# Patient Record
Sex: FEMALE | Race: WHITE | HISPANIC OR LATINO | Employment: OTHER | URBAN - METROPOLITAN AREA
[De-identification: names, ages, dates, MRNs, and addresses within clinical notes are randomized per-mention and may not be internally consistent; named-entity substitution may affect disease eponyms.]

---

## 2017-04-03 ENCOUNTER — ALLSCRIPTS OFFICE VISIT (OUTPATIENT)
Dept: OTHER | Facility: OTHER | Age: 67
End: 2017-04-03

## 2017-04-03 DIAGNOSIS — E78.5 HYPERLIPIDEMIA: ICD-10-CM

## 2017-04-03 DIAGNOSIS — I10 ESSENTIAL (PRIMARY) HYPERTENSION: ICD-10-CM

## 2017-04-03 DIAGNOSIS — M19.042 PRIMARY OSTEOARTHRITIS OF LEFT HAND: ICD-10-CM

## 2017-04-03 DIAGNOSIS — M19.041 PRIMARY OSTEOARTHRITIS OF RIGHT HAND: ICD-10-CM

## 2017-04-03 DIAGNOSIS — M25.512 PAIN IN LEFT SHOULDER: ICD-10-CM

## 2017-04-03 DIAGNOSIS — T39.391A: ICD-10-CM

## 2017-04-08 ENCOUNTER — APPOINTMENT (OUTPATIENT)
Dept: LAB | Facility: HOSPITAL | Age: 67
End: 2017-04-08
Payer: MEDICARE

## 2017-04-08 ENCOUNTER — HOSPITAL ENCOUNTER (OUTPATIENT)
Dept: RADIOLOGY | Facility: HOSPITAL | Age: 67
Discharge: HOME | End: 2017-04-08
Payer: MEDICARE

## 2017-04-08 DIAGNOSIS — E78.5 HYPERLIPIDEMIA: ICD-10-CM

## 2017-04-08 DIAGNOSIS — M19.041 PRIMARY OSTEOARTHRITIS OF RIGHT HAND: ICD-10-CM

## 2017-04-08 DIAGNOSIS — T39.391A: ICD-10-CM

## 2017-04-08 DIAGNOSIS — M25.512 PAIN IN LEFT SHOULDER: ICD-10-CM

## 2017-04-08 DIAGNOSIS — M19.042 PRIMARY OSTEOARTHRITIS OF LEFT HAND: ICD-10-CM

## 2017-04-08 DIAGNOSIS — I10 ESSENTIAL (PRIMARY) HYPERTENSION: ICD-10-CM

## 2017-04-08 LAB
ALBUMIN SERPL BCP-MCNC: 3.9 G/DL (ref 3.5–5)
ALP SERPL-CCNC: 65 U/L (ref 46–116)
ALT SERPL W P-5'-P-CCNC: 34 U/L (ref 12–78)
ANION GAP SERPL CALCULATED.3IONS-SCNC: 7 MMOL/L (ref 4–13)
AST SERPL W P-5'-P-CCNC: 24 U/L (ref 5–45)
BASOPHILS # BLD AUTO: 0.02 THOUSANDS/ΜL (ref 0–0.1)
BASOPHILS NFR BLD AUTO: 0 % (ref 0–1)
BILIRUB SERPL-MCNC: 0.42 MG/DL (ref 0.2–1)
BUN SERPL-MCNC: 20 MG/DL (ref 5–25)
CALCIUM SERPL-MCNC: 9.1 MG/DL (ref 8.3–10.1)
CHLORIDE SERPL-SCNC: 107 MMOL/L (ref 100–108)
CHOLEST SERPL-MCNC: 148 MG/DL (ref 50–200)
CO2 SERPL-SCNC: 31 MMOL/L (ref 21–32)
CREAT SERPL-MCNC: 0.64 MG/DL (ref 0.6–1.3)
CREAT UR-MCNC: 113 MG/DL
EOSINOPHIL # BLD AUTO: 0.36 THOUSAND/ΜL (ref 0–0.61)
EOSINOPHIL NFR BLD AUTO: 7 % (ref 0–6)
ERYTHROCYTE [DISTWIDTH] IN BLOOD BY AUTOMATED COUNT: 13.8 % (ref 11.6–15.1)
GFR SERPL CREATININE-BSD FRML MDRD: >60 ML/MIN/1.73SQ M
GLUCOSE P FAST SERPL-MCNC: 103 MG/DL (ref 65–99)
HCT VFR BLD AUTO: 38.9 % (ref 34.8–46.1)
HDLC SERPL-MCNC: 43 MG/DL (ref 40–60)
HGB BLD-MCNC: 13 G/DL (ref 11.5–15.4)
LDLC SERPL CALC-MCNC: 74 MG/DL (ref 0–100)
LYMPHOCYTES # BLD AUTO: 1.89 THOUSANDS/ΜL (ref 0.6–4.47)
LYMPHOCYTES NFR BLD AUTO: 38 % (ref 14–44)
MCH RBC QN AUTO: 30.5 PG (ref 26.8–34.3)
MCHC RBC AUTO-ENTMCNC: 33.4 G/DL (ref 31.4–37.4)
MCV RBC AUTO: 91 FL (ref 82–98)
MICROALBUMIN UR-MCNC: 10.4 MG/L (ref 0–20)
MICROALBUMIN/CREAT 24H UR: 9 MG/G CREATININE (ref 0–30)
MONOCYTES # BLD AUTO: 0.41 THOUSAND/ΜL (ref 0.17–1.22)
MONOCYTES NFR BLD AUTO: 8 % (ref 4–12)
NEUTROPHILS # BLD AUTO: 2.33 THOUSANDS/ΜL (ref 1.85–7.62)
NEUTS SEG NFR BLD AUTO: 47 % (ref 43–75)
NRBC BLD AUTO-RTO: 0 /100 WBCS
PLATELET # BLD AUTO: 231 THOUSANDS/UL (ref 149–390)
PMV BLD AUTO: 11 FL (ref 8.9–12.7)
POTASSIUM SERPL-SCNC: 4.2 MMOL/L (ref 3.5–5.3)
PROT SERPL-MCNC: 7.3 G/DL (ref 6.4–8.2)
RBC # BLD AUTO: 4.26 MILLION/UL (ref 3.81–5.12)
SODIUM SERPL-SCNC: 145 MMOL/L (ref 136–145)
T4 FREE SERPL-MCNC: 0.98 NG/DL (ref 0.76–1.46)
TRIGL SERPL-MCNC: 153 MG/DL
TSH SERPL DL<=0.05 MIU/L-ACNC: 5.73 UIU/ML (ref 0.36–3.74)
WBC # BLD AUTO: 5.01 THOUSAND/UL (ref 4.31–10.16)

## 2017-04-08 PROCEDURE — 84439 ASSAY OF FREE THYROXINE: CPT

## 2017-04-08 PROCEDURE — 82570 ASSAY OF URINE CREATININE: CPT

## 2017-04-08 PROCEDURE — 73030 X-RAY EXAM OF SHOULDER: CPT

## 2017-04-08 PROCEDURE — 80061 LIPID PANEL: CPT

## 2017-04-08 PROCEDURE — 80053 COMPREHEN METABOLIC PANEL: CPT

## 2017-04-08 PROCEDURE — 82043 UR ALBUMIN QUANTITATIVE: CPT

## 2017-04-08 PROCEDURE — 36415 COLL VENOUS BLD VENIPUNCTURE: CPT

## 2017-04-08 PROCEDURE — 85025 COMPLETE CBC W/AUTO DIFF WBC: CPT

## 2017-04-08 PROCEDURE — 84443 ASSAY THYROID STIM HORMONE: CPT

## 2017-04-08 PROCEDURE — 73130 X-RAY EXAM OF HAND: CPT

## 2017-04-11 ENCOUNTER — GENERIC CONVERSION - ENCOUNTER (OUTPATIENT)
Dept: OTHER | Facility: OTHER | Age: 67
End: 2017-04-11

## 2017-04-18 ENCOUNTER — ALLSCRIPTS OFFICE VISIT (OUTPATIENT)
Dept: OTHER | Facility: OTHER | Age: 67
End: 2017-04-18

## 2017-04-28 ENCOUNTER — ALLSCRIPTS OFFICE VISIT (OUTPATIENT)
Dept: OTHER | Facility: OTHER | Age: 67
End: 2017-04-28

## 2017-05-09 ENCOUNTER — ALLSCRIPTS OFFICE VISIT (OUTPATIENT)
Dept: OTHER | Facility: OTHER | Age: 67
End: 2017-05-09

## 2017-05-10 ENCOUNTER — ALLSCRIPTS OFFICE VISIT (OUTPATIENT)
Dept: OTHER | Facility: OTHER | Age: 67
End: 2017-05-10

## 2017-05-12 ENCOUNTER — APPOINTMENT (OUTPATIENT)
Dept: PHYSICAL THERAPY | Facility: MEDICAL CENTER | Age: 67
End: 2017-05-12
Payer: MEDICARE

## 2017-05-12 DIAGNOSIS — M25.512 PAIN IN LEFT SHOULDER: ICD-10-CM

## 2017-05-12 PROCEDURE — 97162 PT EVAL MOD COMPLEX 30 MIN: CPT

## 2017-05-12 PROCEDURE — G8979 MOBILITY GOAL STATUS: HCPCS

## 2017-05-12 PROCEDURE — G8978 MOBILITY CURRENT STATUS: HCPCS

## 2017-05-15 ENCOUNTER — APPOINTMENT (OUTPATIENT)
Dept: PHYSICAL THERAPY | Facility: MEDICAL CENTER | Age: 67
End: 2017-05-15
Payer: MEDICARE

## 2017-05-15 PROCEDURE — 97140 MANUAL THERAPY 1/> REGIONS: CPT

## 2017-05-15 PROCEDURE — 97110 THERAPEUTIC EXERCISES: CPT

## 2017-05-17 ENCOUNTER — APPOINTMENT (OUTPATIENT)
Dept: PHYSICAL THERAPY | Facility: MEDICAL CENTER | Age: 67
End: 2017-05-17
Payer: MEDICARE

## 2017-05-17 PROCEDURE — 97110 THERAPEUTIC EXERCISES: CPT

## 2017-05-17 PROCEDURE — 97140 MANUAL THERAPY 1/> REGIONS: CPT

## 2017-05-22 ENCOUNTER — APPOINTMENT (OUTPATIENT)
Dept: PHYSICAL THERAPY | Facility: MEDICAL CENTER | Age: 67
End: 2017-05-22
Payer: MEDICARE

## 2017-05-22 PROCEDURE — 97110 THERAPEUTIC EXERCISES: CPT

## 2017-05-22 PROCEDURE — 97140 MANUAL THERAPY 1/> REGIONS: CPT

## 2017-05-24 ENCOUNTER — APPOINTMENT (OUTPATIENT)
Dept: PHYSICAL THERAPY | Facility: MEDICAL CENTER | Age: 67
End: 2017-05-24
Payer: MEDICARE

## 2017-05-24 PROCEDURE — 97110 THERAPEUTIC EXERCISES: CPT

## 2017-05-30 ENCOUNTER — APPOINTMENT (OUTPATIENT)
Dept: PHYSICAL THERAPY | Facility: MEDICAL CENTER | Age: 67
End: 2017-05-30
Payer: MEDICARE

## 2017-06-02 ENCOUNTER — APPOINTMENT (OUTPATIENT)
Dept: PHYSICAL THERAPY | Facility: MEDICAL CENTER | Age: 67
End: 2017-06-02
Payer: MEDICARE

## 2017-06-02 RX ORDER — METHYLPREDNISOLONE 4 MG/1
4 TABLET ORAL
Status: ON HOLD | COMMUNITY
End: 2018-01-18 | Stop reason: ALTCHOICE

## 2017-06-04 ENCOUNTER — ANESTHESIA EVENT (OUTPATIENT)
Dept: GASTROENTEROLOGY | Facility: MEDICAL CENTER | Age: 67
End: 2017-06-04
Payer: MEDICARE

## 2017-06-04 RX ORDER — ONDANSETRON 2 MG/ML
4 INJECTION INTRAMUSCULAR; INTRAVENOUS EVERY 6 HOURS PRN
Status: CANCELLED | OUTPATIENT
Start: 2017-06-04

## 2017-06-05 ENCOUNTER — GENERIC CONVERSION - ENCOUNTER (OUTPATIENT)
Dept: GASTROENTEROLOGY | Facility: MEDICAL CENTER | Age: 67
End: 2017-06-05

## 2017-06-05 ENCOUNTER — HOSPITAL ENCOUNTER (OUTPATIENT)
Facility: MEDICAL CENTER | Age: 67
Setting detail: OUTPATIENT SURGERY
Discharge: HOME | End: 2017-06-05
Attending: INTERNAL MEDICINE | Admitting: INTERNAL MEDICINE
Payer: MEDICARE

## 2017-06-05 ENCOUNTER — ANESTHESIA (OUTPATIENT)
Dept: GASTROENTEROLOGY | Facility: MEDICAL CENTER | Age: 67
End: 2017-06-05
Payer: MEDICARE

## 2017-06-05 VITALS
WEIGHT: 138 LBS | OXYGEN SATURATION: 96 % | TEMPERATURE: 97.6 F | HEART RATE: 65 BPM | RESPIRATION RATE: 16 BRPM | HEIGHT: 60 IN | DIASTOLIC BLOOD PRESSURE: 77 MMHG | BODY MASS INDEX: 27.09 KG/M2 | SYSTOLIC BLOOD PRESSURE: 156 MMHG

## 2017-06-05 DIAGNOSIS — R10.9 ABDOMINAL PAIN: ICD-10-CM

## 2017-06-05 PROCEDURE — 88305 TISSUE EXAM BY PATHOLOGIST: CPT | Performed by: INTERNAL MEDICINE

## 2017-06-05 PROCEDURE — 88342 IMHCHEM/IMCYTCHM 1ST ANTB: CPT | Performed by: INTERNAL MEDICINE

## 2017-06-05 RX ORDER — SODIUM CHLORIDE 9 MG/ML
125 INJECTION, SOLUTION INTRAVENOUS CONTINUOUS
Status: DISCONTINUED | OUTPATIENT
Start: 2017-06-05 | End: 2017-06-05 | Stop reason: HOSPADM

## 2017-06-05 RX ORDER — PROPOFOL 10 MG/ML
INJECTION, EMULSION INTRAVENOUS AS NEEDED
Status: DISCONTINUED | OUTPATIENT
Start: 2017-06-05 | End: 2017-06-05 | Stop reason: SURG

## 2017-06-05 RX ORDER — SODIUM CHLORIDE 9 MG/ML
125 INJECTION, SOLUTION INTRAVENOUS CONTINUOUS
Status: CANCELLED | OUTPATIENT
Start: 2017-06-05

## 2017-06-05 RX ADMIN — PROPOFOL 100 MG: 10 INJECTION, EMULSION INTRAVENOUS at 14:03

## 2017-06-05 RX ADMIN — SODIUM CHLORIDE: 0.9 INJECTION, SOLUTION INTRAVENOUS at 14:09

## 2017-06-05 RX ADMIN — SODIUM CHLORIDE: 0.9 INJECTION, SOLUTION INTRAVENOUS at 13:57

## 2017-06-05 RX ADMIN — PROPOFOL 20 MG: 10 INJECTION, EMULSION INTRAVENOUS at 14:08

## 2017-06-05 RX ADMIN — PROPOFOL 20 MG: 10 INJECTION, EMULSION INTRAVENOUS at 14:05

## 2017-06-05 RX ADMIN — LIDOCAINE HYDROCHLORIDE 100 MG: 20 INJECTION, SOLUTION INTRAVENOUS at 14:03

## 2017-06-07 ENCOUNTER — ALLSCRIPTS OFFICE VISIT (OUTPATIENT)
Dept: OTHER | Facility: OTHER | Age: 67
End: 2017-06-07

## 2017-06-07 DIAGNOSIS — M77.12 LATERAL EPICONDYLITIS OF LEFT ELBOW: ICD-10-CM

## 2017-06-07 DIAGNOSIS — M77.11 LATERAL EPICONDYLITIS OF RIGHT ELBOW: ICD-10-CM

## 2017-06-09 ENCOUNTER — ALLSCRIPTS OFFICE VISIT (OUTPATIENT)
Dept: OTHER | Facility: OTHER | Age: 67
End: 2017-06-09

## 2017-06-16 ENCOUNTER — APPOINTMENT (OUTPATIENT)
Dept: OCCUPATIONAL THERAPY | Facility: MEDICAL CENTER | Age: 67
End: 2017-06-16
Payer: MEDICARE

## 2017-06-16 DIAGNOSIS — M77.11 LATERAL EPICONDYLITIS OF RIGHT ELBOW: ICD-10-CM

## 2017-06-16 DIAGNOSIS — M77.12 LATERAL EPICONDYLITIS OF LEFT ELBOW: ICD-10-CM

## 2017-06-16 PROCEDURE — 97010 HOT OR COLD PACKS THERAPY: CPT

## 2017-06-16 PROCEDURE — G8990 OTHER PT/OT CURRENT STATUS: HCPCS

## 2017-06-16 PROCEDURE — G8991 OTHER PT/OT GOAL STATUS: HCPCS

## 2017-06-16 PROCEDURE — 97140 MANUAL THERAPY 1/> REGIONS: CPT

## 2017-06-16 PROCEDURE — 97165 OT EVAL LOW COMPLEX 30 MIN: CPT

## 2017-06-21 ENCOUNTER — APPOINTMENT (OUTPATIENT)
Dept: OCCUPATIONAL THERAPY | Facility: MEDICAL CENTER | Age: 67
End: 2017-06-21
Payer: MEDICARE

## 2017-06-21 PROCEDURE — 97110 THERAPEUTIC EXERCISES: CPT

## 2017-06-21 PROCEDURE — 97140 MANUAL THERAPY 1/> REGIONS: CPT

## 2017-06-21 PROCEDURE — 97010 HOT OR COLD PACKS THERAPY: CPT

## 2017-06-22 ENCOUNTER — ALLSCRIPTS OFFICE VISIT (OUTPATIENT)
Dept: OTHER | Facility: OTHER | Age: 67
End: 2017-06-22

## 2017-06-26 ENCOUNTER — APPOINTMENT (OUTPATIENT)
Dept: OCCUPATIONAL THERAPY | Facility: MEDICAL CENTER | Age: 67
End: 2017-06-26
Payer: MEDICARE

## 2017-06-26 PROCEDURE — 97010 HOT OR COLD PACKS THERAPY: CPT

## 2017-06-26 PROCEDURE — 97140 MANUAL THERAPY 1/> REGIONS: CPT

## 2017-06-26 PROCEDURE — 97110 THERAPEUTIC EXERCISES: CPT

## 2017-06-28 ENCOUNTER — APPOINTMENT (OUTPATIENT)
Dept: OCCUPATIONAL THERAPY | Facility: MEDICAL CENTER | Age: 67
End: 2017-06-28
Payer: MEDICARE

## 2017-06-28 PROCEDURE — 97140 MANUAL THERAPY 1/> REGIONS: CPT

## 2017-06-28 PROCEDURE — 97110 THERAPEUTIC EXERCISES: CPT

## 2017-06-28 PROCEDURE — 97010 HOT OR COLD PACKS THERAPY: CPT

## 2017-06-29 ENCOUNTER — GENERIC CONVERSION - ENCOUNTER (OUTPATIENT)
Dept: OTHER | Facility: OTHER | Age: 67
End: 2017-06-29

## 2017-07-03 ENCOUNTER — APPOINTMENT (OUTPATIENT)
Dept: OCCUPATIONAL THERAPY | Facility: MEDICAL CENTER | Age: 67
End: 2017-07-03
Payer: MEDICARE

## 2017-07-03 DIAGNOSIS — R94.6 ABNORMAL RESULTS OF THYROID FUNCTION STUDIES: ICD-10-CM

## 2017-07-03 DIAGNOSIS — Z12.31 ENCOUNTER FOR SCREENING MAMMOGRAM FOR MALIGNANT NEOPLASM OF BREAST: ICD-10-CM

## 2017-07-03 DIAGNOSIS — R10.2 PELVIC AND PERINEAL PAIN: ICD-10-CM

## 2017-07-03 DIAGNOSIS — Z01.419 ENCOUNTER FOR GYNECOLOGICAL EXAMINATION WITHOUT ABNORMAL FINDING: ICD-10-CM

## 2017-07-03 DIAGNOSIS — D25.9 LEIOMYOMA OF UTERUS: ICD-10-CM

## 2017-07-05 ENCOUNTER — APPOINTMENT (OUTPATIENT)
Dept: OCCUPATIONAL THERAPY | Facility: MEDICAL CENTER | Age: 67
End: 2017-07-05
Payer: MEDICARE

## 2017-07-05 ENCOUNTER — HOSPITAL ENCOUNTER (OUTPATIENT)
Dept: MAMMOGRAPHY | Facility: HOSPITAL | Age: 67
Discharge: HOME | End: 2017-07-05
Payer: MEDICARE

## 2017-07-05 DIAGNOSIS — Z12.39 ENCOUNTER FOR OTHER SCREENING FOR MALIGNANT NEOPLASM OF BREAST: ICD-10-CM

## 2017-07-05 PROCEDURE — G0202 SCR MAMMO BI INCL CAD: HCPCS

## 2017-07-07 ENCOUNTER — ALLSCRIPTS OFFICE VISIT (OUTPATIENT)
Dept: OTHER | Facility: OTHER | Age: 67
End: 2017-07-07

## 2017-07-07 ENCOUNTER — TRANSCRIBE ORDERS (OUTPATIENT)
Dept: ADMINISTRATIVE | Facility: HOSPITAL | Age: 67
End: 2017-07-07

## 2017-07-07 DIAGNOSIS — M75.102 ROTATOR CUFF SYNDROME OF LEFT SHOULDER: Primary | ICD-10-CM

## 2017-07-08 ENCOUNTER — HOSPITAL ENCOUNTER (OUTPATIENT)
Dept: MRI IMAGING | Facility: HOSPITAL | Age: 67
Discharge: HOME | End: 2017-07-08
Payer: MEDICARE

## 2017-07-08 DIAGNOSIS — M75.102 ROTATOR CUFF SYNDROME OF LEFT SHOULDER: ICD-10-CM

## 2017-07-08 PROCEDURE — 73221 MRI JOINT UPR EXTREM W/O DYE: CPT

## 2017-07-10 ENCOUNTER — APPOINTMENT (OUTPATIENT)
Dept: OCCUPATIONAL THERAPY | Facility: MEDICAL CENTER | Age: 67
End: 2017-07-10
Payer: MEDICARE

## 2017-07-10 ENCOUNTER — APPOINTMENT (OUTPATIENT)
Dept: LAB | Facility: HOSPITAL | Age: 67
End: 2017-07-10
Payer: MEDICARE

## 2017-07-10 DIAGNOSIS — R94.6 ABNORMAL RESULTS OF THYROID FUNCTION STUDIES: ICD-10-CM

## 2017-07-10 LAB — TSH SERPL DL<=0.05 MIU/L-ACNC: 4.48 UIU/ML (ref 0.36–3.74)

## 2017-07-10 PROCEDURE — 97010 HOT OR COLD PACKS THERAPY: CPT

## 2017-07-10 PROCEDURE — 84443 ASSAY THYROID STIM HORMONE: CPT

## 2017-07-10 PROCEDURE — 97140 MANUAL THERAPY 1/> REGIONS: CPT

## 2017-07-10 PROCEDURE — 97110 THERAPEUTIC EXERCISES: CPT

## 2017-07-10 PROCEDURE — 36415 COLL VENOUS BLD VENIPUNCTURE: CPT

## 2017-07-11 ENCOUNTER — GENERIC CONVERSION - ENCOUNTER (OUTPATIENT)
Dept: OTHER | Facility: OTHER | Age: 67
End: 2017-07-11

## 2017-07-12 ENCOUNTER — ALLSCRIPTS OFFICE VISIT (OUTPATIENT)
Dept: OTHER | Facility: OTHER | Age: 67
End: 2017-07-12

## 2017-07-12 ENCOUNTER — GENERIC CONVERSION - ENCOUNTER (OUTPATIENT)
Dept: OTHER | Facility: OTHER | Age: 67
End: 2017-07-12

## 2017-07-17 ENCOUNTER — GENERIC CONVERSION - ENCOUNTER (OUTPATIENT)
Dept: OTHER | Facility: OTHER | Age: 67
End: 2017-07-17

## 2017-07-17 ENCOUNTER — ALLSCRIPTS OFFICE VISIT (OUTPATIENT)
Dept: OTHER | Facility: OTHER | Age: 67
End: 2017-07-17

## 2017-07-17 PROCEDURE — 87624 HPV HI-RISK TYP POOLED RSLT: CPT | Performed by: NURSE PRACTITIONER

## 2017-07-17 PROCEDURE — G0145 SCR C/V CYTO,THINLAYER,RESCR: HCPCS | Performed by: NURSE PRACTITIONER

## 2017-07-18 ENCOUNTER — LAB REQUISITION (OUTPATIENT)
Dept: LAB | Facility: HOSPITAL | Age: 67
End: 2017-07-18
Payer: MEDICARE

## 2017-07-18 DIAGNOSIS — Z01.419 ENCOUNTER FOR GYNECOLOGICAL EXAMINATION WITHOUT ABNORMAL FINDING: ICD-10-CM

## 2017-07-20 LAB — HPV RRNA GENITAL QL NAA+PROBE: NORMAL

## 2017-07-21 ENCOUNTER — HOSPITAL ENCOUNTER (OUTPATIENT)
Dept: ULTRASOUND IMAGING | Facility: HOSPITAL | Age: 67
Discharge: HOME | End: 2017-07-21
Payer: MEDICARE

## 2017-07-21 DIAGNOSIS — R10.2 PELVIC AND PERINEAL PAIN: ICD-10-CM

## 2017-07-21 DIAGNOSIS — D25.9 LEIOMYOMA OF UTERUS: ICD-10-CM

## 2017-07-21 LAB
LAB AP GYN PRIMARY INTERPRETATION: NORMAL
Lab: NORMAL

## 2017-07-21 PROCEDURE — 76856 US EXAM PELVIC COMPLETE: CPT

## 2017-07-25 ENCOUNTER — ALLSCRIPTS OFFICE VISIT (OUTPATIENT)
Dept: OTHER | Facility: OTHER | Age: 67
End: 2017-07-25

## 2017-07-31 ENCOUNTER — GENERIC CONVERSION - ENCOUNTER (OUTPATIENT)
Dept: OTHER | Facility: OTHER | Age: 67
End: 2017-07-31

## 2017-08-03 ENCOUNTER — ALLSCRIPTS OFFICE VISIT (OUTPATIENT)
Dept: OTHER | Facility: OTHER | Age: 67
End: 2017-08-03

## 2017-08-03 DIAGNOSIS — R14.0 ABDOMINAL DISTENSION (GASEOUS): ICD-10-CM

## 2017-08-03 DIAGNOSIS — N39.3 STRESS INCONTINENCE: ICD-10-CM

## 2017-08-08 ENCOUNTER — ALLSCRIPTS OFFICE VISIT (OUTPATIENT)
Dept: OTHER | Facility: OTHER | Age: 67
End: 2017-08-08

## 2017-08-08 ENCOUNTER — GENERIC CONVERSION - ENCOUNTER (OUTPATIENT)
Dept: OTHER | Facility: OTHER | Age: 67
End: 2017-08-08

## 2017-08-08 ENCOUNTER — APPOINTMENT (OUTPATIENT)
Dept: LAB | Facility: HOSPITAL | Age: 67
End: 2017-08-08
Payer: MEDICARE

## 2017-08-08 DIAGNOSIS — N39.3 STRESS INCONTINENCE: ICD-10-CM

## 2017-08-08 LAB
BILIRUB UR QL STRIP: NORMAL
CLARITY UR: NORMAL
COLOR UR: YELLOW
GLUCOSE (HISTORICAL): NORMAL
HGB UR QL STRIP.AUTO: NORMAL
KETONES UR STRIP-MCNC: NORMAL MG/DL
LEUKOCYTE ESTERASE UR QL STRIP: NORMAL
NITRITE UR QL STRIP: NORMAL
PROT UR STRIP-MCNC: NORMAL MG/DL
UROBILINOGEN UR QL STRIP.AUTO: 0.2

## 2017-08-08 PROCEDURE — 87086 URINE CULTURE/COLONY COUNT: CPT

## 2017-08-09 ENCOUNTER — ALLSCRIPTS OFFICE VISIT (OUTPATIENT)
Dept: OTHER | Facility: OTHER | Age: 67
End: 2017-08-09

## 2017-08-09 LAB — BACTERIA UR CULT: NORMAL

## 2017-09-11 ENCOUNTER — APPOINTMENT (OUTPATIENT)
Dept: LAB | Facility: HOSPITAL | Age: 67
End: 2017-09-11
Attending: ORTHOPAEDIC SURGERY
Payer: COMMERCIAL

## 2017-09-11 ENCOUNTER — TRANSCRIBE ORDERS (OUTPATIENT)
Dept: LAB | Facility: HOSPITAL | Age: 67
End: 2017-09-11

## 2017-09-11 ENCOUNTER — ALLSCRIPTS OFFICE VISIT (OUTPATIENT)
Dept: OTHER | Facility: OTHER | Age: 67
End: 2017-09-11

## 2017-09-11 DIAGNOSIS — I65.23 OCCLUSION AND STENOSIS OF BILATERAL CAROTID ARTERIES: ICD-10-CM

## 2017-09-11 DIAGNOSIS — M75.102 ROTATOR CUFF SYNDROME OF LEFT SHOULDER: Primary | ICD-10-CM

## 2017-09-11 DIAGNOSIS — M81.0 AGE-RELATED OSTEOPOROSIS WITHOUT CURRENT PATHOLOGICAL FRACTURE: ICD-10-CM

## 2017-09-11 DIAGNOSIS — M75.102 TEAR OF LEFT ROTATOR CUFF: ICD-10-CM

## 2017-09-11 DIAGNOSIS — R07.9 CHEST PAIN: ICD-10-CM

## 2017-09-11 DIAGNOSIS — M75.02 ADHESIVE CAPSULITIS OF LEFT SHOULDER: ICD-10-CM

## 2017-09-11 DIAGNOSIS — M75.102 ROTATOR CUFF SYNDROME OF LEFT SHOULDER: ICD-10-CM

## 2017-09-11 LAB
ALBUMIN SERPL BCP-MCNC: 4.1 G/DL (ref 3.5–5)
ALP SERPL-CCNC: 81 U/L (ref 46–116)
ALT SERPL W P-5'-P-CCNC: 36 U/L (ref 12–78)
ANION GAP SERPL CALCULATED.3IONS-SCNC: 9 MMOL/L (ref 4–13)
AST SERPL W P-5'-P-CCNC: 25 U/L (ref 5–45)
ATRIAL RATE: 63 BPM
BASOPHILS # BLD AUTO: 0.01 THOUSANDS/ΜL (ref 0–0.1)
BASOPHILS NFR BLD AUTO: 0 % (ref 0–1)
BILIRUB SERPL-MCNC: 0.28 MG/DL (ref 0.2–1)
BUN SERPL-MCNC: 19 MG/DL (ref 5–25)
CALCIUM SERPL-MCNC: 9.2 MG/DL (ref 8.3–10.1)
CHLORIDE SERPL-SCNC: 105 MMOL/L (ref 100–108)
CO2 SERPL-SCNC: 27 MMOL/L (ref 21–32)
CREAT SERPL-MCNC: 0.9 MG/DL (ref 0.6–1.3)
EOSINOPHIL # BLD AUTO: 0.25 THOUSAND/ΜL (ref 0–0.61)
EOSINOPHIL NFR BLD AUTO: 5 % (ref 0–6)
ERYTHROCYTE [DISTWIDTH] IN BLOOD BY AUTOMATED COUNT: 13.4 % (ref 11.6–15.1)
GFR SERPL CREATININE-BSD FRML MDRD: 67 ML/MIN/1.73SQ M
GLUCOSE SERPL-MCNC: 117 MG/DL (ref 65–140)
HCT VFR BLD AUTO: 37.5 % (ref 34.8–46.1)
HGB BLD-MCNC: 12.5 G/DL (ref 11.5–15.4)
LYMPHOCYTES # BLD AUTO: 2.22 THOUSANDS/ΜL (ref 0.6–4.47)
LYMPHOCYTES NFR BLD AUTO: 43 % (ref 14–44)
MCH RBC QN AUTO: 30.8 PG (ref 26.8–34.3)
MCHC RBC AUTO-ENTMCNC: 33.3 G/DL (ref 31.4–37.4)
MCV RBC AUTO: 92 FL (ref 82–98)
MONOCYTES # BLD AUTO: 0.39 THOUSAND/ΜL (ref 0.17–1.22)
MONOCYTES NFR BLD AUTO: 8 % (ref 4–12)
NEUTROPHILS # BLD AUTO: 2.26 THOUSANDS/ΜL (ref 1.85–7.62)
NEUTS SEG NFR BLD AUTO: 44 % (ref 43–75)
NRBC BLD AUTO-RTO: 0 /100 WBCS
P AXIS: 54 DEGREES
PLATELET # BLD AUTO: 234 THOUSANDS/UL (ref 149–390)
PMV BLD AUTO: 11 FL (ref 8.9–12.7)
POTASSIUM SERPL-SCNC: 3.9 MMOL/L (ref 3.5–5.3)
PR INTERVAL: 148 MS
PROT SERPL-MCNC: 7.6 G/DL (ref 6.4–8.2)
QRS AXIS: 21 DEGREES
QRSD INTERVAL: 88 MS
QT INTERVAL: 420 MS
QTC INTERVAL: 429 MS
RBC # BLD AUTO: 4.06 MILLION/UL (ref 3.81–5.12)
SODIUM SERPL-SCNC: 141 MMOL/L (ref 136–145)
T WAVE AXIS: 44 DEGREES
VENTRICULAR RATE: 63 BPM
WBC # BLD AUTO: 5.14 THOUSAND/UL (ref 4.31–10.16)

## 2017-09-11 PROCEDURE — 93005 ELECTROCARDIOGRAM TRACING: CPT

## 2017-09-11 PROCEDURE — 85025 COMPLETE CBC W/AUTO DIFF WBC: CPT

## 2017-09-11 PROCEDURE — 80053 COMPREHEN METABOLIC PANEL: CPT

## 2017-09-11 PROCEDURE — 36415 COLL VENOUS BLD VENIPUNCTURE: CPT

## 2017-09-18 ENCOUNTER — ALLSCRIPTS OFFICE VISIT (OUTPATIENT)
Dept: OTHER | Facility: OTHER | Age: 67
End: 2017-09-18

## 2017-09-20 ENCOUNTER — GENERIC CONVERSION - ENCOUNTER (OUTPATIENT)
Dept: OTHER | Facility: OTHER | Age: 67
End: 2017-09-20

## 2017-10-13 ENCOUNTER — HOSPITAL ENCOUNTER (OUTPATIENT)
Dept: NON INVASIVE DIAGNOSTICS | Facility: CLINIC | Age: 67
Discharge: HOME | End: 2017-10-13
Payer: COMMERCIAL

## 2017-10-13 DIAGNOSIS — I65.23 OCCLUSION AND STENOSIS OF BILATERAL CAROTID ARTERIES: ICD-10-CM

## 2017-10-13 PROCEDURE — 93880 EXTRACRANIAL BILAT STUDY: CPT

## 2017-10-26 ENCOUNTER — GENERIC CONVERSION - ENCOUNTER (OUTPATIENT)
Dept: OTHER | Facility: OTHER | Age: 67
End: 2017-10-26

## 2018-01-09 NOTE — MISCELLANEOUS
Reason For Visit  Reason For Visit Free Text Note Form: SW CONTACTED PATIENT VIA PHONE TO FOLLOW UP ROBBY Watson 112  PATIENT REPORTED APPOINTMENT WENT WELL  HAS THERAPY APPOINTMENT THIS WEEK  NO OTHER CONCERN AT THIS TIME  Active Problems    1  Abdominal distension (787 3) (R14 0)   2  Abdominal pain (789 00) (R10 9)   3  Anxiety (300 00) (F41 9)   4  Bowel incontinence (787 60) (R15 9)   5  Breast cancer screening (V76 10) (Z12 39)   6  Cervical cancer screening (V76 2) (Z12 4)   7  Cervical radiculitis (723 4) (M54 12)   8  Chest pain (786 50) (R07 9)   9  Closed head injury, subsequent encounter (V58 89,959 01) (S09 90XD)   10  Colon cancer screening (V76 51) (Z12 11)   11  Confusion (Symptom) (298 9)   12  Constipation (564 00) (K59 00)   13  Depression (311) (F32 9)   14  Elevated TSH (794 5) (R94 6)   15  Encounter for routine gynecological examination with Papanicolaou smear of cervix    (V72 31,V76 2) (Z01 419)   16  Encounter for screening mammogram for malignant neoplasm of breast (V76 12)    (Z12 31)   17  Epigastric pain (789 06) (R10 13)   18  Gastroesophageal reflux disease (530 81) (K21 9)   19  Headache (784 0) (R51)   20  History of arthritis (V13 4) (Z87 39)   21  History of CVA (cerebrovascular accident) (V12 54) (Z86 73)   22  History of hypertension (V12 59) (Z86 79)   23  Hyperlipidemia (272 4) (E78 5)   24  Lateral epicondylitis of both elbows (726 32) (M77 11,M77 12)   25  Left rotator cuff tear (840 4) (M75 102)   26  Left shoulder pain (719 41) (M25 512)   27  Loss of appetite (783 0) (R63 0)   28  Memory difficulties (780 93) (R41 3)   29  Memory Lapses Or Loss (780 93)   30  Muscle ache (729 1) (M79 1)   31  Muscle Cramps (729 82)   32  Muscle weakness (728 87) (M62 81)   33  Nausea (787 02) (R11 0)   34  Neck pain (723 1) (M54 2)   35  NSAID induced gastritis (535 40,E935 8) (K29 60,T39 395A)   36  Numbness (782 0) (R20 0)   37   Osteoarthritis of both hands (539 94) (M19 041,M19 042)   38  Pain of foot and toes (729 5) (M79 673)   39  Palpitations (785 1) (R00 2)   40  Pelvic pain (R10 2)   41  Post concussion syndrome (310 2) (F07 81)   42  Right arm weakness (729 89) (R29 898)   43  Skin rash (782 1) (R21)   44  Sleep disorder (780 50) (G47 9)   45  Stress incontinence of urine (N39 3)   46  Stroke Syndrome (436)   47  History of Subarachnoid hemorrhage (430) (I60 9)   48  Thyroid disorder (246 9) (E07 9)   49  Traumatic subdural hematoma, without loss of consciousness, subsequent encounter   50  Trigger ring finger of left hand (727 03) (M65 342)   51  Trigger ring finger of right hand (727 03) (M65 341)   52  Urinary incontinence (788 30) (R32)   53  Uterine leiomyoma, unspecified location (218 9) (D25 9)   54  Vasogenic cerebral edema (348 5) (G93 6)   55  Vision loss (369 9) (H54 7)   56  Visual field defect (368 40) (H53 40)    Current Meds   1  Citalopram Hydrobromide 20 MG Oral Tablet; take 1 tablet by mouth every day    Requested for: 22Jun2017; Last Rx:22Jun2017 Ordered   2  MethylPREDNISolone 4 MG Oral Tablet (Medrol); DAY #1-2: 6 TABS/DAY, DAY #3-4: 5   TABS/DAY, DAY #5-6: 4 TABS/DAY, DAY #7-8: 3 TABS/DAY, DAY #9-10: 2 TABS/DAY,   DAY#11-12: 1 TAB/DAY; Therapy: 97PVH0735 to (Evaluate:14Oni0114)  Requested for: 07Atj2927; Last   Rx:50Nnm9030; Status: ACTIVE - Transmit to Pharmacy - Awaiting Verification Ordered   3  Omeprazole 20 MG Oral Capsule Delayed Release; take 1 capsule by mouth at bedtime; Therapy: 41VAD9796 to (Evaluate:35Dry9210)  Requested for: 12GUA7885; Last   Rx:22Jun2017 Ordered   4  Tylenol Extra Strength 500 MG Oral Tablet; TAKE 2 TABLET Every 6 hours PRN pain; Therapy: 94SSD4405 to (Evaluate:85Wvl8604); Last Rx:03Apr2017 Ordered    Allergies    1   No Known Drug Allergies    Signatures   Electronically signed by : JASMINA Gonzales; Jul 31 2017  4:19PM EST                       (Author)

## 2018-01-10 NOTE — MISCELLANEOUS
Message  GI Reminder Recall [facility]:   Date: 08/08/2017   Dear Nirmala Sellers:     Review of our records shows you are due for the following: Follow Up Visit  Please call the following office to schedule your appointment:   [address], [address], [place], [address] [facility] [phone]  We look forward to hearing from you! Sincerely,     [facility]      Signatures   Electronically signed by :  Jeanne Fuller, ; Aug  8 2017  3:10PM EST                       (Author)

## 2018-01-10 NOTE — RESULT NOTES
Verified Results  * MRI LUMBAR SPINE [address] [employer] [id number] 11:04AM Elis Myrtle Order Number: [id number]    - Patient Instructions: To schedule this appointment, please contact Central Scheduling at [phone]  Test Name Result Flag Reference   MRI LUMBAR SPINE [address] [employer] (Report)     MRI LUMBAR SPINE WITHOUT CONTRAST     INDICATION: R15 9: Full incontinence of feces   R32: Unspecified urinary incontinence  History taken directly from the electronic ordering system  COMPARISON: MRI of the cervical spine performed on 4/9/2015  TECHNIQUE: Sagittal T1, sagittal T2, sagittal inversion recovery, axial T1 and axial T2, coronal T2       IMAGE QUALITY: Diagnostic     FINDINGS:   Counting reference: Lumbosacral Junction For the purposes of this report, L4-5 is considered the level of the iliac crest  Please note the apparent preservation of an S1-S2 disc  ALIGNMENT: Alignment is maintained  MARROW SIGNAL: Marrow signal is within normal limits without signs of an infiltrative process  No signs of acute fracture or significant marrow edema pattern  There is minimal height loss to the superior T11 vertebral body with signs of endplate marrow edema pattern  This is likely reflective of remote compression deformity with less than 25% height loss  No significant bony retropulsion Remainder the    vertebral body heights are maintained  DISTAL CORD AND CONUS: Normal size and signal within the distal cord and conus  The conus ends at the L1-L2 level  PARASPINAL SOFT TISSUES: Paraspinal soft tissues are unremarkable  SACRUM: Normal signal within the sacrum  No evidence of insufficiency or stress fracture  LOWER THORACIC DISC SPACES: Normal disc height and signal  No disc herniation, canal stenosis or foraminal narrowing  LUMBAR DISC SPACES:        L1-L2: No significant spinal canal stenosis  No right and no left neural foraminal stenosis        L2-L3: No significant spinal canal stenosis  No right and no left neural foraminal stenosis  L3-L4: No significant spinal canal stenosis  No right and no left neural foraminal stenosis  L4-L5: Suggestion of minimal facet arthropathy  Left foraminal extrusion with superior extension seen on series 3 image 5 as well as series 5 image 17  No significant spinal canal stenosis  No right and moderate left neural foraminal stenosis  L5-S1: Interspace narrowing with circumferential bulging  Bilateral facet arthropathy  No significant spinal canal stenosis  No right and no left neural foraminal stenosis  IMPRESSION:     Left foraminal disc extrusion at L4-L5 resulting in moderate left neural foraminal stenosis  Otherwise, minimal spondylotic change at L4-L5 and L5-S1 without additional significant stenoses  Remote T11 superior endplate compression deformity with less than 25% height loss and no significant bony retropulsion         ##sigslh##sigslh            Workstation performed: [id number]     Signed by:   Cristal Waters MD   10/31/16

## 2018-01-11 ENCOUNTER — GENERIC CONVERSION - ENCOUNTER (OUTPATIENT)
Dept: OTHER | Facility: OTHER | Age: 68
End: 2018-01-11

## 2018-01-11 NOTE — PROGRESS NOTES
Preliminary Nursing Report                Patient Information    Initial Encounter Entry Date:   2017 2:53 PM EST (Automated Transmission Automated Transmission)       Last Modified:   {Suzy Mariano}              Legal Name: Gianni Gibson Number:        YOB: 1950        Age (years): 73        Gender: F        Body Mass Index (BMI): 28 kg/m2        Height: 59 in  Weight: 137 lbs (62 kgs)           Address:    [facility]               Phone: [phone]   (consent to leave messages)        Email:        Ethnicity: Decline to State        Hinduism:        Marital Status:        Preferred Language: English        Race: Other Race                    Patient Insurance Information        Primary Insurance Information Carrier Name: {Primary  CarrierName}           Carrier Address:   {Primary  CarrierAddress}              Carrier Phone: {Primary  CarrierPhone}          Group Number: {Primary  GroupNumber}          Policy Number: {Primary  PolicyNumber}          Insured Name: {Primary  InsuredName}          Insured : {Primary  InsuredDOB}          Relationship to Insured: {Primary  RelationshiptoInsured}           Secondary Insurance Information Carrier Name: {Secondary  CarrierName}           Carrier Address:   {Secondary  CarrierAddress}              Carrier Phone: {Secondary  CarrierPhone}          Group Number: {Secondary  GroupNumber}          Policy Number: {Secondary  PolicyNumber}          Insured Name: {Secondary  InsuredName}          Insured : {Secondary  InsuredDOB}          Relationship to Insured: {Secondary  RelationshiptoInsured}                       Health Profile   Booking #:   Trisha Erp #: [id number]               DOS: 10/04/2017    Surgery : SHOULDER ARTHROSCOPY/SURGERY        Add'l Procedures/Notes:     Surgery Risk: Intermediate          Precautions     Chest pain       History of CVA (cerebrovascular accident) Palpitations                   Allergies    No Known Drug Allergies             Medications    Citalopram Hydrobromide 20 MG Oral Tablet       Omeprazole 20 MG Oral Capsule Delayed Release       Tylenol Extra Strength 500 MG Oral Tablet               Conditions    Abdominal distension       Abdominal pain       Adhesive capsulitis of left shoulder       Anxiety       Bowel incontinence       Breast cancer screening       Cervical cancer screening       Cervical radiculitis       Chest pain       Closed head injury, subsequent encounter       Colon cancer screening       Confusion (Symptom)       Constipation       Depression       Elevated TSH       Encounter for routine gynecological examination with Papanicolaou smear of cervix       Encounter for screening mammogram for malignant neoplasm of breast       Epigastric pain       Follow-up for resolved condition       Gastroesophageal reflux disease       Headache       History of CVA (cerebrovascular accident)       Hyperlipidemia       Lateral epicondylitis of both elbows       Left rotator cuff tear       Left shoulder pain       Loss of appetite       Memory difficulties       Memory Lapses Or Loss       Muscle ache       Muscle Cramps       Muscle weakness       Nausea       Neck pain       NSAID induced gastritis       Numbness       Osteoarthritis of both hands       Pain of foot and toes       Palpitations       Pelvic pain       Post concussion syndrome       Right arm weakness       Skin rash       Sleep disorder       Stress incontinence of urine       Stroke Syndrome       Thyroid disorder       Traumatic subdural hematoma, without loss of consciousness, subsequent encounter       Trigger ring finger of left hand       Trigger ring finger of right hand       Urinary incontinence       Vasogenic cerebral edema       Vision loss       Visual field defect               Family History    None             Surgical History    None             Social History Caffeine Use       Educational Level - Grade 12 Completed       Lives with spouse       Marital History - Currently        Never a smoker       No alcohol use       No illicit drug use       [place]                               Patient Instructions       Medical Procedure Risk  NPO Instructions   The day before surgery it is recommended to have a light dinner at your usual time and you are allowed a light snack early in the evening  Do not eat anything heavy or eat a big meal after 7pm  Do not eat or drink anything after midnight prior to your surgery  If you are supposed to take any of your medications, do so with a sip of water  Failure to follow these instructions can lead to an increased risk of lung complications and may result in a delay or cancellation of your procedure  If you have any questions, contact your institution for further instructions  No candy, no gum, no mints, no chewing tobacco          Gastroesophageal reflux disease  Medication Instruction (Reflux Disease)   Please continue to take this medication on your normal schedule  If this is an oral medication and you take in the morning, you may do so with a sip of water  Citalopram Hydrobromide 20 MG Oral Tablet  Medication Instruction (SSRI - Antidepressants) 80  Please continue to take this medication on your normal schedule  If this is an oral medication and you take in the morning, you may do so with a sip of water  Testing Considerations       ? Chest X-ray (CXR) t  Consider a Chest X-Ray (CXR) if patient is having respiratory symptoms  Triggered by: Chest pain         ? Complete Blood Count (CBC) t  If test was completed and normal within last six months, repeat test is not necessary  Triggered by: Memory Lapses Or Loss, Chest pain, History of CVA (cerebrovascular accident), Palpitations         ?  Comprehensive Metabolic Panel (CMP) t  If test was completed and normal within last six months, repeat test is not necessary  Triggered by: Chest pain, History of CVA (cerebrovascular accident), Palpitations         ? Electrocardiogram (ECG) t  Patient does not need new test if normal ECG is present within the last six months and no change in clinical condition  Triggered by: Chest pain, History of CVA (cerebrovascular accident), Palpitations, Age or Facility Rec               Consultations       ? Cardiac Consult (Major MI) c  If the patient has had a Myocardial Infarction within 30 days then a cardiac consult is indicated  Also, if the patient is having increasing frequency or intensity of chest pain then a cardiac consult is indicated  Otherwise, optimization of medical therapy is recommended under the direction of the PCP or cardiologist   Triggered by: Chest pain         ? Cardiac Consult (Major Rate) c  If the patient has a heart rate of greater than 100 bpm, or if the heart rhythm disturbance is new, then a cardiac consult is indicated  Otherwise, optimization of medical therapy is recommended under the direction of the PCP or cardiologist   Triggered by: Palpitations         ? PCP eval/Echo t  Triggered by: History of CVA (cerebrovascular accident)         ? Primary Care Physician Evaluation   Primary care physician may need to evaluate patient prior to surgery  This is likely NOT necessary if the listed conditions are chronic and stable  Triggered by: Memory Lapses Or Loss, History of CVA (cerebrovascular accident), Loss of appetite               Miscellaneous Questions         Question: Are you able to walk up a flight of stairs, walk up a hill or do heavy housework WITHOUT having chest pain or shortness of breath? Answer: YES                   Allergies/Conditions/Medications Not Found        The following were not recognized by our system when generating the recommendations  Please consider if this would impact any preoperative protocols  ? Bowel incontinence       ? Caffeine Use       ?  Closed head injury, subsequent encounter       ? Confusion (Symptom)       ? Educational Level - Grade 12 Completed       ? Encounter for screening mammogram for malignant neoplasm of breast       ? Lives with spouse       ? Marital History - Currently        ? Muscle Cramps       ? Never a smoker       ? No alcohol use       ? No illicit drug use       ? Pelvic pain       ? [place]       ? Stress incontinence of urine       ? Stroke Syndrome       ? Traumatic subdural hematoma, without loss of consciousness, subsequent encounter       ? Trigger ring finger of left hand       ? Trigger ring finger of right hand       ? Vision loss       ? Visual field defect       ? Omeprazole 20 MG Oral Capsule Delayed Release                  Appointment Information         Date:    10/04/2017        Location:    [place]        Address:           Directions:                      Footnotes revision 14      ?? Denotes a free-text entry  Legal Disclaimer: Any and all recommendations and services provided herein are designed to assist in the preoperative care of the patient  Nothing contained herein is designed to replace, eliminate or alleviate the responsibility of the attending physician to supervise and determine the patient?s preoperative care and course of treatment  Failure to provide complete, accurate information may negatively impact the system?s ability to recommend the proper preoperative protocol  THE ATTENDING PHYSICIAN IS RESPONSIBLE TO REVIEW THE SUGGESTED PREOPERATIVE PROTOCOLS/COURSE OF TREATMENT AND PRESCRIBE THE FINAL COURSE OF PREOPERATIVE TREATMENT IN CONSULTATION WITH THE PATIENT  THE [employer] SYSTEM AND ITS MATERIALS ARE PROVIDED ? AS IS? WITHOUT WARRANTY OF ANY KIND, EXPRESS OR IMPLIED, INCLUDING, BUT NOT LIMITED TO, WARRANTIES OF PERFORMANCE OR MERCHANTABILITY OR FITNESS FOR A PARTICULAR PURPOSE   PATIENT AND PHYSICIANS HEREBY AGREE THAT THEIR USE OF THE MATERIALS AND RESOURCES ACT AS A CONSENT TO RELEASE AND WAIVE ePREOP FROM ANY AND ALL CLAIMS OF WARRANTY, TORT OR CONTRACT LAW OF ANY KIND  Electronically signed by:William JMIENEZ    Sep 20 2017  2:06PM EST

## 2018-01-11 NOTE — H&P
H&P Exam - Gynecology   Estefanía Coats 78 y o  female MRN: [id number]  Unit/Bed#:  Encounter: [id number]    Assessment/Plan     Assessment:  78 y o  with MELISSA-dominant mixed incontinence    Plan:   Plan to proceed with pubovaginal sling  Discussed that without urodynamics it is possible that we have not fully ruled out ISD, however other than age and decreased resistance she has no other risk factors  Low suspicion for ISD  I discussed higher risk of bladder/bowel injury with retropubic sling and higher rate of persistent MELISSA with single-incision sling if she does have underling ISD  Pt understands and wishes to proceed with single-incision sling at this time  I counseled the patient for surgery myself in Greek  We discussed the risks of surgery including bleeding, infection, and VTE  We discussed the risks specific to pubovaginal sling including injury to bladder, urethra, and vagina  We discussed the risks of mesh including mesh exposure and erosion into the bladder or urethra but that these risks are low when the sling is placed with optimal surgical technique  We discussed the alternative of expectant management with vaginal estrogen and Kegel exercises, however the patient reports the MELISSA is severely affecting her QOL and wishes to proceed with surgery  She was medically cleared for a minor procedure in Sept 2017 and does not need additional clearance  Her BP is well-controlled in the office today  For her VVA, vaginal estrogen is the best treatment for her atrophy, and low enough dose that it is low-risk of causing vascular disease even despite her history  However we will revisit this with her PCP and will discuss the possibility of restarting ASA in the face of vaginal estrogen cream      For her vasomotor sx, she is a poor candidate for systemic estrogen therapy given her cardiovascular history  Will start paroxetine  Preoperative and postoperative procedures and care were reviewed  All questions and concerns addressed  The patient signed the consent  Yola Hughes MD        History of Present Illness     HPI:  Vallie Shone is a 78 y o  female pleasant, Luxem[place]ger-speaking who presents for her preoperative visit  Long-standing history of MELISSA and UUI, reports she leaks daily  No POP sx  C/o dyspareunia, was started on vaginal estrogen but pt self-discontinued because "it did not help me"  Also c/o vasomotor sx  Recommended urodynamics but patient never got them done  She is a nonsmoker  PMH prior CVA in 2013 followed by a subdural hemorrhage due to a trauma  She has a known hx of R ICA occlusion but has been asymptomatic  She was evaluated by vascular surgery Sept 2017 and her carotid study remains stable and unchanged from 2015  She is not on ASA but has been on atorvastatin since September    Review of Systems   Constitutional: Negative for activity change and appetite change  HENT: Negative for congestion  Respiratory: Negative for apnea and chest tightness  Cardiovascular: Negative for chest pain, palpitations and leg swelling  Gastrointestinal: Negative for abdominal distention, abdominal pain, anal bleeding, blood in stool and constipation  Genitourinary: Negative for hematuria, pelvic pain, vaginal bleeding and vaginal discharge  Neurological: Negative for dizziness, syncope, facial asymmetry, speech difficulty, light-headedness, numbness and headaches  Historical Information   Past Medical History:   Diagnosis Date    Abdominal pain     Anxiety     Arthritis     osteo both hands    Bowel incontinence     Chest pain     Constipation     CVA (cerebral vascular accident) ([address] )  CVA in 2013   Hx total R ICA occlusion  [facility] - Dr Malcom Santoro 9/2017,  Carotid US 10/2017:  R ICA occluded, Left ICA is patent, stable from 2015  Not currently on ASA  Taking atorvastatin     Disease of thyroid gland     Epigastric pain     with distention    Falls     Hyperlipidemia     Hypertension     Migraine     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    Palpitations     Stroke [facility])      Past Surgical History:   Procedure Laterality Date    AXILLARY SURGERY      cyst surgery     SECTION      KNEE SURGERY      OTHER SURGICAL HISTORY      cyst removed from axilla    [place] ESOPHAGOGASTRODUODENOSCOPY TRANSORAL DIAGNOSTIC N/A 2017    Procedure: ESOPHAGOGASTRODUODENOSCOPY (EGD); Surgeon: Rudy Velez MD;  Location: [facility] GI [facility]; Service: Gastroenterology     OB/GYN History: Prior CS x 2    Family History   Problem Relation Age of Onset    Family history unknown: Yes     Social History   History   Alcohol Use No     History   Drug Use No     History   Smoking Status    Never Smoker   Smokeless Tobacco    Not on file       Meds/Allergies   all current active meds have been reviewed and current meds:       No Known Allergies    Objective   Vitals: There were no vitals taken for this visit  No intake or output data in the 24 hours ending 18 1409    Invasive Devices:  Physical Exam   Constitutional: She is oriented to person, place, and time  She appears well-developed and well-nourished  Cardiovascular: Normal rate, regular rhythm and normal heart sounds  Pulmonary/Chest: Effort normal and breath sounds normal  No respiratory distress  She has no wheezes  She has no rales  She exhibits no tenderness  Abdominal: She exhibits no distension and no mass  There is no tenderness  There is no rebound and no guarding  Genitourinary: Vagina normal and uterus normal    Neurological: She is alert and oriented to person, place, and time  No cranial nerve deficit  Coordination normal    Skin: Skin is warm and dry  Psychiatric: She has a normal mood and affect  Her behavior is normal      POP Q: not done   no pelvic support defects  Urethra: decreased resistance  Q-tip: 0 to 60,  + CST  PVR: 26ml  Severely atrophic vagina    Lab Results: I have personally reviewed pertinent reports  Imaging: I have personally reviewed pertinent reports  Code Status: full code  Advance Directive and Living Will:      Power of :    POLST:      Counseling / Coordination of Care  Total floor / unit time spent today 30 minutes  Greater than 50% of total time was spent with the patient and / or family counseling and / or coordination of care  A description of the counseling / coordination of care: see above

## 2018-01-12 VITALS
DIASTOLIC BLOOD PRESSURE: 66 MMHG | WEIGHT: 133 LBS | HEIGHT: 59 IN | BODY MASS INDEX: 26.81 KG/M2 | SYSTOLIC BLOOD PRESSURE: 115 MMHG

## 2018-01-12 VITALS
SYSTOLIC BLOOD PRESSURE: 146 MMHG | BODY MASS INDEX: 27.82 KG/M2 | WEIGHT: 138 LBS | DIASTOLIC BLOOD PRESSURE: 70 MMHG | HEIGHT: 59 IN | RESPIRATION RATE: 12 BRPM | HEART RATE: 60 BPM

## 2018-01-12 VITALS
WEIGHT: 135 LBS | DIASTOLIC BLOOD PRESSURE: 78 MMHG | SYSTOLIC BLOOD PRESSURE: 118 MMHG | BODY MASS INDEX: 26.37 KG/M2 | HEART RATE: 66 BPM

## 2018-01-12 VITALS
TEMPERATURE: 97.5 F | SYSTOLIC BLOOD PRESSURE: 130 MMHG | HEART RATE: 64 BPM | OXYGEN SATURATION: 96 % | RESPIRATION RATE: 16 BRPM | DIASTOLIC BLOOD PRESSURE: 70 MMHG | WEIGHT: 133.31 LBS | BODY MASS INDEX: 26.88 KG/M2 | HEIGHT: 59 IN

## 2018-01-12 VITALS — DIASTOLIC BLOOD PRESSURE: 70 MMHG | SYSTOLIC BLOOD PRESSURE: 130 MMHG

## 2018-01-12 NOTE — MISCELLANEOUS
Provider Comments  Provider Comments:   Patient did not show up for her follow up  Tiago RMA attempted to call patients phone numbers, she could not get a hold of any one        Signatures   Electronically signed by : Joan Perkins [facility]; Jun 1 2016  1:42PM EST                       (Author)    Electronically signed by : Arpit Solano MD; Jun 6 2016  3:02PM EST                       (Acknowledgement)

## 2018-01-13 VITALS
SYSTOLIC BLOOD PRESSURE: 117 MMHG | BODY MASS INDEX: 27.21 KG/M2 | DIASTOLIC BLOOD PRESSURE: 71 MMHG | HEART RATE: 67 BPM | WEIGHT: 135 LBS | HEIGHT: 59 IN

## 2018-01-13 VITALS
DIASTOLIC BLOOD PRESSURE: 84 MMHG | HEIGHT: 59 IN | BODY MASS INDEX: 27.28 KG/M2 | SYSTOLIC BLOOD PRESSURE: 137 MMHG | HEART RATE: 78 BPM | WEIGHT: 135.31 LBS

## 2018-01-13 VITALS
DIASTOLIC BLOOD PRESSURE: 77 MMHG | BODY MASS INDEX: 27.21 KG/M2 | WEIGHT: 135 LBS | SYSTOLIC BLOOD PRESSURE: 144 MMHG | HEIGHT: 59 IN

## 2018-01-13 VITALS
TEMPERATURE: 97 F | DIASTOLIC BLOOD PRESSURE: 78 MMHG | HEART RATE: 63 BPM | OXYGEN SATURATION: 96 % | HEIGHT: 59 IN | SYSTOLIC BLOOD PRESSURE: 140 MMHG | RESPIRATION RATE: 18 BRPM | WEIGHT: 137.31 LBS | BODY MASS INDEX: 27.68 KG/M2

## 2018-01-13 VITALS
BODY MASS INDEX: 26.56 KG/M2 | WEIGHT: 136 LBS | HEART RATE: 78 BPM | SYSTOLIC BLOOD PRESSURE: 135 MMHG | DIASTOLIC BLOOD PRESSURE: 75 MMHG

## 2018-01-13 VITALS
HEART RATE: 94 BPM | WEIGHT: 133 LBS | BODY MASS INDEX: 26.81 KG/M2 | SYSTOLIC BLOOD PRESSURE: 137 MMHG | DIASTOLIC BLOOD PRESSURE: 73 MMHG | HEIGHT: 59 IN

## 2018-01-13 VITALS
SYSTOLIC BLOOD PRESSURE: 124 MMHG | WEIGHT: 137.25 LBS | DIASTOLIC BLOOD PRESSURE: 74 MMHG | HEART RATE: 71 BPM | HEIGHT: 59 IN | BODY MASS INDEX: 27.67 KG/M2

## 2018-01-13 VITALS
WEIGHT: 140 LBS | TEMPERATURE: 98.9 F | OXYGEN SATURATION: 98 % | SYSTOLIC BLOOD PRESSURE: 132 MMHG | HEIGHT: 59 IN | BODY MASS INDEX: 28.22 KG/M2 | HEART RATE: 68 BPM | DIASTOLIC BLOOD PRESSURE: 80 MMHG

## 2018-01-13 NOTE — PROGRESS NOTES
Preliminary Nursing Report                Patient Information    Initial Encounter Entry Date:   2017 2:39 PM EST (Automated Transmission Automated Transmission)       Last Modified:   {Suzy Mariano}              Legal Name: Gianni Gibson Number:        YOB: 1950        Age (years): 73        Gender: F        Body Mass Index (BMI): 28 kg/m2        Height: 59 in  Weight: 137 lbs (62 kgs)           Address:    [facility]               Phone: [phone]   (consent to leave messages)        Email:        Ethnicity: Decline to State        Yazidi:        Marital Status:        Preferred Language: English        Race: Other Race                    Patient Insurance Information        Primary Insurance Information Carrier Name: {Primary  CarrierName}           Carrier Address:   {Primary  CarrierAddress}              Carrier Phone: {Primary  CarrierPhone}          Group Number: {Primary  GroupNumber}          Policy Number: {Primary  PolicyNumber}          Insured Name: {Primary  InsuredName}          Insured : {Primary  InsuredDOB}          Relationship to Insured: {Primary  RelationshiptoInsured}           Secondary Insurance Information Carrier Name: {Secondary  CarrierName}           Carrier Address:   {Secondary  CarrierAddress}              Carrier Phone: {Secondary  CarrierPhone}          Group Number: {Secondary  GroupNumber}          Policy Number: {Secondary  PolicyNumber}          Insured Name: {Secondary  InsuredName}          Insured : {Secondary  InsuredDOB}          Relationship to Insured: {Secondary  RelationshiptoInsured}                       Health Profile   Booking #:   Luis Dorantes #: [id number]               DOS: 10/04/2017    Surgery : ARTHROSCOP ROTATOR CUFF REPR    Add'l Procedures/Notes:     Surgery Risk: Intermediate          Precautions     Chest pain       History of CVA (cerebrovascular accident) Palpitations                   Allergies    No Known Drug Allergies             Medications    Citalopram Hydrobromide 20 MG Oral Tablet       Omeprazole 20 MG Oral Capsule Delayed Release       Tylenol Extra Strength 500 MG Oral Tablet               Conditions    Abdominal distension       Abdominal pain       Adhesive capsulitis of left shoulder       Anxiety       Bowel incontinence       Breast cancer screening       Cervical cancer screening       Cervical radiculitis       Chest pain       Closed head injury, subsequent encounter       Colon cancer screening       Confusion (Symptom)       Constipation       Depression       Elevated TSH       Encounter for routine gynecological examination with Papanicolaou smear of cervix       Encounter for screening mammogram for malignant neoplasm of breast       Epigastric pain       Follow-up for resolved condition       Gastroesophageal reflux disease       Headache       History of CVA (cerebrovascular accident)       Hyperlipidemia       Lateral epicondylitis of both elbows       Left rotator cuff tear       Left shoulder pain       Loss of appetite       Memory difficulties       Memory Lapses Or Loss       Muscle ache       Muscle Cramps       Muscle weakness       Nausea       Neck pain       NSAID induced gastritis       Numbness       Osteoarthritis of both hands       Pain of foot and toes       Palpitations       Pelvic pain       Post concussion syndrome       Right arm weakness       Skin rash       Sleep disorder       Stress incontinence of urine       Stroke Syndrome       Thyroid disorder       Traumatic subdural hematoma, without loss of consciousness, subsequent encounter       Trigger ring finger of left hand       Trigger ring finger of right hand       Urinary incontinence       Vasogenic cerebral edema       Vision loss       Visual field defect               Family History    None             Surgical History    None             Social History Caffeine Use       Educational Level - Grade 12 Completed       Lives with spouse       Marital History - Currently        Never a smoker       No alcohol use       No illicit drug use       Caodaism                               Patient Instructions       Medical Procedure Risk  NPO Instructions   The day before surgery it is recommended to have a light dinner at your usual time and you are allowed a light snack early in the evening  Do not eat anything heavy or eat a big meal after 7pm  Do not eat or drink anything after midnight prior to your surgery  If you are supposed to take any of your medications, do so with a sip of water  Failure to follow these instructions can lead to an increased risk of lung complications and may result in a delay or cancellation of your procedure  If you have any questions, contact your institution for further instructions  No candy, no gum, no mints, no chewing tobacco          Gastroesophageal reflux disease  Medication Instruction (Reflux Disease)   Please continue to take this medication on your normal schedule  If this is an oral medication and you take in the morning, you may do so with a sip of water  Citalopram Hydrobromide 20 MG Oral Tablet  Medication Instruction (SSRI - Antidepressants) 80  Please continue to take this medication on your normal schedule  If this is an oral medication and you take in the morning, you may do so with a sip of water  Testing Considerations       ? Chest X-ray (CXR) t  Consider a Chest X-Ray (CXR) if patient is having respiratory symptoms  Triggered by: Chest pain         ? Complete Blood Count (CBC) t  If test was completed and normal within last six months, repeat test is not necessary  Triggered by: Memory Lapses Or Loss, Chest pain, History of CVA (cerebrovascular accident), Palpitations         ?  Comprehensive Metabolic Panel (CMP) t  If test was completed and normal within last six months, repeat test is not necessary  Triggered by: Chest pain, History of CVA (cerebrovascular accident), Palpitations         ? Electrocardiogram (ECG) t  Patient does not need new test if normal ECG is present within the last six months and no change in clinical condition  Triggered by: Chest pain, History of CVA (cerebrovascular accident), Palpitations, Age or Facility Rec               Consultations       ? Cardiac Consult (Major MI) c  If the patient has had a Myocardial Infarction within 30 days then a cardiac consult is indicated  Also, if the patient is having increasing frequency or intensity of chest pain then a cardiac consult is indicated  Otherwise, optimization of medical therapy is recommended under the direction of the PCP or cardiologist   Triggered by: Chest pain         ? Cardiac Consult (Major Rate) c  If the patient has a heart rate of greater than 100 bpm, or if the heart rhythm disturbance is new, then a cardiac consult is indicated  Otherwise, optimization of medical therapy is recommended under the direction of the PCP or cardiologist   Triggered by: Palpitations         ? PCP eval/Echo t  Triggered by: History of CVA (cerebrovascular accident)         ? Primary Care Physician Evaluation   Primary care physician may need to evaluate patient prior to surgery  This is likely NOT necessary if the listed conditions are chronic and stable  Triggered by: Memory Lapses Or Loss, History of CVA (cerebrovascular accident), Loss of appetite               Miscellaneous Questions         Question: Are you able to walk up a flight of stairs, walk up a hill or do heavy housework WITHOUT having chest pain or shortness of breath? Answer: YES                   Allergies/Conditions/Medications Not Found        The following were not recognized by our system when generating the recommendations  Please consider if this would impact any preoperative protocols  ? Bowel incontinence       ? Caffeine Use       ?  Closed head injury, subsequent encounter       ? Confusion (Symptom)       ? Educational Level - Grade 12 Completed       ? Encounter for screening mammogram for malignant neoplasm of breast       ? Lives with spouse       ? Marital History - Currently        ? Muscle Cramps       ? Never a smoker       ? No alcohol use       ? No illicit drug use       ? Pelvic pain       ? [employer]       ? Stress incontinence of urine       ? Stroke Syndrome       ? Traumatic subdural hematoma, without loss of consciousness, subsequent encounter       ? Trigger ring finger of left hand       ? Trigger ring finger of right hand       ? Vision loss       ? Visual field defect       ? Omeprazole 20 MG Oral Capsule Delayed Release                  Appointment Information         Date:    10/04/2017        Location:    [place]        Address:           Directions:                      Footnotes revision 14      ?? Denotes a free-text entry  Legal Disclaimer: Any and all recommendations and services provided herein are designed to assist in the preoperative care of the patient  Nothing contained herein is designed to replace, eliminate or alleviate the responsibility of the attending physician to supervise and determine the patient?s preoperative care and course of treatment  Failure to provide complete, accurate information may negatively impact the system?s ability to recommend the proper preoperative protocol  THE ATTENDING PHYSICIAN IS RESPONSIBLE TO REVIEW THE SUGGESTED PREOPERATIVE PROTOCOLS/COURSE OF TREATMENT AND PRESCRIBE THE FINAL COURSE OF PREOPERATIVE TREATMENT IN CONSULTATION WITH THE PATIENT  THE [employer] SYSTEM AND ITS MATERIALS ARE PROVIDED ? AS IS? WITHOUT WARRANTY OF ANY KIND, EXPRESS OR IMPLIED, INCLUDING, BUT NOT LIMITED TO, WARRANTIES OF PERFORMANCE OR MERCHANTABILITY OR FITNESS FOR A PARTICULAR PURPOSE   PATIENT AND PHYSICIANS HEREBY AGREE THAT THEIR USE OF THE MATERIALS AND RESOURCES ACT AS A CONSENT TO RELEASE AND WAIVE ePREOP FROM ANY AND ALL CLAIMS OF WARRANTY, TORT OR CONTRACT LAW OF ANY KIND  Electronically signed by:William JIMENEZ    Sep 20 2017  2:06PM EST

## 2018-01-13 NOTE — CONSULTS
Chief Complaint  Pre Op Clearance left shoulder schedule for 10/04 with Ortho      History of Present Illness  Pre-Op Visit (Brief): The patient is being seen for a preoperative visit  The procedure is a(n) Left shoulder arthroscopy scheduled for 10/4/17 with ian  The indication for surgery is Left shoulder pain  Surgical Risk Assessment:   Prior Anesthesia: She had prior anesthesia and no prior adverse reaction to general anesthesia  Pertinent Past Medical History: neck osteoarthrosis, wears dentures, CVA, obesity and last CVA 3 yrs ago, but no angina, no arrhythmia, no CAD, CAD without prior MI, CAD without recent PCI, no CHF, no chronic liver disease, no acute hepatitis, no coagulation delay, no primary hypercoagulable state, no secondary hypercoagulable state, no pulmonary embolism, no DVT, does not use anticoagulants, no diabetes, does not use insulin, no thyroid disease, no TMJ osteoarthrosis, no seizure disorder, no asthma, no COPD, not DAVID, no renal disease and no low serum albumin  Exercise Capacity: unable to walk two flights of stairs without symptoms and feels SOB with stair climbing, but able to walk four blocks without symptoms  Lifestyle Factors: denies alcohol use, denies tobacco use and denies illegal drug use  Symptoms: no easy bleeding, no easy bruising, no frequent nosebleeds, no chest pain, cough, no dyspnea, no edema, no palpitations and no wheezing  Pertinent Family History: no family history of an adverse reaction to anesthesia, no aneurysm, no bleeding problems, no sudden early deaths, no ischemic heart disease and no stroke  HPI: Patient complaint of a scratchy throat and very mild nasal congestion over the last several days  Minimal cough  Denies fevers or chills  Review of Systems    Constitutional: as noted in HPI, no fever and no chills  ENT: sore throat, but as noted in HPI  Cardiovascular: as noted in HPI  Respiratory: as noted in HPI     Gastrointestinal: no abdominal pain, no vomiting and no diarrhea  Genitourinary: no dysuria  Integumentary: no rashes, no skin lesions and no skin wound  Preventive Quality 65 and Older: Falls Risk: The patient fell 3 times in the past 12 months  Active Problems    1  Abdominal distension (787 3) (R14 0)   2  Abdominal pain (789 00) (R10 9)   3  Adhesive capsulitis of left shoulder (726 0) (M75 02)   4  Anxiety (300 00) (F41 9)   5  Bowel incontinence (787 60) (R15 9)   6  Breast cancer screening (V76 10) (Z12 39)   7  Cervical cancer screening (V76 2) (Z12 4)   8  Cervical radiculitis (723 4) (M54 12)   9  Chest pain (786 50) (R07 9)   10  Closed head injury, subsequent encounter (V58 89,959 01) (S09 90XD)   11  Colon cancer screening (V76 51) (Z12 11)   12  Confusion (Symptom) (298 9)   13  Constipation (564 00) (K59 00)   14  Depression (311) (F32 9)   15  Elevated TSH (794 5) (R94 6)   16  Encounter for routine gynecological examination with Papanicolaou smear of cervix    (V72 31,V76 2) (Z01 419)   17  Encounter for screening mammogram for malignant neoplasm of breast (V76 12)    (Z12 31)   18  Epigastric pain (789 06) (R10 13)   19  Follow-up for resolved condition (V67 59) (Z09)   20  Gastroesophageal reflux disease (530 81) (K21 9)   21  Headache (784 0) (R51)   22  History of arthritis (V13 4) (Z87 39)   23  History of CVA (cerebrovascular accident) (V12 54) (Z86 73)   24  History of hypertension (V12 59) (Z86 79)   25  Hyperlipidemia (272 4) (E78 5)   26  Lateral epicondylitis of both elbows (726 32) (M77 11,M77 12)   27  Left rotator cuff tear (840 4) (M75 102)   28  Left shoulder pain (719 41) (M25 512)   29  Loss of appetite (783 0) (R63 0)   30  Memory difficulties (780 93) (R41 3)   31  Memory Lapses Or Loss (780 93)   32  Muscle ache (729 1) (M79 1)   33  Muscle Cramps (729 82)   34  Muscle weakness (728 87) (M62 81)   35  Nausea (787 02) (R11 0)   36  Neck pain (723 1) (M54 2)   37   NSAID induced gastritis (535 40,E935 8) (K29 60,T39 395A)   38  Numbness (782 0) (R20 0)   39  Osteoarthritis of both hands (715 94) (M19 041,M19 042)   40  Osteoporosis (733 00) (M81 0)   41  Pain of foot and toes (729 5) (M79 673)   42  Palpitations (785 1) (R00 2)   43  Pelvic pain (R10 2)   44  Post concussion syndrome (310 2) (F07 81)   45  Preoperative clearance (V72 84) (Z01 818)   46  Right arm weakness (729 89) (R29 898)   47  Skin rash (782 1) (R21)   48  Sleep disorder (780 50) (G47 9)   49  Stress incontinence of urine (N39 3)   50  Stroke Syndrome (436)   51  History of Subarachnoid hemorrhage (430) (I60 9)   52  Thyroid disorder (246 9) (E07 9)   53  Traumatic subdural hematoma, without loss of consciousness, subsequent encounter   54  Trigger ring finger of left hand (727 03) (M65 342)   55  Trigger ring finger of right hand (727 03) (M65 341)   56  Urinary incontinence (788 30) (R32)   57  Vasogenic cerebral edema (348 5) (G93 6)   58  Vision loss (369 9) (H54 7)   59   Visual field defect (368 40) (H53 40)    Past Medical History    · Anxiety (300 00) (F41 9)   · History of Diabetes mellitus type II, controlled (250 00) (E11 9)   · History of High cholesterol (272 0) (E78 00)   · History of arthritis (V13 4) (Z87 39)   · History of hypertension (V12 59) (Z86 79)   · History of recurrent urinary tract infection (V13 02) (Z87 440)   · History of seizures (V12 49) (J36 011)   · History of stroke (V12 54) (Z86 73)   · History of thyroid disease (V12 29) (Z86 39)   · History of Night sweats (780 8) (R61)   · History of Pain in both lower extremities (729 5) (M79 604,M79 605)   · History of Seizures (780 39) (R56 9)   · Sleep disorder (780 50) (G47 9)   · History of Subarachnoid hemorrhage (430) (I60 9)    Surgical History    · History of  Section   · 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    Family History    · Family history of Father  At Age ___    · Family history of diabetes mellitus (V18 0) (Z83 3)   · Family history of hypertension (V17 49) (Z82 49)   · Family history of stroke (V17 1) (Z82 3)   · Family history of thyroid disease (V18 19) (Z80 46)    Social History    · Caffeine Use   · Educational Level - Grade 12 Completed   · Lives with spouse   · Marital History - Currently    · Never a smoker   · No alcohol use   · No illicit drug use   · Zoroastrian    Current Meds   1  Citalopram Hydrobromide 20 MG Oral Tablet; take 1 tablet by mouth every day    Requested for: 22Jun2017; Last Rx:22Jun2017 Ordered   2  Omeprazole 20 MG Oral Capsule Delayed Release; take 1 capsule by mouth at bedtime; Therapy: 50PWB0780 to (Evaluate:53Pcu5151)  Requested for: 38UQL5228; Last   Rx:22Jun2017 Ordered    Allergies    1  No Known Drug Allergies    Vitals  Signs    Temperature: 98 6 F, Tympanic  Heart Rate: 63  Respiration: 18  Systolic: 111  Diastolic: 70  Height: 4 ft 11 in  Weight: 139 lb   BMI Calculated: 28 07  BSA Calculated: 1 58  O2 Saturation: 96    Physical Exam    Constitutional   General appearance: Abnormal   well developed, appears healthy, well nourished, overweight and well hydrated  Ears, Nose, Mouth, and Throat   External inspection of ears and nose: Normal     Otoscopic examination: Tympanic membranes translucent with normal light reflex  Canals patent without erythema  Lips, teeth, and gums: Normal, good dentition  Oropharynx: Normal with no erythema, edema, exudate or lesions  Neck   Neck: Supple, symmetric, trachea midline, no masses  Thyroid: Normal, no thyromegaly  Pulmonary   Respiratory effort: No increased work of breathing or signs of respiratory distress  Auscultation of lungs: Clear to auscultation  Cardiovascular   Auscultation of heart: Normal rate and rhythm, normal S1 and S2, no murmurs  Examination of extremities for edema and/or varicosities: Normal     Abdomen   Abdomen: Non-tender, no masses      Lymphatic   Palpation of lymph nodes in neck: No lymphadenopathy  Results/Data  *VB - Fall Risk Assessment  (Dx Z13 89 Screen for Neurologic Disorder) 51UJI6594 10:11AM Lisette Hernandze     Test Name Result Flag Reference   Falls Risk      2 or more falls past year     *VB - PHQ-9 Tool 60Ejd0832 10:10AM Lisette Hernandez     Test Name Result Flag Reference   PHQ-9 Adult Depression Score 15     PHQ-9 Adult Depression Screening Positive         Assessment    1  Preoperative clearance (V72 84) (Z01 818)   2  Adhesive capsulitis of left shoulder (726 0) (M75 02)   3  Gastroesophageal reflux disease (530 81) (K21 9)   4  History of CVA (cerebrovascular accident) (V12 54) (Z86 73)   5  Hyperlipidemia (272 4) (E78 5)   6  Acute upper respiratory infection (465 9) (J06 9)   7  Colon cancer screening (V76 51) (Z12 11)   8  Osteoporosis (733 00) (M81 0)    Plan  Acute upper respiratory infection    · Follow Up if Not Better Evaluation and Treatment  Follow-up  Status: Complete  Done:  12FGR6466 10:38AM   · Drink plenty of fluids ; Status:Complete;   Done: 03VKU8006 10:38AM  Colon cancer screening    · 1 - Charlie LO, Tuan Barrientos  (General Surgery) Co-Management  *  Status: Active   Requested for: 94HBI2522  Care Summary provided  : Yes   · COLONOSCOPY; Status:Active; Requested for:22Yrz7444;   Depression    · *VB - PHQ-9 Tool; Status:Complete;   Done: 90MAX5749 10:10AM  Health Maintenance    · *VB - Fall Risk Assessment  (Dx Z13 89 Screen for Neurologic Disorder);  Status:Complete;   Done: 08DBP0949 10:11AM  Hyperlipidemia    · Atorvastatin Calcium 40 MG Oral Tablet; Take 1 tablet daily  Osteoporosis    · * DXA BONE DENSITY SPINE HIP AND PELVIS; Status:Active; Requested for:19Lqt1879;      Discussion/Summary  Surgical Clearance: She is at a LOW TO MODERATE risk from a cardiovascular standpoint at this time without any additional cardiac testing  Reevaluation needed, if she should present with symptoms prior to surgery/procedure  Comments:   Preadmission CBC and CMP and EKG results have been reviewed  The patient was counseled regarding instructions for management, impressions  The treatment plan was reviewed with the patient/guardian  The patient/guardian understands and agrees with the treatment plan     Self Referrals: No      End of Encounter Meds    1  Citalopram Hydrobromide 20 MG Oral Tablet; take 1 tablet by mouth every day    Requested for: 22Jun2017; Last Rx:22Jun2017 Ordered    2  Atorvastatin Calcium 40 MG Oral Tablet; Take 1 tablet daily  Requested for: 48NKN1335;   Last Rx:21Xwo3736 Ordered    3  Omeprazole 20 MG Oral Capsule Delayed Release; take 1 capsule by mouth at bedtime;    Therapy: 91AGA3184 to (Evaluate:76Zgb9895)  Requested for: 33FJO3419; Last   GUERRA:82KMF9431 Ordered    Signatures   Electronically signed by : ARNOLD Paniagua; Sep 18 2017 10:40AM EST                       (Author)    Electronically signed by : MORALES King ; Sep 18 2017  1:21PM EST

## 2018-01-14 VITALS
WEIGHT: 138 LBS | BODY MASS INDEX: 27.82 KG/M2 | DIASTOLIC BLOOD PRESSURE: 74 MMHG | TEMPERATURE: 97.5 F | RESPIRATION RATE: 16 BRPM | HEIGHT: 59 IN | SYSTOLIC BLOOD PRESSURE: 134 MMHG | OXYGEN SATURATION: 98 % | HEART RATE: 62 BPM

## 2018-01-14 VITALS
DIASTOLIC BLOOD PRESSURE: 73 MMHG | WEIGHT: 136 LBS | SYSTOLIC BLOOD PRESSURE: 116 MMHG | BODY MASS INDEX: 27.42 KG/M2 | HEIGHT: 59 IN

## 2018-01-14 VITALS
DIASTOLIC BLOOD PRESSURE: 60 MMHG | HEIGHT: 59 IN | SYSTOLIC BLOOD PRESSURE: 124 MMHG | BODY MASS INDEX: 26.61 KG/M2 | HEART RATE: 58 BPM | RESPIRATION RATE: 12 BRPM | WEIGHT: 132 LBS

## 2018-01-15 VITALS
SYSTOLIC BLOOD PRESSURE: 128 MMHG | WEIGHT: 133 LBS | DIASTOLIC BLOOD PRESSURE: 70 MMHG | BODY MASS INDEX: 26.81 KG/M2 | HEIGHT: 59 IN | HEART RATE: 71 BPM

## 2018-01-15 NOTE — MISCELLANEOUS
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  PATIENT REPORTED SHE RESIDES WITH   PATIENT RELATES THAT FOR THE PAST 3 YEARS SHE HAS BEEN DEALING WITH SAD FEELINGS, LOSS OF INTEREST, DECREASE LIBIDO, INCREASE IRRITABILITY, SUICIDAL IDEATION AND TIREDNESS  NOT SUICIDAL AT PRESENT TIME  SUPPORTIVE COUNSELING GIVEN  PATIENT RECEPTIVE TO MENTAL HEALTH EVALUATION  ON HER REQUEST CRISTI ASSISTED WITH SCHEDULING THE APPOINTMENT SCHEDULING AT [facility]  APPOINTMENT SCHEDULED FOR 7/26/17 AT 12 NOON  Active Problems    1  Abdominal distension (787 3) (R14 0)   2  Abdominal pain (789 00) (R10 9)   3  Anxiety (300 00) (F41 9)   4  Bowel incontinence (787 60) (R15 9)   5  Breast cancer screening (V76 10) (Z12 39)   6  Cervical cancer screening (V76 2) (Z12 4)   7  Cervical radiculitis (723 4) (M54 12)   8  Chest pain (786 50) (R07 9)   9  Closed head injury, subsequent encounter (V58 89,959 01) (S09 90XD)   10  Colon cancer screening (V76 51) (Z12 11)   11  Confusion (Symptom) (298 9)   12  Constipation (564 00) (K59 00)   13  Depression (311) (F32 9)   14  Elevated TSH (794 5) (R94 6)   15  Encounter for routine gynecological examination with Papanicolaou smear of cervix    (V72 31,V76 2) (Z01 419)   16  Encounter for screening mammogram for malignant neoplasm of breast (V76 12)    (Z12 31)   17  Epigastric pain (789 06) (R10 13)   18  Gastroesophageal reflux disease (530 81) (K21 9)   19  Headache (784 0) (R51)   20  History of arthritis (V13 4) (Z87 39)   21  History of CVA (cerebrovascular accident) (V12 54) (Z86 73)   22  History of hypertension (V12 59) (Z86 79)   23  Hyperlipidemia (272 4) (E78 5)   24  Lateral epicondylitis of both elbows (726 32) (M77 11,M77 12)   25  Left rotator cuff tear (840 4) (M75 102)   26  Left shoulder pain (719 41) (M25 512)   27   Loss of appetite (783 0) (R63 0)   28  Memory difficulties (780 93) (R41 3)   29  Memory Lapses Or Loss (780 93)   30  Muscle ache (729 1) (M79 1)   31  Muscle Cramps (729 82)   32  Muscle weakness (728 87) (M62 81)   33  Nausea (787 02) (R11 0)   34  Neck pain (723 1) (M54 2)   35  NSAID induced gastritis (535 40,E935 8) (T39 391A,K29 60)   36  Numbness (782 0) (R20 0)   37  Osteoarthritis of both hands (715 94) (M19 041,M19 042)   38  Pain of foot and toes (729 5) (M79 673)   39  Palpitations (785 1) (R00 2)   40  Pelvic pain (R10 2)   41  Post concussion syndrome (310 2) (F07 81)   42  Right arm weakness (729 89) (R29 898)   43  Skin rash (782 1) (R21)   44  Sleep disorder (780 50) (G47 9)   45  Stress incontinence of urine (N39 3)   46  Stroke Syndrome (436)   47  History of Subarachnoid hemorrhage (430) (I60 9)   48  Thyroid disorder (246 9) (E07 9)   49  Traumatic subdural hematoma, without loss of consciousness, subsequent encounter   50  Trigger ring finger of left hand (727 03) (M65 342)   51  Trigger ring finger of right hand (727 03) (M65 341)   52  Urinary incontinence (788 30) (R32)   53  Uterine leiomyoma, unspecified location (218 9) (D25 9)   54  Vasogenic cerebral edema (348 5) (G93 6)   55  Vision loss (369 9) (H54 7)   56  Visual field defect (368 40) (H53 40)    Current Meds   1  Citalopram Hydrobromide 20 MG Oral Tablet; take 1 tablet by mouth every day    Requested for: 22Jun2017; Last Rx:22Jun2017 Ordered   2  MethylPREDNISolone 4 MG Oral Tablet (Medrol); DAY #1-2: 6 TABS/DAY, DAY #3-4: 5   TABS/DAY, DAY #5-6: 4 TABS/DAY, DAY #7-8: 3 TABS/DAY, DAY #9-10: 2 TABS/DAY,   DAY#11-12: 1 TAB/DAY; Therapy: 71QWJ9577 to (Evaluate:94Zmi6731)  Requested for: 19Tvs5402; Last   Rx:71Kwm1142; Status: ACTIVE - Transmit to Pharmacy - Awaiting Verification Ordered   3  Omeprazole 20 MG Oral Capsule Delayed Release; take 1 capsule by mouth at bedtime;    Therapy: 75ZXK7223 to (Evaluate:34Hku6922)  Requested for: 97GCQ0771; Last Rx: 88QCV7096 Ordered   4  Tylenol Extra Strength 500 MG Oral Tablet; TAKE 2 TABLET Every 6 hours PRN pain; Therapy: 17JZX2945 to (Evaluate:97Bkf1687); Last Rx:03Apr2017 Ordered    Allergies    1   No Known Drug Allergies    Signatures   Electronically signed by : JASMINA Ye; Jul 17 2017  4:09PM EST                       (Author)

## 2018-01-16 NOTE — RESULT NOTES
Verified Results  (1) CBC/PLT/DIFF 08Apr2017 07:39AM Osiel Melton Order Number: [id number]     Test Name Result Flag Reference   WBC COUNT 5 01 Thousand/uL  4 31-10 16   RBC COUNT 4 26 Million/uL  3 81-5 12   HEMOGLOBIN 13 0 g/dL  11 5-15 4   HEMATOCRIT 38 9 %  34 8-46  1   MCV 91 fL  82-98   MCH 30 5 pg  26 8-34 3   MCHC 33 4 g/dL  31 4-37 4   RDW 13 8 %  11 6-15 1   MPV 11 0 fL  8 9-12 7   PLATELET COUNT 792 Thousands/uL  149-390   nRBC AUTOMATED 0 /100 WBCs     NEUTROPHILS RELATIVE PERCENT 47 %  43-75   LYMPHOCYTES RELATIVE PERCENT 38 %  14-44   MONOCYTES RELATIVE PERCENT 8 %  4-12   EOSINOPHILS RELATIVE PERCENT 7 % H 0-6   BASOPHILS RELATIVE PERCENT 0 %  0-1   NEUTROPHILS ABSOLUTE COUNT 2 33 Thousands/? ??L  1 85-7 62   LYMPHOCYTES ABSOLUTE COUNT 1 89 Thousands/? ??L  0 60-4 47   MONOCYTES ABSOLUTE COUNT 0 41 Thousand/? ??L  0 17-1 22   EOSINOPHILS ABSOLUTE COUNT 0 36 Thousand/? ??L  0 00-0 61   BASOPHILS ABSOLUTE COUNT 0 02 Thousands/? ??L  0 00-0 10   - Patient Instructions: This bloodwork is non-fasting  Please drink two glasses of water morning of bloodwork  - Patient Instructions: This bloodwork is non-fasting  Please drink two glasses of water morning of bloodwork  (1) COMPREHENSIVE METABOLIC PANEL 87OFC1347 89:64IX Osiel Melton Order Number: [id number]     Test Name Result Flag Reference   SODIUM 145 mmol/L  136-145   POTASSIUM 4 2 mmol/L  3 5-5 3   CHLORIDE 107 mmol/L  100-108   CARBON DIOXIDE 31 mmol/L  21-32   ANION GAP (CALC) 7 mmol/L  4-13   BLOOD UREA NITROGEN 20 mg/dL  5-25   CREATININE 0 64 mg/dL  0 60-1 30   Standardized to IDMS reference method   CALCIUM 9 1 mg/dL  8 3-10 1   BILI, TOTAL 0 42 mg/dL  0 20-1 00   ALK PHOSPHATAS 65 U/L     ALT (SGPT) 34 U/L  12-78   AST(SGOT) 24 U/L  5-45   ALBUMIN 3 9 g/dL  3 5-5 0   TOTAL PROTEIN 7 3 g/dL  6 4-8 2   eGFR Non-African American      >60 0 ml/min/1 73sq m   - Patient Instructions:  This is a fasting blood test  Water,black tea or black  coffee only after 9:00pm the night before test Drink 2 glasses of water the morning of test   National Kidney Disease Education Program recommendations are as follows:  GFR calculation is accurate only with a steady state creatinine  Chronic Kidney disease less than 60 ml/min/1 73 sq  meters  Kidney failure less than 15 ml/min/1 73 sq  meters  GLUCOSE FASTING 103 mg/dL H 65-99     (1) LIPID PANEL, FASTING 08Apr2017 07:39AM [facility] Order Number: [id number]     Test Name Result Flag Reference   CHOLESTEROL 148 mg/dL     HDL,DIRECT 43 mg/dL  40-60   Specimen collection should occur prior to Metamizole administration due to the potential for falsely depressed results  LDL CHOLESTEROL CALCULATED 74 mg/dL  0-100   - Patient Instructions: This is a fasting blood test  Water,black tea or black  coffee only after 9:00pm the night before test   Drink 2 glasses of water the morning of test     - Patient Instructions: This is a fasting blood test  Water,black tea or black  coffee only after 9:00pm the night before test Drink 2 glasses of water the morning of test   Triglyceride:         Normal              <150 mg/dl       Borderline High    150-199 mg/dl       High               200-499 mg/dl       Very High          >499 mg/dl  Cholesterol:         Desirable        <200 mg/dl      Borderline High  200-239 mg/dl      High             >239 mg/dl  HDL Cholesterol:        High    >59 mg/dL      Low     <41 mg/dL  LDL CALCULATED:    This screening LDL is a calculated result  It does not have the accuracy of the Direct Measured LDL in the monitoring of patients with hyperlipidemia and/or statin therapy  Direct Measure LDL (HGW292) must be ordered separately in these patients  TRIGLYCERIDES 153 mg/dL H <=150   Specimen collection should occur prior to N-Acetylcysteine or Metamizole administration due to the potential for falsely depressed results       (1) MICROALBUMIN CREATININE RATIO, RANDOM URINE 08Apr2017 07:39AM Prasanna Espinoza Order Number: [id number]     Test Name Result Flag Reference   MICROALBUMIN/ CREAT R 9 mg/g creatinine  0-30   MICROALBUMIN,URINE 10 4 mg/L  0 0-20 0   CREATININE URINE 113 0 mg/dL       (1) TSH WITH FT4 REFLEX 08Apr2017 07:39AM Prasanna Espinoza Order Number: [id number]     Test Name Result Flag Reference   TSH 5 733 uIU/mL H 0 358-3 740   A FT4 has been ordered    - Patient Instructions: This is a fasting blood test  Water,black tea or black  coffee only after 9:00pm the night before test Drink 2 glasses of water the morning of test   Patients undergoing fluorescein dye angiography may retain small amounts of fluorescein in the body for 48-72 hours post procedure  Samples containing fluorescein can produce falsely depressed TSH values  If the patient had this procedure,a specimen should be resubmitted post fluorescein clearance  The recommended reference ranges for TSH during pregnancy are as follows:  First trimester 0 1 to 2 5 uIU/mL  Second trimester  0 2 to 3 0 uIU/mL  Third trimester 0 3 to 3 0 uIU/m     (1) TSH WITH FT4 REFLEX 08Apr2017 07:39AM Prasanna Espinoza Order Number: [id number]     Test Name Result Flag Reference   TSH 5 733 uIU/mL H 0 358-3 740   A FT4 has been ordered    - Patient Instructions: This is a fasting blood test  Water,black tea or black  coffee only after 9:00pm the night before test Drink 2 glasses of water the morning of test   Patients undergoing fluorescein dye angiography may retain small amounts of fluorescein in the body for 48-72 hours post procedure  Samples containing fluorescein can produce falsely depressed TSH values  If the patient had this procedure,a specimen should be resubmitted post fluorescein clearance            The recommended reference ranges for TSH during pregnancy are as follows:  First trimester 0 1 to 2 5 uIU/mL  Second trimester  0 2 to 3 0 uIU/mL  Third trimester 0 3 to 3 0 uIU/m   T4,FREE 0 98 ng/dL  0 76-1 46     * XR SHOULDER 2+ VIEW LEFT 08Apr2017 07:34AM Lamontenccarolina [employer] Order Number: [id number]     Test Name Result Flag Reference   XR SHOULDER 2+ VW LEFT (Report)     LEFT SHOULDER     INDICATION: Left shoulder pain  COMPARISON: None     VIEWS: 3     IMAGES: 3     FINDINGS:     There is no acute fracture or dislocation  Old healed fracture lateral aspect left 2nd rib  Mild narrowing at the glenohumeral articulation  Hypertrophic changes at the acromioclavicular joint  No lytic or blastic lesions are seen  Soft tissues are unremarkable  IMPRESSION:   Degenerative changes of the shoulder and acromioclavicular joint  No acute osseous abnormality  Workstation performed: [id number]     Signed by:   Hamilton Shepard DO   4/11/17     * XR HAND 3+ VIEW RIGHT 08Apr2017 07:34AM [employer] Order Number: [id number]     Test Name Result Flag Reference   XR HAND 3+ VW RIGHT (Report)     RIGHT HAND     INDICATION: Right hand pain  COMPARISON: None     VIEWS: 3     IMAGES: 3     For the purposes of institution wide universal language the following terms will apply: (thumb=1st digit/finger, index finger=2nd digit/finger, long finger=3rd digit/finger, ring=4th digit/finger and small finger=5th digit/finger)     FINDINGS:     There is no acute fracture or dislocation  Joint space narrowing within the DIP joints noted diffusely  Mild joint space narrowing seen in the PIP joints  There is relative preservation of the MCP joints  Diffuse periarticular osteopenia is present  Soft tissue swelling noted about the DIP joints  IMPRESSION:     Joint space not predominate within the DIP joints may represent underlying inflammatory arthritis such as erosive osteoarthritis         Workstation performed: [id number]     Signed by:   Jarvis Restrepo MD   4/11/17     * XR HAND 3+ VIEW LEFT 08Apr2017 07:34AM Mary Libia Becker Order Number: [id number]     Test Name Result Flag Reference   XR HAND 3+ VW LEFT (Report)     LEFT HAND     INDICATION: Left hand pain  COMPARISON: None     VIEWS: 3     IMAGES: 3     For the purposes of institution wide universal language the following terms will apply: (thumb=1st digit/finger, index finger=2nd digit/finger, long finger=3rd digit/finger, ring=4th digit/finger and small finger=5th digit/finger)     FINDINGS:     There is no acute fracture or dislocation  Diffuse joint space narrowing is seen within the DIP joints with suggestion of central erosions which may represent underlying inflammatory erosive arthritis  Mild narrowing is seen within the PIP joint similar prior exam  MCP joints demonstrate only    minimal narrowing within the fifth MCP joint  There is periarticular osteopenia similar prior exam     Soft tissues are unremarkable  IMPRESSION:     Overall stable appearance to the joint space narrowing within the hand predominantly affecting the DIP joints which may represent underlying erosive osteoarthritis  Workstation performed: [id number]     Signed by:   Petros Canales MD   4/11/17       Plan  Elevated TSH    · (1) TSH; Status:Active;  Requested EQY:58KXV4305;

## 2018-01-16 NOTE — RESULT NOTES
Verified Results  (1) TSH 94VGX5255 08:01AM Leona Proper Order Number: [id number]     Test Name Result Flag Reference   TSH 4 479 uIU/mL H 0 358-3 740   - Patient Instructions: This bloodwork is non-fasting  Please drink two glasses of water morning of bloodwork  Patients undergoing fluorescein dye angiography may retain small amounts of fluorescein in the body for 48-72 hours post procedure  Samples containing fluorescein can produce falsely depressed TSH values  If the patient had this procedure,a specimen should be resubmitted post fluorescein clearance            The recommended reference ranges for TSH during pregnancy are as follows:  First trimester 0 1 to 2 5 uIU/mL  Second trimester  0 2 to 3 0 uIU/mL  Third trimester 0 3 to 3 0 uIU/m

## 2018-01-16 NOTE — RESULT NOTES
Verified Results  * MAMMO SCREENING BILATERAL W CAD [id number] 01:55PM Nissa Barfield Order Number: [id number]    - Patient Instructions: To schedule this appointment, please contact Central Scheduling at [phone]  Do not wear any perfume, powder, lotion or deodorant on breast or underarm area  Please bring your doctors order, referral (if needed) and insurance information with you on the day of the test  Failure to bring this information may result in this test being rescheduled  Arrive 15 minutes prior to your appointment time to register  On the day of your test, please bring any prior mammogram or breast studies with you that were not performed at a [facility]  Failure to bring prior exams may result in your test needing to be rescheduled   Order Number: [id number]    - Patient Instructions: To schedule this appointment, please contact Central Scheduling at [phone]  Do not wear any perfume, powder, lotion or deodorant on breast or underarm area  Please bring your doctors order, referral (if needed) and insurance information with you on the day of the test  Failure to bring this information may result in this test being rescheduled  Arrive 15 minutes prior to your appointment time to register  On the day of your test, please bring any prior mammogram or breast studies with you that were not performed at a [facility]  Failure to bring prior exams may result in your test needing to be rescheduled  Test Name Result Flag Reference   MAMMO SCREENING BILATERAL W CAD (Report)     Patient History:   Patient is postmenopausal and had first child at age 29  No known family history of cancer  Patient has never smoked  Patient's BMI is 28 4  Reason for exam: screening, asymptomatic  Mammo Screening Bilateral W CAD: July 5, 2017 - Check In #:    [de-identified]   Bilateral CC and MLO view(s) were taken  Technologist: ROBBY Anne)(M) Prior study comparison: January 24, 2008, bilateral digital bilat   mammo cad, performed at [address] [place] [address]  There are scattered fibroglandular densities  No dominant soft tissue mass, architectural distortion or    suspicious calcifications are noted in either breast  The skin    and nipple contours are within normal limits  No evidence of malignancy  No significant changes when compared with prior studies  ACR BI-RADSï¾® Assessments: BiRad:1 - Negative     Recommendation:   Routine screening mammogram of both breasts in 1 year  Analyzed by CAD     The patient is scheduled in a reminder system  8-10% of cancers will be missed on mammography  Management of a    palpable abnormality must be based on clinical grounds  Patients   will be notified of their results via letter from our facility  Accredited by [employer] of Radiology and FDA  [facility]: [id number]     Risk Value(s):   Tyrer-Cuzick 10 Year: 3 600%, Tyrer-Cuzick Lifetime: 7 400%,    Myriad Table: 1 5%, EDWAR 5 Year: 2 3%, NCI Lifetime: 8 6%   Signed by:   Marga Soulier, MD   7/12/17                               Plan  Cervical cancer screening    · *1 - [facility] Physician Referral  Consult  [address] [facility], [address] [facility]  [phone]  Status: Active  Requested for: [id number]  Care Summary provided  : Yes  Colon cancer screening    · COLONOSCOPY; Status:Active;  Requested OWM:[id number];

## 2018-01-17 ENCOUNTER — ANESTHESIA EVENT (OUTPATIENT)
Dept: PERIOP | Facility: HOSPITAL | Age: 68
End: 2018-01-17
Payer: COMMERCIAL

## 2018-01-18 ENCOUNTER — HOSPITAL ENCOUNTER (OUTPATIENT)
Facility: HOSPITAL | Age: 68
Setting detail: OUTPATIENT SURGERY
Discharge: HOME | End: 2018-01-18
Attending: OBSTETRICS & GYNECOLOGY | Admitting: OBSTETRICS & GYNECOLOGY
Payer: COMMERCIAL

## 2018-01-18 ENCOUNTER — ANESTHESIA (OUTPATIENT)
Dept: PERIOP | Facility: HOSPITAL | Age: 68
End: 2018-01-18
Payer: COMMERCIAL

## 2018-01-18 VITALS
BODY MASS INDEX: 29.56 KG/M2 | WEIGHT: 137 LBS | OXYGEN SATURATION: 97 % | RESPIRATION RATE: 18 BRPM | HEIGHT: 57 IN | HEART RATE: 82 BPM | TEMPERATURE: 98.3 F | SYSTOLIC BLOOD PRESSURE: 140 MMHG | DIASTOLIC BLOOD PRESSURE: 63 MMHG

## 2018-01-18 LAB
ABO GROUP BLD: NORMAL
BLD GP AB SCN SERPL QL: NEGATIVE
RH BLD: POSITIVE
SPECIMEN EXPIRATION DATE: NORMAL

## 2018-01-18 PROCEDURE — 86850 RBC ANTIBODY SCREEN: CPT | Performed by: OBSTETRICS & GYNECOLOGY

## 2018-01-18 PROCEDURE — C1771 REP DEV, URINARY, W/SLING: HCPCS | Performed by: OBSTETRICS & GYNECOLOGY

## 2018-01-18 PROCEDURE — 86901 BLOOD TYPING SEROLOGIC RH(D): CPT | Performed by: OBSTETRICS & GYNECOLOGY

## 2018-01-18 PROCEDURE — 86900 BLOOD TYPING SEROLOGIC ABO: CPT | Performed by: OBSTETRICS & GYNECOLOGY

## 2018-01-18 DEVICE — SINGLE INCISION SLING SYSTEM
Type: IMPLANTABLE DEVICE | Site: VAGINA | Status: FUNCTIONAL
Brand: ALTIS

## 2018-01-18 RX ORDER — FENTANYL CITRATE 50 UG/ML
INJECTION, SOLUTION INTRAMUSCULAR; INTRAVENOUS AS NEEDED
Status: DISCONTINUED | OUTPATIENT
Start: 2018-01-18 | End: 2018-01-18 | Stop reason: SURG

## 2018-01-18 RX ORDER — OMEPRAZOLE 20 MG/1
20 CAPSULE, DELAYED RELEASE ORAL DAILY
COMMUNITY
End: 2018-04-18

## 2018-01-18 RX ORDER — SODIUM CHLORIDE 9 MG/ML
125 INJECTION, SOLUTION INTRAVENOUS CONTINUOUS
Status: DISCONTINUED | OUTPATIENT
Start: 2018-01-18 | End: 2018-01-18 | Stop reason: HOSPADM

## 2018-01-18 RX ORDER — ONDANSETRON 2 MG/ML
INJECTION INTRAMUSCULAR; INTRAVENOUS AS NEEDED
Status: DISCONTINUED | OUTPATIENT
Start: 2018-01-18 | End: 2018-01-18 | Stop reason: SURG

## 2018-01-18 RX ORDER — LIDOCAINE HYDROCHLORIDE AND EPINEPHRINE 10; 10 MG/ML; UG/ML
INJECTION, SOLUTION INFILTRATION; PERINEURAL AS NEEDED
Status: DISCONTINUED | OUTPATIENT
Start: 2018-01-18 | End: 2018-01-18 | Stop reason: HOSPADM

## 2018-01-18 RX ORDER — IBUPROFEN 600 MG/1
600 TABLET ORAL EVERY 6 HOURS
Status: DISCONTINUED | OUTPATIENT
Start: 2018-01-18 | End: 2018-01-18 | Stop reason: HOSPADM

## 2018-01-18 RX ORDER — ONDANSETRON 2 MG/ML
4 INJECTION INTRAMUSCULAR; INTRAVENOUS EVERY 6 HOURS PRN
Status: DISCONTINUED | OUTPATIENT
Start: 2018-01-18 | End: 2018-01-18 | Stop reason: HOSPADM

## 2018-01-18 RX ORDER — FENTANYL CITRATE 50 UG/ML
50 INJECTION, SOLUTION INTRAMUSCULAR; INTRAVENOUS
Status: DISCONTINUED | OUTPATIENT
Start: 2018-01-18 | End: 2018-01-18 | Stop reason: HOSPADM

## 2018-01-18 RX ORDER — MIDAZOLAM HYDROCHLORIDE 1 MG/ML
INJECTION INTRAMUSCULAR; INTRAVENOUS AS NEEDED
Status: DISCONTINUED | OUTPATIENT
Start: 2018-01-18 | End: 2018-01-18 | Stop reason: SURG

## 2018-01-18 RX ORDER — MAGNESIUM HYDROXIDE 1200 MG/15ML
LIQUID ORAL AS NEEDED
Status: DISCONTINUED | OUTPATIENT
Start: 2018-01-18 | End: 2018-01-18 | Stop reason: HOSPADM

## 2018-01-18 RX ORDER — PROPOFOL 10 MG/ML
INJECTION, EMULSION INTRAVENOUS AS NEEDED
Status: DISCONTINUED | OUTPATIENT
Start: 2018-01-18 | End: 2018-01-18 | Stop reason: SURG

## 2018-01-18 RX ORDER — ACETAMINOPHEN 325 MG/1
650 TABLET ORAL EVERY 6 HOURS
Status: DISCONTINUED | OUTPATIENT
Start: 2018-01-18 | End: 2018-01-18 | Stop reason: HOSPADM

## 2018-01-18 RX ADMIN — PROPOFOL 100 MG: 10 INJECTION, EMULSION INTRAVENOUS at 09:52

## 2018-01-18 RX ADMIN — ACETAMINOPHEN 650 MG: 325 TABLET, FILM COATED ORAL at 13:20

## 2018-01-18 RX ADMIN — SODIUM CHLORIDE 125 ML/HR: 0.9 INJECTION, SOLUTION INTRAVENOUS at 11:01

## 2018-01-18 RX ADMIN — FENTANYL CITRATE 50 MCG: 50 INJECTION INTRAMUSCULAR; INTRAVENOUS at 10:14

## 2018-01-18 RX ADMIN — SODIUM CHLORIDE 125 ML/HR: 0.9 INJECTION, SOLUTION INTRAVENOUS at 09:15

## 2018-01-18 RX ADMIN — DEXAMETHASONE SODIUM PHOSPHATE 4 MG: 10 INJECTION INTRAMUSCULAR; INTRAVENOUS at 10:15

## 2018-01-18 RX ADMIN — ONDANSETRON HYDROCHLORIDE 4 MG: 2 INJECTION, SOLUTION INTRAVENOUS at 10:15

## 2018-01-18 RX ADMIN — CEFAZOLIN SODIUM 2000 MG: 1 SOLUTION INTRAVENOUS at 09:58

## 2018-01-18 RX ADMIN — IBUPROFEN 600 MG: 600 TABLET, FILM COATED ORAL at 12:15

## 2018-01-18 RX ADMIN — MIDAZOLAM HYDROCHLORIDE 2 MG: 1 INJECTION, SOLUTION INTRAMUSCULAR; INTRAVENOUS at 09:45

## 2018-01-18 RX ADMIN — FENTANYL CITRATE 50 MCG: 50 INJECTION INTRAMUSCULAR; INTRAVENOUS at 09:52

## 2018-01-18 RX ADMIN — LIDOCAINE HYDROCHLORIDE 50 MG: 20 INJECTION, SOLUTION INTRAVENOUS at 09:52

## 2018-01-18 NOTE — ANESTHESIA PREPROCEDURE EVALUATION
Review of Systems/Medical History  Patient summary reviewed  Chart reviewed      Cardiovascular  Hyperlipidemia, Hypertension controlled,   Comment: Does not take medicine for her htn,  Pulmonary  Negative pulmonary ROS        GI/Hepatic  Negative GI/hepatic ROS          Negative  ROS        Endo/Other  History of thyroid disease ,   Comment: Unknown thyroid disease    GYN  Negative gynecology ROS          Hematology  Negative hematology ROS      Musculoskeletal    Arthritis     Neurology    CVA , residual symptoms, Headaches,   Comment: Left side weakness post CVA Psychology   Anxiety, Depression , being treated for depression,              Physical Exam    Airway    Mallampati score: II  TM Distance: >3 FB  Neck ROM: full     Dental   upper dentures,     Cardiovascular  Rhythm: regular, Rate: normal, Cardiovascular exam normal    Pulmonary  Pulmonary exam normal Breath sounds clear to auscultation,     Other Findings        Anesthesia Plan  ASA Score- 3     Anesthesia Type- general with ASA Monitors  Additional Monitors:   Airway Plan: LMA  Plan Factors- Patient instructed to abstain from smoking on day of procedure  Patient did not smoke on day of surgery  Induction- intravenous  Postoperative Plan- Plan for postoperative opioid use  Informed Consent- Anesthetic plan and risks discussed with patient  I personally reviewed this patient with the CRNA  Discussed and agreed on the Anesthesia Plan with the CRNA  eCci Barbosa

## 2018-01-18 NOTE — OP NOTE
OPERATIVE REPORT  PATIENT NAME: Emeli Contreras    :  1950  MRN: [id number]  Pt Location: [facility] OR ROOM 04    SURGERY DATE: 2018    Surgeon(s) and Role:     * Regla Clay MD - Primary     * Son Espinoza MD - Assisting    Preop Diagnosis:  Mixed incontinence [N39 46]  Hypermobility of urethra [N36 41]    Post-Op Diagnosis Codes:     * Mixed incontinence [N39 46]     * Hypermobility of urethra [N36 41]    Procedure(s) (LRB):  PUBOVAGINAL SLING (N/A)  CYSTOSCOPY (N/A)    Specimen(s):  * No specimens in log *    Estimated Blood Loss:   Minimal    Drains:       Anesthesia Type:   IV Sedation with Anesthesia    Operative Indications:  Mixed incontinence [N39 46]  Hypermobility of urethra [N36 41]    Operative Findings:  Cystoscopy: efflux noted from bilateral ureteral orifices  No mesh, suture material, or injury noted to bladder lumen  Complications:   None    Procedure and Technique:  Appropriate preoperative antibiotics chosen per ACOG guidelines were given  Bilateral SCDs were placed in the lower extremities for DVT prevention prior to the institution of anesthesia  No bladder, ureteral, viscus, or solid organ injury were noted at the end of the procedure  The patient was identified in the holding area by the operating room staff and attending physician  She was taken to the operating room where anesthesia was instituted without complications  She was placed in the dorsal lithotomy position with the legs in [address] [place] [address] with care taken to avoid excessive flexion or extension of her lower extremities  The patient was prepped and draped in the usual sterile fashion  A Anglin catheter was inserted  Next, we turned our attention to the single incision sling  The mid urethral zone was identified by reference to the Anglin catheter and urethral meatus   Local anesthetic solution was injected into the anterior vaginal wall muscularis at the mid urethral level for hydrodissection and vasoconstriction, 10 mL was injected at the midline  Next, an additional 10 mL was injected to the left and right of midline directing the infiltration laterally towards the cephalad aspect of the inferior pubic ramus bilaterally  Care was taken to ensure that the anterolateral sulcus was flattened by the infiltration to minimize chance for buttonholing or sling (tape) becoming too close to the anterolateral sulcus  After completion of hydrodissection, 2 Allis clamps were used to grasp the anterior vaginal wall for traction  A 1 5 cm incision was made into the midline through mucosa and muscularis  Two Allis clamps were then placed on each cut edge of the incision for stabilization  Tenotomy scissors were used to create a small vaginal tunnel with sharp and blunt dissection above the anterior vaginal wall directed laterally towards the cephalad aspect of the inferior pubic ramus bilaterally  Dissection was carried out to the edge of the bone itself but without dissection into the obturator internus muscle  Once the dissection was complete, the Altis sling system was placed  An index finger was placed in the vagina for guidance  The Altis trocar and fixed anchor was placed into the pre-dissected tract  The handle was held horizontally in a slight upward angle to avoid buttonholing of the sulcus  Cephalad drift was used to allow passage around the inferior pubic ramus  A thumb was placed on the heel of the introducer to allow a lateral followed by a rotation push maneuver to place a fixed anchor into the obturator internus muscle membrane complex on the patient's left side  Proper handle deviation confirmed proper anchor placement, as well as inspection of the sling itself  Once the introducer was removed, proper anchor placement was confirmed by gentle tugging on the sling  The exact same sequence of steps was repeated on the patient's right side with the adjustable anchor and trocar       Once placement of dynamic anchor was completed, the introducer was removed and gentle tugging again confirmed proper anchor placement  The Anglin catheter was then removed and a diagnostic cystoscopy was performed by instilling 300 mL of fluid into the bladder  Both ureters were effluxing normally and there were no lacerations or evidence of injury to the lower urinary tract  The tensioning suture was used to adjust the tape until there was minimal to leakage with Crede and coughing  After appropriate tensioning, the tensioning suture was cut  The vaginal incision was closed with 2-0 Vicryl suture in a running locked stitch  The sponge, needle and instrument count were correct x 2  The patient tolerated the procedure well  She was awakened from anesthesia and transferred to the recovery room in stable condition  A qualified resident physician was not available       I was present for the entire procedure    Patient Disposition:  PACU     SIGNATURE: Ariel Velázquez MD  DATE: January 18, 2018  TIME: 10:52 AM

## 2018-01-18 NOTE — DISCHARGE INSTRUCTIONS
Bladder Sling Procedure   WHAT YOU NEED TO KNOW:   A bladder sling procedure is surgery to treat urinary incontinence in women  The sling acts as a hammock to keep your urethra in place and hold it closed when your bladder is full  You may have vaginal bleeding or discharge for up to a week after your surgery  Use sanitary pads  Do not use tampons  You may have some pelvic discomfort or trouble urinating  DISCHARGE INSTRUCTIONS:   Call 911 for any of the following:   · You have sudden trouble breathing  Seek care immediately if:   · Your bleeding gets worse  · You have yellow or foul smelling discharge from your vagina  · You cannot urinate, or you are urinating less than what is normal for you  · You feel confused  Contact your healthcare provider if:   · You have a fever  · You do not feel like you are able to empty your bladder completely when you urinate  · You feel the need to urinate very suddenly  · You have burning or stinging when you urinate  · You have blood in your urine  · Your skin is itchy, swollen, or you have a rash  · You have questions or concerns about your condition or care  Medicines:   · Prescription pain medicine  may be given  Ask your how to take this medicine safely  · Take your medicine as directed  Contact your healthcare provider if you think your medicine is not helping or if you have side effects  Tell him or her if you are allergic to any medicine  Keep a list of the medicines, vitamins, and herbs you take  Include the amounts, and when and why you take them  Bring the list or the pill bottles to follow-up visits  Carry your medicine list with you in case of an emergency  Self-catheterization:  You may need to put a catheter into your bladder after you urinate to empty any remaining urine  A catheter is a small rubber tube used to drain urine  Healthcare providers will teach you how to put the catheter in safely   This may be needed until you are completely emptying your bladder  Anglin catheter: You may have a Anglin catheter for a short period of time  The Anglin is a tube put into your bladder to drain urine into a bag  Keep the bag below your waist  This will prevent urine from flowing back into your bladder and causing an infection or other problems  Also, keep the tube free of kinks so the urine will drain properly  Do not pull on the catheter  This can cause pain and bleeding, and may cause the catheter to come out  Activity:  Do not lift heavy objects for 6 weeks after your procedure  Do not have intercourse for 4 to 6 weeks  Do not use a tampon for 4 weeks  Ask your healthcare provider when you can return to work or your usual activities  Do pelvic muscle exercises: These are also called Kegel exercises  These exercises help strengthen your pelvic muscles and help prevent urine leakage  Tighten the muscles of your pelvis and hold them tight for 5 seconds, then relax for 5 seconds  Gradually work up to tightening them for 10 seconds and relaxing for 10 seconds  Do this 3 times each day  Keep a record:  Keep a record of when you urinate and if you leak any urine  Write down what you were doing when you leaked urine, such as coughing or sneezing  Bring the log to your follow-up visits  Prevent constipation:  Drink liquids as directed  You may need to drink more water than usual to soften your bowel movements  Eat a variety of healthy foods, especially fruit and foods high in fiber  You may need to use an over-the-counter bowel movement softener  Follow up with your healthcare provider as directed: You may need a test to check how much urine remains in your bladder after you urinate  This will help show how the sling is working  Write down your questions so you remember to ask them during your visits    © 2017 Melisa0 Nathaniel Salmeron Information is for End User's use only and may not be sold, redistributed or otherwise used for commercial purposes  All illustrations and images included in CareNotes® are the copyrighted property of A D A M , Inc  or Kiel Torre  The above information is an  only  It is not intended as medical advice for individual conditions or treatments  Talk to your doctor, nurse or pharmacist before following any medical regimen to see if it is safe and effective for you  Procedimientos de cabestrillo de la vejiga   LO QUE NECESITA SABER:   Un procedimiento de cabestrillo de la vejiga es hero cirugía para tratar la incontinencia urinaria en las mujeres  El cabestrillo actúa flori hero hamaca para mantener la uretra en santiago lugar y cerrada cuando santiago vejiga esté llena  Usted podría presentar sangrado o secreción vaginal hasta por hero semana después de santiago cirugía  Use toallas sanitarias  No use tampones  Es posible que tenga algo de [place] pélvica o dificultad para orinar  INSTRUCCIONES SOBRE [facility]:   Llame al 911 en ananth de presentar lo siguiente:   · Usted tiene dificultad repentina para respirar  Busque atención médica de inmediato si:   · Santiago sangrado empeora  · Usted tiene secreción vaginal amarillenta o maloliente  · Usted no puede orinar o está orinando menos de lo que es normal para usted  · Se siente confundido  Pregúntele a santiago Terrilyn Kit vitaminas y minerales son adecuados para usted  · Usted tiene fiebre  · Usted siente que no puede vaciar santiago vejiga completamente cuando orina  · Usted siente necesidad de orinar muy repentinamente  · Usted tiene ardor o sensación punzante cuando orina  · Usted orina con roxane  · Usted tiene comezón en la piel, inflamación o un sarpullido  · Usted tiene preguntas o inquietudes acerca de santiago condición o cuidado  Medicamentos:   · Un medicamento con receta para el dolor  podrían ser Elfrieda Gary  Pregunte cómo tomarse vianney medicamento con precaución  · [place] milana medicamentos flori se le haya indicado    Consulte con santiago médico si usted efrain que santiago medicamento no le está ayudando o si presenta efectos secundarios  Infórmele si es alérgico a cualquier medicamento  Mantenga hero lista actualizada de los [place], las vitaminas y los productos herbales que kike  Incluya los siguientes datos de los medicamentos: cantidad, frecuencia y motivo de administración  Traiga con usted la lista o los envases de la píldoras a milana citas de seguimiento  Lleve la lista de los medicamentos con usted en ananth de hero emergencia  Autocateterismo:  Es posible que usted necesite colocar un catéter en santiago vejiga después de orinar para sacar cualquier orina restante  Un catéter es un tubo de hule pequeño que se [place] para drenar [place]  [employer] enseñarán a colocar el catéter de forma rivero  [place] podría ser necesario hasta que santiago vejiga se esté vaciando completamente  Catéter de Anglin:  Es posible que usted use un catéter Anglin por un corto tiempo  El catéter Anglin es un tubo que se coloca en santiago vejiga para drenar la orina en [place]  Mantenga la bolsa debajo del nivel de santiago cintura  [place] va a prevenir que la orina fluya de regreso a santiago vejiga y cause hero infección u otros problemas  También mantenga el tubo sin torceduras para que la orina drene apropiadamente  No jale del catéter  [place] puede provocarle dolor y sangrado y podría provocar que el catéter se salga  Actividad:  No levante objetos pesados ? ?josiah 6 semanas después de santiago procedimiento  No tenga relaciones sexuales dentro de 4 a 6 semanas  No use un tampón por 4 semanas  Consulte con santiago médico acerca de cuándo puede regresar al Shea Rae o a milana actividades usuales  Realice ejercicios del músculo pélvico:  Estos también se conocen flori los ejercicios de Kegel  Estos ejercicios ayudan a fortalecer milana músculos pélvicos y a evitar un derrame de [place]  [employer] los músculos de santiago pelvis y manténgalos apretados por 5 segundos, después relájese por 5 segundos   Trabaje gradualmente [employer] apretarlos josiah 10 segundos y relajarlos josiah 10 segundos  [place] esto 3 veces al día  Mantenga un registro: Mantenga un registro de cuándo [place] y si se derrama orina  Marilu lo que usted estaba haciendo cuando la orina se derramó, flori tosiendo o estornudando  Maryellen Pachecodarrius registro a milana citas de seguimiento  Prevenga estreñimiento:  Cordele líquidos flori se le haya indicado  Es posible que usted necesite nabor más agua que de costumbre para suavizar milana evacuaciones intestinales  Consuma hero variedad de alimentos saludables, especialmente frutas y alimentos altos en fibra  Es posible que usted necesite usar un suavizante de evacuaciones que está a la venta sin receta médica  Acuda a milana consultas de control con barnard médico según le indicaron  Es posible que usted necesite un examen para revisar cuánta [facility] en barnard vejiga después de orinar  [place] ayudará a mostrar cómo está funcionando el cabestrillo  Anote milana preguntas para que se acuerde de hacerlas josiah milana visitas  © 2017 [address] Nathaniel Salmeron Information is for End User's use only and may not be sold, redistributed or otherwise used for commercial purposes  All illustrations and images included in CareNotes® are the copyrighted property of A D A M , Inc  or Kiel Torre  Esta información es sólo para uso en educación  Barnard intención no es darle un consejo médico sobre enfermedades o tratamientos  Colsulte con barnard Robert Jewels farmacéutico antes de seguir cualquier régimen médico para saber si es seguro y efectivo para usted

## 2018-01-18 NOTE — ANESTHESIA POSTPROCEDURE EVALUATION
Post-Op Assessment Note      /58 (01/18/18 1105)    Temp 98 3 °F (36 8 °C) (01/18/18 1105)    Pulse 70 (01/18/18 1105)   Resp 14 (01/18/18 1105)    SpO2 94 % (01/18/18 1105)

## 2018-01-22 VITALS
WEIGHT: 137.57 LBS | TEMPERATURE: 99.3 F | HEART RATE: 80 BPM | DIASTOLIC BLOOD PRESSURE: 70 MMHG | SYSTOLIC BLOOD PRESSURE: 120 MMHG | BODY MASS INDEX: 27.73 KG/M2 | HEIGHT: 59 IN

## 2018-01-22 VITALS
WEIGHT: 135 LBS | SYSTOLIC BLOOD PRESSURE: 129 MMHG | BODY MASS INDEX: 27.21 KG/M2 | DIASTOLIC BLOOD PRESSURE: 76 MMHG | HEIGHT: 59 IN

## 2018-01-24 VITALS
WEIGHT: 135 LBS | BODY MASS INDEX: 27.21 KG/M2 | HEIGHT: 59 IN | SYSTOLIC BLOOD PRESSURE: 133 MMHG | HEART RATE: 64 BPM | DIASTOLIC BLOOD PRESSURE: 77 MMHG

## 2018-02-08 ENCOUNTER — OFFICE VISIT (OUTPATIENT)
Dept: OBGYN CLINIC | Facility: CLINIC | Age: 68
End: 2018-02-08
Payer: COMMERCIAL

## 2018-02-08 VITALS — DIASTOLIC BLOOD PRESSURE: 76 MMHG | BODY MASS INDEX: 29.43 KG/M2 | WEIGHT: 136 LBS | SYSTOLIC BLOOD PRESSURE: 119 MMHG

## 2018-02-08 DIAGNOSIS — N95.2 VAGINAL ATROPHY: ICD-10-CM

## 2018-02-08 DIAGNOSIS — N39.3 URINARY, INCONTINENCE, STRESS FEMALE: Primary | ICD-10-CM

## 2018-02-08 PROCEDURE — 99024 POSTOP FOLLOW-UP VISIT: CPT | Performed by: OBSTETRICS & GYNECOLOGY

## 2018-02-08 NOTE — PROGRESS NOTES
Subjective: the patient returns today for her two week postoperative visit  She underwent a single incision sling to treat her stress incontinence and hypermobile urethra  The patient states that she is doing well she has no pain or dysuria  She denies having any abnormal vaginal bleeding or vaginal discharge  She has no lower extremity pain    The patient has no voiding concerns her stream is normal and she is emptying completely  She she is currently happy that she is no longer leaking  She reports no new onset urinary urgency or frequency    Objective: The the patient is vaginal epithelium appears to be atrophic  The midline incision is well we approximated  There is no granulation tissue noted  The patient points to her suture line as where she feels something sticking out  There is no visual signs of any mesh you exposure a manual examination was then performed  There was no pain with palpation of the left sulcus  No mesh was palpated in this area as well the patient did experience some point tenderness on the right sulcus  The sling felt slightly shallow underneath the vaginal epithelium however there was no exposure appreciated    Assessment:two week postop single incision sling    Plan    Intravaginal estrogen tid, follow up 6 weeks for reevaluation of right sulcus

## 2018-02-27 ENCOUNTER — TELEPHONE (OUTPATIENT)
Dept: FAMILY MEDICINE CLINIC | Facility: CLINIC | Age: 68
End: 2018-02-27

## 2018-03-11 ENCOUNTER — APPOINTMENT (EMERGENCY)
Dept: CT IMAGING | Facility: HOSPITAL | Age: 68
End: 2018-03-11
Payer: COMMERCIAL

## 2018-03-11 ENCOUNTER — APPOINTMENT (EMERGENCY)
Dept: RADIOLOGY | Facility: HOSPITAL | Age: 68
End: 2018-03-11
Payer: COMMERCIAL

## 2018-03-11 ENCOUNTER — HOSPITAL ENCOUNTER (EMERGENCY)
Facility: HOSPITAL | Age: 68
Discharge: HOME | End: 2018-03-11
Attending: EMERGENCY MEDICINE | Admitting: EMERGENCY MEDICINE
Payer: COMMERCIAL

## 2018-03-11 VITALS
DIASTOLIC BLOOD PRESSURE: 89 MMHG | HEART RATE: 86 BPM | TEMPERATURE: 98.4 F | OXYGEN SATURATION: 99 % | WEIGHT: 139.11 LBS | BODY MASS INDEX: 30.1 KG/M2 | SYSTOLIC BLOOD PRESSURE: 192 MMHG | RESPIRATION RATE: 16 BRPM

## 2018-03-11 DIAGNOSIS — M25.551 RIGHT HIP PAIN: ICD-10-CM

## 2018-03-11 DIAGNOSIS — S09.90XA CLOSED HEAD INJURY, INITIAL ENCOUNTER: ICD-10-CM
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
## 2018-03-11 LAB
ALBUMIN SERPL BCP-MCNC: 3.9 G/DL (ref 3.5–5)
ALP SERPL-CCNC: 85 U/L (ref 46–116)
ALT SERPL W P-5'-P-CCNC: 49 U/L (ref 12–78)
ANION GAP SERPL CALCULATED.3IONS-SCNC: 7 MMOL/L (ref 4–13)
AST SERPL W P-5'-P-CCNC: 35 U/L (ref 5–45)
ATRIAL RATE: 76 BPM
BASOPHILS # BLD AUTO: 0.01 THOUSANDS/ΜL (ref 0–0.1)
BASOPHILS NFR BLD AUTO: 0 % (ref 0–1)
BILIRUB SERPL-MCNC: 0.29 MG/DL (ref 0.2–1)
BUN SERPL-MCNC: 21 MG/DL (ref 5–25)
CALCIUM SERPL-MCNC: 9.3 MG/DL (ref 8.3–10.1)
CHLORIDE SERPL-SCNC: 106 MMOL/L (ref 100–108)
CO2 SERPL-SCNC: 29 MMOL/L (ref 21–32)
CREAT SERPL-MCNC: 0.78 MG/DL (ref 0.6–1.3)
EOSINOPHIL # BLD AUTO: 0.19 THOUSAND/ΜL (ref 0–0.61)
EOSINOPHIL NFR BLD AUTO: 4 % (ref 0–6)
ERYTHROCYTE [DISTWIDTH] IN BLOOD BY AUTOMATED COUNT: 13.5 % (ref 11.6–15.1)
GFR SERPL CREATININE-BSD FRML MDRD: 79 ML/MIN/1.73SQ M
GLUCOSE SERPL-MCNC: 128 MG/DL (ref 65–140)
HCT VFR BLD AUTO: 37.4 % (ref 34.8–46.1)
HGB BLD-MCNC: 12.2 G/DL (ref 11.5–15.4)
LYMPHOCYTES # BLD AUTO: 1.78 THOUSANDS/ΜL (ref 0.6–4.47)
LYMPHOCYTES NFR BLD AUTO: 39 % (ref 14–44)
MCH RBC QN AUTO: 30.2 PG (ref 26.8–34.3)
MCHC RBC AUTO-ENTMCNC: 32.6 G/DL (ref 31.4–37.4)
MCV RBC AUTO: 93 FL (ref 82–98)
MONOCYTES # BLD AUTO: 0.41 THOUSAND/ΜL (ref 0.17–1.22)
MONOCYTES NFR BLD AUTO: 9 % (ref 4–12)
NEUTROPHILS # BLD AUTO: 2.18 THOUSANDS/ΜL (ref 1.85–7.62)
NEUTS SEG NFR BLD AUTO: 48 % (ref 43–75)
P AXIS: 64 DEGREES
PLATELET # BLD AUTO: 223 THOUSANDS/UL (ref 149–390)
PMV BLD AUTO: 10.6 FL (ref 8.9–12.7)
POTASSIUM SERPL-SCNC: 3.5 MMOL/L (ref 3.5–5.3)
PR INTERVAL: 142 MS
PROT SERPL-MCNC: 7.4 G/DL (ref 6.4–8.2)
QRS AXIS: 49 DEGREES
QRSD INTERVAL: 96 MS
QT INTERVAL: 358 MS
QTC INTERVAL: 402 MS
RBC # BLD AUTO: 4.04 MILLION/UL (ref 3.81–5.12)
SODIUM SERPL-SCNC: 142 MMOL/L (ref 136–145)
T WAVE AXIS: 54 DEGREES
TROPONIN I SERPL-MCNC: <0.02 NG/ML
VENTRICULAR RATE: 76 BPM
WBC # BLD AUTO: 4.57 THOUSAND/UL (ref 4.31–10.16)

## 2018-03-11 PROCEDURE — 96361 HYDRATE IV INFUSION ADD-ON: CPT

## 2018-03-11 PROCEDURE — 85025 COMPLETE CBC W/AUTO DIFF WBC: CPT | Performed by: EMERGENCY MEDICINE

## 2018-03-11 PROCEDURE — 71046 X-RAY EXAM CHEST 2 VIEWS: CPT

## 2018-03-11 PROCEDURE — 36415 COLL VENOUS BLD VENIPUNCTURE: CPT | Performed by: EMERGENCY MEDICINE

## 2018-03-11 PROCEDURE — 73502 X-RAY EXAM HIP UNI 2-3 VIEWS: CPT

## 2018-03-11 PROCEDURE — 93005 ELECTROCARDIOGRAM TRACING: CPT

## 2018-03-11 PROCEDURE — 96374 THER/PROPH/DIAG INJ IV PUSH: CPT

## 2018-03-11 PROCEDURE — 72125 CT NECK SPINE W/O DYE: CPT

## 2018-03-11 PROCEDURE — 84484 ASSAY OF TROPONIN QUANT: CPT | Performed by: EMERGENCY MEDICINE

## 2018-03-11 PROCEDURE — 93010 ELECTROCARDIOGRAM REPORT: CPT | Performed by: INTERNAL MEDICINE

## 2018-03-11 PROCEDURE — 80053 COMPREHEN METABOLIC PANEL: CPT | Performed by: EMERGENCY MEDICINE

## 2018-03-11 PROCEDURE — 70450 CT HEAD/BRAIN W/O DYE: CPT

## 2018-03-11 PROCEDURE — 99284 EMERGENCY DEPT VISIT MOD MDM: CPT

## 2018-03-11 RX ORDER — MORPHINE SULFATE 2 MG/ML
2 INJECTION, SOLUTION INTRAMUSCULAR; INTRAVENOUS ONCE
Status: COMPLETED | OUTPATIENT
Start: 2018-03-11 | End: 2018-03-11

## 2018-03-11 RX ADMIN — SODIUM CHLORIDE 1000 ML: 0.9 INJECTION, SOLUTION INTRAVENOUS at 19:20

## 2018-03-11 RX ADMIN — MORPHINE SULFATE 2 MG: 2 INJECTION, SOLUTION INTRAMUSCULAR; INTRAVENOUS at 19:19

## 2018-03-11 NOTE — ED PROVIDER NOTES
History  Chief Complaint   Patient presents with    Hip Pain     fell 20 minutes prior to arrival in home on carpeted floor, fell backwards striking right hip   Headache     struck head on coffee table during a fall at home, no LOC  79 YO female presents after a fall  Pt was walking down the hallway when she fell backwards, states she struck her head on a table and landed on the Right side  Pt did not lose consciousness, she has had a headache since the event  Pt complains of pain in the Right hip which has been constant since the fall, worse with movement  Pt was able to get up and walk for a short period though she had discomfort with this  Pt does have 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 past, daughter states she was previously on anticoagulation but is no longer taking these medications  Pt denies CP/SOB/F/C/N/V/D/C, no dysuria, burning on urination or blood in urine  History provided by:  Patient and relative   used: No    Hip Pain   Location:  Right hip  Quality:  Aching, sharp  Severity:  Moderate  Onset quality:  Sudden  Duration:  1 hour  Timing:  Constant  Progression:  Unchanged  Chronicity:  New  Context:  Fall  Worsened by: Movement  Associated symptoms: headaches    Associated symptoms: no abdominal pain, no chest pain, no congestion, no diarrhea, no fatigue, no fever, no myalgias, no rash, no shortness of breath, no vomiting and no wheezing    Headaches:     Severity:  Moderate    Onset quality:  Sudden    Duration:  1 hour    Timing:  Constant    Progression:  Unchanged    Chronicity:  New  Headache   Associated symptoms: no abdominal pain, no congestion, no diarrhea, no dizziness, no fatigue, no fever, no myalgias, no vomiting and no weakness        Prior to Admission Medications   Prescriptions Last Dose Informant Patient Reported? Taking?   citalopram (CeleXA) 20 mg tablet   Yes No   Sig: Take 20 mg by mouth daily     conjugated estrogens (PREMARIN) vaginal cream   No No   Sig: Insert 0 5 g into the vagina 3 (three) times a week   omeprazole (PriLOSEC) 20 mg delayed release capsule   Yes No   Sig: Take 20 mg by mouth daily   psyllium (METAMUCIL) 0 52 g capsule   Yes No   Sig: Take 0 52 g by mouth daily      Facility-Administered Medications: None       Past Medical History:   Diagnosis Date    Abdominal pain     Anxiety     Arthritis     osteo both hands    Bowel incontinence     Chest pain     Constipation     CVA (cerebral vascular accident) ([address] )     Disease of thyroid gland     Epigastric pain     with distention    Falls     Hyperlipidemia     Hypertension     Migraine     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    Palpitations     Stroke [facility])        Past Surgical History:   Procedure Laterality Date    AXILLARY SURGERY      cyst surgery    BLADDER SURGERY       SECTION      CYSTOSCOPY N/A 2018    Procedure: CYSTOSCOPY;  Surgeon: Kushal Hopkins MD;  [facility];  Service: Gynecology    KNEE SURGERY      OTHER SURGICAL HISTORY      cyst removed from axilla    [place] ESOPHAGOGASTRODUODENOSCOPY TRANSORAL DIAGNOSTIC N/A 2017    Procedure: ESOPHAGOGASTRODUODENOSCOPY (EGD); Surgeon: Daniel Gonzalez MD;  Location[facility]; Service: Gastroenterology    [place] SLING OPER STRES INCONTINENCE N/A 2018    Procedure: PUBOVAGINAL SLING;  Surgeon: Kushal Hopkins MD;  [facility];  Service: Gynecology       Family History   Problem Relation Age of Onset    Family history unknown: Yes     I have reviewed and agree with the history as documented  Social History   Substance Use Topics    Smoking status: Never Smoker    Smokeless tobacco: Never Used    Alcohol use No        Review of Systems   Constitutional: Negative for chills, fatigue and fever  HENT: Negative for congestion and dental problem  Eyes: Negative for visual disturbance  Respiratory: Negative for shortness of breath and wheezing  Cardiovascular: Negative for chest pain  Gastrointestinal: Negative for abdominal pain, diarrhea and vomiting  Genitourinary: Negative for dysuria and frequency  Musculoskeletal: Negative for arthralgias and myalgias  Skin: Negative for rash  Neurological: Positive for headaches  Negative for dizziness, weakness and light-headedness  Psychiatric/Behavioral: Negative for agitation, behavioral problems and confusion  All other systems reviewed and are negative  Physical Exam  ED Triage Vitals [03/11/18 1821]   Temperature Pulse Respirations Blood Pressure SpO2   98 4 °F (36 9 °C) 86 16 (!) 220/93 99 %      Temp Source Heart Rate Source Patient Position - Orthostatic VS BP Location FiO2 (%)   Oral Monitor Sitting Right arm --      Pain Score       8           Orthostatic Vital Signs  Vitals:    03/11/18 1821   BP: (!) 220/93   Pulse: 86   Patient Position - Orthostatic VS: Sitting       Physical Exam   Constitutional: She is oriented to person, place, and time  She appears well-developed and well-nourished  HENT:   Head: Normocephalic and atraumatic  Eyes: EOM are normal  Pupils are equal, round, and reactive to light  Neck: Normal range of motion  Cardiovascular: Normal rate, regular rhythm and normal heart sounds  Pulmonary/Chest: Effort normal and breath sounds normal    Abdominal: Soft  Musculoskeletal: Normal range of motion  No obvious deformity of the Right hip, no shortening of Right leg, tender to palpation over the lateral aspect of the hip, no ecchymosis or swelling  NV intact  Neurological: She is alert and oriented to person, place, and time  Skin: Skin is warm and dry  Psychiatric: She has a normal mood and affect  Her behavior is normal  Thought content normal    Nursing note and vitals reviewed        ED Medications  Medications   sodium chloride 0 9 % bolus 1,000 mL (1,000 mL Intravenous New Bag 3/11/18 1920)   morphine injection 2 mg (2 mg Intravenous Given 3/11/18 1919)       Diagnostic Studies  Results Reviewed     Procedure Component Value Units Date/Time    Troponin I [[id number]]  (Normal) Collected:  03/11/18 1840    Lab Status:  Final result Specimen:  Blood from Arm, Left Updated:  03/11/18 1905     Troponin I <0 02 ng/mL     Narrative:         [employer] Chemistry analyzer 99% cutoff is > 0 04 ng/mL in network labs    o cTnI 99% cutoff is useful only when applied to patients in the clinical setting of myocardial ischemia  o cTnI 99% cutoff should be interpreted in the context of clinical history, ECG findings and possibly cardiac imaging to establish correct diagnosis  o cTnI 99% cutoff may be suggestive but clearly not indicative of a coronary event without the clinical setting of myocardial ischemia  Comprehensive metabolic panel [[id number]] Collected:  03/11/18 1840    Lab Status:  Final result Specimen:  Blood from Arm, Left Updated:  03/11/18 1901     Sodium 142 mmol/L      Potassium 3 5 mmol/L      Chloride 106 mmol/L      CO2 29 mmol/L      Anion Gap 7 mmol/L      BUN 21 mg/dL      Creatinine 0 78 mg/dL      Glucose 128 mg/dL      Calcium 9 3 mg/dL      AST 35 U/L      ALT 49 U/L      Alkaline Phosphatase 85 U/L      Total Protein 7 4 g/dL      Albumin 3 9 g/dL      Total Bilirubin 0 29 mg/dL      eGFR 79 ml/min/1 73sq m     Narrative:         National Kidney Disease Education Program recommendations are as follows:  GFR calculation is accurate only with a steady state creatinine  Chronic Kidney disease less than 60 ml/min/1 73 sq  meters  Kidney failure less than 15 ml/min/1 73 sq  meters      CBC and differential [[id number]]  (Normal) Collected:  03/11/18 1840    Lab Status:  Final result Specimen:  Blood from Arm, Left Updated:  03/11/18 1848     WBC 4 57 Thousand/uL      RBC 4 04 Million/uL      Hemoglobin 12 2 g/dL      Hematocrit 37 4 %      MCV 93 fL      MCH 30 2 pg      MCHC 32 6 g/dL      RDW 13 5 %      MPV 10 6 fL      Platelets 776 Thousands/uL Neutrophils Relative 48 %      Lymphocytes Relative 39 %      Monocytes Relative 9 %      Eosinophils Relative 4 %      Basophils Relative 0 %      Neutrophils Absolute 2 18 Thousands/µL      Lymphocytes Absolute 1 78 Thousands/µL      Monocytes Absolute 0 41 Thousand/µL      Eosinophils Absolute 0 19 Thousand/µL      Basophils Absolute 0 01 Thousands/µL                  CT cervical spine without contrast   Final Result by Jd Price DO (03/11 1931)      No cervical spine fracture or traumatic malalignment  Workstation performed: [id number]         CT head without contrast   Final Result by Jd Price DO (03/11 1922)      No acute intracranial abnormality  Sinus mucosal thickening and retention cyst formation  Workstation performed: [id number]         X-ray chest 2 views    (Results Pending)   XR hip/pelv 2-3 vws right    (Results Pending)              Procedures  Procedures       Phone Contacts  ED Phone Contact    ED Course  ED Course as of Mar 11 2009   Sun Mar 11, 2018   1943 Spoke with tech, pt was able to ambulate down the bynum  MDM  Number of Diagnoses or Management Options  Closed head injury, initial encounter: new and requires workup  Fall, initial encounter: new and requires workup  Right hip pain: new and requires workup  Diagnosis management comments: 1  Fall - Pt with fall while walking, Right hip pain, HA after trauma, Hx of ICH  Will CT head, c-cpine, plain films of hip, will give analgesia         Amount and/or Complexity of Data Reviewed  Tests in the radiology section of CPT®: ordered and reviewed  Obtain history from someone other than the patient: yes  Independent visualization of images, tracings, or specimens: yes    Patient Progress  Patient progress: improved    CritCare Time    Disposition  Final diagnoses:   Fall, initial encounter   Closed head injury, initial encounter   Right hip pain     Time reflects when diagnosis was documented in both MDM as applicable and the Disposition within this note     Time User Action Codes Description Comment    3/11/2018  8:04 PM Hernan ENRIQUE Add [[id number]  UIII] Fall, initial encounter     3/11/2018  8:04 PM Hernan Cates [[id number]] Closed head injury, initial encounter     3/11/2018  8:04 PM [place], [address] Right hip pain       ED Disposition     ED Disposition Condition Comment    Discharge  Pavel Easley discharge to home/self care  Condition at discharge: Stable        Follow-up Information     Follow up With Specialties Details Why Crissy May MD [facility] Medicine Schedule an appointment as soon as possible for a visit  [address] [phone]  [facility]          Patient's Medications   Discharge Prescriptions    No medications on file     No discharge procedures on file      ED Provider  Electronically Signed by           Shai Dhillon MD  03/11/18 2009

## 2018-03-11 NOTE — ED NOTES
Patient ambulated with assistance patient still complaining of hip pain  When returned to room patient ambulated to bathroom unassisted        Sabine Romero  03/11/18 1942

## 2018-03-12 NOTE — DISCHARGE INSTRUCTIONS
Use hielo sobre santiago cadera para el día siguiente, luego puede intentar aplicar calor al [employer]  [place] ibuprofeno y acetaminofeno para la incomodidad  Hable con el médico de santiago niru, analice la posibilidad de comenzar la fisioterapia flori paciente externo para ayudarlo a caminar  Dolor de [place]   LO QUE NECESITA SABER:   El dolor de cadera puede ser causado por varias condiciones, flori la bursitis, artritis o un esguince en el músculo o tendón  Los grace X no muestran quebraduras de [place]  Es posible que usted presente inflamación en los sacos llenos de líquido que protegen a milana músculos y tendones  El dolor de cadera también puede ser a causa de un problema en la parte inferior de la espalda  El dolor de cadera podría ser causado por un trauma, la práctica de deportes o correr  Santiago dolor podría comenzar en santiago [address] [employer] y pasarse a santiago muslo, glúteo o migue  INSTRUCCIONES SOBRE EL [facility]ITAL[facility]:   Medicamentos:   · AINEs (Analgésicos antiinflamatorios no esteroides) flori el ibuprofeno, ayudan a disminuir la inflamación, el dolor y la fiebre  Jacey medicamento esta disponible con o sin hero receta médica  Los AINEs pueden causar sangrado estomacal o problemas renales en ciertas personas  Si usted kike un medicamento anticoagulante, siempre pregúntele a santiago médico si los PAUL son seguros para usted  Siempre selina la etiqueta de jacey medicamento y [place] instrucciones  · [place] milana medicamentos flori se le haya indicado  Consulte con santiago médico si usted efrain que santiago medicamento no le está ayudando o si presenta efectos secundarios  Infórmele si es alérgico a algún medicamento  Mantenga hero lista actualizada de los [employer], las vitaminas y los productos herbales que kike  Incluya los siguientes datos de los medicamentos: cantidad, frecuencia y motivo de administración  Traiga con usted la lista o los envases de la píldoras a milana citas de seguimiento   Lleve la lista de los medicamentos con usted en ananth de Raj emergencia  Regrese a la beka de emergencias si:   · Santiago dolor empeora  · Usted tiene entumecimiento en milana piernas o dedos de los pies  · Usted no puede poner [place] Hever santiago cadera o no puede moverla  Pregúntele a santiago Allegra Broom vitaminas y minerales son adecuados para usted  · Usted tiene fiebre  · Santiago dolor no disminuye, aún después del [place]  · Usted tiene preguntas o inquietudes acerca de santiago condición o cuidado  Acuda a milana consultas de control con santiago médico según le indicaron  Es posible que usted necesite fisioterapia, hero inyección o más exámenes  Es posible que necesite carie a un especialista ortopédico  Anote milana preguntas para que se acuerde de hacerlas josiah milana visitas  Controle santiago dolor de cadera:   · Descanse  santiago cadera lesionada para que pueda sanar  Posiblemente necesite evitar poner peso sobre santiago cadera josiah al menos 48 horas  Regrese a milana actividades cotidianas según las indicaciones  · El hielo  a la lesión por 20 minutos cada 4 horas o según le indicaron  Use un paquete de hielo o ponga hielo molido dentro de [employer]  Cúbrala con hero toalla para proteger santiago piel  El hielo ayuda a evitar daño al tejido y a disminuir la inflamación y el dolor  · Eleve  santiago cadera lesionada arriba del nivel de santiago corazón con la mayor frecuencia posible  [place] va a disminuir inflamación y el dolor  Si es posible, apoye santiago cadera y pierna sobre almohadas o cobijas para mantener el área elevada cómodamente  · Mantenga un peso saludable  El peso corporal adicional puede provocar presión y dolor en las articulaciones de santiago Anais Homans y tobillo  Consulte con santiago médico cuánto debería pesar  Pida que le ayude a crear un plan para bajar de peso si usted tiene sobrepeso  · Use dispositivos de apoyo flori se le indique  Es posible que usted necesite usar un bastón o [place]   Los dispositivos ortopédicos ayudan a disminuir el dolor y la presión en santiago cadera cuando usted camina  Pregunte a barnadr médico por más [employer] dispositivos de asistencia y cómo se usan de forma correcta  © 2017 [address] Information is for End User's use only and may not be sold, redistributed or otherwise used for commercial purposes  All illustrations and images included in CareNotes® are the copyrighted property of [employer] M , Inc  or Kiel Torre  Esta información es sólo para uso en educación  Barnard intención no es darle un consejo médico sobre enfermedades o tratamientos  Colsulte con barnard Josy Angst farmacéutico antes de seguir cualquier régimen médico para saber si es seguro y efectivo para usted

## 2018-03-12 NOTE — ED NOTES
Dr Adarsh Blanton aware of patients blood pressure  Patient is cleared for discharge       [address] Clem Gibson RN  03/11/18 2026

## 2018-04-18 ENCOUNTER — APPOINTMENT (OUTPATIENT)
Dept: LAB | Facility: CLINIC | Age: 68
End: 2018-04-18
Payer: COMMERCIAL

## 2018-04-18 ENCOUNTER — TELEPHONE (OUTPATIENT)
Dept: FAMILY MEDICINE CLINIC | Facility: CLINIC | Age: 68
End: 2018-04-18

## 2018-04-18 ENCOUNTER — OFFICE VISIT (OUTPATIENT)
Dept: FAMILY MEDICINE CLINIC | Facility: CLINIC | Age: 68
End: 2018-04-18
Payer: COMMERCIAL

## 2018-04-18 ENCOUNTER — TRANSCRIBE ORDERS (OUTPATIENT)
Dept: LAB | Facility: CLINIC | Age: 68
End: 2018-04-18

## 2018-04-18 VITALS
HEIGHT: 57 IN | BODY MASS INDEX: 29.83 KG/M2 | WEIGHT: 138.25 LBS | OXYGEN SATURATION: 98 % | SYSTOLIC BLOOD PRESSURE: 120 MMHG | RESPIRATION RATE: 16 BRPM | TEMPERATURE: 97.6 F | DIASTOLIC BLOOD PRESSURE: 72 MMHG | HEART RATE: 76 BPM

## 2018-04-18 DIAGNOSIS — Z12.11 SCREENING FOR COLON CANCER: ICD-10-CM

## 2018-04-18 DIAGNOSIS — M19.041 PRIMARY OSTEOARTHRITIS OF BOTH HANDS: ICD-10-CM

## 2018-04-18 DIAGNOSIS — M19.042 PRIMARY OSTEOARTHRITIS OF BOTH HANDS: ICD-10-CM

## 2018-04-18 DIAGNOSIS — F51.01 PRIMARY INSOMNIA: Primary | ICD-10-CM

## 2018-04-18 DIAGNOSIS — F32.89 OTHER DEPRESSION: ICD-10-CM

## 2018-04-18 DIAGNOSIS — I10 ESSENTIAL HYPERTENSION: ICD-10-CM

## 2018-04-18 DIAGNOSIS — E78.2 MIXED HYPERLIPIDEMIA: ICD-10-CM

## 2018-04-18 DIAGNOSIS — N39.3 URINARY, INCONTINENCE, STRESS FEMALE: ICD-10-CM

## 2018-04-18 PROBLEM — M81.0 OSTEOPOROSIS: Status: ACTIVE | Noted: 2017-09-15

## 2018-04-18 PROBLEM — M75.02 ADHESIVE CAPSULITIS OF LEFT SHOULDER: Status: ACTIVE | Noted: 2017-09-11

## 2018-04-18 PROBLEM — R79.89 ELEVATED TSH: Status: ACTIVE | Noted: 2017-04-11

## 2018-04-18 PROBLEM — K59.00 CONSTIPATION: Status: ACTIVE | Noted: 2017-05-10

## 2018-04-18 PROBLEM — N39.46 URGE AND STRESS INCONTINENCE: Status: ACTIVE | Noted: 2017-10-26

## 2018-04-18 PROBLEM — M54.12 CERVICAL RADICULITIS: Status: ACTIVE | Noted: 2017-05-09

## 2018-04-18 LAB
25(OH)D3 SERPL-MCNC: 36.1 NG/ML (ref 30–100)
ALBUMIN SERPL BCP-MCNC: 4 G/DL (ref 3.5–5)
ALP SERPL-CCNC: 65 U/L (ref 46–116)
ALT SERPL W P-5'-P-CCNC: 45 U/L (ref 12–78)
ANION GAP SERPL CALCULATED.3IONS-SCNC: 6 MMOL/L (ref 4–13)
AST SERPL W P-5'-P-CCNC: 29 U/L (ref 5–45)
BASOPHILS # BLD AUTO: 0.02 THOUSANDS/ΜL (ref 0–0.1)
BASOPHILS NFR BLD AUTO: 0 % (ref 0–1)
BILIRUB SERPL-MCNC: 0.35 MG/DL (ref 0.2–1)
BUN SERPL-MCNC: 14 MG/DL (ref 5–25)
CALCIUM SERPL-MCNC: 9.1 MG/DL (ref 8.3–10.1)
CHLORIDE SERPL-SCNC: 106 MMOL/L (ref 100–108)
CHOLEST SERPL-MCNC: 146 MG/DL (ref 50–200)
CO2 SERPL-SCNC: 30 MMOL/L (ref 21–32)
CREAT SERPL-MCNC: 0.64 MG/DL (ref 0.6–1.3)
CREAT UR-MCNC: 54.7 MG/DL
EOSINOPHIL # BLD AUTO: 0.29 THOUSAND/ΜL (ref 0–0.61)
EOSINOPHIL NFR BLD AUTO: 6 % (ref 0–6)
ERYTHROCYTE [DISTWIDTH] IN BLOOD BY AUTOMATED COUNT: 13.4 % (ref 11.6–15.1)
FOLATE SERPL-MCNC: >20 NG/ML (ref 3.1–17.5)
GFR SERPL CREATININE-BSD FRML MDRD: 93 ML/MIN/1.73SQ M
GLUCOSE P FAST SERPL-MCNC: 100 MG/DL (ref 65–99)
HCT VFR BLD AUTO: 39.7 % (ref 34.8–46.1)
HDLC SERPL-MCNC: 43 MG/DL (ref 40–60)
HGB BLD-MCNC: 12.5 G/DL (ref 11.5–15.4)
LDLC SERPL CALC-MCNC: 70 MG/DL (ref 0–100)
LYMPHOCYTES # BLD AUTO: 1.91 THOUSANDS/ΜL (ref 0.6–4.47)
LYMPHOCYTES NFR BLD AUTO: 37 % (ref 14–44)
MCH RBC QN AUTO: 29.8 PG (ref 26.8–34.3)
MCHC RBC AUTO-ENTMCNC: 31.5 G/DL (ref 31.4–37.4)
MCV RBC AUTO: 95 FL (ref 82–98)
MICROALBUMIN UR-MCNC: <5 MG/L (ref 0–20)
MICROALBUMIN/CREAT 24H UR: <9 MG/G CREATININE (ref 0–30)
MONOCYTES # BLD AUTO: 0.44 THOUSAND/ΜL (ref 0.17–1.22)
MONOCYTES NFR BLD AUTO: 9 % (ref 4–12)
NEUTROPHILS # BLD AUTO: 2.44 THOUSANDS/ΜL (ref 1.85–7.62)
NEUTS SEG NFR BLD AUTO: 48 % (ref 43–75)
NONHDLC SERPL-MCNC: 103 MG/DL
NRBC BLD AUTO-RTO: 0 /100 WBCS
PLATELET # BLD AUTO: 223 THOUSANDS/UL (ref 149–390)
PMV BLD AUTO: 11.3 FL (ref 8.9–12.7)
POTASSIUM SERPL-SCNC: 4.3 MMOL/L (ref 3.5–5.3)
PROT SERPL-MCNC: 7.4 G/DL (ref 6.4–8.2)
RBC # BLD AUTO: 4.19 MILLION/UL (ref 3.81–5.12)
SODIUM SERPL-SCNC: 142 MMOL/L (ref 136–145)
TRIGL SERPL-MCNC: 167 MG/DL
TSH SERPL DL<=0.05 MIU/L-ACNC: 3.6 UIU/ML (ref 0.36–3.74)
WBC # BLD AUTO: 5.11 THOUSAND/UL (ref 4.31–10.16)

## 2018-04-18 PROCEDURE — 3725F SCREEN DEPRESSION PERFORMED: CPT | Performed by: FAMILY MEDICINE

## 2018-04-18 PROCEDURE — 82746 ASSAY OF FOLIC ACID SERUM: CPT | Performed by: FAMILY MEDICINE

## 2018-04-18 PROCEDURE — 82570 ASSAY OF URINE CREATININE: CPT | Performed by: FAMILY MEDICINE

## 2018-04-18 PROCEDURE — 80053 COMPREHEN METABOLIC PANEL: CPT | Performed by: FAMILY MEDICINE

## 2018-04-18 PROCEDURE — 84443 ASSAY THYROID STIM HORMONE: CPT | Performed by: FAMILY MEDICINE

## 2018-04-18 PROCEDURE — 85025 COMPLETE CBC W/AUTO DIFF WBC: CPT | Performed by: FAMILY MEDICINE

## 2018-04-18 PROCEDURE — 36415 COLL VENOUS BLD VENIPUNCTURE: CPT | Performed by: FAMILY MEDICINE

## 2018-04-18 PROCEDURE — 3008F BODY MASS INDEX DOCD: CPT | Performed by: FAMILY MEDICINE

## 2018-04-18 PROCEDURE — 80061 LIPID PANEL: CPT | Performed by: FAMILY MEDICINE

## 2018-04-18 PROCEDURE — 82043 UR ALBUMIN QUANTITATIVE: CPT | Performed by: FAMILY MEDICINE

## 2018-04-18 PROCEDURE — 82306 VITAMIN D 25 HYDROXY: CPT | Performed by: FAMILY MEDICINE

## 2018-04-18 PROCEDURE — 99214 OFFICE O/P EST MOD 30 MIN: CPT | Performed by: FAMILY MEDICINE

## 2018-04-18 RX ORDER — CITALOPRAM 20 MG/1
40 TABLET ORAL
COMMUNITY
Start: 2014-05-27 | End: 2018-04-18 | Stop reason: SDUPTHER

## 2018-04-18 RX ORDER — MECLIZINE HCL 12.5 MG/1
TABLET ORAL
COMMUNITY
Start: 2016-01-21

## 2018-04-18 RX ORDER — MELOXICAM 7.5 MG/1
7.5 TABLET ORAL DAILY
Qty: 90 TABLET | Refills: 0 | Status: SHIPPED | OUTPATIENT
Start: 2018-04-18 | End: 2018-10-12 | Stop reason: SDUPTHER

## 2018-04-18 RX ORDER — ATORVASTATIN CALCIUM 40 MG/1
TABLET, FILM COATED ORAL
COMMUNITY
Start: 2013-05-23 | End: 2019-07-01 | Stop reason: SDUPTHER

## 2018-04-18 RX ORDER — NAPROXEN 500 MG/1
500 TABLET ORAL
COMMUNITY
End: 2018-04-18

## 2018-04-18 RX ORDER — NICOTINE POLACRILEX 4 MG/1
1 GUM, CHEWING ORAL
COMMUNITY
Start: 2017-04-03 | End: 2018-04-18

## 2018-04-18 RX ORDER — CHOLECALCIFEROL (VITAMIN D3) 125 MCG
1 CAPSULE ORAL
Qty: 90 TABLET | Refills: 0 | Status: SHIPPED | OUTPATIENT
Start: 2018-04-18 | End: 2019-02-08 | Stop reason: SDUPTHER

## 2018-04-18 RX ORDER — CITALOPRAM 20 MG/1
40 TABLET ORAL DAILY
Qty: 90 TABLET | Refills: 1 | Status: SHIPPED | OUTPATIENT
Start: 2018-04-18 | End: 2018-10-12

## 2018-04-18 RX ORDER — ALBUTEROL SULFATE 90 UG/1
AEROSOL, METERED RESPIRATORY (INHALATION)
COMMUNITY
Start: 2013-12-30

## 2018-04-18 RX ORDER — HYDROXYZINE HYDROCHLORIDE 25 MG/1
TABLET, FILM COATED ORAL
COMMUNITY
Start: 2015-05-13 | End: 2018-04-18

## 2018-04-18 RX ORDER — CITALOPRAM 40 MG/1
40 TABLET ORAL DAILY
Qty: 90 TABLET | Refills: 0 | Status: SHIPPED | OUTPATIENT
Start: 2018-04-18 | End: 2018-10-12 | Stop reason: SDUPTHER

## 2018-04-18 NOTE — PROGRESS NOTES
Assessment/Plan:    No problem-specific Assessment & Plan notes found for this encounter  Problem List Items Addressed This Visit        Cardiovascular and Mediastinum    Hypertension    Relevant Orders    CBC and differential (Completed)    Comprehensive metabolic panel    Folate    Microalbumin / creatinine urine ratio    TSH, 3rd generation with T4 reflex       Musculoskeletal and Integument    Osteoarthritis of both hands     Meloxicam 7 5 as needed daily          Relevant Medications    meloxicam (MOBIC) 7 5 mg tablet    Other Relevant Orders    Vitamin D 25 hydroxy (Completed)       Other    HLD (hyperlipidemia)    Relevant Medications    atorvastatin (LIPITOR) 40 mg tablet    Other Relevant Orders    CBC and differential (Completed)    Comprehensive metabolic panel    Folate    Lipid panel    Urinary, incontinence, stress female    Depression     Increase Citalopram to 40 mg daily          Relevant Medications    citalopram (CeleXA) 20 mg tablet    citalopram (CeleXA) 40 mg tablet    Primary insomnia - Primary     Decrease tea intake  No caffeine products after 2 pm  Discussed sleep hygiene  Start Melatonin           Relevant Medications    Melatonin 5 MG TABS      Other Visit Diagnoses     Screening for colon cancer                Subjective:      Patient ID: Hoda Goode is a 78 y o  female  77 yo  female with multiple complains:  1- Joint pains specially both hips and knees  2- Depression, feels constantly sad, without energy   3- Fatigue/insomnia has trouble falling asleep and staying asleep  Patient states however she drinks a lot of black tea with milk, takes naps during the day  She gets up in the early am to feed her granddaughter, than goes back to bed, than gets up again to take her grandson to school  Arthritis   Presents for follow-up visit  She complains of pain and joint swelling  The symptoms have been worsening   Affected locations include the right hip, left hip, right knee and left knee  Her pain is at a severity of 7/10  Associated symptoms include fatigue and pain at night  Compliance problems include psychosocial issues  Depression   This is a chronic problem  The current episode started more than 1 year ago  The problem occurs constantly  The problem has been gradually worsening  Associated symptoms include fatigue and joint swelling  She has tried walking for the symptoms  The treatment provided moderate relief  The following portions of the patient's history were reviewed and updated as appropriate:   She  has a past medical history of Abdominal pain; Anxiety; Arthritis; Bowel incontinence; Chest pain; Constipation; CVA (cerebral vascular accident) ([address] ); Diabetes mellitus type II, controlled ([address] ); Disease of thyroid gland; Epigastric pain; Falls; High cholesterol; Hyperlipidemia; Hypertension; Migraine; Palpitations; Recurrent urinary tract infection; Seizures ([facility] ); Sleep disorder; Stroke [facility]); and Thyroid disease    She   Patient Active Problem List    Diagnosis Date Noted    Primary insomnia 04/18/2018    Urinary, incontinence, stress female 02/08/2018    Urge and stress incontinence 10/26/2017    Osteoporosis 09/15/2017    Adhesive capsulitis of left shoulder 09/11/2017    Constipation 05/10/2017    Cervical radiculitis 05/09/2017    Elevated TSH 04/11/2017    Osteoarthritis of both hands 04/03/2017    Memory difficulties 03/18/2016    Asymptomatic bilateral carotid artery stenosis 02/29/2016    Gastroesophageal reflux disease 02/03/2016    Hypertension 01/20/2016    Collapsed vertebra, not elsewhere classified, sacral and sacrococcygeal region, initial encounter for fracture ([facility] ) 01/20/2016    HLD (hyperlipidemia) 01/20/2016    History of CVA with residual deficit 01/20/2016    Depression 01/20/2016    Headache 01/20/2016    Fall 01/16/2016    Subdural hemorrhage following injury ([address] ) 01/16/2016    Subarachnoid hemorrhage following injury ([facility] ) 2016    Hyperlipidemia 10/19/2013    Muscle weakness 10/19/2013     She  has a past surgical history that includes Knee surgery; Other surgical history;  section; Axillary Surgery; pr esophagogastroduodenoscopy transoral diagnostic (N/A, 2017); pr sling oper stres incontinence (N/A, 2018); CYSTOSCOPY (N/A, 2018); Bladder surgery; Facial bone tumor excision; and Tubal ligation  Her family history includes Diabetes in her family; Hypertension in her family; Stroke in her family; Thyroid disease in her family  She  reports that she has never smoked  She has never used smokeless tobacco  She reports that she does not drink alcohol or use drugs  Current Outpatient Prescriptions   Medication Sig Dispense Refill    albuterol (PROVENTIL HFA,VENTOLIN HFA) 90 mcg/act inhaler       atorvastatin (LIPITOR) 40 mg tablet nightly   citalopram (CeleXA) 20 mg tablet Take 2 tablets (40 mg total) by mouth daily 90 tablet 1    conjugated estrogens (PREMARIN) vaginal cream Insert into the vagina      meclizine (ANTIVERT) 12 5 MG tablet       citalopram (CeleXA) 40 mg tablet Take 1 tablet (40 mg total) by mouth daily 90 tablet 0    conjugated estrogens (PREMARIN) vaginal cream Insert 0 5 g into the vagina 3 (three) times a week 42 5 g 0    Melatonin 5 MG TABS Take 1 tablet (5 mg total) by mouth daily at bedtime 90 tablet 0    meloxicam (MOBIC) 7 5 mg tablet Take 1 tablet (7 5 mg total) by mouth daily 90 tablet 0    psyllium (METAMUCIL) 0 52 g capsule Take 0 52 g by mouth daily       No current facility-administered medications for this visit       Review of Systems   Constitutional: Positive for fatigue  Musculoskeletal: Positive for arthritis and joint swelling  Psychiatric/Behavioral: Positive for depression and sleep disturbance  All other systems reviewed and are negative          Objective:      /72 (BP Location: Right arm, Patient Position: Sitting, Cuff Size: Standard)   Pulse 76   Temp 97 6 °F (36 4 °C) (Tympanic)   Resp 16   Ht 4' 9" (1 448 m)   Wt 62 7 kg (138 lb 4 oz)   SpO2 98%   BMI 29 92 kg/m²          Physical Exam   Constitutional: She is oriented to person, place, and time  She appears well-developed  HENT:   Head: Normocephalic  Right Ear: External ear normal    Left Ear: External ear normal    Nose: Nose normal    Mouth/Throat: Oropharynx is clear and moist    Eyes: Conjunctivae and EOM are normal  Pupils are equal, round, and reactive to light  Neck: Normal range of motion  Neck supple  No thyromegaly present  Cardiovascular: Normal rate, regular rhythm and normal heart sounds  Pulmonary/Chest: Effort normal and breath sounds normal    Abdominal: Soft  There is no tenderness  There is no rebound and no guarding  Musculoskeletal: Normal range of motion  Neurological: She is alert and oriented to person, place, and time  She has normal reflexes  Skin: Skin is dry  Psychiatric: She has a normal mood and affect  Nursing note and vitals reviewed

## 2018-10-12 ENCOUNTER — OFFICE VISIT (OUTPATIENT)
Dept: FAMILY MEDICINE CLINIC | Facility: CLINIC | Age: 68
End: 2018-10-12
Payer: COMMERCIAL

## 2018-10-12 VITALS
SYSTOLIC BLOOD PRESSURE: 122 MMHG | TEMPERATURE: 97.5 F | DIASTOLIC BLOOD PRESSURE: 78 MMHG | HEART RATE: 71 BPM | BODY MASS INDEX: 29.85 KG/M2 | OXYGEN SATURATION: 97 % | WEIGHT: 138.38 LBS | RESPIRATION RATE: 18 BRPM | HEIGHT: 57 IN

## 2018-10-12 DIAGNOSIS — Z12.11 COLON CANCER SCREENING: Primary | ICD-10-CM

## 2018-10-12 DIAGNOSIS — E11.9 CONTROLLED TYPE 2 DIABETES MELLITUS WITHOUT COMPLICATION, WITHOUT LONG-TERM CURRENT USE OF INSULIN (HCC): ICD-10-CM

## 2018-10-12 DIAGNOSIS — Z78.0 POSTMENOPAUSAL STATUS: ICD-10-CM

## 2018-10-12 DIAGNOSIS — Z13.820 SCREENING FOR OSTEOPOROSIS: ICD-10-CM

## 2018-10-12 DIAGNOSIS — F32.89 OTHER DEPRESSION: ICD-10-CM

## 2018-10-12 DIAGNOSIS — M19.042 PRIMARY OSTEOARTHRITIS OF BOTH HANDS: ICD-10-CM

## 2018-10-12 DIAGNOSIS — M19.041 PRIMARY OSTEOARTHRITIS OF BOTH HANDS: ICD-10-CM

## 2018-10-12 DIAGNOSIS — I10 ESSENTIAL HYPERTENSION: ICD-10-CM

## 2018-10-12 DIAGNOSIS — Z12.31 ENCOUNTER FOR MAMMOGRAM TO ESTABLISH BASELINE MAMMOGRAM: ICD-10-CM

## 2018-10-12 DIAGNOSIS — Z23 NEED FOR PNEUMOCOCCAL VACCINATION: ICD-10-CM

## 2018-10-12 PROBLEM — M19.90 ARTHRITIS: Status: ACTIVE | Noted: 2018-10-12

## 2018-10-12 PROCEDURE — 1160F RVW MEDS BY RX/DR IN RCRD: CPT

## 2018-10-12 PROCEDURE — 99213 OFFICE O/P EST LOW 20 MIN: CPT | Performed by: FAMILY MEDICINE

## 2018-10-12 PROCEDURE — 90670 PCV13 VACCINE IM: CPT

## 2018-10-12 PROCEDURE — 4040F PNEUMOC VAC/ADMIN/RCVD: CPT

## 2018-10-12 PROCEDURE — G0009 ADMIN PNEUMOCOCCAL VACCINE: HCPCS

## 2018-10-12 PROCEDURE — 1036F TOBACCO NON-USER: CPT | Performed by: FAMILY MEDICINE

## 2018-10-12 PROCEDURE — 1160F RVW MEDS BY RX/DR IN RCRD: CPT | Performed by: FAMILY MEDICINE

## 2018-10-12 RX ORDER — MELOXICAM 7.5 MG/1
7.5 TABLET ORAL DAILY
Qty: 90 TABLET | Refills: 0 | Status: SHIPPED | OUTPATIENT
Start: 2018-10-12 | End: 2019-07-01 | Stop reason: SDUPTHER

## 2018-10-12 RX ORDER — CITALOPRAM 40 MG/1
40 TABLET ORAL DAILY
Qty: 90 TABLET | Refills: 0 | Status: SHIPPED | OUTPATIENT
Start: 2018-10-12 | End: 2019-07-01 | Stop reason: SDUPTHER

## 2018-10-12 NOTE — ASSESSMENT & PLAN NOTE
Lab Results   Component Value Date    HGBA1C 6 2 (H) 04/08/2015       No results for input(s): POCGLU in the last 72 hours      Blood Sugar Average: Last 72 hrs:  Blood work ordered today  Continue low carbohydrate diet

## 2018-10-12 NOTE — PROGRESS NOTES
Assessment/Plan:    Arthritis  Discussed importance of staying active     Hypertension  Well controlled     Diabetes mellitus type II, controlled ([facility] )  Lab Results   Component Value Date    HGBA1C 6 2 (H) 04/08/2015       No results for input(s): POCGLU in the last 72 hours  Blood Sugar Average: Last 72 hrs:  Blood work ordered today  Continue low carbohydrate diet          Problem List Items Addressed This Visit        Endocrine    Diabetes mellitus type II, controlled ([facility] )     Lab Results   Component Value Date    HGBA1C 6 2 (H) 04/08/2015       No results for input(s): POCGLU in the last 72 hours      Blood Sugar Average: Last 72 hrs:  Blood work ordered today  Continue low carbohydrate diet          Relevant Orders    CBC and differential    Comprehensive metabolic panel    Hemoglobin A1C    Lipid panel    Microalbumin / creatinine urine ratio    TSH, 3rd generation with Free T4 reflex       Cardiovascular and Mediastinum    Hypertension     Well controlled          Relevant Orders    CBC and differential    Comprehensive metabolic panel    Hemoglobin A1C    Lipid panel    Microalbumin / creatinine urine ratio    TSH, 3rd generation with Free T4 reflex       Musculoskeletal and Integument    Osteoarthritis of both hands    Relevant Medications    Cholecalciferol (VITAMIN D-3) 5000 units TABS    meloxicam (MOBIC) 7 5 mg tablet      Other Visit Diagnoses     Colon cancer screening    -  Primary    Relevant Orders    Ambulatory referral to Gastroenterology    Screening for osteoporosis        Relevant Orders    DXA bone density spine hip and pelvis    Postmenopausal status        Relevant Orders    DXA bone density spine hip and pelvis    Encounter for mammogram to establish baseline mammogram        Relevant Orders    Mammo screening bilateral w cad    Need for pneumococcal vaccination        Relevant Orders    PNEUMOCOCCAL CONJUGATE VACCINE 13-VALENT GREATER THAN 6 MONTHS (Completed)            Subjective: Patient ID: Pavel Easley is a 78 y o  female  Knee Pain    The incident occurred more than 1 week ago  There was no injury mechanism  The pain is present in the left knee, right hip and right knee  The pain is at a severity of 6/10  The pain has been fluctuating since onset  She reports no foreign bodies present  The symptoms are aggravated by movement and weight bearing  She has tried acetaminophen and NSAIDs for the symptoms  The treatment provided moderate relief  Back Pain   This is a chronic problem  The current episode started more than 1 year ago  The problem occurs intermittently  The problem is unchanged  The pain is present in the lumbar spine  The quality of the pain is described as cramping  The pain does not radiate  The pain is at a severity of 6/10  The pain is worse during the day  The symptoms are aggravated by bending, sitting, standing and twisting  Associated symptoms include leg pain  Risk factors include menopause, lack of exercise and sedentary lifestyle  She has tried home exercises and NSAIDs for the symptoms  The treatment provided moderate relief  Leg Pain    There was no injury mechanism  She reports no foreign bodies present  The symptoms are aggravated by movement and weight bearing  She has tried acetaminophen and NSAIDs for the symptoms  The treatment provided moderate relief  The following portions of the patient's history were reviewed and updated as appropriate:   She  has a past medical history of Abdominal pain; Anxiety; Arthritis; Bowel incontinence; Chest pain; Constipation; CVA (cerebral vascular accident) ([address] ); Diabetes mellitus type II, controlled ([address] ); Disease of thyroid gland; Epigastric pain; Falls; High cholesterol; Hyperlipidemia; Hypertension; Migraine; Palpitations; Recurrent urinary tract infection; Seizures ([address] ); Sleep disorder; Stroke [facility]); and Thyroid disease    She   Patient Active Problem List    Diagnosis Date Noted    Diabetes mellitus type II, controlled ([facility] ) 10/12/2018    Arthritis 10/12/2018    Primary insomnia 2018    Urinary, incontinence, stress female 2018    Urge and stress incontinence 10/26/2017    Osteoporosis 09/15/2017    Adhesive capsulitis of left shoulder 2017    Constipation 05/10/2017    Cervical radiculitis 2017    Elevated TSH 2017    Osteoarthritis of both hands 2017    Memory difficulties 2016    Asymptomatic bilateral carotid artery stenosis 2016    Gastroesophageal reflux disease 2016    Hypertension 2016    Collapsed vertebra, not elsewhere classified, sacral and sacrococcygeal region, initial encounter for fracture [facility] 2016    HLD (hyperlipidemia) 2016    History of CVA with residual deficit 2016    Depression 2016    Headache 2016    Fall 2016    [facility] 2016    Subarachnoid hemorrhage following injury ([place] [id number] ) 2016    Hyperlipidemia 10/19/2013    Muscle weakness 10/19/2013     She  has a past surgical history that includes Knee surgery; Other surgical history;  section; Axillary Surgery; pr esophagogastroduodenoscopy transoral diagnostic (N/A, 2017); pr sling oper stres incontinence (N/A, 2018); CYSTOSCOPY (N/A, 2018); Bladder surgery; Facial bone tumor excision; and Tubal ligation  Her family history includes Diabetes in her family; Hypertension in her family; Stroke in her family; Thyroid disease in her family  She  reports that she has never smoked  She has never used smokeless tobacco  She reports that she does not drink alcohol or use drugs  Current Outpatient Prescriptions   Medication Sig Dispense Refill    albuterol (PROVENTIL HFA,VENTOLIN HFA) 90 mcg/act inhaler       atorvastatin (LIPITOR) 40 mg tablet nightly          Cholecalciferol (VITAMIN D-3) 5000 units TABS Take 1 tablet by mouth daily 90 tablet 1    citalopram (CeleXA) 20 mg tablet Take 2 tablets (40 mg total) by mouth daily 90 tablet 1    citalopram (CeleXA) 40 mg tablet Take 1 tablet (40 mg total) by mouth daily 90 tablet 0    conjugated estrogens (PREMARIN) vaginal cream Insert 0 5 g into the vagina 3 (three) times a week 42 5 g 0    conjugated estrogens (PREMARIN) vaginal cream Insert into the vagina      meclizine (ANTIVERT) 12 5 MG tablet       Melatonin 5 MG TABS Take 1 tablet (5 mg total) by mouth daily at bedtime 90 tablet 0    meloxicam (MOBIC) 7 5 mg tablet Take 1 tablet (7 5 mg total) by mouth daily 90 tablet 0    psyllium (METAMUCIL) 0 52 g capsule Take 0 52 g by mouth daily       No current facility-administered medications for this visit  Current Outpatient Prescriptions on File Prior to Visit   Medication Sig    albuterol (PROVENTIL HFA,VENTOLIN HFA) 90 mcg/act inhaler     atorvastatin (LIPITOR) 40 mg tablet nightly   citalopram (CeleXA) 20 mg tablet Take 2 tablets (40 mg total) by mouth daily    citalopram (CeleXA) 40 mg tablet Take 1 tablet (40 mg total) by mouth daily    conjugated estrogens (PREMARIN) vaginal cream Insert 0 5 g into the vagina 3 (three) times a week    conjugated estrogens (PREMARIN) vaginal cream Insert into the vagina    meclizine (ANTIVERT) 12 5 MG tablet     Melatonin 5 MG TABS Take 1 tablet (5 mg total) by mouth daily at bedtime    psyllium (METAMUCIL) 0 52 g capsule Take 0 52 g by mouth daily    [DISCONTINUED] meloxicam (MOBIC) 7 5 mg tablet Take 1 tablet (7 5 mg total) by mouth daily     No current facility-administered medications on file prior to visit       Review of Systems   Musculoskeletal: Positive for arthralgias and back pain  Psychiatric/Behavioral: Positive for sleep disturbance  The patient is nervous/anxious  All other systems reviewed and are negative          Objective:      /78 (BP Location: Left arm, Patient Position: Sitting, Cuff Size: Standard)   Pulse 71   Temp 97 5 °F (36 4 °C) (Tympanic)   Resp 18   Ht 4' 9" (1 448 m)   Wt 62 8 kg (138 lb 6 oz)   SpO2 97%   BMI 29 94 kg/m²          Physical Exam   Constitutional: She is oriented to person, place, and time  She appears well-developed  HENT:   Head: Normocephalic  Right Ear: External ear normal    Left Ear: External ear normal    Nose: Nose normal    Mouth/Throat: Oropharynx is clear and moist    Eyes: Pupils are equal, round, and reactive to light  Conjunctivae and EOM are normal    Neck: Normal range of motion  Neck supple  No thyromegaly present  Cardiovascular: Normal rate, regular rhythm and normal heart sounds  Pulmonary/Chest: Effort normal and breath sounds normal    Abdominal: Soft  There is no tenderness  There is no rebound and no guarding  Musculoskeletal: Normal range of motion  Neurological: She is alert and oriented to person, place, and time  She has normal reflexes  Skin: Skin is dry  Psychiatric: She has a normal mood and affect  Nursing note and vitals reviewed

## 2018-10-16 ENCOUNTER — APPOINTMENT (OUTPATIENT)
Dept: LAB | Facility: HOSPITAL | Age: 68
End: 2018-10-16
Payer: COMMERCIAL

## 2018-10-16 LAB
ALBUMIN SERPL BCP-MCNC: 3.9 G/DL (ref 3.5–5)
ALP SERPL-CCNC: 60 U/L (ref 46–116)
ALT SERPL W P-5'-P-CCNC: 42 U/L (ref 12–78)
ANION GAP SERPL CALCULATED.3IONS-SCNC: 5 MMOL/L (ref 4–13)
AST SERPL W P-5'-P-CCNC: 28 U/L (ref 5–45)
BASOPHILS # BLD AUTO: 0.05 THOUSANDS/ΜL (ref 0–0.1)
BASOPHILS NFR BLD AUTO: 1 % (ref 0–1)
BILIRUB SERPL-MCNC: 0.43 MG/DL (ref 0.2–1)
BUN SERPL-MCNC: 15 MG/DL (ref 5–25)
CALCIUM SERPL-MCNC: 9.1 MG/DL (ref 8.3–10.1)
CHLORIDE SERPL-SCNC: 103 MMOL/L (ref 100–108)
CHOLEST SERPL-MCNC: 182 MG/DL (ref 50–200)
CO2 SERPL-SCNC: 33 MMOL/L (ref 21–32)
CREAT SERPL-MCNC: 0.71 MG/DL (ref 0.6–1.3)
CREAT UR-MCNC: 159 MG/DL
EOSINOPHIL # BLD AUTO: 0.37 THOUSAND/ΜL (ref 0–0.61)
EOSINOPHIL NFR BLD AUTO: 7 % (ref 0–6)
ERYTHROCYTE [DISTWIDTH] IN BLOOD BY AUTOMATED COUNT: 13.3 % (ref 11.6–15.1)
EST. AVERAGE GLUCOSE BLD GHB EST-MCNC: 131 MG/DL
GFR SERPL CREATININE-BSD FRML MDRD: 89 ML/MIN/1.73SQ M
GLUCOSE P FAST SERPL-MCNC: 106 MG/DL (ref 65–99)
HBA1C MFR BLD: 6.2 % (ref 4.2–6.3)
HCT VFR BLD AUTO: 40.1 % (ref 34.8–46.1)
HDLC SERPL-MCNC: 41 MG/DL (ref 40–60)
HGB BLD-MCNC: 12.9 G/DL (ref 11.5–15.4)
IMM GRANULOCYTES # BLD AUTO: 0.02 THOUSAND/UL (ref 0–0.2)
IMM GRANULOCYTES NFR BLD AUTO: 0 % (ref 0–2)
LDLC SERPL CALC-MCNC: 98 MG/DL (ref 0–100)
LYMPHOCYTES # BLD AUTO: 1.69 THOUSANDS/ΜL (ref 0.6–4.47)
LYMPHOCYTES NFR BLD AUTO: 34 % (ref 14–44)
MCH RBC QN AUTO: 30.6 PG (ref 26.8–34.3)
MCHC RBC AUTO-ENTMCNC: 32.2 G/DL (ref 31.4–37.4)
MCV RBC AUTO: 95 FL (ref 82–98)
MICROALBUMIN UR-MCNC: 14.4 MG/L (ref 0–20)
MICROALBUMIN/CREAT 24H UR: 9 MG/G CREATININE (ref 0–30)
MONOCYTES # BLD AUTO: 0.42 THOUSAND/ΜL (ref 0.17–1.22)
MONOCYTES NFR BLD AUTO: 8 % (ref 4–12)
NEUTROPHILS # BLD AUTO: 2.44 THOUSANDS/ΜL (ref 1.85–7.62)
NEUTS SEG NFR BLD AUTO: 50 % (ref 43–75)
NONHDLC SERPL-MCNC: 141 MG/DL
NRBC BLD AUTO-RTO: 0 /100 WBCS
PLATELET # BLD AUTO: 248 THOUSANDS/UL (ref 149–390)
PMV BLD AUTO: 10.8 FL (ref 8.9–12.7)
POTASSIUM SERPL-SCNC: 4.1 MMOL/L (ref 3.5–5.3)
PROT SERPL-MCNC: 7.6 G/DL (ref 6.4–8.2)
RBC # BLD AUTO: 4.21 MILLION/UL (ref 3.81–5.12)
SODIUM SERPL-SCNC: 141 MMOL/L (ref 136–145)
T4 FREE SERPL-MCNC: 0.79 NG/DL (ref 0.76–1.46)
TRIGL SERPL-MCNC: 214 MG/DL
TSH SERPL DL<=0.05 MIU/L-ACNC: 7.34 UIU/ML (ref 0.36–3.74)
WBC # BLD AUTO: 4.99 THOUSAND/UL (ref 4.31–10.16)

## 2018-10-16 PROCEDURE — 3061F NEG MICROALBUMINURIA REV: CPT | Performed by: FAMILY MEDICINE

## 2018-10-16 PROCEDURE — 36415 COLL VENOUS BLD VENIPUNCTURE: CPT | Performed by: FAMILY MEDICINE

## 2018-10-16 PROCEDURE — 84439 ASSAY OF FREE THYROXINE: CPT | Performed by: FAMILY MEDICINE

## 2018-10-16 PROCEDURE — 85025 COMPLETE CBC W/AUTO DIFF WBC: CPT | Performed by: FAMILY MEDICINE

## 2018-10-16 PROCEDURE — 80053 COMPREHEN METABOLIC PANEL: CPT | Performed by: FAMILY MEDICINE

## 2018-10-16 PROCEDURE — 82570 ASSAY OF URINE CREATININE: CPT | Performed by: FAMILY MEDICINE

## 2018-10-16 PROCEDURE — 84443 ASSAY THYROID STIM HORMONE: CPT | Performed by: FAMILY MEDICINE

## 2018-10-16 PROCEDURE — 83036 HEMOGLOBIN GLYCOSYLATED A1C: CPT | Performed by: FAMILY MEDICINE

## 2018-10-16 PROCEDURE — 80061 LIPID PANEL: CPT | Performed by: FAMILY MEDICINE

## 2018-10-16 PROCEDURE — 3044F HG A1C LEVEL LT 7.0%: CPT | Performed by: FAMILY MEDICINE

## 2018-10-16 PROCEDURE — 82043 UR ALBUMIN QUANTITATIVE: CPT | Performed by: FAMILY MEDICINE

## 2019-01-08 ENCOUNTER — HOSPITAL ENCOUNTER (OUTPATIENT)
Dept: BONE DENSITY | Facility: MEDICAL CENTER | Age: 69
Discharge: HOME | End: 2019-01-08
Payer: COMMERCIAL

## 2019-01-08 ENCOUNTER — HOSPITAL ENCOUNTER (OUTPATIENT)
Dept: MAMMOGRAPHY | Facility: MEDICAL CENTER | Age: 69
Discharge: HOME | End: 2019-01-08
Payer: COMMERCIAL

## 2019-01-08 VITALS — HEIGHT: 57 IN | BODY MASS INDEX: 29.77 KG/M2 | WEIGHT: 138 LBS

## 2019-01-08 DIAGNOSIS — Z78.0 POSTMENOPAUSAL STATUS: ICD-10-CM

## 2019-01-08 DIAGNOSIS — Z12.31 ENCOUNTER FOR MAMMOGRAM TO ESTABLISH BASELINE MAMMOGRAM: ICD-10-CM

## 2019-01-08 DIAGNOSIS — Z13.820 SCREENING FOR OSTEOPOROSIS: ICD-10-CM

## 2019-01-08 PROCEDURE — 77080 DXA BONE DENSITY AXIAL: CPT

## 2019-01-08 PROCEDURE — 77067 SCR MAMMO BI INCL CAD: CPT

## 2019-02-08 ENCOUNTER — OFFICE VISIT (OUTPATIENT)
Dept: FAMILY MEDICINE CLINIC | Facility: CLINIC | Age: 69
End: 2019-02-08

## 2019-02-08 VITALS
RESPIRATION RATE: 18 BRPM | OXYGEN SATURATION: 96 % | HEIGHT: 57 IN | HEART RATE: 66 BPM | SYSTOLIC BLOOD PRESSURE: 110 MMHG | TEMPERATURE: 97 F | DIASTOLIC BLOOD PRESSURE: 70 MMHG | WEIGHT: 138 LBS | BODY MASS INDEX: 29.77 KG/M2

## 2019-02-08 DIAGNOSIS — Z12.11 COLON CANCER SCREENING: ICD-10-CM

## 2019-02-08 DIAGNOSIS — K59.01 SLOW TRANSIT CONSTIPATION: ICD-10-CM

## 2019-02-08 DIAGNOSIS — R10.12 LEFT UPPER QUADRANT PAIN: Primary | ICD-10-CM

## 2019-02-08 DIAGNOSIS — E11.9 CONTROLLED TYPE 2 DIABETES MELLITUS WITHOUT COMPLICATION, WITHOUT LONG-TERM CURRENT USE OF INSULIN (HCC): ICD-10-CM

## 2019-02-08 DIAGNOSIS — F51.01 PRIMARY INSOMNIA: ICD-10-CM

## 2019-02-08 LAB — SL AMB POCT HEMOGLOBIN AIC: 6.1 (ref ?–6.5)

## 2019-02-08 PROCEDURE — 3044F HG A1C LEVEL LT 7.0%: CPT | Performed by: FAMILY MEDICINE

## 2019-02-08 PROCEDURE — 83036 HEMOGLOBIN GLYCOSYLATED A1C: CPT | Performed by: FAMILY MEDICINE

## 2019-02-08 PROCEDURE — 99214 OFFICE O/P EST MOD 30 MIN: CPT | Performed by: FAMILY MEDICINE

## 2019-02-08 RX ORDER — CHOLECALCIFEROL (VITAMIN D3) 125 MCG
1 CAPSULE ORAL
Qty: 90 TABLET | Refills: 0 | Status: SHIPPED | OUTPATIENT
Start: 2019-02-08 | End: 2019-07-01 | Stop reason: SDUPTHER

## 2019-02-08 RX ORDER — POLYETHYLENE GLYCOL 3350 17 G/17G
17 POWDER, FOR SOLUTION ORAL DAILY
Qty: 850 G | Refills: 1 | Status: SHIPPED | OUTPATIENT
Start: 2019-02-08

## 2019-02-08 NOTE — PROGRESS NOTES
Assessment/Plan:    Diabetes mellitus type II, controlled ([facility] )  Lab Results   Component Value Date    HGBA1C 6 1 02/08/2019       No results for input(s): POCGLU in the last 72 hours  Controlled  Continue low carb diet   Blood Sugar Average: Last 72 hrs:      Constipation  Start Miralax   Increase fluid and fiber intake     Left upper quadrant pain  CT Scan ordered          Problem List Items Addressed This Visit        Endocrine    Diabetes mellitus type II, controlled ([facility] )     Lab Results   Component Value Date    HGBA1C 6 1 02/08/2019       No results for input(s): POCGLU in the last 72 hours  Controlled  Continue low carb diet   Blood Sugar Average: Last 72 hrs:           Relevant Orders    POCT hemoglobin A1c (Completed)       Other    Primary insomnia    Relevant Medications    Melatonin 5 MG TABS    Constipation     Start Miralax   Increase fluid and fiber intake          Left upper quadrant pain - Primary     CT Scan ordered          Relevant Orders    CT abdomen pelvis w contrast      Other Visit Diagnoses     Colon cancer screening        Relevant Orders    Ambulatory referral to Gastroenterology            Subjective:      Patient ID: Elicia Cruz is a 80 y o  female  79 yo  female with multiple complains:  1- Constipation, small firm BM every other day  2- LUQ abdominal pain: constant, not radiated described as pressure, worse with food, improves slightly with bowel movement  Accompanied by nausea  Denies fever, chills, vomiting  3- Fatigue  4- Insomnia: difficulty falling asleep         The following portions of the patient's history were reviewed and updated as appropriate: She  has a past medical history of Abdominal pain; Anxiety; Arthritis; Bowel incontinence; Chest pain; Constipation; CVA (cerebral vascular accident) ([facility] ); Diabetes mellitus type II, controlled ([facility] ); Disease of thyroid gland; Epigastric pain; Falls; High cholesterol; Hyperlipidemia;  Hypertension; Migraine; Palpitations; Recurrent urinary tract infection; Seizures ([facility] [address] ); Sleep disorder; Stroke [facility]); and Thyroid disease  She   Patient Active Problem List    Diagnosis Date Noted    Left upper quadrant pain 2019    Diabetes mellitus type II, controlled ([facility] [address] ) 10/12/2018    Arthritis 10/12/2018    Primary insomnia 2018    Urinary, incontinence, stress female 2018    Urge and stress incontinence 10/26/2017    Osteoporosis 09/15/2017    Adhesive capsulitis of left shoulder 2017    Constipation 05/10/2017    Cervical radiculitis 2017    Elevated TSH 2017    Osteoarthritis of both hands 2017    Memory difficulties 2016    Asymptomatic bilateral carotid artery stenosis 2016    Gastroesophageal reflux disease 2016    Hypertension 2016    Collapsed vertebra, not elsewhere classified, sacral and sacrococcygeal region, initial encounter for fracture ([facility] [address] ) 2016    HLD (hyperlipidemia) 2016    History of CVA with residual deficit 2016    Depression 2016    Headache 2016    Fall 2016    Subdural hemorrhage following injury ([facility] ) 2016    Subarachnoid hemorrhage following injury ([facility] ) 2016    Hyperlipidemia 10/19/2013    Muscle weakness 10/19/2013     She  has a past surgical history that includes Knee surgery; Other surgical history;  section; Axillary Surgery; pr esophagogastroduodenoscopy transoral diagnostic (N/A, 2017); pr sling oper stres incontinence (N/A, 2018); CYSTOSCOPY (N/A, 2018); Bladder surgery; Facial bone tumor excision; and Tubal ligation  Her family history includes Diabetes in her family; Hypertension in her family; Stroke in her family; Thyroid disease in her family  She  reports that she has never smoked  She has never used smokeless tobacco  She reports that she does not drink alcohol or use drugs    Current Outpatient Prescriptions   Medication Sig Dispense Refill    albuterol (PROVENTIL HFA,VENTOLIN HFA) 90 mcg/act inhaler       atorvastatin (LIPITOR) 40 mg tablet nightly   Cholecalciferol (VITAMIN D-3) 5000 units TABS Take 1 tablet by mouth daily 90 tablet 1    citalopram (CeleXA) 40 mg tablet Take 1 tablet (40 mg total) by mouth daily 90 tablet 0    conjugated estrogens (PREMARIN) vaginal cream Insert 0 5 g into the vagina 3 (three) times a week 42 5 g 0    conjugated estrogens (PREMARIN) vaginal cream Insert into the vagina      meclizine (ANTIVERT) 12 5 MG tablet       Melatonin 5 MG TABS Take 1 tablet (5 mg total) by mouth daily at bedtime 90 tablet 0    meloxicam (MOBIC) 7 5 mg tablet Take 1 tablet (7 5 mg total) by mouth daily 90 tablet 0    psyllium (METAMUCIL) 0 52 g capsule Take 0 52 g by mouth daily       No current facility-administered medications for this visit  Current Outpatient Prescriptions on File Prior to Visit   Medication Sig    albuterol (PROVENTIL HFA,VENTOLIN HFA) 90 mcg/act inhaler     atorvastatin (LIPITOR) 40 mg tablet nightly   Cholecalciferol (VITAMIN D-3) 5000 units TABS Take 1 tablet by mouth daily    citalopram (CeleXA) 40 mg tablet Take 1 tablet (40 mg total) by mouth daily    conjugated estrogens (PREMARIN) vaginal cream Insert 0 5 g into the vagina 3 (three) times a week    conjugated estrogens (PREMARIN) vaginal cream Insert into the vagina    meclizine (ANTIVERT) 12 5 MG tablet     meloxicam (MOBIC) 7 5 mg tablet Take 1 tablet (7 5 mg total) by mouth daily    psyllium (METAMUCIL) 0 52 g capsule Take 0 52 g by mouth daily    [DISCONTINUED] Melatonin 5 MG TABS Take 1 tablet (5 mg total) by mouth daily at bedtime     No current facility-administered medications on file prior to visit       Review of Systems   Constitutional: Positive for fatigue  Gastrointestinal: Positive for abdominal distention, abdominal pain, constipation and nausea  Musculoskeletal: Positive for arthralgias  Psychiatric/Behavioral: Positive for sleep disturbance  All other systems reviewed and are negative  Objective:      /70 (BP Location: Left arm, Patient Position: Sitting, Cuff Size: Standard)   Pulse 66   Temp (!) 97 °F (36 1 °C) (Tympanic)   Resp 18   Ht 4' 9" (1 448 m)   Wt 62 6 kg (138 lb)   SpO2 96%   Breastfeeding? No   BMI 29 86 kg/m²          Physical Exam   Constitutional: She is oriented to person, place, and time  She appears well-developed  HENT:   Head: Normocephalic  Right Ear: External ear normal    Left Ear: External ear normal    Nose: Nose normal    Mouth/Throat: Oropharynx is clear and moist    Eyes: Pupils are equal, round, and reactive to light  Conjunctivae and EOM are normal    Neck: Normal range of motion  Neck supple  No thyromegaly present  Cardiovascular: Normal rate, regular rhythm and normal heart sounds  Pulmonary/Chest: Effort normal and breath sounds normal    Abdominal: Soft  Bowel sounds are normal  There is tenderness in the left upper quadrant  There is no rigidity, no rebound, no guarding, no tenderness at McBurney's point and negative Dickerson's sign  Musculoskeletal: Normal range of motion  Neurological: She is alert and oriented to person, place, and time  She has normal reflexes  Skin: Skin is dry  Psychiatric: She has a normal mood and affect  Nursing note and vitals reviewed

## 2019-02-08 NOTE — ASSESSMENT & PLAN NOTE
Lab Results   Component Value Date    HGBA1C 6 1 02/08/2019       No results for input(s): POCGLU in the last 72 hours    Controlled  Continue low carb diet   Blood Sugar Average: Last 72 hrs:

## 2019-02-13 ENCOUNTER — TELEPHONE (OUTPATIENT)
Dept: FAMILY MEDICINE CLINIC | Facility: CLINIC | Age: 69
End: 2019-02-13

## 2019-02-13 DIAGNOSIS — E11.9 CONTROLLED TYPE 2 DIABETES MELLITUS WITHOUT COMPLICATION, WITHOUT LONG-TERM CURRENT USE OF INSULIN (HCC): Primary | ICD-10-CM

## 2019-02-13 NOTE — TELEPHONE ENCOUNTER
From [facility] radiology are requesting lab order for pt to have done before 2/21/2019     bun and creatinine

## 2019-02-16 ENCOUNTER — APPOINTMENT (OUTPATIENT)
Dept: LAB | Facility: HOSPITAL | Age: 69
End: 2019-02-16
Payer: COMMERCIAL

## 2019-02-16 DIAGNOSIS — E11.9 CONTROLLED TYPE 2 DIABETES MELLITUS WITHOUT COMPLICATION, WITHOUT LONG-TERM CURRENT USE OF INSULIN (HCC): ICD-10-CM

## 2019-02-16 LAB
ANION GAP SERPL CALCULATED.3IONS-SCNC: 6 MMOL/L (ref 4–13)
BUN SERPL-MCNC: 23 MG/DL (ref 5–25)
CALCIUM SERPL-MCNC: 8.8 MG/DL (ref 8.3–10.1)
CHLORIDE SERPL-SCNC: 107 MMOL/L (ref 100–108)
CO2 SERPL-SCNC: 28 MMOL/L (ref 21–32)
CREAT SERPL-MCNC: 0.67 MG/DL (ref 0.6–1.3)
GFR SERPL CREATININE-BSD FRML MDRD: 91 ML/MIN/1.73SQ M
GLUCOSE P FAST SERPL-MCNC: 105 MG/DL (ref 65–99)
POTASSIUM SERPL-SCNC: 4 MMOL/L (ref 3.5–5.3)
SODIUM SERPL-SCNC: 141 MMOL/L (ref 136–145)

## 2019-02-16 PROCEDURE — 36415 COLL VENOUS BLD VENIPUNCTURE: CPT

## 2019-02-16 PROCEDURE — 80048 BASIC METABOLIC PNL TOTAL CA: CPT

## 2019-02-23 ENCOUNTER — HOSPITAL ENCOUNTER (OUTPATIENT)
Dept: CT IMAGING | Facility: HOSPITAL | Age: 69
Discharge: HOME | End: 2019-02-23
Payer: COMMERCIAL

## 2019-02-23 DIAGNOSIS — R10.12 LEFT UPPER QUADRANT PAIN: ICD-10-CM

## 2019-02-23 PROCEDURE — 74176 CT ABD & PELVIS W/O CONTRAST: CPT

## 2019-03-11 ENCOUNTER — TELEPHONE (OUTPATIENT)
Dept: FAMILY MEDICINE CLINIC | Facility: CLINIC | Age: 69
End: 2019-03-11

## 2019-03-12 NOTE — TELEPHONE ENCOUNTER
Ct Scan shows a bit of fat on the liver, no medications given for that just recommend weight loss and low fat diet  Lbas show blood sugar is borderline, the rest is normal

## 2019-03-28 ENCOUNTER — OFFICE VISIT (OUTPATIENT)
Dept: GASTROENTEROLOGY | Facility: MEDICAL CENTER | Age: 69
End: 2019-03-28
Payer: COMMERCIAL

## 2019-03-28 VITALS
HEIGHT: 57 IN | WEIGHT: 138 LBS | SYSTOLIC BLOOD PRESSURE: 114 MMHG | DIASTOLIC BLOOD PRESSURE: 72 MMHG | TEMPERATURE: 98.3 F | BODY MASS INDEX: 29.77 KG/M2 | HEART RATE: 60 BPM

## 2019-03-28 DIAGNOSIS — R10.12 LEFT UPPER QUADRANT PAIN: Primary | ICD-10-CM

## 2019-03-28 DIAGNOSIS — Z12.11 COLON CANCER SCREENING: ICD-10-CM

## 2019-03-28 DIAGNOSIS — R10.13 EPIGASTRIC ABDOMINAL PAIN: ICD-10-CM

## 2019-03-28 DIAGNOSIS — K76.0 FATTY LIVER: ICD-10-CM

## 2019-03-28 DIAGNOSIS — Z79.1 NSAID LONG-TERM USE: ICD-10-CM

## 2019-03-28 DIAGNOSIS — K59.00 CONSTIPATION, UNSPECIFIED CONSTIPATION TYPE: ICD-10-CM

## 2019-03-28 DIAGNOSIS — R14.0 BLOATING: ICD-10-CM

## 2019-03-28 PROCEDURE — 99214 OFFICE O/P EST MOD 30 MIN: CPT | Performed by: PHYSICIAN ASSISTANT

## 2019-03-28 RX ORDER — OMEPRAZOLE 20 MG/1
20 CAPSULE, DELAYED RELEASE ORAL DAILY
Qty: 60 CAPSULE | Refills: 2 | Status: SHIPPED | OUTPATIENT
Start: 2019-03-28 | End: 2022-01-18 | Stop reason: SDUPTHER

## 2019-03-28 NOTE — PATIENT INSTRUCTIONS
EGD scheduled on 6/3/2019 with Dr Neela Carrion at [facility]  Instructions given to patient

## 2019-03-28 NOTE — PROGRESS NOTES
[facility]  Raegan Chopra 80 y o  female MRN: [id number]  Encounter: [id number]          ASSESSMENT AND PLAN:      1  Colon cancer screening: she had a colonoscopy a few years ago and was normal  Repeat recommended in 2023  - Ambulatory referral to Gastroenterology    2  Left upper quadrant pain  3  Epigastric abdominal pain:   Admits to epigastric/left upper quadrant pain worse after meals  She is taking Mobic on a daily basis for arthritis  She is currently taking omeprazole once daily over-the-counter  This could be secondary to peptic ulcer disease, gastritis, esophagitis  We will try increasing her Prilosec to 20 milligrams twice daily as well as plan for EGD to rule out peptic ulcer disease given NSAID use  Recommended using Tylenol instead of Mobic  - omeprazole (PriLOSEC) 20 mg delayed release capsule; Take 1 capsule (20 mg total) by mouth daily  Dispense: 60 capsule; Refill: 2  -EGD planned to rule out peptic ulcer disease, H pylori    4  Bloating:  - Celiac Disease Antibody Profile; Future    5  NSAID long-term use: recommended d/c mobic and using tylenol for arthritic pain  Increase PPI twice daily and plan for EGD to rule out PUD  - Case request operating room: ESOPHAGOGASTRODUODENOSCOPY (EGD)  - omeprazole (PriLOSEC) 20 mg delayed release capsule; Take 1 capsule (20 mg total) by mouth daily  Dispense: 60 capsule; Refill: 2    6  Constipation:  She admits to straining to have a bowel movement every other day but states that if she takes MiraLax this causes her to have diarrhea  -recommend Metamucil every day or every other day on a regular basis  -drink at least 8 glasses of 8 ounces of water daily    7:   Fatty liver:  Seen on CT scan  Fortunately her LFTs are within normal limits  -check liver enzymes every 6 months    Risks and benefits of procedures were discussed including but not limited to bleeding, infection, perforation    She understands and agrees to proceed with procedure    ______________________________________________________________________    SUBJECTIVE:  52-year-old female here for follow-up of epigastric/left lower quadrant abdominal pain  She states that this pain has been present for the past 5 months but she has experienced it intermittently over the past few years  She states that the pain is worse after meals and she also experiences postprandial bloating  She also complains of constipation and straining to have a bowel movement every other day  She has tried MiraLax but this causes her to have diarrhea  She denies any nausea, vomiting, abnormal weight loss, dysphagia, poor appetite  She had a colonoscopy a few years ago and was recommended to have a repeat in year   REVIEW OF SYSTEMS IS OTHERWISE NEGATIVE        Historical Information   Past Medical History:   Diagnosis Date    Abdominal pain     Anxiety     last assessed: 10/19/2013    Arthritis     osteo both hands; last assessed: 10/19/2013    Bowel incontinence     Chest pain     Constipation     CVA (cerebral vascular accident) ([facility] [address] )     Diabetes mellitus type II, controlled ([facility] [address] )     Disease of thyroid gland     Epigastric pain     with distention    Falls     High cholesterol     Hyperlipidemia     Hypertension     last assessed: 4/3/2017    Migraine     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    Palpitations     Recurrent urinary tract infection     Seizures ([address] )     Sleep disorder     last assessed: 10/19/2013    Stroke ([facility] [address] )     Thyroid disease      Past Surgical History:   Procedure Laterality Date    AXILLARY SURGERY      cyst surgery    BLADDER SURGERY       SECTION      CYSTOSCOPY N/A 2018    Procedure: CYSTOSCOPY;  Surgeon: Ariel Velázquez MD;  Location[facility] OR;  Service: Gynecology    FACIAL BONE TUMOR EXCISION      KNEE SURGERY      OTHER SURGICAL HISTORY      cyst removed from axilla    UT ESOPHAGOGASTRODUODENOSCOPY TRANSORAL DIAGNOSTIC N/A 6/5/2017    Procedure: ESOPHAGOGASTRODUODENOSCOPY (EGD); Surgeon: Julia Robles MD;  Location[facility]; Service: Gastroenterology    UT SLING OPER STRES INCONTINENCE N/A 1/18/2018    Procedure: PUBOVAGINAL SLING;  Surgeon: Porsha Trujillo MD;  Location: [facility] OR;  Service: Gynecology    TUBAL LIGATION       Social History   Social History     Substance and Sexual Activity   Alcohol Use No     Social History     Substance and Sexual Activity   Drug Use No     Social History     Tobacco Use   Smoking Status Never Smoker   Smokeless Tobacco Never Used     Family History   Problem Relation Age of Onset    Diabetes Family         mellitus    Hypertension Family     Stroke Family     Thyroid disease Family        Meds/Allergies       Current Outpatient Medications:     albuterol (PROVENTIL HFA,VENTOLIN HFA) 90 mcg/act inhaler    Cholecalciferol (VITAMIN D-3) 5000 units TABS    citalopram (CeleXA) 40 mg tablet    conjugated estrogens (PREMARIN) vaginal cream    conjugated estrogens (PREMARIN) vaginal cream    meclizine (ANTIVERT) 12 5 MG tablet    Melatonin 5 MG TABS    meloxicam (MOBIC) 7 5 mg tablet    polyethylene glycol (GLYCOLAX) powder    psyllium (METAMUCIL) 0 52 g capsule    atorvastatin (LIPITOR) 40 mg tablet    omeprazole (PriLOSEC) 20 mg delayed release capsule    No Known Allergies        Objective     Blood pressure 114/72, pulse 60, temperature 98 3 °F (36 8 °C), height 4' 9" (1 448 m), weight 62 6 kg (138 lb), not currently breastfeeding  Body mass index is 29 86 kg/m²  PHYSICAL EXAM:      General Appearance:   Alert, cooperative, no distress   HEENT:   Normocephalic, atraumatic, anicteric  Right eye exhibits no discharge  Left eye exhibits no discharge   No scleral icterus     Neck:  Supple, symmetrical, trachea midline, no stridor    Lungs:   Clear to auscultation bilaterally; no rales, rhonchi or wheezing; respirations unlabored    Heart[de-identified]   Regular rate and rhythm; no murmur, rub, or gallop  Abdomen:   Soft, luq tenderness non-distended; normal bowel sounds; no masses, no organomegaly    Genitalia:   Deferred    Rectal:   Deferred    Extremities:  No cyanosis, clubbing or edema        Skin:  No jaundice, rashes, or lesions          Lab Results:   No visits with results within 1 Day(s) from this visit  Latest known visit with results is:   Appointment on 02/16/2019   Component Date Value    Sodium 02/16/2019 141     Potassium 02/16/2019 4 0     Chloride 02/16/2019 107     CO2 02/16/2019 28     ANION GAP 02/16/2019 6     BUN 02/16/2019 23     Creatinine 02/16/2019 0 67     Glucose, Fasting 02/16/2019 105*    Calcium 02/16/2019 8 8     eGFR 02/16/2019 91          Radiology Results:   No results found

## 2019-03-29 ENCOUNTER — APPOINTMENT (OUTPATIENT)
Dept: LAB | Facility: HOSPITAL | Age: 69
End: 2019-03-29
Attending: PHYSICIAN ASSISTANT
Payer: COMMERCIAL

## 2019-03-29 DIAGNOSIS — R14.0 BLOATING: ICD-10-CM

## 2019-03-29 PROCEDURE — 86255 FLUORESCENT ANTIBODY SCREEN: CPT

## 2019-03-29 PROCEDURE — 82784 ASSAY IGA/IGD/IGG/IGM EACH: CPT

## 2019-03-29 PROCEDURE — 83516 IMMUNOASSAY NONANTIBODY: CPT

## 2019-03-29 PROCEDURE — 36415 COLL VENOUS BLD VENIPUNCTURE: CPT

## 2019-03-30 LAB
ENDOMYSIUM IGA SER QL: NEGATIVE
GLIADIN PEPTIDE IGA SER-ACNC: 3 UNITS (ref 0–19)
GLIADIN PEPTIDE IGG SER-ACNC: 3 UNITS (ref 0–19)
IGA SERPL-MCNC: 150 MG/DL (ref 87–352)
TTG IGA SER-ACNC: <2 U/ML (ref 0–3)
TTG IGG SER-ACNC: <2 U/ML (ref 0–5)

## 2019-04-09 ENCOUNTER — TELEPHONE (OUTPATIENT)
Dept: GASTROENTEROLOGY | Facility: AMBULARY SURGERY CENTER | Age: 69
End: 2019-04-09

## 2019-05-12 ENCOUNTER — APPOINTMENT (EMERGENCY)
Dept: CT IMAGING | Facility: HOSPITAL | Age: 69
End: 2019-05-12
Payer: COMMERCIAL

## 2019-05-12 ENCOUNTER — HOSPITAL ENCOUNTER (EMERGENCY)
Facility: HOSPITAL | Age: 69
Discharge: HOME | End: 2019-05-12
Attending: EMERGENCY MEDICINE
Payer: COMMERCIAL

## 2019-05-12 ENCOUNTER — OFFICE VISIT (OUTPATIENT)
Dept: URGENT CARE | Age: 69
End: 2019-05-12
Payer: COMMERCIAL

## 2019-05-12 VITALS
HEART RATE: 79 BPM | DIASTOLIC BLOOD PRESSURE: 88 MMHG | OXYGEN SATURATION: 99 % | TEMPERATURE: 98.2 F | SYSTOLIC BLOOD PRESSURE: 186 MMHG | RESPIRATION RATE: 18 BRPM

## 2019-05-12 VITALS
DIASTOLIC BLOOD PRESSURE: 84 MMHG | OXYGEN SATURATION: 99 % | TEMPERATURE: 97.5 F | HEART RATE: 72 BPM | RESPIRATION RATE: 18 BRPM | SYSTOLIC BLOOD PRESSURE: 194 MMHG

## 2019-05-12 DIAGNOSIS — S20.01XA CONTUSION OF RIGHT BREAST, INITIAL ENCOUNTER: Primary | ICD-10-CM

## 2019-05-12 LAB
ANION GAP SERPL CALCULATED.3IONS-SCNC: 8 MMOL/L (ref 4–13)
APTT PPP: 29 SECONDS (ref 26–38)
BASOPHILS # BLD AUTO: 0.02 THOUSANDS/ΜL (ref 0–0.1)
BASOPHILS NFR BLD AUTO: 0 % (ref 0–1)
BUN SERPL-MCNC: 24 MG/DL (ref 5–25)
CALCIUM SERPL-MCNC: 9.5 MG/DL (ref 8.3–10.1)
CHLORIDE SERPL-SCNC: 104 MMOL/L (ref 100–108)
CO2 SERPL-SCNC: 30 MMOL/L (ref 21–32)
CREAT SERPL-MCNC: 0.8 MG/DL (ref 0.6–1.3)
EOSINOPHIL # BLD AUTO: 0.25 THOUSAND/ΜL (ref 0–0.61)
EOSINOPHIL NFR BLD AUTO: 5 % (ref 0–6)
ERYTHROCYTE [DISTWIDTH] IN BLOOD BY AUTOMATED COUNT: 13.5 % (ref 11.6–15.1)
GFR SERPL CREATININE-BSD FRML MDRD: 77 ML/MIN/1.73SQ M
GLUCOSE SERPL-MCNC: 118 MG/DL (ref 65–140)
HCT VFR BLD AUTO: 37.3 % (ref 34.8–46.1)
HGB BLD-MCNC: 12.1 G/DL (ref 11.5–15.4)
IMM GRANULOCYTES # BLD AUTO: 0.02 THOUSAND/UL (ref 0–0.2)
IMM GRANULOCYTES NFR BLD AUTO: 0 % (ref 0–2)
INR PPP: 0.95 (ref 0.86–1.17)
LYMPHOCYTES # BLD AUTO: 1.81 THOUSANDS/ΜL (ref 0.6–4.47)
LYMPHOCYTES NFR BLD AUTO: 33 % (ref 14–44)
MCH RBC QN AUTO: 30.9 PG (ref 26.8–34.3)
MCHC RBC AUTO-ENTMCNC: 32.4 G/DL (ref 31.4–37.4)
MCV RBC AUTO: 95 FL (ref 82–98)
MONOCYTES # BLD AUTO: 0.48 THOUSAND/ΜL (ref 0.17–1.22)
MONOCYTES NFR BLD AUTO: 9 % (ref 4–12)
NEUTROPHILS # BLD AUTO: 2.96 THOUSANDS/ΜL (ref 1.85–7.62)
NEUTS SEG NFR BLD AUTO: 53 % (ref 43–75)
NRBC BLD AUTO-RTO: 0 /100 WBCS
PLATELET # BLD AUTO: 233 THOUSANDS/UL (ref 149–390)
PMV BLD AUTO: 10.6 FL (ref 8.9–12.7)
POTASSIUM SERPL-SCNC: 3.6 MMOL/L (ref 3.5–5.3)
PROTHROMBIN TIME: 12.8 SECONDS (ref 11.8–14.2)
RBC # BLD AUTO: 3.91 MILLION/UL (ref 3.81–5.12)
SODIUM SERPL-SCNC: 142 MMOL/L (ref 136–145)
WBC # BLD AUTO: 5.54 THOUSAND/UL (ref 4.31–10.16)

## 2019-05-12 PROCEDURE — 80048 BASIC METABOLIC PNL TOTAL CA: CPT | Performed by: EMERGENCY MEDICINE

## 2019-05-12 PROCEDURE — 36415 COLL VENOUS BLD VENIPUNCTURE: CPT | Performed by: EMERGENCY MEDICINE

## 2019-05-12 PROCEDURE — 71260 CT THORAX DX C+: CPT

## 2019-05-12 PROCEDURE — 99284 EMERGENCY DEPT VISIT MOD MDM: CPT

## 2019-05-12 PROCEDURE — 99203 OFFICE O/P NEW LOW 30 MIN: CPT | Performed by: PHYSICIAN ASSISTANT

## 2019-05-12 PROCEDURE — 90471 IMMUNIZATION ADMIN: CPT

## 2019-05-12 PROCEDURE — 85610 PROTHROMBIN TIME: CPT | Performed by: EMERGENCY MEDICINE

## 2019-05-12 PROCEDURE — 90715 TDAP VACCINE 7 YRS/> IM: CPT | Performed by: EMERGENCY MEDICINE

## 2019-05-12 PROCEDURE — 85730 THROMBOPLASTIN TIME PARTIAL: CPT | Performed by: EMERGENCY MEDICINE

## 2019-05-12 PROCEDURE — 96360 HYDRATION IV INFUSION INIT: CPT

## 2019-05-12 PROCEDURE — 99284 EMERGENCY DEPT VISIT MOD MDM: CPT | Performed by: EMERGENCY MEDICINE

## 2019-05-12 PROCEDURE — 85025 COMPLETE CBC W/AUTO DIFF WBC: CPT | Performed by: EMERGENCY MEDICINE

## 2019-05-12 PROCEDURE — G0463 HOSPITAL OUTPT CLINIC VISIT: HCPCS | Performed by: PHYSICIAN ASSISTANT

## 2019-05-12 RX ORDER — TRAMADOL HYDROCHLORIDE 50 MG/1
50 TABLET ORAL EVERY 6 HOURS PRN
Qty: 10 TABLET | Refills: 0 | Status: SHIPPED | OUTPATIENT
Start: 2019-05-12 | End: 2019-05-22

## 2019-05-12 RX ADMIN — SODIUM CHLORIDE 1000 ML: 0.9 INJECTION, SOLUTION INTRAVENOUS at 19:17

## 2019-05-12 RX ADMIN — TETANUS TOXOID, REDUCED DIPHTHERIA TOXOID AND ACELLULAR PERTUSSIS VACCINE, ADSORBED 0.5 ML: 5; 2.5; 8; 8; 2.5 SUSPENSION INTRAMUSCULAR at 19:17

## 2019-05-12 RX ADMIN — IOHEXOL 85 ML: 350 INJECTION, SOLUTION INTRAVENOUS at 19:09

## 2019-05-21 ENCOUNTER — TELEPHONE (OUTPATIENT)
Dept: GASTROENTEROLOGY | Facility: MEDICAL CENTER | Age: 69
End: 2019-05-21

## 2019-05-24 ENCOUNTER — OFFICE VISIT (OUTPATIENT)
Dept: FAMILY MEDICINE CLINIC | Facility: CLINIC | Age: 69
End: 2019-05-24

## 2019-05-24 VITALS
RESPIRATION RATE: 15 BRPM | BODY MASS INDEX: 29.65 KG/M2 | HEART RATE: 89 BPM | OXYGEN SATURATION: 96 % | DIASTOLIC BLOOD PRESSURE: 60 MMHG | SYSTOLIC BLOOD PRESSURE: 98 MMHG | TEMPERATURE: 97.8 F | WEIGHT: 137 LBS

## 2019-05-24 DIAGNOSIS — S20.01XA CONTUSION OF RIGHT BREAST, INITIAL ENCOUNTER: Primary | ICD-10-CM

## 2019-05-24 PROCEDURE — 99213 OFFICE O/P EST LOW 20 MIN: CPT | Performed by: FAMILY MEDICINE

## 2019-05-24 RX ORDER — TRAMADOL HYDROCHLORIDE 50 MG/1
50 TABLET ORAL EVERY 6 HOURS PRN
Qty: 10 TABLET | Refills: 0 | Status: SHIPPED | OUTPATIENT
Start: 2019-05-24 | End: 2019-09-18

## 2019-07-01 ENCOUNTER — TRANSCRIBE ORDERS (OUTPATIENT)
Dept: LAB | Facility: CLINIC | Age: 69
End: 2019-07-01

## 2019-07-01 ENCOUNTER — OFFICE VISIT (OUTPATIENT)
Dept: FAMILY MEDICINE CLINIC | Facility: CLINIC | Age: 69
End: 2019-07-01

## 2019-07-01 VITALS
OXYGEN SATURATION: 96 % | SYSTOLIC BLOOD PRESSURE: 138 MMHG | RESPIRATION RATE: 16 BRPM | HEART RATE: 71 BPM | BODY MASS INDEX: 29.65 KG/M2 | WEIGHT: 137 LBS | DIASTOLIC BLOOD PRESSURE: 88 MMHG | TEMPERATURE: 97.6 F

## 2019-07-01 DIAGNOSIS — R03.0 ELEVATED BP WITHOUT DIAGNOSIS OF HYPERTENSION: ICD-10-CM

## 2019-07-01 DIAGNOSIS — M81.0 AGE RELATED OSTEOPOROSIS, UNSPECIFIED PATHOLOGICAL FRACTURE PRESENCE: ICD-10-CM

## 2019-07-01 DIAGNOSIS — E11.9 CONTROLLED TYPE 2 DIABETES MELLITUS WITHOUT COMPLICATION, WITHOUT LONG-TERM CURRENT USE OF INSULIN (HCC): ICD-10-CM

## 2019-07-01 DIAGNOSIS — N64.4 BREAST PAIN, RIGHT: Primary | ICD-10-CM

## 2019-07-01 DIAGNOSIS — M19.042 PRIMARY OSTEOARTHRITIS OF BOTH HANDS: ICD-10-CM

## 2019-07-01 DIAGNOSIS — M19.041 PRIMARY OSTEOARTHRITIS OF BOTH HANDS: ICD-10-CM

## 2019-07-01 DIAGNOSIS — F32.89 OTHER DEPRESSION: ICD-10-CM

## 2019-07-01 DIAGNOSIS — F51.01 PRIMARY INSOMNIA: ICD-10-CM

## 2019-07-01 PROCEDURE — 99214 OFFICE O/P EST MOD 30 MIN: CPT | Performed by: FAMILY MEDICINE

## 2019-07-01 PROCEDURE — 1101F PT FALLS ASSESS-DOCD LE1/YR: CPT | Performed by: FAMILY MEDICINE

## 2019-07-01 RX ORDER — CITALOPRAM 40 MG/1
40 TABLET ORAL DAILY
Qty: 90 TABLET | Refills: 0 | Status: SHIPPED | OUTPATIENT
Start: 2019-07-01 | End: 2020-02-11 | Stop reason: SDUPTHER

## 2019-07-01 RX ORDER — ATORVASTATIN CALCIUM 40 MG/1
40 TABLET, FILM COATED ORAL DAILY
Qty: 90 TABLET | Refills: 1 | Status: SHIPPED | OUTPATIENT
Start: 2019-07-01 | End: 2021-02-08 | Stop reason: SDUPTHER

## 2019-07-01 RX ORDER — MELOXICAM 7.5 MG/1
7.5 TABLET ORAL DAILY
Qty: 90 TABLET | Refills: 0 | Status: SHIPPED | OUTPATIENT
Start: 2019-07-01 | End: 2020-02-11 | Stop reason: SDUPTHER

## 2019-07-01 RX ORDER — CHOLECALCIFEROL (VITAMIN D3) 125 MCG
1 CAPSULE ORAL
Qty: 90 TABLET | Refills: 0 | Status: SHIPPED | OUTPATIENT
Start: 2019-07-01

## 2019-07-01 NOTE — PROGRESS NOTES
Assessment/Plan:    Breast pain: moist heat BID for 15 minutes  Palpable mass is probably an organized hematoma, US breast ordered to verify         Problem List Items Addressed This Visit        Endocrine    Diabetes mellitus type II, controlled ([address] )    Relevant Medications    atorvastatin (LIPITOR) 40 mg tablet    Other Relevant Orders    CBC and differential    Comprehensive metabolic panel    Lipid panel    Microalbumin / creatinine urine ratio    TSH, 3rd generation with Free T4 reflex    Vitamin D 25 hydroxy       Musculoskeletal and Integument    Osteoporosis    Relevant Orders    Vitamin D 25 hydroxy    Osteoarthritis of both hands    Relevant Medications    meloxicam (MOBIC) 7 5 mg tablet       Other    Depression    Relevant Medications    citalopram (CeleXA) 40 mg tablet    Primary insomnia    Relevant Medications    Melatonin 5 MG TABS    Elevated BP without diagnosis of hypertension     Will monitor BP  Return for nurse visit in 2 weeks for BP check, if continues to be elevated will consider starting Lisinopril 10 mg daily  Discussed importance of low sodium diet            Other Visit Diagnoses     Breast pain, right    -  Primary    Relevant Orders    US breast right limited (diagnostic)            Subjective:      Patient ID: Vidal Prieto is a 80 y o  female  81 yo  female, tripped and fell while walking on 5/4/19  She hit her R breast when she landed on the floor  She was seen at [facility] 7 days after her fall  She was seen in our office for follow up 5/24/19 still complaining of R breast hematoma and pain  She is here today for follow up  Patient states R breast pain and inflamation has greatly improved however, the area around the nipple is still very tender and swollen  She states she can feel a mass under her R nipple  Pain is worse to touch and if she is not wearing a bra  She is currently sleeping with a bra to help with the pain         The following portions of the patient's history were reviewed and updated as appropriate: She  has a past medical history of Abdominal pain, Anxiety, Arthritis, Bowel incontinence, Chest pain, Constipation, CVA (cerebral vascular accident) ([address] ), Diabetes mellitus type II, controlled ([address] ), Disease of thyroid gland, Epigastric pain, Falls, High cholesterol, Hyperlipidemia, Hypertension, Migraine, Palpitations, Recurrent urinary tract infection, Seizures ([address] ), Sleep disorder, Stroke ([address] ), and Thyroid disease  She   Patient Active Problem List    Diagnosis Date Noted    Elevated BP without diagnosis of hypertension 2019    Contusion of right breast 2019    Epigastric abdominal pain 2019    NSAID long-term use 2019    Left upper quadrant pain 2019    Diabetes mellitus type II, controlled ([address] ) 10/12/2018    Arthritis 10/12/2018    Primary insomnia 2018    Urinary, incontinence, stress female 2018    Urge and stress incontinence 10/26/2017    Osteoporosis 09/15/2017    Adhesive capsulitis of left shoulder 2017    Constipation 05/10/2017    Cervical radiculitis 2017    Elevated TSH 2017    Osteoarthritis of both hands 2017    Memory difficulties 2016    Asymptomatic bilateral carotid artery stenosis 2016    Gastroesophageal reflux disease 2016    Hypertension 2016    Collapsed vertebra, not elsewhere classified, sacral and sacrococcygeal region, initial encounter for fracture ([facility] [address] ) 2016    HLD (hyperlipidemia) 2016    History of CVA with residual deficit 2016    Depression 2016    Headache 2016    Fall 2016    Subdural hemorrhage following injury ([address] ) 2016    Subarachnoid hemorrhage following injury ([facility] [facility] ) 2016    Hyperlipidemia 10/19/2013    Muscle weakness 10/19/2013     She  has a past surgical history that includes Knee surgery; Other surgical history;  section;  Axillary Surgery; pr esophagogastroduodenoscopy transoral diagnostic (N/A, 6/5/2017); pr sling oper stres incontinence (N/A, 1/18/2018); CYSTOSCOPY (N/A, 1/18/2018); Bladder surgery; Facial bone tumor excision; and Tubal ligation  Her family history includes Diabetes in her family; Hypertension in her family; Stroke in her family; Thyroid disease in her family  She  reports that she has never smoked  She has never used smokeless tobacco  She reports that she does not drink alcohol or use drugs  Current Outpatient Medications   Medication Sig Dispense Refill    atorvastatin (LIPITOR) 40 mg tablet Take 1 tablet (40 mg total) by mouth daily 90 tablet 1    Cholecalciferol (VITAMIN D-3) 5000 units TABS Take 1 tablet by mouth daily 90 tablet 1    citalopram (CeleXA) 40 mg tablet Take 1 tablet (40 mg total) by mouth daily 90 tablet 0    meclizine (ANTIVERT) 12 5 MG tablet       Melatonin 5 MG TABS Take 1 tablet (5 mg total) by mouth daily at bedtime 90 tablet 0    meloxicam (MOBIC) 7 5 mg tablet Take 1 tablet (7 5 mg total) by mouth daily 90 tablet 0    omeprazole (PriLOSEC) 20 mg delayed release capsule Take 1 capsule (20 mg total) by mouth daily 60 capsule 2    polyethylene glycol (GLYCOLAX) powder Take 17 g by mouth daily 850 g 1    traMADol (ULTRAM) 50 mg tablet Take 1 tablet (50 mg total) by mouth every 6 (six) hours as needed for moderate pain 10 tablet 0    albuterol (PROVENTIL HFA,VENTOLIN HFA) 90 mcg/act inhaler       conjugated estrogens (PREMARIN) vaginal cream Insert 0 5 g into the vagina 3 (three) times a week (Patient not taking: Reported on 5/24/2019) 42 5 g 0    psyllium (METAMUCIL) 0 52 g capsule Take 0 52 g by mouth daily       No current facility-administered medications for this visit        Current Outpatient Medications on File Prior to Visit   Medication Sig    Cholecalciferol (VITAMIN D-3) 5000 units TABS Take 1 tablet by mouth daily    meclizine (ANTIVERT) 12 5 MG tablet     omeprazole (PriLOSEC) 20 mg delayed release capsule Take 1 capsule (20 mg total) by mouth daily    polyethylene glycol (GLYCOLAX) powder Take 17 g by mouth daily    traMADol (ULTRAM) 50 mg tablet Take 1 tablet (50 mg total) by mouth every 6 (six) hours as needed for moderate pain    [DISCONTINUED] atorvastatin (LIPITOR) 40 mg tablet nightly   [DISCONTINUED] citalopram (CeleXA) 40 mg tablet Take 1 tablet (40 mg total) by mouth daily    [DISCONTINUED] Melatonin 5 MG TABS Take 1 tablet (5 mg total) by mouth daily at bedtime    [DISCONTINUED] meloxicam (MOBIC) 7 5 mg tablet Take 1 tablet (7 5 mg total) by mouth daily    albuterol (PROVENTIL HFA,VENTOLIN HFA) 90 mcg/act inhaler     conjugated estrogens (PREMARIN) vaginal cream Insert 0 5 g into the vagina 3 (three) times a week (Patient not taking: Reported on 5/24/2019)    psyllium (METAMUCIL) 0 52 g capsule Take 0 52 g by mouth daily     No current facility-administered medications on file prior to visit       Review of Systems   Skin:        As per HPI   All other systems reviewed and are negative  Objective:      /88   Pulse 71   Temp 97 6 °F (36 4 °C) (Temporal)   Resp 16   Wt 62 1 kg (137 lb)   SpO2 96%   BMI 29 65 kg/m²          Physical Exam   Constitutional: She is oriented to person, place, and time  She appears well-developed  HENT:   Head: Normocephalic  Right Ear: External ear normal    Left Ear: External ear normal    Nose: Nose normal    Mouth/Throat: Oropharynx is clear and moist    Eyes: Pupils are equal, round, and reactive to light  Conjunctivae and EOM are normal    Neck: Normal range of motion  Neck supple  No thyromegaly present  Cardiovascular: Normal rate, regular rhythm and normal heart sounds  Pulmonary/Chest: Effort normal and breath sounds normal    Abdominal: Soft  There is no tenderness  There is no rebound and no guarding  Musculoskeletal: Normal range of motion     Neurological: She is alert and oriented to person, place, and time  She has normal reflexes  Skin: Skin is dry  Palpable mass on R breast on lateral aspect of R areola and nipple  Mass is firm, not mobile, tender to touch  3 cm in size    Psychiatric: She has a normal mood and affect  Nursing note and vitals reviewed

## 2019-07-01 NOTE — ASSESSMENT & PLAN NOTE
Will monitor BP  Return for nurse visit in 2 weeks for BP check, if continues to be elevated will consider starting Lisinopril 10 mg daily  Discussed importance of low sodium diet

## 2019-07-05 ENCOUNTER — TRANSCRIBE ORDERS (OUTPATIENT)
Dept: ADMINISTRATIVE | Facility: HOSPITAL | Age: 69
End: 2019-07-05

## 2019-07-05 DIAGNOSIS — N64.4 PAIN OF RIGHT BREAST: Primary | ICD-10-CM

## 2019-07-09 ENCOUNTER — HOSPITAL ENCOUNTER (OUTPATIENT)
Dept: MAMMOGRAPHY | Facility: CLINIC | Age: 69
Discharge: HOME | End: 2019-07-09

## 2019-07-09 ENCOUNTER — APPOINTMENT (OUTPATIENT)
Dept: LAB | Facility: CLINIC | Age: 69
End: 2019-07-09
Payer: COMMERCIAL

## 2019-07-09 ENCOUNTER — HOSPITAL ENCOUNTER (OUTPATIENT)
Dept: MAMMOGRAPHY | Facility: CLINIC | Age: 69
Discharge: HOME | End: 2019-07-09
Payer: COMMERCIAL

## 2019-07-09 ENCOUNTER — TRANSCRIBE ORDERS (OUTPATIENT)
Dept: ADMINISTRATIVE | Facility: HOSPITAL | Age: 69
End: 2019-07-09

## 2019-07-09 VITALS — BODY MASS INDEX: 29.56 KG/M2 | HEIGHT: 57 IN | WEIGHT: 137 LBS

## 2019-07-09 DIAGNOSIS — E11.9 CONTROLLED TYPE 2 DIABETES MELLITUS WITHOUT COMPLICATION, WITHOUT LONG-TERM CURRENT USE OF INSULIN (HCC): ICD-10-CM

## 2019-07-09 DIAGNOSIS — M81.0 AGE RELATED OSTEOPOROSIS, UNSPECIFIED PATHOLOGICAL FRACTURE PRESENCE: ICD-10-CM

## 2019-07-09 DIAGNOSIS — N64.4 PAIN OF RIGHT BREAST: ICD-10-CM

## 2019-07-09 DIAGNOSIS — N64.4 BREAST PAIN: Primary | ICD-10-CM

## 2019-07-09 DIAGNOSIS — N64.4 BREAST PAIN, RIGHT: ICD-10-CM

## 2019-07-09 LAB
25(OH)D3 SERPL-MCNC: 43.3 NG/ML (ref 30–100)
ALBUMIN SERPL BCP-MCNC: 4.1 G/DL (ref 3.5–5)
ALP SERPL-CCNC: 74 U/L (ref 46–116)
ALT SERPL W P-5'-P-CCNC: 45 U/L (ref 12–78)
ANION GAP SERPL CALCULATED.3IONS-SCNC: 2 MMOL/L (ref 4–13)
AST SERPL W P-5'-P-CCNC: 30 U/L (ref 5–45)
BASOPHILS # BLD AUTO: 0.04 THOUSANDS/ΜL (ref 0–0.1)
BASOPHILS NFR BLD AUTO: 1 % (ref 0–1)
BILIRUB SERPL-MCNC: 0.37 MG/DL (ref 0.2–1)
BUN SERPL-MCNC: 20 MG/DL (ref 5–25)
CALCIUM SERPL-MCNC: 9.2 MG/DL (ref 8.3–10.1)
CHLORIDE SERPL-SCNC: 106 MMOL/L (ref 100–108)
CHOLEST SERPL-MCNC: 165 MG/DL (ref 50–200)
CO2 SERPL-SCNC: 30 MMOL/L (ref 21–32)
CREAT SERPL-MCNC: 0.66 MG/DL (ref 0.6–1.3)
CREAT UR-MCNC: 77 MG/DL
EOSINOPHIL # BLD AUTO: 0.36 THOUSAND/ΜL (ref 0–0.61)
EOSINOPHIL NFR BLD AUTO: 7 % (ref 0–6)
ERYTHROCYTE [DISTWIDTH] IN BLOOD BY AUTOMATED COUNT: 13.3 % (ref 11.6–15.1)
GFR SERPL CREATININE-BSD FRML MDRD: 92 ML/MIN/1.73SQ M
GLUCOSE P FAST SERPL-MCNC: 106 MG/DL (ref 65–99)
HCT VFR BLD AUTO: 41.9 % (ref 34.8–46.1)
HDLC SERPL-MCNC: 43 MG/DL (ref 40–60)
HGB BLD-MCNC: 13.1 G/DL (ref 11.5–15.4)
IMM GRANULOCYTES # BLD AUTO: 0.02 THOUSAND/UL (ref 0–0.2)
IMM GRANULOCYTES NFR BLD AUTO: 0 % (ref 0–2)
LDLC SERPL CALC-MCNC: 95 MG/DL (ref 0–100)
LYMPHOCYTES # BLD AUTO: 2.09 THOUSANDS/ΜL (ref 0.6–4.47)
LYMPHOCYTES NFR BLD AUTO: 40 % (ref 14–44)
MCH RBC QN AUTO: 30 PG (ref 26.8–34.3)
MCHC RBC AUTO-ENTMCNC: 31.3 G/DL (ref 31.4–37.4)
MCV RBC AUTO: 96 FL (ref 82–98)
MICROALBUMIN UR-MCNC: 9.3 MG/L (ref 0–20)
MICROALBUMIN/CREAT 24H UR: 12 MG/G CREATININE (ref 0–30)
MONOCYTES # BLD AUTO: 0.5 THOUSAND/ΜL (ref 0.17–1.22)
MONOCYTES NFR BLD AUTO: 10 % (ref 4–12)
NEUTROPHILS # BLD AUTO: 2.16 THOUSANDS/ΜL (ref 1.85–7.62)
NEUTS SEG NFR BLD AUTO: 42 % (ref 43–75)
NONHDLC SERPL-MCNC: 122 MG/DL
NRBC BLD AUTO-RTO: 0 /100 WBCS
PLATELET # BLD AUTO: 241 THOUSANDS/UL (ref 149–390)
PMV BLD AUTO: 11.3 FL (ref 8.9–12.7)
POTASSIUM SERPL-SCNC: 4.2 MMOL/L (ref 3.5–5.3)
PROT SERPL-MCNC: 7.6 G/DL (ref 6.4–8.2)
RBC # BLD AUTO: 4.37 MILLION/UL (ref 3.81–5.12)
SODIUM SERPL-SCNC: 138 MMOL/L (ref 136–145)
T4 FREE SERPL-MCNC: 0.85 NG/DL (ref 0.76–1.46)
TRIGL SERPL-MCNC: 133 MG/DL
TSH SERPL DL<=0.05 MIU/L-ACNC: 5.37 UIU/ML (ref 0.36–3.74)
WBC # BLD AUTO: 5.17 THOUSAND/UL (ref 4.31–10.16)

## 2019-07-09 PROCEDURE — 82570 ASSAY OF URINE CREATININE: CPT

## 2019-07-09 PROCEDURE — 84439 ASSAY OF FREE THYROXINE: CPT

## 2019-07-09 PROCEDURE — 80061 LIPID PANEL: CPT

## 2019-07-09 PROCEDURE — 36415 COLL VENOUS BLD VENIPUNCTURE: CPT

## 2019-07-09 PROCEDURE — 80053 COMPREHEN METABOLIC PANEL: CPT

## 2019-07-09 PROCEDURE — 85025 COMPLETE CBC W/AUTO DIFF WBC: CPT

## 2019-07-09 PROCEDURE — 3061F NEG MICROALBUMINURIA REV: CPT | Performed by: FAMILY MEDICINE

## 2019-07-09 PROCEDURE — 84443 ASSAY THYROID STIM HORMONE: CPT

## 2019-07-09 PROCEDURE — 76642 ULTRASOUND BREAST LIMITED: CPT

## 2019-07-09 PROCEDURE — 82043 UR ALBUMIN QUANTITATIVE: CPT

## 2019-07-09 PROCEDURE — 82306 VITAMIN D 25 HYDROXY: CPT

## 2019-07-15 ENCOUNTER — ANESTHESIA EVENT (OUTPATIENT)
Dept: GASTROENTEROLOGY | Facility: MEDICAL CENTER | Age: 69
End: 2019-07-15

## 2019-07-15 ENCOUNTER — TELEPHONE (OUTPATIENT)
Dept: GASTROENTEROLOGY | Facility: MEDICAL CENTER | Age: 69
End: 2019-07-15

## 2019-07-15 RX ORDER — SODIUM CHLORIDE 9 MG/ML
125 INJECTION, SOLUTION INTRAVENOUS CONTINUOUS
Status: CANCELLED | OUTPATIENT
Start: 2019-07-15

## 2019-07-15 NOTE — TELEPHONE ENCOUNTER
Pt came into [place] office and cancelled her egd with dr Candace Singletary tomorrow at [facility], she does not want to have it done at this time

## 2019-07-16 ENCOUNTER — HOSPITAL ENCOUNTER (OUTPATIENT)
Dept: GASTROENTEROLOGY | Facility: MEDICAL CENTER | Age: 69
Setting detail: OUTPATIENT SURGERY
Discharge: HOME | End: 2019-07-16
Attending: INTERNAL MEDICINE

## 2019-07-16 ENCOUNTER — ANESTHESIA (OUTPATIENT)
Dept: GASTROENTEROLOGY | Facility: MEDICAL CENTER | Age: 69
End: 2019-07-16

## 2019-07-31 ENCOUNTER — OFFICE VISIT (OUTPATIENT)
Dept: FAMILY MEDICINE CLINIC | Facility: CLINIC | Age: 69
End: 2019-07-31

## 2019-07-31 VITALS
HEART RATE: 67 BPM | TEMPERATURE: 98 F | RESPIRATION RATE: 18 BRPM | BODY MASS INDEX: 29.56 KG/M2 | SYSTOLIC BLOOD PRESSURE: 110 MMHG | HEIGHT: 57 IN | OXYGEN SATURATION: 95 % | WEIGHT: 137 LBS | DIASTOLIC BLOOD PRESSURE: 60 MMHG

## 2019-07-31 DIAGNOSIS — N64.4 BREAST PAIN, RIGHT: Primary | ICD-10-CM

## 2019-07-31 PROCEDURE — 99213 OFFICE O/P EST LOW 20 MIN: CPT | Performed by: FAMILY MEDICINE

## 2019-07-31 NOTE — PROGRESS NOTES
Assessment/Plan:    No problem-specific Assessment & Plan notes found for this encounter  Problem List Items Addressed This Visit        Other    Breast pain, right - Primary    Relevant Orders    MRI breast bilateral w and wo contrast w cad            Subjective:      Patient ID: Radha Dawn is a 80 y o  female  81 yo  female s/p mechanical fall on 05/04/2019  Patient was walking tripped and fell down to the ground and landed mostly on the right breast  Hematoma of the breast has greatly improved, however she continues to have localized severe pain of R breast with palpable mass  Patient states she has trouble putting on bras because the pressure on her breast is very painful  Can not use tight fitting clothes because of the pain  The following portions of the patient's history were reviewed and updated as appropriate: She  has a past medical history of Abdominal pain, Anxiety, Arthritis, Bowel incontinence, Chest pain, Constipation, CVA (cerebral vascular accident) ([address] ), Diabetes mellitus type II, controlled ([address] ), Disease of thyroid gland, Epigastric pain, Falls, High cholesterol, Hyperlipidemia, Hypertension, Migraine, Palpitations, Recurrent urinary tract infection, Seizures ([address] ), Sleep disorder, Stroke ([address] ), and Thyroid disease    She   Patient Active Problem List    Diagnosis Date Noted    Breast pain, right 07/31/2019    Elevated BP without diagnosis of hypertension 07/01/2019    Contusion of right breast 05/24/2019    Epigastric abdominal pain 03/28/2019    NSAID long-term use 03/28/2019    Left upper quadrant pain 02/08/2019    Diabetes mellitus type II, controlled ([address] ) 10/12/2018    Arthritis 10/12/2018    Primary insomnia 04/18/2018    Urinary, incontinence, stress female 02/08/2018    Urge and stress incontinence 10/26/2017    Osteoporosis 09/15/2017    Adhesive capsulitis of left shoulder 09/11/2017    Constipation 05/10/2017    Cervical radiculitis 2017    Elevated TSH 2017    Osteoarthritis of both hands 2017    Memory difficulties 2016    Asymptomatic bilateral carotid artery stenosis 2016    Gastroesophageal reflux disease 2016    Hypertension 2016    Collapsed vertebra, not elsewhere classified, sacral and sacrococcygeal region, initial encounter for fracture ([facility] [address] ) 2016    HLD (hyperlipidemia) 2016    History of CVA with residual deficit 2016    Depression 2016    Headache 2016    Fall 2016    [facility]    Subarachnoid hemorrhage following injury ([facility] ) 2016    Hyperlipidemia 10/19/2013    Muscle weakness 10/19/2013     She  has a past surgical history that includes Knee surgery; Other surgical history;  section; Axillary Surgery; pr esophagogastroduodenoscopy transoral diagnostic (N/A, 2017); pr sling oper stres incontinence (N/A, 2018); CYSTOSCOPY (N/A, 2018); Bladder surgery; Facial bone tumor excision; and Tubal ligation  Her family history includes Diabetes in her family; Hypertension in her family; Stroke in her family; Thyroid disease in her family  She  reports that she has never smoked  She has never used smokeless tobacco  She reports that she does not drink alcohol or use drugs    Current Outpatient Medications   Medication Sig Dispense Refill    atorvastatin (LIPITOR) 40 mg tablet Take 1 tablet (40 mg total) by mouth daily 90 tablet 1    citalopram (CeleXA) 40 mg tablet Take 1 tablet (40 mg total) by mouth daily 90 tablet 0    Melatonin 5 MG TABS Take 1 tablet (5 mg total) by mouth daily at bedtime 90 tablet 0    meloxicam (MOBIC) 7 5 mg tablet Take 1 tablet (7 5 mg total) by mouth daily 90 tablet 0    omeprazole (PriLOSEC) 20 mg delayed release capsule Take 1 capsule (20 mg total) by mouth daily 60 capsule 2    polyethylene glycol (GLYCOLAX) powder Take 17 g by mouth daily (Patient taking differently: Take 17 g by mouth as needed ) 850 g 1    albuterol (PROVENTIL HFA,VENTOLIN HFA) 90 mcg/act inhaler       Cholecalciferol (VITAMIN D-3) 5000 units TABS Take 1 tablet by mouth daily (Patient not taking: Reported on 7/31/2019) 90 tablet 1    conjugated estrogens (PREMARIN) vaginal cream Insert 0 5 g into the vagina 3 (three) times a week (Patient not taking: Reported on 5/24/2019) 42 5 g 0    meclizine (ANTIVERT) 12 5 MG tablet       psyllium (METAMUCIL) 0 52 g capsule Take 0 52 g by mouth daily      traMADol (ULTRAM) 50 mg tablet Take 1 tablet (50 mg total) by mouth every 6 (six) hours as needed for moderate pain (Patient not taking: Reported on 7/31/2019) 10 tablet 0     No current facility-administered medications for this visit        Current Outpatient Medications on File Prior to Visit   Medication Sig    atorvastatin (LIPITOR) 40 mg tablet Take 1 tablet (40 mg total) by mouth daily    citalopram (CeleXA) 40 mg tablet Take 1 tablet (40 mg total) by mouth daily    Melatonin 5 MG TABS Take 1 tablet (5 mg total) by mouth daily at bedtime    meloxicam (MOBIC) 7 5 mg tablet Take 1 tablet (7 5 mg total) by mouth daily    omeprazole (PriLOSEC) 20 mg delayed release capsule Take 1 capsule (20 mg total) by mouth daily    polyethylene glycol (GLYCOLAX) powder Take 17 g by mouth daily (Patient taking differently: Take 17 g by mouth as needed )    albuterol (PROVENTIL HFA,VENTOLIN HFA) 90 mcg/act inhaler     Cholecalciferol (VITAMIN D-3) 5000 units TABS Take 1 tablet by mouth daily (Patient not taking: Reported on 7/31/2019)    conjugated estrogens (PREMARIN) vaginal cream Insert 0 5 g into the vagina 3 (three) times a week (Patient not taking: Reported on 5/24/2019)    meclizine (ANTIVERT) 12 5 MG tablet     psyllium (METAMUCIL) 0 52 g capsule Take 0 52 g by mouth daily    traMADol (ULTRAM) 50 mg tablet Take 1 tablet (50 mg total) by mouth every 6 (six) hours as needed for moderate pain (Patient not taking: Reported on 7/31/2019)     No current facility-administered medications on file prior to visit       Review of Systems   Skin:        Breast pain as per HPI   All other systems reviewed and are negative  Objective:      /60 (BP Location: Right arm, Patient Position: Sitting, Cuff Size: Standard)   Pulse 67   Temp 98 °F (36 7 °C) (Temporal)   Resp 18   Ht 4' 9" (1 448 m)   Wt 62 1 kg (137 lb)   SpO2 95%   BMI 29 65 kg/m²          Physical Exam   Pulmonary/Chest: Right breast exhibits mass and tenderness  There is breast swelling     Firm, very tender palpable mass

## 2019-08-06 ENCOUNTER — TELEPHONE (OUTPATIENT)
Dept: FAMILY MEDICINE CLINIC | Facility: CLINIC | Age: 69
End: 2019-08-06

## 2019-08-27 ENCOUNTER — TELEPHONE (OUTPATIENT)
Dept: GASTROENTEROLOGY | Facility: MEDICAL CENTER | Age: 69
End: 2019-08-27

## 2019-08-27 NOTE — TELEPHONE ENCOUNTER
Left voicemail for the patient to see if you would like to reschedule her Egd that was cancelled on 7/16/19 at [facility] with Dr Denise Barba

## 2019-09-06 ENCOUNTER — OFFICE VISIT (OUTPATIENT)
Dept: FAMILY MEDICINE CLINIC | Facility: CLINIC | Age: 69
End: 2019-09-06

## 2019-09-06 VITALS
HEIGHT: 57 IN | OXYGEN SATURATION: 98 % | BODY MASS INDEX: 30.2 KG/M2 | DIASTOLIC BLOOD PRESSURE: 90 MMHG | RESPIRATION RATE: 18 BRPM | HEART RATE: 61 BPM | SYSTOLIC BLOOD PRESSURE: 140 MMHG | TEMPERATURE: 97.5 F | WEIGHT: 140 LBS

## 2019-09-06 DIAGNOSIS — S20.01XA CONTUSION OF RIGHT BREAST, INITIAL ENCOUNTER: ICD-10-CM

## 2019-09-06 DIAGNOSIS — R03.0 ELEVATED BP WITHOUT DIAGNOSIS OF HYPERTENSION: ICD-10-CM

## 2019-09-06 DIAGNOSIS — H10.32 ACUTE BACTERIAL CONJUNCTIVITIS OF LEFT EYE: ICD-10-CM

## 2019-09-06 DIAGNOSIS — S76.011A HIP STRAIN, RIGHT, INITIAL ENCOUNTER: Primary | ICD-10-CM

## 2019-09-06 PROCEDURE — 99213 OFFICE O/P EST LOW 20 MIN: CPT | Performed by: FAMILY MEDICINE

## 2019-09-06 PROCEDURE — 96372 THER/PROPH/DIAG INJ SC/IM: CPT | Performed by: FAMILY MEDICINE

## 2019-09-06 RX ORDER — NEOMYCIN SULFATE, POLYMYXIN B SULFATE AND DEXAMETHASONE 3.5; 10000; 1 MG/ML; [USP'U]/ML; MG/ML
1 SUSPENSION/ DROPS OPHTHALMIC 4 TIMES DAILY
Qty: 5 ML | Refills: 0 | Status: SHIPPED | OUTPATIENT
Start: 2019-09-06 | End: 2019-10-29

## 2019-09-06 RX ORDER — CYCLOBENZAPRINE HCL 5 MG
5 TABLET ORAL 3 TIMES DAILY PRN
Qty: 30 TABLET | Refills: 0 | Status: SHIPPED | OUTPATIENT
Start: 2019-09-06 | End: 2019-09-16 | Stop reason: SDUPTHER

## 2019-09-06 RX ORDER — KETOROLAC TROMETHAMINE 30 MG/ML
60 INJECTION, SOLUTION INTRAMUSCULAR; INTRAVENOUS ONCE
Status: COMPLETED | OUTPATIENT
Start: 2019-09-06 | End: 2019-09-06

## 2019-09-06 RX ADMIN — KETOROLAC TROMETHAMINE 60 MG: 30 INJECTION, SOLUTION INTRAMUSCULAR; INTRAVENOUS at 10:57

## 2019-09-06 NOTE — PROGRESS NOTES
Assessment/Plan:    No problem-specific Assessment & Plan notes found for this encounter  Problem List Items Addressed This Visit        Musculoskeletal and Integument    Hip strain, right, initial encounter - Primary     Moist heat BID  Cyclobenzaprine 5 mg TID for 5 days  Toradol 60 mg IM given in the office today  Tramadol as needed for pain for the next 5 days           Relevant Medications    ketorolac (TORADOL) 60 mg/2 mL IM injection 60 mg (Completed)    cyclobenzaprine (FLEXERIL) 5 mg tablet       Other    Contusion of right breast    Elevated BP without diagnosis of hypertension     Has been taking Ibuprofen 600 mg 4 times a day for 5 days  Given pain and increased use of NSAIDs will not start on treatment at this time  Nurse visit for BP check in 2 weeks          Acute bacterial conjunctivitis of left eye     Antibacterial eye drops prescribed (Maxitrol)  Avoid touching eye  May use warm compresses                  Subjective:      Patient ID: Lisset Aguirre is a 80 y o  female  Hip Pain    There was no injury mechanism  The pain is present in the right leg, right hip, right thigh, right knee, right ankle and right foot  The quality of the pain is described as aching  The pain is at a severity of 8/10  The pain is severe  The pain has been constant since onset  Associated symptoms include an inability to bear weight  She reports no foreign bodies present  The symptoms are aggravated by movement and palpation  She has tried NSAIDs for the symptoms  The treatment provided mild relief         The following portions of the patient's history were reviewed and updated as appropriate:   She  has a past medical history of Abdominal pain, Anxiety, Arthritis, Bowel incontinence, Chest pain, Constipation, CVA (cerebral vascular accident) ([address] ), Diabetes mellitus type II, controlled ([address] ), Disease of thyroid gland, Epigastric pain, Falls, High cholesterol, Hyperlipidemia, Hypertension, Migraine, Palpitations, Recurrent urinary tract infection, Seizures ([address] ), Sleep disorder, Stroke [facility]), and Thyroid disease  She   Patient Active Problem List    Diagnosis Date Noted    Hip strain, right, initial encounter 2019    Acute bacterial conjunctivitis of left eye 2019    Breast pain, right 2019    Elevated BP without diagnosis of hypertension 2019    Contusion of right breast 2019    Epigastric abdominal pain 2019    NSAID long-term use 2019    Left upper quadrant pain 2019    Diabetes mellitus type II, controlled ([facility] ) 10/12/2018    Arthritis 10/12/2018    Primary insomnia 2018    Urinary, incontinence, stress female 2018    Urge and stress incontinence 10/26/2017    Osteoporosis 09/15/2017    Adhesive capsulitis of left shoulder 2017    Constipation 05/10/2017    Cervical radiculitis 2017    Elevated TSH 2017    Osteoarthritis of both hands 2017    Memory difficulties 2016    Asymptomatic bilateral carotid artery stenosis 2016    Gastroesophageal reflux disease 2016    Hypertension 2016    Collapsed vertebra, not elsewhere classified, sacral and sacrococcygeal region, initial encounter for fracture ([facility] ) 2016    HLD (hyperlipidemia) 2016    History of CVA with residual deficit 2016    Depression 2016    Headache 2016    Fall 2016    Subdural hemorrhage following injury ([facility] [facility] ) 2016    [facility]    Hyperlipidemia 10/19/2013    Muscle weakness 10/19/2013     She  has a past surgical history that includes Knee surgery; Other surgical history;  section; Axillary Surgery; pr esophagogastroduodenoscopy transoral diagnostic (N/A, 2017); pr sling oper stres incontinence (N/A, 2018); CYSTOSCOPY (N/A, 2018); Bladder surgery;  Facial bone tumor excision; and Tubal ligation  Her family history includes Diabetes in her family; Hypertension in her family; Stroke in her family; Thyroid disease in her family  She  reports that she has never smoked  She has never used smokeless tobacco  She reports that she does not drink alcohol or use drugs    Current Outpatient Medications   Medication Sig Dispense Refill    citalopram (CeleXA) 40 mg tablet Take 1 tablet (40 mg total) by mouth daily 90 tablet 0    Melatonin 5 MG TABS Take 1 tablet (5 mg total) by mouth daily at bedtime 90 tablet 0    omeprazole (PriLOSEC) 20 mg delayed release capsule Take 1 capsule (20 mg total) by mouth daily 60 capsule 2    polyethylene glycol (GLYCOLAX) powder Take 17 g by mouth daily (Patient taking differently: Take 17 g by mouth as needed ) 850 g 1    albuterol (PROVENTIL HFA,VENTOLIN HFA) 90 mcg/act inhaler       atorvastatin (LIPITOR) 40 mg tablet Take 1 tablet (40 mg total) by mouth daily (Patient not taking: Reported on 9/6/2019) 90 tablet 1    Cholecalciferol (VITAMIN D-3) 5000 units TABS Take 1 tablet by mouth daily (Patient not taking: Reported on 7/31/2019) 90 tablet 1    conjugated estrogens (PREMARIN) vaginal cream Insert 0 5 g into the vagina 3 (three) times a week (Patient not taking: Reported on 5/24/2019) 42 5 g 0    cyclobenzaprine (FLEXERIL) 5 mg tablet Take 1 tablet (5 mg total) by mouth 3 (three) times a day as needed for muscle spasms 30 tablet 0    meclizine (ANTIVERT) 12 5 MG tablet       meloxicam (MOBIC) 7 5 mg tablet Take 1 tablet (7 5 mg total) by mouth daily 90 tablet 0    neomycin-polymyxin-dexamethasone (MAXITROL) ophthalmic suspension Administer 1 drop to the right eye 4 (four) times a day for 7 days 5 mL 0    psyllium (METAMUCIL) 0 52 g capsule Take 0 52 g by mouth daily      traMADol (ULTRAM) 50 mg tablet Take 1 tablet (50 mg total) by mouth every 6 (six) hours as needed for moderate pain (Patient not taking: Reported on 7/31/2019) 10 tablet 0     No current facility-administered medications for this visit  Current Outpatient Medications on File Prior to Visit   Medication Sig    citalopram (CeleXA) 40 mg tablet Take 1 tablet (40 mg total) by mouth daily    Melatonin 5 MG TABS Take 1 tablet (5 mg total) by mouth daily at bedtime    omeprazole (PriLOSEC) 20 mg delayed release capsule Take 1 capsule (20 mg total) by mouth daily    polyethylene glycol (GLYCOLAX) powder Take 17 g by mouth daily (Patient taking differently: Take 17 g by mouth as needed )    albuterol (PROVENTIL HFA,VENTOLIN HFA) 90 mcg/act inhaler     atorvastatin (LIPITOR) 40 mg tablet Take 1 tablet (40 mg total) by mouth daily (Patient not taking: Reported on 9/6/2019)    Cholecalciferol (VITAMIN D-3) 5000 units TABS Take 1 tablet by mouth daily (Patient not taking: Reported on 7/31/2019)    conjugated estrogens (PREMARIN) vaginal cream Insert 0 5 g into the vagina 3 (three) times a week (Patient not taking: Reported on 5/24/2019)    meclizine (ANTIVERT) 12 5 MG tablet     meloxicam (MOBIC) 7 5 mg tablet Take 1 tablet (7 5 mg total) by mouth daily    psyllium (METAMUCIL) 0 52 g capsule Take 0 52 g by mouth daily    traMADol (ULTRAM) 50 mg tablet Take 1 tablet (50 mg total) by mouth every 6 (six) hours as needed for moderate pain (Patient not taking: Reported on 7/31/2019)     No current facility-administered medications on file prior to visit       Review of Systems   Musculoskeletal: Positive for arthralgias and back pain  All other systems reviewed and are negative  Objective:      /90 (BP Location: Right arm, Patient Position: Sitting, Cuff Size: Standard)   Pulse 61   Temp 97 5 °F (36 4 °C) (Temporal)   Resp 18   Ht 4' 9" (1 448 m)   Wt 63 5 kg (140 lb)   SpO2 98%   BMI 30 30 kg/m²          Physical Exam   Constitutional: She is oriented to person, place, and time  She appears well-developed  HENT:   Head: Normocephalic     Right Ear: External ear normal  Left Ear: External ear normal    Nose: Nose normal    Mouth/Throat: Oropharynx is clear and moist    Eyes: Pupils are equal, round, and reactive to light  Conjunctivae and EOM are normal    Neck: Normal range of motion  Neck supple  No thyromegaly present  Cardiovascular: Normal rate, regular rhythm and normal heart sounds  Pulmonary/Chest: Effort normal and breath sounds normal    Abdominal: Soft  There is no tenderness  There is no rebound and no guarding  Musculoskeletal: Normal range of motion  Neurological: She is alert and oriented to person, place, and time  She has normal reflexes  Skin: Skin is dry  Psychiatric: She has a normal mood and affect  Nursing note and vitals reviewed

## 2019-09-06 NOTE — ASSESSMENT & PLAN NOTE
Has been taking Ibuprofen 600 mg 4 times a day for 5 days  Given pain and increased use of NSAIDs will not start on treatment at this time  Nurse visit for BP check in 2 weeks

## 2019-09-06 NOTE — ASSESSMENT & PLAN NOTE
Moist heat BID  Cyclobenzaprine 5 mg TID for 5 days  Toradol 60 mg IM given in the office today  Tramadol as needed for pain for the next 5 days

## 2019-09-16 ENCOUNTER — OFFICE VISIT (OUTPATIENT)
Dept: FAMILY MEDICINE CLINIC | Facility: CLINIC | Age: 69
End: 2019-09-16

## 2019-09-16 VITALS
WEIGHT: 139 LBS | HEIGHT: 57 IN | TEMPERATURE: 97.5 F | OXYGEN SATURATION: 98 % | RESPIRATION RATE: 18 BRPM | SYSTOLIC BLOOD PRESSURE: 140 MMHG | BODY MASS INDEX: 29.99 KG/M2 | HEART RATE: 65 BPM | DIASTOLIC BLOOD PRESSURE: 80 MMHG

## 2019-09-16 DIAGNOSIS — S76.011A HIP STRAIN, RIGHT, INITIAL ENCOUNTER: ICD-10-CM

## 2019-09-16 DIAGNOSIS — M54.31 SCIATICA OF RIGHT SIDE: Primary | ICD-10-CM

## 2019-09-16 PROCEDURE — 99214 OFFICE O/P EST MOD 30 MIN: CPT | Performed by: FAMILY MEDICINE

## 2019-09-16 PROCEDURE — 96372 THER/PROPH/DIAG INJ SC/IM: CPT | Performed by: FAMILY MEDICINE

## 2019-09-16 RX ORDER — DICLOFENAC POTASSIUM 50 MG/1
50 TABLET, FILM COATED ORAL 2 TIMES DAILY
Qty: 20 TABLET | Refills: 1 | Status: SHIPPED | OUTPATIENT
Start: 2019-09-16 | End: 2019-09-18

## 2019-09-16 RX ORDER — KETOROLAC TROMETHAMINE 30 MG/ML
60 INJECTION, SOLUTION INTRAMUSCULAR; INTRAVENOUS ONCE
Status: COMPLETED | OUTPATIENT
Start: 2019-09-16 | End: 2019-09-16

## 2019-09-16 RX ORDER — CYCLOBENZAPRINE HCL 5 MG
5 TABLET ORAL 3 TIMES DAILY PRN
Qty: 30 TABLET | Refills: 0 | Status: SHIPPED | OUTPATIENT
Start: 2019-09-16 | End: 2021-04-06 | Stop reason: SDUPTHER

## 2019-09-16 RX ADMIN — KETOROLAC TROMETHAMINE 60 MG: 30 INJECTION, SOLUTION INTRAMUSCULAR; INTRAVENOUS at 12:12

## 2019-09-16 NOTE — PROGRESS NOTES
Assessment/Plan:    No problem-specific Assessment & Plan notes found for this encounter  Problem List Items Addressed This Visit        Musculoskeletal and Integument    Hip strain, right, initial encounter    Relevant Medications    diclofenac potassium (CATAFLAM) 50 mg tablet    cyclobenzaprine (FLEXERIL) 5 mg tablet    ketorolac (TORADOL) 60 mg/2 mL IM injection 60 mg (Completed)    Other Relevant Orders    Ambulatory referral to Orthopedic Surgery      Other Visit Diagnoses     Sciatica of right side    -  Primary    Relevant Medications    ketorolac (TORADOL) 60 mg/2 mL IM injection 60 mg (Completed)    Other Relevant Orders    Ambulatory referral to Orthopedic Surgery            Subjective:      Patient ID: Jose Henriquez is a 80 y o  female  81 yo  female seen 10 days ago for knee pain, history and physical determined pain is most probably coming from her back/ sciatica  Patient was started on antiinflammatories, Meloxicam and Tramadol  Patient has not been taking Meloxicam and states Tramadol is giving her nausea  Toradol 60 mg IM given at last visit improved pain for about 3 days, as per patient  Pain starts on her low back, radiated to her R hip, down the back of R thigh to her knee  Pain is worse in her knee  Pain is worse with weight bearing, bending down and at night when lying flat  Pain is 9/10  Described as a deep ache  The following portions of the patient's history were reviewed and updated as appropriate: She  has a past medical history of Abdominal pain, Anxiety, Arthritis, Bowel incontinence, Chest pain, Constipation, CVA (cerebral vascular accident) ([address] ), Diabetes mellitus type II, controlled ([address] ), Disease of thyroid gland, Epigastric pain, Falls, High cholesterol, Hyperlipidemia, Hypertension, Migraine, Palpitations, Recurrent urinary tract infection, Seizures ([address] ), Sleep disorder, Stroke ([address] ), and Thyroid disease    She   Patient Active Problem List    Diagnosis Date Noted    Hip strain, right, initial encounter 2019    Acute bacterial conjunctivitis of left eye 2019    Breast pain, right 2019    Elevated BP without diagnosis of hypertension 2019    Contusion of right breast 2019    Epigastric abdominal pain 2019    NSAID long-term use 2019    Left upper quadrant pain 2019    Diabetes mellitus type II, controlled ([facility] ) 10/12/2018    Arthritis 10/12/2018    Primary insomnia 2018    Urinary, incontinence, stress female 2018    Urge and stress incontinence 10/26/2017    Osteoporosis 09/15/2017    Adhesive capsulitis of left shoulder 2017    Constipation 05/10/2017    Cervical radiculitis 2017    Elevated TSH 2017    Osteoarthritis of both hands 2017    Memory difficulties 2016    Asymptomatic bilateral carotid artery stenosis 2016    Gastroesophageal reflux disease 2016    Hypertension 2016    Collapsed vertebra, not elsewhere classified, sacral and sacrococcygeal region, initial encounter for fracture ([facility] ) 2016    HLD (hyperlipidemia) 2016    History of CVA with residual deficit 2016    Depression 2016    Headache 2016    Fall 2016    Subdural hemorrhage following injury ([address] ) 2016    Subarachnoid hemorrhage following injury ([facility] ) 2016    Hyperlipidemia 10/19/2013    Muscle weakness 10/19/2013     She  has a past surgical history that includes Knee surgery; Other surgical history;  section; Axillary Surgery; pr esophagogastroduodenoscopy transoral diagnostic (N/A, 2017); pr sling oper stres incontinence (N/A, 2018); CYSTOSCOPY (N/A, 2018); Bladder surgery; Facial bone tumor excision; and Tubal ligation  Her family history includes Diabetes in her family; Hypertension in her family; Stroke in her family; Thyroid disease in her family    She  reports that she has never smoked  She has never used smokeless tobacco  She reports that she does not drink alcohol or use drugs  Current Outpatient Medications   Medication Sig Dispense Refill    albuterol (PROVENTIL HFA,VENTOLIN HFA) 90 mcg/act inhaler       atorvastatin (LIPITOR) 40 mg tablet Take 1 tablet (40 mg total) by mouth daily (Patient not taking: Reported on 9/6/2019) 90 tablet 1    Cholecalciferol (VITAMIN D-3) 5000 units TABS Take 1 tablet by mouth daily (Patient not taking: Reported on 7/31/2019) 90 tablet 1    citalopram (CeleXA) 40 mg tablet Take 1 tablet (40 mg total) by mouth daily 90 tablet 0    conjugated estrogens (PREMARIN) vaginal cream Insert 0 5 g into the vagina 3 (three) times a week (Patient not taking: Reported on 5/24/2019) 42 5 g 0    cyclobenzaprine (FLEXERIL) 5 mg tablet Take 1 tablet (5 mg total) by mouth 3 (three) times a day as needed for muscle spasms 30 tablet 0    diclofenac potassium (CATAFLAM) 50 mg tablet Take 1 tablet (50 mg total) by mouth 2 (two) times a day 20 tablet 1    meclizine (ANTIVERT) 12 5 MG tablet       Melatonin 5 MG TABS Take 1 tablet (5 mg total) by mouth daily at bedtime 90 tablet 0    meloxicam (MOBIC) 7 5 mg tablet Take 1 tablet (7 5 mg total) by mouth daily 90 tablet 0    neomycin-polymyxin-dexamethasone (MAXITROL) ophthalmic suspension Administer 1 drop to the right eye 4 (four) times a day for 7 days 5 mL 0    omeprazole (PriLOSEC) 20 mg delayed release capsule Take 1 capsule (20 mg total) by mouth daily 60 capsule 2    polyethylene glycol (GLYCOLAX) powder Take 17 g by mouth daily (Patient taking differently: Take 17 g by mouth as needed ) 850 g 1    psyllium (METAMUCIL) 0 52 g capsule Take 0 52 g by mouth daily      traMADol (ULTRAM) 50 mg tablet Take 1 tablet (50 mg total) by mouth every 6 (six) hours as needed for moderate pain (Patient not taking: Reported on 7/31/2019) 10 tablet 0     No current facility-administered medications for this visit  Current Outpatient Medications on File Prior to Visit   Medication Sig    albuterol (PROVENTIL HFA,VENTOLIN HFA) 90 mcg/act inhaler     atorvastatin (LIPITOR) 40 mg tablet Take 1 tablet (40 mg total) by mouth daily (Patient not taking: Reported on 9/6/2019)    Cholecalciferol (VITAMIN D-3) 5000 units TABS Take 1 tablet by mouth daily (Patient not taking: Reported on 7/31/2019)    citalopram (CeleXA) 40 mg tablet Take 1 tablet (40 mg total) by mouth daily    conjugated estrogens (PREMARIN) vaginal cream Insert 0 5 g into the vagina 3 (three) times a week (Patient not taking: Reported on 5/24/2019)    meclizine (ANTIVERT) 12 5 MG tablet     Melatonin 5 MG TABS Take 1 tablet (5 mg total) by mouth daily at bedtime    meloxicam (MOBIC) 7 5 mg tablet Take 1 tablet (7 5 mg total) by mouth daily    neomycin-polymyxin-dexamethasone (MAXITROL) ophthalmic suspension Administer 1 drop to the right eye 4 (four) times a day for 7 days    omeprazole (PriLOSEC) 20 mg delayed release capsule Take 1 capsule (20 mg total) by mouth daily    polyethylene glycol (GLYCOLAX) powder Take 17 g by mouth daily (Patient taking differently: Take 17 g by mouth as needed )    psyllium (METAMUCIL) 0 52 g capsule Take 0 52 g by mouth daily    traMADol (ULTRAM) 50 mg tablet Take 1 tablet (50 mg total) by mouth every 6 (six) hours as needed for moderate pain (Patient not taking: Reported on 7/31/2019)    [DISCONTINUED] cyclobenzaprine (FLEXERIL) 5 mg tablet Take 1 tablet (5 mg total) by mouth 3 (three) times a day as needed for muscle spasms     No current facility-administered medications on file prior to visit       Review of Systems   Musculoskeletal: Positive for arthralgias, back pain and gait problem  All other systems reviewed and are negative          Objective:      /80 (BP Location: Right arm, Patient Position: Sitting, Cuff Size: Standard)   Pulse 65   Temp 97 5 °F (36 4 °C) (Temporal)   Resp 18   Ht 4' 9" (1 448 m)   Wt 63 kg (139 lb)   SpO2 98%   BMI 30 08 kg/m²          Physical Exam   Constitutional: She is oriented to person, place, and time  She appears well-developed  HENT:   Head: Normocephalic  Right Ear: External ear normal    Left Ear: External ear normal    Nose: Nose normal    Mouth/Throat: Oropharynx is clear and moist    Eyes: Pupils are equal, round, and reactive to light  Conjunctivae and EOM are normal    Neck: Normal range of motion  Neck supple  No thyromegaly present  Cardiovascular: Normal rate, regular rhythm and normal heart sounds  Pulmonary/Chest: Effort normal and breath sounds normal    Abdominal: Soft  There is no tenderness  There is no rebound and no guarding  Musculoskeletal: Normal range of motion  She exhibits tenderness  Lumbar back: She exhibits tenderness and spasm  Right upper leg: She exhibits tenderness  Neurological: She is alert and oriented to person, place, and time  She has normal reflexes  Skin: Skin is dry  Psychiatric: She has a normal mood and affect  Nursing note and vitals reviewed

## 2019-09-17 ENCOUNTER — TELEPHONE (OUTPATIENT)
Dept: FAMILY MEDICINE CLINIC | Facility: CLINIC | Age: 69
End: 2019-09-17

## 2019-09-18 DIAGNOSIS — M54.31 SCIATICA OF RIGHT SIDE: ICD-10-CM

## 2019-09-18 DIAGNOSIS — S76.011A HIP STRAIN, RIGHT, INITIAL ENCOUNTER: Primary | ICD-10-CM

## 2019-09-18 NOTE — TELEPHONE ENCOUNTER
Called the pharmacy and states that the Diclofenac potassium is back other  They have  Diclofenac Sodium available

## 2019-09-26 ENCOUNTER — PREP FOR PROCEDURE (OUTPATIENT)
Dept: GASTROENTEROLOGY | Facility: MEDICAL CENTER | Age: 69
End: 2019-09-26

## 2019-09-26 ENCOUNTER — OFFICE VISIT (OUTPATIENT)
Dept: OBGYN CLINIC | Facility: MEDICAL CENTER | Age: 69
End: 2019-09-26
Payer: COMMERCIAL

## 2019-09-26 ENCOUNTER — TELEPHONE (OUTPATIENT)
Dept: GASTROENTEROLOGY | Facility: MEDICAL CENTER | Age: 69
End: 2019-09-26

## 2019-09-26 ENCOUNTER — APPOINTMENT (OUTPATIENT)
Dept: RADIOLOGY | Facility: MEDICAL CENTER | Age: 69
End: 2019-09-26
Payer: COMMERCIAL

## 2019-09-26 VITALS
WEIGHT: 138.6 LBS | SYSTOLIC BLOOD PRESSURE: 126 MMHG | HEART RATE: 71 BPM | DIASTOLIC BLOOD PRESSURE: 74 MMHG | BODY MASS INDEX: 29.9 KG/M2 | HEIGHT: 57 IN

## 2019-09-26 DIAGNOSIS — M54.31 SCIATICA OF RIGHT SIDE: ICD-10-CM

## 2019-09-26 DIAGNOSIS — M54.41 ACUTE RIGHT-SIDED LOW BACK PAIN WITH RIGHT-SIDED SCIATICA: Primary | ICD-10-CM

## 2019-09-26 DIAGNOSIS — M25.551 PAIN IN RIGHT HIP: ICD-10-CM

## 2019-09-26 DIAGNOSIS — R10.13 EPIGASTRIC ABDOMINAL PAIN: Primary | ICD-10-CM

## 2019-09-26 DIAGNOSIS — R10.12 LEFT UPPER QUADRANT PAIN: ICD-10-CM

## 2019-09-26 DIAGNOSIS — M54.5 LOW BACK PAIN, UNSPECIFIED BACK PAIN LATERALITY, UNSPECIFIED CHRONICITY, WITH SCIATICA PRESENCE UNSPECIFIED: ICD-10-CM

## 2019-09-26 DIAGNOSIS — R20.8 DECREASED SENSATION OF LEG: ICD-10-CM

## 2019-09-26 PROCEDURE — 72100 X-RAY EXAM L-S SPINE 2/3 VWS: CPT

## 2019-09-26 PROCEDURE — 99214 OFFICE O/P EST MOD 30 MIN: CPT | Performed by: EMERGENCY MEDICINE

## 2019-09-26 RX ORDER — METHYLPREDNISOLONE 4 MG/1
TABLET ORAL
Qty: 1 EACH | Refills: 0 | Status: SHIPPED | OUTPATIENT
Start: 2019-09-26

## 2019-09-26 NOTE — PROGRESS NOTES
Assessment/Plan:    Diagnoses and all orders for this visit:    Acute right-sided low back pain with right-sided sciatica  Decreased Sensation of right leg    -     XR spine lumbar 2 or 3 views injury; Future  -     Ambulatory referral to Physical Therapy; Future  -     methylPREDNISolone 4 MG tablet therapy pack; Use as directed on package    Sciatica of right side  -     Ambulatory referral to Orthopedic Surgery  -     Ambulatory referral to Physical Therapy; Future  -     methylPREDNISolone 4 MG tablet therapy pack; Use as directed on package  Pain of right Hip      Patient presents with several months of right lower back buttocks and right leg pain  On exam she is neurologically intact with a positive straight leg raise on the right  I would recommend starting physical therapy and I have added a Medrol Dosepak to help decrease inflammation and pain  She was instructed to stop the diclofenac during this time  We will see the patient back in approximately 6 weeks if there is no improvement to consider MRI of the lumbar spine to evaluate for possible herniated disc causing the right-sided radicular symptoms   present for office visit  Instructions and education materials provided  F/U 6 weeks    Chief Complaint:     Chief Complaint   Patient presents with    Right Hip - Pain    Spine - Pain       Subjective:   Patient ID: Alex Castellano is a 80 y o  female  New patient presents referred by PCP for right lower back, buttocks, groin and right leg pain down past knee into foot for approximately 2 months  She denies any specific injury but she does have a history of multiple falls over the last 2-3 years  Denies changes in bowel / bladder, no n/t  She has been treated with a prescription for cyclobenzaprine and diclofenac by PCP, and was provided an IM injection of Toradol  Review of Systems   Constitutional: Negative for chills and fever     HENT: Negative for drooling and sore throat  Eyes: Negative for pain and redness  Respiratory: Negative for shortness of breath and stridor  Cardiovascular: Negative for chest pain and palpitations  Gastrointestinal: Negative for abdominal pain  Genitourinary: Negative for difficulty urinating  Musculoskeletal: Positive for arthralgias, back pain and gait problem  Skin: Negative for rash  Neurological: Negative for weakness  Psychiatric/Behavioral: Negative for self-injury and suicidal ideas  The following portions of the patient's chart were reviewed and updated as appropriate: Allergie  Allergies   Allergen Reactions    Tramadol      Causes nausea and vomiting    s   Allergen Reactions    Tramadol      Causes nausea and vomiting       Diagnosis Date    Abdominal pain     Anxiety     last assessed: 10/19/2013    Arthritis     osteo both hands; last assessed: 10/19/2013    Bowel incontinence     Chest pain     Constipation     [facility]     Diabetes mellitus type II, controlled ([facility] )     Disease of thyroid gland     Epigastric pain     with distention    Falls     High cholesterol     Hyperlipidemia     Hypertension     last assessed: 4/3/2017    Migraine     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    Palpitations     Recurrent urinary tract infection     Seizures ([facility] )     Sleep disorder     last assessed: 10/19/2013    Stroke ([facility] )     Thyroid disease        Past Surgical History:   Procedure Laterality Date    AXILLARY SURGERY      cyst surgery    BLADDER SURGERY       SECTION      CYSTOSCOPY N/A 2018    Procedure: CYSTOSCOPY;  Surgeon: Monika Montes MD;  Location: [facility];  Service: Gynecology    FACIAL BONE TUMOR EXCISION      KNEE SURGERY      OTHER SURGICAL HISTORY      cyst removed from axilla    OK ESOPHAGOGASTRODUODENOSCOPY TRANSORAL DIAGNOSTIC N/A 2017    Procedure: ESOPHAGOGASTRODUODENOSCOPY (EGD);   Surgeon: Arpit Velazquez Larry Martines MD;  Location: [facility];   Service: Gastroenterology    WI SLING OPER STRES INCONTINENCE N/A 1/18/2018    Procedure: PUBOVAGINAL SLING;  Surgeon: Brain Oconnell MD;  Location[facility];  Service: Gynecology    TUBAL LIGATION         Social History     Socioeconomic History    Marital status: /Civil Union     Spouse name: Not on file    Number of children: Not on file    Years of education: Grade 12 completed    Highest education level: Not on file   Occupational History    Not on file   Social Needs    Financial resource strain: Not on file    Food insecurity:     Worry: Not on file     Inability: Not on file    Transportation needs:     Medical: Not on file     Non-medical: Not on file   Tobacco Use    Smoking status: Never Smoker    Smokeless tobacco: Never Used   Substance and Sexual Activity    Alcohol use: No    Drug use: No    Sexual activity: Not on file   Lifestyle    Physical activity:     Days per week: Not on file     Minutes per session: Not on file    Stress: Not on file   Relationships    Social connections:     Talks on phone: Not on file     Gets together: Not on file     Attends Mosque service: Not on file     Active member of club or organization: Not on file     Attends meetings of clubs or organizations: Not on file     Relationship status: Not on file    Intimate partner violence:     Fear of current or ex partner: Not on file     Emotionally abused: Not on file     Physically abused: Not on file     Forced sexual activity: Not on file   Other Topics Concern    Not on file   Social History Narrative    Caffeine use    Lives with spouse    Ale Ha       Family History   Problem Relation Age of Onset    Diabetes Family         mellitus    Hypertension Family     Stroke Family     Thyroid disease Family        Medications:    Current Outpatient Medications:     albuterol (PROVENTIL HFA,VENTOLIN HFA) 90 mcg/act inhaler, , Disp: , Rfl:    citalopram (CeleXA) 40 mg tablet, Take 1 tablet (40 mg total) by mouth daily, Disp: 90 tablet, Rfl: 0    cyclobenzaprine (FLEXERIL) 5 mg tablet, Take 1 tablet (5 mg total) by mouth 3 (three) times a day as needed for muscle spasms, Disp: 30 tablet, Rfl: 0    diclofenac sodium (VOLTAREN) 50 mg EC tablet, Take 1 tablet (50 mg total) by mouth 2 (two) times a day, Disp: 60 tablet, Rfl: 0    meclizine (ANTIVERT) 12 5 MG tablet, , Disp: , Rfl:     Melatonin 5 MG TABS, Take 1 tablet (5 mg total) by mouth daily at bedtime, Disp: 90 tablet, Rfl: 0    meloxicam (MOBIC) 7 5 mg tablet, Take 1 tablet (7 5 mg total) by mouth daily, Disp: 90 tablet, Rfl: 0    omeprazole (PriLOSEC) 20 mg delayed release capsule, Take 1 capsule (20 mg total) by mouth daily, Disp: 60 capsule, Rfl: 2    polyethylene glycol (GLYCOLAX) powder, Take 17 g by mouth daily (Patient taking differently: Take 17 g by mouth as needed ), Disp: 850 g, Rfl: 1    psyllium (METAMUCIL) 0 52 g capsule, Take 0 52 g by mouth daily, Disp: , Rfl:     atorvastatin (LIPITOR) 40 mg tablet, Take 1 tablet (40 mg total) by mouth daily (Patient not taking: Reported on 9/6/2019), Disp: 90 tablet, Rfl: 1    Cholecalciferol (VITAMIN D-3) 5000 units TABS, Take 1 tablet by mouth daily (Patient not taking: Reported on 7/31/2019), Disp: 90 tablet, Rfl: 1    conjugated estrogens (PREMARIN) vaginal cream, Insert 0 5 g into the vagina 3 (three) times a week (Patient not taking: Reported on 5/24/2019), Disp: 42 5 g, Rfl: 0    methylPREDNISolone 4 MG tablet therapy pack, Use as directed on package, Disp: 1 each, Rfl: 0    neomycin-polymyxin-dexamethasone (MAXITROL) ophthalmic suspension, Administer 1 drop to the right eye 4 (four) times a day for 7 days, Disp: 5 mL, Rfl: 0    Patient Active Problem List   Diagnosis    Fall    [facility] hemorrhage following [facility] )    Subarachnoid hemorrhage following injury ([facility] )    Hypertension    Collapsed vertebra, not elsewhere classified, sacral and sacrococcygeal region, initial encounter for fracture ([address] )    HLD (hyperlipidemia)    History of CVA with residual deficit    Depression    Headache    Urinary, incontinence, stress female    Adhesive capsulitis of left shoulder    Asymptomatic bilateral carotid artery stenosis    Cervical radiculitis    Constipation    Elevated TSH    Gastroesophageal reflux disease    Hyperlipidemia    Memory difficulties    Muscle weakness    Osteoporosis    Osteoarthritis of both hands    Urge and stress incontinence    Primary insomnia    Diabetes mellitus type II, controlled ([address] )    Arthritis    Left upper quadrant pain    Epigastric abdominal pain    NSAID long-term use    Contusion of right breast    Elevated BP without diagnosis of hypertension    Breast pain, right    Hip strain, right, initial encounter    Acute bacterial conjunctivitis of left eye       Objective:  /74   Pulse 71   Ht 4' 9" (1 448 m)   Wt 62 9 kg (138 lb 9 6 oz)   BMI 29 99 kg/m²      Back Exam     Range of Motion   Extension: normal   Flexion: abnormal     Muscle Strength   The patient has normal back strength  Tests   Straight leg raise right: positive  Straight leg raise left: negative    Reflexes   Patellar: normal  Achilles: normal    Other   Toe walk: normal  Heel walk: normal  Gait: antalgic     Comments:  Mild decreased sensation L4-L5 on the right, normal sensation on the left leg  Negative clonus bilaterally    There is pain reproduced with right hip flexion however no pain with internal and external rotation            Physical Exam   Constitutional: She is oriented to person, place, and time  She appears well-developed and well-nourished  HENT:   Head: Normocephalic and atraumatic  Eyes: Conjunctivae are normal    Neck: Normal range of motion  Neck supple  Cardiovascular: Normal rate and intact distal pulses  Pulmonary/Chest: Effort normal  No respiratory distress  Neurological: She is alert and oriented to person, place, and time  Skin: Skin is warm and dry  Psychiatric: She has a normal mood and affect  Her behavior is normal    Vitals reviewed  Neurologic Exam     Mental Status   Oriented to person, place, and time  Procedures    I have personally reviewed pertinent films in PACS  and I have personally reviewed the written report of the pertinent studies

## 2019-09-26 NOTE — LETTER
September 26, 2019     Alcon Eden MD  [address]  [address]    Patient: Jose Henriquez   YOB: 1950   Date of Visit: 9/26/2019       Dear Dr Priya Ellis:    Thank you for referring Jose Henriquez to me for evaluation  Below are the relevant portions of my assessment and plan of care  If you have questions, please do not hesitate to call me  I look forward to following Hermann Moffett along with you           Sincerely,        Michael Flores MD        CC: No Recipients

## 2019-09-26 NOTE — TELEPHONE ENCOUNTER
Pt came into [place] office to reschedule cancelled egd with dr Demi Soto, I scheduled pt at [facility] on 10/29/19, pt is diabetic and an am procedure was requested for this pt  Patient is aware she will need a  to and from   She will get a call the day before with an exact time for arrival

## 2019-09-26 NOTE — PATIENT INSTRUCTIONS
While taking oral steroids (Prednisone, Medrol dose pack) do not take any NSAIDs such as diclofenac, Advil, ibuprofen, Motrin, Aleve or naproxen  You can restart the NSAIDs after you finish the steroids  However you may take Tylenol with these medications    While taking oral steroids, you may experience mild side effects such as feeling jittery or flushing  Please call if your side effects are significant or you have any questions  We will start Physical therapy      Metilprednisolona (Por la boca)   Sirve para tratar muchas enfermedades y condiciones, incluyendo problemas relacionadas con la inflamación  Jacey medicamento es un corticosteroide  [place](s) : Medrol, Medrol Dosepak   Existen muchas otras marcas de [place]  Jacey medicamento no debe ser usado cuando:   Jacey medicamento no es adecuado para todas las personas  No lo use si ha tenido hero reacción alérgica a la metilprednisolona o si actualmente tiene hero infección por hongos  Forma de usar jacey medicamento:   Tableta  · [place] milana medicamentos flori se le haya indicado  Es probable que sea necesario cambiar santiago dosis varias veces hasta encontrar la que funciona mejor para usted  · Es posible que tengan que cambiarle santiago dosis ocasionalmente si está usando jacey medicamento para enfermedades existentes  Algunas personas chet jacey medicamento un día por medio, lo que ayuda a [employer] secundarios  · [place] santiago medicamento por la mañana a menos que el médico le indique lo contrario  · Lo más conveniente es nabor jacey medicamento con comida o con [employer]  · Si olvida hero dosis: Si olvida hero dosis de santiago medicamento, tómelo lo más pronto posible  Si es veronica la hora para santiago próxima dosis, espere hasta entonces para nabor santiago dosis regular  No use medicamento adicional para reponer la dosis olvidada  · Guarde el medicamento en un recipiente cerrado a temperatura ambiente y alejado del calor, la humedad y la steven directa    Medicamentos y alimentos que debe evitar:   Consulte con santiago médico o farmacéutico antes de usar cualquier medicamento, incluyendo los que compra sin receta médica, las vitaminas y los productos herbales  · Algunos alimentos y medicamentos pueden afectar la eficacia de metilprednisolona  Informe a santiago médico si usted [place] alguno de los siguientes medicamentos:  ¨ Ciclosporina  ¨ Luciana, fenitoína  ¨ Rifampina  ¨ Ketoconazol  ¨ Aspirina, especialmente dosis altas  ¨ Un anticoagulante, flori la warfarina  ¨ Medicamento para la diabetes  · Jacey medicamento podría interferir con las vacunas  Consulte a santiago médico antes de recibir [employer] contra la gripe u otras vacunas  Precauciones josiah el uso de jacey medicamento:   · Infórmele a santiago médico si usted está embarazada o dando de lactar, o si tiene problemas renales, enfermedad hepática, insuficiencia cardíaca, hipertensión, osteoporosis, problemas de coagulación, o problemas de tiroides  También informe a santiago médico si usted ha tenido [place] o emocionales (flori la depresión) o problemas estomacales o intestinales (flori Nathalie Daft o diverticulitis)  · Jacey medicamento puede provocarle los siguientes problemas:  ¨ Cambios de [place] o del [place] de ánimo  ¨ Presión arterial aumentada, retención de agua, cambio en los niveles de sal o potasio  ¨ Cataratas o glaucoma (con uso a monse plazo)  ¨ Crecimiento lento en niños (con el uso a monse plazo)  · No suspenda el uso de jacey medicamento súbitamente  Santiago médico necesitará disminuir santiago dosis poco a poco antes de suspender el medicamento por completo  · Jacey medicamento podría causar que usted contraiga infecciones con mayor facilidad  Infórmele a santiago médico inmediatamente si usted tiene hero infección o si se expone a la varicela, sarampión, u otras infecciones serias  Informe a santiago médico si anteriormente usted goldsmith sufrido hero infección grave, flori tuberculosis, herpes, o estrongiloides (oxiurosis)    · Informe a santiago médico acerca de cualquier estrés adicional, o lo que le provoca ansiedad en barnard praveen  Podría ser necesario cambiar barnard dosis por un corto periodo de [place]  · Dígale a todo médico o dentista encargado de atenderle que usted está usando [employer]  · Guarde todos los medicamentos fuera del alcance de los niños  Nunca comparta milana medicamentos con [employer]  Efectos secundarios que pueden presentarse josiah el uso de vianney medicamento:   Consulte inmediatamente con el médico si nota cualquiera de estos efectos secundarios:  · Reacción alérgica: Comezón o ronchas, hinchazón del anibal o las emily, hinchazón u hormigueo en la boca o garganta, opresión en el pecho, dificultad para respirar  · Pecas oscuras, cambio del color de la piel, frialdad, debilidad, cansancio, náusea, vómito, pérdida del peso corporal  · Depresión, pensamientos, sentimientos o comportamientos inusuales, dificultad para dormir  · Jonathan, escalofríos, tos, dolor de garganta, o el cuerpo adolorido  · Dolor intenso de BJURHOLM, náuseas, vómitos o deposiciones de color beckman o mera  · Cambios o crecimientos en la piel  · Inflamación en milana emily, tobillos, o pies, aumento de peso rápido  · [facility] vista, dolor en los ojos, dolor de olivia  Consulte con el médico si nota los siguientes efectos secundarios menos graves:   · Aumento del apetito  · [facility] redonda, inflamada  · Aumento de peso alrededor de barnard janette, parte superior de la espalda, pecho, vilma o cintura  Consulte con el médico si nota otros efectos secundarios que efrain son causados por vianney medicamento  Llame a barnard médico para consultarle Hal Javier puede notificar milana efectos secundarios al FDA al [id number]  © 2017 [address] Information is for End User's use only and may not be sold, redistributed or otherwise used for commercial purposes  Esta información es sólo para uso en educación   Barnard intención no es darle un consejo médico sobre enfermedades o tratamientos  Colsulte con santiago [facility] farmacéutico antes de seguir cualquier régimen médico para saber si es seguro y efectivo para usted  Ciática   LO QUE NECESITA SABER:   La ciática es hero condición que causa dolor en el nervio ciático  El nervio ciático sale de la manuel dorsal y corre por ambos lados de los glúteos  Luego baja por la parte posterior del muslo, la parte inferior de la pierna y el pie  El nervio ciático puede estar comprimido, [place], irritado o estirado  INSTRUCCIONES SOBRE [facility]:   Medicamentos:   · AINEs (analgésicos antiinflamatorios no esteroides):  Estos medicamentos disminuyen la inflamación y el dolor  Los AINEs se pueden obtener sin receta médica  Consulte con santiago médico cuál medicamento es el adecuado para usted  Pregunte cuánto nabor y cuándo  Tómelos flori se le indique  Cuando no se chet de la [employer], los medicamentos antiinflamatorios no esteroides pueden causar sangrado estomacal o problemas renales  · Acetaminofeno:  Jacey medicamento disminuye el dolor  El acetaminofeno puede obtener sin receta médica  Pregunte cuánto nabor y cuándo  [facility]  El acetaminofén puede causar daño en el hígado cuando no se kike de forma correcta  · Relajantes musculares  ayudan a reducir dolor y espasmos musculares  · [place] milana medicamentos flori se le haya indicado  Consulte con santiago médico si usted efrain que santiago medicamento no le está ayudando o si presenta efectos secundarios  Infórmele si es alérgico a algún medicamento  Mantenga hero lista actualizada de los [employer], las vitaminas y los productos herbales que kike  Incluya los siguientes datos de los medicamentos: cantidad, frecuencia y motivo de administración  Traiga con usted la lista o los envases de la píldoras a milana citas de seguimiento  Lleve la lista de los medicamentos con usted en ananth de hero emergencia    Acuda a milana consultas de control con santiago médico según le indicaron  Anote milana preguntas para que se acuerde de hacerlas josiah milana visitas  [facility] síntomas:   · Actividad:  Reduzca milana actividades  No levante objetos pesados ni tuerza la espalda josiah un mínimo de 6 semanas  Retome lentamente milana actividades acostumbradas  · Hielo:  El hielo ayuda a disminuir la inflamación y el dolor  El hielo también puede contribuir a evitar el daño de los tejidos  Use un paquete de hielo o ponga hielo molido dentro de [employer]  Cúbrala con hero toalla y colóquela en la pierna por 15 a 20 minutos cada hora o flori se le indique  · Calor:  El calor ayuda a disminuir el dolor y los espasmos musculares  Aplíquese calor en el área lesionada josiah 20 a 30 minutos cada 2 horas josiah la cantidad de [employer] indiquen  · Fisioterapia:  Es posible que deba atenderse con un fisioterapeuta para que le enseñe ejercicios para aumentar la fuerza y el movimiento y aliviar el dolor  Un terapeuta ocupacional le enseña habilidades para ayudarlo con milana actividades diarias  · Use los dispositivos de asistencia david flori le indiquen:  Es posible que deba usar un soporte para la espalda, flori hero Carilyn Lass  Puede que necesite muletas, un bastón o un andador para disminuir la presión en los músculos de la parte inferior de la espalda y las piernas  Pregunte a santiago médico por más [employer] dispositivos de asistencia y cómo se usan de forma correcta  Cuidados personales:   · Evite la presión en la espalda y las piernas:  No  levante objetos pesados, ni esté de pie o sentado por períodos prolongados de tiempo  · Levante los objetos de Demi rivero: Mantenga la Jimbo Kale y doble las rodillas para alzar un objeto  No doble ni tuerza la espalda cuando levante algo  · Mantenga un peso saludable:  Consulte con santiago médico cuánto debería pesar  Pida que le ayude a crear un plan para bajar de peso si usted tiene sobrepeso       · Ejercicio: [place] a barnard médico que le recomiende el programa de estiramiento, calentamiento y ejercicio más apropiado para usted  Pregúntele a barnard [employer] vitaminas y minerales son adecuados para usted  · Le duele la parte inferior de la espalda por la noche o cuando descansa  · Le duele la parte inferior de la espalda y se le adormece la pierna por debajo de la rodilla  · Tiene debilidad en hero pierna solamente  · Usted tiene preguntas o inquietudes acerca de barnard condición o cuidado  Regrese a la beka de emergencias si:   · Tiene dificultad para contener la orina o las evacuaciones intestinales  · Tiene debilidad en ambas piernas  · Pierde la sensación en la migue o los glúteos  © 2017 [address] Information is for End User's use only and may not be sold, redistributed or otherwise used for commercial purposes  All illustrations and images included in CareNotes® are the copyrighted property of A D A M , Inc  or Kiel Torre  Esta información es sólo para uso en educación  Barnard intención no es darle un consejo médico sobre enfermedades o tratamientos  Colsulte con barnard Zelpha Roch farmacéutico antes de seguir cualquier régimen médico para saber si es seguro y efectivo para usted

## 2019-10-11 ENCOUNTER — TELEPHONE (OUTPATIENT)
Dept: FAMILY MEDICINE CLINIC | Facility: CLINIC | Age: 69
End: 2019-10-11

## 2019-10-11 NOTE — TELEPHONE ENCOUNTER
Patient No Showed 10/10  appointment  Left a message asking about missed appointment  Patient can call and reschedule appointment if they would like  Letter and orders mailed to patient in regard to missed appointment  Phone Number: [phone]    If patient calls back and they need assistance on rescheduling, please ask a convenient day, time, and location  You can inform them someone will call them back with appointment details  Please send encounter back to me, and I will contact the patient

## 2019-10-24 ENCOUNTER — ANESTHESIA EVENT (OUTPATIENT)
Dept: GASTROENTEROLOGY | Facility: MEDICAL CENTER | Age: 69
End: 2019-10-24

## 2019-10-29 ENCOUNTER — HOSPITAL ENCOUNTER (OUTPATIENT)
Dept: GASTROENTEROLOGY | Facility: MEDICAL CENTER | Age: 69
Setting detail: OUTPATIENT SURGERY
Discharge: HOME | End: 2019-10-29
Attending: INTERNAL MEDICINE | Admitting: INTERNAL MEDICINE
Payer: COMMERCIAL

## 2019-10-29 ENCOUNTER — ANESTHESIA (OUTPATIENT)
Dept: GASTROENTEROLOGY | Facility: MEDICAL CENTER | Age: 69
End: 2019-10-29

## 2019-10-29 VITALS
HEART RATE: 65 BPM | RESPIRATION RATE: 16 BRPM | DIASTOLIC BLOOD PRESSURE: 77 MMHG | OXYGEN SATURATION: 99 % | BODY MASS INDEX: 29.77 KG/M2 | HEIGHT: 57 IN | SYSTOLIC BLOOD PRESSURE: 164 MMHG | TEMPERATURE: 98 F | WEIGHT: 138 LBS

## 2019-10-29 DIAGNOSIS — R10.13 EPIGASTRIC ABDOMINAL PAIN: ICD-10-CM

## 2019-10-29 LAB — GLUCOSE SERPL-MCNC: 98 MG/DL (ref 65–140)

## 2019-10-29 PROCEDURE — 88305 TISSUE EXAM BY PATHOLOGIST: CPT | Performed by: PATHOLOGY

## 2019-10-29 PROCEDURE — 82948 REAGENT STRIP/BLOOD GLUCOSE: CPT

## 2019-10-29 PROCEDURE — 43239 EGD BIOPSY SINGLE/MULTIPLE: CPT | Performed by: INTERNAL MEDICINE

## 2019-10-29 RX ORDER — LIDOCAINE HYDROCHLORIDE 20 MG/ML
INJECTION, SOLUTION EPIDURAL; INFILTRATION; INTRACAUDAL; PERINEURAL AS NEEDED
Status: DISCONTINUED | OUTPATIENT
Start: 2019-10-29 | End: 2019-10-29 | Stop reason: SURG

## 2019-10-29 RX ORDER — SODIUM CHLORIDE 9 MG/ML
125 INJECTION, SOLUTION INTRAVENOUS CONTINUOUS
Status: DISCONTINUED | OUTPATIENT
Start: 2019-10-29 | End: 2019-10-29

## 2019-10-29 RX ORDER — PROPOFOL 10 MG/ML
INJECTION, EMULSION INTRAVENOUS AS NEEDED
Status: DISCONTINUED | OUTPATIENT
Start: 2019-10-29 | End: 2019-10-29 | Stop reason: SURG

## 2019-10-29 RX ADMIN — PROPOFOL 50 MG: 10 INJECTION, EMULSION INTRAVENOUS at 09:52

## 2019-10-29 RX ADMIN — LIDOCAINE HYDROCHLORIDE 100 MG: 20 INJECTION, SOLUTION EPIDURAL; INFILTRATION; INTRACAUDAL; PERINEURAL at 09:50

## 2019-10-29 RX ADMIN — PROPOFOL 100 MG: 10 INJECTION, EMULSION INTRAVENOUS at 09:50

## 2019-10-29 RX ADMIN — SODIUM CHLORIDE 125 ML/HR: 0.9 INJECTION, SOLUTION INTRAVENOUS at 08:41

## 2019-10-29 NOTE — H&P
History and Physical - [facility]  Sanju Coronado 80 y o  female MRN: [id number]                  HPI: Sanju Coronado is a 80y o  year old female who presents for EGD for epigastric pain  REVIEW OF SYSTEMS: Per the HPI, and otherwise unremarkable  Historical Information   Past Medical History:   Diagnosis Date    Abdominal pain     Anxiety     last assessed: 10/19/2013    Arthritis     osteo both hands; last assessed: 10/19/2013    Bowel incontinence     Chest pain     Constipation     [facility] )     Diabetes mellitus type II, controlled ([place] [address] )     Disease of thyroid gland     Epigastric pain     with distention    Falls     High cholesterol     Hyperlipidemia     Hypertension     last assessed: 4/3/2017    Migraine     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    Palpitations     Recurrent urinary tract infection     Seizures ([place] [address] )     Sleep disorder     last assessed: 10/19/2013    Stroke ([place] [address] )     Thyroid disease      Past Surgical History:   Procedure Laterality Date    AXILLARY SURGERY      cyst surgery    BLADDER SURGERY       SECTION      CYSTOSCOPY N/A 2018    Procedure: CYSTOSCOPY;  Surgeon: Kim Edwards MD;  [facility];  Service: Gynecology    FACIAL BONE TUMOR EXCISION      KNEE SURGERY      OTHER SURGICAL HISTORY      cyst removed from axilla    NV ESOPHAGOGASTRODUODENOSCOPY TRANSORAL DIAGNOSTIC N/A 2017    Procedure: ESOPHAGOGASTRODUODENOSCOPY (EGD); Surgeon: Carla Bill MD;  [facility];   Service: Gastroenterology    NV SLING OPER STRES INCONTINENCE N/A 2018    Procedure: PUBOVAGINAL SLING;  Surgeon: Kim Edwards MD;  [facility];  Service: Gynecology    TUBAL LIGATION       Social History   Social History     Substance and Sexual Activity   Alcohol Use No     Social History     Substance and Sexual Activity   Drug Use No     Social History Tobacco Use   Smoking Status Never Smoker   Smokeless Tobacco Never Used     Family History   Problem Relation Age of Onset    Diabetes Family         mellitus    Hypertension Family     Stroke Family     Thyroid disease Family        Meds/Allergies       (Not in a hospital admission)    Allergies   Allergen Reactions    Tramadol      Causes nausea and vomiting        Objective     /82   Pulse 63   Temp 98 °F (36 7 °C) (Temporal)   Resp 20   Ht 4' 9" (1 448 m)   Wt 62 6 kg (138 lb)   SpO2 98%   BMI 29 86 kg/m²       PHYSICAL EXAM    Gen: NAD  CV: RRR  CHEST: Clear  ABD: soft, NT/ND  EXT: no edema      ASSESSMENT/PLAN:  This is a 80y o  year old female here for EGD, and she is stable and optimized for her procedure

## 2019-10-29 NOTE — DISCHARGE INSTRUCTIONS
Endoscopia superior   CUIDADO AMBULATORIO:   Lo que necesita saber acerca de la endoscopia superior:  Hero endoscopia superior también se conoce flori endoscopia gastrointestinal (GI) superior o esofagogastroduodenoscopia (EGD)  Se utiliza un endoscopio (tubo chadwick y flexible con Floydale Pair steven y Marcy) para examinar las landa de los intestinos superiores de santiago hijo  El intestino chadwick incluye el esófago, el estómago y el duodeno (primera parte del intestino chadwick)  Hero endoscopia superior se [place] para determinar si hay problemas, flori sangrado, pólipos, úlceras o infección  Cómo debe prepararse para hero endoscopia superior:  Santiago médico hablará con usted acerca de cómo prepararse para santiago procedimiento  Es posible que no deba comer ni beber nada, excepto agua, josiah 6 a 12 horas antes del procedimiento  Le dirán qué medicamentos nabro o no nabor el día de santiago procedimiento  [place] a alguien que lo lleve a santiago casa  Qué sucederá josiah hero endoscopía superior:   · A usted le darán medicamento a través de santiago vía intravenosa para ayudarlo a relajarse y para provocarle somnolencia  A usted también le darán [facility]  [employer] para adormecer santiago garganta  Es posible que usted necesite usar hero pieza bucal para ayudarlo a mantener santiago boca abierta y proteger milana dientes y lengua  Santiago médico introducirá cuidadosamente el endoscopio a través de santiago boca y lo bajará por santiago garganta  Es posible que le pidan que trague hero vez para que ayude a  el endoscopio dentro de milana intestinos superiores  Usted podría sentir presión en santiago garganta julianna no debería sentir dolor  El endoscopio no restringe santiago respiración  · Santiago médico observará el endoscopio en un monitor  Él tomará imágenes con el endoscopio  Podría inyectar aire cuidadosamente para que pueda carie santiago tracto digestivo claramente  Santiago médico podría nabor muestras de tejido y mandarlas al laboratorio para santiago examinación   Podría remover objetos extraños, tumores o pólipos que pudieran estar obstruyendo alyson intestinos superiores  Santiago médico también podría introducir instrumentos con el endoscopio para tratar el sangrado o para colocar un stent (sonda)  Cuando termine el procedimiento, el endoscopio será removido lentamente  Qué sucederá después de hero endoscopía superior:  Usted podría sentirse inflamado, con gases o tener molestia abdominal  Santiago garganta podría estar adolorida por 24 a 39 horas después del procedimiento  Usted podría eructar o expulsar gases debido al aire que todavía está dentro de santiago cuerpo después del procedimiento  Posiblemente necesite caminar un poco para ayudarse a expulsar los gases  Si se siente inflamado, coma comidas pequeñas  No maneje o tome decisiones importantes hasta el día siguiente de santiago procedimiento  Los riesgos de hero endoscopía superior? Santiago esófago, estómago o duodeno podrían ser perforados o desgarrados josiah el procedimiento  [place] es debido al [employer] de la presión conforme el endoscopio y el aire Lovely Stains  Usted podría sangrar más de lo esperado o contraer hero infección  Usted podría tener latido cardíaco lento o irregular o presión arterial baja  [place] puede provocar sudor y Hoh  Los líquidos podrían entrar a alyson pulmones y usted tener dificultad para respirar  Estos problemas pueden ser mortales  Llame al 911 si presenta:   · Tiene dolor en el pecho o dificultad para respirar de forma repentina  Busque atención médica de inmediato si:   · Está mareado o siente que se va a desmayar  · Usted tiene dificultad para tragar  · Usted tiene dolor de garganta intenso  · Alyson evacuaciones intestinales son Nadean Papa o negras  · Santiago abdomen está karen y firme y usted siente dolor intenso  · Usted vomita roxane  Pregúntele a santiago Margorie Longest vitaminas y minerales son adecuados para usted  · Usted se siente lleno o hinchado y no puede eructar o expulsar gas      · Usted no ha tenido hero evacuación intestinal después de 3 días de barnard procedimiento  · Usted tiene dolor de janette  · Usted tiene fiebre o escalofríos  · Usted tiene náuseas o está vomitando  · Usted tiene salpullido o urticaria  · Usted tiene preguntas o inquietudes acerca de barnard endoscopia  Alivie el dolor de garganta:  Chupe pastillas para la garganta o hielo triturado  [place] gárgaras con hero pequeña cantidad de agua tibia con sal  Mezcle 1 cucharadita de sal y 1 taza de agua tibia para hacer agua salada  Alivie el gas y la molestia de la inflamación:  Acuéstese sobre barnard costado derecho con hero almohada térmica sobre barnard abdomen  Camine un poco para ayudar a expulsar el gas  Coma comidas pequeñas hasta que se alivie de la inflamación  Descanse después de barnard procedimiento:  A usted le james administrado medicamento para relajarse  No  maneje o tome decisiones importantes hasta el día siguiente de barnard procedimiento  Regrese a milana actividades normales según le indiquen  Usted generalmente puede regresar al Maylon Quach al día siguiente de barnard procedimiento  Acuda a milana consultas de control con barnard médico según le indicaron  Anote milana preguntas para que se acuerde de hacerlas josiah milana visitas  © 2017 [address] [facility] Information is for End User's use only and may not be sold, redistributed or otherwise used for commercial purposes  All illustrations and images included in CareNotes® are the copyrighted property of A D A M , Inc  or Kiel Torre  Esta información es sólo para uso en educación  Barnard intención no es darle un consejo médico sobre enfermedades o tratamientos  Colsulte con barnard Miguel Angel Hu farmacéutico antes de seguir cualquier régimen médico para saber si es seguro y efectivo para usted  Endoscopia superior   LO QUE NECESITA SABER:   Hero endoscopia superior también se conoce flori endoscopia gastrointestinal (GI) superior o esofagogastroduodenoscopia (EGD)   Usted podría sentirse inflamado, con gases o sentir molestia abdominal después de santiago procedimiento  Santiago garganta podría estar adolorida por 24 a 36 horas  Usted podría eructar o expulsar gas debido al aire que todavía está en santiago cuerpo  INSTRUCCIONES SOBRE [facility]:   Llame al 911 en ananth de presentar lo siguiente:   · Tiene dolor en el pecho o dificultad para respirar de forma repentina  Busque atención médica de inmediato si:   · Está mareado o siente que se va a desmayar  · Usted tiene dificultad para tragar  · Alyson evacuaciones intestinales son Henna Grist o negras  · Santiago abdomen está karen y firme y usted siente dolor intenso  · Usted vomita roxane  Pregúntele a santiago Mamadou Cotta vitaminas y minerales son adecuados para usted  · Usted se siente lleno o hinchado y no puede eructar o expulsar gas  · Usted no ha tenido hero evacuación intestinal después de 3 días de santiago procedimiento  · Usted tiene dolor de janette  · Usted tiene fiebre o escalofríos  · Usted tiene náuseas o está vomitando  · Usted tiene salpullido o urticaria  · Usted tiene preguntas o inquietudes acerca de santiago endoscopia  Alivie el dolor de garganta:  Chupe pastillas para la garganta o hielo triturado  [place] gárgaras con hero pequeña cantidad de agua tibia con sal  Mezcle 1 cucharadita de sal y 1 taza de agua tibia para hacer agua salada  Alivie el gas y la molestia de la inflamación:  Acuéstese sobre santiago costado derecho con hero almohada térmica sobre santiago abdomen  Camine un poco para ayudar a expulsar el gas  Coma comidas pequeñas hasta que se alivie de la inflamación  Descanse después de santiago procedimiento:  A usted le james administrado medicamento para relajarse  No  maneje o tome decisiones importantes hasta el día siguiente de santiago procedimiento  Regrese a alyson actividades normales según le indiquen  Usted generalmente puede regresar al Linda Rajosmel al día siguiente de santiago procedimiento  Acuda a alyson consultas de control con santiago médico según le indicaron    Anote alyson preguntas para que se acuerde de hacerlas josiah milana visitas  © 2017 maria teresa Haji  Information is for End User's use only and may not be sold, redistributed or otherwise used for commercial purposes  All illustrations and images included in CareNotes® are the copyrighted property of A D A M , Inc  or Kiel Torre  Esta información es sólo para uso en educación  Barnard intención no es darle un consejo médico sobre enfermedades o tratamientos  Colsulte con barnard Mabeline Loser farmacéutico antes de seguir cualquier régimen médico para saber si es seguro y efectivo para usted  Hernia hiatal   LO QUE NECESITA SABER:   ¿Qué es hero hernia hiatal?  Hero hernia hiatal es hero condición que provoca que parte de barnard estómago se abulte a través del hiato (apertura pequeña) en barnard diafragma  La parte del estómago podría moverse hacia arriba y [place], o podría quedarse atrapado en el diafragma  ¿Qué aumenta mi riesgo de tener hero hernia hiatal?  La causa exacta de hero hernia hiatal es desconocida  Usted pudo claudia nacido con un hiato lc  Lo siguiente podría aumentar barnard riesgo de hero hernia hiatal:  · Obesidad    · Edad avanzada    · Condiciones médicas flori diverticulosis o esofagitis    · Cirugías del esófago o del estómago previas o trauma flori de un accidente automovilístico  ¿Cuáles son los tipos de hernia hiatal?   · Tipo I (hernia hiatal por deslizamiento): Hero porción del estómago se desliza dentro y fuera del hiato  Jacey tipo es el más común y generalmente provoca la enfermedad por reflujo gastroesofágico ([place])  El ERGE ocurre cuando el esfínter esofágico no ward apropiadamente y provoca reflujo de ácido  El esfínter esofágico es el músculo más bajo del esófago  · Tipo II (hernia hiatal paraesofágica):  El tipo II de hernia hiatal se forma cuando hero parte del estómago pasa por el hiato y se coloca junto al esófago      · Tipo III (combinada):  El tipo III de hernia hiatal es hero combinación de hero hernia hiatal por deslizamiento y [facility] paraesofágica  · Tipo IV (hernia hiatal compleja paraesofágica):  El [address], los intestinos chadwick y grueso, el bazo, el páncreas o el hígado son empujados hacia arriba dentro de santiago pecho  ¿Cuáles son los signos y síntomas de hero hernia hiatal?  La acidez es el síntoma más común de hero hernia hiatal  [place] ocurre generalmente después de las comidas y se extiende al janetet, a la mandíbula o al hombro  Es posible que no presente ningún signo ni síntoma o que tenga alguno de los siguientes:  · Dolor abdominal, especialmente en el área bay arriba del ombligo    · Sabor amargo o ácido en santiago boca    · Dificultad para tragar    · Liz Kemps o ronquera    · Dolor en el pecho o falta de aliento que ocurre después de comer    · Eructar o tener hipo con frecuencia    · Sensación de incomodidad o de estar lleno después de comer  ¿Cómo se diagnostica hero hernia hiatal?   · Examen de las series gastrointestinales (GI) superiores  incluye radiografías de santiago esófago, estómago y de milana intestinos delgados  También se conoce flori examen de deglución de bario  A usted le darán bario (líquido calcáreo) para nabor antes de que le tomen las imágenes  Jacey líquido Al[place] a que santiago estómago e intestinos se vean mejor en las radiografías  Un examen de las series GI superiores puede mostrar si usted tiene [place], obstrucción en un intestinos u otros problemas  · Hero endoscopia  o endoscopia esofagogastroduodenal utiliza un endoscopio para observar en el interior de santiago tracto digestivo  Un endoscopio es un tubo monse y flexible con hero steven en el extremo  Es posible que se conecte hero cámara al endoscopio para nabor imágenes  ¿Cómo se trata hero hernia hiatal?  El tratamiento depende del tipo de hernia hiatal que usted tenga y de milana síntomas  Es posible que no necesite ningún tratamiento   Es posible que usted necesite alguno de los siguientes:  · Medicamentos,  podrían administrase para Pandya Angel Alerts de la [place]  Estos medicamentos ayudan a disminuir u obstruir el ácido estomacal  Es posible que también le den medicamentos ayudan a hacer más estrecho el esfínter esofágico     · Cirugía  podría realizarse cuando los medicamentos no puedan controlar alyson síntomas u otros problemas están presentes  Santiago médico también podría sugerir la cirugía dependiendo del tipo de hernia que usted tenga  Santiago médico puede poner santiago estómago en la posición normal  Él podría hacer el hiato (agujero) más pequeño y atar santiago estómago a santiago abdomen  La funduplicatura es hero cirugía en donde la parte superior del estómago se enrolla alrededor del esfínter esofágico para reforzarlo  ¿Cómo puedo controlar mis síntomas? La siguiente nutrición y cambios en el estilo de praveen podrían se recomendados para aliviar alyson síntomas de [place]  · Evite los alimentos que empeoran alyson síntomas  Estos podrían [employer], jugos de fruta, alcohol, cafeína, chocolate y [place]  · Coma porciones pequeñas josiah el día  Las porciones [employer] dan a santiago estómago menos alimentos que digerir  · Evite acostarse e inclinarse después de comer  No consuma alimentos entre 2 y 3 horas antes de [place]  [place] disminuye santiago riesgo de reflujo  · Mantenga un peso saludable  Si usted tiene sobrepeso, la pérdida de peso podría ayudarle a [employer]  · Duerma con santiago olivia elevada  al menos 6 pulgadas  · No fume  El fumar puede aumentar alyson síntomas de [place]  ¿Cuándo cherry buscar atención inmediata? · Usted tiene dolor abdominal intenso  · Usted trata de vomitar julianna no sale nada  · Usted siente dolor aldo en el pecho y dificultad repentina para respirar  · Alyson heces son negras o tienen roxane  · Santiago vómito parece flori café molido o contiene roxane    ¿Cuándo cherry comunicarme con mi médico?   · Alyson síntomas están empeorando  · Usted tiene náusea y está vomitando  · Usted pierde peso sin proponérselo  · Usted tiene preguntas o inquietudes acerca de santiago condición o cuidado  ACUERDOS SOBRE SANTIAGO CUIDADO:   Usted tiene el derecho de ayudar a planear santiago cuidado  Aprenda todo lo que pueda sobre santiago condición y flori darle tratamiento  Discuta milana opciones de tratamiento con milana médicos para decidir el cuidado que usted desea recibir  Usted siempre tiene el derecho de rechazar el tratamiento  Esta información es sólo para uso en educación  Santiago intención no es darle un consejo médico sobre enfermedades o tratamientos  Colsulte con santiago Taylor Bright farmacéutico antes de seguir cualquier régimen médico para saber si es seguro y efectivo para usted  © 2017 [facility] Information is for End User's use only and may not be sold, redistributed or otherwise used for commercial purposes  All illustrations and images included in CareNotes® are the copyrighted property of A D A M , Inc  or Kiel Torre

## 2019-11-07 ENCOUNTER — OFFICE VISIT (OUTPATIENT)
Dept: OBGYN CLINIC | Facility: MEDICAL CENTER | Age: 69
End: 2019-11-07
Payer: COMMERCIAL

## 2019-11-07 VITALS — SYSTOLIC BLOOD PRESSURE: 122 MMHG | DIASTOLIC BLOOD PRESSURE: 62 MMHG

## 2019-11-07 DIAGNOSIS — M54.41 CHRONIC RIGHT-SIDED LOW BACK PAIN WITH RIGHT-SIDED SCIATICA: Primary | ICD-10-CM

## 2019-11-07 DIAGNOSIS — G89.29 CHRONIC RIGHT-SIDED LOW BACK PAIN WITH RIGHT-SIDED SCIATICA: Primary | ICD-10-CM

## 2019-11-07 DIAGNOSIS — R20.8 DECREASED SENSATION OF LEG: ICD-10-CM

## 2019-11-07 DIAGNOSIS — M51.36 LUMBAR DEGENERATIVE DISC DISEASE: ICD-10-CM

## 2019-11-07 PROCEDURE — 99213 OFFICE O/P EST LOW 20 MIN: CPT | Performed by: EMERGENCY MEDICINE

## 2019-11-07 NOTE — PATIENT INSTRUCTIONS
Dolor de espalda   LO QUE NECESITA SABER:   El dolor de espalda es común  Puede ser causado por hero gran cantidad de afecciones, flori la artritis o por el deterioro de los discos de la columna vertebral  El riesgo del dolor de espalda aumenta al sufrir lesiones, por falta de [place], o inclinarse hacia adelante o girar de un lado a otro de forma repetitiva  Usted puede estar adolorido o sentir rigidez en neville o ambos lados de la espalda  El dolor se puede propagar a milana glúteos o muslos  INSTRUCCIONES SOBRE EL [facility]ITAL[facility]:   Medicamentos:   · AINEs (Analgésicos antiinflamatorios no esteroides)  ayudan a disminuir la inflamación y el dolor  Vianney medicamento esta disponible con o sin hero receta médica  Los AINEs pueden causar sangrado estomacal o problemas renales en ciertas personas  Si usted kike un medicamento anticoagulante, siempre pregúntele a santiago médico si los PAUL son seguros para usted  Siempre selina la etiqueta de vianney medicamento y [place] instrucciones  · El acetaminofén  [employer]  Está disponible sin receta médica  Pregunte la cantidad y la frecuencia con que debe tomarlos  [address]  El acetaminofén puede causar daño en el hígado cuando no se kike de forma correcta  · Un medicamento con receta para el dolor  podrían ser Fanny Easton  Pregunte al médico cómo debe nabor vianney medicamento de forma rivero  · [place] milana medicamentos flori se le haya indicado  Consulte con santiago médico si usted efrain que santiago medicamento no le está ayudando o si presenta efectos secundarios  Infórmele si es alérgico a cualquier medicamento  Mantenga hero lista actualizada de los [place], las vitaminas y los productos herbales que kike  Incluya los siguientes datos de los medicamentos: cantidad, frecuencia y motivo de administración  Traiga con usted la lista o los envases de la píldoras a milana citas de seguimiento   Lleve la lista de los medicamentos con usted en ananth de Raj emergencia  Acuda en 2 semanas a santiago swati de control con santiago médico, o según le indicaron:  Anote milana preguntas para que se acuerde de hacerlas josiah milana visitas  La forma de controlar santiago dolor de espalda:   · Aplique hielo  en santiago espalda o en el área afectada de 15 a 20 minutos cada hora o según le indicaron  Use un paquete de hielo o ponga hielo molido dentro de [employer]  Cúbrala con hero toalla  El hielo disminuye el dolor y ayuda a evitar el daño en los tejidos  · La aplicación de calor  en santiago espalda o en el área afectada de 20 a 30 minutos cada 2 horas josiah los días que le indicaron  El calor ayuda a disminuir el dolor y los espasmos musculares  · Manténgase activo  lo más que pueda sin causar [employer]  El reposo en cama puede empeorar santiago dolor de espalda  Evite levantar objetos hasta que ya no tenga dolor  Regrese a la beka de emergencias si:   · Usted tiene dolor, entumecimiento o debilidad en hero o en ambas piernas  · El dolor se vuelve tan intenso que lo incapacita para caminar  · Usted no puede controlar santiago orina ni milana deposiciones intestinales  · Usted tiene dolor de espalda intenso con dolor en el pecho  · Usted tiene dolor de espalda intenso, náuseas y vómito  · Usted tiene un dolor de espalda intenso que se propaga a un costado o al área genital   Teri Hoguet a santiago [employer] vitaminas y minerales son adecuados para usted  · Usted tiene dolor de espalda que no mejora con el reposo, ni con el medicamento para el dolor  · Usted tiene fiebre  · Usted tiene un dolor que empeora cuando está acostado boca Hanny Mulling o al descansar  · Usted tiene dolor que empeora cuando tose o estornuda  · Usted pierde peso sin proponérselo  · Usted tiene preguntas o inquietudes acerca de santiago condición o cuidado  © 2017 [address] Information is for End User's use only and may not be sold, redistributed or otherwise used for commercial purposes   All illustrations and images included in CareNotes® are the copyrighted property of A D A M , Inc  or Kiel Torre  Esta información es sólo para uso en educación  Santiago intención no es darle un consejo médico sobre enfermedades o tratamientos  Colsulte con santiago Art Ping farmacéutico antes de seguir cualquier régimen médico para saber si es seguro y efectivo para usted

## 2019-11-07 NOTE — PROGRESS NOTES
Assessment/Plan:    Diagnoses and all orders for this visit:    Chronic right-sided low back pain with right-sided sciatica  -     MRI lumbar spine wo contrast; Future    Decreased sensation of leg  -     MRI lumbar spine wo contrast; Future    Lumbar degenerative disc disease  -     MRI lumbar spine wo contrast; Future    I would like to obtain an MRI of the lumbar spine for 4 5 months of right lower back pain with radicular symptoms and positive straight leg raise  She states she is unable to perform physical therapy due to cost of co-pay knees  She has been performing home exercises  She has completed a Medrol Dosepak and is continuing naproxen  To consider referral to pain management for possible intervention an epidural injection pending MRI results  Return for Follow Up After Imaging Study  Chief Complaint:     Chief Complaint   Patient presents with    Right Hip - Follow-up       Subjective:   Patient ID: Kim Zacarias is a 80 y o  female  Patient returns for pain x 4-5 months s/p medrol dose pack with improvement of the right lower back and buttocks pain, still with radiation down right leg to the knee with more significant pain of the knee  She was not able to start PT due to cost of high copay  No swelling of the knee, knee pain worse with stairs and forward bending, no n/t  Hip pain 5/10, knee is 8/10  She is currently taking diclofenac and tylenol, not taking meloxicam         Initial note:  New patient presents referred by PCP for right lower back, buttocks, groin and right leg pain down past knee into foot for approximately 2 months  She denies any specific injury but she does have a history of multiple falls over the last 2-3 years  Denies changes in bowel / bladder, no n/t  She has been treated with a prescription for cyclobenzaprine and diclofenac by PCP, and was provided an IM injection of Toradol        Review of Systems   Constitutional: Negative for chills and fever    HENT: Negative for drooling and sore throat  Eyes: Negative for pain and redness  Respiratory: Negative for shortness of breath and stridor  Cardiovascular: Negative for chest pain and palpitations  Gastrointestinal: Negative for abdominal pain  Genitourinary: Negative for difficulty urinating  Musculoskeletal: Positive for arthralgias, back pain and gait problem  Skin: Negative for rash  Neurological: Negative for weakness and numbness  Psychiatric/Behavioral: Negative for self-injury and suicidal ideas  The following portions of the patient's chart were reviewed and updated as appropriate:      Allergie  Allergies   Allergen Reactions    Tramadol      Causes nausea and vomiting    s   Allergen Reactions    Tramadol      Causes nausea and vomiting       Diagnosis Date    Abdominal pain     Anxiety     last assessed: 10/19/2013    Arthritis     osteo both hands; last assessed: 10/19/2013    Bowel incontinence     Chest pain     Constipation     [facility]     Diabetes mellitus type II, controlled ([facility] )     Disease of thyroid gland     Epigastric pain     with distention    Falls     High cholesterol     Hyperlipidemia     Hypertension     last assessed: 4/3/2017    Migraine     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    Palpitations     Recurrent urinary tract infection     Seizures ([address] )     Sleep disorder     last assessed: 10/19/2013    Stroke ([facility]     Thyroid disease        Past Surgical History:   Procedure Laterality Date    AXILLARY SURGERY      cyst surgery    BLADDER SURGERY       SECTION      CYSTOSCOPY N/A 2018    Procedure: CYSTOSCOPY;  Surgeon: Melvina Villareal MD;  Location: [facility];  Service: Gynecology    FACIAL BONE TUMOR EXCISION      KNEE SURGERY      OTHER SURGICAL HISTORY      cyst removed from axilla    MN ESOPHAGOGASTRODUODENOSCOPY TRANSORAL DIAGNOSTIC N/A 2017 Procedure: ESOPHAGOGASTRODUODENOSCOPY (EGD); Surgeon: Zuly Parra MD;  Location: [facility];   Service: Gastroenterology    MS SLING OPER STRES INCONTINENCE N/A 1/18/2018    Procedure: PUBOVAGINAL SLING;  Surgeon: Renetta Levine MD;  Location: [facility];  Service: Gynecology    TUBAL LIGATION         Social History     Socioeconomic History    Marital status: /Civil Union     Spouse name: Not on file    Number of children: Not on file    Years of education: Grade 12 completed    Highest education level: Not on file   Occupational History    Not on file   Social Needs    Financial resource strain: Not on file    Food insecurity:     Worry: Not on file     Inability: Not on file    Transportation needs:     Medical: Not on file     Non-medical: Not on file   Tobacco Use    Smoking status: Never Smoker    Smokeless tobacco: Never Used   Substance and Sexual Activity    Alcohol use: No    Drug use: No    Sexual activity: Not on file   Lifestyle    Physical activity:     Days per week: Not on file     Minutes per session: Not on file    Stress: Not on file   Relationships    Social connections:     Talks on phone: Not on file     Gets together: Not on file     Attends Buddhist service: Not on file     Active member of club or organization: Not on file     Attends meetings of clubs or organizations: Not on file     Relationship status: Not on file    Intimate partner violence:     Fear of current or ex partner: Not on file     Emotionally abused: Not on file     Physically abused: Not on file     Forced sexual activity: Not on file   Other Topics Concern    Not on file   Social History Narrative    Caffeine use    Lives with spouse    Kamran Adrian       Family History   Problem Relation Age of Onset    Diabetes Family         mellitus    Hypertension Family     Stroke Family     Thyroid disease Family        Medications:    Current Outpatient Medications:     albuterol (PROVENTIL HFA,VENTOLIN HFA) 90 mcg/act inhaler, , Disp: , Rfl:     atorvastatin (LIPITOR) 40 mg tablet, Take 1 tablet (40 mg total) by mouth daily, Disp: 90 tablet, Rfl: 1    Cholecalciferol (VITAMIN D-3) 5000 units TABS, Take 1 tablet by mouth daily, Disp: 90 tablet, Rfl: 1    citalopram (CeleXA) 40 mg tablet, Take 1 tablet (40 mg total) by mouth daily, Disp: 90 tablet, Rfl: 0    conjugated estrogens (PREMARIN) vaginal cream, Insert 0 5 g into the vagina 3 (three) times a week (Patient not taking: Reported on 5/24/2019), Disp: 42 5 g, Rfl: 0    cyclobenzaprine (FLEXERIL) 5 mg tablet, Take 1 tablet (5 mg total) by mouth 3 (three) times a day as needed for muscle spasms, Disp: 30 tablet, Rfl: 0    diclofenac sodium (VOLTAREN) 50 mg EC tablet, Take 1 tablet (50 mg total) by mouth 2 (two) times a day, Disp: 60 tablet, Rfl: 0    meclizine (ANTIVERT) 12 5 MG tablet, , Disp: , Rfl:     Melatonin 5 MG TABS, Take 1 tablet (5 mg total) by mouth daily at bedtime, Disp: 90 tablet, Rfl: 0    meloxicam (MOBIC) 7 5 mg tablet, Take 1 tablet (7 5 mg total) by mouth daily, Disp: 90 tablet, Rfl: 0    methylPREDNISolone 4 MG tablet therapy pack, Use as directed on package, Disp: 1 each, Rfl: 0    neomycin-polymyxin-dexamethasone (MAXITROL) ophthalmic suspension, Administer 1 drop to the right eye 4 (four) times a day for 7 days, Disp: 5 mL, Rfl: 0    omeprazole (PriLOSEC) 20 mg delayed release capsule, Take 1 capsule (20 mg total) by mouth daily, Disp: 60 capsule, Rfl: 2    polyethylene glycol (GLYCOLAX) powder, Take 17 g by mouth daily (Patient taking differently: Take 17 g by mouth as needed ), Disp: 850 g, Rfl: 1    Patient Active Problem List   Diagnosis    Fall    [facility]    Subarachnoid hemorrhage following injury ([facility]    Hypertension    Collapsed vertebra, not elsewhere classified, sacral and sacrococcygeal region, initial encounter for fracture ([address] )    HLD (hyperlipidemia)    History of CVA with residual deficit    Depression    Headache    Urinary, incontinence, stress female    Adhesive capsulitis of left shoulder    Asymptomatic bilateral carotid artery stenosis    Cervical radiculitis    Constipation    Elevated TSH    Gastroesophageal reflux disease    Hyperlipidemia    Memory difficulties    Muscle weakness    Osteoporosis    Osteoarthritis of both hands    Urge and stress incontinence    Primary insomnia    Diabetes mellitus type II, controlled ([address] )    Arthritis    Left upper quadrant pain    Epigastric abdominal pain    NSAID long-term use    Contusion of right breast    Elevated BP without diagnosis of hypertension    Breast pain, right    Hip strain, right, initial encounter    Acute bacterial conjunctivitis of left eye       Objective:  /62     Back Exam     Range of Motion   Extension: normal   Flexion: abnormal     Muscle Strength   The patient has normal back strength  Tests   Straight leg raise right: positive  Straight leg raise left: negative    Reflexes   Patellar: normal  Achilles: normal    Other   Toe walk: normal  Heel walk: normal  Gait: antalgic     Comments:  Mild decreased sensation L4-L5 on the right, normal sensation on the left leg  Negative clonus bilaterally    There is pain reproduced with right hip flexion however no pain with internal and external rotation            Physical Exam   Constitutional: She is oriented to person, place, and time  She appears well-developed and well-nourished  HENT:   Head: Normocephalic and atraumatic  Eyes: Conjunctivae are normal    Neck: Normal range of motion  Neck supple  Cardiovascular: Normal rate and intact distal pulses  Pulmonary/Chest: Effort normal  No respiratory distress  Neurological: She is alert and oriented to person, place, and time  Skin: Skin is warm and dry  Psychiatric: She has a normal mood and affect   Her behavior is normal    Vitals reviewed  Neurologic Exam     Mental Status   Oriented to person, place, and time  Procedures    I have personally reviewed the written report of the pertinent studies

## 2020-02-11 ENCOUNTER — OFFICE VISIT (OUTPATIENT)
Dept: FAMILY MEDICINE CLINIC | Facility: CLINIC | Age: 70
End: 2020-02-11

## 2020-02-11 ENCOUNTER — TELEPHONE (OUTPATIENT)
Dept: FAMILY MEDICINE CLINIC | Facility: CLINIC | Age: 70
End: 2020-02-11

## 2020-02-11 VITALS
BODY MASS INDEX: 30.2 KG/M2 | HEIGHT: 57 IN | WEIGHT: 140 LBS | DIASTOLIC BLOOD PRESSURE: 74 MMHG | TEMPERATURE: 97.5 F | HEART RATE: 66 BPM | SYSTOLIC BLOOD PRESSURE: 130 MMHG | RESPIRATION RATE: 18 BRPM | OXYGEN SATURATION: 97 %

## 2020-02-11 DIAGNOSIS — N64.4 BREAST PAIN, RIGHT: Primary | ICD-10-CM

## 2020-02-11 DIAGNOSIS — Z23 NEED FOR INFLUENZA VACCINATION: ICD-10-CM

## 2020-02-11 DIAGNOSIS — M25.369 INSTABILITY OF KNEE JOINT, UNSPECIFIED LATERALITY: ICD-10-CM

## 2020-02-11 DIAGNOSIS — M19.041 PRIMARY OSTEOARTHRITIS OF BOTH HANDS: ICD-10-CM

## 2020-02-11 DIAGNOSIS — M19.042 PRIMARY OSTEOARTHRITIS OF BOTH HANDS: ICD-10-CM

## 2020-02-11 DIAGNOSIS — E11.9 CONTROLLED TYPE 2 DIABETES MELLITUS WITHOUT COMPLICATION, WITHOUT LONG-TERM CURRENT USE OF INSULIN (HCC): ICD-10-CM

## 2020-02-11 DIAGNOSIS — F32.89 OTHER DEPRESSION: ICD-10-CM

## 2020-02-11 DIAGNOSIS — M54.31 SCIATICA OF RIGHT SIDE: ICD-10-CM

## 2020-02-11 LAB — SL AMB POCT HEMOGLOBIN AIC: 6 (ref ?–6.5)

## 2020-02-11 PROCEDURE — 4040F PNEUMOC VAC/ADMIN/RCVD: CPT | Performed by: FAMILY MEDICINE

## 2020-02-11 PROCEDURE — 83036 HEMOGLOBIN GLYCOSYLATED A1C: CPT | Performed by: FAMILY MEDICINE

## 2020-02-11 PROCEDURE — 99215 OFFICE O/P EST HI 40 MIN: CPT | Performed by: FAMILY MEDICINE

## 2020-02-11 PROCEDURE — 1160F RVW MEDS BY RX/DR IN RCRD: CPT | Performed by: FAMILY MEDICINE

## 2020-02-11 PROCEDURE — G0008 ADMIN INFLUENZA VIRUS VAC: HCPCS | Performed by: FAMILY MEDICINE

## 2020-02-11 PROCEDURE — 3075F SYST BP GE 130 - 139MM HG: CPT | Performed by: FAMILY MEDICINE

## 2020-02-11 PROCEDURE — 3008F BODY MASS INDEX DOCD: CPT | Performed by: FAMILY MEDICINE

## 2020-02-11 PROCEDURE — 3078F DIAST BP <80 MM HG: CPT | Performed by: FAMILY MEDICINE

## 2020-02-11 PROCEDURE — 1036F TOBACCO NON-USER: CPT | Performed by: FAMILY MEDICINE

## 2020-02-11 PROCEDURE — 3044F HG A1C LEVEL LT 7.0%: CPT | Performed by: FAMILY MEDICINE

## 2020-02-11 PROCEDURE — 90662 IIV NO PRSV INCREASED AG IM: CPT | Performed by: FAMILY MEDICINE

## 2020-02-11 RX ORDER — CITALOPRAM 40 MG/1
40 TABLET ORAL DAILY
Qty: 90 TABLET | Refills: 1 | Status: SHIPPED | OUTPATIENT
Start: 2020-02-11 | End: 2020-04-02 | Stop reason: SDUPTHER

## 2020-02-11 RX ORDER — MELOXICAM 7.5 MG/1
7.5 TABLET ORAL DAILY
Qty: 90 TABLET | Refills: 0 | Status: SHIPPED | OUTPATIENT
Start: 2020-02-11

## 2020-02-11 RX ORDER — CYCLOBENZAPRINE HCL 5 MG
5 TABLET ORAL 3 TIMES DAILY PRN
Qty: 90 TABLET | Refills: 0 | Status: SHIPPED | OUTPATIENT
Start: 2020-02-11

## 2020-02-11 NOTE — ASSESSMENT & PLAN NOTE
Patient was due for repeat breast US of R breast on October 2019 and never had it done  Patient also never had MRI of the breasts for which she was scheduled on 8/13/2019 at 3:45 pm at [address] [place] [address]    Counseled patient at length on the importance of following up with tests ordered and risks of not doing so

## 2020-02-11 NOTE — ASSESSMENT & PLAN NOTE
Given ongoing symptoms for almost 6 months of  right lower back pain with radicular symptoms and positive straight leg raise, without improvement despite home exercises and NSAIDs, I would like to obtain an MRI of the lumbar spine  I did  patient on the importance of physical therapy  Counseled patient on the importance of follow up with imaging  If for any reason we have not gotten back to you with date and time of scheduled tests make sure you take control of your health and reach out to us for follow up  Also suggested she join the Patient Portal were she can access and review all her appointments among other pertinent information   Restart Cyclobenzaprine, moist heat and Naproxen

## 2020-02-11 NOTE — PROGRESS NOTES
Diabetic Foot Exam    Patient's shoes and socks removed  Right Foot/Ankle   Right Foot Inspection  Skin Exam: skin normal and skin intact no dry skin, no warmth, no callus, no erythema, no maceration, no abnormal color, no pre-ulcer, no ulcer and no callus                            Sensory   Vibration: intact  Proprioception: intact   Monofilament testing: intact  Vascular  Capillary refills: < 3 seconds  The right DP pulse is 1+  The right PT pulse is 1+  Left Foot/Ankle  Left Foot Inspection  Skin Exam: skin normal and skin intactno dry skin, no warmth, no erythema, no maceration, normal color, no pre-ulcer, no ulcer and no callus                         Toe Exam: ROM and strength within normal limits                   Sensory   Vibration: intact  Proprioception: intact  Monofilament: intact  Vascular  Capillary refills: < 3 seconds  The left DP pulse is 1+  The left PT pulse is 1+  Assign Risk Category:  No deformity present; No loss of protective sensation;        Risk: 0  Assessment/Plan:    Breast pain, right  Patient was due for repeat breast US of R breast on October 2019 and never had it done  Patient also never had MRI of the breasts for which she was scheduled on 8/13/2019 at 3:45 pm at [address] [place] [address]    Counseled patient at length on the importance of following up with tests ordered and risks of not doing so     Sciatica of right side  Given ongoing symptoms for almost 6 months of  right lower back pain with radicular symptoms and positive straight leg raise, without improvement despite home exercises and NSAIDs, I would like to obtain an MRI of the lumbar spine  I did  patient on the importance of physical therapy  Counseled patient on the importance of follow up with imaging  If for any reason we have not gotten back to you with date and time of scheduled tests make sure you take control of your health and reach out to us for follow up  Also suggested she join the Patient Portal were she can access and review all her appointments among other pertinent information   Restart Cyclobenzaprine, moist heat and Naproxen          Diagnoses and all orders for this visit:    Breast pain, right  -     US breast right limited (diagnostic); Future    Sciatica of right side  -     MRI lumbar spine wo contrast; Future  -     Ambulatory referral to Physical Therapy; Future    Controlled type 2 diabetes mellitus without complication, without long-term current use of insulin (HCC)  -     POCT hemoglobin A1c    Instability of knee joint, unspecified laterality  -     Ambulatory referral to Physical Therapy; Future    Need for influenza vaccination  -     influenza vaccine, [id number], high-dose, PF 0 5 mL (FLUZONE HIGH-DOSE)          Greater than 50% of 40 minute encounter was spent counseling/coordinating care of the patient regarding the diagnosis and understanding pathogenesis of disease process, discussion of differential diagnoses, review of laboratory data and imaging, discussion of medications to include side effect profile, precautions, and plan of care  Subjective:      Patient ID: Ricky Floyd is a 77 y o  female  78 yo  female complains of R breast pain, states feels like the cyst is growing  States it is painful to touch  Recently also felt small, non tender soft mass on R axilla  Denies SOB, cough, unintentional weight loss  No other palpable masses  Patient was due for a follow up [address] in October of last year, but states she forgot about it  Does not check BG at home  Continue with LBP, still with radiation down right leg to the knee with more significant pain of the knee  Patient states she feels increased pain of her back when going up the steps and feels like her legs give away  She was not able to start PT due to cost of high copay   MRI was ordered by Ortho Dr Deepti Wisdom in November, but as per patient, "they never called me for the appointment "           The following portions of the patient's history were reviewed and updated as appropriate: She  has a past medical history of Abdominal pain, Anxiety, Arthritis, Bowel incontinence, Chest pain, Constipation, CVA (cerebral vascular accident) ([address] ), Diabetes mellitus type II, controlled ([address] ), Disease of thyroid gland, Epigastric pain, Falls, High cholesterol, Hyperlipidemia, Hypertension, Migraine, Palpitations, Recurrent urinary tract infection, Seizures ([address] ), Sleep disorder, Stroke ([address] ), and Thyroid disease    She   Patient Active Problem List    Diagnosis Date Noted    Sciatica of right side 02/11/2020    Hip strain, right, initial encounter 09/06/2019    Acute bacterial conjunctivitis of left eye 09/06/2019    Breast pain, right 07/31/2019    Elevated BP without diagnosis of hypertension 07/01/2019    Contusion of right breast 05/24/2019    Epigastric abdominal pain 03/28/2019    NSAID long-term use 03/28/2019    Left upper quadrant pain 02/08/2019    Diabetes mellitus type II, controlled ([address] ) 10/12/2018    Arthritis 10/12/2018    Primary insomnia 04/18/2018    Urinary, incontinence, stress female 02/08/2018    Urge and stress incontinence 10/26/2017    Osteoporosis 09/15/2017    Adhesive capsulitis of left shoulder 09/11/2017    Constipation 05/10/2017    Cervical radiculitis 05/09/2017    Elevated TSH 04/11/2017    Osteoarthritis of both hands 04/03/2017    Memory difficulties 03/18/2016    Asymptomatic bilateral carotid artery stenosis 02/29/2016    Gastroesophageal reflux disease 02/03/2016    Hypertension 01/20/2016    Collapsed vertebra, not elsewhere classified, sacral and sacrococcygeal region, initial encounter for fracture ([facility] ) 01/20/2016    HLD (hyperlipidemia) 01/20/2016    History of CVA with residual deficit 01/20/2016    Depression 01/20/2016    Headache 01/20/2016    Fall 01/16/2016    Subdural hemorrhage following injury ([address] ) 01/16/2016    Subarachnoid hemorrhage following injury ([facility] ) 2016    Hyperlipidemia 10/19/2013    Muscle weakness 10/19/2013     She  has a past surgical history that includes Knee surgery; Other surgical history;  section; Axillary Surgery; pr esophagogastroduodenoscopy transoral diagnostic (N/A, 2017); pr sling oper stres incontinence (N/A, 2018); CYSTOSCOPY (N/A, 2018); Bladder surgery; Facial bone tumor excision; and Tubal ligation  Her family history includes Diabetes in her family; Hypertension in her family; Stroke in her family; Thyroid disease in her family  She  reports that she has never smoked  She has never used smokeless tobacco  She reports that she does not drink alcohol or use drugs    Current Outpatient Medications   Medication Sig Dispense Refill    atorvastatin (LIPITOR) 40 mg tablet Take 1 tablet (40 mg total) by mouth daily 90 tablet 1    Cholecalciferol (VITAMIN D-3) 5000 units TABS Take 1 tablet by mouth daily 90 tablet 1    citalopram (CeleXA) 40 mg tablet Take 1 tablet (40 mg total) by mouth daily 90 tablet 0    diclofenac sodium (VOLTAREN) 50 mg EC tablet Take 1 tablet (50 mg total) by mouth 2 (two) times a day 60 tablet 0    Melatonin 5 MG TABS Take 1 tablet (5 mg total) by mouth daily at bedtime 90 tablet 0    meloxicam (MOBIC) 7 5 mg tablet Take 1 tablet (7 5 mg total) by mouth daily 90 tablet 0    methylPREDNISolone 4 MG tablet therapy pack Use as directed on package 1 each 0    omeprazole (PriLOSEC) 20 mg delayed release capsule Take 1 capsule (20 mg total) by mouth daily 60 capsule 2    polyethylene glycol (GLYCOLAX) powder Take 17 g by mouth daily (Patient taking differently: Take 17 g by mouth as needed ) 850 g 1    albuterol (PROVENTIL HFA,VENTOLIN HFA) 90 mcg/act inhaler       conjugated estrogens (PREMARIN) vaginal cream Insert 0 5 g into the vagina 3 (three) times a week (Patient not taking: Reported on 2019) 42 5 g 0    cyclobenzaprine (FLEXERIL) 5 mg tablet Take 1 tablet (5 mg total) by mouth 3 (three) times a day as needed for muscle spasms (Patient not taking: Reported on 2/11/2020) 30 tablet 0    meclizine (ANTIVERT) 12 5 MG tablet       neomycin-polymyxin-dexamethasone (MAXITROL) ophthalmic suspension Administer 1 drop to the right eye 4 (four) times a day for 7 days 5 mL 0     No current facility-administered medications for this visit  Current Outpatient Medications on File Prior to Visit   Medication Sig    atorvastatin (LIPITOR) 40 mg tablet Take 1 tablet (40 mg total) by mouth daily    Cholecalciferol (VITAMIN D-3) 5000 units TABS Take 1 tablet by mouth daily    citalopram (CeleXA) 40 mg tablet Take 1 tablet (40 mg total) by mouth daily    diclofenac sodium (VOLTAREN) 50 mg EC tablet Take 1 tablet (50 mg total) by mouth 2 (two) times a day    Melatonin 5 MG TABS Take 1 tablet (5 mg total) by mouth daily at bedtime    meloxicam (MOBIC) 7 5 mg tablet Take 1 tablet (7 5 mg total) by mouth daily    methylPREDNISolone 4 MG tablet therapy pack Use as directed on package    omeprazole (PriLOSEC) 20 mg delayed release capsule Take 1 capsule (20 mg total) by mouth daily    polyethylene glycol (GLYCOLAX) powder Take 17 g by mouth daily (Patient taking differently: Take 17 g by mouth as needed )    albuterol (PROVENTIL HFA,VENTOLIN HFA) 90 mcg/act inhaler     conjugated estrogens (PREMARIN) vaginal cream Insert 0 5 g into the vagina 3 (three) times a week (Patient not taking: Reported on 5/24/2019)    cyclobenzaprine (FLEXERIL) 5 mg tablet Take 1 tablet (5 mg total) by mouth 3 (three) times a day as needed for muscle spasms (Patient not taking: Reported on 2/11/2020)    meclizine (ANTIVERT) 12 5 MG tablet     neomycin-polymyxin-dexamethasone (MAXITROL) ophthalmic suspension Administer 1 drop to the right eye 4 (four) times a day for 7 days     No current facility-administered medications on file prior to visit           Review of Systems   Musculoskeletal: Positive for arthralgias, back pain and gait problem  Hematological: Positive for adenopathy (as per HPI)  All other systems reviewed and are negative  Objective:      /74 (BP Location: Right arm, Patient Position: Sitting, Cuff Size: Standard)   Pulse 66   Temp 97 5 °F (36 4 °C) (Temporal)   Resp 18   Ht 4' 9" (1 448 m)   Wt 63 5 kg (140 lb)   SpO2 97%   BMI 30 30 kg/m²          Physical Exam   Constitutional: She is oriented to person, place, and time  She appears well-developed  HENT:   Head: Normocephalic  Right Ear: External ear normal    Left Ear: External ear normal    Nose: Nose normal    Mouth/Throat: Oropharynx is clear and moist    Eyes: Pupils are equal, round, and reactive to light  Conjunctivae and EOM are normal    Neck: Normal range of motion  Neck supple  No thyromegaly present  Cardiovascular: Normal rate, regular rhythm and normal heart sounds  Pulses:       Dorsalis pedis pulses are 1+ on the right side, and 1+ on the left side  Posterior tibial pulses are 1+ on the right side, and 1+ on the left side  Pulmonary/Chest: Effort normal and breath sounds normal  Right breast exhibits mass and tenderness  R breast 2 cm tender palpable mass tender to touch, soft on medial upper quadrant    Abdominal: Soft  There is no tenderness  There is no rebound and no guarding  Musculoskeletal: Normal range of motion  Lumbar back: She exhibits tenderness and spasm  + right SLR   Feet:   Right Foot:   Skin Integrity: Negative for ulcer, skin breakdown, erythema, warmth, callus or dry skin  Left Foot:   Skin Integrity: Negative for ulcer, skin breakdown, erythema, warmth, callus or dry skin  Lymphadenopathy:     She has axillary adenopathy  Right axillary: Lateral adenopathy present  Neurological: She is alert and oriented to person, place, and time  She has normal reflexes  Skin: Skin is dry  Psychiatric: She has a normal mood and affect     Nursing note and vitals reviewed

## 2020-02-13 ENCOUNTER — TRANSCRIBE ORDERS (OUTPATIENT)
Dept: ADMINISTRATIVE | Facility: HOSPITAL | Age: 70
End: 2020-02-13

## 2020-02-13 DIAGNOSIS — N64.4 MASTODYNIA: Primary | ICD-10-CM

## 2020-03-10 ENCOUNTER — EVALUATION (OUTPATIENT)
Dept: PHYSICAL THERAPY | Facility: CLINIC | Age: 70
End: 2020-03-10
Payer: COMMERCIAL

## 2020-03-10 DIAGNOSIS — M54.31 SCIATICA OF RIGHT SIDE: Primary | ICD-10-CM

## 2020-03-10 PROCEDURE — 97161 PT EVAL LOW COMPLEX 20 MIN: CPT | Performed by: PHYSICAL THERAPIST

## 2020-03-10 PROCEDURE — 97110 THERAPEUTIC EXERCISES: CPT | Performed by: PHYSICAL THERAPIST

## 2020-03-10 NOTE — PROGRESS NOTES
PT Evaluation     Today's date: 3/10/2020  Patient name: Dioni Duron  : 1950  MRN: [id number]  Referring provider: Danny Merino MD  Dx:   Encounter Diagnosis     ICD-10-CM    1  Sciatica of right side M54 31                   Assessment  Assessment details: Dioni Duron is a pleasant 77 y o  female who presents with signs and symptoms correlating with referring diagnosis  No further referral appears necessary at this time based upon examination results  The patient's greatest concerns are decreasing pain and returning to baseline and decreasing falls  She presents with a movement impairment diagnosis of hypomobile lumbar extension ROM with a mechanical diagnosis of posterior above knee derangement assymetrical  This also presents with centralization and abolishment of symptoms during extension directional preference activities, decreased balance, strength and tolerance to ROM, with improvement post testing  Negative prognostic indicators:none  Positive prognostic indicators: good motivaiton  Was educated on importance of completion of activities 6-7x/day for the next few days especially when she gets pain  Please contact me if you have any further questions or recommendations  Thank you very much for the kind referral       Impairments: abnormal or restricted ROM, abnormal movement, activity intolerance, impaired balance, impaired physical strength and pain with function    Symptom irritability: highUnderstanding of Dx/Px/POC: good   Prognosis: good    Goals  STGs  1  Decrease pain by 20% in 2-4 weeks  2  Improve lumbar ROM by 10 degrees in 2-4 weeks  3  Improve LE strength by 1/3 grade in 2-4 weeks  LTGs  1  Decrease pain by 60% in 6-8 weeks  2  Improve walking tolerance to >30 minutes in 6-8 weeks  3  Perform ADls without pain in 6-8 weeks          Plan  Patient would benefit from: skilled physical therapy  Referral necessary: No  Planned modality interventions: TENS, thermotherapy: hydrocollator packs and cryotherapy  Planned therapy interventions: manual therapy, therapeutic training, stretching, strengthening, therapeutic activities, therapeutic exercise, patient education and activity modification  Frequency: 2x week  Duration in weeks: 8  Treatment plan discussed with: patient        Subjective Evaluation    History of Present Illness  Mechanism of injury: Pt is a 77 y o  female presenting w/  Low back pain and leg weakness  She is stating that the weakness in her legs is her main complaint and that she only occasionally gets pain  She is currently stating that she has issues doing most things during the day as the pain is so bad  She is currently interested in PT to improve balance and reduce falls      Neurological signs: weakness in legs   Red Flags: frequent falls   PMH: CVA and urinary incontinence           Recurrent probem    Quality of life: good    Pain  Current pain ratin  At best pain ratin  At worst pain rating: 10  Quality: sharp and tight  Relieving factors: medications  Aggravating factors: lifting and stair climbing  Progression: worsening    Patient Goals  Patient goals for therapy: improved balance, increased strength, decreased pain and increased motion          Objective     Active Range of Motion     Lumbar   Flexion:  with pain Restriction level: minimal  Extension:  with pain Restriction level: moderate  Left lateral flexion:  with pain Restriction level: minimal  Right lateral flexion:  with pain Restriction level: moderate    Additional Active Range of Motion Details  Prone lying abolished leg symptoms better sustained  Prone lying on elbows decrease better   Prone press up repeated increase no worse  Sustained lying in propped position centralized abolish  Press up following increase no worse   Improved strength and ROM following repeated movements    Strength/Myotome Testing     Lumbar   Left   Normal strength    Right Hip   Planes of Motion   Flexion: 3 (pain)    Right Knee   Flexion: 3- (pain )  Extension: 4- (pain)    Right Ankle/Foot   Dorsiflexion: 4-  Plantar flexion: 4-             Precautions: previous CVA  Mechanical diagnosis: posterior derangement assymetrical above knee    Daily Treatment Diary     Date 3/10            FOTO IE             Re-eval IE              Manuals 3/10                                                            Exercise Diary 3/10         Treadmill           Prone on elbows 2'         Repeated extension in lying  10x2          Prone propped on table at tolerance 2'         Hip ext standing           bridges          Step ups          TA press out           Side steps working on hip abd                                                                                                                                  Modalities 3/10

## 2020-04-01 ENCOUNTER — TELEPHONE (OUTPATIENT)
Dept: FAMILY MEDICINE CLINIC | Facility: CLINIC | Age: 70
End: 2020-04-01

## 2020-04-01 DIAGNOSIS — Z20.828 EXPOSURE TO SARS-ASSOCIATED CORONAVIRUS: Primary | ICD-10-CM

## 2020-04-02 ENCOUNTER — TELEMEDICINE (OUTPATIENT)
Dept: FAMILY MEDICINE CLINIC | Facility: CLINIC | Age: 70
End: 2020-04-02

## 2020-04-02 DIAGNOSIS — K59.01 SLOW TRANSIT CONSTIPATION: ICD-10-CM

## 2020-04-02 DIAGNOSIS — F32.89 OTHER DEPRESSION: ICD-10-CM

## 2020-04-02 PROBLEM — E66.811 CLASS 1 OBESITY DUE TO EXCESS CALORIES WITHOUT SERIOUS COMORBIDITY WITH BODY MASS INDEX (BMI) OF 30.0 TO 30.9 IN ADULT: Status: ACTIVE | Noted: 2020-04-02

## 2020-04-02 PROBLEM — E66.09 CLASS 1 OBESITY DUE TO EXCESS CALORIES WITHOUT SERIOUS COMORBIDITY WITH BODY MASS INDEX (BMI) OF 30.0 TO 30.9 IN ADULT: Status: ACTIVE | Noted: 2020-04-02

## 2020-04-02 PROCEDURE — G2012 BRIEF CHECK IN BY MD/QHP: HCPCS | Performed by: FAMILY MEDICINE

## 2020-04-02 RX ORDER — CITALOPRAM 40 MG/1
40 TABLET ORAL DAILY
Qty: 90 TABLET | Refills: 1 | Status: SHIPPED | OUTPATIENT
Start: 2020-04-02 | End: 2021-12-08 | Stop reason: SDUPTHER

## 2020-05-26 ENCOUNTER — HOSPITAL ENCOUNTER (OUTPATIENT)
Dept: MAMMOGRAPHY | Facility: CLINIC | Age: 70
Discharge: HOME | End: 2020-05-26
Payer: COMMERCIAL

## 2020-05-26 VITALS — HEIGHT: 57 IN | BODY MASS INDEX: 30.2 KG/M2 | WEIGHT: 140 LBS

## 2020-05-26 DIAGNOSIS — N64.4 MASTODYNIA: ICD-10-CM

## 2020-05-26 DIAGNOSIS — N64.4 BREAST PAIN, RIGHT: ICD-10-CM

## 2020-05-26 PROCEDURE — G0279 TOMOSYNTHESIS, MAMMO: HCPCS

## 2020-05-26 PROCEDURE — 76642 ULTRASOUND BREAST LIMITED: CPT

## 2020-05-26 PROCEDURE — 77066 DX MAMMO INCL CAD BI: CPT

## 2020-05-29 ENCOUNTER — TELEMEDICINE (OUTPATIENT)
Dept: FAMILY MEDICINE CLINIC | Facility: CLINIC | Age: 70
End: 2020-05-29

## 2020-05-29 DIAGNOSIS — N64.4 BREAST PAIN, RIGHT: Primary | ICD-10-CM

## 2020-05-29 PROCEDURE — 99442 PR PHYS/QHP TELEPHONE EVALUATION 11-20 MIN: CPT | Performed by: FAMILY MEDICINE

## 2020-05-29 RX ORDER — LIDOCAINE 50 MG/G
OINTMENT TOPICAL 2 TIMES DAILY PRN
Qty: 35.44 G | Refills: 0 | Status: SHIPPED | OUTPATIENT
Start: 2020-05-29

## 2021-01-29 ENCOUNTER — OFFICE VISIT (OUTPATIENT)
Dept: FAMILY MEDICINE CLINIC | Facility: CLINIC | Age: 71
End: 2021-01-29

## 2021-01-29 VITALS
RESPIRATION RATE: 20 BRPM | WEIGHT: 149.5 LBS | HEIGHT: 57 IN | OXYGEN SATURATION: 98 % | BODY MASS INDEX: 32.25 KG/M2 | DIASTOLIC BLOOD PRESSURE: 78 MMHG | SYSTOLIC BLOOD PRESSURE: 128 MMHG | TEMPERATURE: 97.4 F | HEART RATE: 68 BPM

## 2021-01-29 DIAGNOSIS — M25.561 CHRONIC PAIN OF BOTH KNEES: ICD-10-CM

## 2021-01-29 DIAGNOSIS — G89.29 CHRONIC PAIN OF BOTH KNEES: ICD-10-CM

## 2021-01-29 DIAGNOSIS — E11.9 CONTROLLED TYPE 2 DIABETES MELLITUS WITHOUT COMPLICATION, WITHOUT LONG-TERM CURRENT USE OF INSULIN (HCC): Primary | ICD-10-CM

## 2021-01-29 DIAGNOSIS — M25.562 CHRONIC PAIN OF BOTH KNEES: ICD-10-CM

## 2021-01-29 DIAGNOSIS — Z11.59 ENCOUNTER FOR HEPATITIS C SCREENING TEST FOR LOW RISK PATIENT: ICD-10-CM

## 2021-01-29 LAB
CREAT UR-MCNC: 146 MG/DL
MICROALBUMIN UR-MCNC: 24.4 MG/L (ref 0–20)
MICROALBUMIN/CREAT 24H UR: 17 MG/G CREATININE (ref 0–30)
SL AMB POCT HEMOGLOBIN AIC: 6.3 (ref ?–6.5)

## 2021-01-29 PROCEDURE — 82570 ASSAY OF URINE CREATININE: CPT | Performed by: FAMILY MEDICINE

## 2021-01-29 PROCEDURE — 82043 UR ALBUMIN QUANTITATIVE: CPT | Performed by: FAMILY MEDICINE

## 2021-01-29 PROCEDURE — 99214 OFFICE O/P EST MOD 30 MIN: CPT | Performed by: FAMILY MEDICINE

## 2021-01-29 PROCEDURE — 83036 HEMOGLOBIN GLYCOSYLATED A1C: CPT | Performed by: FAMILY MEDICINE

## 2021-01-29 NOTE — PROGRESS NOTES
Assessment/Plan:    No problem-specific Assessment & Plan notes found for this encounter  Diagnoses and all orders for this visit:    Controlled type 2 diabetes mellitus without complication, without long-term current use of insulin (HCC)  -     POCT hemoglobin A1c  -     Microalbumin / creatinine urine ratio  -     CBC and differential; Future  -     Comprehensive metabolic panel; Future  -     Lipid panel; Future  -     Microalbumin / creatinine urine ratio; Future  -     TSH, 3rd generation with Free T4 reflex; Future    Chronic pain of both knees  -     XR knee 3 vw right non injury; Future  -     XR knee 3 vw left non injury; Future  -     Ambulatory referral to Orthopedic Surgery; Future    Encounter for hepatitis C screening test for low risk patient  -     Hepatitis C antibody; Future        Patient refusing PT due to the current COVID pandemic  Counseled on the importance of staying active as part of her aches stem from deconditioning   Ice joint for 15 minutes 3 times a day  Ibuprofen OTC up to 3 times a day for no more than 5 days  Will hold off on stronger NSAIDs till BW done and reviewed   Subjective:      Patient ID: Luzma Senior is a 72 y o  female  71 yo  female with well controlled DM complains of:  1- Bilateral knee pain  2- Shoulder pain     Knee Pain   There was no injury mechanism  The pain is present in the right knee and left knee  The pain is at a severity of 7/10  The pain has been constant since onset  Associated symptoms include an inability to bear weight and muscle weakness  She reports no foreign bodies present  The symptoms are aggravated by movement and weight bearing  She has tried acetaminophen and rest for the symptoms  The treatment provided no relief  Shoulder Pain   This is a chronic problem  The current episode started more than 1 month ago  There has been no history of extremity trauma  The problem occurs constantly  The problem has been unchanged   The pain is at a severity of 7/10  Associated symptoms include an inability to bear weight and stiffness  The symptoms are aggravated by activity and cold  She has tried acetaminophen for the symptoms  The treatment provided no relief  The following portions of the patient's history were reviewed and updated as appropriate:   She  has a past medical history of Abdominal pain, Anxiety, Arthritis, Bowel incontinence, Chest pain, Constipation, CVA (cerebral vascular accident) ([address] ), Diabetes mellitus type II, controlled ([address] ), Disease of thyroid gland, Epigastric pain, Falls, High cholesterol, Hyperlipidemia, Hypertension, Migraine, Palpitations, Recurrent urinary tract infection, Seizures ([address] ), Sleep disorder, Stroke ([address] ), and Thyroid disease    She   Patient Active Problem List    Diagnosis Date Noted    Class 1 obesity due to excess calories without serious comorbidity with body mass index (BMI) of 30 0 to 30 9 in adult 04/02/2020    Sciatica of right side 02/11/2020    Hip strain, right, initial encounter 09/06/2019    Acute bacterial conjunctivitis of left eye 09/06/2019    Breast pain, right 07/31/2019    Elevated BP without diagnosis of hypertension 07/01/2019    Contusion of right breast 05/24/2019    Epigastric abdominal pain 03/28/2019    NSAID long-term use 03/28/2019    Left upper quadrant pain 02/08/2019    Diabetes mellitus type II, controlled ([address] ) 10/12/2018    Arthritis 10/12/2018    Primary insomnia 04/18/2018    Urinary, incontinence, stress female 02/08/2018    Urge and stress incontinence 10/26/2017    Osteoporosis 09/15/2017    Adhesive capsulitis of left shoulder 09/11/2017    Constipation 05/10/2017    Cervical radiculitis 05/09/2017    Elevated TSH 04/11/2017    Osteoarthritis of both hands 04/03/2017    Memory difficulties 03/18/2016    Asymptomatic bilateral carotid artery stenosis 02/29/2016    Gastroesophageal reflux disease 02/03/2016    Hypertension 01/20/2016  Collapsed vertebra, not elsewhere classified, sacral and sacrococcygeal region, initial encounter for fracture ([facility] ) 2016    HLD (hyperlipidemia) 2016    History of CVA with residual deficit 2016    Depression 2016    Headache 2016    Fall 2016    Subdural hemorrhage following injury [facility] ) 2016    Subarachnoid hemorrhage following injury ([facility] ) 2016    Hyperlipidemia 10/19/2013    Muscle weakness 10/19/2013     She  has a past surgical history that includes Knee surgery; Other surgical history;  section; Axillary Surgery; pr esophagogastroduodenoscopy transoral diagnostic (N/A, 2017); pr sling oper stres incontinence (N/A, 2018); CYSTOSCOPY (N/A, 2018); Bladder surgery; Facial bone tumor excision; and Tubal ligation  Her family history includes Diabetes in her family; Hypertension in her family; Stroke in her family; Thyroid disease in her family  She  reports that she has never smoked  She has never used smokeless tobacco  She reports that she does not drink alcohol or use drugs    Current Outpatient Medications   Medication Sig Dispense Refill    albuterol (PROVENTIL HFA,VENTOLIN HFA) 90 mcg/act inhaler       atorvastatin (LIPITOR) 40 mg tablet Take 1 tablet (40 mg total) by mouth daily 90 tablet 1    Cholecalciferol (VITAMIN D-3) 5000 units TABS Take 1 tablet by mouth daily 90 tablet 1    citalopram (CeleXA) 40 mg tablet Take 1 tablet (40 mg total) by mouth daily 90 tablet 1    conjugated estrogens (PREMARIN) vaginal cream Insert 0 5 g into the vagina 3 (three) times a week 42 5 g 0    cyclobenzaprine (FLEXERIL) 5 mg tablet Take 1 tablet (5 mg total) by mouth 3 (three) times a day as needed for muscle spasms 30 tablet 0    cyclobenzaprine (FLEXERIL) 5 mg tablet Take 1 tablet (5 mg total) by mouth 3 (three) times a day as needed for muscle spasms 90 tablet 0    diclofenac sodium (VOLTAREN) 50 mg EC tablet Take 1 tablet (50 mg total) by mouth 2 (two) times a day 60 tablet 0    lidocaine (XYLOCAINE) 5 % ointment Apply topically 2 (two) times a day as needed for mild pain 35 44 g 0    meclizine (ANTIVERT) 12 5 MG tablet       Melatonin 5 MG TABS Take 1 tablet (5 mg total) by mouth daily at bedtime (Patient not taking: Reported on 5/29/2020) 90 tablet 0    meloxicam (MOBIC) 7 5 mg tablet Take 1 tablet (7 5 mg total) by mouth daily (Patient not taking: Reported on 5/29/2020) 90 tablet 0    methylPREDNISolone 4 MG tablet therapy pack Use as directed on package (Patient not taking: Reported on 5/29/2020) 1 each 0    neomycin-polymyxin-dexamethasone (MAXITROL) ophthalmic suspension Administer 1 drop to the right eye 4 (four) times a day for 7 days 5 mL 0    omeprazole (PriLOSEC) 20 mg delayed release capsule Take 1 capsule (20 mg total) by mouth daily 60 capsule 2    polyethylene glycol (GLYCOLAX) powder Take 17 g by mouth daily (Patient taking differently: Take 17 g by mouth as needed ) 850 g 1     No current facility-administered medications for this visit        Current Outpatient Medications on File Prior to Visit   Medication Sig    albuterol (PROVENTIL HFA,VENTOLIN HFA) 90 mcg/act inhaler     atorvastatin (LIPITOR) 40 mg tablet Take 1 tablet (40 mg total) by mouth daily    Cholecalciferol (VITAMIN D-3) 5000 units TABS Take 1 tablet by mouth daily    citalopram (CeleXA) 40 mg tablet Take 1 tablet (40 mg total) by mouth daily    conjugated estrogens (PREMARIN) vaginal cream Insert 0 5 g into the vagina 3 (three) times a week    cyclobenzaprine (FLEXERIL) 5 mg tablet Take 1 tablet (5 mg total) by mouth 3 (three) times a day as needed for muscle spasms    cyclobenzaprine (FLEXERIL) 5 mg tablet Take 1 tablet (5 mg total) by mouth 3 (three) times a day as needed for muscle spasms    diclofenac sodium (VOLTAREN) 50 mg EC tablet Take 1 tablet (50 mg total) by mouth 2 (two) times a day    lidocaine (XYLOCAINE) 5 % ointment Apply topically 2 (two) times a day as needed for mild pain    meclizine (ANTIVERT) 12 5 MG tablet     Melatonin 5 MG TABS Take 1 tablet (5 mg total) by mouth daily at bedtime (Patient not taking: Reported on 5/29/2020)    meloxicam (MOBIC) 7 5 mg tablet Take 1 tablet (7 5 mg total) by mouth daily (Patient not taking: Reported on 5/29/2020)    methylPREDNISolone 4 MG tablet therapy pack Use as directed on package (Patient not taking: Reported on 5/29/2020)    neomycin-polymyxin-dexamethasone (MAXITROL) ophthalmic suspension Administer 1 drop to the right eye 4 (four) times a day for 7 days    omeprazole (PriLOSEC) 20 mg delayed release capsule Take 1 capsule (20 mg total) by mouth daily    polyethylene glycol (GLYCOLAX) powder Take 17 g by mouth daily (Patient taking differently: Take 17 g by mouth as needed )     No current facility-administered medications on file prior to visit       Review of Systems   Musculoskeletal: Positive for arthralgias, gait problem and stiffness  All other systems reviewed and are negative  Objective:      /78 (BP Location: Left arm, Patient Position: Sitting, Cuff Size: Standard)   Pulse 68   Temp (!) 97 4 °F (36 3 °C) (Temporal)   Resp 20   Ht 4' 9" (1 448 m)   Wt 67 8 kg (149 lb 8 oz)   SpO2 98%   BMI 32 35 kg/m²          Physical Exam  Vitals signs and nursing note reviewed  Constitutional:       Appearance: She is well-developed  HENT:      Head: Normocephalic  Right Ear: External ear normal       Left Ear: External ear normal       Nose: Nose normal    Eyes:      Conjunctiva/sclera: Conjunctivae normal       Pupils: Pupils are equal, round, and reactive to light  Neck:      Musculoskeletal: Normal range of motion and neck supple  Thyroid: No thyromegaly  Cardiovascular:      Rate and Rhythm: Normal rate and regular rhythm  Heart sounds: Normal heart sounds     Pulmonary:      Effort: Pulmonary effort is normal       Breath sounds: Normal breath sounds  Abdominal:      Palpations: Abdomen is soft  Tenderness: There is no abdominal tenderness  There is no guarding or rebound  Musculoskeletal:         General: Tenderness present  Right shoulder: She exhibits decreased range of motion and tenderness  Left shoulder: She exhibits tenderness  Right knee: Tenderness found  Medial joint line tenderness noted  Left knee: Tenderness found  Medial joint line tenderness noted  Skin:     General: Skin is dry  Neurological:      Mental Status: She is alert and oriented to person, place, and time  Deep Tendon Reflexes: Reflexes are normal and symmetric

## 2021-02-04 ENCOUNTER — APPOINTMENT (OUTPATIENT)
Dept: LAB | Facility: CLINIC | Age: 71
End: 2021-02-04
Payer: COMMERCIAL

## 2021-02-04 DIAGNOSIS — Z11.59 ENCOUNTER FOR HEPATITIS C SCREENING TEST FOR LOW RISK PATIENT: ICD-10-CM

## 2021-02-04 DIAGNOSIS — E11.9 CONTROLLED TYPE 2 DIABETES MELLITUS WITHOUT COMPLICATION, WITHOUT LONG-TERM CURRENT USE OF INSULIN (HCC): ICD-10-CM

## 2021-02-04 LAB
ALBUMIN SERPL BCP-MCNC: 4.4 G/DL (ref 3.5–5)
ALP SERPL-CCNC: 68 U/L (ref 46–116)
ALT SERPL W P-5'-P-CCNC: 56 U/L (ref 12–78)
ANION GAP SERPL CALCULATED.3IONS-SCNC: 5 MMOL/L (ref 4–13)
AST SERPL W P-5'-P-CCNC: 37 U/L (ref 5–45)
BASOPHILS # BLD AUTO: 0.05 THOUSANDS/ΜL (ref 0–0.1)
BASOPHILS NFR BLD AUTO: 1 % (ref 0–1)
BILIRUB SERPL-MCNC: 0.64 MG/DL (ref 0.2–1)
BUN SERPL-MCNC: 20 MG/DL (ref 5–25)
CALCIUM SERPL-MCNC: 9.3 MG/DL (ref 8.3–10.1)
CHLORIDE SERPL-SCNC: 107 MMOL/L (ref 100–108)
CHOLEST SERPL-MCNC: 214 MG/DL (ref 50–200)
CO2 SERPL-SCNC: 31 MMOL/L (ref 21–32)
CREAT SERPL-MCNC: 0.69 MG/DL (ref 0.6–1.3)
EOSINOPHIL # BLD AUTO: 0.19 THOUSAND/ΜL (ref 0–0.61)
EOSINOPHIL NFR BLD AUTO: 4 % (ref 0–6)
ERYTHROCYTE [DISTWIDTH] IN BLOOD BY AUTOMATED COUNT: 13.2 % (ref 11.6–15.1)
GFR SERPL CREATININE-BSD FRML MDRD: 89 ML/MIN/1.73SQ M
GLUCOSE P FAST SERPL-MCNC: 112 MG/DL (ref 65–99)
HCT VFR BLD AUTO: 42.5 % (ref 34.8–46.1)
HCV AB SER QL: NORMAL
HDLC SERPL-MCNC: 45 MG/DL
HGB BLD-MCNC: 13.5 G/DL (ref 11.5–15.4)
IMM GRANULOCYTES # BLD AUTO: 0 THOUSAND/UL (ref 0–0.2)
IMM GRANULOCYTES NFR BLD AUTO: 0 % (ref 0–2)
LDLC SERPL CALC-MCNC: 120 MG/DL (ref 0–100)
LYMPHOCYTES # BLD AUTO: 1.86 THOUSANDS/ΜL (ref 0.6–4.47)
LYMPHOCYTES NFR BLD AUTO: 40 % (ref 14–44)
MCH RBC QN AUTO: 30.1 PG (ref 26.8–34.3)
MCHC RBC AUTO-ENTMCNC: 31.8 G/DL (ref 31.4–37.4)
MCV RBC AUTO: 95 FL (ref 82–98)
MONOCYTES # BLD AUTO: 0.46 THOUSAND/ΜL (ref 0.17–1.22)
MONOCYTES NFR BLD AUTO: 10 % (ref 4–12)
NEUTROPHILS # BLD AUTO: 2.15 THOUSANDS/ΜL (ref 1.85–7.62)
NEUTS SEG NFR BLD AUTO: 45 % (ref 43–75)
NONHDLC SERPL-MCNC: 169 MG/DL
NRBC BLD AUTO-RTO: 0 /100 WBCS
PLATELET # BLD AUTO: 238 THOUSANDS/UL (ref 149–390)
PMV BLD AUTO: 11.6 FL (ref 8.9–12.7)
POTASSIUM SERPL-SCNC: 3.6 MMOL/L (ref 3.5–5.3)
PROT SERPL-MCNC: 7.6 G/DL (ref 6.4–8.2)
RBC # BLD AUTO: 4.48 MILLION/UL (ref 3.81–5.12)
SODIUM SERPL-SCNC: 143 MMOL/L (ref 136–145)
TRIGL SERPL-MCNC: 243 MG/DL
TSH SERPL DL<=0.05 MIU/L-ACNC: 2.37 UIU/ML (ref 0.36–3.74)
WBC # BLD AUTO: 4.71 THOUSAND/UL (ref 4.31–10.16)

## 2021-02-04 PROCEDURE — 80053 COMPREHEN METABOLIC PANEL: CPT

## 2021-02-04 PROCEDURE — 80061 LIPID PANEL: CPT

## 2021-02-04 PROCEDURE — 85025 COMPLETE CBC W/AUTO DIFF WBC: CPT

## 2021-02-04 PROCEDURE — 84443 ASSAY THYROID STIM HORMONE: CPT

## 2021-02-04 PROCEDURE — 36415 COLL VENOUS BLD VENIPUNCTURE: CPT

## 2021-02-04 PROCEDURE — 86803 HEPATITIS C AB TEST: CPT

## 2021-02-08 DIAGNOSIS — E11.9 CONTROLLED TYPE 2 DIABETES MELLITUS WITHOUT COMPLICATION, WITHOUT LONG-TERM CURRENT USE OF INSULIN (HCC): ICD-10-CM

## 2021-02-08 RX ORDER — ATORVASTATIN CALCIUM 40 MG/1
40 TABLET, FILM COATED ORAL DAILY
Qty: 90 TABLET | Refills: 1 | Status: SHIPPED | OUTPATIENT
Start: 2021-02-08 | End: 2021-09-17 | Stop reason: SDUPTHER

## 2021-04-06 ENCOUNTER — OFFICE VISIT (OUTPATIENT)
Dept: FAMILY MEDICINE CLINIC | Facility: CLINIC | Age: 71
End: 2021-04-06

## 2021-04-06 VITALS
BODY MASS INDEX: 31.59 KG/M2 | WEIGHT: 146 LBS | HEART RATE: 98 BPM | TEMPERATURE: 98.8 F | SYSTOLIC BLOOD PRESSURE: 150 MMHG | RESPIRATION RATE: 18 BRPM | OXYGEN SATURATION: 98 % | DIASTOLIC BLOOD PRESSURE: 80 MMHG

## 2021-04-06 DIAGNOSIS — S76.011A HIP STRAIN, RIGHT, INITIAL ENCOUNTER: ICD-10-CM

## 2021-04-06 PROCEDURE — 1036F TOBACCO NON-USER: CPT | Performed by: FAMILY MEDICINE

## 2021-04-06 PROCEDURE — 1160F RVW MEDS BY RX/DR IN RCRD: CPT | Performed by: FAMILY MEDICINE

## 2021-04-06 PROCEDURE — 3288F FALL RISK ASSESSMENT DOCD: CPT | Performed by: FAMILY MEDICINE

## 2021-04-06 PROCEDURE — 1101F PT FALLS ASSESS-DOCD LE1/YR: CPT | Performed by: FAMILY MEDICINE

## 2021-04-06 PROCEDURE — 99213 OFFICE O/P EST LOW 20 MIN: CPT | Performed by: FAMILY MEDICINE

## 2021-04-06 RX ORDER — CYCLOBENZAPRINE HCL 5 MG
5 TABLET ORAL 3 TIMES DAILY PRN
Qty: 30 TABLET | Refills: 0 | Status: SHIPPED | OUTPATIENT
Start: 2021-04-06 | End: 2022-04-20

## 2021-04-06 NOTE — PROGRESS NOTES
Assessment/Plan:    No problem-specific Assessment & Plan notes found for this encounter  Diagnoses and all orders for this visit:    Hip strain, right, initial encounter  -     cyclobenzaprine (FLEXERIL) 5 mg tablet; Take 1 tablet (5 mg total) by mouth 3 (three) times a day as needed for muscle spasms          Subjective:      Patient ID: Emeli Contreras is a 72 y o  female  71 yo  female with DM, GERD, HTN, complains of joint pains, most severe is hip pain, which is affecting her ADLs, requires help with bathing, changing clothes, cooking  Complains of loss of balance  Denies falls  Has not completed PT      The following portions of the patient's history were reviewed and updated as appropriate:   She  has a past medical history of Abdominal pain, Anxiety, Arthritis, Bowel incontinence, Chest pain, Constipation, CVA (cerebral vascular accident) ([address] ), Diabetes mellitus type II, controlled ([address] ), Disease of thyroid gland, Epigastric pain, Falls, High cholesterol, Hyperlipidemia, Hypertension, Migraine, Palpitations, Recurrent urinary tract infection, Seizures ([address] ), Sleep disorder, Stroke ([address] ), and Thyroid disease    She   Patient Active Problem List    Diagnosis Date Noted    Class 1 obesity due to excess calories without serious comorbidity with body mass index (BMI) of 30 0 to 30 9 in adult 04/02/2020    Sciatica of right side 02/11/2020    Hip strain, right, initial encounter 09/06/2019    Acute bacterial conjunctivitis of left eye 09/06/2019    Breast pain, right 07/31/2019    Elevated BP without diagnosis of hypertension 07/01/2019    Contusion of right breast 05/24/2019    Epigastric abdominal pain 03/28/2019    NSAID long-term use 03/28/2019    Left upper quadrant pain 02/08/2019    Diabetes mellitus type II, controlled ([address] ) 10/12/2018    Arthritis 10/12/2018    Primary insomnia 04/18/2018    Urinary, incontinence, stress female 02/08/2018    Urge and stress incontinence 10/26/2017    Osteoporosis 09/15/2017    Adhesive capsulitis of left shoulder 2017    Constipation 05/10/2017    Cervical radiculitis 2017    Elevated TSH 2017    Osteoarthritis of both hands 2017    Memory difficulties 2016    Asymptomatic bilateral carotid artery stenosis 2016    Gastroesophageal reflux disease 2016    Hypertension 2016    Collapsed vertebra, not elsewhere classified, sacral and sacrococcygeal region, initial encounter for fracture ([facility] ) 2016    HLD (hyperlipidemia) 2016    History of CVA with residual deficit 2016    Depression 2016    Headache 2016    Fall 2016    Subdural hemorrhage following injury ([facility] ) 2016    Subarachnoid hemorrhage following injury ([facility] ) 2016    Hyperlipidemia 10/19/2013    Muscle weakness 10/19/2013     She  has a past surgical history that includes Knee surgery; Other surgical history;  section; Axillary Surgery; pr esophagogastroduodenoscopy transoral diagnostic (N/A, 2017); pr sling oper stres incontinence (N/A, 2018); CYSTOSCOPY (N/A, 2018); Bladder surgery; Facial bone tumor excision; and Tubal ligation  Her family history includes Diabetes in her family; Hypertension in her family; Stroke in her family; Thyroid disease in her family  She  reports that she has never smoked  She has never used smokeless tobacco  She reports that she does not drink alcohol or use drugs    Current Outpatient Medications   Medication Sig Dispense Refill    albuterol (PROVENTIL HFA,VENTOLIN HFA) 90 mcg/act inhaler       atorvastatin (LIPITOR) 40 mg tablet Take 1 tablet (40 mg total) by mouth daily 90 tablet 1    Cholecalciferol (VITAMIN D-3) 5000 units TABS Take 1 tablet by mouth daily 90 tablet 1    citalopram (CeleXA) 40 mg tablet Take 1 tablet (40 mg total) by mouth daily 90 tablet 1    conjugated estrogens (PREMARIN) vaginal cream Insert 0 5 g into the vagina 3 (three) times a week 42 5 g 0    cyclobenzaprine (FLEXERIL) 5 mg tablet Take 1 tablet (5 mg total) by mouth 3 (three) times a day as needed for muscle spasms 90 tablet 0    cyclobenzaprine (FLEXERIL) 5 mg tablet Take 1 tablet (5 mg total) by mouth 3 (three) times a day as needed for muscle spasms 30 tablet 0    diclofenac sodium (VOLTAREN) 50 mg EC tablet Take 1 tablet (50 mg total) by mouth 2 (two) times a day 60 tablet 0    lidocaine (XYLOCAINE) 5 % ointment Apply topically 2 (two) times a day as needed for mild pain 35 44 g 0    meclizine (ANTIVERT) 12 5 MG tablet       Melatonin 5 MG TABS Take 1 tablet (5 mg total) by mouth daily at bedtime (Patient not taking: Reported on 5/29/2020) 90 tablet 0    meloxicam (MOBIC) 7 5 mg tablet Take 1 tablet (7 5 mg total) by mouth daily (Patient not taking: Reported on 5/29/2020) 90 tablet 0    methylPREDNISolone 4 MG tablet therapy pack Use as directed on package (Patient not taking: Reported on 5/29/2020) 1 each 0    neomycin-polymyxin-dexamethasone (MAXITROL) ophthalmic suspension Administer 1 drop to the right eye 4 (four) times a day for 7 days 5 mL 0    omeprazole (PriLOSEC) 20 mg delayed release capsule Take 1 capsule (20 mg total) by mouth daily 60 capsule 2    polyethylene glycol (GLYCOLAX) powder Take 17 g by mouth daily (Patient taking differently: Take 17 g by mouth as needed ) 850 g 1     No current facility-administered medications for this visit        Current Outpatient Medications on File Prior to Visit   Medication Sig    albuterol (PROVENTIL HFA,VENTOLIN HFA) 90 mcg/act inhaler     atorvastatin (LIPITOR) 40 mg tablet Take 1 tablet (40 mg total) by mouth daily    Cholecalciferol (VITAMIN D-3) 5000 units TABS Take 1 tablet by mouth daily    citalopram (CeleXA) 40 mg tablet Take 1 tablet (40 mg total) by mouth daily    conjugated estrogens (PREMARIN) vaginal cream Insert 0 5 g into the vagina 3 (three) times a week    cyclobenzaprine (FLEXERIL) 5 mg tablet Take 1 tablet (5 mg total) by mouth 3 (three) times a day as needed for muscle spasms    diclofenac sodium (VOLTAREN) 50 mg EC tablet Take 1 tablet (50 mg total) by mouth 2 (two) times a day    lidocaine (XYLOCAINE) 5 % ointment Apply topically 2 (two) times a day as needed for mild pain    meclizine (ANTIVERT) 12 5 MG tablet     Melatonin 5 MG TABS Take 1 tablet (5 mg total) by mouth daily at bedtime (Patient not taking: Reported on 5/29/2020)    meloxicam (MOBIC) 7 5 mg tablet Take 1 tablet (7 5 mg total) by mouth daily (Patient not taking: Reported on 5/29/2020)    methylPREDNISolone 4 MG tablet therapy pack Use as directed on package (Patient not taking: Reported on 5/29/2020)    neomycin-polymyxin-dexamethasone (MAXITROL) ophthalmic suspension Administer 1 drop to the right eye 4 (four) times a day for 7 days    omeprazole (PriLOSEC) 20 mg delayed release capsule Take 1 capsule (20 mg total) by mouth daily    polyethylene glycol (GLYCOLAX) powder Take 17 g by mouth daily (Patient taking differently: Take 17 g by mouth as needed )     No current facility-administered medications on file prior to visit       Review of Systems   Musculoskeletal: Positive for arthralgias  All other systems reviewed and are negative  Objective:      /80 (BP Location: Left arm, Patient Position: Sitting, Cuff Size: Adult)   Pulse 98   Temp 98 8 °F (37 1 °C) (Temporal)   Resp 18   Wt 66 2 kg (146 lb)   SpO2 98%   BMI 31 59 kg/m²          Physical Exam  Vitals signs and nursing note reviewed  Constitutional:       Appearance: She is well-developed  HENT:      Head: Normocephalic  Right Ear: External ear normal       Left Ear: External ear normal       Nose: Nose normal    Eyes:      Conjunctiva/sclera: Conjunctivae normal       Pupils: Pupils are equal, round, and reactive to light  Neck:      Musculoskeletal: Normal range of motion and neck supple  Thyroid: No thyromegaly  Cardiovascular:      Rate and Rhythm: Normal rate and regular rhythm  Heart sounds: Normal heart sounds  Pulmonary:      Effort: Pulmonary effort is normal       Breath sounds: Normal breath sounds  Abdominal:      Palpations: Abdomen is soft  Tenderness: There is no abdominal tenderness  There is no guarding or rebound  Musculoskeletal:         General: Tenderness present  Right hip: She exhibits decreased range of motion and tenderness  Skin:     General: Skin is dry  Neurological:      Mental Status: She is alert and oriented to person, place, and time  Deep Tendon Reflexes: Reflexes are normal and symmetric

## 2021-05-06 ENCOUNTER — TELEPHONE (OUTPATIENT)
Dept: FAMILY MEDICINE CLINIC | Facility: CLINIC | Age: 71
End: 2021-05-06

## 2021-05-06 NOTE — TELEPHONE ENCOUNTER
Please reach out to the pt to schedule a PRE-OP visit appt for dental clearance     Form will be held with me,Thank you

## 2021-05-12 NOTE — TELEPHONE ENCOUNTER
Vanessa Cameron would you be okay with completing the form with out pre-op appt  Pt was seen on 04/06/21 with you

## 2021-05-12 NOTE — TELEPHONE ENCOUNTER
All depends on procedure, what is she having done, what type of anesthesia, when is she having the procedure done  Gloria Prows Gloria Barahona

## 2021-05-19 ENCOUNTER — TELEPHONE (OUTPATIENT)
Dept: FAMILY MEDICINE CLINIC | Facility: CLINIC | Age: 71
End: 2021-05-19

## 2021-05-21 ENCOUNTER — HOSPITAL ENCOUNTER (OUTPATIENT)
Dept: RADIOLOGY | Facility: HOSPITAL | Age: 71
Discharge: HOME | End: 2021-05-21
Payer: COMMERCIAL

## 2021-05-21 ENCOUNTER — APPOINTMENT (OUTPATIENT)
Dept: LAB | Facility: HOSPITAL | Age: 71
End: 2021-05-21
Payer: COMMERCIAL

## 2021-05-21 ENCOUNTER — CONSULT (OUTPATIENT)
Dept: FAMILY MEDICINE CLINIC | Facility: CLINIC | Age: 71
End: 2021-05-21

## 2021-05-21 VITALS
DIASTOLIC BLOOD PRESSURE: 82 MMHG | WEIGHT: 148 LBS | RESPIRATION RATE: 18 BRPM | TEMPERATURE: 97.2 F | SYSTOLIC BLOOD PRESSURE: 160 MMHG | HEIGHT: 57 IN | BODY MASS INDEX: 31.93 KG/M2 | HEART RATE: 68 BPM | OXYGEN SATURATION: 97 %

## 2021-05-21 DIAGNOSIS — G89.29 CHRONIC PAIN OF BOTH KNEES: ICD-10-CM

## 2021-05-21 DIAGNOSIS — M25.562 CHRONIC PAIN OF BOTH KNEES: ICD-10-CM

## 2021-05-21 DIAGNOSIS — S76.011A HIP STRAIN, RIGHT, INITIAL ENCOUNTER: ICD-10-CM

## 2021-05-21 DIAGNOSIS — M25.561 CHRONIC PAIN OF BOTH KNEES: ICD-10-CM

## 2021-05-21 DIAGNOSIS — E11.9 CONTROLLED TYPE 2 DIABETES MELLITUS WITHOUT COMPLICATION, WITHOUT LONG-TERM CURRENT USE OF INSULIN (HCC): Primary | ICD-10-CM

## 2021-05-21 DIAGNOSIS — Z01.818 PREOP EXAMINATION: ICD-10-CM

## 2021-05-21 DIAGNOSIS — I10 ESSENTIAL HYPERTENSION: ICD-10-CM

## 2021-05-21 DIAGNOSIS — M54.31 SCIATICA OF RIGHT SIDE: ICD-10-CM

## 2021-05-21 LAB
CREAT UR-MCNC: 63.5 MG/DL
MICROALBUMIN UR-MCNC: 9.6 MG/L (ref 0–20)
MICROALBUMIN/CREAT 24H UR: 15 MG/G CREATININE (ref 0–30)
SL AMB POCT HEMOGLOBIN AIC: 6.1 (ref ?–6.5)

## 2021-05-21 PROCEDURE — 3008F BODY MASS INDEX DOCD: CPT | Performed by: FAMILY MEDICINE

## 2021-05-21 PROCEDURE — 36415 COLL VENOUS BLD VENIPUNCTURE: CPT

## 2021-05-21 PROCEDURE — 86431 RHEUMATOID FACTOR QUANT: CPT

## 2021-05-21 PROCEDURE — 83036 HEMOGLOBIN GLYCOSYLATED A1C: CPT | Performed by: FAMILY MEDICINE

## 2021-05-21 PROCEDURE — 1036F TOBACCO NON-USER: CPT | Performed by: FAMILY MEDICINE

## 2021-05-21 PROCEDURE — 1160F RVW MEDS BY RX/DR IN RCRD: CPT | Performed by: FAMILY MEDICINE

## 2021-05-21 PROCEDURE — 3061F NEG MICROALBUMINURIA REV: CPT | Performed by: FAMILY MEDICINE

## 2021-05-21 PROCEDURE — 82570 ASSAY OF URINE CREATININE: CPT

## 2021-05-21 PROCEDURE — 86038 ANTINUCLEAR ANTIBODIES: CPT

## 2021-05-21 PROCEDURE — 82043 UR ALBUMIN QUANTITATIVE: CPT

## 2021-05-21 PROCEDURE — 86618 LYME DISEASE ANTIBODY: CPT

## 2021-05-21 PROCEDURE — 3044F HG A1C LEVEL LT 7.0%: CPT | Performed by: FAMILY MEDICINE

## 2021-05-21 PROCEDURE — 82306 VITAMIN D 25 HYDROXY: CPT

## 2021-05-21 PROCEDURE — 99215 OFFICE O/P EST HI 40 MIN: CPT | Performed by: FAMILY MEDICINE

## 2021-05-21 PROCEDURE — 73562 X-RAY EXAM OF KNEE 3: CPT

## 2021-05-21 PROCEDURE — 86200 CCP ANTIBODY: CPT

## 2021-05-21 RX ORDER — ATENOLOL 25 MG/1
25 TABLET ORAL DAILY
Qty: 90 TABLET | Refills: 1 | Status: SHIPPED | OUTPATIENT
Start: 2021-05-21 | End: 2021-09-17 | Stop reason: SDUPTHER

## 2021-05-21 NOTE — PROGRESS NOTES
Assessment/Plan:    No problem-specific Assessment & Plan notes found for this encounter  Diagnoses and all orders for this visit:    Controlled type 2 diabetes mellitus without complication, without long-term current use of insulin (HCC)  -     POCT hemoglobin A1c    Preop examination    Essential hypertension  -     atenolol (TENORMIN) 25 mg tablet; Take 1 tablet (25 mg total) by mouth daily    Chronic pain of both knees  -     Ambulatory referral to Orthopedic Surgery; Future  -     ADRIENNE Scr, IFA, W/Refl Titer/Pattern/Rheumatoid Arthritis Panel 1; Future  -     Lyme Total Antibody Profile with reflex to WB; Future  -     Vitamin D Panel; Future  -     Ambulatory referral to Physical Therapy; Future    Hip strain, right, initial encounter  -     diclofenac sodium (VOLTAREN) 50 mg EC tablet; Take 1 tablet (50 mg total) by mouth 2 (two) times a day  -     Ambulatory referral to Physical Therapy; Future    Sciatica of right side  -     diclofenac sodium (VOLTAREN) 50 mg EC tablet; Take 1 tablet (50 mg total) by mouth 2 (two) times a day  -     Ambulatory referral to Physical Therapy; Future        Preop: counseled on stopping Diclofenac 5 days prior to dental surgery  OK for dental procedure   Counseled on importance and benefits of Physical therapy   Return for nurse visit in 2 weeks for BP check     Greater than 50% of 40 minute encounter was spent counseling/coordinating care of the patient regarding the diagnosis and understanding pathogenesis of disease process, discussion of differential diagnoses, review of laboratory data and imaging, discussion of medications to include side effect profile, precautions, and plan of care  Subjective:      Patient ID: Raegan Chopra is a 72 y o  female  71 yo  female with well controlled DM, s/p CVA, here today for preop for dental surgery on 6/24/2021, currently complains of  1- b/l knee pain unchanged from prior   Knee pain is constant, worse with going up steps, walking for more than 5 minutes  Pain is described as sharp not radiated  6/10 stopped taking Meloxicam because she ran out, but she does not remember if it was helping or not  Patient states she did not remember she was sent for x rays at her previous visits, she has not completed physical therapy either  She has not tried any other medication  Has not tried applying ice  States her knees "click" when she is going down steps or walking down an incline  Knees do give out  Denies locking  2- Chronic LBP: she was evaluated about 1 5 years ago by Ortho, referred for PT went for evaluation but never completed PT  LBP is unchanged, no red flags  7/10 constant, worse with activity  3- States her hands swell as the day goes by    4- Loss of balance: states she feels she sways when she walks, needs to lean on her significant other or a cane, the more she walks or stands the more she feels she losses her balance  Has no trouble getting up from a seated position or sitting down  Denies dizziness  5- Not checking BG at home  Denies symptoms of hypo or hyperglycemia  Denies CP, SOB, able to walk several blocks on a flat surface without chest pain, does need to stop to rest due to back and knee pain  No recent history of any bleeding   Preop clearance for dental treatment is being requested by [facility] [address] [place] [address] for the purpose of deep cleaning, radiographs, extractions, root canal and implant/bridges therapy  Nitrous oxide and local anesthetic with epi will be used  Clearance is being requested because patient has diabetes         The following portions of the patient's history were reviewed and updated as appropriate:   She  has a past medical history of Abdominal pain, Anxiety, Arthritis, Bowel incontinence, Chest pain, Constipation, CVA (cerebral vascular accident) ([address] ), Diabetes mellitus type II, controlled ([address] ), Disease of thyroid gland, Epigastric pain, Falls, High cholesterol, Hyperlipidemia, Hypertension, Migraine, Palpitations, Recurrent urinary tract infection, Seizures ([address] ), Sleep disorder, Stroke [facility]), and Thyroid disease  She   Patient Active Problem List    Diagnosis Date Noted    Class 1 obesity due to excess calories without serious comorbidity with body mass index (BMI) of 30 0 to 30 9 in adult 2020    Sciatica of right side 2020    Hip strain, right, initial encounter 2019    Acute bacterial conjunctivitis of left eye 2019    Breast pain, right 2019    Elevated BP without diagnosis of hypertension 2019    Contusion of right breast 2019    Epigastric abdominal pain 2019    NSAID long-term use 2019    Left upper quadrant pain 2019    Diabetes mellitus type II, controlled ([address] ) 10/12/2018    Arthritis 10/12/2018    Primary insomnia 2018    Urinary, incontinence, stress female 2018    Urge and stress incontinence 10/26/2017    Osteoporosis 09/15/2017    Adhesive capsulitis of left shoulder 2017    Constipation 05/10/2017    Cervical radiculitis 2017    Elevated TSH 2017    Osteoarthritis of both hands 2017    Memory difficulties 2016    Asymptomatic bilateral carotid artery stenosis 2016    Gastroesophageal reflux disease 2016    Hypertension 2016    Collapsed vertebra, not elsewhere classified, sacral and sacrococcygeal region, initial encounter for fracture ([facility] ) 2016    HLD (hyperlipidemia) 2016    History of CVA with residual deficit 2016    Depression 2016    Headache 2016    Fall 2016    Subdural hemorrhage following injury ([address] ) 2016    Subarachnoid hemorrhage following injury ([facility] ) 2016    Hyperlipidemia 10/19/2013    Muscle weakness 10/19/2013     She  has a past surgical history that includes Knee surgery; Other surgical history;  section;  Axillary Surgery; pr esophagogastroduodenoscopy transoral diagnostic (N/A, 6/5/2017); pr sling oper stres incontinence (N/A, 1/18/2018); CYSTOSCOPY (N/A, 1/18/2018); Bladder surgery; Facial bone tumor excision; and Tubal ligation  Her family history includes Diabetes in her family; Hypertension in her family; Stroke in her family; Thyroid disease in her family  She  reports that she has never smoked  She has never used smokeless tobacco  She reports that she does not drink alcohol or use drugs    Current Outpatient Medications   Medication Sig Dispense Refill    atorvastatin (LIPITOR) 40 mg tablet Take 1 tablet (40 mg total) by mouth daily 90 tablet 1    citalopram (CeleXA) 40 mg tablet Take 1 tablet (40 mg total) by mouth daily 90 tablet 1    albuterol (PROVENTIL HFA,VENTOLIN HFA) 90 mcg/act inhaler       atenolol (TENORMIN) 25 mg tablet Take 1 tablet (25 mg total) by mouth daily 90 tablet 1    Cholecalciferol (VITAMIN D-3) 5000 units TABS Take 1 tablet by mouth daily 90 tablet 1    conjugated estrogens (PREMARIN) vaginal cream Insert 0 5 g into the vagina 3 (three) times a week 42 5 g 0    cyclobenzaprine (FLEXERIL) 5 mg tablet Take 1 tablet (5 mg total) by mouth 3 (three) times a day as needed for muscle spasms 90 tablet 0    cyclobenzaprine (FLEXERIL) 5 mg tablet Take 1 tablet (5 mg total) by mouth 3 (three) times a day as needed for muscle spasms 30 tablet 0    diclofenac sodium (VOLTAREN) 50 mg EC tablet Take 1 tablet (50 mg total) by mouth 2 (two) times a day 60 tablet 0    lidocaine (XYLOCAINE) 5 % ointment Apply topically 2 (two) times a day as needed for mild pain 35 44 g 0    meclizine (ANTIVERT) 12 5 MG tablet       Melatonin 5 MG TABS Take 1 tablet (5 mg total) by mouth daily at bedtime (Patient not taking: Reported on 5/29/2020) 90 tablet 0    meloxicam (MOBIC) 7 5 mg tablet Take 1 tablet (7 5 mg total) by mouth daily (Patient not taking: Reported on 5/29/2020) 90 tablet 0    methylPREDNISolone 4 MG tablet therapy pack Use as directed on package (Patient not taking: Reported on 5/29/2020) 1 each 0    neomycin-polymyxin-dexamethasone (MAXITROL) ophthalmic suspension Administer 1 drop to the right eye 4 (four) times a day for 7 days 5 mL 0    omeprazole (PriLOSEC) 20 mg delayed release capsule Take 1 capsule (20 mg total) by mouth daily 60 capsule 2    polyethylene glycol (GLYCOLAX) powder Take 17 g by mouth daily (Patient taking differently: Take 17 g by mouth as needed ) 850 g 1     No current facility-administered medications for this visit        Current Outpatient Medications on File Prior to Visit   Medication Sig    atorvastatin (LIPITOR) 40 mg tablet Take 1 tablet (40 mg total) by mouth daily    citalopram (CeleXA) 40 mg tablet Take 1 tablet (40 mg total) by mouth daily    albuterol (PROVENTIL HFA,VENTOLIN HFA) 90 mcg/act inhaler     Cholecalciferol (VITAMIN D-3) 5000 units TABS Take 1 tablet by mouth daily    conjugated estrogens (PREMARIN) vaginal cream Insert 0 5 g into the vagina 3 (three) times a week    cyclobenzaprine (FLEXERIL) 5 mg tablet Take 1 tablet (5 mg total) by mouth 3 (three) times a day as needed for muscle spasms    cyclobenzaprine (FLEXERIL) 5 mg tablet Take 1 tablet (5 mg total) by mouth 3 (three) times a day as needed for muscle spasms    lidocaine (XYLOCAINE) 5 % ointment Apply topically 2 (two) times a day as needed for mild pain    meclizine (ANTIVERT) 12 5 MG tablet     Melatonin 5 MG TABS Take 1 tablet (5 mg total) by mouth daily at bedtime (Patient not taking: Reported on 5/29/2020)    meloxicam (MOBIC) 7 5 mg tablet Take 1 tablet (7 5 mg total) by mouth daily (Patient not taking: Reported on 5/29/2020)    methylPREDNISolone 4 MG tablet therapy pack Use as directed on package (Patient not taking: Reported on 5/29/2020)    neomycin-polymyxin-dexamethasone (MAXITROL) ophthalmic suspension Administer 1 drop to the right eye 4 (four) times a day for 7 days    omeprazole (PriLOSEC) 20 mg delayed release capsule Take 1 capsule (20 mg total) by mouth daily    polyethylene glycol (GLYCOLAX) powder Take 17 g by mouth daily (Patient taking differently: Take 17 g by mouth as needed )    [DISCONTINUED] diclofenac sodium (VOLTAREN) 50 mg EC tablet Take 1 tablet (50 mg total) by mouth 2 (two) times a day     No current facility-administered medications on file prior to visit       Review of Systems   All other systems reviewed and are negative  Objective:      /82 (BP Location: Right arm, Patient Position: Sitting, Cuff Size: Adult)   Pulse 68   Temp (!) 97 2 °F (36 2 °C) (Temporal)   Resp 18   Ht 4' 9" (1 448 m)   Wt 67 1 kg (148 lb)   SpO2 97%   BMI 32 03 kg/m²          Physical Exam  Vitals signs and nursing note reviewed  Constitutional:       Appearance: She is well-developed  HENT:      Head: Normocephalic  Right Ear: External ear normal       Left Ear: External ear normal       Nose: Nose normal    Eyes:      Conjunctiva/sclera: Conjunctivae normal       Pupils: Pupils are equal, round, and reactive to light  Neck:      Musculoskeletal: Normal range of motion and neck supple  Thyroid: No thyromegaly  Cardiovascular:      Rate and Rhythm: Normal rate and regular rhythm  Heart sounds: Normal heart sounds  Pulmonary:      Effort: Pulmonary effort is normal       Breath sounds: Normal breath sounds  Abdominal:      Palpations: Abdomen is soft  Tenderness: There is no abdominal tenderness  There is no guarding or rebound  Musculoskeletal: Normal range of motion  Skin:     General: Skin is dry  Neurological:      Mental Status: She is alert and oriented to person, place, and time  Deep Tendon Reflexes: Reflexes are normal and symmetric

## 2021-05-22 LAB — B BURGDOR IGG+IGM SER-ACNC: 13

## 2021-05-27 LAB
25(OH)D2 SERPL-MCNC: <1 NG/ML
25(OH)D3 SERPL-MCNC: 45 NG/ML
25(OH)D3+25(OH)D2 SERPL-MCNC: 45 NG/ML

## 2021-06-02 LAB — MISCELLANEOUS LAB TEST RESULT: NORMAL

## 2021-06-07 ENCOUNTER — EVALUATION (OUTPATIENT)
Dept: PHYSICAL THERAPY | Facility: MEDICAL CENTER | Age: 71
End: 2021-06-07
Payer: COMMERCIAL

## 2021-06-07 DIAGNOSIS — M25.551 CHRONIC HIP PAIN, BILATERAL: ICD-10-CM

## 2021-06-07 DIAGNOSIS — G89.29 CHRONIC BILATERAL LOW BACK PAIN WITH RIGHT-SIDED SCIATICA: Primary | ICD-10-CM

## 2021-06-07 DIAGNOSIS — M25.562 CHRONIC PAIN OF BOTH KNEES: ICD-10-CM

## 2021-06-07 DIAGNOSIS — S76.011A HIP STRAIN, RIGHT, INITIAL ENCOUNTER: ICD-10-CM

## 2021-06-07 DIAGNOSIS — M25.552 CHRONIC HIP PAIN, BILATERAL: ICD-10-CM

## 2021-06-07 DIAGNOSIS — G89.29 CHRONIC PAIN OF BOTH KNEES: ICD-10-CM

## 2021-06-07 DIAGNOSIS — G89.29 CHRONIC HIP PAIN, BILATERAL: ICD-10-CM

## 2021-06-07 DIAGNOSIS — M54.31 SCIATICA OF RIGHT SIDE: ICD-10-CM

## 2021-06-07 DIAGNOSIS — M54.41 CHRONIC BILATERAL LOW BACK PAIN WITH RIGHT-SIDED SCIATICA: Primary | ICD-10-CM

## 2021-06-07 DIAGNOSIS — M25.561 CHRONIC PAIN OF BOTH KNEES: ICD-10-CM

## 2021-06-07 PROCEDURE — 97162 PT EVAL MOD COMPLEX 30 MIN: CPT | Performed by: PHYSICAL THERAPIST

## 2021-06-07 NOTE — PROGRESS NOTES
PT Evaluation     Today's date: 2021  Patient name: Maged Shell  : 1950  MRN: [id number]  Referring provider: Porter Odonnell MD  Dx:   Encounter Diagnosis     ICD-10-CM    1  Chronic bilateral low back pain with right-sided sciatica  M54 41     G89 29    2  Chronic hip pain, bilateral  M25 551     M25 552     G89 29    3  Chronic pain of both knees  M25 561 Ambulatory referral to Physical Therapy    M25 562     G89 29    4  Hip strain, right, initial encounter  [phone] Ambulatory referral to Physical Therapy   5  Sciatica of right side  M54 31 Ambulatory referral to Physical Therapy                  Assessment  Assessment details: Maged Shell is a pleasant 72 y o  female who presents with chronic bilateral LBP/bilateral hip pain/bilateral knee pain with radiating R LE paresthesias and pain of an unknown onset for the last 5 months  She does have an extensive history of falling  However, she is unsure if her symptoms started after a fall or not  Patient was educated to bring her [facility] to future PT sessions to enhance safety  Language line utilized for video interpretation and FDC also utilized for translation during exam  Physical examination testing limited due to high symptom irritability, muscle guarding, and inability to achieve testing positions  However, no further referral is necessary at this time based upon examination results  The primary movement problem is lumbopelvic hypomobility, resulting in pathoanatomical symptoms of Chronic bilateral low back pain with right-sided sciatica and limiting her ability to lie supine, sit, and don/doff clothing  In addition, R sciatic neural tension further contributes to R LE paresthesias and limits her ability to perform household chores  Bilateral hip girdle weakness also causes excessive compensatory stress on lumbar structures during ADL   Patient would benefit from skilled PT services to address the listed impairments to facilitate a return to [facility]  Thank you for the referral      Primary Impairments:  1) Lumbopelvic hypomobility--> addressing with stretching and active exercise  2) R sciatic neural tension--> addressing with nerve glides and neuromotor re-education  3) Core and hip girdle weakness--> addressing with neuromotor re-education and progressive strengthening    Impairments: abnormal gait, abnormal muscle firing, abnormal muscle tone, abnormal or restricted ROM, abnormal movement, activity intolerance, difficulty understanding, impaired balance, impaired physical strength, lacks appropriate home exercise program, pain with function, safety issue and poor body mechanics  Functional limitations: sitting, lying supine, donning/doffing clothing, household chores  Symptom irritability: highBarriers to therapy: Language barrier  Understanding of Dx/Px/POC: good   Prognosis: fair  Prognosis details: Positive prognostic indicators include positive attitude toward recovery  Negative prognostic indicators include chronicity of symptoms, high symptom irritability, osteoporosis, HTN, type II DM  Goals  Patient will be independent with home exercise program    Patient will increase lumbar AROM by 50% in all planes to be able to don/doff clothing  Patient will increase R hip AROM to be comparable to the contralateral side in all planes to improve quality of gait mechanics  Patient will exceed MCID on FOTO to demonstrate improved function  Patient will be able to lie supine  Patient will be able to sit for longer periods  Patient will be able to manage symptoms independently  Plan  Plan details: Prognosis above is given PT services 2x/week tapering to 1x/week over the next month and home program adherence    Patient would benefit from: skilled physical therapy  Referral necessary: No  Planned modality interventions: thermotherapy: hydrocollator packs and cryotherapy  Planned therapy interventions: activity modification, joint mobilization, manual therapy, motor coordination training, neuromuscular re-education, patient education, therapeutic activities, therapeutic exercise, graded activity, home exercise program, balance, strengthening, stretching, functional ROM exercises and flexibility  Frequency: 2x week  Duration in weeks: 4  Treatment plan discussed with: patient        Subjective Evaluation    History of Present Illness  Mechanism of injury: This is a 71 yo female presenting with LBP/bilateral knee pain/bilateral hip pain for the last 5 months  The pain is located in both sides of her low back, both outer hips/inner thighs, and into both knees  She also reports that pain radiates into her R ankle/foot  She has numbness/tingling into her R leg  She reports that she has a history of falling approximately 3x/week but is unsure if her pain started after a fall or not  She does use a cane at home  She is having difficulty donning/doffing clothing and performing household chores due to the pain  She also reports that the pain is worsened when she is lying flat on her back and sitting      Quality of life: fair    Pain  Current pain ratin  At best pain ratin  At worst pain ratin  Location: both sides of low back, both hips, both knees, R lower leg  Quality: sharp  Aggravating factors: sitting      Diagnostic Tests  X-ray: abnormal (mild OA R knee; moderate OA L knee)  Treatments  No previous or current treatments  Patient Goals  Patient goals for therapy: decreased pain, increased strength, independence with ADLs/IADLs and increased motion  Patient goal: to be able to lie down, to be able to sit, to be able to dress, to be able to do chores        Objective     Neurological Testing     Sensation     Lumbar   Left   Diminished: light touch    Right   Intact: light touch    Comments   Left light touch: diminished sensation to light touch L S1 dermatome    Reflexes   Left   Patellar (L4): brisk (3+)  Achilles (S1): absent (0)    Right   Patellar (L4): brisk (3+)  Achilles (S1): absent (0)    Active Range of Motion     Lumbar   Flexion:  WFL and with pain  Extension:  Restriction level: maximal  Left lateral flexion:  Restriction level: moderate  Right lateral flexion:  Restriction level: moderate    Passive Range of Motion   Left Hip   External rotation (90/90): 30 degrees   Internal rotation (90/90): 45 degrees     Right Hip   External rotation (90/90): 25 degrees with pain  Internal rotation (90/90): 45 degrees with pain    Strength/Myotome Testing     Lumbar   Left   Heel walk: normal  Toe walk: normal    Right   Heel walk: normal  Toe walk: normal    Left Hip   Planes of Motion   Flexion: 3  Abduction: 2+    Right Hip   Planes of Motion   Flexion: 3  Abduction: 2+    Left Knee   Flexion: 5  Extension: 5    Right Knee   Flexion: 5  Extension: 5    Left Ankle/Foot   Plantar flexion: 5  Great toe extension: 5    Right Ankle/Foot   Plantar flexion: 5  Great toe extension: 5    Additional Strength Details  Myotomes intact and symmetrical bilateral at all levels; patient presents with gross peripheral strength deficits throughout bilateral hip girdle musculature (R worse than L)    Tests     Lumbar     Left   Negative passive SLR  Right   Positive passive SLR  Left Hip   Left hip REZA test: unable to achieve testing position  FADIR test: unable to achieve testing position  Scour test positive: unable to achieve testing position  Right Hip   Right hip REZA test: unable to achieve testing position  FADIR test: unable to assess testing position  Scour test positive: unable to achieve testing position  Ambulation     Observational Gait   Gait: antalgic   Decreased walking speed       Additional Observational Gait Details  Mild unsteadiness noted upon ambulation; pt was educated to bring her [facility] to next session    Functional Assessment        Comments  Supine to sit transfer: mod A    General Comments:      Hip Comments Physical examination tests and measures limited due to high symptom irritability, muscle guarding, and inability to sustain prolonged positioning             Precautions: FALL RISK, osteoporosis, hx of CVA/TBI, hx of seizures, HTN, carotid artery stenosis, hyperlipidemia, hx of sacral fracture, thyroid disease      Manuals 6/7                                                                Neuro Re-Ed             Seated sciatic nerve glides                                                                                           Ther Ex             LTR             Active BKFO             Supine clamshell             Supine hip ADD isometrics             Seated pball rollouts             Seated hip ER/IR AROM                                                                              Ther Activity                                       Gait Training                                       Modalities             MHP lumbar/R hip

## 2021-06-10 ENCOUNTER — OFFICE VISIT (OUTPATIENT)
Dept: PHYSICAL THERAPY | Facility: MEDICAL CENTER | Age: 71
End: 2021-06-10
Payer: COMMERCIAL

## 2021-06-10 DIAGNOSIS — M25.562 CHRONIC PAIN OF BOTH KNEES: ICD-10-CM

## 2021-06-10 DIAGNOSIS — M54.41 CHRONIC BILATERAL LOW BACK PAIN WITH RIGHT-SIDED SCIATICA: Primary | ICD-10-CM

## 2021-06-10 DIAGNOSIS — S76.011A HIP STRAIN, RIGHT, INITIAL ENCOUNTER: ICD-10-CM

## 2021-06-10 DIAGNOSIS — M25.551 CHRONIC HIP PAIN, BILATERAL: ICD-10-CM

## 2021-06-10 DIAGNOSIS — M25.561 CHRONIC PAIN OF BOTH KNEES: ICD-10-CM

## 2021-06-10 DIAGNOSIS — G89.29 CHRONIC PAIN OF BOTH KNEES: ICD-10-CM

## 2021-06-10 DIAGNOSIS — M54.31 SCIATICA OF RIGHT SIDE: ICD-10-CM

## 2021-06-10 DIAGNOSIS — G89.29 CHRONIC HIP PAIN, BILATERAL: ICD-10-CM

## 2021-06-10 DIAGNOSIS — G89.29 CHRONIC BILATERAL LOW BACK PAIN WITH RIGHT-SIDED SCIATICA: Primary | ICD-10-CM

## 2021-06-10 DIAGNOSIS — M25.552 CHRONIC HIP PAIN, BILATERAL: ICD-10-CM

## 2021-06-10 PROCEDURE — 97110 THERAPEUTIC EXERCISES: CPT | Performed by: PHYSICAL THERAPIST

## 2021-06-10 NOTE — PROGRESS NOTES
Daily Note     Today's date: 6/10/2021  Patient name: Estefanía Coats  : 1950  MRN: [id number]  Referring provider: Sudhakar Luo MD  Dx:   Encounter Diagnosis     ICD-10-CM    1  Chronic bilateral low back pain with right-sided sciatica  M54 41     G89 29    2  Chronic hip pain, bilateral  M25 551     M25 552     G89 29    3  Chronic pain of both knees  M25 561     M25 562     G89 29    4  Hip strain, right, initial encounter  [id number]    5  Sciatica of right side  M54 31                   Subjective: Patient reports that she is feeling okay, but she is having difficulty walking  Objective: See treatment diary below      Assessment: FDC utilized for translation  Patient presented ambulating with SPC but demonstrated significant unsteadiness when utilizing SPC  CGA/min A of 1 required to ambulate within clinic  Extensive cueing provided for proper sequencing with SPC  Patient also required min/mod A for supine to and from sit transfers  Extensive cueing required for proper form throughout session  Performed gentle lumbar FLX-based mobility TE and lumbopelvic strengthening to decrease compensatory strain on soft tissues during functional mobility  Patient and  were educated that patient should use RW at home to prevent falls  They were also educated to bring RW to utilize in the clinic next visit  They verbalized understanding  Patient would benefit from continued PT      Plan: Continue per plan of care        Precautions: FALL RISK, osteoporosis, hx of CVA/TBI, hx of seizures, HTN, carotid artery stenosis, hyperlipidemia, hx of sacral fracture, thyroid disease      Manuals 6/7 6/10                                                               Neuro Re-Ed             Seated sciatic nerve glides                                                                                           Ther Ex             LTR  5"x30           SKTC  10"x10 ea           Active BKFO             Supine clamshell  5"x30 RTB           Supine hip ADD isometrics  5"x30            Seated pball rollouts  10"x10 FWD           Seated hip ER/IR AROM                                                                              Ther Activity                                       Gait Training             SPC ambulation  5'                        Modalities             MHP lumbar/R hip  10' seated pre

## 2021-06-14 ENCOUNTER — OFFICE VISIT (OUTPATIENT)
Dept: PHYSICAL THERAPY | Facility: MEDICAL CENTER | Age: 71
End: 2021-06-14
Payer: COMMERCIAL

## 2021-06-14 DIAGNOSIS — M25.552 CHRONIC HIP PAIN, BILATERAL: ICD-10-CM

## 2021-06-14 DIAGNOSIS — G89.29 CHRONIC HIP PAIN, BILATERAL: ICD-10-CM

## 2021-06-14 DIAGNOSIS — S76.011A HIP STRAIN, RIGHT, INITIAL ENCOUNTER: ICD-10-CM

## 2021-06-14 DIAGNOSIS — M54.31 SCIATICA OF RIGHT SIDE: ICD-10-CM

## 2021-06-14 DIAGNOSIS — M25.551 CHRONIC HIP PAIN, BILATERAL: ICD-10-CM

## 2021-06-14 DIAGNOSIS — M25.561 CHRONIC PAIN OF BOTH KNEES: ICD-10-CM

## 2021-06-14 DIAGNOSIS — M54.41 CHRONIC BILATERAL LOW BACK PAIN WITH RIGHT-SIDED SCIATICA: Primary | ICD-10-CM

## 2021-06-14 DIAGNOSIS — M25.562 CHRONIC PAIN OF BOTH KNEES: ICD-10-CM

## 2021-06-14 DIAGNOSIS — G89.29 CHRONIC PAIN OF BOTH KNEES: ICD-10-CM

## 2021-06-14 DIAGNOSIS — G89.29 CHRONIC BILATERAL LOW BACK PAIN WITH RIGHT-SIDED SCIATICA: Primary | ICD-10-CM

## 2021-06-14 PROCEDURE — 97110 THERAPEUTIC EXERCISES: CPT | Performed by: PHYSICAL THERAPIST

## 2021-06-14 NOTE — PROGRESS NOTES
Daily Note     Today's date: 2021  Patient name: Ashley Valenzuela  : 1950  MRN: [id number]  Referring provider: Jemma Gonzales MD  Dx:   Encounter Diagnosis     ICD-10-CM    1  Chronic bilateral low back pain with right-sided sciatica  M54 41     G89 29    2  Chronic hip pain, bilateral  M25 551     M25 552     G89 29    3  Chronic pain of both knees  M25 561     M25 562     G89 29    4  Hip strain, right, initial encounter  S76 011A    5  Sciatica of right side  M54 31                   Subjective: Patient reports that she is having pain in the middle and R side of her low back near her sacrum  Objective: See treatment diary below      Assessment: Patient presented to clinic today ambulating with RW  She demonstrated significant improvements in balance with RW ambulation vs use of single point cane  Cane or crutch assistance is not sufficient due to significant balance dysfunction and cognitive deficits  However, her current RW is very unstable and not safe for daily use  Unable to adjust mechanics of RW in the clinic to enhance stability and safety  Patient would benefit from use of a new RW for ambulation due to significant balance and safety concerns  Cueing provided to prevent patient lifting RW when making turns  Patient continues to require mod/max assist during supine to sit transfer  Patient was educated to perform all interventions in a pain-free range  Patient reported groin pain after prolonged supine hooklying  Will monitor response next visit  Patient would benefit from continued PT to improve lumbopelvic mobility and core stability to maximize functional activity tolerance  Plan: Continue per plan of care        Precautions: FALL RISK, osteoporosis, hx of CVA/TBI, hx of seizures, HTN, carotid artery stenosis, hyperlipidemia, hx of sacral fracture, thyroid disease      Manuals 6/7 6/10 6/14                                                              Neuro Re-Ed             Seated sciatic nerve glides                                                                                           Ther Ex             LTR  5"x30 5"x30          SKTC  10"x10 ea 15"x10 ea          Active BKFO   10"x25 ea          Supine clamshell  5"x30 RTB 5"x30 RTB          Supine hip ADD isometrics  5"x30  5"x40          Seated pball rollouts  10"x10 FWD 10"x10 FWD          Supine TA isometrics             Standing marches             Seated hip ER/IR AROM                                                                              Ther Activity                                       Gait Training             SPC ambulation  8' np                       Modalities             MHP lumbar/R hip  10' seated pre 10' seated pre

## 2021-06-17 ENCOUNTER — TELEPHONE (OUTPATIENT)
Dept: FAMILY MEDICINE CLINIC | Facility: CLINIC | Age: 71
End: 2021-06-17

## 2021-06-17 DIAGNOSIS — M25.562 CHRONIC PAIN OF BOTH KNEES: Primary | ICD-10-CM

## 2021-06-17 DIAGNOSIS — G89.29 CHRONIC PAIN OF BOTH KNEES: Primary | ICD-10-CM

## 2021-06-17 DIAGNOSIS — M81.0 AGE RELATED OSTEOPOROSIS, UNSPECIFIED PATHOLOGICAL FRACTURE PRESENCE: ICD-10-CM

## 2021-06-17 DIAGNOSIS — M25.561 CHRONIC PAIN OF BOTH KNEES: Primary | ICD-10-CM

## 2021-06-17 NOTE — TELEPHONE ENCOUNTER
[facility] states they received PT notes but no script  Reviewed most recent note, looks like the recommended a new RW for her      Script ordered and faxed to them [phone]    Confirmation received

## 2021-06-18 ENCOUNTER — OFFICE VISIT (OUTPATIENT)
Dept: PHYSICAL THERAPY | Facility: MEDICAL CENTER | Age: 71
End: 2021-06-18
Payer: COMMERCIAL

## 2021-06-18 DIAGNOSIS — G89.29 CHRONIC PAIN OF BOTH KNEES: ICD-10-CM

## 2021-06-18 DIAGNOSIS — G89.29 CHRONIC HIP PAIN, BILATERAL: ICD-10-CM

## 2021-06-18 DIAGNOSIS — M54.41 CHRONIC BILATERAL LOW BACK PAIN WITH RIGHT-SIDED SCIATICA: Primary | ICD-10-CM

## 2021-06-18 DIAGNOSIS — M25.552 CHRONIC HIP PAIN, BILATERAL: ICD-10-CM

## 2021-06-18 DIAGNOSIS — M25.551 CHRONIC HIP PAIN, BILATERAL: ICD-10-CM

## 2021-06-18 DIAGNOSIS — G89.29 CHRONIC BILATERAL LOW BACK PAIN WITH RIGHT-SIDED SCIATICA: Primary | ICD-10-CM

## 2021-06-18 DIAGNOSIS — M25.562 CHRONIC PAIN OF BOTH KNEES: ICD-10-CM

## 2021-06-18 DIAGNOSIS — S76.011A HIP STRAIN, RIGHT, INITIAL ENCOUNTER: ICD-10-CM

## 2021-06-18 DIAGNOSIS — M25.561 CHRONIC PAIN OF BOTH KNEES: ICD-10-CM

## 2021-06-18 DIAGNOSIS — M54.31 SCIATICA OF RIGHT SIDE: ICD-10-CM

## 2021-06-18 PROCEDURE — 97112 NEUROMUSCULAR REEDUCATION: CPT | Performed by: PHYSICAL THERAPIST

## 2021-06-18 PROCEDURE — 97530 THERAPEUTIC ACTIVITIES: CPT | Performed by: PHYSICAL THERAPIST

## 2021-06-18 NOTE — PROGRESS NOTES
Daily Note     Today's date: 2021  Patient name: Petey Mclaughlin  : 1950  MRN: [id number]  Referring provider: Jami Tam MD  Dx:   Encounter Diagnosis     ICD-10-CM    1  Chronic bilateral low back pain with right-sided sciatica  M54 41     G89 29    2  Chronic hip pain, bilateral  M25 551     M25 552     G89 29    3  Chronic pain of both knees  M25 561     M25 562     G89 29    4  Hip strain, right, initial encounter  S76 011A    5  Sciatica of right side  M54 31                   Subjective: Patient reports that she is having pain in her R shoulder due to falling at home a few days ago  She reports that she fell when getting out of bed, and she reports that her bed is too high for her at home  She also reports that she had dizziness prior to standing to get out of bed  She notes that she did not injure her hips, knees, or back when falling  Her back feels pretty good today  Objective: See treatment diary below      Assessment: FDC utilized for translation  Patient presented with kinesiotape applied on R shoulder that she stated was applied by her   She also presented ambulating with RW and demonstrated significant improvements in balance and stability when using RW compared to prior sessions with use of SPC  Patient was educated to avoid picking RW up when making turns  Focused session on supine to sit transfers, sit to stands, and functional balance training to decrease compensatory strain on low back and LE during ADL and to prevent recurrence of falls  Patient requires min A for supine to sit transfer, and max verbal cueing was provided to roll onto side and use UE assist  Patient reported dizziness upon supine to sit transfers  Extensive education provided to wait 1 minute prior to standing when transitioning out of bed at home to prevent falling  All interventions performed in a pain-free range   Patient would benefit from continued PT to improve balance and functional strength to achieve goals  Plan: Continue per plan of care        Precautions: FALL RISK, osteoporosis, hx of CVA/TBI, hx of seizures, HTN, carotid artery stenosis, hyperlipidemia, hx of sacral fracture, thyroid disease      Manuals 6/7 6/10 6/14 6/18                                                             Neuro Re-Ed             Seated sciatic nerve glides             Sit to stands    3x5         Heel raises    30         Toe raises    30         Tandem stance    5x5" ea CGA         Romberg    5x5" CGA                      Ther Ex             LTR  5"x30 5"x30 np         SKTC  10"x10 ea 15"x10 ea np         Active BKFO   10"x25 ea np         Supine clamshell  5"x30 RTB 5"x30 RTB np         Supine hip ADD isometrics  5"x30  5"x40 np         Seated pball rollouts  10"x10 FWD 10"x10 FWD np         Supine TA isometrics             Standing marches             Seated hip ER/IR AROM                                                                              Ther Activity             Supine to sit transfer training    15'                      Gait Training             [facility] ambulation  8' np                       Modalities             MHP lumbar/R hip  10' seated pre 10' seated pre 10' seated pre

## 2021-06-21 ENCOUNTER — APPOINTMENT (OUTPATIENT)
Dept: PHYSICAL THERAPY | Facility: MEDICAL CENTER | Age: 71
End: 2021-06-21
Payer: COMMERCIAL

## 2021-06-22 ENCOUNTER — TELEPHONE (OUTPATIENT)
Dept: FAMILY MEDICINE CLINIC | Facility: CLINIC | Age: 71
End: 2021-06-22

## 2021-06-22 NOTE — TELEPHONE ENCOUNTER
SIGNATURES NEEDED FOR [facility] FORM RECEIVED VIA FAX  WILL BE PLACED IN FORM BIN FOR MA PICKUP      Wheeled walker

## 2021-06-25 ENCOUNTER — OFFICE VISIT (OUTPATIENT)
Dept: PHYSICAL THERAPY | Facility: MEDICAL CENTER | Age: 71
End: 2021-06-25
Payer: COMMERCIAL

## 2021-06-25 DIAGNOSIS — G89.29 CHRONIC PAIN OF BOTH KNEES: ICD-10-CM

## 2021-06-25 DIAGNOSIS — M25.561 CHRONIC PAIN OF BOTH KNEES: ICD-10-CM

## 2021-06-25 DIAGNOSIS — M25.551 CHRONIC HIP PAIN, BILATERAL: ICD-10-CM

## 2021-06-25 DIAGNOSIS — M25.562 CHRONIC PAIN OF BOTH KNEES: ICD-10-CM

## 2021-06-25 DIAGNOSIS — G89.29 CHRONIC HIP PAIN, BILATERAL: ICD-10-CM

## 2021-06-25 DIAGNOSIS — M54.41 CHRONIC BILATERAL LOW BACK PAIN WITH RIGHT-SIDED SCIATICA: Primary | ICD-10-CM

## 2021-06-25 DIAGNOSIS — M25.552 CHRONIC HIP PAIN, BILATERAL: ICD-10-CM

## 2021-06-25 DIAGNOSIS — G89.29 CHRONIC BILATERAL LOW BACK PAIN WITH RIGHT-SIDED SCIATICA: Primary | ICD-10-CM

## 2021-06-25 DIAGNOSIS — S76.011A HIP STRAIN, RIGHT, INITIAL ENCOUNTER: ICD-10-CM

## 2021-06-25 DIAGNOSIS — M54.31 SCIATICA OF RIGHT SIDE: ICD-10-CM

## 2021-06-25 PROCEDURE — 97112 NEUROMUSCULAR REEDUCATION: CPT | Performed by: PHYSICAL THERAPIST

## 2021-06-25 PROCEDURE — 97110 THERAPEUTIC EXERCISES: CPT | Performed by: PHYSICAL THERAPIST

## 2021-06-25 NOTE — PROGRESS NOTES
Daily Note     Today's date: 2021  Patient name: Bayron Clancy  : 1950  MRN: [id number]  Referring provider: Kristian Coffey MD  Dx:   Encounter Diagnosis     ICD-10-CM    1  Chronic bilateral low back pain with right-sided sciatica  M54 41     G89 29    2  Chronic hip pain, bilateral  M25 551     M25 552     G89 29    3  Chronic pain of both knees  M25 561     M25 562     G89 29    4  Hip strain, right, initial encounter  S76 011A    5  Sciatica of right side  M54 31                   Subjective: Patient reports that she is feeling good today  She reports that she had 1 fall at home 2 days ago when attempting to adjust the height of her bed  She reports that that she landed on her buttock  She states that it is feeling better today with no pain since taking Tylenol  Objective: See treatment diary below      Assessment: FDC utilized to assist with translation  Patient was able to complete functional strengthening and balance program without reproduction of buttock pain  Patient was educated to contact physician if she develops increased buttock pain after recent fall  She verbalized understanding  Patient demonstrated improved steadiness today during tandem stance and SLS compared to last session but continues to require CGA to maintain stability  Held [facility] core and hip girdle strengthening to initiate CKC hip girdle and LE strengthening with addition of standing hip ABD, marches, and step ups  Patient required UE support to rise from chair during sit to stand transfer  Patient also continues to demonstrate significant difficulty performing supine to and from sit transfer and requires min/mod assist to complete transfer  Extra time required throughout session for seated and standing rest breaks  All exercises performed in a pain-free range  Patient is able to use her RW, but the arms of the RW are unstable  There is a safety concern due to instability with her RW   Will contact medical supply company to inquire about delivery of new RW pending insurance approval to decrease risk of falls  Plan: Continue per plan of care        Precautions: FALL RISK, osteoporosis, hx of CVA/TBI, hx of seizures, HTN, carotid artery stenosis, hyperlipidemia, hx of sacral fracture, thyroid disease      Manuals 6/7 6/10 6/14 6/18 6/25                                                            Neuro Re-Ed             Seated sciatic nerve glides             Sit to stands    3x5 15        Heel raises    30 30        Toe raises    30 30        Tandem stance    5x5" ea CGA 20x5" ea CGA        Romberg    5x5" CGA 20x5" CGA                     Ther Ex             LTR  5"x30 5"x30 np 5"x30        SKTC  10"x10 ea 15"x10 ea np 10"x10 ea        Standing hip ABD     10 ea        Standing marches     30 ea        Step ups     10 L1 HR A        Active BKFO   10"x25 ea np         Supine clamshell  5"x30 RTB 5"x30 RTB np         Supine hip ADD isometrics  5"x30  5"x40 np         Seated pball rollouts  10"x10 FWD 10"x10 FWD np         Supine TA isometrics             Seated hip ER/IR AROM                                                                              Ther Activity             Supine to sit transfer training    15' np                     Gait Training             [facility] ambulation  8' np                       Modalities             MHP lumbar/R hip  10' seated pre 10' seated pre 10' seated pre

## 2021-06-28 ENCOUNTER — OFFICE VISIT (OUTPATIENT)
Dept: PHYSICAL THERAPY | Facility: MEDICAL CENTER | Age: 71
End: 2021-06-28
Payer: COMMERCIAL

## 2021-06-28 DIAGNOSIS — M25.561 CHRONIC PAIN OF BOTH KNEES: ICD-10-CM

## 2021-06-28 DIAGNOSIS — M25.552 CHRONIC HIP PAIN, BILATERAL: ICD-10-CM

## 2021-06-28 DIAGNOSIS — M54.31 SCIATICA OF RIGHT SIDE: ICD-10-CM

## 2021-06-28 DIAGNOSIS — G89.29 CHRONIC HIP PAIN, BILATERAL: ICD-10-CM

## 2021-06-28 DIAGNOSIS — S76.011A HIP STRAIN, RIGHT, INITIAL ENCOUNTER: ICD-10-CM

## 2021-06-28 DIAGNOSIS — M25.562 CHRONIC PAIN OF BOTH KNEES: ICD-10-CM

## 2021-06-28 DIAGNOSIS — M25.551 CHRONIC HIP PAIN, BILATERAL: ICD-10-CM

## 2021-06-28 DIAGNOSIS — M54.41 CHRONIC BILATERAL LOW BACK PAIN WITH RIGHT-SIDED SCIATICA: Primary | ICD-10-CM

## 2021-06-28 DIAGNOSIS — G89.29 CHRONIC BILATERAL LOW BACK PAIN WITH RIGHT-SIDED SCIATICA: Primary | ICD-10-CM

## 2021-06-28 DIAGNOSIS — G89.29 CHRONIC PAIN OF BOTH KNEES: ICD-10-CM

## 2021-06-28 PROCEDURE — 97112 NEUROMUSCULAR REEDUCATION: CPT | Performed by: PHYSICAL THERAPIST

## 2021-06-28 PROCEDURE — 97110 THERAPEUTIC EXERCISES: CPT | Performed by: PHYSICAL THERAPIST

## 2021-06-28 NOTE — PROGRESS NOTES
Daily Note     Today's date: 2021  Patient name: Debi Rios  : 1950  MRN: [id number]  Referring provider: Sukh Hart MD  Dx:   Encounter Diagnosis     ICD-10-CM    1  Chronic bilateral low back pain with right-sided sciatica  M54 41     G89 29    2  Chronic hip pain, bilateral  M25 551     M25 552     G89 29    3  Chronic pain of both knees  M25 561     M25 562     G89 29    4  Hip strain, right, initial encounter  S76 011A    5  Sciatica of right side  M54 31                   Subjective: Patient reports that she is having pain in both of her knees  She has no pain in her back, and she did not have any falls since last session  She has not received her new RW yet  Objective: See treatment diary below      Assessment: Patient is continuing to ambulate with RW that is slightly unsteady due to inability to adjust and tighten sides  She is currently awaiting new RW pending physician referral to [employer] company  Continued with gentle lumbar mobility TE due to positive response after last visit with decreased LBP  Returned to gentle [place] hip girdle strengthening to decrease compensatory strain on knees during ADL  Patient continues to demonstrate significant difficulty performing supine to and from sit transfers with min/mod A required for safety  Patient is continuing to demonstrating steady progress with improving balance and control during tandem and Romberg stance  Decreased reps of sit to stands and held step ups due to report of bilateral knee pain today  All interventions performed in a pain-free range  Patient required seated rest breaks throughout session due to fatigue  CP applied to bilateral knees at end of session to minimize post-exercise soreness  Patient would benefit from continued PT to further progress balance and functional strengthening to ensure safety during community and household ambulation  Plan: Continue per plan of care        Precautions: FALL RISK, osteoporosis, hx of CVA/TBI, hx of seizures, HTN, carotid artery stenosis, hyperlipidemia, hx of sacral fracture, thyroid disease      Manuals 6/7 6/10 6/14 6/18 6/25 6/28                                                           Neuro Re-Ed             Seated sciatic nerve glides             Sit to stands    3x5 15 2x5       Heel raises    30 30 30       Toe raises    30 30 30       Tandem stance    5x5" ea CGA 20x5" ea CGA 10x5" ea CGA       Romberg    5x5" CGA 20x5" CGA 20"x5 CGA                    Ther Ex             LTR  5"x30 5"x30 np 5"x30 5"x30       SKTC  10"x10 ea 15"x10 ea np 10"x10 ea 10"x10 ea       Standing hip ABD     10 ea 10 ea       Standing marches     30 ea 30 ea       Step ups     10 L1 HR A np       Active BKFO   10"x25 ea np         Supine clamshell  5"x30 RTB 5"x30 RTB np  5"x30 RTB       Supine hip ADD isometrics  5"x30  5"x40 np  5"x30        Seated pball rollouts  10"x10 FWD 10"x10 FWD np         Supine TA isometrics             Seated hip ER/IR AROM                                                                              Ther Activity             Supine to sit transfer training    15' np                     Gait Training             [facility] ambulation  8' np                       Modalities             MHP lumbar/R hip  10' seated pre 10' seated pre 10' seated pre         CP to bilateral knees      10' post supine + bolster under knees

## 2021-07-01 ENCOUNTER — APPOINTMENT (OUTPATIENT)
Dept: PHYSICAL THERAPY | Facility: MEDICAL CENTER | Age: 71
End: 2021-07-01
Payer: COMMERCIAL

## 2021-07-02 ENCOUNTER — OFFICE VISIT (OUTPATIENT)
Dept: PHYSICAL THERAPY | Facility: MEDICAL CENTER | Age: 71
End: 2021-07-02
Payer: COMMERCIAL

## 2021-07-02 ENCOUNTER — TELEPHONE (OUTPATIENT)
Dept: FAMILY MEDICINE CLINIC | Facility: CLINIC | Age: 71
End: 2021-07-02

## 2021-07-02 DIAGNOSIS — M54.31 SCIATICA OF RIGHT SIDE: ICD-10-CM

## 2021-07-02 DIAGNOSIS — M25.552 CHRONIC HIP PAIN, BILATERAL: ICD-10-CM

## 2021-07-02 DIAGNOSIS — M54.41 CHRONIC BILATERAL LOW BACK PAIN WITH RIGHT-SIDED SCIATICA: Primary | ICD-10-CM

## 2021-07-02 DIAGNOSIS — G89.29 CHRONIC PAIN OF BOTH KNEES: ICD-10-CM

## 2021-07-02 DIAGNOSIS — G89.29 CHRONIC HIP PAIN, BILATERAL: ICD-10-CM

## 2021-07-02 DIAGNOSIS — M25.561 CHRONIC PAIN OF BOTH KNEES: ICD-10-CM

## 2021-07-02 DIAGNOSIS — G89.29 CHRONIC BILATERAL LOW BACK PAIN WITH RIGHT-SIDED SCIATICA: Primary | ICD-10-CM

## 2021-07-02 DIAGNOSIS — M25.562 CHRONIC PAIN OF BOTH KNEES: ICD-10-CM

## 2021-07-02 DIAGNOSIS — M25.551 CHRONIC HIP PAIN, BILATERAL: ICD-10-CM

## 2021-07-02 DIAGNOSIS — S76.011A HIP STRAIN, RIGHT, INITIAL ENCOUNTER: ICD-10-CM

## 2021-07-02 PROCEDURE — 97112 NEUROMUSCULAR REEDUCATION: CPT | Performed by: PHYSICAL THERAPIST

## 2021-07-02 PROCEDURE — 97110 THERAPEUTIC EXERCISES: CPT | Performed by: PHYSICAL THERAPIST

## 2021-07-02 NOTE — PROGRESS NOTES
Daily Note     Today's date: 2021  Patient name: Marcela Good  : 1950  MRN: [id number]  Referring provider: Sarah Rios MD  Dx:   Encounter Diagnosis     ICD-10-CM    1  Chronic bilateral low back pain with right-sided sciatica  M54 41     G89 29    2  Chronic hip pain, bilateral  M25 551     M25 552     G89 29    3  Chronic pain of both knees  M25 561     M25 562     G89 29    4  Hip strain, right, initial encounter  S76 011A    5  Sciatica of right side  M54 31                   Subjective: Patient reports that she is feeling pretty good but had some soreness in her R thigh after last visit  She reports that she has fallen at home every day for the last 3 days when getting out of bed because her bed is too high  She reports that her bed is now at a lower height, and she reports that she did not have any injuries from the falls  She is still using her current RW and is awaiting her new RW  Objective: See treatment diary below      Assessment: Patient's family member assisted with translation  Patient demonstrated with no gait deviations and no evidence of increased pain after recent falls at home  Patient demonstrated improvements in stability during Romberg stance today but continues to demonstrate difficulty maintaining tandem stance without CGA  Modified sit to stands to be performed with addition of 1 airex pad due to report of R knee pain during this intervention  No pain after modification of height  Patient was able to progress reps for supine hip girdle strengthening, which demonstrates good progress toward goals  Patient would benefit from continued PT to progress strengthening and balance interventions to decrease risk of falls  Plan: Continue per plan of care        Precautions: FALL RISK, osteoporosis, hx of CVA/TBI, hx of seizures, HTN, carotid artery stenosis, hyperlipidemia, hx of sacral fracture, thyroid disease      Manuals 6/7 6/10 6/14 6/18 6/25 6/28 7/2 Neuro Re-Ed             Seated sciatic nerve glides             Sit to stands    3x5 15 2x5 2x10 + airex      Heel raises    30 30 30 30      Toe raises    30 30 30 30      Tandem stance    5x5" ea CGA 20x5" ea CGA 10x5" ea CGA 10x5" ea CGA      Romberg    5x5" CGA 20x5" CGA 20"x5 CGA 20"x7 CGA                   Ther Ex             LTR  5"x30 5"x30 np 5"x30 5"x30 5"x30      SKTC  10"x10 ea 15"x10 ea np 10"x10 ea 10"x10 ea 10"x10 ea      Standing hip ABD     10 ea 10 ea 10 ea      Standing marches     30 ea 30 ea 30 ea      Step ups     10 L1 HR A np       Active BKFO   10"x25 ea np         Supine clamshell  5"x30 RTB 5"x30 RTB np  5"x30 RTB 5"x40 GTB      Supine hip ADD isometrics  5"x30  5"x40 np  5"x30  5"x40      Seated pball rollouts  10"x10 FWD 10"x10 FWD np         Supine TA isometrics             Seated hip ER/IR AROM                                                                              Ther Activity             Supine to sit transfer training    15' np                     Gait Training             [facility] ambulation  8' np                       Modalities             MHP lumbar/R hip  10' seated pre 10' seated pre 10' seated pre         CP to bilateral knees      10' post supine + bolster under knees

## 2021-07-09 ENCOUNTER — OFFICE VISIT (OUTPATIENT)
Dept: PHYSICAL THERAPY | Facility: MEDICAL CENTER | Age: 71
End: 2021-07-09
Payer: COMMERCIAL

## 2021-07-09 DIAGNOSIS — M25.552 CHRONIC HIP PAIN, BILATERAL: ICD-10-CM

## 2021-07-09 DIAGNOSIS — S76.011A HIP STRAIN, RIGHT, INITIAL ENCOUNTER: ICD-10-CM

## 2021-07-09 DIAGNOSIS — M54.41 CHRONIC BILATERAL LOW BACK PAIN WITH RIGHT-SIDED SCIATICA: Primary | ICD-10-CM

## 2021-07-09 DIAGNOSIS — M25.561 CHRONIC PAIN OF BOTH KNEES: ICD-10-CM

## 2021-07-09 DIAGNOSIS — M54.31 SCIATICA OF RIGHT SIDE: ICD-10-CM

## 2021-07-09 DIAGNOSIS — G89.29 CHRONIC BILATERAL LOW BACK PAIN WITH RIGHT-SIDED SCIATICA: Primary | ICD-10-CM

## 2021-07-09 DIAGNOSIS — M25.562 CHRONIC PAIN OF BOTH KNEES: ICD-10-CM

## 2021-07-09 DIAGNOSIS — G89.29 CHRONIC HIP PAIN, BILATERAL: ICD-10-CM

## 2021-07-09 DIAGNOSIS — M25.551 CHRONIC HIP PAIN, BILATERAL: ICD-10-CM

## 2021-07-09 DIAGNOSIS — G89.29 CHRONIC PAIN OF BOTH KNEES: ICD-10-CM

## 2021-07-09 PROCEDURE — 97110 THERAPEUTIC EXERCISES: CPT | Performed by: PHYSICAL THERAPIST

## 2021-07-09 PROCEDURE — 97112 NEUROMUSCULAR REEDUCATION: CPT | Performed by: PHYSICAL THERAPIST

## 2021-07-09 NOTE — PROGRESS NOTES
Daily Note     Today's date: 2021  Patient name: Ritu Meade  : 1950  MRN: [id number]  Referring provider: Kathy Rondon MD  Dx:   Encounter Diagnosis     ICD-10-CM    1  Chronic bilateral low back pain with right-sided sciatica  M54 41     G89 29    2  Chronic hip pain, bilateral  M25 551     M25 552     G89 29    3  Chronic pain of both knees  M25 561     M25 562     G89 29    4  Hip strain, right, initial encounter  S76 011A    5  Sciatica of right side  M54 31                   Subjective: Patient reports that she is having a lot of pain in her knees today, but she reports that she did not have any falls at home since last session  She has been doing her exercises at home, but she reports that the exercises cause her to have knee pain  Her knee pain is worsened when going up and down the stairs  Objective: See treatment diary below      Assessment: FDC utilized for translation  Held remaining reps of tandem stance due to patient report of knee pain during this intervention  Also held CKC strengthening today due to baseline bilateral knee pain  Patient was educated to hold on performing her current HEP due to report of knee pain with these exercises  Patient was educated in updated illustrated HEP for continued lumbar ROM, gentle hip girdle stability, and knee mobility  Extensive education provided to perform all exercises in a pain-free range  CP applied to bilateral knees at end of session, and patient reported decreased intensity of symptoms post-tx  Patient would benefit from continued PT to progress strengthening and balance as able to decrease frequency of falls and maximize functional mobility  Plan: Continue per plan of care  Focus each session on current primary impairment/source of functional limitation       Precautions: FALL RISK, osteoporosis, hx of CVA/TBI, hx of seizures, HTN, carotid artery stenosis, hyperlipidemia, hx of sacral fracture, thyroid disease      Manuals 6/7 6/10 6/14 6/18 6/25 6/28 7/2 7/9                                                         Neuro Re-Ed             Seated sciatic nerve glides             Sit to stands    3x5 15 2x5 2x10 + airex np     Heel raises    30 30 30 30 2     Toe raises    30 30 30 30 2     Tandem stance    5x5" ea CGA 20x5" ea CGA 10x5" ea CGA 10x5" ea CGA 2x5" ea CGA     Romberg    5x5" CGA 20x5" CGA 20"x5 CGA 20"x7 CGA 20"x8 CGA                  Ther Ex             LTR  5"x30 5"x30 np 5"x30 5"x30 5"x30 5"x30     SKTC  10"x10 ea 15"x10 ea np 10"x10 ea 10"x10 ea 10"x10 ea np     Standing hip ABD     10 ea 10 ea 10 ea np     Standing marches     30 ea 30 ea 30 ea np     Step ups     10 L1 HR A np       Active BKFO   10"x25 ea np         Supine clamshell  5"x30 RTB 5"x30 RTB np  5"x30 RTB 5"x40 GTB 5"x20 RTB     Supine hip ADD isometrics  5"x30  5"x40 np  5"x30  5"x40 5"x30     Strap gastroc stretch        30"x2 b/l     Long sit heel slides        20 ea     Seated pball rollouts  10"x10 FWD 10"x10 FWD np         Supine TA isometrics             Seated hip ER/IR AROM                                                                              Ther Activity             Supine to sit transfer training    15' np                     Gait Training             [facility] ambulation  8' np                       Modalities             MHP lumbar/R hip  10' seated pre 10' seated pre 10' seated pre         CP to bilateral knees      10' post supine + bolster under knees  10' long sit

## 2021-07-12 ENCOUNTER — OFFICE VISIT (OUTPATIENT)
Dept: PHYSICAL THERAPY | Facility: MEDICAL CENTER | Age: 71
End: 2021-07-12
Payer: COMMERCIAL

## 2021-07-12 DIAGNOSIS — M54.31 SCIATICA OF RIGHT SIDE: ICD-10-CM

## 2021-07-12 DIAGNOSIS — G89.29 CHRONIC HIP PAIN, BILATERAL: ICD-10-CM

## 2021-07-12 DIAGNOSIS — M25.561 CHRONIC PAIN OF BOTH KNEES: ICD-10-CM

## 2021-07-12 DIAGNOSIS — M54.41 CHRONIC BILATERAL LOW BACK PAIN WITH RIGHT-SIDED SCIATICA: Primary | ICD-10-CM

## 2021-07-12 DIAGNOSIS — S76.011A HIP STRAIN, RIGHT, INITIAL ENCOUNTER: ICD-10-CM

## 2021-07-12 DIAGNOSIS — G89.29 CHRONIC PAIN OF BOTH KNEES: ICD-10-CM

## 2021-07-12 DIAGNOSIS — M25.551 CHRONIC HIP PAIN, BILATERAL: ICD-10-CM

## 2021-07-12 DIAGNOSIS — M25.552 CHRONIC HIP PAIN, BILATERAL: ICD-10-CM

## 2021-07-12 DIAGNOSIS — G89.29 CHRONIC BILATERAL LOW BACK PAIN WITH RIGHT-SIDED SCIATICA: Primary | ICD-10-CM

## 2021-07-12 DIAGNOSIS — M25.562 CHRONIC PAIN OF BOTH KNEES: ICD-10-CM

## 2021-07-12 PROCEDURE — 97110 THERAPEUTIC EXERCISES: CPT | Performed by: PHYSICAL THERAPIST

## 2021-07-12 NOTE — PROGRESS NOTES
Daily Note     Today's date: 2021  Patient name: Jose Henriquez  : 1950  MRN: [id number]  Referring provider: Leodan Cleary MD  Dx:   Encounter Diagnosis     ICD-10-CM    1  Chronic bilateral low back pain with right-sided sciatica  M54 41     G89 29    2  Chronic hip pain, bilateral  M25 551     M25 552     G89 29    3  Chronic pain of both knees  M25 561     M25 562     G89 29    4  Hip strain, right, initial encounter  S76 011A    5  Sciatica of right side  M54 31                   Subjective: Patient reports that the ice last session helped decrease her pain, but she reports that she is still continuing to have a lot of pain in both of her knees today  She reports that her pain is worsened with going up the stairs  She also reports that her R leg feels more bent than her L leg  She had no falls at home since last session  Objective: See treatment diary below      Assessment: FDC utilized for translation assistance  Held all balance interventions today due to baseline bilateral knee pain during CKC interventions  Performed gentle LTR to continue to improve lumbopelvic mobility to decrease compensatory strain on knees during ambulation and ADL  Able to progress reps for supine hip ABD/ADD strengthening, which demonstrates good progress toward goals  Patient was educated to continue gentle hip stability and knee mobility HEP  She reported decreased pain after CP application at end of session  Patient would benefit from continued PT to improve LE mobility and strength in order to achieve goals  Plan: Continue per plan of care  Re-evaluation next visit       Precautions: FALL RISK, osteoporosis, hx of CVA/TBI, hx of seizures, HTN, carotid artery stenosis, hyperlipidemia, hx of sacral fracture, thyroid disease      Manuals 6/7 6/10 6/14 6/18 6/25 6/28 7                                                        Neuro Re-Ed             Seated sciatic nerve glides             Sit to stands    3x5 15 2x5 2x10 + airex np held    Heel raises    30 30 30 30 2 held    Toe raises    30 30 30 30 2 held    Tandem stance    5x5" ea CGA 20x5" ea CGA 10x5" ea CGA 10x5" ea CGA 2x5" ea CGA held    Romberg    5x5" CGA 20x5" CGA 20"x5 CGA 20"x7 CGA 20"x8 CGA held                 Ther Ex             LTR  5"x30 5"x30 np 5"x30 5"x30 5"x30 5"x30 5"x30    SKTC  10"x10 ea 15"x10 ea np 10"x10 ea 10"x10 ea 10"x10 ea np np    Standing hip ABD     10 ea 10 ea 10 ea np np    Standing marches     30 ea 30 ea 30 ea np np    Step ups     10 L1 HR A np       Active BKFO   10"x25 ea np         Supine clamshell  5"x30 RTB 5"x30 RTB np  5"x30 RTB 5"x40 GTB 5"x20 RTB 5"x40 GTB    Supine hip ADD isometrics  5"x30  5"x40 np  5"x30  5"x40 5"x30 5"x40    Strap gastroc stretch        30"x2 b/l 30"x3 b/l    Long sit heel slides        20 ea 30 ea    Seated pball rollouts  10"x10 FWD 10"x10 FWD np         Supine TA isometrics             Seated hip ER/IR AROM                                                                              Ther Activity             Supine to sit transfer training    15' np                     Gait Training             [facility] ambulation  8' np                       Modalities             MHP lumbar/R hip  10' seated pre 10' seated pre 10' seated pre         CP to bilateral knees      10' post supine + bolster under knees  10' long sit 10' long sit

## 2021-07-15 ENCOUNTER — EVALUATION (OUTPATIENT)
Dept: PHYSICAL THERAPY | Facility: MEDICAL CENTER | Age: 71
End: 2021-07-15
Payer: COMMERCIAL

## 2021-07-15 DIAGNOSIS — M25.552 CHRONIC HIP PAIN, BILATERAL: ICD-10-CM

## 2021-07-15 DIAGNOSIS — M25.562 CHRONIC PAIN OF BOTH KNEES: ICD-10-CM

## 2021-07-15 DIAGNOSIS — S76.011A HIP STRAIN, RIGHT, INITIAL ENCOUNTER: ICD-10-CM

## 2021-07-15 DIAGNOSIS — M25.561 CHRONIC PAIN OF BOTH KNEES: ICD-10-CM

## 2021-07-15 DIAGNOSIS — G89.29 CHRONIC PAIN OF BOTH KNEES: ICD-10-CM

## 2021-07-15 DIAGNOSIS — M54.31 SCIATICA OF RIGHT SIDE: ICD-10-CM

## 2021-07-15 DIAGNOSIS — G89.29 CHRONIC HIP PAIN, BILATERAL: ICD-10-CM

## 2021-07-15 DIAGNOSIS — G89.29 CHRONIC BILATERAL LOW BACK PAIN WITH RIGHT-SIDED SCIATICA: Primary | ICD-10-CM

## 2021-07-15 DIAGNOSIS — M54.41 CHRONIC BILATERAL LOW BACK PAIN WITH RIGHT-SIDED SCIATICA: Primary | ICD-10-CM

## 2021-07-15 DIAGNOSIS — M25.551 CHRONIC HIP PAIN, BILATERAL: ICD-10-CM

## 2021-07-15 PROCEDURE — 97110 THERAPEUTIC EXERCISES: CPT | Performed by: PHYSICAL THERAPIST

## 2021-07-15 NOTE — PROGRESS NOTES
Discharge Summary    Today's date: 7/15/2021  Patient name: Sharon Mejia  : 1950  MRN: [id number]  Referring provider: Binta Serrano MD  Dx:   Encounter Diagnosis     ICD-10-CM    1  Chronic bilateral low back pain with right-sided sciatica  M54 41     G89 29    2  Chronic hip pain, bilateral  M25 551     M25 552     G89 29    3  Chronic pain of both knees  M25 561     M25 562     G89 29    4  Hip strain, right, initial encounter  S76 [id number]    5  Sciatica of right side  M54 31                   Subjective: Patient reports that she fell at home this morning when she was sitting on a small bench  She reports that she fell onto her R side and hit her R elbow  She reports that she has not any improvements in her balance since beginning PT  She also reports that she is continuing to have significant pain in both of her knees  She states that she feels better with decreased pain in her knees after each PT session, but the pain returns when she leaves the clinic  She reports that she did not do her exercises at home due to pain  Objective: See treatment diary below      Assessment: FDC utilized for translation  Patient demonstrated mild bruising on R elbow today but no bruising on R shoulder  Patient was educated to contact her physician if she notices an increase in R UE pain due to recent fall  She verbalized understanding  Patient has been limited in her overall progress since beginning PT due to significant co-morbidity of bilateral knee pain  We have been unable to perform balance interventions for the last 1 5 weeks due to bilateral knee pain when performing standing exercise  Patient has not met her goals for PT for improving lumbar ROM or hip ROM due to inability to complete exercise program without significant bilateral knee pain  However, patient is independent in a comprehensive illustrated HEP for continued lumbopelvic mobility, knee mobility, and gentle hip girdle stability   Patient was educated extensively on the importance of consistent performance of HEP to further maximize function  Patient reported alleviation of symptoms at end of session  In-basket message sent to referring provider with an update on the current status of patient  Patient has reached maximum benefit from PT session attendance at this time and is now discharged to her [facility]  Goals  Patient will be independent with home exercise program - met   Patient will increase lumbar AROM by 50% in all planes to be able to don/doff clothing - not met  Patient will increase R hip AROM to be comparable to the contralateral side in all planes to improve quality of gait mechanics  - not met  Patient will exceed MCID on FOTO to demonstrate improved function  - not met  Patient will be able to lie supine  - not met  Patient will be able to sit for longer periods  - not met  Patient will be able to manage symptoms independently  - met         Plan: Discharge to [facility] and follow-up with PCP regarding continued falls       Precautions: FALL RISK, osteoporosis, hx of CVA/TBI, hx of seizures, HTN, carotid artery stenosis, hyperlipidemia, hx of sacral fracture, thyroid disease      Manuals 6/7 6/10 6/14 6/18 6/25 6/28 7/2 7/9 7/12 7/15                                                       Neuro Re-Ed             Seated sciatic nerve glides             Sit to stands    3x5 15 2x5 2x10 + airex np held held   Heel raises    30 30 30 30 2 held held   Toe raises    30 30 30 30 2 held held   Tandem stance    5x5" ea CGA 20x5" ea CGA 10x5" ea CGA 10x5" ea CGA 2x5" ea CGA held held   Romberg    5x5" CGA 20x5" CGA 20"x5 CGA 20"x7 CGA 20"x8 CGA held held                Ther Ex             LTR  5"x30 5"x30 np 5"x30 5"x30 5"x30 5"x30 5"x30 5"x30   SKTC  10"x10 ea 15"x10 ea np 10"x10 ea 10"x10 ea 10"x10 ea np np np   Standing hip ABD     10 ea 10 ea 10 ea np np np   Standing marches     30 ea 30 ea 30 ea np np np   Step ups     10 L1 HR A np       Active BKFO   10"x25 ea np         Supine clamshell  5"x30 RTB 5"x30 RTB np  5"x30 RTB 5"x40 GTB 5"x20 RTB 5"x40 GTB 5"x30 GTB   Supine hip ADD isometrics  5"x30  5"x40 np  5"x30  5"x40 5"x30 5"x40 5"x40   Strap gastroc stretch        30"x2 b/l 30"x3 b/l 30"x4 b/l   Long sit heel slides        20 ea 30 ea 30 ea   Seated pball rollouts  10"x10 FWD 10"x10 FWD np         Supine TA isometrics             Seated hip ER/IR AROM                                                                              Ther Activity             Supine to sit transfer training    15' np                     Gait Training             [facility] ambulation  8' np                       Modalities             MHP lumbar/R hip  10' seated pre 10' seated pre 10' seated pre         CP to bilateral knees      10' post supine + bolster under knees  10' long sit 10' long sit 10' long sit

## 2021-08-05 ENCOUNTER — OFFICE VISIT (OUTPATIENT)
Dept: FAMILY MEDICINE CLINIC | Facility: CLINIC | Age: 71
End: 2021-08-05

## 2021-08-05 VITALS
WEIGHT: 148.9 LBS | TEMPERATURE: 97.8 F | DIASTOLIC BLOOD PRESSURE: 78 MMHG | HEIGHT: 57 IN | BODY MASS INDEX: 32.12 KG/M2 | SYSTOLIC BLOOD PRESSURE: 132 MMHG | RESPIRATION RATE: 20 BRPM | OXYGEN SATURATION: 97 % | HEART RATE: 63 BPM

## 2021-08-05 DIAGNOSIS — S76.011A HIP STRAIN, RIGHT, INITIAL ENCOUNTER: ICD-10-CM

## 2021-08-05 DIAGNOSIS — Z78.9 NEED FOR FOLLOW-UP BY SOCIAL WORKER: ICD-10-CM

## 2021-08-05 DIAGNOSIS — G89.29 CHRONIC PAIN OF BOTH KNEES: Primary | ICD-10-CM

## 2021-08-05 DIAGNOSIS — R26.89 LOSS OF BALANCE: ICD-10-CM

## 2021-08-05 DIAGNOSIS — M25.561 CHRONIC PAIN OF BOTH KNEES: Primary | ICD-10-CM

## 2021-08-05 DIAGNOSIS — M25.562 CHRONIC PAIN OF BOTH KNEES: Primary | ICD-10-CM

## 2021-08-05 PROCEDURE — 3008F BODY MASS INDEX DOCD: CPT | Performed by: FAMILY MEDICINE

## 2021-08-05 PROCEDURE — 1160F RVW MEDS BY RX/DR IN RCRD: CPT | Performed by: FAMILY MEDICINE

## 2021-08-05 PROCEDURE — 99214 OFFICE O/P EST MOD 30 MIN: CPT | Performed by: FAMILY MEDICINE

## 2021-08-05 PROCEDURE — 1036F TOBACCO NON-USER: CPT | Performed by: FAMILY MEDICINE

## 2021-08-05 PROCEDURE — 3008F BODY MASS INDEX DOCD: CPT | Performed by: ORTHOPAEDIC SURGERY

## 2021-08-05 RX ORDER — ASPIRIN 81 MG/1
81 TABLET ORAL DAILY
COMMUNITY

## 2021-08-05 NOTE — PROGRESS NOTES
Assessment/Plan:    No problem-specific Assessment & Plan notes found for this encounter  Diagnoses and all orders for this visit:    Chronic pain of both knees  -     Ambulatory referral to Orthopedic Surgery; Future    Hip strain, right, initial encounter  -     Ambulatory referral to Orthopedic Surgery; Future    Need for follow-up by   -     Ambulatory referral to social work care management program; Future    Loss of balance  -     VAS carotid complete study; Future    Other orders  -     aspirin (Guille Aspirin EC Low Dose) 81 mg EC tablet; Take 81 mg by mouth daily          Subjective:      Patient ID: Madhu Goodson is a 72 y o  female  73 yo  female with well controlled DM, s/p CVA, currently complains of  1- b/l knee pain unchanged from prior  Knee pain is constant, worse with going up steps, walking for more than 5 minutes  Pain is described as sharp not radiated  6/10 stopped taking Meloxicam  States she tried PT but pain feels worse with physical therapy  She has not made Orthopedics appointment yet  States her knees "click" when she is going down steps or walking down an incline  Knees do give out  Denies locking  2- Hip pain, constant, not radiated, worse with going up steps  Mild, described as an ache  3- Loss of balance: states she feels she sways when she walks, needs to lean on her significant other or a cane, the more she walks or stands the more she feels she losses her balance  Has no trouble getting up from a seated position or sitting down  Denies dizziness  4- Not checking BG at home  Denies symptoms of hypo or hyperglycemia  Denies CP, SOB, able to walk several blocks on a flat surface without chest pain, does need to stop to rest due to back and knee pain  No recent history of any bleeding   States has not had dental procedure for which she was cleared in June, since her BP was mildly elevated         The following portions of the patient's history were reviewed and updated as appropriate:   She  has a past medical history of Abdominal pain, Anxiety, Arthritis, Bowel incontinence, Chest pain, Constipation, CVA (cerebral vascular accident) ([address] ), Diabetes mellitus type II, controlled ([address] ), Disease of thyroid gland, Epigastric pain, Falls, High cholesterol, Hyperlipidemia, Hypertension, Migraine, Palpitations, Recurrent urinary tract infection, Seizures ([address] ), Sleep disorder, Stroke ([address] ), and Thyroid disease    She   Patient Active Problem List    Diagnosis Date Noted    Class 1 obesity due to excess calories without serious comorbidity with body mass index (BMI) of 30 0 to 30 9 in adult 04/02/2020    Sciatica of right side 02/11/2020    Hip strain, right, initial encounter 09/06/2019    Acute bacterial conjunctivitis of left eye 09/06/2019    Breast pain, right 07/31/2019    Elevated BP without diagnosis of hypertension 07/01/2019    Contusion of right breast 05/24/2019    Epigastric abdominal pain 03/28/2019    NSAID long-term use 03/28/2019    Left upper quadrant pain 02/08/2019    Diabetes mellitus type II, controlled ([address] ) 10/12/2018    Arthritis 10/12/2018    Primary insomnia 04/18/2018    Urinary, incontinence, stress female 02/08/2018    Urge and stress incontinence 10/26/2017    Osteoporosis 09/15/2017    Adhesive capsulitis of left shoulder 09/11/2017    Constipation 05/10/2017    Cervical radiculitis 05/09/2017    Elevated TSH 04/11/2017    Osteoarthritis of both hands 04/03/2017    Memory difficulties 03/18/2016    Asymptomatic bilateral carotid artery stenosis 02/29/2016    Gastroesophageal reflux disease 02/03/2016    Hypertension 01/20/2016    Collapsed vertebra, not elsewhere classified, sacral and sacrococcygeal region, initial encounter for fracture ([address] ) 01/20/2016    HLD (hyperlipidemia) 01/20/2016    History of CVA with residual deficit 01/20/2016    Depression 01/20/2016    Headache 01/20/2016    Fall 01/16/2016  Subdural hemorrhage following injury ([facility] ) 2016    Subarachnoid hemorrhage following injury ([facility] ) 2016    Hyperlipidemia 10/19/2013    Muscle weakness 10/19/2013     She  has a past surgical history that includes Knee surgery; Other surgical history;  section; Axillary Surgery; pr esophagogastroduodenoscopy transoral diagnostic (N/A, 2017); pr sling oper stres incontinence (N/A, 2018); CYSTOSCOPY (N/A, 2018); Bladder surgery; Facial bone tumor excision; and Tubal ligation  Her family history includes Diabetes in her family; Hypertension in her family; Stroke in her family; Thyroid disease in her family  She  reports that she has never smoked  She has never used smokeless tobacco  She reports that she does not drink alcohol and does not use drugs    Current Outpatient Medications   Medication Sig Dispense Refill    aspirin (Guille Aspirin EC Low Dose) 81 mg EC tablet Take 81 mg by mouth daily      atenolol (TENORMIN) 25 mg tablet Take 1 tablet (25 mg total) by mouth daily 90 tablet 1    atorvastatin (LIPITOR) 40 mg tablet Take 1 tablet (40 mg total) by mouth daily 90 tablet 1    citalopram (CeleXA) 40 mg tablet Take 1 tablet (40 mg total) by mouth daily 90 tablet 1    diclofenac sodium (VOLTAREN) 50 mg EC tablet Take 1 tablet (50 mg total) by mouth 2 (two) times a day 60 tablet 0    albuterol (PROVENTIL HFA,VENTOLIN HFA) 90 mcg/act inhaler  (Patient not taking: Reported on 2021)      Cholecalciferol (VITAMIN D-3) 5000 units TABS Take 1 tablet by mouth daily (Patient not taking: Reported on 2021) 90 tablet 1    conjugated estrogens (PREMARIN) vaginal cream Insert 0 5 g into the vagina 3 (three) times a week (Patient not taking: Reported on 2021) 42 5 g 0    cyclobenzaprine (FLEXERIL) 5 mg tablet Take 1 tablet (5 mg total) by mouth 3 (three) times a day as needed for muscle spasms (Patient not taking: Reported on 2021) 90 tablet 0    cyclobenzaprine (FLEXERIL) 5 mg tablet Take 1 tablet (5 mg total) by mouth 3 (three) times a day as needed for muscle spasms (Patient not taking: Reported on 8/5/2021) 30 tablet 0    lidocaine (XYLOCAINE) 5 % ointment Apply topically 2 (two) times a day as needed for mild pain (Patient not taking: Reported on 8/5/2021) 35 44 g 0    meclizine (ANTIVERT) 12 5 MG tablet  (Patient not taking: Reported on 8/5/2021)      Melatonin 5 MG TABS Take 1 tablet (5 mg total) by mouth daily at bedtime (Patient not taking: Reported on 5/29/2020) 90 tablet 0    meloxicam (MOBIC) 7 5 mg tablet Take 1 tablet (7 5 mg total) by mouth daily (Patient not taking: Reported on 5/29/2020) 90 tablet 0    methylPREDNISolone 4 MG tablet therapy pack Use as directed on package (Patient not taking: Reported on 5/29/2020) 1 each 0    neomycin-polymyxin-dexamethasone (MAXITROL) ophthalmic suspension Administer 1 drop to the right eye 4 (four) times a day for 7 days 5 mL 0    omeprazole (PriLOSEC) 20 mg delayed release capsule Take 1 capsule (20 mg total) by mouth daily (Patient not taking: Reported on 8/5/2021) 60 capsule 2    polyethylene glycol (GLYCOLAX) powder Take 17 g by mouth daily (Patient not taking: Reported on 8/5/2021) 850 g 1     No current facility-administered medications for this visit       Current Outpatient Medications on File Prior to Visit   Medication Sig    aspirin (Guille Aspirin EC Low Dose) 81 mg EC tablet Take 81 mg by mouth daily    atenolol (TENORMIN) 25 mg tablet Take 1 tablet (25 mg total) by mouth daily    atorvastatin (LIPITOR) 40 mg tablet Take 1 tablet (40 mg total) by mouth daily    citalopram (CeleXA) 40 mg tablet Take 1 tablet (40 mg total) by mouth daily    diclofenac sodium (VOLTAREN) 50 mg EC tablet Take 1 tablet (50 mg total) by mouth 2 (two) times a day    albuterol (PROVENTIL HFA,VENTOLIN HFA) 90 mcg/act inhaler  (Patient not taking: Reported on 8/5/2021)    Cholecalciferol (VITAMIN D-3) 5000 units TABS Take 1 tablet by mouth daily (Patient not taking: Reported on 8/5/2021)    conjugated estrogens (PREMARIN) vaginal cream Insert 0 5 g into the vagina 3 (three) times a week (Patient not taking: Reported on 8/5/2021)    cyclobenzaprine (FLEXERIL) 5 mg tablet Take 1 tablet (5 mg total) by mouth 3 (three) times a day as needed for muscle spasms (Patient not taking: Reported on 8/5/2021)    cyclobenzaprine (FLEXERIL) 5 mg tablet Take 1 tablet (5 mg total) by mouth 3 (three) times a day as needed for muscle spasms (Patient not taking: Reported on 8/5/2021)    lidocaine (XYLOCAINE) 5 % ointment Apply topically 2 (two) times a day as needed for mild pain (Patient not taking: Reported on 8/5/2021)    meclizine (ANTIVERT) 12 5 MG tablet  (Patient not taking: Reported on 8/5/2021)    Melatonin 5 MG TABS Take 1 tablet (5 mg total) by mouth daily at bedtime (Patient not taking: Reported on 5/29/2020)    meloxicam (MOBIC) 7 5 mg tablet Take 1 tablet (7 5 mg total) by mouth daily (Patient not taking: Reported on 5/29/2020)    methylPREDNISolone 4 MG tablet therapy pack Use as directed on package (Patient not taking: Reported on 5/29/2020)    neomycin-polymyxin-dexamethasone (MAXITROL) ophthalmic suspension Administer 1 drop to the right eye 4 (four) times a day for 7 days    omeprazole (PriLOSEC) 20 mg delayed release capsule Take 1 capsule (20 mg total) by mouth daily (Patient not taking: Reported on 8/5/2021)    polyethylene glycol (GLYCOLAX) powder Take 17 g by mouth daily (Patient not taking: Reported on 8/5/2021)     No current facility-administered medications on file prior to visit       Review of Systems   Musculoskeletal: Positive for arthralgias, back pain and gait problem  Neurological: Positive for dizziness  All other systems reviewed and are negative          Objective:      /70 (BP Location: Right arm, Patient Position: Sitting, Cuff Size: Standard)   Pulse 63   Temp 97 8 °F (36 6 °C) (Temporal) Resp 20   Ht 4' 9" (1 448 m)   Wt 67 5 kg (148 lb 14 4 oz)   SpO2 97%   BMI 32 22 kg/m²          Physical Exam  Vitals and nursing note reviewed  Constitutional:       Appearance: She is well-developed  HENT:      Head: Normocephalic  Right Ear: External ear normal       Left Ear: External ear normal       Nose: Nose normal    Eyes:      Conjunctiva/sclera: Conjunctivae normal       Pupils: Pupils are equal, round, and reactive to light  Neck:      Thyroid: No thyromegaly  Cardiovascular:      Rate and Rhythm: Normal rate and regular rhythm  Heart sounds: Normal heart sounds  Pulmonary:      Effort: Pulmonary effort is normal       Breath sounds: Normal breath sounds  Abdominal:      Palpations: Abdomen is soft  Tenderness: There is no abdominal tenderness  There is no guarding or rebound  Musculoskeletal:         General: Tenderness present  Normal range of motion  Cervical back: Normal range of motion and neck supple  Right hip: Normal       Left hip: Tenderness present  Right knee: Tenderness present over the medial joint line  Left knee: Tenderness present over the medial joint line, lateral joint line and patellar tendon  Skin:     General: Skin is dry  Neurological:      Mental Status: She is alert and oriented to person, place, and time  Deep Tendon Reflexes: Reflexes are normal and symmetric

## 2021-08-06 ENCOUNTER — OFFICE VISIT (OUTPATIENT)
Dept: OBGYN CLINIC | Facility: MEDICAL CENTER | Age: 71
End: 2021-08-06
Payer: COMMERCIAL

## 2021-08-06 ENCOUNTER — APPOINTMENT (OUTPATIENT)
Dept: RADIOLOGY | Facility: MEDICAL CENTER | Age: 71
End: 2021-08-06
Payer: COMMERCIAL

## 2021-08-06 DIAGNOSIS — G89.29 CHRONIC PAIN OF BOTH KNEES: ICD-10-CM

## 2021-08-06 DIAGNOSIS — M25.562 CHRONIC PAIN OF BOTH KNEES: ICD-10-CM

## 2021-08-06 DIAGNOSIS — M25.561 CHRONIC PAIN OF BOTH KNEES: ICD-10-CM

## 2021-08-06 DIAGNOSIS — S76.011A HIP STRAIN, RIGHT, INITIAL ENCOUNTER: ICD-10-CM

## 2021-08-06 DIAGNOSIS — M17.0 PRIMARY OSTEOARTHRITIS OF BOTH KNEES: Primary | ICD-10-CM

## 2021-08-06 PROCEDURE — 99214 OFFICE O/P EST MOD 30 MIN: CPT | Performed by: ORTHOPAEDIC SURGERY

## 2021-08-06 PROCEDURE — 73564 X-RAY EXAM KNEE 4 OR MORE: CPT

## 2021-08-06 PROCEDURE — 20610 DRAIN/INJ JOINT/BURSA W/O US: CPT | Performed by: ORTHOPAEDIC SURGERY

## 2021-08-06 PROCEDURE — 1160F RVW MEDS BY RX/DR IN RCRD: CPT | Performed by: ORTHOPAEDIC SURGERY

## 2021-08-06 RX ORDER — BUPIVACAINE HYDROCHLORIDE 2.5 MG/ML
4 INJECTION, SOLUTION INFILTRATION; PERINEURAL
Status: COMPLETED | OUTPATIENT
Start: 2021-08-06 | End: 2021-08-06

## 2021-08-06 RX ORDER — METHYLPREDNISOLONE ACETATE 40 MG/ML
1 INJECTION, SUSPENSION INTRA-ARTICULAR; INTRALESIONAL; INTRAMUSCULAR; SOFT TISSUE
Status: COMPLETED | OUTPATIENT
Start: 2021-08-06 | End: 2021-08-06

## 2021-08-06 RX ORDER — METHYLPREDNISOLONE ACETATE 40 MG/ML
2 INJECTION, SUSPENSION INTRA-ARTICULAR; INTRALESIONAL; INTRAMUSCULAR; SOFT TISSUE
Status: COMPLETED | OUTPATIENT
Start: 2021-08-06 | End: 2021-08-06

## 2021-08-06 RX ADMIN — BUPIVACAINE HYDROCHLORIDE 4 ML: 2.5 INJECTION, SOLUTION INFILTRATION; PERINEURAL at 12:05

## 2021-08-06 RX ADMIN — METHYLPREDNISOLONE ACETATE 1 ML: 40 INJECTION, SUSPENSION INTRA-ARTICULAR; INTRALESIONAL; INTRAMUSCULAR; SOFT TISSUE at 12:05

## 2021-08-06 RX ADMIN — METHYLPREDNISOLONE ACETATE 2 ML: 40 INJECTION, SUSPENSION INTRA-ARTICULAR; INTRALESIONAL; INTRAMUSCULAR; SOFT TISSUE at 12:05

## 2021-08-06 NOTE — PROGRESS NOTES
Orthopaedic Surgery - Office Note  Elisabeth Carrasco (72 y o  female)   : 1950   MRN: [id number]  Encounter Date: 2021    Chief Complaint   Patient presents with    Knee Pain     R>L       Assessment / Plan  Mild osteoarthritis of bilateral knees     · CSI of bilateral knee joint was performed  · Return if symptoms worsen or fail to improve, for Please schedule apt with Dr Tiffanie Godoy for back pain  · For back pain advised to get MRI done and follow up with Dr Tiffanie Godoy   · Various options discussed including PT for back pain, steroid injection today  History of Present Illness  Elisabeth Carrasco is a 72 y o  female who primary Uzbek speaking with PMH diabetes (last A1c 6 1 21), CVA  who presents her family for bilateral knee pain  Interpretor used Dashawn Stanton  Bilateral knee pain right>left  Severity minimal to moderate pain  Patient is going to PT but concerned for difficulty with balance  Pain worse with walking upstairs then walking on flat ground  Reports pain laying on her back  Pain relieved when leaning forward  Right knee pain over patella  tendon  Left knee unable to specify location of pain points to entire knee  Of note seen by sports medicine in the past for right sided back pain with sciatica  Reports persistent sciatic pain down right leg  MRI ordered but patient never went to get imaging  Has taken tylenol as needed without relief of pain  Pertinent items are noted in HPI  All other systems were reviewed and are negative  Physical Exam  There were no vitals taken for this visit  Cons: Appears well  No apparent distress  Psych: Alert  Oriented x3  Mood and affect normal   Eyes: PERRLA, EOMI  Resp: Normal effort  No audible wheezing or stridor  CV: Palpable pulse  No discernable arrhythmia  No LE edema  Lymph:  No palpable cervical, axillary, or inguinal lymphadenopathy  Skin: Warm  No palpable masses  No visible lesions  Neuro: Normal muscle tone    Normal and symmetric DTR's  Bilateral Knee Exam  Alignment:  Normal knee alignment  Patella central    Inspection:  No swelling  No erythema  No muscle atrophy  Palpation:  Medial joint line tenderness on the left  No crepitus bilateral    ROM:  Normal knee ROM  Strength:  5/5 quadriceps and hamstrings  Able to SLR without lag  Able to actively extend knee against gravity  Stability:  No objective knee instability  Stable Varus / Valgus stress, Lachman, and Posterior drawer  Tests:  (+) Straight leg raise of the right   Patella:  Normal patellar mobility  Neurovascular:  Sensation intact to light touch in all other peripheral nerve distributions  2+ radial pulse  Gait:  Patient in wheel chair      Studies Reviewed  XR of bilateral knee - mild osteoarthritic changes bilateral      Large joint arthrocentesis: bilateral knee  Universal Protocol:  Procedure performed by: (Dr Glenroy Bustillos )  Consent: Verbal consent obtained  Risks and benefits: risks, benefits and alternatives were discussed  Patient understanding: patient states understanding of the procedure being performed  Patient consent: the patient's understanding of the procedure matches consent given  Site marked: the operative site was marked  Radiology Images displayed and confirmed   If images not available, report reviewed: imaging studies available  Patient identity confirmed: verbally with patient    Supporting Documentation  Indications: pain   Procedure Details  Location: knee - bilateral knee  Preparation: Patient was prepped and draped in the usual sterile fashion  Needle size: 20 G  Approach: superior    Medications (Right): 4 mL bupivacaine 0 25 %; 1 mL methylPREDNISolone acetate 40 mg/mLMedications (Left): 2 mL methylPREDNISolone acetate 40 mg/mL; 4 mL bupivacaine 0 25 %; 1 mL methylPREDNISolone acetate 40 mg/mL   Patient tolerance: patient tolerated the procedure well with no immediate complications  Dressing:  Sterile dressing applied Medical, Surgical, Family, and Social History  The patient's medical history, family history, and social history, were reviewed and updated as appropriate  Past Medical History:   Diagnosis Date    Abdominal pain     Anxiety     last assessed: 10/19/2013    Arthritis     osteo both hands; last assessed: 10/19/2013    Bowel incontinence     Chest pain     Constipation     [facility]     [facility]     Disease of thyroid gland     Epigastric pain     with distention    Falls     High cholesterol     Hyperlipidemia     Hypertension     last assessed: 4/3/2017    Migraine     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    Palpitations     Recurrent urinary tract infection     Seizures ([facility]     Sleep disorder     last assessed: 10/19/2013    Stroke ([facility]     Thyroid disease        Past Surgical History:   Procedure Laterality Date    AXILLARY SURGERY      cyst surgery    BLADDER SURGERY       SECTION      CYSTOSCOPY N/A 2018    Procedure: CYSTOSCOPY;  Surgeon: Gay Francis MD;  [facility];  Service: Gynecology    FACIAL BONE TUMOR EXCISION      KNEE SURGERY      OTHER SURGICAL HISTORY      cyst removed from axilla    CA ESOPHAGOGASTRODUODENOSCOPY TRANSORAL DIAGNOSTIC N/A 2017    Procedure: ESOPHAGOGASTRODUODENOSCOPY (EGD); Surgeon: Shailesh Willard MD;  [facility];   Service: Gastroenterology    CA SLING OPER STRES INCONTINENCE N/A 2018    Procedure: PUBOVAGINAL SLING;  Surgeon: Gay Francis MD;  [facility]: [facility];  Service: Gynecology    TUBAL LIGATION         Family History   Problem Relation Age of Onset    Diabetes Family         mellitus    Hypertension Family     Stroke Family     Thyroid disease Family        Social History     Occupational History    Not on file   Tobacco Use    Smoking status: Never Smoker    Smokeless tobacco: Never Used   Substance and Sexual Activity    Alcohol use: No    Drug use: No    Sexual activity: Not on file       Allergies   Allergen Reactions    Tramadol      Causes nausea and vomiting          Current Outpatient Medications:     albuterol (PROVENTIL HFA,VENTOLIN HFA) 90 mcg/act inhaler, , Disp: , Rfl:     aspirin (Guille Aspirin EC Low Dose) 81 mg EC tablet, Take 81 mg by mouth daily, Disp: , Rfl:     atenolol (TENORMIN) 25 mg tablet, Take 1 tablet (25 mg total) by mouth daily, Disp: 90 tablet, Rfl: 1    atorvastatin (LIPITOR) 40 mg tablet, Take 1 tablet (40 mg total) by mouth daily, Disp: 90 tablet, Rfl: 1    Cholecalciferol (VITAMIN D-3) 5000 units TABS, Take 1 tablet by mouth daily (Patient not taking: Reported on 8/5/2021), Disp: 90 tablet, Rfl: 1    citalopram (CeleXA) 40 mg tablet, Take 1 tablet (40 mg total) by mouth daily, Disp: 90 tablet, Rfl: 1    conjugated estrogens (PREMARIN) vaginal cream, Insert 0 5 g into the vagina 3 (three) times a week (Patient not taking: Reported on 8/5/2021), Disp: 42 5 g, Rfl: 0    cyclobenzaprine (FLEXERIL) 5 mg tablet, Take 1 tablet (5 mg total) by mouth 3 (three) times a day as needed for muscle spasms (Patient not taking: Reported on 8/5/2021), Disp: 90 tablet, Rfl: 0    cyclobenzaprine (FLEXERIL) 5 mg tablet, Take 1 tablet (5 mg total) by mouth 3 (three) times a day as needed for muscle spasms (Patient not taking: Reported on 8/5/2021), Disp: 30 tablet, Rfl: 0    diclofenac sodium (VOLTAREN) 50 mg EC tablet, Take 1 tablet (50 mg total) by mouth 2 (two) times a day, Disp: 60 tablet, Rfl: 0    lidocaine (XYLOCAINE) 5 % ointment, Apply topically 2 (two) times a day as needed for mild pain (Patient not taking: Reported on 8/5/2021), Disp: 35 44 g, Rfl: 0    meclizine (ANTIVERT) 12 5 MG tablet, , Disp: , Rfl:     Melatonin 5 MG TABS, Take 1 tablet (5 mg total) by mouth daily at bedtime (Patient not taking: Reported on 5/29/2020), Disp: 90 tablet, Rfl: 0    meloxicam (MOBIC) 7 5 mg tablet, Take 1 tablet (7 5 mg total) by mouth daily (Patient not taking: Reported on 5/29/2020), Disp: 90 tablet, Rfl: 0    methylPREDNISolone 4 MG tablet therapy pack, Use as directed on package (Patient not taking: Reported on 5/29/2020), Disp: 1 each, Rfl: 0    neomycin-polymyxin-dexamethasone (MAXITROL) ophthalmic suspension, Administer 1 drop to the right eye 4 (four) times a day for 7 days, Disp: 5 mL, Rfl: 0    omeprazole (PriLOSEC) 20 mg delayed release capsule, Take 1 capsule (20 mg total) by mouth daily (Patient not taking: Reported on 8/5/2021), Disp: 60 capsule, Rfl: 2    polyethylene glycol (GLYCOLAX) powder, Take 17 g by mouth daily (Patient not taking: Reported on 8/5/2021), Disp: 850 g, Rfl: 1      Jigar Segundo MD    Scribe Attestation    I,:   am acting as a scribe while in the presence of the attending physician :       I,:   personally performed the services described in this documentation    as scribed in my presence :

## 2021-08-12 ENCOUNTER — HOSPITAL ENCOUNTER (OUTPATIENT)
Dept: NON INVASIVE DIAGNOSTICS | Facility: HOSPITAL | Age: 71
Discharge: HOME | End: 2021-08-12
Payer: COMMERCIAL

## 2021-08-12 DIAGNOSIS — R26.89 LOSS OF BALANCE: ICD-10-CM

## 2021-08-12 PROCEDURE — 93880 EXTRACRANIAL BILAT STUDY: CPT

## 2021-08-12 PROCEDURE — 93880 EXTRACRANIAL BILAT STUDY: CPT | Performed by: SURGERY

## 2021-08-17 ENCOUNTER — VBI (OUTPATIENT)
Dept: ADMINISTRATIVE | Facility: OTHER | Age: 71
End: 2021-08-17

## 2021-08-19 ENCOUNTER — OFFICE VISIT (OUTPATIENT)
Dept: OBGYN CLINIC | Facility: MEDICAL CENTER | Age: 71
End: 2021-08-19
Payer: COMMERCIAL

## 2021-08-19 ENCOUNTER — PATIENT OUTREACH (OUTPATIENT)
Dept: FAMILY MEDICINE CLINIC | Facility: CLINIC | Age: 71
End: 2021-08-19

## 2021-08-19 ENCOUNTER — TELEPHONE (OUTPATIENT)
Dept: OBGYN CLINIC | Facility: HOSPITAL | Age: 71
End: 2021-08-19

## 2021-08-19 VITALS
BODY MASS INDEX: 31.28 KG/M2 | HEIGHT: 57 IN | DIASTOLIC BLOOD PRESSURE: 78 MMHG | SYSTOLIC BLOOD PRESSURE: 130 MMHG | HEART RATE: 74 BPM | WEIGHT: 145 LBS

## 2021-08-19 DIAGNOSIS — G89.29 CHRONIC RIGHT-SIDED LOW BACK PAIN WITH BILATERAL SCIATICA: Primary | ICD-10-CM

## 2021-08-19 DIAGNOSIS — M51.36 LUMBAR DEGENERATIVE DISC DISEASE: ICD-10-CM

## 2021-08-19 DIAGNOSIS — R20.8 DECREASED SENSATION OF LEG: ICD-10-CM

## 2021-08-19 DIAGNOSIS — M54.42 CHRONIC RIGHT-SIDED LOW BACK PAIN WITH BILATERAL SCIATICA: Primary | ICD-10-CM

## 2021-08-19 DIAGNOSIS — Z78.9 UNDER CARE OF SOCIAL WORKER: Primary | ICD-10-CM

## 2021-08-19 DIAGNOSIS — M54.41 CHRONIC RIGHT-SIDED LOW BACK PAIN WITH BILATERAL SCIATICA: Primary | ICD-10-CM

## 2021-08-19 PROCEDURE — 1160F RVW MEDS BY RX/DR IN RCRD: CPT | Performed by: EMERGENCY MEDICINE

## 2021-08-19 PROCEDURE — 3008F BODY MASS INDEX DOCD: CPT | Performed by: FAMILY MEDICINE

## 2021-08-19 PROCEDURE — 99214 OFFICE O/P EST MOD 30 MIN: CPT | Performed by: EMERGENCY MEDICINE

## 2021-08-19 PROCEDURE — 3008F BODY MASS INDEX DOCD: CPT | Performed by: EMERGENCY MEDICINE

## 2021-08-19 PROCEDURE — 1036F TOBACCO NON-USER: CPT | Performed by: EMERGENCY MEDICINE

## 2021-08-19 NOTE — TELEPHONE ENCOUNTER
I tried calling patient and no answer  I did leave her a detailed message in [address] of above message and gave her p# for central scheduling for her to schedule MRI

## 2021-08-19 NOTE — PROGRESS NOTES
CRISTI ARTHUR received a phone call from [facility] stating he would like to become caregiver for pt  CRISTI ARTHUR informed Gulshan that CHW referral could be placed at this time for assistance with waiver program application  Clive Wright was agreeable  CRISTI ARTHUR will remain available as needed

## 2021-08-19 NOTE — PROGRESS NOTES
Chart Review / Outgoing Call:  8/19/2021    CHW did call Rodrick, pt's brother, and discussed referral received for interest in waiver services  CHW did introduce herself and her role  Rodrick stated that he is very much interested in becoming pt's caretaker and will need assistance/guidance in applying with [place] [employer]  CHW did inform him that she could assist in this process  He thanked CHW but stated that he was currently still working and asked if CHW could call him back tomorrow morning to proceed  CHW agreed to do this  Next outreach is scheduled for 8/20/2021

## 2021-08-19 NOTE — TELEPHONE ENCOUNTER
I cancelled her PT for her back as she has already completed 2 months of PT for this  Please inform her about scheduling MRI

## 2021-08-19 NOTE — PROGRESS NOTES
Assessment/Plan:    Diagnoses and all orders for this visit:    Chronic right-sided low back pain with bilateral sciatica  -     Cancel: Ambulatory referral to Physical Therapy; Future  -     MRI lumbar spine wo contrast; Future  -     Ambulatory referral to Pain Management; Future    Decreased sensation of leg  -     Cancel: Ambulatory referral to Physical Therapy; Future  -     MRI lumbar spine wo contrast; Future  -     Ambulatory referral to Pain Management; Future    Lumbar degenerative disc disease  -     Cancel: Ambulatory referral to Physical Therapy; Future  -     MRI lumbar spine wo contrast; Future  -     Ambulatory referral to Pain Management; Future    Chronic back pain despite 2 months of formal PT  Xray lumbar spine reviewed  Will obtain MRI and refer to Pain Management    Return if symptoms worsen or fail to improve  Chief Complaint:     Chief Complaint   Patient presents with    Spine - Pain       Subjective:   Patient ID: Maximo Mckeon is a 72 y o  female  Patient returns with chronic midline lower back pain with b/l leg n/t and pain  No changes b/b  She's participated in formal PT over 4 weeks in June and July  Multiple falls 2-3 times per week    Previous note Patient returns for pain x 4-5 months s/p medrol dose pack with improvement of the right lower back and buttocks pain, still with radiation down right leg to the knee with more significant pain of the knee  She was not able to start PT due to cost of high copay  No swelling of the knee, knee pain worse with stairs and forward bending, no n/t  Hip pain 5/10, knee is 8/10  She is currently taking diclofenac and tylenol, not taking meloxicam         Initial note:  New patient presents referred by PCP for right lower back, buttocks, groin and right leg pain down past knee into foot for approximately 2 months  She denies any specific injury but she does have a history of multiple falls over the last 2-3 years    Denies changes in bowel / bladder, no n/t  She has been treated with a prescription for cyclobenzaprine and diclofenac by PCP, and was provided an IM injection of Toradol  Review of Systems    The following portions of the patient's chart were reviewed and updated as appropriate: Allergie  Allergies   Allergen Reactions    Tramadol      Causes nausea and vomiting    s   Allergen Reactions    Tramadol      Causes nausea and vomiting       Diagnosis Date    Abdominal pain     Anxiety     last assessed: 10/19/2013    Arthritis     osteo both hands; last assessed: 10/19/2013    Bowel incontinence     Chest pain     Constipation     [facility]     Diabetes mellitus type II, controlled ([facility]     Disease of thyroid gland     Epigastric pain     with distention    Falls     High cholesterol     Hyperlipidemia     Hypertension     last assessed: 4/3/2017    Migraine     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    Palpitations     Recurrent urinary tract infection     Seizures [facility]     Sleep disorder     last assessed: 10/19/2013    Stroke ([facility]     Thyroid disease        Past Surgical History:   Procedure Laterality Date    AXILLARY SURGERY      cyst surgery    BLADDER SURGERY       SECTION      CYSTOSCOPY N/A 2018    Procedure: CYSTOSCOPY;  Surgeon: Sherrill Chandler MD;  Location: [facility] OR;  Service: Gynecology    FACIAL BONE TUMOR EXCISION      KNEE SURGERY      OTHER SURGICAL HISTORY      cyst removed from axilla    VT ESOPHAGOGASTRODUODENOSCOPY TRANSORAL DIAGNOSTIC N/A 2017    Procedure: ESOPHAGOGASTRODUODENOSCOPY (EGD); Surgeon: Domenico Moser MD;  Location: [facility];   Service: Gastroenterology    VT SLING OPER STRES INCONTINENCE N/A 2018    Procedure: PUBOVAGINAL SLING;  Surgeon: Sherrill Chandler MD;  Location: [facility] OR;  Service: Gynecology    TUBAL LIGATION         Social History     Socioeconomic History    Marital status: /Civil Union     Spouse name: Not on file    Number of children: Not on file    Years of education: Grade 12 completed    Highest education level: Not on file   Occupational History    Not on file   Tobacco Use    Smoking status: Never Smoker    Smokeless tobacco: Never Used   Vaping Use    Vaping Use: Never used   Substance and Sexual Activity    Alcohol use: No    Drug use: No    Sexual activity: Not on file   Other Topics Concern    Not on file   Social History Narrative    Caffeine use    Lives with spouse    Scientologist     Social Determinants of Health     Financial Resource Strain:     Difficulty of Paying Living Expenses:    Food Insecurity:     Worried About Running Out of Food in the Last Year:     [address] in the Last Year:    Transportation Needs:     Lack of Transportation (Medical):      Lack of Transportation (Non-Medical):    Physical Activity:     Days of Exercise per Week:     Minutes of Exercise per Session:    Stress:     Feeling of Stress :    Social Connections:     Frequency of Communication with Friends and Family:     Frequency of Social Gatherings with Friends and Family:     Attends [employer]oration[employer] Services:     Active Member of Clubs or Organizations:     Attends Club or Organization Meetings:     Marital Status:    Intimate Partner Violence:     Fear of Current or Ex-Partner:     Emotionally Abused:     Physically Abused:     Sexually Abused:        Family History   Problem Relation Age of Onset    Diabetes Family         mellitus    Hypertension Family     Stroke Family     Thyroid disease Family        Medications:    Current Outpatient Medications:     aspirin (Guille Aspirin EC Low Dose) 81 mg EC tablet, Take 81 mg by mouth daily, Disp: , Rfl:     atenolol (TENORMIN) 25 mg tablet, Take 1 tablet (25 mg total) by mouth daily, Disp: 90 tablet, Rfl: 1    atorvastatin (LIPITOR) 40 mg tablet, Take 1 tablet (40 mg total) by mouth daily, Disp: 90 tablet, Rfl: 1    citalopram (CeleXA) 40 mg tablet, Take 1 tablet (40 mg total) by mouth daily, Disp: 90 tablet, Rfl: 1    diclofenac sodium (VOLTAREN) 50 mg EC tablet, Take 1 tablet (50 mg total) by mouth 2 (two) times a day, Disp: 60 tablet, Rfl: 0    albuterol (PROVENTIL HFA,VENTOLIN HFA) 90 mcg/act inhaler, , Disp: , Rfl:     Cholecalciferol (VITAMIN D-3) 5000 units TABS, Take 1 tablet by mouth daily (Patient not taking: Reported on 8/5/2021), Disp: 90 tablet, Rfl: 1    conjugated estrogens (PREMARIN) vaginal cream, Insert 0 5 g into the vagina 3 (three) times a week (Patient not taking: Reported on 8/5/2021), Disp: 42 5 g, Rfl: 0    cyclobenzaprine (FLEXERIL) 5 mg tablet, Take 1 tablet (5 mg total) by mouth 3 (three) times a day as needed for muscle spasms (Patient not taking: Reported on 8/5/2021), Disp: 90 tablet, Rfl: 0    cyclobenzaprine (FLEXERIL) 5 mg tablet, Take 1 tablet (5 mg total) by mouth 3 (three) times a day as needed for muscle spasms (Patient not taking: Reported on 8/5/2021), Disp: 30 tablet, Rfl: 0    lidocaine (XYLOCAINE) 5 % ointment, Apply topically 2 (two) times a day as needed for mild pain (Patient not taking: Reported on 8/5/2021), Disp: 35 44 g, Rfl: 0    meclizine (ANTIVERT) 12 5 MG tablet, , Disp: , Rfl:     Melatonin 5 MG TABS, Take 1 tablet (5 mg total) by mouth daily at bedtime (Patient not taking: Reported on 5/29/2020), Disp: 90 tablet, Rfl: 0    meloxicam (MOBIC) 7 5 mg tablet, Take 1 tablet (7 5 mg total) by mouth daily (Patient not taking: Reported on 5/29/2020), Disp: 90 tablet, Rfl: 0    methylPREDNISolone 4 MG tablet therapy pack, Use as directed on package (Patient not taking: Reported on 5/29/2020), Disp: 1 each, Rfl: 0    neomycin-polymyxin-dexamethasone (MAXITROL) ophthalmic suspension, Administer 1 drop to the right eye 4 (four) times a day for 7 days, Disp: 5 mL, Rfl: 0    omeprazole (PriLOSEC) 20 mg delayed release capsule, Take 1 capsule (20 mg total) by mouth daily (Patient not taking: Reported on 8/5/2021), Disp: 60 capsule, Rfl: 2    polyethylene glycol (GLYCOLAX) powder, Take 17 g by mouth daily (Patient not taking: Reported on 8/5/2021), Disp: 850 g, Rfl: 1    Patient Active Problem List   Diagnosis    Fall    [facility]    Subarachnoid hemorrhage following injury [facility]    Hypertension    Collapsed vertebra, not elsewhere classified, sacral and sacrococcygeal region, initial encounter for fracture ([facility]    HLD (hyperlipidemia)    History of CVA with residual deficit    Depression    Headache    Urinary, incontinence, stress female    Adhesive capsulitis of left shoulder    Asymptomatic bilateral carotid artery stenosis    Cervical radiculitis    Constipation    Elevated TSH    Gastroesophageal reflux disease    Hyperlipidemia    Memory difficulties    Muscle weakness    Osteoporosis    Osteoarthritis of both hands    Urge and stress incontinence    Primary insomnia    Diabetes mellitus type II, [facility] ([facility]    Arthritis    Left upper quadrant pain    Epigastric abdominal pain    NSAID long-term use    Contusion of right breast    Elevated BP without diagnosis of hypertension    Breast pain, right    Hip strain, right, initial encounter    Acute bacterial conjunctivitis of left eye    Sciatica of right side    Class 1 obesity due to excess calories without serious comorbidity with body mass index (BMI) of 30 0 to 30 9 in adult       Objective:  /78   Pulse 74   Ht 4' 9" (1 448 m)   Wt 65 8 kg (145 lb)   BMI 31 38 kg/m²     Back Exam     Range of Motion   Extension: normal   Flexion: abnormal     Muscle Strength   The patient has normal back strength      Tests   Straight leg raise right: positive  Straight leg raise left: negative    Reflexes   Patellar: normal  Achilles: normal    Other   Toe walk: normal  Heel walk: normal  Sensation: normal  Gait: antalgic     Comments:  Mild decreased sensation L4-L5 on the right, normal sensation on the left leg  Negative clonus bilaterally    There is pain reproduced with right hip flexion however no pain with internal and external rotation            Physical Exam  Musculoskeletal:      Lumbar back: Positive right straight leg raise test  Negative left straight leg raise test            Neurologic Exam    Procedures    I have personally reviewed the written report of the pertinent studies  2019  LUMBAR SPINE     INDICATION:   M54 5: Low back pain      COMPARISON:  None     VIEWS:  XR SPINE LUMBAR 2 OR 3 VIEWS INJURY        FINDINGS:     There is no evidence of acute fracture or destructive osseous lesion      Alignment is unremarkable      L5-S1 degenerative disc change      The pedicles appear intact      Soft tissues are unremarkable      IMPRESSION:     No acute osseous abnormality

## 2021-08-19 NOTE — TELEPHONE ENCOUNTER
Patient is calling because she tried to schedule PT but they said that there was a problem with her PT script  Patient would like us to call her when it is in her chart  I see it is in her chart but it is showing "cancelled" on it  Patient is also asking when she is having her mri  She is not sure if she calls for that or if someone will call her to schedule      cb  [phone] (needs )

## 2021-08-20 ENCOUNTER — PATIENT OUTREACH (OUTPATIENT)
Dept: FAMILY MEDICINE CLINIC | Facility: CLINIC | Age: 71
End: 2021-08-20

## 2021-08-20 ENCOUNTER — OFFICE VISIT (OUTPATIENT)
Dept: FAMILY MEDICINE CLINIC | Facility: CLINIC | Age: 71
End: 2021-08-20

## 2021-08-20 VITALS
RESPIRATION RATE: 18 BRPM | BODY MASS INDEX: 31.81 KG/M2 | SYSTOLIC BLOOD PRESSURE: 120 MMHG | OXYGEN SATURATION: 96 % | WEIGHT: 147 LBS | HEART RATE: 77 BPM | DIASTOLIC BLOOD PRESSURE: 84 MMHG | TEMPERATURE: 98.1 F

## 2021-08-20 DIAGNOSIS — R42 DIZZINESS: Primary | ICD-10-CM
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
## 2021-08-20 PROCEDURE — 1160F RVW MEDS BY RX/DR IN RCRD: CPT | Performed by: FAMILY MEDICINE

## 2021-08-20 PROCEDURE — 99213 OFFICE O/P EST LOW 20 MIN: CPT | Performed by: FAMILY MEDICINE

## 2021-08-20 PROCEDURE — 1036F TOBACCO NON-USER: CPT | Performed by: FAMILY MEDICINE

## 2021-08-20 NOTE — PROGRESS NOTES
Chart Review / Outgoing Calls:  8/20/2021    CHW did call pt to discuss referral in reference for waiver services  CHW wanted to confirm pt is interested in waiver services and that her brother, Emerald Ponce, should be the   Pt was confused but after explaining to her what waiver services are she agreed she is in need of home health care  She agreed to have her brother as a point of contact  CHW did explain to her what to expect moving forward and also informed her of time frame  Pt appeared to be discouraged as she was under the impression that CHW was calling her with a start date for waiver services  CHW did call Gulshan to begin scheduling evaluation with ANNALISA TERAN  Voice message left requesting a call in return  Next outreach is scheduled for 8/27/2021

## 2021-08-20 NOTE — PROGRESS NOTES
Assessment/Plan:    Dizziness  Assessment:   Chronic, positive for tinnitus, unable to visualized left tympanic membrane during examination  Negative for nystagmus, limp weakness, paresthesia,  diplopia, headaches, fever, recent URI, ear supuration or otalgia   No trauma to her head  Has a pending visit with PT   Follows with orthopedics and sport medicine  Plan:  Continue PT  Will refer her to OT   Will refer the patient with ENT  Possible MRI of head and neck if symptoms persist    Possible referral to  Neurology if cleared from ENT standpoint     Fall  Assessment:   Negative for head trauma/hip or back trauma  Patient tripped while getting out of her bed hit her right elbow with a nightstand  Elbow exam unremarkable, mild redness, no sign of edema, laceration, loss of limb function  Sensation and strength wnl  Plan:  Xray not required at this moment  Patient refused pain medication since has minimal tenderness  Referral to OT, PT evaluation pending  F/U as needed  ED recommendations given  Diagnoses and all orders for this visit:    Dizziness  -     Ambulatory referral to Occupational Therapy; Future    Fall, subsequent encounter  -     Ambulatory Referral to Otolaryngology; Future  -     Ambulatory referral to Occupational Therapy; Future          Subjective:      Patient ID: Denny Blake is a 72 y o  female     Denny Blake is a 72 y o  female who presents with her granddaughter, who has a PMHx significant for DM-2, HTN, hx of CVA,  B/l carotid stenosis, HLD, MDD, presents c/o dizziness that has resulted in multiple falls episodes  Patient reports that the most recent fall episode was this morning while getting up from her bed  Denies  head or hip trauma, fall was less than 2 feet and the point of contact was her right elbow  See below for the description     Patient states that her frequent falls are caused by chronic episodes of dizziness that make her loose her balance  Reports that during the episodes, she  feels like objects are moving around her  Denies any medication changes, and has not tried any medication for her symptoms  Recently she was seen by orthopedics for chronic b/l knee pain, she saw also recently sport medicine for back pain f/u  She was referred to PT for her imbalance  As per  Patient, she was also recommended by her Orthopedic to be referred to ENT for her symptoms  MRI of her back was ordered in previous visits but the patient never performed them  Denies fatigue, headaches, dizziness, blurred vision, nausea, palpitation, chest pain, SOB, micturition, weakness, bowel changes, sleep problems,  sick contacts or recent travel  Patient's medical conditions are stable unless noted otherwise above   Patient has not had any recent hospitalizations, or medical emergencies since last visit  Patient reports being compliant with her medications  Last office visit was on 8/5/2021 with her PCP  Patient has no further complaints other than what is mentioned in the ROS  Fall  The accident occurred 1 to 3 hours ago  The fall occurred from a bed  She fell from a height of 1 to 2 ft  She landed on carpet  The point of impact was the right elbow  The pain is at a severity of 3/10  The pain is mild  The symptoms are aggravated by standing  Pertinent negatives include no abdominal pain, bowel incontinence, fever, headaches, hearing loss, hematuria, loss of consciousness, nausea, numbness, tingling, visual change or vomiting  She has tried nothing for the symptoms  Dizziness  This is a chronic problem  The problem occurs daily  The problem has been waxing and waning  Associated symptoms include arthralgias (chronic LE pain and back pain  )  Pertinent negatives include no abdominal pain, chills, congestion, diaphoresis, fatigue, fever, headaches, nausea, numbness, sore throat, visual change or vomiting  She has tried nothing for the symptoms         The following portions of the patient's history were reviewed and updated as appropriate: allergies, current medications, past family history, past medical history, past social history, past surgical history and problem list     Review of Systems   Constitutional: Negative for activity change, chills, diaphoresis, fatigue, fever and unexpected weight change  HENT: Positive for tinnitus  Negative for congestion, ear discharge, ear pain, hearing loss, mouth sores, postnasal drip, rhinorrhea, sinus pressure, sinus pain, sneezing, sore throat, trouble swallowing and voice change  Eyes: Negative for itching and visual disturbance  Respiratory: Negative  Cardiovascular: Negative  Gastrointestinal: Negative  Negative for abdominal pain, bowel incontinence, nausea and vomiting  Genitourinary: Negative  Negative for hematuria  Musculoskeletal: Positive for arthralgias (chronic LE pain and back pain  )  Skin: Negative  Negative for color change  Neurological: Positive for dizziness  Negative for tingling, loss of consciousness, numbness and headaches  Objective:      /84 (BP Location: Left arm, Patient Position: Sitting, Cuff Size: Adult)   Pulse 77   Temp 98 1 °F (36 7 °C) (Temporal)   Resp 18   Wt 66 7 kg (147 lb)   SpO2 96%   BMI 31 81 kg/m²          Physical Exam  Vitals and nursing note reviewed  Constitutional:       General: She is awake  She is not in acute distress  Appearance: Normal appearance  She is well-developed and well-groomed  She is obese  She is not ill-appearing, toxic-appearing or diaphoretic  HENT:      Head: Normocephalic and atraumatic  Right Ear: External ear normal  No laceration, drainage, swelling or tenderness  No middle ear effusion  No foreign body  No mastoid tenderness  No PE tube  No hemotympanum  Tympanic membrane is not injected, scarred, perforated, erythematous, retracted or bulging        Left Ear: External ear normal  No laceration, drainage, swelling or tenderness  No middle ear effusion  No foreign body  No mastoid tenderness  No PE tube  No hemotympanum  Ears:      Comments: Left ear:  Unable to visualize Tympanic  Right ear mild cerumen        Nose: Nose normal       Mouth/Throat:      Lips: Pink  Mouth: Mucous membranes are moist       Tongue: No lesions  Tongue does not deviate from midline  Palate: No mass and lesions  Pharynx: Oropharynx is clear  No oropharyngeal exudate or posterior oropharyngeal erythema  Eyes:      Extraocular Movements: Extraocular movements intact  Conjunctiva/sclera: Conjunctivae normal       Pupils: Pupils are equal, round, and reactive to light  Cardiovascular:      Rate and Rhythm: Normal rate and regular rhythm  Pulses: Normal pulses  Heart sounds: Normal heart sounds  Pulmonary:      Effort: Pulmonary effort is normal       Breath sounds: Normal breath sounds  Abdominal:      General: Bowel sounds are normal       Palpations: Abdomen is soft  Musculoskeletal:         General: Normal range of motion  Right upper arm: Normal       Left upper arm: Normal       Right elbow: Normal       Left elbow: Normal       Right forearm: Normal       Left forearm: Normal       Right wrist: Normal       Left wrist: Normal       Right hand: Normal       Left hand: Normal       Cervical back: Normal, normal range of motion and neck supple  Thoracic back: Normal       Lumbar back: Normal    Skin:     General: Skin is warm  Capillary Refill: Capillary refill takes less than 2 seconds  Coloration: Skin is not jaundiced or pale  Findings: No bruising, erythema, lesion or rash  Neurological:      General: No focal deficit present  Mental Status: She is alert and oriented to person, place, and time  Psychiatric:         Mood and Affect: Mood normal          Behavior: Behavior is cooperative

## 2021-08-22 NOTE — ASSESSMENT & PLAN NOTE
Assessment:   Chronic, positive for tinnitus, unable to visualized left tympanic membrane during examination  Negative for nystagmus, limp weakness, paresthesia,  diplopia, headaches, fever, recent URI, ear supuration or otalgia   No trauma to her head  Has a pending visit with PT   Follows with orthopedics and sport medicine  Plan:  Continue PT  Will refer her to OT   Will refer the patient with ENT     Possible MRI of head and neck if symptoms persist    Possible referral to  Neurology if cleared from ENT standpoint

## 2021-08-22 NOTE — ASSESSMENT & PLAN NOTE
Assessment:   Negative for head trauma/hip or back trauma  Patient tripped while getting out of her bed hit her right elbow with a nightstand  Elbow exam unremarkable, mild redness, no sign of edema, laceration, loss of limb function  Sensation and strength wnl  Plan:  Xray not required at this moment  Patient refused pain medication since has minimal tenderness  Referral to OT, PT evaluation pending  F/U as needed  ED recommendations given

## 2021-08-25 ENCOUNTER — PATIENT OUTREACH (OUTPATIENT)
Dept: FAMILY MEDICINE CLINIC | Facility: CLINIC | Age: 71
End: 2021-08-25

## 2021-08-25 NOTE — PROGRESS NOTES
Chart Review / Outgoing Calls:  8/25/2021    CHW did call Rodrick, pt's brother, to begin waiver process  Gulshan apologized for missing CHW's calls  CHW informed him it was ok and that she is able to assist him today in moving forward with beginning waiver application  Gulshan agreed  CHW did call ANNALISA TERAN to begin the waiver application  We were assisted by Digna After initial call we were asked to add pt to the call so that she can give verbal consent for CHW and Rodrick to be authorized representatives on her behalf  Once this was completed an evaluation was scheduled for 8/31/21, between 11-1PM, with ANNALISA Peters  Gulshan agreed to be available for this assessment along with the pt  CHW will f/u day after evaluation  CHW did explain to Rodrick all information that will be requested during this evaluation so that he may be prepared  He expressed understanding  Next outreach is scheduled for 9/1/2021

## 2021-08-26 ENCOUNTER — TELEPHONE (OUTPATIENT)
Dept: FAMILY MEDICINE CLINIC | Facility: CLINIC | Age: 71
End: 2021-08-26

## 2021-08-26 NOTE — TELEPHONE ENCOUNTER
[facility] / Independent Enrollment   FORM RECEIVED VIA FAX  WILL BE PLACED IN FORM BIN FOR MA PICKUP

## 2021-09-01 ENCOUNTER — TELEPHONE (OUTPATIENT)
Dept: OBGYN CLINIC | Facility: HOSPITAL | Age: 71
End: 2021-09-01

## 2021-09-01 NOTE — TELEPHONE ENCOUNTER
Dr Raegan Gaviria from [facility] said the patient was unable to lie still for any imaging to be done  Renataaurelia Strickland a note in the chart saying it was attempted, she tried to communicate with the family but there was a language barrier  Patient couldn't even hold still for 10 seconds, it seemed like she forgot the instructions each time she was told to stay still  Can this MRI be scheduled an alternate way?     Patient cb  # [phone]

## 2021-09-02 ENCOUNTER — PATIENT OUTREACH (OUTPATIENT)
Dept: FAMILY MEDICINE CLINIC | Facility: CLINIC | Age: 71
End: 2021-09-02

## 2021-09-02 NOTE — PROGRESS NOTES
Outgoing Call:  9/2/2021    CHW did call Vijay Grant, pt's brother, to confirm he and pt completed waiver evaluation which was scheduled for yesterday  He was not available and voice message was left  CHW then called pt's number and her  Prabhu Elena answered  He stated he was not at home and CHW should call later  Prabhu Elena was able to confirm that pt did complete her evaluation yesterday with [facility]  Chart reviewed  Next outreach is scheduled for 9/9/2021

## 2021-09-02 NOTE — TELEPHONE ENCOUNTER
Please call patient to see if there is any way we can help her obtain an MRI    If not, we could order CT  Thanks

## 2021-09-02 NOTE — PROGRESS NOTES
Incoming Call:  9/2/2021    CHW did receive a call from lynn Mensah's brother, stating that he was returning my call  Gulshan did also confirm that the evaluation for waiver services was completed yesterday and will await a response from [address] [place] [address]  CHW informed him she will look into status within a week and keep him posted  Next outreach is scheduled for 9/9/2021

## 2021-09-07 NOTE — TELEPHONE ENCOUNTER
I called and spoke with patient, she said she would prefer the CT instead of the MRI  She stated that during the MRI she was unable to stay still due to tingling that she felt  Please enter order as she will call to schedule the CT  P# was provided to her for central scheduling

## 2021-09-09 ENCOUNTER — PATIENT OUTREACH (OUTPATIENT)
Dept: FAMILY MEDICINE CLINIC | Facility: CLINIC | Age: 71
End: 2021-09-09

## 2021-09-09 NOTE — TELEPHONE ENCOUNTER
I called and spoke with patient  She said that is scared to do the IV so she will try the tablet to see if its possible to do the MRI  I did advise her that we highly recommend the MRI vs CT for what we are looking for and she understood  She will try the MRI again with the medication and if not possible then CT she will have to do  Please send medication to the pharmacy, as she wants a call when this is done so she can call to schedule MRI

## 2021-09-09 NOTE — TELEPHONE ENCOUNTER
Patient called asking when the tablets would be sent to her pharmacy so she can be prepared to pick it up      Used     Call back # [phone]

## 2021-09-09 NOTE — PROGRESS NOTES
Outgoing Calls:  9/9/2021    CHW did call [facility] [employer] to inquire about status of pt's waiver application  Pt is clinically eligible  Currently application has been forward to [facility] for financial review  CHW did call pt's brother, Falguni Dueñas and informed him of this update  CHW encouraged Gulshan to call pt and ask if she has received letters from [address] requesting proof of income  CHW did ask him to let CHW if this is the case and he agreed  Next outreach is scheduled for 9/16/2021

## 2021-09-09 NOTE — TELEPHONE ENCOUNTER
Patient called in , used interpretor line  She said she moves too much and she needs something else       I am assuming she means the IV to CT Scan [employer]

## 2021-09-10 DIAGNOSIS — F40.240 CLAUSTROPHOBIA: Primary | ICD-10-CM

## 2021-09-10 RX ORDER — LORAZEPAM 0.5 MG/1
TABLET ORAL
Qty: 1 TABLET | Refills: 0 | Status: SHIPPED | OUTPATIENT
Start: 2021-09-10

## 2021-09-16 ENCOUNTER — PATIENT OUTREACH (OUTPATIENT)
Dept: FAMILY MEDICINE CLINIC | Facility: CLINIC | Age: 71
End: 2021-09-16

## 2021-09-16 NOTE — PROGRESS NOTES
Outgoing Call:  9/16/2021    CHW did complete chart review  CHW was expecting a call from pt's brother, Tamela Robert, to confirm if pt received mail from [facility] in reference to waiver application  CHW did call Tamela Robert as she has not heard back from him  CHW did leave a detailed message requesting a call in return  Next outreach is scheduled for 9/23/2021

## 2021-09-17 ENCOUNTER — OFFICE VISIT (OUTPATIENT)
Dept: FAMILY MEDICINE CLINIC | Facility: CLINIC | Age: 71
End: 2021-09-17

## 2021-09-17 ENCOUNTER — PATIENT OUTREACH (OUTPATIENT)
Dept: FAMILY MEDICINE CLINIC | Facility: CLINIC | Age: 71
End: 2021-09-17

## 2021-09-17 VITALS
DIASTOLIC BLOOD PRESSURE: 70 MMHG | TEMPERATURE: 97.3 F | SYSTOLIC BLOOD PRESSURE: 128 MMHG | RESPIRATION RATE: 18 BRPM | HEART RATE: 70 BPM | WEIGHT: 150 LBS | BODY MASS INDEX: 32.46 KG/M2 | OXYGEN SATURATION: 98 %

## 2021-09-17 DIAGNOSIS — E11.9 ENCOUNTER FOR DIABETIC FOOT EXAM (HCC): ICD-10-CM

## 2021-09-17 DIAGNOSIS — R42 DIZZINESS: ICD-10-CM

## 2021-09-17 DIAGNOSIS — E66.09 CLASS 1 OBESITY DUE TO EXCESS CALORIES WITHOUT SERIOUS COMORBIDITY WITH BODY MASS INDEX (BMI) OF 30.0 TO 30.9 IN ADULT: ICD-10-CM

## 2021-09-17 DIAGNOSIS — Z01.00 DIABETIC EYE EXAM (HCC): ICD-10-CM

## 2021-09-17 DIAGNOSIS — Z12.12 ENCOUNTER FOR COLORECTAL CANCER SCREENING: ICD-10-CM

## 2021-09-17 DIAGNOSIS — R26.89 LOSS OF BALANCE: Primary | ICD-10-CM

## 2021-09-17 DIAGNOSIS — E11.9 CONTROLLED TYPE 2 DIABETES MELLITUS WITHOUT COMPLICATION, WITHOUT LONG-TERM CURRENT USE OF INSULIN (HCC): ICD-10-CM

## 2021-09-17 DIAGNOSIS — I10 ESSENTIAL HYPERTENSION: ICD-10-CM

## 2021-09-17 DIAGNOSIS — Z12.11 ENCOUNTER FOR COLORECTAL CANCER SCREENING: ICD-10-CM

## 2021-09-17 DIAGNOSIS — E11.9 DIABETIC EYE EXAM (HCC): ICD-10-CM

## 2021-09-17 DIAGNOSIS — H93.13 TINNITUS OF BOTH EARS: ICD-10-CM

## 2021-09-17 PROCEDURE — 1160F RVW MEDS BY RX/DR IN RCRD: CPT | Performed by: FAMILY MEDICINE

## 2021-09-17 PROCEDURE — 3074F SYST BP LT 130 MM HG: CPT | Performed by: FAMILY MEDICINE

## 2021-09-17 PROCEDURE — 3288F FALL RISK ASSESSMENT DOCD: CPT | Performed by: FAMILY MEDICINE

## 2021-09-17 PROCEDURE — 3725F SCREEN DEPRESSION PERFORMED: CPT | Performed by: FAMILY MEDICINE

## 2021-09-17 PROCEDURE — 1036F TOBACCO NON-USER: CPT | Performed by: FAMILY MEDICINE

## 2021-09-17 PROCEDURE — 3078F DIAST BP <80 MM HG: CPT | Performed by: FAMILY MEDICINE

## 2021-09-17 PROCEDURE — 99214 OFFICE O/P EST MOD 30 MIN: CPT | Performed by: FAMILY MEDICINE

## 2021-09-17 RX ORDER — ATORVASTATIN CALCIUM 40 MG/1
40 TABLET, FILM COATED ORAL DAILY
Qty: 90 TABLET | Refills: 1 | Status: SHIPPED | OUTPATIENT
Start: 2021-09-17 | End: 2021-12-08 | Stop reason: SDUPTHER

## 2021-09-17 RX ORDER — ATENOLOL 25 MG/1
25 TABLET ORAL DAILY
Qty: 90 TABLET | Refills: 1 | Status: SHIPPED | OUTPATIENT
Start: 2021-09-17

## 2021-09-17 NOTE — PROGRESS NOTES
Incoming Call:  9/17/2021    CHW did receive a message from pt's brother, Mabel Natarajan  He stated that pt did receive a letter from [facility] requesting bank statements and such  He stated that pt did provide them with requested information  CHW did call back and left message for him  Next outreach is scheduled for 9/23/2021

## 2021-09-17 NOTE — ASSESSMENT & PLAN NOTE
BMI Counseling: Body mass index is 32 46 kg/m²  The BMI is above normal  Nutrition recommendations include reducing portion sizes, decreasing overall calorie intake, 3-5 servings of fruits/vegetables daily, decreasing soda and/or juice intake, moderation in carbohydrate intake and reducing intake of cholesterol

## 2021-09-17 NOTE — PROGRESS NOTES
Diabetic Foot Exam    Patient's shoes and socks removed  Right Foot/Ankle   Right Foot Inspection  Skin Exam: skin normal and skin intact no dry skin, no warmth, no callus, no erythema, no maceration, no abnormal color, no pre-ulcer, no ulcer and no callus                          Toe Exam: ROM and strength within normal limits  Sensory   Vibration: intact  Proprioception: intact   Monofilament testing: intact  Vascular  Capillary refills: < 3 seconds  The right DP pulse is 1+  The right PT pulse is 1+  Left Foot/Ankle  Left Foot Inspection  Skin Exam: skin normal and skin intactno dry skin, no warmth, no erythema, no maceration, normal color, no pre-ulcer, no ulcer and no callus                         Toe Exam: ROM and strength within normal limits                   Sensory   Vibration: intact  Proprioception: intact  Monofilament: intact  Vascular  Capillary refills: < 3 seconds  The left DP pulse is 1+  The left PT pulse is 1+  Assign Risk Category:  No deformity present; No loss of protective sensation;        Risk: 0  Assessment/Plan:    Class 1 obesity due to excess calories without serious comorbidity with body mass index (BMI) of 30 0 to 30 9 in adult  BMI Counseling: Body mass index is 32 46 kg/m²  The BMI is above normal  Nutrition recommendations include reducing portion sizes, decreasing overall calorie intake, 3-5 servings of fruits/vegetables daily, decreasing soda and/or juice intake, moderation in carbohydrate intake and reducing intake of cholesterol  Diagnoses and all orders for this visit:    Loss of balance  -     Ambulatory Referral to Otolaryngology; Future    Dizziness  -     Ambulatory Referral to Otolaryngology; Future    Tinnitus of both ears  -     Ambulatory Referral to Otolaryngology; Future    Controlled type 2 diabetes mellitus without complication, without long-term current use of insulin (HCC)  -     atorvastatin (LIPITOR) 40 mg tablet;  Take 1 tablet (40 mg total) by mouth daily    Essential hypertension  -     atenolol (TENORMIN) 25 mg tablet; Take 1 tablet (25 mg total) by mouth daily    Class 1 obesity due to excess calories without serious comorbidity with body mass index (BMI) of 30 0 to 30 9 in adult        Have MRI done as ordered by Sports Medicine  Information for Dr Carla Durham ENT given so she can call and see if she can get an appointment sooner   Subjective:      Patient ID: Teja Pradhan is a 72 y o  female  71 yo  female with known DM-2, HTN, hx of CVA,  B/l carotid stenosis, HLD, MDD, complains of continued dizziness resulting in multiple falls  Patient reports that the most recent fall episode was about 1 week ago getting out of bed  Falls are more common in the morning when she gets out of bed or after sitting lying for more than 10 minutes  Patient states that her frequent falls are caused by chronic episodes of dizziness that make her loose her balance  She often needs to stop when walking due to dizziness and loss of balance  Does not like using cane or walker  Low back pain is unchanged from prior  She was seen and evalauted by Sports Medicine and was sent for MRI  Patient was unable to have MRI done because she could not lie still  As per chart notes she refused IV sedation because she is afraid of the IV  She is going to retry MRI but taking benzo prior to MRI  She was scheduled for ENT but had to reschedule due to lack of transportation and is now upset because her new appointment is not till December         The following portions of the patient's history were reviewed and updated as appropriate: She  has a past medical history of Abdominal pain, Anxiety, Arthritis, Bowel incontinence, Chest pain, Constipation, CVA (cerebral vascular accident) ([address] ), Diabetes mellitus type II, controlled ([address] ), Disease of thyroid gland, Epigastric pain, Falls, High cholesterol, Hyperlipidemia, Hypertension, Migraine, Palpitations, Recurrent urinary tract infection, Seizures ([facility] ), Sleep disorder, Stroke [facility]), and Thyroid disease  She   Patient Active Problem List    Diagnosis Date Noted    Class 1 obesity due to excess calories without serious comorbidity with body mass index (BMI) of 30 0 to 30 9 in adult 2020    Sciatica of right side 2020    Hip strain, right, initial encounter 2019    Acute bacterial conjunctivitis of left eye 2019    Breast pain, right 2019    Elevated BP without diagnosis of hypertension 2019    Contusion of right breast 2019    Epigastric abdominal pain 2019    NSAID long-term use 2019    Left upper quadrant pain 2019    Diabetes mellitus type II, controlled ([facility] ) 10/12/2018    Arthritis 10/12/2018    Primary insomnia 2018    Urinary, incontinence, stress female 2018    Urge and stress incontinence 10/26/2017    Osteoporosis 09/15/2017    Adhesive capsulitis of left shoulder 2017    Constipation 05/10/2017    Cervical radiculitis 2017    Elevated TSH 2017    Osteoarthritis of both hands 2017    Memory difficulties 2016    Asymptomatic bilateral carotid artery stenosis 2016    Gastroesophageal reflux disease 2016    Hypertension 2016    Collapsed vertebra, not elsewhere classified, sacral and sacrococcygeal region, initial encounter for fracture ([facility] ) 2016    HLD (hyperlipidemia) 2016    History of CVA with residual deficit 2016    Depression 2016    Headache 2016    Dizziness 2016    Fall 2016    Subdural hemorrhage following injury ([facility] ) 2016    Subarachnoid hemorrhage following injury ([facility] ) 2016    Hyperlipidemia 10/19/2013    Muscle weakness 10/19/2013     She  has a past surgical history that includes Knee surgery; Other surgical history;  section;  Axillary Surgery; pr esophagogastroduodenoscopy transoral diagnostic (N/A, 6/5/2017); pr sling oper stres incontinence (N/A, 1/18/2018); CYSTOSCOPY (N/A, 1/18/2018); Bladder surgery; Facial bone tumor excision; and Tubal ligation  Her family history includes Diabetes in her family; Hypertension in her family; Stroke in her family; Thyroid disease in her family  She  reports that she has never smoked  She has never used smokeless tobacco  She reports that she does not drink alcohol and does not use drugs    Current Outpatient Medications   Medication Sig Dispense Refill    albuterol (PROVENTIL HFA,VENTOLIN HFA) 90 mcg/act inhaler       aspirin (Guille Aspirin EC Low Dose) 81 mg EC tablet Take 81 mg by mouth daily       atenolol (TENORMIN) 25 mg tablet Take 1 tablet (25 mg total) by mouth daily 90 tablet 1    atorvastatin (LIPITOR) 40 mg tablet Take 1 tablet (40 mg total) by mouth daily 90 tablet 1    citalopram (CeleXA) 40 mg tablet Take 1 tablet (40 mg total) by mouth daily 90 tablet 1    cyclobenzaprine (FLEXERIL) 5 mg tablet Take 1 tablet (5 mg total) by mouth 3 (three) times a day as needed for muscle spasms 90 tablet 0    Cholecalciferol (VITAMIN D-3) 5000 units TABS Take 1 tablet by mouth daily (Patient not taking: Reported on 8/5/2021) 90 tablet 1    conjugated estrogens (PREMARIN) vaginal cream Insert 0 5 g into the vagina 3 (three) times a week (Patient not taking: Reported on 8/5/2021) 42 5 g 0    cyclobenzaprine (FLEXERIL) 5 mg tablet Take 1 tablet (5 mg total) by mouth 3 (three) times a day as needed for muscle spasms (Patient not taking: Reported on 8/5/2021) 30 tablet 0    diclofenac sodium (VOLTAREN) 50 mg EC tablet Take 1 tablet (50 mg total) by mouth 2 (two) times a day (Patient not taking: Reported on 8/20/2021) 60 tablet 0    lidocaine (XYLOCAINE) 5 % ointment Apply topically 2 (two) times a day as needed for mild pain (Patient not taking: Reported on 8/5/2021) 35 44 g 0    LORazepam (ATIVAN) 0 5 mg tablet 1 tab PO 1 hour before MRI (Patient not taking: Reported on 9/17/2021) 1 tablet 0    meclizine (ANTIVERT) 12 5 MG tablet  (Patient not taking: Reported on 8/5/2021)      Melatonin 5 MG TABS Take 1 tablet (5 mg total) by mouth daily at bedtime (Patient not taking: Reported on 5/29/2020) 90 tablet 0    meloxicam (MOBIC) 7 5 mg tablet Take 1 tablet (7 5 mg total) by mouth daily (Patient not taking: Reported on 5/29/2020) 90 tablet 0    methylPREDNISolone 4 MG tablet therapy pack Use as directed on package (Patient not taking: Reported on 5/29/2020) 1 each 0    neomycin-polymyxin-dexamethasone (MAXITROL) ophthalmic suspension Administer 1 drop to the right eye 4 (four) times a day for 7 days 5 mL 0    omeprazole (PriLOSEC) 20 mg delayed release capsule Take 1 capsule (20 mg total) by mouth daily (Patient not taking: Reported on 8/5/2021) 60 capsule 2    polyethylene glycol (GLYCOLAX) powder Take 17 g by mouth daily (Patient not taking: Reported on 8/5/2021) 850 g 1     No current facility-administered medications for this visit       Current Outpatient Medications on File Prior to Visit   Medication Sig    albuterol (PROVENTIL HFA,VENTOLIN HFA) 90 mcg/act inhaler     aspirin (Guille Aspirin EC Low Dose) 81 mg EC tablet Take 81 mg by mouth daily     citalopram (CeleXA) 40 mg tablet Take 1 tablet (40 mg total) by mouth daily    cyclobenzaprine (FLEXERIL) 5 mg tablet Take 1 tablet (5 mg total) by mouth 3 (three) times a day as needed for muscle spasms    [DISCONTINUED] atenolol (TENORMIN) 25 mg tablet Take 1 tablet (25 mg total) by mouth daily    [DISCONTINUED] atorvastatin (LIPITOR) 40 mg tablet Take 1 tablet (40 mg total) by mouth daily    Cholecalciferol (VITAMIN D-3) 5000 units TABS Take 1 tablet by mouth daily (Patient not taking: Reported on 8/5/2021)    conjugated estrogens (PREMARIN) vaginal cream Insert 0 5 g into the vagina 3 (three) times a week (Patient not taking: Reported on 8/5/2021)    cyclobenzaprine (FLEXERIL) 5 mg tablet Take 1 tablet (5 mg total) by mouth 3 (three) times a day as needed for muscle spasms (Patient not taking: Reported on 8/5/2021)    diclofenac sodium (VOLTAREN) 50 mg EC tablet Take 1 tablet (50 mg total) by mouth 2 (two) times a day (Patient not taking: Reported on 8/20/2021)    lidocaine (XYLOCAINE) 5 % ointment Apply topically 2 (two) times a day as needed for mild pain (Patient not taking: Reported on 8/5/2021)    LORazepam (ATIVAN) 0 5 mg tablet 1 tab PO 1 hour before MRI (Patient not taking: Reported on 9/17/2021)    meclizine (ANTIVERT) 12 5 MG tablet  (Patient not taking: Reported on 8/5/2021)    Melatonin 5 MG TABS Take 1 tablet (5 mg total) by mouth daily at bedtime (Patient not taking: Reported on 5/29/2020)    meloxicam (MOBIC) 7 5 mg tablet Take 1 tablet (7 5 mg total) by mouth daily (Patient not taking: Reported on 5/29/2020)    methylPREDNISolone 4 MG tablet therapy pack Use as directed on package (Patient not taking: Reported on 5/29/2020)    neomycin-polymyxin-dexamethasone (MAXITROL) ophthalmic suspension Administer 1 drop to the right eye 4 (four) times a day for 7 days    omeprazole (PriLOSEC) 20 mg delayed release capsule Take 1 capsule (20 mg total) by mouth daily (Patient not taking: Reported on 8/5/2021)    polyethylene glycol (GLYCOLAX) powder Take 17 g by mouth daily (Patient not taking: Reported on 8/5/2021)     No current facility-administered medications on file prior to visit       Review of Systems   Musculoskeletal: Positive for arthralgias and back pain  All other systems reviewed and are negative  Objective:      /70 (BP Location: Left arm, Patient Position: Sitting, Cuff Size: Adult)   Pulse 70   Temp (!) 97 3 °F (36 3 °C) (Temporal)   Resp 18   Wt 68 kg (150 lb)   SpO2 98%   BMI 32 46 kg/m²          Physical Exam  Vitals and nursing note reviewed  Constitutional:       Appearance: She is well-developed  HENT:      Head: Normocephalic        Right Ear: External ear normal       Left Ear: External ear normal       Nose: Nose normal    Eyes:      Conjunctiva/sclera: Conjunctivae normal       Pupils: Pupils are equal, round, and reactive to light  Neck:      Thyroid: No thyromegaly  Cardiovascular:      Rate and Rhythm: Normal rate and regular rhythm  Pulses:           Dorsalis pedis pulses are 1+ on the right side and 1+ on the left side  Posterior tibial pulses are 1+ on the right side and 1+ on the left side  Heart sounds: Normal heart sounds  Pulmonary:      Effort: Pulmonary effort is normal       Breath sounds: Normal breath sounds  Abdominal:      Palpations: Abdomen is soft  Tenderness: There is no abdominal tenderness  There is no guarding or rebound  Musculoskeletal:         General: Tenderness present  Normal range of motion  Cervical back: Normal range of motion and neck supple  Feet:      Right foot:      Skin integrity: No ulcer, skin breakdown, erythema, warmth, callus or dry skin  Left foot:      Skin integrity: No ulcer, skin breakdown, erythema, warmth, callus or dry skin  Skin:     General: Skin is dry  Neurological:      Mental Status: She is alert and oriented to person, place, and time  Motor: Weakness present  Gait: Gait abnormal       Deep Tendon Reflexes: Reflexes are normal and symmetric

## 2021-09-20 ENCOUNTER — EVALUATION (OUTPATIENT)
Dept: PHYSICAL THERAPY | Facility: REHABILITATION | Age: 71
End: 2021-09-20
Payer: COMMERCIAL

## 2021-09-20 DIAGNOSIS — R42 DIZZINESS: ICD-10-CM
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
## 2021-09-20 PROCEDURE — 97162 PT EVAL MOD COMPLEX 30 MIN: CPT | Performed by: PHYSICAL THERAPIST

## 2021-09-20 NOTE — PROGRESS NOTES
PT Evaluation     Today's date: 2021  Patient name: Jose Henriquez  : 1950  MRN: [id number]  Referring provider: Amish Muñoz MD  Dx:   Encounter Diagnosis     ICD-10-CM    1  Fall, subsequent encounter  W19  XXXD Ambulatory referral to Occupational Therapy   2  Dizziness  R42 Ambulatory referral to Occupational Therapy                  Assessment  Assessment details: Pt is a 72year old female referred with falls and dizziness  Pt presented today with impairments in decreased standing balance, (+) fall risk per her TUG and DGI scores, increased complaints of dizziness per her DHI score, chronic complaints of low back pain all which limit her functionally with gait, transfers, and elevations  Pt will benefit from PT services needed to address above impairments, increase functional mobility without AD, and decreased fall risk  Impairments: abnormal gait, activity intolerance, impaired balance, impaired physical strength, lacks appropriate home exercise program and safety issue  Understanding of Dx/Px/POC: good   Prognosis: good    Goals  ST  Pt will improve gait speed to at least 0 75 m/s with least restrictive device within 4 weeks needed to improve safety with ambulation  2  Pt will improve DGI score by at least 3 points within 4 weeks needed to reduce fall risk  3  Pt will improve TUG score by <14 seconds within 4 weeks needed to reduce fall risk  4  Pt will demonstrate independence with HEP within 4 weeks  LT  Pt will improve gait speed to at least 1 m/s with least restrictive device within 8 weeks needed to improve safety with ambulation  2  Pt will improve DGI score to at least 22/24 within 8 weeks needed to reduce fall risk  3  Pt will improve DHI score to at least 75% within 8 weeks needed to reduce fall risk  4  Pt will be able to maintain balance on nomcompliant surfaces with eyes closed for 30 seconds within 8 weeks needed to reduce fall risk    5  Pt will demonstrate independence with HEP within 8 weeks  Plan  Patient would benefit from: skilled physical therapy  Planned therapy interventions: balance, neuromuscular re-education, patient education, therapeutic exercise, therapeutic training, home exercise program and gait training  Frequency: 2x week  Duration in weeks: 8  Plan of Care beginning date: 2021  Plan of Care expiration date: 2021  Treatment plan discussed with: patient and family        Subjective Evaluation    History of Present Illness  Mechanism of injury: Past 6 months having more issues with her balance  Uses wheeled walker all the time (however came to clinic without it)  Does have dizziness, worse when she lays down at night to go to sleep  Unsure what caused the worsening  Does have significant PMH per medical chart  Having falls daily, has bruises all over her arms and knees  Wants to be able to walk without the walker  Pain  Location: chronic low back pain    Social Support  Steps to enter house: yes  Stairs in house: yes   Lives in: multiple-level home  Lives with: spouse    Patient Goals  Patient goals for therapy: improved balance          Objective   Neuro Exam:     Transfers Sit to stand: minimum assist     Functional outcomes   Gait speed:  48 m/s without AD  5x sit to stand: unable (seconds)  TU sec without AD CGA (seconds)  Functional outcome comment: Modified Clinical Test of Sensory Interaction on Balance  30 eyes open firm surface  9 eyes closed firm surface  5 eyes open foam surface  NT eyes closed foam surface    DGI:     Sharp-Shelbi: negative  Vertebral Artery Screen: negative  Cervical AROM:  DixHallpike: Right: negative; Left: negative (slight complaints of dizziness, no nystagmus, no symptoms upon sitting up)  Roll Test: Right negative;  Left negative    DHI: 84/100       Precautions: fall risk, [place] speaking, seizures      Manuals                                                                 Neuro Re-Ed Ther Ex                                                                                                                     Ther Activity                                       Gait Training                                       Modalities

## 2021-09-23 ENCOUNTER — PATIENT OUTREACH (OUTPATIENT)
Dept: FAMILY MEDICINE CLINIC | Facility: CLINIC | Age: 71
End: 2021-09-23

## 2021-09-23 NOTE — PROGRESS NOTES
Outgoing Call:  9/23/2021    CHW did call pt's brother, Berto Machuca, to discuss status of bank statements needed for waiver application  It was requested she submits a bank statement to [facility] per Berto Gaffney informed CHW that she did send it back last week  CHW did call pt and completed a three way call to [facility] to ask if they are in receipt of bank statements  After a lengthy wait period we were assisted by [facility] rep who stated that they did receive a bank statement on 9/20/21 and pt is still in need of providing additional information  Pt still needs to provide the following:    Bank statements from 8/2019 - 8/2021    Bank statements for March and September, for the years 2016, 2017, and 2018  Proof of income taxes filed for past five years, including if her , Fish Nixon, filed  Proof of disposition of any funds withdrawn from her account in excess of $500  CHW did ask for an extension as Ms Tracie Valdovinos stated that all is due tomorrow  She did place a ticket for this extension and ticket number is [id number]  CW at [facility] moving forward is Ms Pan and can be reached at  [phone]  CHW did call Gulshan back and informed him of this  He did write notes on what is needed and agreed to work with his sister to obtain these forms  He asked CHW if pt did not send documents could she be denied due to the fact she is now a [place] Citizen  CHW explained to him that she will be denied regardless of status if she does not provide documents requested  Chart reviewed  Next outreach is scheduled for 9/30/2021

## 2021-09-27 ENCOUNTER — HOSPITAL ENCOUNTER (OUTPATIENT)
Dept: MRI IMAGING | Facility: HOSPITAL | Age: 71
Discharge: HOME | End: 2021-09-27
Attending: EMERGENCY MEDICINE
Payer: COMMERCIAL

## 2021-09-27 ENCOUNTER — APPOINTMENT (OUTPATIENT)
Dept: PHYSICAL THERAPY | Facility: REHABILITATION | Age: 71
End: 2021-09-27
Payer: COMMERCIAL

## 2021-09-27 PROCEDURE — G1004 CDSM NDSC: HCPCS

## 2021-09-27 PROCEDURE — 72148 MRI LUMBAR SPINE W/O DYE: CPT

## 2021-09-30 ENCOUNTER — PATIENT OUTREACH (OUTPATIENT)
Dept: FAMILY MEDICINE CLINIC | Facility: CLINIC | Age: 71
End: 2021-09-30

## 2021-09-30 NOTE — PROGRESS NOTES
Outgoing Call:  9/30/2021    CHW did call Rodrick, pt's brother, to discuss status of documents needed to complete waiver application  Gulshan stated that he does not know if all documents have bee colleted by pt but will call her tonight and ask her  CHW reminded him of documents needed and he expressed understanding  CHW reminded him this information is needed soon as pt's case can be closed out soon and will be denied by [place] [facility] without completed information requested  Next outreach is scheduled for 10/07/2021

## 2021-10-04 ENCOUNTER — APPOINTMENT (OUTPATIENT)
Dept: PHYSICAL THERAPY | Facility: REHABILITATION | Age: 71
End: 2021-10-04
Payer: COMMERCIAL

## 2021-10-05 ENCOUNTER — OFFICE VISIT (OUTPATIENT)
Dept: PHYSICAL THERAPY | Facility: REHABILITATION | Age: 71
End: 2021-10-05
Payer: COMMERCIAL

## 2021-10-05 DIAGNOSIS — R42 DIZZINESS: ICD-10-CM
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
## 2021-10-05 PROCEDURE — 97112 NEUROMUSCULAR REEDUCATION: CPT

## 2021-10-05 PROCEDURE — 97110 THERAPEUTIC EXERCISES: CPT

## 2021-10-07 ENCOUNTER — PATIENT OUTREACH (OUTPATIENT)
Dept: FAMILY MEDICINE CLINIC | Facility: CLINIC | Age: 71
End: 2021-10-07

## 2021-10-08 ENCOUNTER — HOSPITAL ENCOUNTER (OUTPATIENT)
Dept: MAMMOGRAPHY | Facility: CLINIC | Age: 71
Discharge: HOME | End: 2021-10-08

## 2021-10-08 ENCOUNTER — PATIENT OUTREACH (OUTPATIENT)
Dept: FAMILY MEDICINE CLINIC | Facility: CLINIC | Age: 71
End: 2021-10-08

## 2021-10-08 ENCOUNTER — OFFICE VISIT (OUTPATIENT)
Dept: PHYSICAL THERAPY | Facility: REHABILITATION | Age: 71
End: 2021-10-08
Payer: COMMERCIAL

## 2021-10-08 DIAGNOSIS — N64.4 BREAST PAIN, LEFT: ICD-10-CM

## 2021-10-08 DIAGNOSIS — N63.10 LUMP OF RIGHT BREAST: ICD-10-CM

## 2021-10-08 DIAGNOSIS — R42 DIZZINESS: ICD-10-CM
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
## 2021-10-08 DIAGNOSIS — Z12.31 ENCOUNTER FOR SCREENING MAMMOGRAM FOR MALIGNANT NEOPLASM OF BREAST: ICD-10-CM

## 2021-10-08 PROCEDURE — 97110 THERAPEUTIC EXERCISES: CPT

## 2021-10-08 PROCEDURE — 97112 NEUROMUSCULAR REEDUCATION: CPT

## 2021-10-12 ENCOUNTER — OFFICE VISIT (OUTPATIENT)
Dept: PHYSICAL THERAPY | Facility: REHABILITATION | Age: 71
End: 2021-10-12
Payer: COMMERCIAL

## 2021-10-12 DIAGNOSIS — R42 DIZZINESS: ICD-10-CM
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
## 2021-10-12 PROCEDURE — 97110 THERAPEUTIC EXERCISES: CPT

## 2021-10-12 PROCEDURE — 97140 MANUAL THERAPY 1/> REGIONS: CPT

## 2021-10-15 ENCOUNTER — PATIENT OUTREACH (OUTPATIENT)
Dept: FAMILY MEDICINE CLINIC | Facility: CLINIC | Age: 71
End: 2021-10-15

## 2021-10-18 ENCOUNTER — OFFICE VISIT (OUTPATIENT)
Dept: PHYSICAL THERAPY | Facility: REHABILITATION | Age: 71
End: 2021-10-18
Payer: COMMERCIAL
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
## 2021-10-18 DIAGNOSIS — R42 DIZZINESS: ICD-10-CM

## 2021-10-18 PROCEDURE — 97110 THERAPEUTIC EXERCISES: CPT | Performed by: PHYSICAL THERAPIST

## 2021-10-18 PROCEDURE — 97112 NEUROMUSCULAR REEDUCATION: CPT | Performed by: PHYSICAL THERAPIST

## 2021-10-21 ENCOUNTER — PATIENT OUTREACH (OUTPATIENT)
Dept: FAMILY MEDICINE CLINIC | Facility: CLINIC | Age: 71
End: 2021-10-21

## 2021-10-25 ENCOUNTER — APPOINTMENT (OUTPATIENT)
Dept: PHYSICAL THERAPY | Facility: REHABILITATION | Age: 71
End: 2021-10-25
Payer: COMMERCIAL

## 2021-10-26 ENCOUNTER — APPOINTMENT (OUTPATIENT)
Dept: PHYSICAL THERAPY | Facility: REHABILITATION | Age: 71
End: 2021-10-26
Payer: COMMERCIAL

## 2021-11-02 ENCOUNTER — EVALUATION (OUTPATIENT)
Dept: PHYSICAL THERAPY | Facility: REHABILITATION | Age: 71
End: 2021-11-02
Payer: COMMERCIAL
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
## 2021-11-02 DIAGNOSIS — R42 DIZZINESS: ICD-10-CM

## 2021-11-02 PROCEDURE — 97110 THERAPEUTIC EXERCISES: CPT

## 2021-11-02 PROCEDURE — 97112 NEUROMUSCULAR REEDUCATION: CPT

## 2021-11-08 ENCOUNTER — APPOINTMENT (OUTPATIENT)
Dept: PHYSICAL THERAPY | Facility: REHABILITATION | Age: 71
End: 2021-11-08
Payer: COMMERCIAL

## 2021-11-09 ENCOUNTER — APPOINTMENT (OUTPATIENT)
Dept: PHYSICAL THERAPY | Facility: REHABILITATION | Age: 71
End: 2021-11-09
Payer: COMMERCIAL

## 2021-11-15 ENCOUNTER — APPOINTMENT (OUTPATIENT)
Dept: PHYSICAL THERAPY | Facility: REHABILITATION | Age: 71
End: 2021-11-15
Payer: COMMERCIAL

## 2021-11-16 ENCOUNTER — APPOINTMENT (OUTPATIENT)
Dept: PHYSICAL THERAPY | Facility: REHABILITATION | Age: 71
End: 2021-11-16
Payer: COMMERCIAL

## 2021-11-17 ENCOUNTER — VBI (OUTPATIENT)
Dept: ADMINISTRATIVE | Facility: OTHER | Age: 71
End: 2021-11-17

## 2021-11-18 ENCOUNTER — HOSPITAL ENCOUNTER (OUTPATIENT)
Dept: MAMMOGRAPHY | Facility: CLINIC | Age: 71
Discharge: HOME | End: 2021-11-18
Payer: COMMERCIAL

## 2021-11-18 VITALS — BODY MASS INDEX: 32.36 KG/M2 | HEIGHT: 57 IN | WEIGHT: 150 LBS

## 2021-11-18 DIAGNOSIS — N63.10 LUMP OF RIGHT BREAST: ICD-10-CM

## 2021-11-18 DIAGNOSIS — N64.4 PAIN OF LEFT BREAST: ICD-10-CM

## 2021-11-18 PROCEDURE — 77066 DX MAMMO INCL CAD BI: CPT

## 2021-11-18 PROCEDURE — 76642 ULTRASOUND BREAST LIMITED: CPT

## 2021-11-18 PROCEDURE — G0279 TOMOSYNTHESIS, MAMMO: HCPCS

## 2021-11-22 ENCOUNTER — APPOINTMENT (OUTPATIENT)
Dept: PHYSICAL THERAPY | Facility: REHABILITATION | Age: 71
End: 2021-11-22
Payer: COMMERCIAL

## 2021-11-23 ENCOUNTER — APPOINTMENT (OUTPATIENT)
Dept: PHYSICAL THERAPY | Facility: REHABILITATION | Age: 71
End: 2021-11-23
Payer: COMMERCIAL

## 2021-11-29 ENCOUNTER — APPOINTMENT (OUTPATIENT)
Dept: PHYSICAL THERAPY | Facility: REHABILITATION | Age: 71
End: 2021-11-29
Payer: COMMERCIAL

## 2021-11-30 ENCOUNTER — APPOINTMENT (OUTPATIENT)
Dept: PHYSICAL THERAPY | Facility: REHABILITATION | Age: 71
End: 2021-11-30
Payer: COMMERCIAL

## 2021-12-08 DIAGNOSIS — E11.9 CONTROLLED TYPE 2 DIABETES MELLITUS WITHOUT COMPLICATION, WITHOUT LONG-TERM CURRENT USE OF INSULIN (HCC): ICD-10-CM

## 2021-12-08 DIAGNOSIS — F32.89 OTHER DEPRESSION: ICD-10-CM

## 2021-12-08 RX ORDER — ATORVASTATIN CALCIUM 40 MG/1
40 TABLET, FILM COATED ORAL DAILY
Qty: 90 TABLET | Refills: 1 | Status: SHIPPED | OUTPATIENT
Start: 2021-12-08 | End: 2022-01-18 | Stop reason: SDUPTHER

## 2021-12-08 RX ORDER — CITALOPRAM 40 MG/1
40 TABLET ORAL DAILY
Qty: 90 TABLET | Refills: 1 | Status: SHIPPED | OUTPATIENT
Start: 2021-12-08 | End: 2022-08-02 | Stop reason: SDUPTHER

## 2022-01-18 ENCOUNTER — OFFICE VISIT (OUTPATIENT)
Dept: FAMILY MEDICINE CLINIC | Facility: CLINIC | Age: 72
End: 2022-01-18

## 2022-01-18 VITALS
BODY MASS INDEX: 31.52 KG/M2 | TEMPERATURE: 98 F | RESPIRATION RATE: 18 BRPM | HEART RATE: 75 BPM | OXYGEN SATURATION: 95 % | DIASTOLIC BLOOD PRESSURE: 90 MMHG | WEIGHT: 146.1 LBS | HEIGHT: 57 IN | SYSTOLIC BLOOD PRESSURE: 130 MMHG

## 2022-01-18 DIAGNOSIS — R09.82 POST-NASAL DRIP: ICD-10-CM

## 2022-01-18 DIAGNOSIS — Z79.1 NSAID LONG-TERM USE: ICD-10-CM

## 2022-01-18 DIAGNOSIS — R10.13 EPIGASTRIC ABDOMINAL PAIN: ICD-10-CM

## 2022-01-18 DIAGNOSIS — I69.398 GAIT DISTURBANCE, POST-STROKE: Primary | ICD-10-CM

## 2022-01-18 DIAGNOSIS — R26.9 GAIT DISTURBANCE, POST-STROKE: Primary | ICD-10-CM

## 2022-01-18 DIAGNOSIS — R10.12 LEFT UPPER QUADRANT PAIN: ICD-10-CM

## 2022-01-18 DIAGNOSIS — E11.9 CONTROLLED TYPE 2 DIABETES MELLITUS WITHOUT COMPLICATION, WITHOUT LONG-TERM CURRENT USE OF INSULIN (HCC): ICD-10-CM

## 2022-01-18 PROCEDURE — 99214 OFFICE O/P EST MOD 30 MIN: CPT | Performed by: FAMILY MEDICINE

## 2022-01-18 RX ORDER — OMEPRAZOLE 20 MG/1
20 CAPSULE, DELAYED RELEASE ORAL DAILY
Qty: 60 CAPSULE | Refills: 2 | Status: SHIPPED | OUTPATIENT
Start: 2022-01-18 | End: 2022-06-08

## 2022-01-18 RX ORDER — BENZONATATE 100 MG/1
100 CAPSULE ORAL 3 TIMES DAILY PRN
Qty: 20 CAPSULE | Refills: 0 | Status: SHIPPED | OUTPATIENT
Start: 2022-01-18

## 2022-01-18 RX ORDER — ATORVASTATIN CALCIUM 40 MG/1
40 TABLET, FILM COATED ORAL DAILY
Qty: 90 TABLET | Refills: 1 | Status: SHIPPED | OUTPATIENT
Start: 2022-01-18

## 2022-01-18 RX ORDER — FLUTICASONE PROPIONATE 50 MCG
1 SPRAY, SUSPENSION (ML) NASAL DAILY
Qty: 16 G | Refills: 0 | Status: SHIPPED | OUTPATIENT
Start: 2022-01-18

## 2022-01-18 NOTE — LETTER
Re:  Kelly Weaver  1950  Need for stair chair lift    To whom it may concern,    Kelly Weaver is a longstanding patient in our office  She has multiple co-morbid conditions including but not limited to Ambulatory dysfunction s/p cerebral vascular accident  Due to her medical condition patient is having increasing difficulty going up the steps at home  Patient lives in a 3 story home with a basement, she needs to access the second floor of the home because this is were the bathroom is located  She has trouble going up the steps and has fallen several times  She would greatly benefit from a chair lift to be more independent going up the steps and prevent any further falls    Please let me know if I can be of any further assurance          Tha Argueta MD

## 2022-01-18 NOTE — PROGRESS NOTES
Assessment/Plan:    No problem-specific Assessment & Plan notes found for this encounter  Diagnoses and all orders for this visit:    Gait disturbance, post-stroke  -     Cancel: Ambulatory Referral to Neurology; Future  -     Ambulatory Referral to Neurology; Future    Controlled type 2 diabetes mellitus without complication, without long-term current use of insulin (HCC)  -     atorvastatin (LIPITOR) 40 mg tablet; Take 1 tablet (40 mg total) by mouth daily    Left upper quadrant pain  -     omeprazole (PriLOSEC) 20 mg delayed release capsule; Take 1 capsule (20 mg total) by mouth daily    Epigastric abdominal pain  -     omeprazole (PriLOSEC) 20 mg delayed release capsule; Take 1 capsule (20 mg total) by mouth daily    NSAID long-term use  -     omeprazole (PriLOSEC) 20 mg delayed release capsule; Take 1 capsule (20 mg total) by mouth daily    Post-nasal drip  -     fluticasone (FLONASE) 50 mcg/act nasal spray; 1 spray into each nostril daily  -     benzonatate (TESSALON PERLES) 100 mg capsule; Take 1 capsule (100 mg total) by mouth 3 (three) times a day as needed for cough        Discussed that cough is probably secondary to post nasal drip  Sent flonase and tessalon perles  Continue PT  Referred to Neurology   Subjective:      Patient ID: Marisa Alfonso is a 80 y o  female  81 yo  female with the following complains:  1- Cough for about 5 days, non productive  Denies fever, chills, change in sense of taste or smell  Has nasal congestion   2- Falls frequently  States has difficulty going up the steps and has fallen several times at home when trying to go up the steps as well as trips frequently when walking on a flat surface  No LOC, feels her legs are weak  Her  in the room with her states he sees her L foot is stiff when she walks and she tends to drag it when going up the steps   Patient having increasing difficulty going up the steps at home   Patient lives in a 3 story home with a basement, she needs to access the second floor of the home because this is were the bathroom is located  She has trouble going up the steps and has fallen several times       The following portions of the patient's history were reviewed and updated as appropriate:   She  has a past medical history of Abdominal pain, Anxiety, Arthritis, Bowel incontinence, Chest pain, Constipation, CVA (cerebral vascular accident) ([address] ), Diabetes mellitus type II, controlled ([address] ), Disease of thyroid gland, Epigastric pain, Falls, High cholesterol, Hyperlipidemia, Hypertension, Migraine, Palpitations, Recurrent urinary tract infection, Seizures ([address] ), Sleep disorder, Stroke ([address] ), and Thyroid disease    She   Patient Active Problem List    Diagnosis Date Noted    Class 1 obesity due to excess calories without serious comorbidity with body mass index (BMI) of 30 0 to 30 9 in adult 04/02/2020    Sciatica of right side 02/11/2020    Hip strain, right, initial encounter 09/06/2019    Acute bacterial conjunctivitis of left eye 09/06/2019    Breast pain, right 07/31/2019    Elevated BP without diagnosis of hypertension 07/01/2019    Contusion of right breast 05/24/2019    Epigastric abdominal pain 03/28/2019    NSAID long-term use 03/28/2019    Left upper quadrant pain 02/08/2019    Diabetes mellitus type II, controlled ([address] ) 10/12/2018    Arthritis 10/12/2018    Primary insomnia 04/18/2018    Urinary, incontinence, stress female 02/08/2018    Urge and stress incontinence 10/26/2017    Osteoporosis 09/15/2017    Adhesive capsulitis of left shoulder 09/11/2017    Constipation 05/10/2017    Cervical radiculitis 05/09/2017    Elevated TSH 04/11/2017    Osteoarthritis of both hands 04/03/2017    Memory difficulties 03/18/2016    Asymptomatic bilateral carotid artery stenosis 02/29/2016    Gastroesophageal reflux disease 02/03/2016    Hypertension 01/20/2016    Collapsed vertebra, not elsewhere classified, sacral and sacrococcygeal region, initial encounter for fracture ([facility] ) 2016    HLD (hyperlipidemia) 2016    History of CVA with residual deficit 2016    Depression 2016    Headache 2016    Dizziness 2016    Fall 2016    Subdural hemorrhage following injury ([facility] ) 2016    Subarachnoid hemorrhage following injury ([facility] ) 2016    Hyperlipidemia 10/19/2013    Muscle weakness 10/19/2013     She  has a past surgical history that includes Knee surgery; Other surgical history;  section; Axillary Surgery; pr esophagogastroduodenoscopy transoral diagnostic (N/A, 2017); pr sling oper stres incontinence (N/A, 2018); CYSTOSCOPY (N/A, 2018); Bladder surgery; Facial bone tumor excision; and Tubal ligation  Her family history includes Diabetes in her family; Hypertension in her family; No Known Problems in her daughter, father, maternal grandfather, maternal grandmother, mother, paternal grandfather, paternal grandmother, and sister; Stroke in her family; Thyroid disease in her family  She  reports that she has never smoked  She has never used smokeless tobacco  She reports that she does not drink alcohol and does not use drugs    Current Outpatient Medications   Medication Sig Dispense Refill    albuterol (PROVENTIL HFA,VENTOLIN HFA) 90 mcg/act inhaler       aspirin (Guille Aspirin EC Low Dose) 81 mg EC tablet Take 81 mg by mouth daily       atenolol (TENORMIN) 25 mg tablet Take 1 tablet (25 mg total) by mouth daily 90 tablet 1    atorvastatin (LIPITOR) 40 mg tablet Take 1 tablet (40 mg total) by mouth daily 90 tablet 1    benzonatate (TESSALON PERLES) 100 mg capsule Take 1 capsule (100 mg total) by mouth 3 (three) times a day as needed for cough 20 capsule 0    Cholecalciferol (VITAMIN D-3) 5000 units TABS Take 1 tablet by mouth daily (Patient not taking: Reported on 2021) 90 tablet 1    citalopram (CeleXA) 40 mg tablet Take 1 tablet (40 mg total) by mouth daily 90 tablet 1    conjugated estrogens (PREMARIN) vaginal cream Insert 0 5 g into the vagina 3 (three) times a week (Patient not taking: Reported on 8/5/2021) 42 5 g 0    cyclobenzaprine (FLEXERIL) 5 mg tablet Take 1 tablet (5 mg total) by mouth 3 (three) times a day as needed for muscle spasms 90 tablet 0    cyclobenzaprine (FLEXERIL) 5 mg tablet Take 1 tablet (5 mg total) by mouth 3 (three) times a day as needed for muscle spasms (Patient not taking: Reported on 8/5/2021) 30 tablet 0    diclofenac sodium (VOLTAREN) 50 mg EC tablet Take 1 tablet (50 mg total) by mouth 2 (two) times a day (Patient not taking: Reported on 8/20/2021) 60 tablet 0    fluticasone (FLONASE) 50 mcg/act nasal spray 1 spray into each nostril daily 16 g 0    lidocaine (XYLOCAINE) 5 % ointment Apply topically 2 (two) times a day as needed for mild pain (Patient not taking: Reported on 8/5/2021) 35 44 g 0    LORazepam (ATIVAN) 0 5 mg tablet 1 tab PO 1 hour before MRI (Patient not taking: Reported on 9/17/2021) 1 tablet 0    meclizine (ANTIVERT) 12 5 MG tablet  (Patient not taking: Reported on 8/5/2021)      Melatonin 5 MG TABS Take 1 tablet (5 mg total) by mouth daily at bedtime (Patient not taking: Reported on 5/29/2020) 90 tablet 0    meloxicam (MOBIC) 7 5 mg tablet Take 1 tablet (7 5 mg total) by mouth daily (Patient not taking: Reported on 5/29/2020) 90 tablet 0    methylPREDNISolone 4 MG tablet therapy pack Use as directed on package (Patient not taking: Reported on 5/29/2020) 1 each 0    neomycin-polymyxin-dexamethasone (MAXITROL) ophthalmic suspension Administer 1 drop to the right eye 4 (four) times a day for 7 days 5 mL 0    omeprazole (PriLOSEC) 20 mg delayed release capsule Take 1 capsule (20 mg total) by mouth daily 60 capsule 2    polyethylene glycol (GLYCOLAX) powder Take 17 g by mouth daily (Patient not taking: Reported on 8/5/2021) 850 g 1     No current facility-administered medications for this visit  Current Outpatient Medications on File Prior to Visit   Medication Sig    albuterol (PROVENTIL HFA,VENTOLIN HFA) 90 mcg/act inhaler     aspirin (Guille Aspirin EC Low Dose) 81 mg EC tablet Take 81 mg by mouth daily     atenolol (TENORMIN) 25 mg tablet Take 1 tablet (25 mg total) by mouth daily    Cholecalciferol (VITAMIN D-3) 5000 units TABS Take 1 tablet by mouth daily (Patient not taking: Reported on 8/5/2021)    citalopram (CeleXA) 40 mg tablet Take 1 tablet (40 mg total) by mouth daily    conjugated estrogens (PREMARIN) vaginal cream Insert 0 5 g into the vagina 3 (three) times a week (Patient not taking: Reported on 8/5/2021)    cyclobenzaprine (FLEXERIL) 5 mg tablet Take 1 tablet (5 mg total) by mouth 3 (three) times a day as needed for muscle spasms    cyclobenzaprine (FLEXERIL) 5 mg tablet Take 1 tablet (5 mg total) by mouth 3 (three) times a day as needed for muscle spasms (Patient not taking: Reported on 8/5/2021)    diclofenac sodium (VOLTAREN) 50 mg EC tablet Take 1 tablet (50 mg total) by mouth 2 (two) times a day (Patient not taking: Reported on 8/20/2021)    lidocaine (XYLOCAINE) 5 % ointment Apply topically 2 (two) times a day as needed for mild pain (Patient not taking: Reported on 8/5/2021)    LORazepam (ATIVAN) 0 5 mg tablet 1 tab PO 1 hour before MRI (Patient not taking: Reported on 9/17/2021)    meclizine (ANTIVERT) 12 5 MG tablet  (Patient not taking: Reported on 8/5/2021)    Melatonin 5 MG TABS Take 1 tablet (5 mg total) by mouth daily at bedtime (Patient not taking: Reported on 5/29/2020)    meloxicam (MOBIC) 7 5 mg tablet Take 1 tablet (7 5 mg total) by mouth daily (Patient not taking: Reported on 5/29/2020)    methylPREDNISolone 4 MG tablet therapy pack Use as directed on package (Patient not taking: Reported on 5/29/2020)    neomycin-polymyxin-dexamethasone (MAXITROL) ophthalmic suspension Administer 1 drop to the right eye 4 (four) times a day for 7 days    polyethylene glycol (GLYCOLAX) powder Take 17 g by mouth daily (Patient not taking: Reported on 8/5/2021)    [DISCONTINUED] atorvastatin (LIPITOR) 40 mg tablet Take 1 tablet (40 mg total) by mouth daily    [DISCONTINUED] omeprazole (PriLOSEC) 20 mg delayed release capsule Take 1 capsule (20 mg total) by mouth daily (Patient not taking: Reported on 8/5/2021)     No current facility-administered medications on file prior to visit       Review of Systems   Respiratory: Positive for cough  Psychiatric/Behavioral: The patient is nervous/anxious  All other systems reviewed and are negative  Objective:      /90   Pulse 75   Temp 98 °F (36 7 °C) (Temporal)   Resp 18   Ht 4' 9" (1 448 m)   Wt 66 3 kg (146 lb 1 6 oz)   SpO2 95%   BMI 31 62 kg/m²          Physical Exam  Vitals and nursing note reviewed  Constitutional:       Appearance: She is well-developed  HENT:      Head: Normocephalic  Right Ear: External ear normal       Left Ear: External ear normal       Nose: Mucosal edema, congestion and rhinorrhea present  Rhinorrhea is clear  Right Turbinates: Swollen  Left Turbinates: Swollen  Eyes:      Conjunctiva/sclera: Conjunctivae normal       Pupils: Pupils are equal, round, and reactive to light  Neck:      Thyroid: No thyromegaly  Cardiovascular:      Rate and Rhythm: Normal rate and regular rhythm  Heart sounds: Normal heart sounds  Pulmonary:      Effort: Pulmonary effort is normal       Breath sounds: Normal breath sounds  Abdominal:      Palpations: Abdomen is soft  Tenderness: There is no abdominal tenderness  There is no guarding or rebound  Musculoskeletal:         General: Normal range of motion  Cervical back: Normal range of motion and neck supple  Skin:     General: Skin is dry  Neurological:      Mental Status: She is alert and oriented to person, place, and time        Gait: Gait abnormal       Deep Tendon Reflexes: Reflexes are normal and symmetric

## 2022-01-19 ENCOUNTER — TELEPHONE (OUTPATIENT)
Dept: FAMILY MEDICINE CLINIC | Facility: CLINIC | Age: 72
End: 2022-01-19

## 2022-01-19 NOTE — TELEPHONE ENCOUNTER
Do they require a letter for a home care assistant, if so does she have a home care assistant and how many hours of home care assistant does she have? Where/to whom do I send the letter? I already wrote a letter for the stair chair lift, patient has a copy and it is in the chart under letters  Please advice

## 2022-01-19 NOTE — TELEPHONE ENCOUNTER
Pt  came in stating insurance is requesting a letter stating pt requires home care assistant in order to obtain power chair  He stated insurance approved chair but is requiring letter

## 2022-01-24 ENCOUNTER — TELEMEDICINE (OUTPATIENT)
Dept: FAMILY MEDICINE CLINIC | Facility: CLINIC | Age: 72
End: 2022-01-24

## 2022-01-24 DIAGNOSIS — Z20.822 ENCOUNTER BY TELEHEALTH FOR SUSPECTED COVID-19: Primary | ICD-10-CM

## 2022-01-24 PROCEDURE — G0071 COMM SVCS BY RHC/FQHC 5 MIN: HCPCS | Performed by: FAMILY MEDICINE

## 2022-01-24 NOTE — PROGRESS NOTES
COVID-19 Outpatient Progress Note    Assessment/Plan:    Problem List Items Addressed This Visit     None      Visit Diagnoses     Encounter by telehealth for suspected COVID-19    -  Primary    Relevant Orders    COVID Only- Collected at   [facility] or [facility]       ED precautions reviewed with patient   Disposition:     Referred patient to centralized site to test for COVID-19  Patient is fully vaccinated and I recommended self quarantine for 5 days followed by strict mask use for an additional 5 days  If patient were to develop symptoms, they should immediately self isolate and call our office for further guidance  I have spent 9 minutes directly with the patient  Greater than 50% of this time was spent in counseling/coordination of care regarding: instructions for management  Encounter provider Rhina Lang MD    Provider located at [address] [address] [phone]    Recent Visits  Date Type Provider Dept   01/19/22 Telephone MD [facility]   01/18/22 Office Visit MD [facility]   Showing recent visits within past 7 days and meeting all other requirements  Today's Visits  Date Type Provider Dept   01/24/22 Telemedicine MD [facility]   Showing today's visits and meeting all other requirements  Future Appointments  No visits were found meeting these conditions  Showing future appointments within next 150 days and meeting all other requirements     This virtual check-in was done via telephone and she agrees to proceed  Patient agrees to participate in a virtual check in via telephone or video visit instead of presenting to the office to address urgent/immediate medical needs  Patient is aware this is a billable service  After connecting through Telephone, the patient was identified by name and date of birth   Petey Mclaughlin was informed that this was a telemedicine visit and that the exam was being conducted confidentially over secure lines  My office door was closed  No one else was in the room  Elmer Ceja acknowledged consent and understanding of privacy and security of the telemedicine visit  I informed the patient that I have reviewed her record in Epic and presented the opportunity for her to ask any questions regarding the visit today  The patient agreed to participate  It was my intent to perform this visit via video technology but the patient was not able to do a video connection so the visit was completed via audio telephone only  Verification of patient location:  Patient is located in the following state in which I hold an active license: [place]    Subjective:   Elmer Ceja is a 80 y o  female who is concerned about COVID-19   Patient's symptoms include chills, fatigue, nasal congestion, rhinorrhea, cough and headache      - Date of symptom onset: 1/21/2022      COVID-19 vaccination status: Fully vaccinated with booster    Exposure:   Contact with a person who is under investigation (PUI) for or who is positive for COVID-19 within the last 14 days?: Yes    Hospitalized recently for fever and/or lower respiratory symptoms?: No      Currently a healthcare worker that is involved in direct patient care?: No      Works in a special setting where the risk of COVID-19 transmission may be high? (this may include long-term care, correctional and [facility] facilities; homeless shelters; assisted-living facilities and group homes ): No      Resident in a special setting where the risk of COVID-19 transmission may be high? (this may include long-term care, correctional and [facility] facilities; homeless shelters; assisted-living facilities and group homes ): No      No results found for: [address] [facility] [address], [address] [facility], [address] [facility], [address] [facility]  Past Medical History:   Diagnosis Date    Abdominal pain     Anxiety     last assessed: 10/19/2013  Arthritis     osteo both hands; last assessed: 10/19/2013    Bowel incontinence     Chest pain     Constipation     [facility]cere[facility]) [facility] )     Diabetes mellitus type [facility], [facility] )     Disease of thyroid gland     Epigastric pain     with distention    Falls     High cholesterol     Hyperlipidemia     Hypertension     last assessed: 4/3/2017    Migraine     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    Palpitations     Recurrent urinary tract infection     Seizures ([facility] )     Sleep disorder     last assessed: 10/19/2013    Stroke ([facility] )     Thyroid disease      Past Surgical History:   Procedure Laterality Date    AXILLARY SURGERY      cyst surgery    BLADDER SURGERY       SECTION      CYSTOSCOPY N/A 2018    Procedure: CYSTOSCOPY;  Surgeon: Yessi Glover MD;  Location: [facility];  Service: Gynecology    FACIAL BONE TUMOR EXCISION      KNEE SURGERY      OTHER SURGICAL HISTORY      cyst removed from axilla    IA ESOPHAGOGASTRODUODENOSCOPY TRANSORAL DIAGNOSTIC N/A 2017    Procedure: ESOPHAGOGASTRODUODENOSCOPY (EGD); Surgeon: Aga Romero MD;  Location: [facility];   Service: Gastroenterology    IA SLING OPER STRES INCONTINENCE N/A 2018    Procedure: PUBOVAGINAL SLING;  Surgeon: Yessi Glover MD;  Location: [facility];  Service: Gynecology    TUBAL LIGATION       Current Outpatient Medications   Medication Sig Dispense Refill    albuterol (PROVENTIL HFA,VENTOLIN HFA) 90 mcg/act inhaler       aspirin ([place]ille Aspirin EC Low Dose) 81 mg EC tablet Take 81 mg by mouth daily       atenolol (TENORMIN) 25 mg tablet Take 1 tablet (25 mg total) by mouth daily 90 tablet 1    atorvastatin (LIPITOR) 40 mg tablet Take 1 tablet (40 mg total) by mouth daily 90 tablet 1    benzonatate (TESSALON PERLES) 100 mg capsule Take 1 capsule (100 mg total) by mouth 3 (three) times a day as needed for cough 20 capsule 0    Cholecalciferol (VITAMIN D-3) 5000 units TABS Take 1 tablet by mouth daily (Patient not taking: Reported on 8/5/2021) 90 tablet 1    citalopram (CeleXA) 40 mg tablet Take 1 tablet (40 mg total) by mouth daily 90 tablet 1    conjugated estrogens (PREMARIN) vaginal cream Insert 0 5 g into the vagina 3 (three) times a week (Patient not taking: Reported on 8/5/2021) 42 5 g 0    cyclobenzaprine (FLEXERIL) 5 mg tablet Take 1 tablet (5 mg total) by mouth 3 (three) times a day as needed for muscle spasms 90 tablet 0    cyclobenzaprine (FLEXERIL) 5 mg tablet Take 1 tablet (5 mg total) by mouth 3 (three) times a day as needed for muscle spasms (Patient not taking: Reported on 8/5/2021) 30 tablet 0    diclofenac sodium (VOLTAREN) 50 mg EC tablet Take 1 tablet (50 mg total) by mouth 2 (two) times a day (Patient not taking: Reported on 8/20/2021) 60 tablet 0    fluticasone (FLONASE) 50 mcg/act nasal spray 1 spray into each nostril daily 16 g 0    lidocaine (XYLOCAINE) 5 % ointment Apply topically 2 (two) times a day as needed for mild pain (Patient not taking: Reported on 8/5/2021) 35 44 g 0    LORazepam (ATIVAN) 0 5 mg tablet 1 tab PO 1 hour before MRI (Patient not taking: Reported on 9/17/2021) 1 tablet 0    meclizine (ANTIVERT) 12 5 MG tablet  (Patient not taking: Reported on 8/5/2021)      Melatonin 5 MG TABS Take 1 tablet (5 mg total) by mouth daily at bedtime (Patient not taking: Reported on 5/29/2020) 90 tablet 0    meloxicam (MOBIC) 7 5 mg tablet Take 1 tablet (7 5 mg total) by mouth daily (Patient not taking: Reported on 5/29/2020) 90 tablet 0    methylPREDNISolone 4 MG tablet therapy pack Use as directed on package (Patient not taking: Reported on 5/29/2020) 1 each 0    neomycin-polymyxin-dexamethasone (MAXITROL) ophthalmic suspension Administer 1 drop to the right eye 4 (four) times a day for 7 days 5 mL 0    omeprazole (PriLOSEC) 20 mg delayed release capsule Take 1 capsule (20 mg total) by mouth daily 60 capsule 2    polyethylene glycol (GLYCOLAX) powder Take 17 g by mouth daily (Patient not taking: Reported on 8/5/2021) 850 g 1     No current facility-administered medications for this visit  Allergies   Allergen Reactions    Tramadol      Causes nausea and vomiting        Review of Systems   Constitutional: Positive for chills and fatigue  HENT: Positive for congestion and rhinorrhea  Respiratory: Positive for cough  Neurological: Positive for headaches  All other systems reviewed and are negative  Objective: There were no vitals filed for this visit  Physical Exam  Pulmonary:      Effort: Pulmonary effort is normal    Neurological:      Mental Status: She is alert  Psychiatric:         Mood and Affect: Mood normal          VIRTUAL VISIT DISCLAIMER    Julioosmel Mo verbally agrees to participate in [employer]  Pt is aware that [employer] could be limited without vital signs or the ability to perform a full hands-on physical Michael Lynch understands she or the provider may request at any time to terminate the video visit and request the patient to seek care or treatment in person

## 2022-02-02 ENCOUNTER — TELEPHONE (OUTPATIENT)
Dept: FAMILY MEDICINE CLINIC | Facility: CLINIC | Age: 72
End: 2022-02-02

## 2022-02-02 DIAGNOSIS — Z78.9 NEED FOR FOLLOW-UP BY SOCIAL WORKER: Primary | ICD-10-CM

## 2022-02-02 NOTE — TELEPHONE ENCOUNTER
[employer] did reach out to patient last year in October multiple times to help them apply for the waiver services through the [place] Department of [employer], however patient//brother Berry Reeves never returned calls nor provided the documentation needed for the services   I have placed another referral, but they will need to talk to the [facility] health worker and provide the documents  Letter written as requested

## 2022-02-02 NOTE — TELEPHONE ENCOUNTER
02/02/22    Pt son Ca Chavis) called office and is requesting a letter from PCP that states all the Medical Conditions that pt Haves and the Reason that she needs a Nurse Aid at Home  Pt Son stated that Mom cant Walk / Eat or Bathe herself without assistance from her Son or   Pt son has moved back home after 10 years to help dad and mom, but Pt son states that they truly need the help  Pt son is stating that Medicare is requesting the letter and if a referral is needed for a Nurse Aid, he would like for the Referral Order to be placed  Please contact PT Son if Further Information is needed         Son # [phone]

## 2022-02-02 NOTE — TELEPHONE ENCOUNTER
02/02/22    Note  And I called pt son and informed that letter was ready to be Pick-Up  Son Pick-Up Letter today 02/02/22  Thank You Dr Lalitha Mckeon

## 2022-02-02 NOTE — TELEPHONE ENCOUNTER
Called pt to review PCP message  Pt will have son My Barber call back since he is the one dealing with the insurance in regards to this issue  Ok to speak with

## 2022-02-03 ENCOUNTER — PATIENT OUTREACH (OUTPATIENT)
Dept: FAMILY MEDICINE CLINIC | Facility: CLINIC | Age: 72
End: 2022-02-03

## 2022-02-03 DIAGNOSIS — Z78.9 NEED FOR FOLLOW-UP BY SOCIAL WORKER: Primary | ICD-10-CM

## 2022-02-03 NOTE — PROGRESS NOTES
CRISTI ARTHUR was advised by the CHW that she received a direct referral from the provider  CRISTI ARTHUR reviewed the chart and noted that the patient, David Nunn, had a CHW involved between August - October 2021 to obtain Waiver services  She had her brother, Ludin Cristina, [address]  However, the referral had to be closed due to loss of communication with the CHW  Shae's son, Orlinda Collet, has been in communication with PCP regarding a letter for stair lift  A telephone note from 2/2/22 indicated that David Nunn "can't walk/eat or bathe herself without assistance from her son or "  The son had indicated that he moved back into the home to help  David Nunn was recently at a PT appointment in December 2021 for gait abnormality and reported to them a hx of 3 small seizures  CRISTI ARTHUR placed call to [facility] interpretor #[id number]  A male answered the phone and handed it to David Nunn who confirmed needs home care services  She continued that she needs help with "everything" such as cleaning, washing, etc  She resides with her , Orlinda Collet  She indicated that he is unable to help her with these tasks as he "goes out a lot"  He only assists with cooking  Her son, Ran Rodriguez, is unable to help, as well, since he has to work  David Nunn reported she ambulates with a rolling walker  When asked if she has fallen at all recently, she first stated no but then when asked when last fell, she stated last week  David Nunn reported a frequency of almost daily  When she falls her  is able to help her get back up  When asked about injury, she stated has a lot of bruises on her elbows and knees  When asked if ever seeks medical attention for this, she responded that she went to an "ear doctor" last week who prescribed medications to help her with her balance  She stated does not have the prescription yet, but was unable to give a reason   Her  picks up her medications from the pharmacy and hands her the medications as she needs them  She denied having any other supports at this time  Her  drives her to appointments  CRISTI ARTHUR and Shae discussed Waiver Program and her previous involvement with CHW Castellanosmilana Manmira explained they needed bank documents from 2016  She continued that she was only able to give from 2018 forward  She confirmed she did try getting them for the bank  She did not reach out to JIMENES to see if an exception could be made  CRISTI ARTHUR explained does not know the current status of her application, but that a referral could be placed again to the CHW to see if can reapply/see what steps are needed  CRISTI ARTHUR explained role and scope of CHW  David Baker expressed understanding and willingness to for CHW involvement  CRISTI ARTHUR explained this process can take at least a couple of months and that she will need some type of care in the meantime  David Baker then asked on the timeframe for the stair lift  She did submit the letter to the company  CRISTI ARTHUR advised she would need ot reach out to them  She confirmed this CRISTI ARTHUR can reach out to the son listed on the chart  At this time, the  joined the call from another phone  He stated this JERSON can also contact the daughter, Sp Benavidez, on chart  She does not live locally  CRISTI ARTHUR expressed concerns regarding the falls and current home care needs  The  reported trying his best, but has his own disability  He feels the stair lift will help as he is the one who has to try getting her up the stairs at this time  CRISTI ARTHUR provided information about programs through the [facility] for home care and explained it will not be many hours/week but be able to provide him a break with her care  They were agreeable and preferred this CRISTI ARTHUR to place the call for the service  They denied any further needs at this time  CRISTI ARTHUR will advise CHW to proceed with referral  CRISTI ARTHUR will call [facility]  CRISTI ARTHUR will remain available for support

## 2022-02-03 NOTE — PROGRESS NOTES
CRISTI ARTHUR discussed concerns with the provider who indicated did not see any bruising at recent visit  The provider does not feel PT is needed at this time  CRISTI ARTHUR advised will contact [facility] to see if qualifies for home health aides  Provider in agreement  CRISTI ARTHUR placed call to son, Edgar Moscoso, and left a message  CRISTI ARTHUR will await return call

## 2022-02-04 ENCOUNTER — PATIENT OUTREACH (OUTPATIENT)
Dept: FAMILY MEDICINE CLINIC | Facility: CLINIC | Age: 72
End: 2022-02-04

## 2022-02-04 NOTE — PROGRESS NOTES
CRISTI ARTHUR returned missed call from the patient's son, Edgar Moscoso ([phone])  Edgar Moscoso indicated that his mother, Dewayne Elmore, has lost strength on her one side following a stroke and has balance issues  He recently moved back in with his parents and noticed how much she physically declined  He and his father have to physically help Shae up the stairs and to take showers  He feels she needs 24/7 supervision and when he is at work, his father stays home with Dewayne Elmore  This prevents his father from daily tasks, such as grocery shopping  His father is older, as well, and having own physical difficulties  Edgar Moscoso is receptive to home care for Dewayne Elmore  CRISTI ARTHUR explained a referral for the CHW has been placed to assist again with Waiver  CRISTI ARTHUR explained OPTIONS Program and Egdar Moscoso will make the referral to receive some home health aide services during the Waiver application process  CRISTI ARTHUR provided him with [facility] Information & Referral contact information ([phone])  CRISTI ARTHUR will remain available for support

## 2022-02-11 ENCOUNTER — PATIENT OUTREACH (OUTPATIENT)
Dept: FAMILY MEDICINE CLINIC | Facility: CLINIC | Age: 72
End: 2022-02-11

## 2022-02-11 NOTE — PROGRESS NOTES
Outgoing Call  02/11/2022    Community Health Worker called Dulce Solitario on this day regarding referral from [facility] to assist with Waiver Program     Felipa Bustos stated that someone else just called regarding home visit on Monday to establish home care services and her name was Sanyafrancksabinashanta  CHW called Jarrednguyengvölnguyenarnoldo from [employer]  Nicholas stated that she is currently working with finals steps of the [facility] and she scheduled home visit on Monday 02/14 to finalized Waiver  Melodylur does not need CHW intervention at this time  CHW explained Nicholas and Felipa Bustos that this referral will be closed on my behalf today and any future assistance Felipa Bustos can always contact our Clinic  Felipa Bustos seen understood  No follow ups are required at this time  This referral will be closed on my behalf today 02/11/2022

## 2022-02-11 NOTE — PROGRESS NOTES
CRISTI ARTHUR attempted to speak with the patient's son, Kylie Rosa, for an update if he made a referral to [facility] for OPTIONS program  CRISTI ARTHUR left message and will await a return call

## 2022-02-11 NOTE — PROGRESS NOTES
CRISTI ARTHUR reviewed chart and noted CHW Lillard Landau had spoken with the patient, Katerina Méndez, who indicated has an appointment on Monday 2/14 with Nicholas from [facility]  CHW confirmed with [place] that [place] will be finalizing the Waiver application with Katerina Méndez at this appointment and does not need CHW intervention  CHW referral was closed  CRISTI ARTHUR placed call to son, Yamilka Morales, and updated about same  CRISTI ARTHUR provided contact information for [place]  Yamilka Morales is receptive to social work follow up on the outcome of the appointment with [facility]  CRISTI ARTHUR will continue to remain for support

## 2022-02-11 NOTE — PROGRESS NOTES
CRISTI ARTHUR received a returned call from the patient's son, Parminder Zurita Doron expressed frustration that the patient, Carolyn Low, still does not have services  She fell again this morning and it was difficult for him to get to her as she was blocking the door  He is only temporarily residing with her and would like to have the home care services in place prior to leaving  He stated he call the [facility] regarding home care services and has not heard back  Parminder Sal prefers this CM contact them as he feels they would be more responsive  Parminder Sheltonlarry has the referral for the stair lift and is working on getting that set up  He reported she has fallen 6-7 times/week in the past 1 5 months and has bruises as a result  CRISTI ARTHUR placed call to [place] [id number] Chinedu Solomon ([phone]) and was advised Carolyn Low does have an open case and was transferred to the worker, Nicholas ([phone])  CRISTI ARTHUR left a message and will await a return call

## 2022-02-15 ENCOUNTER — PATIENT OUTREACH (OUTPATIENT)
Dept: FAMILY MEDICINE CLINIC | Facility: CLINIC | Age: 72
End: 2022-02-15

## 2022-02-15 NOTE — PROGRESS NOTES
CRISTI ARTHUR placed call to the patient's son, Meet Forde, to follow up regarding yesterday's visit from [facility] [place]  Meet Forde reported the visit went well  He was not able to attend, however the worker was able to meet with the patient's spouse, as well  The worker had indicated that someone will be out to the home in about 1-2 weeks to finalize how many hours Pam Russell will receive  Meet Forde is unsure of the name of the [facility]  Thera Net inquired about the stair lift  He does have the referral from the provider  CRISTI ARTHUR advised will need to contact stair lift companies that will do the installation and that it will most likely be an out-of-pocket expense  CRISTI ARTHUR encouraged Meet Forde to have this discussed at the next visit to inquire if it can be covered through the [facility]  CRISTI ARTHUR advised it is not a guarantee, however the program can cover different expenses based on the needs of the patient  Meet Maurisio expressed understanding  Meet Forde requested continued social work follow up  CRISTI ARTHUR will to continue to provide support as needed

## 2022-02-21 ENCOUNTER — OFFICE VISIT (OUTPATIENT)
Dept: GASTROENTEROLOGY | Facility: CLINIC | Age: 72
End: 2022-02-21
Payer: MEDICARE

## 2022-02-21 VITALS
HEART RATE: 69 BPM | SYSTOLIC BLOOD PRESSURE: 150 MMHG | BODY MASS INDEX: 31.05 KG/M2 | WEIGHT: 143.5 LBS | DIASTOLIC BLOOD PRESSURE: 76 MMHG

## 2022-02-21 DIAGNOSIS — Z12.11 ENCOUNTER FOR COLORECTAL CANCER SCREENING: ICD-10-CM

## 2022-02-21 DIAGNOSIS — K21.00 GASTROESOPHAGEAL REFLUX DISEASE WITH ESOPHAGITIS WITHOUT HEMORRHAGE: ICD-10-CM

## 2022-02-21 DIAGNOSIS — K59.00 CONSTIPATION, UNSPECIFIED CONSTIPATION TYPE: ICD-10-CM

## 2022-02-21 DIAGNOSIS — Z12.12 ENCOUNTER FOR COLORECTAL CANCER SCREENING: ICD-10-CM

## 2022-02-21 DIAGNOSIS — K21.9 LARYNGOPHARYNGEAL REFLUX (LPR): Primary | ICD-10-CM

## 2022-02-21 DIAGNOSIS — K76.0 FATTY LIVER: ICD-10-CM

## 2022-02-21 DIAGNOSIS — R09.89 GLOBUS SENSATION: ICD-10-CM

## 2022-02-21 PROCEDURE — 99204 OFFICE O/P NEW MOD 45 MIN: CPT | Performed by: INTERNAL MEDICINE

## 2022-02-21 RX ORDER — POLYETHYLENE GLYCOL 3350 17 G/17G
17 POWDER, FOR SOLUTION ORAL DAILY
Qty: 510 G | Refills: 5 | Status: SHIPPED | OUTPATIENT
Start: 2022-02-21 | End: 2022-08-20

## 2022-02-21 NOTE — PROGRESS NOTES
[facility] - Outpatient Consultation  Maged Shell 80 y o  female MRN: [id number]  Encounter: [id number]          ASSESSMENT AND PLAN:      1  Encounter for colorectal cancer screening    Patient will be due for colonoscopy  We will order procedure today  She will need 2 day prep  2  Laryngopharyngeal reflux (LPR)  3  GERD     Patient started noticing symptoms in the last few months  Could be due to uncontrolled acid reflux or due to local pharyngeal pathology  Will refer  To ENT as well as evaluate with EGD  For Yee's  Patient agreeable to get procedure done  Patient agreeable to see ENT  Same plan as in problem 2      4  Constipation   Uncontrolled  Patient had normal consistency bowel movements with MiraLax in the past but ran out of medication  Prescribed medication today  5  Fatty liver   Normal LFTs  Hepatitis-C antibody was negative in the past   Has risk factors for nonalcoholic fatty liver disease  Will discuss further management plan on next visit with patient  RTC 6 months  Sanju Hammond MD  Gastroenterology Fellow  [address]  Date: February 21, 2022    - Ambulatory referral to Gastroenterology  - bisacodyl (DULCOLAX) 5 mg EC tablet; Take 4 tablets (20 mg total) by mouth once for 1 dose Colonoscopy prep  Dispense: 4 tablet; Refill: 0  - polyethylene glycol (GOLYTELY) 4000 mL solution; Take 4,000 mL by mouth once for 1 dose Colonoscopy prep  Dispense: 4000 mL; Refill: 0  - Colonoscopy; Future  - EGD; Future  - polyethylene glycol (MIRALAX) 17 g packet; Take 17 g by mouth daily  Dispense: 510 g; Refill: 5        ______________________________________________________________________    HPI:    67-year-old with history of hyperlipidemia obesity, GERD, presents for evaluation colon cancer screening  Patient reports constipation with daily hard to pass stools    She used to be on MiraLax daily which helped her have soft loose bowel movements but ran out of MiraLax a while ago  Her heartburn is somewhat improved with Prilosec intake as in the last few months she has been noticing increased acid taste in the mouth, along with foreign body sensation in her throat as well as cough  No chest pain, abdominal pain, swallowing problems  Weight fluctuates in the last 1 year  No family history of colon cancer  No alcohol intake  No cigarette smoking  Her last EGD was in 2019 which showed small hiatal hernia, grade a esophagitis and normal small bowel and stomach biopsies  Her last colonoscopy was in 2013 which was stated to be normal in the last GI note in 2017  Labs done in February 2021 show normal CBC and CMP  REVIEW OF SYSTEMS:    CONSTITUTIONAL: Denies any fever, chills, rigors, and weight loss  HEENT: No earache or tinnitus  Denies hearing loss or visual disturbances  CARDIOVASCULAR: No chest pain or palpitations  RESPIRATORY: Denies any cough, hemoptysis, shortness of breath or dyspnea on exertion  GASTROINTESTINAL: As noted in the History of Present Illness  GENITOURINARY: No problems with urination  Denies any hematuria or dysuria  NEUROLOGIC: No dizziness or vertigo, denies headaches  MUSCULOSKELETAL: Denies any muscle or joint pain  SKIN: Denies skin rashes or itching  ENDOCRINE: Denies excessive thirst  Denies intolerance to heat or cold  PSYCHOSOCIAL: Denies depression or anxiety  Denies any recent memory loss         Historical Information   Past Medical History:   Diagnosis Date    Abdominal pain     Anxiety     last assessed: 10/19/2013    Arthritis     osteo both hands; last assessed: 10/19/2013    Bowel incontinence     Chest pain     Constipation     [facility] [address] [facility]     Diabetes mellitus type II, controlled ([facility] [address] [facility]     Disease of thyroid gland     Epigastric pain     with distention    Falls     High cholesterol     Hyperlipidemia     Hypertension last assessed: 4/3/2017    Migraine     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    Palpitations     Recurrent urinary tract infection     Seizures [facility] [address] [facility]     Sleep disorder     last assessed: 10/19/2013    Stroke ([facility] [address] [facility]     Thyroid disease      Past Surgical History:   Procedure Laterality Date    AXILLARY SURGERY      cyst surgery    BLADDER SURGERY       SECTION      CYSTOSCOPY N/A 2018    Procedure: CYSTOSCOPY;  Surgeon: Jennifer Rose MD;  [facility];  Service: Gynecology    FACIAL BONE TUMOR EXCISION      KNEE SURGERY      OTHER SURGICAL HISTORY      cyst removed from axilla    CA ESOPHAGOGASTRODUODENOSCOPY TRANSORAL DIAGNOSTIC N/A 2017    Procedure: ESOPHAGOGASTRODUODENOSCOPY (EGD); Surgeon: Ana Rubio MD;  Location[facility];   Service: Gastroenterology    CA SLING OPER STRES INCONTINENCE N/A 2018    Procedure: PUBOVAGINAL SLING;  Surgeon: Jennifer Rose MD;  [facility]: [facility];  Service: Gynecology    TUBAL LIGATION       Social History   Social History     Substance and Sexual Activity   Alcohol Use No     Social History     Substance and Sexual Activity   Drug Use No     Social History     Tobacco Use   Smoking Status Never Smoker   Smokeless Tobacco Never Used     Family History   Problem Relation Age of Onset    No Known Problems Father     Diabetes Family         mellitus    Hypertension Family     Stroke Family     Thyroid disease Family     No Known Problems Mother     No Known Problems Sister     No Known Problems Daughter     No Known Problems Maternal Grandmother     No Known Problems Maternal Grandfather     No Known Problems Paternal Grandmother     No Known Problems Paternal Grandfather        Meds/Allergies       Current Outpatient Medications:     aspirin (Guille Aspirin EC Low Dose) 81 mg EC tablet    atenolol (TENORMIN) 25 mg tablet    atorvastatin (LIPITOR) 40 mg tablet    benzonatate (TESSALON PERLES) 100 mg capsule    citalopram (CeleXA) 40 mg tablet    cyclobenzaprine (FLEXERIL) 5 mg tablet    fluticasone (FLONASE) 50 mcg/act nasal spray    omeprazole (PriLOSEC) 20 mg delayed release capsule    albuterol (PROVENTIL HFA,VENTOLIN HFA) 90 mcg/act inhaler    bisacodyl (DULCOLAX) 5 mg EC tablet    Cholecalciferol (VITAMIN D-3) 5000 units TABS    conjugated estrogens (PREMARIN) vaginal cream    cyclobenzaprine (FLEXERIL) 5 mg tablet    diclofenac sodium (VOLTAREN) 50 mg EC tablet    lidocaine (XYLOCAINE) 5 % ointment    LORazepam (ATIVAN) 0 5 mg tablet    meclizine (ANTIVERT) 12 5 MG tablet    Melatonin 5 MG TABS    meloxicam (MOBIC) 7 5 mg tablet    methylPREDNISolone 4 MG tablet therapy pack    neomycin-polymyxin-dexamethasone (MAXITROL) ophthalmic suspension    polyethylene glycol (GLYCOLAX) powder    polyethylene glycol (GOLYTELY) 4000 mL solution    polyethylene glycol (MIRALAX) 17 g packet    Allergies   Allergen Reactions    Tramadol      Causes nausea and vomiting            Objective     Blood pressure 150/76, pulse 69, weight 65 1 kg (143 lb 8 oz), not currently breastfeeding  Body mass index is 31 05 kg/m²  PHYSICAL EXAM:      General Appearance:   Alert, cooperative, no distress   HEENT:   Normocephalic, atraumatic, anicteric      Neck:  Supple, symmetrical, trachea midline   Lungs:   Clear to auscultation bilaterally; no rales, rhonchi or wheezing; respirations unlabored    Heart[de-identified]   Regular rate and rhythm; no murmur, rub, or gallop  Abdomen:   Soft, non-tender, non-distended; normal bowel sounds; no masses, no organomegaly    Genitalia:   Deferred    Rectal:   Deferred    Extremities:  No cyanosis, clubbing or edema    Pulses:  2+ and symmetric    Skin:  No jaundice, rashes, or lesions    Lymph nodes:  No palpable cervical lymphadenopathy        Lab Results:   No visits with results within 1 Day(s) from this visit     Latest known visit with results is:   Appointment on 05/21/2021   Component Date Value    Lyme total antibody 05/21/2021 13     25-HYDROXY VIT D 05/21/2021 45     25-Hydroxy D2 05/21/2021 <1 0     25-HYDROXY VIT D3 05/21/2021 45     Miscellaneous Lab Test R* 05/21/2021 SEE SCANNED REPORT          Radiology Results:   No results found

## 2022-02-21 NOTE — PATIENT INSTRUCTIONS
Scheduled date of 6/6/22 (as of today) 2/21/22  Physician performing Dr Mike Baum:  Location of procedure  [facility]:   Bowel prep reviewed with patient: miralax/dulcolax  Instructions reviewed with patient by: MA  Clearances:

## 2022-03-02 ENCOUNTER — PATIENT OUTREACH (OUTPATIENT)
Dept: FAMILY MEDICINE CLINIC | Facility: CLINIC | Age: 72
End: 2022-03-02

## 2022-03-02 NOTE — PROGRESS NOTES
CRISTI ARTHUR placed call to the patient's son, Twila Carlson, to follow up regarding progress with Waiver process  CRISTI ARTHUR left message and will await return call to provide resources and support as needed

## 2022-03-02 NOTE — PROGRESS NOTES
CRISTI ARTHUR received returned call from the son, Davide Beal  He reported that no one has come out to the home yet to assess the patient, Ginna Rivera  He has not heard from anyone either  He confirmed he is listed as the point of contact for her care for Waiver  Davide Beal does not know service coordination agency  CRISTI ARTHUR listed three main service coordination agencies  He stated it could potentially have been [employer] [facility]  CRISTI ARTHUR provided the number for their [place] for waiver services  CRISTI ARTHUR also advised can try contacting ANNALISA TERAN ([phone] ) as they can potentially advise on which service coordination agency was assigned to Ginna Beal agreed  [facility] call CRISTI ARTHUR back again and was advised by Piotr Blackwell was not in their system and was provided with a different number to contact  Davide Emigdio will update this CRISTI ARTHUR and CRISTI ARTHUR will remain available for support as needed

## 2022-03-15 ENCOUNTER — PATIENT OUTREACH (OUTPATIENT)
Dept: FAMILY MEDICINE CLINIC | Facility: CLINIC | Age: 72
End: 2022-03-15

## 2022-03-15 NOTE — PROGRESS NOTES
CRISTI ARTHUR placed call to the patient's son, Hannah De Santiago, regarding update with contacting the waiver [place]  Hannah De Santiago reported he did not receive a callback from anyone  CRISTI ARTHUR offered to lace a 3-way call with the PA PARUL to see if they can provide the [facility] agency  Hannah De Santiago advised currently does not have vision due to a surgery  Hannah De Santiago advised is off of work this week and would like to get the Waiver issue handled  Hannah De Santiago reported should be recovered by tomorrow and requested callback then  CRISTI ARTHUR will remain available for support as needed

## 2022-03-16 ENCOUNTER — PATIENT OUTREACH (OUTPATIENT)
Dept: FAMILY MEDICINE CLINIC | Facility: CLINIC | Age: 72
End: 2022-03-16

## 2022-03-16 NOTE — PROGRESS NOTES
CRISTI ARTHUR placed call to the son, Suzanne Soto, per his request  CRISTI ARTHUR left message and will await return call to provide support as needed

## 2022-04-05 ENCOUNTER — PATIENT OUTREACH (OUTPATIENT)
Dept: FAMILY MEDICINE CLINIC | Facility: CLINIC | Age: 72
End: 2022-04-05

## 2022-04-05 NOTE — PROGRESS NOTES
CRISTI ARTHUR contacted the patient's son, [facility] to follow up on progress in connecting with the Waiver  so in home services can begin  [facility] stated that no one has contacted him regarding Waiver services  His parents do not speak much about it and they already get the mail when he gets home from work so he is unable to sort through it  He reported no else came to the home for a visit after the last worker was at the home  CRISTI ARTHUR facilitated a 3-way call with the ANNALISA TERAN and was advised that the patient, Neil Busby has [facility] for a service coordination agency  CRISTI ARTHUR then contacted [facility] and was advised that the  is Carla Mendieta and can be contacted at [email or web address]  As [facility] was in the car driving, he requested CRISTI ARTHUR email that information to him at [email or web address] which CRISTI ARTHUR did following the phone conversation

## 2022-04-20 ENCOUNTER — VBI (OUTPATIENT)
Dept: ADMINISTRATIVE | Facility: OTHER | Age: 72
End: 2022-04-20

## 2022-04-20 ENCOUNTER — OFFICE VISIT (OUTPATIENT)
Dept: FAMILY MEDICINE CLINIC | Facility: CLINIC | Age: 72
End: 2022-04-20

## 2022-04-20 VITALS
WEIGHT: 147.4 LBS | DIASTOLIC BLOOD PRESSURE: 70 MMHG | RESPIRATION RATE: 18 BRPM | SYSTOLIC BLOOD PRESSURE: 136 MMHG | HEIGHT: 57 IN | BODY MASS INDEX: 31.8 KG/M2 | HEART RATE: 69 BPM | OXYGEN SATURATION: 99 % | TEMPERATURE: 97.5 F

## 2022-04-20 DIAGNOSIS — E11.9 CONTROLLED TYPE 2 DIABETES MELLITUS WITHOUT COMPLICATION, WITHOUT LONG-TERM CURRENT USE OF INSULIN (HCC): Primary | ICD-10-CM

## 2022-04-20 DIAGNOSIS — E55.9 VITAMIN D DEFICIENCY: ICD-10-CM

## 2022-04-20 DIAGNOSIS — S40.021A CONTUSION OF RIGHT UPPER EXTREMITY, INITIAL ENCOUNTER: ICD-10-CM

## 2022-04-20 DIAGNOSIS — M54.41 CHRONIC RIGHT-SIDED LOW BACK PAIN WITH RIGHT-SIDED SCIATICA: ICD-10-CM

## 2022-04-20 DIAGNOSIS — I10 ESSENTIAL HYPERTENSION: ICD-10-CM

## 2022-04-20 DIAGNOSIS — G89.29 CHRONIC RIGHT-SIDED LOW BACK PAIN WITH RIGHT-SIDED SCIATICA: ICD-10-CM

## 2022-04-20 LAB — SL AMB POCT HEMOGLOBIN AIC: 6 (ref ?–6.5)

## 2022-04-20 PROCEDURE — 83036 HEMOGLOBIN GLYCOSYLATED A1C: CPT | Performed by: FAMILY MEDICINE

## 2022-04-20 PROCEDURE — 99214 OFFICE O/P EST MOD 30 MIN: CPT | Performed by: FAMILY MEDICINE

## 2022-04-20 NOTE — PROGRESS NOTES
Assessment/Plan:    No problem-specific Assessment & Plan notes found for this encounter  Diagnoses and all orders for this visit:    Controlled type 2 diabetes mellitus without complication, without long-term current use of insulin (HCC)  -     POCT hemoglobin A1c  -     CBC and differential; Future  -     Comprehensive metabolic panel; Future  -     Lipid panel; Future  -     Microalbumin / creatinine urine ratio; Future  -     TSH, 3rd generation with Free T4 reflex; Future    Essential hypertension  -     CBC and differential; Future  -     Comprehensive metabolic panel; Future  -     Lipid panel; Future  -     Microalbumin / creatinine urine ratio; Future  -     TSH, 3rd generation with Free T4 reflex; Future    Vitamin D deficiency  -     Vitamin D 25 hydroxy; Future    Chronic right-sided low back pain with right-sided sciatica  -     MRI lumbar spine wo contrast; Future    Contusion of right upper extremity, initial encounter        Given increasing LBP and R leg weakness despite PT and NSIADs MRI ordered   Apply ice to bruised area   Subjective:      Patient ID: Yazmin Osman is a 80 y o  female  79 yo female with DM, HTN, s/p CVA, chronic LBP complains of falls frequently  States has difficulty going up the steps and has fallen several times at home when trying to go up the steps as well as trips frequently when walking on a flat surface  No LOC, feels her legs are weak  Patient having increasing difficulty going up the steps at home  Patient lives in a 3 story home with a basement, she needs to access the second floor of the home because this is were the bathroom is located  She has trouble going up the steps and has fallen several times   Patient states she has been doing PT but feels it is making her LBP worse  Now has increasing low back pain, radiated down R leg to mid thigh     Denies fever, chills, unintentional weight loss, incontinence, saddle anesthesia  As per her son in the room with her patient fell about 5 days ago and landed on her R side, now has bruises on R arm and complains of not being able to lift R arm  The following portions of the patient's history were reviewed and updated as appropriate:   She  has a past medical history of Abdominal pain, Anxiety, Arthritis, Bowel incontinence, Chest pain, Constipation, CVA (cerebral vascular accident) ([address] ), Diabetes mellitus type II, controlled ([address] ), Disease of thyroid gland, Epigastric pain, Falls, High cholesterol, Hyperlipidemia, Hypertension, Migraine, Palpitations, Recurrent urinary tract infection, Seizures ([address] ), Sleep disorder, Stroke ([address] ), and Thyroid disease    She   Patient Active Problem List    Diagnosis Date Noted    Class 1 obesity due to excess calories without serious comorbidity with body mass index (BMI) of 30 0 to 30 9 in adult 04/02/2020    Sciatica of right side 02/11/2020    Hip strain, right, initial encounter 09/06/2019    Acute bacterial conjunctivitis of left eye 09/06/2019    Breast pain, right 07/31/2019    Elevated BP without diagnosis of hypertension 07/01/2019    Contusion of right breast 05/24/2019    Epigastric abdominal pain 03/28/2019    NSAID long-term use 03/28/2019    Left upper quadrant pain 02/08/2019    Diabetes mellitus type II, controlled ([address] ) 10/12/2018    Arthritis 10/12/2018    Primary insomnia 04/18/2018    Urinary, incontinence, stress female 02/08/2018    Urge and stress incontinence 10/26/2017    Osteoporosis 09/15/2017    Adhesive capsulitis of left shoulder 09/11/2017    Constipation 05/10/2017    Cervical radiculitis 05/09/2017    Elevated TSH 04/11/2017    Osteoarthritis of both hands 04/03/2017    Memory difficulties 03/18/2016    Asymptomatic bilateral carotid artery stenosis 02/29/2016    Gastroesophageal reflux disease 02/03/2016    Hypertension 01/20/2016    Collapsed vertebra, not elsewhere classified, sacral and sacrococcygeal region, initial encounter for fracture ([facility] ) 2016    HLD (hyperlipidemia) 2016    History of CVA with residual deficit 2016    Depression 2016    Headache 2016    Dizziness 2016    Fall 2016    Subdural hemorrhage following injury ([facility] ) 2016    Subarachnoid hemorrhage following injury ([facility] ) 2016    Hyperlipidemia 10/19/2013    Muscle weakness 10/19/2013     She  has a past surgical history that includes Knee surgery; Other surgical history;  section; Axillary Surgery; pr esophagogastroduodenoscopy transoral diagnostic (N/A, 2017); pr sling oper stres incontinence (N/A, 2018); CYSTOSCOPY (N/A, 2018); Bladder surgery; Facial bone tumor excision; and Tubal ligation  Her family history includes Diabetes in her family; Hypertension in her family; No Known Problems in her daughter, father, maternal grandfather, maternal grandmother, mother, paternal grandfather, paternal grandmother, and sister; Stroke in her family; Thyroid disease in her family  She  reports that she has never smoked  She has never used smokeless tobacco  She reports that she does not drink alcohol and does not use drugs    Current Outpatient Medications   Medication Sig Dispense Refill    albuterol (PROVENTIL HFA,VENTOLIN HFA) 90 mcg/act inhaler       aspirin (Guille Aspirin EC Low Dose) 81 mg EC tablet Take 81 mg by mouth daily       atenolol (TENORMIN) 25 mg tablet Take 1 tablet (25 mg total) by mouth daily 90 tablet 1    atorvastatin (LIPITOR) 40 mg tablet Take 1 tablet (40 mg total) by mouth daily 90 tablet 1    benzonatate (TESSALON PERLES) 100 mg capsule Take 1 capsule (100 mg total) by mouth 3 (three) times a day as needed for cough 20 capsule 0    bisacodyl (DULCOLAX) 5 mg EC tablet Take 4 tablets (20 mg total) by mouth once for 1 dose Colonoscopy prep 4 tablet 0    Cholecalciferol (VITAMIN D-3) 5000 units TABS Take 1 tablet by mouth daily (Patient not taking: Reported on 8/5/2021) 90 tablet 1    citalopram (CeleXA) 40 mg tablet Take 1 tablet (40 mg total) by mouth daily 90 tablet 1    conjugated estrogens (PREMARIN) vaginal cream Insert 0 5 g into the vagina 3 (three) times a week (Patient not taking: Reported on 8/5/2021) 42 5 g 0    cyclobenzaprine (FLEXERIL) 5 mg tablet Take 1 tablet (5 mg total) by mouth 3 (three) times a day as needed for muscle spasms 90 tablet 0    diclofenac sodium (VOLTAREN) 50 mg EC tablet Take 1 tablet (50 mg total) by mouth 2 (two) times a day (Patient not taking: Reported on 8/20/2021) 60 tablet 0    fluticasone (FLONASE) 50 mcg/act nasal spray 1 spray into each nostril daily 16 g 0    lidocaine (XYLOCAINE) 5 % ointment Apply topically 2 (two) times a day as needed for mild pain (Patient not taking: Reported on 8/5/2021) 35 44 g 0    LORazepam (ATIVAN) 0 5 mg tablet 1 tab PO 1 hour before MRI (Patient not taking: Reported on 9/17/2021) 1 tablet 0    meclizine (ANTIVERT) 12 5 MG tablet  (Patient not taking: Reported on 8/5/2021)      Melatonin 5 MG TABS Take 1 tablet (5 mg total) by mouth daily at bedtime (Patient not taking: Reported on 5/29/2020) 90 tablet 0    meloxicam (MOBIC) 7 5 mg tablet Take 1 tablet (7 5 mg total) by mouth daily (Patient not taking: Reported on 5/29/2020) 90 tablet 0    methylPREDNISolone 4 MG tablet therapy pack Use as directed on package (Patient not taking: Reported on 5/29/2020) 1 each 0    neomycin-polymyxin-dexamethasone (MAXITROL) ophthalmic suspension Administer 1 drop to the right eye 4 (four) times a day for 7 days 5 mL 0    omeprazole (PriLOSEC) 20 mg delayed release capsule Take 1 capsule (20 mg total) by mouth daily 60 capsule 2    polyethylene glycol (GLYCOLAX) powder Take 17 g by mouth daily (Patient not taking: Reported on 8/5/2021) 850 g 1    polyethylene glycol (GOLYTELY) 4000 mL solution Take 4,000 mL by mouth once for 1 dose Colonoscopy prep 4000 mL 0    polyethylene glycol (MIRALAX) 17 g packet Take 17 g by mouth daily 510 g 5     No current facility-administered medications for this visit       Current Outpatient Medications on File Prior to Visit   Medication Sig    albuterol (PROVENTIL HFA,VENTOLIN HFA) 90 mcg/act inhaler     aspirin (Guille Aspirin EC Low Dose) 81 mg EC tablet Take 81 mg by mouth daily     atenolol (TENORMIN) 25 mg tablet Take 1 tablet (25 mg total) by mouth daily    atorvastatin (LIPITOR) 40 mg tablet Take 1 tablet (40 mg total) by mouth daily    benzonatate (TESSALON PERLES) 100 mg capsule Take 1 capsule (100 mg total) by mouth 3 (three) times a day as needed for cough    bisacodyl (DULCOLAX) 5 mg EC tablet Take 4 tablets (20 mg total) by mouth once for 1 dose Colonoscopy prep    Cholecalciferol (VITAMIN D-3) 5000 units TABS Take 1 tablet by mouth daily (Patient not taking: Reported on 8/5/2021)    citalopram (CeleXA) 40 mg tablet Take 1 tablet (40 mg total) by mouth daily    conjugated estrogens (PREMARIN) vaginal cream Insert 0 5 g into the vagina 3 (three) times a week (Patient not taking: Reported on 8/5/2021)    cyclobenzaprine (FLEXERIL) 5 mg tablet Take 1 tablet (5 mg total) by mouth 3 (three) times a day as needed for muscle spasms    diclofenac sodium (VOLTAREN) 50 mg EC tablet Take 1 tablet (50 mg total) by mouth 2 (two) times a day (Patient not taking: Reported on 8/20/2021)    fluticasone (FLONASE) 50 mcg/act nasal spray 1 spray into each nostril daily    lidocaine (XYLOCAINE) 5 % ointment Apply topically 2 (two) times a day as needed for mild pain (Patient not taking: Reported on 8/5/2021)    LORazepam (ATIVAN) 0 5 mg tablet 1 tab PO 1 hour before MRI (Patient not taking: Reported on 9/17/2021)    meclizine (ANTIVERT) 12 5 MG tablet  (Patient not taking: Reported on 8/5/2021)    Melatonin 5 MG TABS Take 1 tablet (5 mg total) by mouth daily at bedtime (Patient not taking: Reported on 5/29/2020)    meloxicam (MOBIC) 7 5 mg tablet Take 1 tablet (7 5 mg total) by mouth daily (Patient not taking: Reported on 5/29/2020)    methylPREDNISolone 4 MG tablet therapy pack Use as directed on package (Patient not taking: Reported on 5/29/2020)    neomycin-polymyxin-dexamethasone (MAXITROL) ophthalmic suspension Administer 1 drop to the right eye 4 (four) times a day for 7 days    omeprazole (PriLOSEC) 20 mg delayed release capsule Take 1 capsule (20 mg total) by mouth daily    polyethylene glycol (GLYCOLAX) powder Take 17 g by mouth daily (Patient not taking: Reported on 8/5/2021)    polyethylene glycol (GOLYTELY) 4000 mL solution Take 4,000 mL by mouth once for 1 dose Colonoscopy prep    polyethylene glycol (MIRALAX) 17 g packet Take 17 g by mouth daily    [DISCONTINUED] cyclobenzaprine (FLEXERIL) 5 mg tablet Take 1 tablet (5 mg total) by mouth 3 (three) times a day as needed for muscle spasms (Patient not taking: Reported on 8/5/2021)     No current facility-administered medications on file prior to visit       Review of Systems   Musculoskeletal: Positive for arthralgias and gait problem  Psychiatric/Behavioral: The patient is nervous/anxious  All other systems reviewed and are negative  Objective:      /70 (BP Location: Left arm, Patient Position: Sitting, Cuff Size: Standard)   Pulse 69   Temp 97 5 °F (36 4 °C) (Temporal)   Resp 18   Ht 4' 9" (1 448 m)   Wt 66 9 kg (147 lb 6 4 oz)   SpO2 99%   BMI 31 90 kg/m²          Physical Exam  Vitals and nursing note reviewed  Constitutional:       Appearance: She is well-developed  HENT:      Head: Normocephalic  Right Ear: External ear normal       Left Ear: External ear normal       Nose: Nose normal    Eyes:      Conjunctiva/sclera: Conjunctivae normal       Pupils: Pupils are equal, round, and reactive to light  Neck:      Thyroid: No thyromegaly  Cardiovascular:      Rate and Rhythm: Normal rate and regular rhythm  Heart sounds: Normal heart sounds     Pulmonary: Effort: Pulmonary effort is normal       Breath sounds: Normal breath sounds  Abdominal:      Palpations: Abdomen is soft  Tenderness: There is no abdominal tenderness  There is no guarding or rebound  Musculoskeletal:         General: Tenderness present  Cervical back: Normal range of motion and neck supple  Lumbar back: Spasms and tenderness present  Decreased range of motion  Positive right straight leg raise test       Comments: Full ROM of b/l UE  When patient asked to move/lift R arm she states she was unable, however I asked her to get up from the chair and she was able to reach over grab her walker with her R arm  Skin:     General: Skin is dry  Findings: Bruising present  Neurological:      Mental Status: She is alert and oriented to person, place, and time  Deep Tendon Reflexes: Reflexes are normal and symmetric

## 2022-05-03 ENCOUNTER — PATIENT OUTREACH (OUTPATIENT)
Dept: FAMILY MEDICINE CLINIC | Facility: CLINIC | Age: 72
End: 2022-05-03

## 2022-05-03 NOTE — PROGRESS NOTES
CRISTI ARTHUR did call the patient's son, Dena Funk at the requested time  CRISTI ARTHUR left a message and will await a return call

## 2022-05-12 ENCOUNTER — HOSPITAL ENCOUNTER (OUTPATIENT)
Dept: MRI IMAGING | Facility: HOSPITAL | Age: 72
Discharge: HOME | End: 2022-05-12
Payer: MEDICARE

## 2022-05-12 DIAGNOSIS — M54.41 CHRONIC RIGHT-SIDED LOW BACK PAIN WITH RIGHT-SIDED SCIATICA: ICD-10-CM

## 2022-05-12 DIAGNOSIS — G89.29 CHRONIC RIGHT-SIDED LOW BACK PAIN WITH RIGHT-SIDED SCIATICA: ICD-10-CM

## 2022-05-12 PROCEDURE — G1004 CDSM NDSC: HCPCS

## 2022-05-12 PROCEDURE — 72148 MRI LUMBAR SPINE W/O DYE: CPT

## 2022-05-17 ENCOUNTER — PATIENT OUTREACH (OUTPATIENT)
Dept: FAMILY MEDICINE CLINIC | Facility: CLINIC | Age: 72
End: 2022-05-17

## 2022-05-17 NOTE — PROGRESS NOTES
CRISTI ARTHUR placed follow up call to the patient's son, Trang Kauffman who advised that the situation with the /Waiver services beginning has been resolved  He is unsure how many hours the patient is receiving offhand but did confirm home care workers have been coming to the home  He indicated that she still has not received the stair lift yet  H confirmed receiving the script  CRISTI ARTHUR explained to work with the  on this matter as this is apart of Waiver services to assist with the process  CRISTI ARTHUR will close referral as the patient is receiving her approved home care  The son is aware can contact office or CRISTI ARTHUR directly as any needs arise  CRISTI ARTHUR will remain available for future psychosocial support as needed

## 2022-06-05 RX ORDER — SODIUM CHLORIDE 9 MG/ML
75 INJECTION, SOLUTION INTRAVENOUS CONTINUOUS
Status: CANCELLED | OUTPATIENT
Start: 2022-06-05

## 2022-06-06 ENCOUNTER — HOSPITAL ENCOUNTER (OUTPATIENT)
Dept: GASTROENTEROLOGY | Facility: HOSPITAL | Age: 72
Setting detail: OUTPATIENT SURGERY
Discharge: HOME | End: 2022-06-06
Attending: INTERNAL MEDICINE | Admitting: INTERNAL MEDICINE
Payer: MEDICARE

## 2022-06-06 ENCOUNTER — ANESTHESIA EVENT (OUTPATIENT)
Dept: GASTROENTEROLOGY | Facility: HOSPITAL | Age: 72
End: 2022-06-06

## 2022-06-06 ENCOUNTER — ANESTHESIA (OUTPATIENT)
Dept: GASTROENTEROLOGY | Facility: HOSPITAL | Age: 72
End: 2022-06-06

## 2022-06-06 VITALS
TEMPERATURE: 97.2 F | HEART RATE: 59 BPM | RESPIRATION RATE: 18 BRPM | DIASTOLIC BLOOD PRESSURE: 76 MMHG | SYSTOLIC BLOOD PRESSURE: 170 MMHG | OXYGEN SATURATION: 96 %

## 2022-06-06 DIAGNOSIS — Z12.11 ENCOUNTER FOR COLORECTAL CANCER SCREENING: ICD-10-CM

## 2022-06-06 DIAGNOSIS — Z12.12 ENCOUNTER FOR COLORECTAL CANCER SCREENING: ICD-10-CM

## 2022-06-06 DIAGNOSIS — K21.9 LARYNGOPHARYNGEAL REFLUX (LPR): ICD-10-CM

## 2022-06-06 DIAGNOSIS — K59.00 CONSTIPATION, UNSPECIFIED CONSTIPATION TYPE: ICD-10-CM

## 2022-06-06 DIAGNOSIS — K76.0 FATTY LIVER: ICD-10-CM

## 2022-06-06 DIAGNOSIS — K21.00 GASTROESOPHAGEAL REFLUX DISEASE WITH ESOPHAGITIS WITHOUT HEMORRHAGE: ICD-10-CM

## 2022-06-06 PROCEDURE — 43239 EGD BIOPSY SINGLE/MULTIPLE: CPT | Performed by: INTERNAL MEDICINE

## 2022-06-06 PROCEDURE — 45385 COLONOSCOPY W/LESION REMOVAL: CPT | Performed by: INTERNAL MEDICINE

## 2022-06-06 PROCEDURE — 45380 COLONOSCOPY AND BIOPSY: CPT | Performed by: INTERNAL MEDICINE

## 2022-06-06 PROCEDURE — 88305 TISSUE EXAM BY PATHOLOGIST: CPT | Performed by: PATHOLOGY

## 2022-06-06 RX ORDER — PROPOFOL 10 MG/ML
INJECTION, EMULSION INTRAVENOUS AS NEEDED
Status: DISCONTINUED | OUTPATIENT
Start: 2022-06-06 | End: 2022-06-06

## 2022-06-06 RX ORDER — LIDOCAINE HYDROCHLORIDE 10 MG/ML
INJECTION, SOLUTION EPIDURAL; INFILTRATION; INTRACAUDAL; PERINEURAL AS NEEDED
Status: DISCONTINUED | OUTPATIENT
Start: 2022-06-06 | End: 2022-06-06

## 2022-06-06 RX ORDER — SODIUM CHLORIDE 9 MG/ML
75 INJECTION, SOLUTION INTRAVENOUS CONTINUOUS
Status: DISCONTINUED | OUTPATIENT
Start: 2022-06-06 | End: 2022-06-10 | Stop reason: HOSPADM

## 2022-06-06 RX ADMIN — LIDOCAINE HYDROCHLORIDE 50 MG: 10 INJECTION, SOLUTION EPIDURAL; INFILTRATION; INTRACAUDAL at 10:44

## 2022-06-06 RX ADMIN — PROPOFOL 30 MG: 10 INJECTION, EMULSION INTRAVENOUS at 11:06

## 2022-06-06 RX ADMIN — SODIUM CHLORIDE 75 ML/HR: 0.9 INJECTION, SOLUTION INTRAVENOUS at 09:51

## 2022-06-06 RX ADMIN — PROPOFOL 130 MG: 10 INJECTION, EMULSION INTRAVENOUS at 10:44

## 2022-06-06 RX ADMIN — PROPOFOL 40 MG: 10 INJECTION, EMULSION INTRAVENOUS at 10:50

## 2022-06-06 RX ADMIN — PROPOFOL 50 MG: 10 INJECTION, EMULSION INTRAVENOUS at 10:59

## 2022-06-06 NOTE — ANESTHESIA POSTPROCEDURE EVALUATION
Post-Op Assessment Note    CV Status:  Stable  Pain Score: 0    Pain management: adequate     Mental Status:  Alert and awake   Hydration Status:  Euvolemic   PONV Controlled:  Controlled   Airway Patency:  Patent      Post Op Vitals Reviewed: Yes      Staff: Anesthesiologist         No complications documented      BP (!) 172/77 (06/06/22 1127)    Temp (!) 97 2 °F (36 2 °C) (06/06/22 1127)    Pulse 63 (06/06/22 1127)   Resp 18 (06/06/22 1127)    SpO2 96 % (06/06/22 1127)

## 2022-06-06 NOTE — H&P
History and Physical - [facility]  Derrick Berg 80 y o  female MRN: [id number]                  HPI: Derrick Berg is a 80y o  year old female who presents for colon cancer screening and reflux  REVIEW OF SYSTEMS: Per the HPI, and otherwise unremarkable  Historical Information   Past Medical History:   Diagnosis Date    Abdominal pain     Anxiety     last assessed: 10/19/2013    Arthritis     osteo both hands; last assessed: 10/19/2013    Bowel incontinence     Chest pain     Constipation     CVA (cerebral vascular accident) ([place] [address] )     Diabetes mellitus type II, controlled ([place] [address] )     Disease of thyroid gland     Epigastric pain     with distention    Falls     High cholesterol     Hyperlipidemia     Hypertension     last assessed: 4/3/2017    Migraine     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    Palpitations     Recurrent urinary tract infection     Seizures ([place] [address] )     Sleep disorder     last assessed: 10/19/2013    Stroke ([place] [address] )     Thyroid disease      Past Surgical History:   Procedure Laterality Date    AXILLARY SURGERY      cyst surgery    BLADDER SURGERY       SECTION      CYSTOSCOPY N/A 2018    Procedure: CYSTOSCOPY;  Surgeon: Afua Pardo MD;  Location: [facility];  Service: Gynecology    FACIAL BONE TUMOR EXCISION      KNEE SURGERY      OTHER SURGICAL HISTORY      cyst removed from axilla    IA ESOPHAGOGASTRODUODENOSCOPY TRANSORAL DIAGNOSTIC N/A 2017    Procedure: ESOPHAGOGASTRODUODENOSCOPY (EGD); Surgeon: Chema Nj MD;  Location[facility];   Service: Gastroenterology    IA SLING OPER STRES INCONTINENCE N/A 2018    Procedure: PUBOVAGINAL SLING;  Surgeon: Afua Pardo MD;  Location: [facility];  Service: Gynecology    TUBAL LIGATION       Social History   Social History     Substance and Sexual Activity   Alcohol Use No     Social History     Substance and Sexual Activity   Drug Use No     Social History     Tobacco Use   Smoking Status Never Smoker   Smokeless Tobacco Never Used     Family History   Problem Relation Age of Onset    No Known Problems Father     Diabetes Family         mellitus    Hypertension Family     Stroke Family     Thyroid disease Family     No Known Problems Mother     No Known Problems Sister     No Known Problems Daughter     No Known Problems Maternal Grandmother     No Known Problems Maternal Grandfather     No Known Problems Paternal Grandmother     No Known Problems Paternal Grandfather        Meds/Allergies       Current Outpatient Medications:     albuterol (PROVENTIL HFA,VENTOLIN HFA) 90 mcg/act inhaler    aspirin (ECOTRIN LOW STRENGTH) 81 mg EC tablet    atenolol (TENORMIN) 25 mg tablet    atorvastatin (LIPITOR) 40 mg tablet    benzonatate (TESSALON PERLES) 100 mg capsule    bisacodyl (DULCOLAX) 5 mg EC tablet    Cholecalciferol (VITAMIN D-3) 5000 units TABS    citalopram (CeleXA) 40 mg tablet    conjugated estrogens (PREMARIN) vaginal cream    cyclobenzaprine (FLEXERIL) 5 mg tablet    diclofenac sodium (VOLTAREN) 50 mg EC tablet    fluticasone (FLONASE) 50 mcg/act nasal spray    lidocaine (XYLOCAINE) 5 % ointment    LORazepam (ATIVAN) 0 5 mg tablet    meclizine (ANTIVERT) 12 5 MG tablet    Melatonin 5 MG TABS    meloxicam (MOBIC) 7 5 mg tablet    methylPREDNISolone 4 MG tablet therapy pack    neomycin-polymyxin-dexamethasone (MAXITROL) ophthalmic suspension    omeprazole (PriLOSEC) 20 mg delayed release capsule    polyethylene glycol (GLYCOLAX) powder    polyethylene glycol (GOLYTELY) 4000 mL solution    polyethylene glycol (MIRALAX) 17 g packet    Current Facility-Administered Medications:     sodium chloride 0 9 % infusion, 75 mL/hr, Intravenous, Continuous, Continue from Pre-op at 06/06/22 1023    Allergies   Allergen Reactions    Tramadol      Causes nausea and vomiting        Objective     /89 (BP Location: Left arm)   Pulse 58   Temp (!) 96 3 °F (35 7 °C) (Temporal)   Resp 18   SpO2 98%       PHYSICAL EXAM    Gen: NAD  Head: NCAT  CV: RRR  CHEST: Clear  ABD: soft, NT/ND  EXT: no edema      ASSESSMENT/PLAN:  This is a 80y o  year old female here for upper endoscopy and colonoscopy, and she is stable and optimized for her procedure

## 2022-06-06 NOTE — ANESTHESIA PREPROCEDURE EVALUATION
Procedure:  COLONOSCOPY  EGD    Relevant Problems   CARDIO   (+) Asymptomatic bilateral carotid artery stenosis   (+) Breast pain, right   (+) HLD (hyperlipidemia)   (+) Hyperlipidemia   (+) Hypertension      ENDO   (+) Diabetes mellitus type II, controlled ([facility] )      GI/HEPATIC   (+) Gastroesophageal reflux disease      MUSCULOSKELETAL   (+) Adhesive capsulitis of left shoulder   (+) Arthritis   (+) Hip strain, right, initial encounter   (+) Osteoarthritis of both hands   (+) Sciatica of right side      NEURO/PSYCH   (+) Depression   (+) Headache   (+) History of CVA with residual deficit   (+) Subdural hemorrhage following injury ([facility] )      Nervous and Auditory   (+) [facility] )      Musculoskeletal and Integument   (+) Collapsed vertebra, not elsewhere classified, sacral and sacrococcygeal region, initial encounter for fracture (HCC)      Other   (+) Class 1 obesity due to excess calories without serious comorbidity with body mass index (BMI) of 30 0 to 30 9 in adult        Physical Exam    Airway    Mallampati score: II  TM Distance: >3 FB  Neck ROM: full     Dental   upper dentures,     Cardiovascular      Pulmonary      Other Findings        Anesthesia Plan  ASA Score- 3     Anesthesia Type- IV sedation with anesthesia with ASA Monitors  Additional Monitors:   Airway Plan:           Plan Factors-Exercise tolerance (METS): <4 METS  Chart reviewed  Patient summary reviewed  Patient is not a current smoker  Patient did not smoke on day of surgery  Induction- intravenous  Postoperative Plan-     Informed Consent- Anesthetic plan and risks discussed with patient and spouse  I personally reviewed this patient with the CRNA  Discussed and agreed on the Anesthesia Plan with the CRNA  Alejandrina Page

## 2022-06-07 DIAGNOSIS — K21.9 GASTROESOPHAGEAL REFLUX DISEASE, UNSPECIFIED WHETHER ESOPHAGITIS PRESENT: Primary | ICD-10-CM

## 2022-06-07 NOTE — TELEPHONE ENCOUNTER
Called patient, a family member answered phone and advised me patient was not home and to call back later

## 2022-06-07 NOTE — TELEPHONE ENCOUNTER
Patients GI provider:  Dr Kathleen Jordan    Number to return call: [phone]    Reason for call: Pt calling asking what medication she is supposed to be taking following her procedure      Scheduled procedure/appointment date if applicable: N/A

## 2022-06-08 ENCOUNTER — TELEPHONE (OUTPATIENT)
Dept: FAMILY MEDICINE CLINIC | Facility: CLINIC | Age: 72
End: 2022-06-08

## 2022-06-08 NOTE — TELEPHONE ENCOUNTER
Called patient's mobile number,  answered and advised to call home number  Called home number, son answered, he advised he would call his mother to see what she was referring to  Her son requested I call back

## 2022-06-08 NOTE — TELEPHONE ENCOUNTER
Patient spouse came in the office today requesting the following     Hospital bed    [address] chair     She also states they have not installed the [address]  She says she falls a lot  She also says she has not been able to get in touch with her coordinator "Scot Navarro" and she needs orders for pull-ups  Wipes  Underpads   And she needs more hours for homecare     A copy of the document hte patient dropped off is going to be placed in providers bin

## 2022-06-08 NOTE — TELEPHONE ENCOUNTER
Spoke with patient and her son, he explains patient had EGD yesterday and was told she had a small hiatal hernia and provider was going to send medication into pharmacy  Per EGD recommendations patient was to continue omeprazole daily  She will need a refill sent to [address] [place] [address]

## 2022-06-09 ENCOUNTER — TELEPHONE (OUTPATIENT)
Dept: GASTROENTEROLOGY | Facility: AMBULARY SURGERY CENTER | Age: 72
End: 2022-06-09

## 2022-06-09 RX ORDER — OMEPRAZOLE 20 MG/1
20 CAPSULE, DELAYED RELEASE ORAL DAILY
Qty: 30 CAPSULE | Refills: 2 | Status: SHIPPED | OUTPATIENT
Start: 2022-06-09

## 2022-06-09 NOTE — TELEPHONE ENCOUNTER
The order for the Hospital bed, Wheel chair and commode have been placed VIA online portal  Once order is electronically signed [employer] will reach out to the pt directly  I will contact the pts son and verify what kind of "pads" he needs and also if the pt has ever received incontinence supplies before and from where

## 2022-06-10 NOTE — TELEPHONE ENCOUNTER
I called the pts son whom states he is unsure of the size and exactly what under pads  States he does not live with his mom  Pts son states he will call his mom and speak with her care taker as she should know  Pts son asked that I call 10-15mins after to speak with the care taker  He will call her to let her know I will be reaching out

## 2022-06-10 NOTE — TELEPHONE ENCOUNTER
I spoke with the pts care taker whom states she will need size L for the pull ups and washable bed pads along with the wipes   Please order scripts and set to print and sign I will fax to [place]

## 2022-06-13 ENCOUNTER — PATIENT OUTREACH (OUTPATIENT)
Dept: FAMILY MEDICINE CLINIC | Facility: CLINIC | Age: 72
End: 2022-06-13

## 2022-06-13 ENCOUNTER — TELEPHONE (OUTPATIENT)
Dept: FAMILY MEDICINE CLINIC | Facility: CLINIC | Age: 72
End: 2022-06-13

## 2022-06-13 NOTE — TELEPHONE ENCOUNTER
Pt son Sheyla Johanny came in requesting a referral for physical therapy for his mom, please adilene

## 2022-06-13 NOTE — PROGRESS NOTES
CRISTI ARTHUR received request from Dr Fred Bill to follow up with the patient's son regarding his concern for needing more home care  DME referral were placed for the patient  CRISTI ARTHUR did place call to the patient's son, Yessy Christensen  He expressed frustration in the stair lift still not being installed  The company came to the home approximately 1 month ago to take measurements  He has not heard from them since  He is unsure of the company name and he is at work  CRISTI ARTHUR encouraged him to contact the company to follow up when he gets the chance  He agreed  Yessy Christensen indicated he did get most of the DME  Aside from the stair lift they are still waiting on the medical bed and the wheelchair  CRISTI ARTHUR advised orders were placed for these items  CRISTI ARTHUR will follow up  Yessy Christensen was unsure what the exact concerns were regarding the home care hours  Yessy Christensen indicated will contact the caregiver and the patient and will call CRISTI ARTHUR back  CRISTI ARTHUR received a callback shortly after indicating that he spoke with the patient and the patient's caregiver  He stated that the patient is wanting an additional 5 hours but the current caregiver is only able to 40 hours  He is unsure of the caregiving agency  He provided the number [phone] as the patient's number  The caregiver does speak Lolis COLIN CM did place call and the patient handed phone to the caregiver  She is unsure of the Waiver supports coordinator  She only knows his name is Tunde Haji  She currently provides 32 hours  She works for [employer] and they can be contacted at [phone]

## 2022-06-13 NOTE — PROGRESS NOTES
CRISTI ARTHUR placed call to the [facility] and spoke with Nilsa Gibson who advised cannot see who the [place] is in the patient's portal  CRISTI ARTHUR advised it should be on the care plan but CRISTI ARTHUR was advised only [facility] is listed and no further information  CRISTI ARTHUR contacted [facility] and was transferred to Stephen Acevedo who advised that the [employer] is [email or web address]    CRISTI ARTHUR placed call to Simeon Mirza who requested the information be emailed to him at [email or web address] which CRISTI ARTHUR did do following conversation  CRISTI ARTHUR will continue to remain available for psychosocial support as needed

## 2022-06-14 ENCOUNTER — PATIENT OUTREACH (OUTPATIENT)
Dept: FAMILY MEDICINE CLINIC | Facility: CLINIC | Age: 72
End: 2022-06-14

## 2022-06-14 DIAGNOSIS — R32 URINARY INCONTINENCE, UNSPECIFIED TYPE: ICD-10-CM

## 2022-06-14 DIAGNOSIS — I69.398 GAIT DISTURBANCE, POST-STROKE: Primary | ICD-10-CM

## 2022-06-14 DIAGNOSIS — R26.9 GAIT DISTURBANCE, POST-STROKE: Primary | ICD-10-CM

## 2022-06-14 DIAGNOSIS — R26.89 LOSS OF BALANCE: ICD-10-CM

## 2022-06-14 RX ORDER — BROMPHENIRAMIN/PSEUDOEPHEDRINE 1-15MG/5ML
LIQUID (ML) ORAL 4 TIMES DAILY
Qty: 160 EACH | Refills: 11 | Status: SHIPPED | OUTPATIENT
Start: 2022-06-14 | End: 2022-06-22 | Stop reason: SDUPTHER

## 2022-06-14 RX ORDER — UNDERPADS 23" X 36"
EACH MISCELLANEOUS DAILY
Qty: 4 EACH | Refills: 11 | Status: SHIPPED | OUTPATIENT
Start: 2022-06-14 | End: 2022-06-22 | Stop reason: SDUPTHER

## 2022-06-14 RX ORDER — UBIDECARENONE 75 MG
CAPSULE ORAL 4 TIMES DAILY
Qty: 240 EACH | Refills: 11 | Status: SHIPPED | OUTPATIENT
Start: 2022-06-14 | End: 2022-06-22 | Stop reason: SDUPTHER

## 2022-06-14 NOTE — PROGRESS NOTES
CRISTI ARTHUR noted in chart that the patient's son requested PT and the order was placed  CRISTI ARTHUR received update from  that order for the medical bed and wheelchair is under review  CRISTI ARTHUR did email the son this update and will continue to remain available for psychosocial support as needed

## 2022-06-22 DIAGNOSIS — R26.89 LOSS OF BALANCE: ICD-10-CM

## 2022-06-22 DIAGNOSIS — I69.398 GAIT DISTURBANCE, POST-STROKE: ICD-10-CM

## 2022-06-22 DIAGNOSIS — R26.9 GAIT DISTURBANCE, POST-STROKE: ICD-10-CM

## 2022-06-22 DIAGNOSIS — R32 URINARY INCONTINENCE, UNSPECIFIED TYPE: ICD-10-CM

## 2022-06-23 RX ORDER — UBIDECARENONE 75 MG
CAPSULE ORAL 4 TIMES DAILY
Qty: 240 EACH | Refills: 11 | Status: SHIPPED | OUTPATIENT
Start: 2022-06-23

## 2022-06-23 RX ORDER — UNDERPADS 23" X 36"
EACH MISCELLANEOUS DAILY
Qty: 4 EACH | Refills: 11 | Status: SHIPPED | OUTPATIENT
Start: 2022-06-23

## 2022-06-23 RX ORDER — BROMPHENIRAMIN/PSEUDOEPHEDRINE 1-15MG/5ML
LIQUID (ML) ORAL 4 TIMES DAILY
Qty: 160 EACH | Refills: 11 | Status: SHIPPED | OUTPATIENT
Start: 2022-06-23

## 2022-06-28 NOTE — RESULT ENCOUNTER NOTE
Polyps were adenomas (precancerous but no cancer) so repeat colonoscopy in 7 years  This was communicated via [address]

## 2022-06-29 ENCOUNTER — EVALUATION (OUTPATIENT)
Dept: PHYSICAL THERAPY | Facility: REHABILITATION | Age: 72
End: 2022-06-29
Payer: MEDICARE

## 2022-06-29 DIAGNOSIS — I69.398 GAIT DISTURBANCE, POST-STROKE: Primary | ICD-10-CM

## 2022-06-29 DIAGNOSIS — R26.89 LOSS OF BALANCE: ICD-10-CM

## 2022-06-29 DIAGNOSIS — R26.2 IMPAIRED AMBULATION: ICD-10-CM

## 2022-06-29 DIAGNOSIS — R26.9 GAIT DISTURBANCE, POST-STROKE: Primary | ICD-10-CM

## 2022-06-29 PROCEDURE — 97162 PT EVAL MOD COMPLEX 30 MIN: CPT | Performed by: PHYSICAL THERAPIST

## 2022-06-29 NOTE — PROGRESS NOTES
PT Evaluation     Today's date: 2022  Patient name: Sanju Coronado  : 1950  MRN: [id number]  Referring provider: Jocelynn Davis MD  Dx:   Encounter Diagnosis     ICD-10-CM    1  Gait disturbance, post-stroke  I51 972 Ambulatory Referral to Physical Therapy    R26 9    2  Loss of balance  R26 89 Ambulatory Referral to Physical Therapy   3  Impaired ambulation  R26 2      tx by Loretta Benoit SPT under direct supervision of Tuan Murillo DPT  Assessment  Assessment details: Used language line today to perform evaluation, since Luxembourgish is primary language  Scot Mt is a 80 y o  female who is coming to OP PT to improve balance, ambulation and low back pain  On evaluation, pt presents with decreased LE strength on the R> L when MMT is performed in isolation, however, when both sides (L and R) are performed at the same time strength is approximately equal  This improvement in strength is likely due to distraction and possible functional neurologic disorder  Pt presents with functional decreases in LE strength per 5XSTS  Pt presents with reduced aerobic endurance compared to age matched norms per 6MWT, and required 10-30s standing rest breaks during testing, however, pt did not appear to be fatigued ( respiratory rate did not seem to increase)  Pt demonstrates poor balance and increased fall risk per the TUG, in which she required occ min A by therapist due to constant veering to the L with rollator  Due to the pt's impairments she is unable to perform ADLs,  functional mobility independently and presents as a fall risk  She would benefit from skilled OP PT to improve above mentioned impairments, activity limitations and participation restrictions  Therapist will assess low back next session to help with pain management       Pt and Home health aide were educated and  given number for the national suicide prevention line since she did state at times she does want to hurt herself, but does not want to hurt herself right now  Referral will be made back to PCP for further referral to psychology and for medication management as appropriate  Impairments: impaired balance, impaired physical strength, lacks appropriate home exercise program and pain with function    Goals  STG  Pt will be independent in HEP  Pt will report improved balance with ambulation and no falls when ambulating at home with rollator  Pt will improve TUG by 2 9s ([address] for chronic CVA population) for improved balance  LTG  Pt will perform the TUG 6s faster and with CGA by therapist to demonstrate decreased fall risk and improved independence with mobility  Pt will improve 6MWT by 164ft to demonstrate improved aerobic endurance  Pt will be able to perform the 5XSTS in no more than 15 5 sec ( age matched norm) to demonstrate improved LE strength  Pt will be able to demonstrate and report  improved use of the RUE/RLE during MMT and functional mobility  Plan  Plan details: Initiate POC  Patient would benefit from: skilled physical therapy  Planned therapy interventions: therapeutic exercise, therapeutic activities, strengthening, stretching, patient education, neuromuscular re-education, activity modification, balance/weight bearing training, gait training and home exercise program  Frequency: 2x week  Duration in weeks: 8  Plan of Care beginning date: 6/29/2022  Plan of Care expiration date: 9/7/2022  Treatment plan discussed with: patient and caregiver        Subjective Evaluation    History of Present Illness  Mechanism of injury: Rachel Pandya is a 80 y o  female who is coming to OP PT due to ambulation dysfunction, balance deficits and LBP  Of note pt has a PMHx of CVA, chronic LBP with R sciatica, bowel incontinence, type 2 diabetes, HTN, palpitations, seizures and osteoporosis  She has also had frequent falls on the stairs in her home  PT in the past has not helped her LBP      Pt is having difficulty with all ADLS, and is unable to walk by herself at this time  Pt has a full time home health aide and when home health aide is not with her  is   Pt reports she has had many falls in the past year which caused impairment to the R side of body  Pt states back pain increases with sitting and improves with standing  Multistory home, 15-16 steps with stair glide  Lives with   Equipment: MWC, stair glide, rollator   Goals: To walk again     Pain  Current pain ratin  At best pain ratin  At worst pain rating: 10 (with sitting )  Location: Low back           Objective         Vitals:  -BP:124/70  -HR: 69bpm  -SpO2:97%    Manual Muscle Testing - Hip Left Right   Flexion 3+ 3+   Extension     Abduction     External Rotation       Manual Muscle Testing - Knee ( each side in isolation)  Left Right   Flexion 4 3+   Extension 4 3+     Manual Muscle Testing - Knee ( each side performed simultaneously) Left Right   Flexion 4 4   Extension 4 4       Manual Muscle Testing - Ankle ( each side in isolation)  Left Right   Doriflexion 4 3   Plantarflexion           Manual Muscle Testing - Ankle ( each side performed simultaneously)  Left Right   Doriflexion 5 5   Plantarflexion         Balance Test Initial Eval      5x Sit to Stand: 58 78s w BUEs from 21" chair       TUs  w rollator CGA-Marina for rollator mgmt and cues      Gait Speed:        2 Minute Walk Test: 204ft w rollator and occ min A with rollator mgmt      6 Minute Walk Test: 544ft w rollator and occ min A with rollator mgmt    2, 10-30s standing rest breaks      Haro Balance Scale:       FGA:                            Transfers    Sit To Stand Contact Guard to min A    Ambulation  CG to Min A for rollator mgmt         Gait Assessment:  Forward flexed cervical spine and trunk, short step length, slow grecia, veers to left often with rollator, dragging of L foot          Precautions: Andorran Speaking,Chronic CVA, high fall risk, HTN, Diabetes mellitus type 2, seizures, osteoporosis, palpitations, chronic LBP with R sided sciatica, hx of depression and suicidal ideation     HEP: STS ( 5x, 2 sets daily) and walking program ( 5-6 min 1 or 2x a day)     Manuals                                                                 Neuro Re-Ed             Overground Ambulation              Side stepping              Backwards ambulation              STS             TM Training                                        Ther Ex             SCIFIT             NV assess low back  - Maybe has extension bias                                                                                          Ther Activity                                       Gait Training                                       Modalities

## 2022-06-30 ENCOUNTER — TELEPHONE (OUTPATIENT)
Dept: FAMILY MEDICINE CLINIC | Facility: CLINIC | Age: 72
End: 2022-06-30

## 2022-06-30 NOTE — TELEPHONE ENCOUNTER
Medical Necessity form received on 06/30/2022  to be completed by PCP  Copy made and placed in PCP folder  Forms to be delivered to PCP mailbox at assigned time      ([employer] [facility])

## 2022-07-05 ENCOUNTER — OFFICE VISIT (OUTPATIENT)
Dept: PHYSICAL THERAPY | Facility: REHABILITATION | Age: 72
End: 2022-07-05
Payer: MEDICARE

## 2022-07-05 DIAGNOSIS — R26.9 GAIT DISTURBANCE, POST-STROKE: Primary | ICD-10-CM

## 2022-07-05 DIAGNOSIS — R26.89 LOSS OF BALANCE: ICD-10-CM

## 2022-07-05 DIAGNOSIS — I69.398 GAIT DISTURBANCE, POST-STROKE: Primary | ICD-10-CM

## 2022-07-05 DIAGNOSIS — R26.2 IMPAIRED AMBULATION: ICD-10-CM

## 2022-07-05 PROCEDURE — 97112 NEUROMUSCULAR REEDUCATION: CPT

## 2022-07-05 PROCEDURE — 97112 NEUROMUSCULAR REEDUCATION: CPT | Performed by: PHYSICAL THERAPIST

## 2022-07-05 PROCEDURE — 97110 THERAPEUTIC EXERCISES: CPT

## 2022-07-05 PROCEDURE — 97110 THERAPEUTIC EXERCISES: CPT | Performed by: PHYSICAL THERAPIST

## 2022-07-05 NOTE — PROGRESS NOTES
Daily Note     Today's date: 2022  Patient name: Petey Mclaughlin  : 1950  MRN: [id number]  Referring provider: Jami Tam MD  Dx:   Encounter Diagnosis     ICD-10-CM    1  Gait disturbance, post-stroke  I69 398     R26 9    2  Loss of balance  R26 89    3  Impaired ambulation  R26 2        Start Time: 09  Stop Time: 7282  Total time in clinic (min): 55 minutes    Subjective: Reports Right shoulder pain today  "it's been going on for a while"  Objective: See treatment diary below      Assessment: Tolerated treatment well  Initiated treadmill ambulation with 2 UE support  Verbal cues for increased step length and decreasing LINDA  Progressed to solostep ambulation without assistive device for dynamic balance  Required assistance during activity to regain balance  Discussed treatment with caregiver  Encouraged increased frequency of ambulation at home  Progress to step training for increased stair negotiation at home  Patient demonstrated fatigue post treatment and would benefit from continued PT      Plan: Continue per plan of care  Precautions: Nepalese Speaking,Chronic CVA, high fall risk, HTN, Diabetes mellitus type 2, seizures, osteoporosis, palpitations, chronic LBP with R sided sciatica, hx of depression and suicidal ideation     HEP: STS ( 5x, 2 sets daily) and walking program ( 5-6 min 1 or 2x a day)                                                                      Neuro Re-Ed             Overground Ambulation  Forwards solostep  50' x 2            Side stepping  30' x 3            Backwards ambulation  25' x 3            STS             TM Training    4 -  5 mph   4 min  5 min                   * 1 min standing break            Ther Ex             SCIFIT 10 min            NV assess low back  No LBO today            Treadmill  Amb   Tr                                                                             Ther Activity                                       Gait Training

## 2022-07-08 ENCOUNTER — APPOINTMENT (OUTPATIENT)
Dept: PHYSICAL THERAPY | Facility: REHABILITATION | Age: 72
End: 2022-07-08
Payer: MEDICARE

## 2022-07-12 ENCOUNTER — PATIENT OUTREACH (OUTPATIENT)
Dept: FAMILY MEDICINE CLINIC | Facility: CLINIC | Age: 72
End: 2022-07-12

## 2022-07-12 ENCOUNTER — OFFICE VISIT (OUTPATIENT)
Dept: PHYSICAL THERAPY | Facility: REHABILITATION | Age: 72
End: 2022-07-12
Payer: MEDICARE

## 2022-07-12 DIAGNOSIS — R26.2 IMPAIRED AMBULATION: ICD-10-CM

## 2022-07-12 DIAGNOSIS — R26.89 LOSS OF BALANCE: ICD-10-CM

## 2022-07-12 DIAGNOSIS — I69.398 GAIT DISTURBANCE, POST-STROKE: Primary | ICD-10-CM

## 2022-07-12 DIAGNOSIS — R26.9 GAIT DISTURBANCE, POST-STROKE: Primary | ICD-10-CM

## 2022-07-12 PROCEDURE — 97112 NEUROMUSCULAR REEDUCATION: CPT

## 2022-07-12 PROCEDURE — 97110 THERAPEUTIC EXERCISES: CPT | Performed by: PHYSICAL THERAPIST

## 2022-07-12 PROCEDURE — 97110 THERAPEUTIC EXERCISES: CPT

## 2022-07-12 PROCEDURE — 97112 NEUROMUSCULAR REEDUCATION: CPT | Performed by: PHYSICAL THERAPIST

## 2022-07-12 NOTE — PROGRESS NOTES
CRISTI ARTHUR noted in chart that the patient started physical therapy  [facility] requested medical necessity form be completed for [employer] [facility] [employer]  CRISTI ARTHUR placed follow up call to the son, Bety Raisa  Juana indicated that he thinks PT is going well but is not residing with the patient at this time  He is working on getting the stair lift still  The company has come to the home a couple of times for measurements  He has not heard back  Bety Kline unable to confirm if the company name is  [employer] [facility] [employer]  He believes the patient has enough home care hours now  He thinks she has 45 hours  [facility] coordination is Suzie Dodge and can be reached at [phone]  He is in contact with her about patient's care  Bety Kline confirmed patient received her wheelchair  They just sent out the payment  The patient declined a medical bed at this time  CRISTI ARTHUR will close referral as goals are met  Stair lift process in progress  Bety Kline aware can contact office or CRISTI ARTHUR directly as needs arise  CRISTI ARTHUR will remain available for psychosocial support as needed

## 2022-07-12 NOTE — PROGRESS NOTES
Daily Note           Today's date: 2022  Patient name: Klely Weaver  : 1950  MRN: [id number]  Referring provider: Nadia Lugo MD  Dx:   Encounter Diagnosis     ICD-10-CM    1  Gait disturbance, post-stroke  I69 398     R26 9    2  Loss of balance  R26 89    3  Impaired ambulation  R26 2        Start Time: 1105  Stop Time: 1200  Total time in clinic (min): 55 minutes    Subjective: Patient reports her left shoulder has been hurting her since January  Caregiver reports obtaining a new chair  Discouraged use of w/c as much as possible  Objective: See treatment diary below      Assessment: Tolerated treatment well  Initiated treadmill ambulation with 2 UE support  Verbal cues for increased step length and decreasing LINDA  Progressed to solostep ambulation without assistive device for dynamic balance  Required assistance during activity to regain balance  Discussed treatment with caregiver  Encouraged increased frequency of ambulation at home  Progress to step training for increased stair negotiation at home  Patient demonstrated fatigue post treatment and would benefit from continued PT      Plan: Continue per plan of care  Precautions: Grenadian Speaking,Chronic CVA, high fall risk, HTN, Diabetes mellitus type 2, seizures, osteoporosis, palpitations, chronic LBP with R sided sciatica, hx of depression and suicidal ideation     HEP: STS ( 5x, 2 sets daily) and walking program ( 5-6 min 1 or 2x a day)             Education  Encouraged decreased use of w/c  Continue to ambulate with least restrictive device or using the push cart at stores, as previously         Neuro Re-Ed         Overground Ambulation  Forwards solostep  50' x 2 100' x 2 solostep       Side stepping  30' x 3 50' x 2       Backwards ambulation  25' x 3        Agility ladder  forwards x  3 laps  * improved technique with repetition       kickball  solostep  Ball passing to therapist  + directional changes, improved with repetition       TM Training    4 -  5 mph   4 min  5 min nv              * 1 min standing break        Ther Ex         SCIFIT 10 min 15 min       NV assess low back  No LBO today        Step ups  Solostep  5 x 2 each with contact guar

## 2022-07-14 ENCOUNTER — OFFICE VISIT (OUTPATIENT)
Dept: PHYSICAL THERAPY | Facility: REHABILITATION | Age: 72
End: 2022-07-14
Payer: MEDICARE

## 2022-07-14 DIAGNOSIS — R26.2 IMPAIRED AMBULATION: ICD-10-CM

## 2022-07-14 DIAGNOSIS — I69.398 GAIT DISTURBANCE, POST-STROKE: Primary | ICD-10-CM

## 2022-07-14 DIAGNOSIS — R26.9 GAIT DISTURBANCE, POST-STROKE: Primary | ICD-10-CM

## 2022-07-14 DIAGNOSIS — R26.89 LOSS OF BALANCE: ICD-10-CM

## 2022-07-14 PROCEDURE — 97112 NEUROMUSCULAR REEDUCATION: CPT | Performed by: PHYSICAL THERAPIST

## 2022-07-14 NOTE — PROGRESS NOTES
Daily Note           Today's date: 2022  Patient name: Ana Rosa Damon  : 1950  MRN: [id number]  Referring provider: Bashir Ojeda MD  Dx:   Encounter Diagnosis     ICD-10-CM    1  Gait disturbance, post-stroke  I69 398     R26 9    2  Loss of balance  R26 89    3  Impaired ambulation  R26 2      tx by Bobetta Najjar, SPT under direct supervision of Severo Army, DPT  One on one from 10:55am to 11:20am               Subjective: Pt is going good but is having some pain in R arm  Objective: See treatment diary below      Assessment: Pt demonstrates ability to perform functional walking patterns with HHA ; demonstrates minor LOB, but able to self correct most of the time (CGA given consistently, occ min A needed)  Increased difficulty of task by having pt negotiate over cones, significant difficulty performing this sideways due to hip strength deficits on B LEs  Next visit would benefit from learning standing hip exercises ( hip abduction, hip flexion, and hip extension) to work on LE strength deficits at home  Pt will continue to benefit from skilled OP PT to progress balance and strength  Plan: Continue per plan of care  Precautions: Stateless Speaking,Chronic CVA, high fall risk, HTN, Diabetes mellitus type 2, seizures, osteoporosis, palpitations, chronic LBP with R sided sciatica, hx of depression and suicidal ideation     HEP: STS ( 5x, 2 sets daily) and walking program ( 5-6 min 1 or 2x a day)            Education  Encouraged decreased use of w/c  Continue to ambulate with least restrictive device or using the push cart at stores, as previously         Neuro Re-Ed         Overground Ambulation  Forwards solostep  50' x 2 100' x 2 solostep 400 ft CGA HHA      Side stepping  30' x 3 50' x 2 20ft x 6 CGA HHA      Backwards ambulation  25' x 3  50 ft x 4 CGA HHA      Agility ladder  forwards x  3 laps  * improved technique with repetition       LVL6ball  [place] passing to therapist  + directional changes, improved with repetition       TM Training    4 -  5 mph   4 min  5 min nv         Negotiating obstacles      * 1 min standing break  Fwd 6x CGA HHA    Unable to perform cones sideways due to hip weakness      Ther Ex         SCIFIT 10 min 15 min 10 min L3      NV assess low back  No LBO today        Step ups  Solostep  5 x 2 each with contact guar 10x on ea LE 4"      Standing Hip exercises   Hip flexion 10x ea LE  NV introduce abduction and ext

## 2022-07-19 ENCOUNTER — OFFICE VISIT (OUTPATIENT)
Dept: PHYSICAL THERAPY | Facility: REHABILITATION | Age: 72
End: 2022-07-19
Payer: MEDICARE

## 2022-07-19 DIAGNOSIS — I69.398 GAIT DISTURBANCE, POST-STROKE: Primary | ICD-10-CM

## 2022-07-19 DIAGNOSIS — R26.2 IMPAIRED AMBULATION: ICD-10-CM

## 2022-07-19 DIAGNOSIS — R26.9 GAIT DISTURBANCE, POST-STROKE: Primary | ICD-10-CM

## 2022-07-19 DIAGNOSIS — R26.89 LOSS OF BALANCE: ICD-10-CM

## 2022-07-19 PROCEDURE — 97110 THERAPEUTIC EXERCISES: CPT

## 2022-07-19 PROCEDURE — 97112 NEUROMUSCULAR REEDUCATION: CPT

## 2022-07-19 NOTE — PROGRESS NOTES
Daily Note     Today's date: 2022  Patient name: Ashley Valenzuela  : 1950  MRN: [id number]  Referring provider: Jemma Gonzales MD  Dx:   Encounter Diagnosis     ICD-10-CM    1  Gait disturbance, post-stroke  I69 398     R26 9    2  Loss of balance  R26 89    3  Impaired ambulation  R26 2                   Subjective: Patient noted no new complaints  Objective: See treatment diary below      Assessment: Tolerated treatment fair  Patient was able to perform listed exercises  Added in new exercises to work on LLE strengthening standing  hip flexion hips ext and hip abd  Patient was able to perform added exercises with VC to slow down pace of performing exercise to achieve maximum benefit  Patient demonstrated fatigue post treatment and would benefit from continued PT      Plan: Continue per plan of care  Precautions: Burundian Speaking,Chronic CVA, high fall risk, HTN, Diabetes mellitus type 2, seizures, osteoporosis, palpitations, chronic LBP with R sided sciatica, hx of depression and suicidal ideation     HEP: STS ( 5x, 2 sets daily) and walking program ( 5-6 min 1 or 2x a day)           Education  Encouraged decreased use of w/c  Continue to ambulate with least restrictive device or using the push cart at stores, as previously  Neuro Re-Ed         Overground Ambulation  Forwards solostep  50' x 2 100' x 2 solostep 400 ft CGA HHA SOLO step 50' x 3 CGA      Side stepping  30' x 3 50' x 2 20ft x 6 CGA HHA SOLO 20ft x 6 CGA     Backwards ambulation  25' x 3  50 ft x 4 CGA HHA 20ft x 4 CGA     Agility ladder  forwards x  3 laps  * improved technique with repetition       kickball  solostep  Ball passing to therapist  + directional changes, improved with repetition       TM Training    4 -  5 mph   4 min  5 min nv         Negotiating obstacles      * 1 min standing break  Fwd 6x CGA HHA    Unable to perform cones sideways due to hip weakness      Ther Ex         SCIFIT 10 min 15 min 10 min L3 10 min L2     NV assess low back  No LBO today        Step ups  Solostep  5 x 2 each with contact guar 10x on ea LE 4" 10x ea LE 4"     Standing Hip exercises   Hip flexion 10x ea LE  NV introduce abduction and ext 10x ea hip flex abd ext

## 2022-07-21 ENCOUNTER — OFFICE VISIT (OUTPATIENT)
Dept: PHYSICAL THERAPY | Facility: REHABILITATION | Age: 72
End: 2022-07-21
Payer: MEDICARE

## 2022-07-21 DIAGNOSIS — I69.398 GAIT DISTURBANCE, POST-STROKE: Primary | ICD-10-CM

## 2022-07-21 DIAGNOSIS — R26.9 GAIT DISTURBANCE, POST-STROKE: Primary | ICD-10-CM

## 2022-07-21 DIAGNOSIS — R26.2 IMPAIRED AMBULATION: ICD-10-CM

## 2022-07-21 DIAGNOSIS — R26.89 LOSS OF BALANCE: ICD-10-CM

## 2022-07-21 PROCEDURE — 97112 NEUROMUSCULAR REEDUCATION: CPT | Performed by: PHYSICAL THERAPIST

## 2022-07-21 PROCEDURE — 97110 THERAPEUTIC EXERCISES: CPT | Performed by: PHYSICAL THERAPIST

## 2022-07-21 NOTE — PROGRESS NOTES
Daily Note     Today's date: 2022  Patient name: Vlad Lynch  : 1950  MRN: [id number]  Referring provider: Ashley Keenan MD  Dx:   Encounter Diagnosis     ICD-10-CM    1  Gait disturbance, post-stroke  I69 398     R26 9    2  Loss of balance  R26 89    3  Impaired ambulation  R26 2                   Subjective: Patient reports feeling well today        Objective: See treatment diary below      Assessment: Tolerated treatment fair  Demonstrate poor balance reactions with lack of ankle/hip/or stepping strategy  Needs solo step harness to prevent falls  Demonstrates improved stability with SPC vs no AD, however is limited in use due to R shoulder pain  Patient demonstrated fatigue post treatment and would benefit from continued PT      Plan: Continue per plan of care  Precautions: Indonesian Speaking,Chronic CVA, high fall risk, HTN, Diabetes mellitus type 2, seizures, osteoporosis, palpitations, chronic LBP with R sided sciatica, hx of depression and suicidal ideation     HEP: STS ( 5x, 2 sets daily) and walking program ( 5-6 min 1 or 2x a day)          Education  Encouraged decreased use of w/c  Continue to ambulate with least restrictive device or using the push cart at stores, as previously  Neuro Re-Ed         Overground Ambulation  Forwards solostep  50' x 2 100' x 2 solostep 400 ft CGA HHA SOLO step 50' x 3 CGA  SOLO no AD 240ft x4    With SPC 200ft    Side stepping  30' x 3 50' x 2 20ft x 6 CGA HHA SOLO 20ft x 6 CGA SOLO 20ft x 4 CGA    Step ups     10x ea LE 4" SOLO, HHA    Backwards ambulation  25' x 3  50 ft x 4 CGA HHA 20ft x 4 CGA 20ft x2 HHA SOLO    Agility ladder  forwards x  3 laps  * improved technique with repetition       kickball  solostep  Ball passing to therapist  + directional changes, improved with repetition       TM Training    4 -  5 mph   4 min  5 min nv         Negotiating obstacles      * 1 min standing break  Fwd 6x CGA HHA    Unable to perform cones sideways due to hip weakness      Ther Ex         SCIFIT 10 min 15 min 10 min L3 10 min L2 10min L3    NV assess low back  No LBO today        Step ups  Solostep  5 x 2 each with contact guar 10x on ea LE 4" 10x ea LE 4"     Standing Hip exercises   Hip flexion 10x ea LE  NV introduce abduction and ext 10x ea hip flex abd ext     STS     x10 BL UEs    x5 No UE's

## 2022-07-26 ENCOUNTER — OFFICE VISIT (OUTPATIENT)
Dept: PHYSICAL THERAPY | Facility: REHABILITATION | Age: 72
End: 2022-07-26
Payer: MEDICARE

## 2022-07-26 DIAGNOSIS — I69.398 GAIT DISTURBANCE, POST-STROKE: Primary | ICD-10-CM

## 2022-07-26 DIAGNOSIS — R26.2 IMPAIRED AMBULATION: ICD-10-CM

## 2022-07-26 DIAGNOSIS — R26.9 GAIT DISTURBANCE, POST-STROKE: Primary | ICD-10-CM

## 2022-07-26 DIAGNOSIS — R26.89 LOSS OF BALANCE: ICD-10-CM

## 2022-07-26 PROCEDURE — 97110 THERAPEUTIC EXERCISES: CPT | Performed by: PHYSICAL THERAPIST

## 2022-07-26 PROCEDURE — 97112 NEUROMUSCULAR REEDUCATION: CPT | Performed by: PHYSICAL THERAPIST

## 2022-07-26 NOTE — PROGRESS NOTES
Daily Note     Today's date: 2022  Patient name: Bret Graff  : 1950  MRN: [id number]  Referring provider: Barbaraann Gaucher, MD  Dx:   Encounter Diagnosis     ICD-10-CM    1  Gait disturbance, post-stroke  I69 398     R26 9    2  Loss of balance  R26 89    3  Impaired ambulation  R26 2                   Subjective: Reports feeling well today  Objective: See treatment diary below      Assessment: Tolerated treatment well  Continues to demonstrate poor balance reactions with lack of ankle strategy, hip strategy or stepping response  Difficulty with properly modulating her movement amplitude, sometimes with small strides, but often over striding when cueing to take bigger steps  Some inconsistency of balance through out  Pain reported in RUE when using Scifit, occasionally needing to take R hand off while peddling  Patient demonstrated fatigue post treatment and would benefit from continued PT      Plan: Continue per plan of care  Precautions: [place] Speaking,Chronic CVA, high fall risk, HTN, Diabetes mellitus type 2, seizures, osteoporosis, palpitations, chronic LBP with R sided sciatica, hx of depression and suicidal ideation     HEP: STS ( 5x, 2 sets daily) and walking program ( 5-6 min 1 or 2x a day)         Education  Encouraged decreased use of w/c  Continue to ambulate with least restrictive device or using the push cart at stores, as previously         Neuro Re-Ed         Overground Ambulation  Forwards solostep  50' x 2 100' x 2 solostep 400 ft CGA HHA SOLO step 50' x 3 CGA  SOLO no AD 240ft x4    With SPC 200ft SOLo no AD 250ft    SOLO SPC 200ft   Side stepping  30' x 3 50' x 2 20ft x 6 CGA HHA SOLO 20ft x 6 CGA SOLO 20ft x 4 CGA 20ft x 3 laps CG   Step ups     10x ea LE 4" SOLO, HHA 15x ea LE 4" SOLO, HHA   Backwards ambulation  25' x 3  50 ft x 4 CGA HHA 20ft x 4 CGA 20ft x2 HHA SOLO 20ft 3 laps HHA SOLO   Agility ladder  forwards x  3 laps  * improved technique with repetition       kickball  solostep  Ball passing to therapist  + directional changes, improved with repetition       TM Training    4 -  5 mph   4 min  5 min nv         Negotiating obstacles      * 1 min standing break  Fwd 6x CGA HHA    Unable to perform cones sideways due to hip weakness      Ther Ex         SCIFIT 10 min 15 min 10 min L3 10 min L2 10min L3 10min L3   NV assess low back  No LBO today        Step ups  Solostep  5 x 2 each with contact guar 10x on ea LE 4" 10x ea LE 4"     Standing Hip exercises   Hip flexion 10x ea LE  NV introduce abduction and ext 10x ea hip flex abd ext  10x ea hip flex abd ext   STS     x10 BL UEs    x5 No UE's    Heel/toe raises      x10 ea   Marches       2 laps in //bars

## 2022-07-29 ENCOUNTER — APPOINTMENT (OUTPATIENT)
Dept: PHYSICAL THERAPY | Facility: REHABILITATION | Age: 72
End: 2022-07-29
Payer: MEDICARE

## 2022-08-02 ENCOUNTER — OFFICE VISIT (OUTPATIENT)
Dept: PHYSICAL THERAPY | Facility: REHABILITATION | Age: 72
End: 2022-08-02
Payer: MEDICARE

## 2022-08-02 DIAGNOSIS — R26.89 LOSS OF BALANCE: ICD-10-CM

## 2022-08-02 DIAGNOSIS — F32.89 OTHER DEPRESSION: ICD-10-CM

## 2022-08-02 DIAGNOSIS — R26.9 GAIT DISTURBANCE, POST-STROKE: Primary | ICD-10-CM

## 2022-08-02 DIAGNOSIS — R26.2 IMPAIRED AMBULATION: ICD-10-CM

## 2022-08-02 DIAGNOSIS — I69.398 GAIT DISTURBANCE, POST-STROKE: Primary | ICD-10-CM

## 2022-08-02 PROCEDURE — 97110 THERAPEUTIC EXERCISES: CPT

## 2022-08-02 PROCEDURE — 97110 THERAPEUTIC EXERCISES: CPT | Performed by: PHYSICAL THERAPIST

## 2022-08-02 PROCEDURE — 97112 NEUROMUSCULAR REEDUCATION: CPT | Performed by: PHYSICAL THERAPIST

## 2022-08-02 PROCEDURE — 97112 NEUROMUSCULAR REEDUCATION: CPT

## 2022-08-02 NOTE — PROGRESS NOTES
Daily Note/Update    Today's date: 2022  Patient name: Estefanía Coats  : 1950  MRN: [id number]  Referring provider: Sudhakar Luo MD  Dx:   Encounter Diagnosis     ICD-10-CM    1  Gait disturbance, post-stroke  I69 398     R26 9    2  Loss of balance  R26 89    3  Impaired ambulation  R26 2        Start Time: 1130  Stop Time: 1225  Total time in clinic (min): 55 minutes    Subjective: Reports feeling well today  Reports slid to the floor today  No injury reported, no reports of pain    Objective: See treatment diary below    Balance Test Initial Eval    5x Sit to Stand: 58 78s w BUEs from 21" chair  43 seconds 2 UE push off 17 5" chair   TUs  w rollator CGA-Marina for rollator mgmt and cues 36 sec rollator no assistance  28 seconds without assistive device   Gait Speed:      2 Minute Walk Test: 204ft w rollator and occ min A with rollator mgmt    6 Minute Walk Test: 544ft w rollator and occ min A with rollator mgmt    2, 10-30s standing rest breaks 620 ' with rollator        Assessment: Alexander Pretty was referred to Out-patient Physical Therapy due to gait disturbance  She is making progress with functional mobility during Tug assessment is no longer using assistive device  Her endurance is showing improvement, able to ambulate approximately 80' longer, and her 5x sit to stand is demonstrating improving muscle strength by lowering time by 15 seconds  She is now walking on the treadmill with improving pattern  She will continue to benefit from skilled PT intervention to maximize functional mobility  Goals  STG  Pt will be independent in HEP  Pt will report improved balance with ambulation and no falls when ambulating at home with rollator  ongoing  Pt will improve TUG by 2 9s ([facility] [employer] [address] for chronic CVA population) for improved balance  MET    LTG  Pt will perform the TUG 6s faster and with CGA by therapist to demonstrate decreased fall risk and improved independence with mobility   ongoing  Pt will improve 6MWT by 164ft to demonstrate improved aerobic endurance  ongoing  Pt will be able to perform the 5XSTS in no more than 15 5 sec ( age matched norm) to demonstrate improved LE strength  ongoing  Pt will be able to demonstrate and report  improved use of the RUE/RLE during MMT and functional mobility  ongoing      Plan  Plan details: Initiate POC  Patient would benefit from: skilled physical therapy  Planned therapy interventions: therapeutic exercise, therapeutic activities, strengthening, stretching, patient education, neuromuscular re-education, activity modification, balance/weight bearing training, gait training and home exercise program  Frequency: 2x week  Duration in weeks: 4  Plan of Care beginning date: 6/29/2022  Plan of Care expiration date: 9/7/2022  Treatment plan discussed with: patient and caregiver       Precautions: Citizen of the [place] Speaking,Chronic CVA, high fall risk, HTN, Diabetes mellitus type 2, seizures, osteoporosis, palpitations, chronic LBP with R sided sciatica, hx of depression and suicidal ideation     HEP: STS ( 5x, 2 sets daily) and walking program ( 5-6 min 1 or 2x a day)      7/05 7/12 7/14 7/19 7/21 7/26 8/02   Education  Encouraged decreased use of w/c  Continue to ambulate with least restrictive device or using the push cart at stores, as previously  Neuro Re-Ed          Overground Ambulation  Forwards solostep  50' x 2 100' x 2 solostep 400 ft CGA HHA SOLO step 50' x 3 CGA  SOLO no AD 240ft x4    With SPC 200ft SOLo no AD 250ft    SOLO SPC 200ft Solo no assistive device 200'  Solo treadmill     6mph 2 min    9 mph 4 minutes     Side stepping  30' x 3 50' x 2 20ft x 6 CGA HHA SOLO 20ft x 6 CGA SOLO 20ft x 4 CGA 20ft x 3 laps CG  20' x 3   Step ups     10x ea LE 4" SOLO, HHA 15x ea LE 4" SOLO, HHA    Backwards ambulation  25' x 3  50 ft x 4 CGA HHA 20ft x 4 CGA 20ft x2 HHA SOLO 20ft 3 laps HHA SOLO 20' x 3 solostep   Agility ladder  forwards x  3 laps  * improved technique with repetition        kickball  solostep  Ball passing to therapist  + directional changes, improved with repetition        TM Training    4 -  5 mph   4 min  5 min nv          Negotiating obstacles      * 1 min standing break  Fwd 6x CGA HHA    Unable to perform cones sideways due to hip weakness       Ther Ex          SCIFIT 10 min 15 min 10 min L3 10 min L2 10min L3 10min L3    NV assess low back  No LBO today         Step ups  Solostep  5 x 2 each with contact guar 10x on ea LE 4" 10x ea LE 4"      Standing Hip exercises   Hip flexion 10x ea LE  NV introduce abduction and ext 10x ea hip flex abd ext  10x ea hip flex abd ext    STS     x10 BL UEs    x5 No UE's     Heel/toe raises      x10 ea    Marches       2 laps in //bars

## 2022-08-03 ENCOUNTER — TRANSCRIBE ORDERS (OUTPATIENT)
Dept: GASTROENTEROLOGY | Facility: CLINIC | Age: 72
End: 2022-08-03

## 2022-08-03 RX ORDER — CITALOPRAM 40 MG/1
40 TABLET ORAL DAILY
Qty: 90 TABLET | Refills: 1 | Status: SHIPPED | OUTPATIENT
Start: 2022-08-03

## 2022-08-04 ENCOUNTER — OFFICE VISIT (OUTPATIENT)
Dept: PHYSICAL THERAPY | Facility: REHABILITATION | Age: 72
End: 2022-08-04
Payer: MEDICARE

## 2022-08-04 ENCOUNTER — TELEPHONE (OUTPATIENT)
Dept: NEUROLOGY | Facility: CLINIC | Age: 72
End: 2022-08-04

## 2022-08-04 DIAGNOSIS — R26.2 IMPAIRED AMBULATION: ICD-10-CM

## 2022-08-04 DIAGNOSIS — I69.398 GAIT DISTURBANCE, POST-STROKE: Primary | ICD-10-CM

## 2022-08-04 DIAGNOSIS — R26.9 GAIT DISTURBANCE, POST-STROKE: Primary | ICD-10-CM

## 2022-08-04 DIAGNOSIS — R26.89 LOSS OF BALANCE: ICD-10-CM

## 2022-08-04 PROCEDURE — 97112 NEUROMUSCULAR REEDUCATION: CPT | Performed by: PHYSICAL THERAPIST

## 2022-08-04 PROCEDURE — 97110 THERAPEUTIC EXERCISES: CPT | Performed by: PHYSICAL THERAPIST

## 2022-08-04 NOTE — PROGRESS NOTES
Daily Note    Today's date: 2022  Patient name: Shari Lynch  : 1950  MRN: [id number]  Referring provider: Juline Phalen, MD  Dx:   Encounter Diagnosis     ICD-10-CM    1  Gait disturbance, post-stroke  I69 398     R26 9    2  Loss of balance  R26 89    3  Impaired ambulation  R26 2                   Subjective: Reports feeling well today  Objective: See treatment diary below        Assessment: Tolerated treatment well  Able to perform addition of 4 square stepping today with instability and occasional min assist to recover balance in SOLO  Continues with poor balance reactions, occasionally pitching forward/backwards possibly more from functional balance disorder than external force  Highly fatigued by end of visit and reporting some b/l shoulder pain with using treadmill  Seated rest needed through out  Plan  Plan details: Initiate POC  Patient would benefit from: skilled physical therapy  Planned therapy interventions: therapeutic exercise, therapeutic activities, strengthening, stretching, patient education, neuromuscular re-education, activity modification, balance/weight bearing training, gait training and home exercise program  Frequency: 2x week  Duration in weeks: 4  Plan of Care beginning date: 2022  Plan of Care expiration date: 2022  Treatment plan discussed with: patient and caregiver       Precautions: [place] Speaking,Chronic CVA, high fall risk, HTN, Diabetes mellitus type 2, seizures, osteoporosis, palpitations, chronic LBP with R sided sciatica, hx of depression and suicidal ideation     HEP: STS ( 5x, 2 sets daily) and walking program ( 5-6 min 1 or 2x a day)         Education Encouraged decreased use of w/c  Continue to ambulate with least restrictive device or using the push cart at stores, as previously           Neuro Re-Ed          Overground Ambulation  100' x 2 solostep 400 ft CGA HHA SOLO step 50' x 3 CGA  SOLO no AD 240ft x4    With SPC 200ft SOLo no AD 250ft    SOLO SPC 200ft Solo no assistive device 200'  Solo treadmill     6mph 2 min    9 mph 4 minutes  Solo no assistive device, '    Solo treadmill  0 8-0  9mph  2 5min x2   Side stepping  50' x 2 20ft x 6 CGA HHA SOLO 20ft x 6 CGA SOLO 20ft x 4 CGA 20ft x 3 laps CG  20' x 3 20ft lap x2   Step ups    10x ea LE 4" SOLO, HHA 15x ea LE 4" SOLO, HHA  10x  total 4" SOLO, HHA   Backwards ambulation   50 ft x 4 CGA HHA 20ft x 4 CGA 20ft x2 HHA SOLO 20ft 3 laps HHA SOLO 20' x 3 solostep    Agility ladder forwards x  3 laps  * improved technique with repetition         kickball solostep  Ball passing to therapist  + directional changes, improved with repetition         TM Training  nv           Negotiating obstacles       Fwd 6x CGA HHA    Unable to perform cones sideways due to hip weakness        4 square stepping        15x SOLO, CG   Ther Ex          SCIFIT 15 min 10 min L3 10 min L2 10min L3 10min L3  10min L3   NV assess low back           Step ups Solostep  5 x 2 each with contact guar 10x on ea LE 4" 10x ea LE 4"       Standing Hip exercises  Hip flexion 10x ea LE  NV introduce abduction and ext 10x ea hip flex abd ext  10x ea hip flex abd ext     STS    x10 BL UEs    x5 No UE's      Heel/toe raises     x10 ea     Marches      2 laps in //bars

## 2022-08-04 NOTE — TELEPHONE ENCOUNTER
Called pt to inform her of the need to r/s her appt as the provider will not be in the offfice on this date  I was unable to [facility] as the voicemail box was full and not able to accept messages at this time

## 2022-08-08 ENCOUNTER — TELEPHONE (OUTPATIENT)
Dept: NEUROLOGY | Facility: CLINIC | Age: 72
End: 2022-08-08

## 2022-08-08 NOTE — TELEPHONE ENCOUNTER
Called pt and was not able to leave message as voicemail is full at this time  Call was being made to inform the patient of a r/s that we need to make as the provider will not be in the office

## 2022-08-09 ENCOUNTER — OFFICE VISIT (OUTPATIENT)
Dept: PHYSICAL THERAPY | Facility: REHABILITATION | Age: 72
End: 2022-08-09
Payer: MEDICARE

## 2022-08-09 DIAGNOSIS — R26.2 IMPAIRED AMBULATION: ICD-10-CM

## 2022-08-09 DIAGNOSIS — R26.89 LOSS OF BALANCE: ICD-10-CM

## 2022-08-09 DIAGNOSIS — I69.398 GAIT DISTURBANCE, POST-STROKE: Primary | ICD-10-CM

## 2022-08-09 DIAGNOSIS — R26.9 GAIT DISTURBANCE, POST-STROKE: Primary | ICD-10-CM

## 2022-08-09 PROCEDURE — 97110 THERAPEUTIC EXERCISES: CPT | Performed by: PHYSICAL THERAPIST

## 2022-08-09 PROCEDURE — 97112 NEUROMUSCULAR REEDUCATION: CPT | Performed by: PHYSICAL THERAPIST

## 2022-08-09 NOTE — PROGRESS NOTES
Progress Update    Today's date: 2022  Patient name: Debbie Malhotra  : 1950  MRN: [id number]  Referring provider: Ela Enriquez MD  Dx:   Encounter Diagnosis     ICD-10-CM    1  Gait disturbance, post-stroke  I69 398     R26 9    2  Loss of balance  R26 89    3  Impaired ambulation  R26 2                   Subjective: Reports feeling well today  No updates from caregiver  Continues to utilize RW for walking  Objective: See treatment diary below      Balance Test Initial Eval  8   5x Sit to Stand: 58 78s w BUEs from 21" chair  43 seconds 2 UE push off 17 5" chair 37 3s BL UE from 21"    TUs  w rollator CGA-Marina for rollator mgmt and cues 36 sec rollator no assistance  28 seconds without assistive device 27s w/o AD (in solo)   Gait Speed:         2 Minute Walk Test: 204ft w rollator and occ min A with rollator mgmt      6 Minute Walk Test: 544ft w rollator and occ min A with rollator mgmt     2, 10-30s standing rest breaks 620 ' with rollator  600' with rollator       Gait: inconsistent stride length  Occasional festination resulting in anterior loss of balance  Goals  STG  Pt will be independent in HEP  Pt will report improved balance with ambulation and no falls when ambulating at home with rollator  MET  Pt will improve TUG by 2 9s ([facility] [address] for chronic CVA population) for improved balance  MET    LTG  Pt will perform the TUG 6s faster and with CGA by therapist to demonstrate decreased fall risk and improved independence with mobility  - MET  Pt will improve 6MWT by 164ft to demonstrate improved aerobic endurance  ongoing  Pt will be able to perform the 5XSTS in no more than 15 5 sec ( age matched norm) to demonstrate improved LE strength  ongoing  Pt will be able to demonstrate and report  improved use of the RUE/RLE during MMT and functional mobility  ongoing    Assessment: Progress update performed today   Minimal number of visits have passed since last progress update, but despite this continues to make gradual improvements  Improved functional LE strength per 5xSTS  Mobility and fall risk improved as well per TUG  Limitations in functional walking endurance persist due to limited speed and pain in b/l shoulder with weight bearing through UE's  Continues to demonstrate poor balance reactions making correcting for LoB difficult w/o assistance from therapist  She continues to have considerable limitations in her house hold and community mobility and has to rely on caregivers for safety with her mobility and performing ADLs  This is due to impaired balance, LE weakness, abnormal gait, and generalized deconditioning  She would benefit from continued skilled PT to address these deficits and continue to maximize function  Plan  Plan details: Initiate POC  Patient would benefit from: skilled physical therapy  Planned therapy interventions: therapeutic exercise, therapeutic activities, strengthening, stretching, patient education, neuromuscular re-education, activity modification, balance/weight bearing training, gait training and home exercise program  Frequency: 2x week  Duration in weeks: 4  Plan of Care beginning date: 6/29/2022  Plan of Care expiration date: 9/7/2022  Treatment plan discussed with: patient and caregiver       Precautions: [place] Speaking,Chronic CVA, high fall risk, HTN, Diabetes mellitus type 2, seizures, osteoporosis, palpitations, chronic LBP with R sided sciatica, hx of depression and suicidal ideation     HEP: STS ( 5x, 2 sets daily) and walking program ( 5-6 min 1 or 2x a day)      7/19 7/21 7/26 8/02 8/4 8/9   Education         Neuro Re-Ed         Overground Ambulation  SOLO step 50' x 3 CGA  SOLO no AD 240ft x4    With SPC 200ft SOLo no AD 250ft    SOLO SPC 200ft Solo no assistive device 200'  Solo treadmill     6mph 2 min    9 mph 4 minutes  Solo no assistive device, '    Solo treadmill  0 8-0  9mph  2 5min x2 Solo no assistive device, ' Side stepping  SOLO 20ft x 6 CGA SOLO 20ft x 4 CGA 20ft x 3 laps CG  20' x 3 20ft lap x2 20ft lap x2   Step ups  10x ea LE 4" SOLO, HHA 15x ea LE 4" SOLO, HHA  10x  total 4" SOLO, HHA 14x  total 4" SOLO, HHA   Backwards ambulation  20ft x 4 CGA 20ft x2 HHA SOLO 20ft 3 laps HHA SOLO 20' x 3 solostep                       TM Training            Negotiating obstacles              4 square stepping      15x SOLO, CG 10x SOLO, HHA   Ther Ex         SCIFIT 10 min L2 10min L3 10min L3 10min L3 10min L3 10min L3   Mini squat      x10 //bars   Step ups 10x ea LE 4"        Standing Hip exercises 10x ea hip flex abd ext  10x ea hip flex abd ext   2x10 abd   STS  x10 BL UEs    x5 No UE's    x10 BL UE   Heel/toe raises   x10 ea   2x10    Marches    2 laps in //bars   2 laps in //bars

## 2022-08-10 ENCOUNTER — TELEPHONE (OUTPATIENT)
Dept: NEUROLOGY | Facility: CLINIC | Age: 72
End: 2022-08-10

## 2022-08-10 NOTE — TELEPHONE ENCOUNTER
Pt came into the [facility] office for appt with Dr Fabby Woo  Informed pt that we have been trying to reach them to r/s this appointment due to provider being out of the office  Pt agreed to r/s the appointment and asked If there is anything closer to them since this is so far away  Informed pt that Dr Fabby Woo only goes to [place]  Pt agreed to be scheduled in [place]  Per terese toney to schedule on 10/26  Offered pt appt on 10/26 at 9 am in [place]  Pt accepted and is now scheduled

## 2022-08-11 ENCOUNTER — PATIENT OUTREACH (OUTPATIENT)
Dept: FAMILY MEDICINE CLINIC | Facility: CLINIC | Age: 72
End: 2022-08-11

## 2022-08-11 ENCOUNTER — OFFICE VISIT (OUTPATIENT)
Dept: FAMILY MEDICINE CLINIC | Facility: CLINIC | Age: 72
End: 2022-08-11

## 2022-08-11 ENCOUNTER — APPOINTMENT (OUTPATIENT)
Dept: PHYSICAL THERAPY | Facility: REHABILITATION | Age: 72
End: 2022-08-11
Payer: MEDICARE

## 2022-08-11 VITALS
BODY MASS INDEX: 31.31 KG/M2 | TEMPERATURE: 97.9 F | OXYGEN SATURATION: 95 % | WEIGHT: 145.1 LBS | HEIGHT: 57 IN | DIASTOLIC BLOOD PRESSURE: 76 MMHG | SYSTOLIC BLOOD PRESSURE: 132 MMHG | RESPIRATION RATE: 16 BRPM | HEART RATE: 68 BPM

## 2022-08-11 DIAGNOSIS — M25.50 ARTHRALGIA, UNSPECIFIED JOINT: Primary | ICD-10-CM

## 2022-08-11 DIAGNOSIS — G62.9 NEUROPATHY: ICD-10-CM

## 2022-08-11 PROCEDURE — 99214 OFFICE O/P EST MOD 30 MIN: CPT | Performed by: FAMILY MEDICINE

## 2022-08-11 RX ORDER — GABAPENTIN 100 MG/1
100 CAPSULE ORAL 2 TIMES DAILY
Qty: 180 CAPSULE | Refills: 0 | Status: SHIPPED | OUTPATIENT
Start: 2022-08-11

## 2022-08-11 NOTE — PROGRESS NOTES
CRISTI ARTHUR received In-person consult from Provider, Dr Georgia Felder regarding patient is asking for additional Waiver hours as patient report she is only receiving 32 hours weekly and she needs 45 hours  Pt is also asking for the status of the chair lift  CRISTI ARTHUR met with patient and pt's caregiver Guero Enamorado from [place]  Introduced self/role in [address] [employer] states she is only working 32 hours and she is not aware if patient has 45 hours  CRISTI ARTHUR informs patient per chart review, her son Merissa Coughlin reported to CRISTI [place] I, pt was approved for 45 hours  Pt continues stating she is not getting 45 hours and she needs 45 hours  CRISTI ARTHUR verbalized understanding and informs patient we need to contact her [place] to inquire how many hours she currently has  Informs CRISTI ARTHUR also need to check on the status the chair lift  Cristela Kong states someone went to the house to takes measurement but they haven't get any updates  CRISTI ARTHUR verbalized understanding and will follow-up with CRISTI Dodgeyl Lizy who has been following up regarding the same  Pt verbalized understanding  CRISTI ARTHUR informs Geryl Rape and she agree to follow-up

## 2022-08-11 NOTE — PROGRESS NOTES
Assessment/Plan:    No problem-specific Assessment & Plan notes found for this encounter  Diagnoses and all orders for this visit:    Arthralgia, unspecified joint  -     ADRIENNE Scr, IFA, W/Refl Titer/Pattern/Rheumatoid Arthritis Panel 1; Future    Neuropathy  -     EMG 2 Limb Upper Extremity; Future  -     gabapentin (Neurontin) 100 mg capsule; Take 1 capsule (100 mg total) by mouth 2 (two) times a day        Please have BW done as requested at last visit  Patient and her caregiver are requesting more caregiver hours  Will have social work discuss with patient   Subjective:      Patient ID: Leonila Nicole is a 80 y o  female  81 yo female with DM, HTN, s/p CVA, chronic LBP complains of falls frequently  States has difficulty going up the steps and has fallen several times at home when trying to go up the steps as well as trips frequently when walking on a flat surface  No LOC, feels her legs are weak and right arm is also weak  Patient currently complains of increasing memory complains, states she feels she is forgetting more than before  Patient has an appointment with Neurology, however as per chart when she got to the appointment she was informed that the Neurology office had been trying to reach her to reschedule her because the neurologist was unavailable  Patient is very upset because now her appointment is in another 3 months     Patient complains of R shoulder pain, radiated to neck and dwon her arm, worse with movement and when lying on her R side       The following portions of the patient's history were reviewed and updated as appropriate:   She  has a past medical history of Abdominal pain, Anxiety, Arthritis, Bowel incontinence, Chest pain, Constipation, CVA (cerebral vascular accident) ([address] ), Diabetes mellitus type II, controlled ([address] ), Disease of thyroid gland, Epigastric pain, Falls, High cholesterol, Hyperlipidemia, Hypertension, Migraine, Palpitations, Recurrent urinary tract infection, Seizures ([address] ), Sleep disorder, Stroke [facility]), and Thyroid disease  She   Patient Active Problem List    Diagnosis Date Noted    Class 1 obesity due to excess calories without serious comorbidity with body mass index (BMI) of 30 0 to 30 9 in adult 2020    Sciatica of right side 2020    Hip strain, right, initial encounter 2019    Acute bacterial conjunctivitis of left eye 2019    Breast pain, right 2019    Elevated BP without diagnosis of hypertension 2019    Contusion of right breast 2019    Epigastric abdominal pain 2019    NSAID long-term use 2019    Left upper quadrant pain 2019    Diabetes mellitus type II, controlled ([facility] ) 10/12/2018    Arthritis 10/12/2018    Primary insomnia 2018    Urinary, incontinence, stress female 2018    Urge and stress incontinence 10/26/2017    Osteoporosis 09/15/2017    Adhesive capsulitis of left shoulder 2017    Constipation 05/10/2017    Cervical radiculitis 2017    Elevated TSH 2017    Osteoarthritis of both hands 2017    Memory difficulties 2016    Asymptomatic bilateral carotid artery stenosis 2016    Gastroesophageal reflux disease 2016    Hypertension 2016    Collapsed vertebra, not elsewhere classified, sacral and sacrococcygeal region, initial encounter for fracture ([facility] ) 2016    HLD (hyperlipidemia) 2016    History of CVA with residual deficit 2016    Depression 2016    Headache 2016    Dizziness 2016    Fall 2016    Subdural hemorrhage following injury ([facility] ) 2016    Subarachnoid hemorrhage following injury ([facility] ) 2016    Hyperlipidemia 10/19/2013    Muscle weakness 10/19/2013     She  has a past surgical history that includes Knee surgery; Other surgical history;  section;  Axillary Surgery; pr esophagogastroduodenoscopy transoral diagnostic (N/A, 2017); pr sling oper stres incontinence (N/A, 1/18/2018); CYSTOSCOPY (N/A, 1/18/2018); Bladder surgery; Facial bone tumor excision; and Tubal ligation  Her family history includes Diabetes in her family; Hypertension in her family; No Known Problems in her daughter, father, maternal grandfather, maternal grandmother, mother, paternal grandfather, paternal grandmother, and sister; Stroke in her family; Thyroid disease in her family  She  reports that she has never smoked  She has never used smokeless tobacco  She reports that she does not drink alcohol and does not use drugs    Current Outpatient Medications   Medication Sig Dispense Refill    gabapentin (Neurontin) 100 mg capsule Take 1 capsule (100 mg total) by mouth 2 (two) times a day 180 capsule 0    albuterol (PROVENTIL HFA,VENTOLIN HFA) 90 mcg/act inhaler       aspirin (ECOTRIN LOW STRENGTH) 81 mg EC tablet Take 81 mg by mouth daily       atenolol (TENORMIN) 25 mg tablet Take 1 tablet (25 mg total) by mouth daily 90 tablet 1    atorvastatin (LIPITOR) 40 mg tablet Take 1 tablet (40 mg total) by mouth daily 90 tablet 1    benzonatate (TESSALON PERLES) 100 mg capsule Take 1 capsule (100 mg total) by mouth 3 (three) times a day as needed for cough 20 capsule 0    bisacodyl (DULCOLAX) 5 mg EC tablet Take 4 tablets (20 mg total) by mouth once for 1 dose Colonoscopy prep 4 tablet 0    Cholecalciferol (VITAMIN D-3) 5000 units TABS Take 1 tablet by mouth daily (Patient not taking: Reported on 8/5/2021) 90 tablet 1    citalopram (CeleXA) 40 mg tablet Take 1 tablet (40 mg total) by mouth daily 90 tablet 1    conjugated estrogens (PREMARIN) vaginal cream Insert 0 5 g into the vagina 3 (three) times a week (Patient not taking: Reported on 8/5/2021) 42 5 g 0    cyclobenzaprine (FLEXERIL) 5 mg tablet Take 1 tablet (5 mg total) by mouth 3 (three) times a day as needed for muscle spasms 90 tablet 0    diclofenac sodium (VOLTAREN) 50 mg EC tablet Take 1 tablet (50 mg total) by mouth 2 (two) times a day (Patient not taking: Reported on 8/20/2021) 60 tablet 0    fluticasone (FLONASE) 50 mcg/act nasal spray 1 spray into each nostril daily 16 g 0    Incontinence Supply Disposable (Brief Overnight Large) MISC Use 4 (four) times a day 160 each 11    Incontinence Supply Disposable (Underpads) MISC Use in the morning Please dispense washable underpads 4 each 11    Infant Care Products (Comfort-Smooth Baby Wipes) MISC Use 4 (four) times a day 240 each 11    meclizine (ANTIVERT) 12 5 MG tablet  (Patient not taking: Reported on 8/5/2021)      Melatonin 5 MG TABS Take 1 tablet (5 mg total) by mouth daily at bedtime (Patient not taking: Reported on 5/29/2020) 90 tablet 0    meloxicam (MOBIC) 7 5 mg tablet Take 1 tablet (7 5 mg total) by mouth daily (Patient not taking: Reported on 5/29/2020) 90 tablet 0    neomycin-polymyxin-dexamethasone (MAXITROL) ophthalmic suspension Administer 1 drop to the right eye 4 (four) times a day for 7 days 5 mL 0    omeprazole (PriLOSEC) 20 mg delayed release capsule Take 1 capsule (20 mg total) by mouth daily 30 capsule 2    polyethylene glycol (GLYCOLAX) powder Take 17 g by mouth daily (Patient not taking: Reported on 8/5/2021) 850 g 1    polyethylene glycol (GOLYTELY) 4000 mL solution Take 4,000 mL by mouth once for 1 dose Colonoscopy prep 4000 mL 0    polyethylene glycol (MIRALAX) 17 g packet Take 17 g by mouth daily 510 g 5     No current facility-administered medications for this visit       Current Outpatient Medications on File Prior to Visit   Medication Sig    albuterol (PROVENTIL HFA,VENTOLIN HFA) 90 mcg/act inhaler     aspirin (ECOTRIN LOW STRENGTH) 81 mg EC tablet Take 81 mg by mouth daily     atenolol (TENORMIN) 25 mg tablet Take 1 tablet (25 mg total) by mouth daily    atorvastatin (LIPITOR) 40 mg tablet Take 1 tablet (40 mg total) by mouth daily    benzonatate (TESSALON PERLES) 100 mg capsule Take 1 capsule (100 mg total) by mouth 3 (three) times a day as needed for cough    bisacodyl (DULCOLAX) 5 mg EC tablet Take 4 tablets (20 mg total) by mouth once for 1 dose Colonoscopy prep    Cholecalciferol (VITAMIN D-3) 5000 units TABS Take 1 tablet by mouth daily (Patient not taking: Reported on 8/5/2021)    citalopram (CeleXA) 40 mg tablet Take 1 tablet (40 mg total) by mouth daily    conjugated estrogens (PREMARIN) vaginal cream Insert 0 5 g into the vagina 3 (three) times a week (Patient not taking: Reported on 8/5/2021)    cyclobenzaprine (FLEXERIL) 5 mg tablet Take 1 tablet (5 mg total) by mouth 3 (three) times a day as needed for muscle spasms    diclofenac sodium (VOLTAREN) 50 mg EC tablet Take 1 tablet (50 mg total) by mouth 2 (two) times a day (Patient not taking: Reported on 8/20/2021)    fluticasone (FLONASE) 50 mcg/act nasal spray 1 spray into each nostril daily    Incontinence Supply Disposable (Brief Overnight Large) MISC Use 4 (four) times a day    Incontinence Supply Disposable (Underpads) MISC Use in the morning Please dispense washable underpads    Infant Care Products (Comfort-Smooth Baby Wipes) MISC Use 4 (four) times a day    meclizine (ANTIVERT) 12 5 MG tablet  (Patient not taking: Reported on 8/5/2021)    Melatonin 5 MG TABS Take 1 tablet (5 mg total) by mouth daily at bedtime (Patient not taking: Reported on 5/29/2020)    meloxicam (MOBIC) 7 5 mg tablet Take 1 tablet (7 5 mg total) by mouth daily (Patient not taking: Reported on 5/29/2020)    neomycin-polymyxin-dexamethasone (MAXITROL) ophthalmic suspension Administer 1 drop to the right eye 4 (four) times a day for 7 days    omeprazole (PriLOSEC) 20 mg delayed release capsule Take 1 capsule (20 mg total) by mouth daily    polyethylene glycol (GLYCOLAX) powder Take 17 g by mouth daily (Patient not taking: Reported on 8/5/2021)    polyethylene glycol (GOLYTELY) 4000 mL solution Take 4,000 mL by mouth once for 1 dose Colonoscopy prep    polyethylene glycol (MIRALAX) 17 g packet Take 17 g by mouth daily    [DISCONTINUED] lidocaine (XYLOCAINE) 5 % ointment Apply topically 2 (two) times a day as needed for mild pain (Patient not taking: Reported on 8/5/2021)    [DISCONTINUED] LORazepam (ATIVAN) 0 5 mg tablet 1 tab PO 1 hour before MRI (Patient not taking: Reported on 9/17/2021)    [DISCONTINUED] methylPREDNISolone 4 MG tablet therapy pack Use as directed on package (Patient not taking: Reported on 5/29/2020)     No current facility-administered medications on file prior to visit       Review of Systems   Musculoskeletal: Positive for arthralgias, back pain and gait problem  Neurological: Positive for weakness  All other systems reviewed and are negative  Objective:      /76 (BP Location: Right arm, Patient Position: Sitting, Cuff Size: Standard)   Pulse 68   Temp 97 9 °F (36 6 °C) (Temporal)   Resp 16   Ht 4' 9" (1 448 m)   Wt 65 8 kg (145 lb 1 6 oz)   SpO2 95%   BMI 31 40 kg/m²          Physical Exam  Vitals and nursing note reviewed  Constitutional:       Appearance: She is well-developed  HENT:      Head: Normocephalic  Right Ear: External ear normal       Left Ear: External ear normal       Nose: Nose normal    Eyes:      Conjunctiva/sclera: Conjunctivae normal       Pupils: Pupils are equal, round, and reactive to light  Neck:      Thyroid: No thyromegaly  Cardiovascular:      Rate and Rhythm: Normal rate and regular rhythm  Heart sounds: Normal heart sounds  Pulmonary:      Effort: Pulmonary effort is normal       Breath sounds: Normal breath sounds  Abdominal:      Palpations: Abdomen is soft  Tenderness: There is no abdominal tenderness  There is no guarding or rebound  Musculoskeletal:         General: Tenderness present  Normal range of motion  Cervical back: Normal range of motion and neck supple  Spasms and tenderness present  Thoracic back: Tenderness present        Lumbar back: Spasms and tenderness present  Skin:     General: Skin is dry  Neurological:      Mental Status: She is alert and oriented to person, place, and time  Deep Tendon Reflexes: Reflexes are normal and symmetric  hair removal not indicated

## 2022-08-12 ENCOUNTER — PATIENT OUTREACH (OUTPATIENT)
Dept: FAMILY MEDICINE CLINIC | Facility: CLINIC | Age: 72
End: 2022-08-12

## 2022-08-12 ENCOUNTER — APPOINTMENT (OUTPATIENT)
Dept: LAB | Facility: CLINIC | Age: 72
End: 2022-08-12
Payer: MEDICARE

## 2022-08-12 DIAGNOSIS — E55.9 VITAMIN D DEFICIENCY: ICD-10-CM

## 2022-08-12 DIAGNOSIS — E11.9 CONTROLLED TYPE 2 DIABETES MELLITUS WITHOUT COMPLICATION, WITHOUT LONG-TERM CURRENT USE OF INSULIN (HCC): ICD-10-CM

## 2022-08-12 DIAGNOSIS — M25.50 ARTHRALGIA, UNSPECIFIED JOINT: ICD-10-CM

## 2022-08-12 DIAGNOSIS — I10 ESSENTIAL HYPERTENSION: ICD-10-CM

## 2022-08-12 LAB
25(OH)D3 SERPL-MCNC: 39.2 NG/ML (ref 30–100)
ALBUMIN SERPL BCP-MCNC: 3.8 G/DL (ref 3.5–5)
ALP SERPL-CCNC: 72 U/L (ref 46–116)
ALT SERPL W P-5'-P-CCNC: 42 U/L (ref 12–78)
ANION GAP SERPL CALCULATED.3IONS-SCNC: 4 MMOL/L (ref 4–13)
AST SERPL W P-5'-P-CCNC: 29 U/L (ref 5–45)
BASOPHILS # BLD AUTO: 0.04 THOUSANDS/ΜL (ref 0–0.1)
BASOPHILS NFR BLD AUTO: 1 % (ref 0–1)
BILIRUB SERPL-MCNC: 0.41 MG/DL (ref 0.2–1)
BUN SERPL-MCNC: 21 MG/DL (ref 5–25)
CALCIUM SERPL-MCNC: 9.2 MG/DL (ref 8.3–10.1)
CHLORIDE SERPL-SCNC: 109 MMOL/L (ref 96–108)
CHOLEST SERPL-MCNC: 202 MG/DL
CO2 SERPL-SCNC: 28 MMOL/L (ref 21–32)
CREAT SERPL-MCNC: 0.78 MG/DL (ref 0.6–1.3)
CREAT UR-MCNC: 155 MG/DL
EOSINOPHIL # BLD AUTO: 0.22 THOUSAND/ΜL (ref 0–0.61)
EOSINOPHIL NFR BLD AUTO: 5 % (ref 0–6)
ERYTHROCYTE [DISTWIDTH] IN BLOOD BY AUTOMATED COUNT: 13.1 % (ref 11.6–15.1)
GFR SERPL CREATININE-BSD FRML MDRD: 77 ML/MIN/1.73SQ M
GLUCOSE P FAST SERPL-MCNC: 100 MG/DL (ref 65–99)
HCT VFR BLD AUTO: 41.6 % (ref 34.8–46.1)
HDLC SERPL-MCNC: 41 MG/DL
HGB BLD-MCNC: 12.9 G/DL (ref 11.5–15.4)
IMM GRANULOCYTES # BLD AUTO: 0 THOUSAND/UL (ref 0–0.2)
IMM GRANULOCYTES NFR BLD AUTO: 0 % (ref 0–2)
LDLC SERPL CALC-MCNC: 122 MG/DL (ref 0–100)
LYMPHOCYTES # BLD AUTO: 1.76 THOUSANDS/ΜL (ref 0.6–4.47)
LYMPHOCYTES NFR BLD AUTO: 36 % (ref 14–44)
MCH RBC QN AUTO: 30 PG (ref 26.8–34.3)
MCHC RBC AUTO-ENTMCNC: 31 G/DL (ref 31.4–37.4)
MCV RBC AUTO: 97 FL (ref 82–98)
MICROALBUMIN UR-MCNC: 27.1 MG/L (ref 0–20)
MICROALBUMIN/CREAT 24H UR: 17 MG/G CREATININE (ref 0–30)
MONOCYTES # BLD AUTO: 0.44 THOUSAND/ΜL (ref 0.17–1.22)
MONOCYTES NFR BLD AUTO: 9 % (ref 4–12)
NEUTROPHILS # BLD AUTO: 2.44 THOUSANDS/ΜL (ref 1.85–7.62)
NEUTS SEG NFR BLD AUTO: 49 % (ref 43–75)
NONHDLC SERPL-MCNC: 161 MG/DL
NRBC BLD AUTO-RTO: 0 /100 WBCS
PLATELET # BLD AUTO: 242 THOUSANDS/UL (ref 149–390)
PMV BLD AUTO: 11.8 FL (ref 8.9–12.7)
POTASSIUM SERPL-SCNC: 4.1 MMOL/L (ref 3.5–5.3)
PROT SERPL-MCNC: 7.6 G/DL (ref 6.4–8.4)
RBC # BLD AUTO: 4.3 MILLION/UL (ref 3.81–5.12)
SODIUM SERPL-SCNC: 141 MMOL/L (ref 135–147)
TRIGL SERPL-MCNC: 197 MG/DL
TSH SERPL DL<=0.05 MIU/L-ACNC: 3.03 UIU/ML (ref 0.45–4.5)
WBC # BLD AUTO: 4.9 THOUSAND/UL (ref 4.31–10.16)

## 2022-08-12 PROCEDURE — 82043 UR ALBUMIN QUANTITATIVE: CPT

## 2022-08-12 PROCEDURE — 85025 COMPLETE CBC W/AUTO DIFF WBC: CPT

## 2022-08-12 PROCEDURE — 80061 LIPID PANEL: CPT

## 2022-08-12 PROCEDURE — 86038 ANTINUCLEAR ANTIBODIES: CPT

## 2022-08-12 PROCEDURE — 84443 ASSAY THYROID STIM HORMONE: CPT

## 2022-08-12 PROCEDURE — 80053 COMPREHEN METABOLIC PANEL: CPT

## 2022-08-12 PROCEDURE — 82306 VITAMIN D 25 HYDROXY: CPT

## 2022-08-12 PROCEDURE — 86431 RHEUMATOID FACTOR QUANT: CPT

## 2022-08-12 PROCEDURE — 82570 ASSAY OF URINE CREATININE: CPT

## 2022-08-12 PROCEDURE — 36415 COLL VENOUS BLD VENIPUNCTURE: CPT

## 2022-08-12 PROCEDURE — 86200 CCP ANTIBODY: CPT

## 2022-08-12 NOTE — PROGRESS NOTES
CRISTI ARTHUR received response from Arron Ashraf that the patient is only approved for 32 hours weekly  He can provide an increase of hours but an assessment with have to be completed  Arron Ashraf also indicated that he did place an Katelyn Dubois for a home modification but that he has not heard back much after  Arron Ashraf indicated that he will follow up  CRISTI ARTHUR will continue to remain available for psychosocial support as needed

## 2022-08-12 NOTE — PROGRESS NOTES
CRISTI ARTHUR had been consulted by CRISTI Ryder regarding the patient having office visit yesterday 8/11  Patient was present with the caregiver, CARYN who works only 32 hours  Patient wants 45 hours  Patient still does not have stair lift  Upon chart review it was noted that her S Cis Amira Coreas and can be reached at [email or web address]    CRISTI ARTHUR will follow case as previously worked with patient and her son regarding Waiver and similar concerns  CRISTI ARTHUR did place call to the [address] line to speak with  [facility]) directly  CRISTI ARTHUR did speak with the representative Maria Teresa Ludwig who confirmed Amira Coreas is still the [facility]  She attempted to transfer CRISTI ARTHUR to [facility] but he was unavailable at this time  Maria Teresa Ludwig will send him a message regarding the patient's concerns for 45 hours and a stair lift  CRISTI ARTHUR was provied his email address again as well which CRISTI ARTHUR confirmed was same as above  CRISTI ARTHUR did then send the [facility] an email detailing patient's concerns and CRISTI ARTHUR request to coordinate care  CRISTI ARTHUR will continue to remain available for psychosocial support as needed

## 2022-08-16 ENCOUNTER — OFFICE VISIT (OUTPATIENT)
Dept: PHYSICAL THERAPY | Facility: REHABILITATION | Age: 72
End: 2022-08-16
Payer: MEDICARE

## 2022-08-16 DIAGNOSIS — R26.9 GAIT DISTURBANCE, POST-STROKE: Primary | ICD-10-CM

## 2022-08-16 DIAGNOSIS — I69.398 GAIT DISTURBANCE, POST-STROKE: Primary | ICD-10-CM

## 2022-08-16 DIAGNOSIS — R26.89 LOSS OF BALANCE: ICD-10-CM

## 2022-08-16 DIAGNOSIS — R26.2 IMPAIRED AMBULATION: ICD-10-CM

## 2022-08-16 PROCEDURE — 97112 NEUROMUSCULAR REEDUCATION: CPT | Performed by: PHYSICAL THERAPIST

## 2022-08-16 PROCEDURE — 97110 THERAPEUTIC EXERCISES: CPT | Performed by: PHYSICAL THERAPIST

## 2022-08-16 NOTE — PROGRESS NOTES
Daily Note    Today's date: 2022  Patient name: Alyce Bright  : 1950  MRN: [id number]  Referring provider: Kojo Brown MD  Dx:   Encounter Diagnosis     ICD-10-CM    1  Gait disturbance, post-stroke  I69 398     R26 9    2  Loss of balance  R26 89    3  Impaired ambulation  R26 2                   Subjective: Reports feeling well today  Caregiver states she does a lot of sit to stands at home and has been walking more  Objective: See treatment diary below        Assessment: Tolerated treatment well  Demonstrates continued inconsistency with gait and performance of balance tasks  Needing min assist at times to recover balance in solo step harness  Needing cueing for "medium" moviement amplitude at times due to over or under exaggerating her movement amplitude  Frequent rest breaks needed due to fatigue  Would benefit from continued PT  Plan  Plan details: Initiate POC  Patient would benefit from: skilled physical therapy  Planned therapy interventions: therapeutic exercise, therapeutic activities, strengthening, stretching, patient education, neuromuscular re-education, activity modification, balance/weight bearing training, gait training and home exercise program  Frequency: 2x week  Duration in weeks: 4  Plan of Care beginning date: 2022  Plan of Care expiration date: 2022  Treatment plan discussed with: patient and caregiver       Precautions: Portuguese Speaking,Chronic CVA, high fall risk, HTN, Diabetes mellitus type 2, seizures, osteoporosis, palpitations, chronic LBP with R sided sciatica, hx of depression and suicidal ideation     HEP: STS ( 5x, 2 sets daily) and walking program ( 5-6 min 1 or 2x a day)         Education         Neuro Re-Ed         Overground Ambulation  SOLO no AD 240ft x4    With SPC 200ft SOLo no AD 250ft    SOLO SPC 200ft Solo no assistive device 200'  Solo treadmill     6mph 2 min    9 mph 4 minutes   Solo no assistive device, '    Solo treadmill  0 8-0  9mph  2 5min x2 Solo no assistive device, ' Solo no assistive device, 500'   Side stepping  SOLO 20ft x 4 CGA 20ft x 3 laps CG  20' x 3 20ft lap x2 20ft lap x2 20ft lap x2, SOLO   Step ups 10x ea LE 4" SOLO, HHA 15x ea LE 4" SOLO, HHA  10x  total 4" SOLO, HHA 14x  total 4" SOLO, HHA 20x  total 4" SOLO, HHA   Backwards ambulation  20ft x2 HHA SOLO 20ft 3 laps HHA SOLO 20' x 3 solostep   20ft laps x2 HHA SOLO                     TM Training            Negotiating obstacles              4 square stepping     15x SOLO, CG 10x SOLO, HHA    Walk between two chairs      Steping over cones  14ft apart   x12   Ther Ex         SCIFIT 10min L3 10min L3 10min L3 10min L3 10min L3 10min L3   Mini squat     x10 //bars    Step ups         Standing Hip exercises  10x ea hip flex abd ext   2x10 abd    STS x10 BL UEs    x5 No UE's    x10 BL UE    Heel/toe raises  x10 ea   2x10     Marches   2 laps in //bars   2 laps in //bars

## 2022-08-18 ENCOUNTER — OFFICE VISIT (OUTPATIENT)
Dept: PHYSICAL THERAPY | Facility: REHABILITATION | Age: 72
End: 2022-08-18
Payer: MEDICARE

## 2022-08-18 DIAGNOSIS — R26.2 IMPAIRED AMBULATION: ICD-10-CM

## 2022-08-18 DIAGNOSIS — M75.02 ADHESIVE CAPSULITIS OF LEFT SHOULDER: ICD-10-CM

## 2022-08-18 DIAGNOSIS — I69.30 HISTORY OF CVA WITH RESIDUAL DEFICIT: Primary | ICD-10-CM

## 2022-08-18 DIAGNOSIS — R26.89 LOSS OF BALANCE: ICD-10-CM

## 2022-08-18 DIAGNOSIS — R26.9 GAIT DISTURBANCE, POST-STROKE: ICD-10-CM

## 2022-08-18 DIAGNOSIS — I69.398 GAIT DISTURBANCE, POST-STROKE: ICD-10-CM

## 2022-08-18 PROCEDURE — 97110 THERAPEUTIC EXERCISES: CPT

## 2022-08-18 PROCEDURE — 97112 NEUROMUSCULAR REEDUCATION: CPT | Performed by: PHYSICAL THERAPIST

## 2022-08-18 PROCEDURE — 97112 NEUROMUSCULAR REEDUCATION: CPT

## 2022-08-18 PROCEDURE — 97110 THERAPEUTIC EXERCISES: CPT | Performed by: PHYSICAL THERAPIST

## 2022-08-18 NOTE — PROGRESS NOTES
Daily Note    Today's date: 2022  Patient name: Ede Briggs  : 1950  MRN: [id number]  Referring provider: Oliver Ledesma MD  Dx:   Encounter Diagnosis     ICD-10-CM    1  History of CVA with residual deficit  I69 30    2  Gait disturbance, post-stroke  I69 398     R26 9    3  Loss of balance  R26 89    4  Impaired ambulation  R26 2    5  Adhesive capsulitis of left shoulder  M75 02        Start Time: 1030  Stop Time: 1130  Total time in clinic (min): 60 minutes    Subjective: Reports had an almost fall which included hitting her right upper arm on a counter  Objective: See treatment diary below    Upon inspection she presets with a superficial skin abrasion   5"wide x 1 5" long pn later aspect of right shoulder 5" distal tohead of humerous  No pain upon bony palpation  Reports pain posterior deltoid and anterior upper chess musculature strain  PROM demonstrated Shoulder flex 80', abd 65, rotation int wfl extrenal 40 degrees  Patient with H/o of frozen shoulder    Assessment: Patient sustained an almost fall at home yesterday  Reports increased pain with walker use  Caregiver reports baseline hemiplegic Right arm pain, but increased discomfort  post fall  Will monitor discomfort on next visit  Worked on dynamic balance activities today with good engagement during soccer passess in different directions  Requires assist to regain balance on solostep  Will access challenged balance without solostep on next visit  Tolerated treatment well  Will contniue to follow  Plan  Plan details: Initiate POC     Patient would benefit from: skilled physical therapy  Planned therapy interventions: therapeutic exercise, therapeutic activities, strengthening, stretching, patient education, neuromuscular re-education, activity modification, balance/weight bearing training, gait training and home exercise program  Frequency: 2x week  Duration in weeks: 4  Plan of Care beginning date: 2022  Plan of Care expiration date: 9/7/2022  Treatment plan discussed with: patient and caregiver       Precautions: Indonesian Speaking,Chronic CVA, high fall risk, HTN, Diabetes mellitus type 2, seizures, osteoporosis, palpitations, chronic LBP with R sided sciatica, hx of depression and suicidal ideation     HEP: STS ( 5x, 2 sets daily) and walking program ( 5-6 min 1 or 2x a day)      8/4 8/9 8/16 8/18 shoulder ROM   Education       Neuro Re-Ed       Overground Ambulation  Solo no assistive device, '    Solo treadmill  0 8-0  9mph  2 5min x2 Solo no assistive device, ' Solo no assistive device, 500' Solostep no ad  500'   Side stepping  20ft lap x2 20ft lap x2 20ft lap x2, SOLO 40' x 2   Step ups 10x  total 4" SOLO, HHA 14x  total 4" SOLO, HHA 20x  total 4" SOLO, HHA    Backwards ambulation    20ft laps x2 HHA SOLO 40' x 2   Eyes closed    40' x 2   4 square stepping  15x SOLO, CG 10x SOLO, HHA     kickball between guards    10 min changing directions  LOB forwards   Walk between two chairs   Steping over cones  14ft apart   x12    Foam beams    Forwards  5 laps LOB   Ther Ex       SCIFIT 10min L3 10min L3 10min L3 10 min level 3   Mini squat  x10 //bars     Step ups       Standing Hip exercises  2x10 abd     STS  x10 BL UE     Heel/toe raises  2x10      Marches   2 laps in //bars

## 2022-08-22 LAB — MISCELLANEOUS LAB TEST RESULT: NORMAL

## 2022-08-23 ENCOUNTER — OFFICE VISIT (OUTPATIENT)
Dept: PHYSICAL THERAPY | Facility: REHABILITATION | Age: 72
End: 2022-08-23
Payer: MEDICARE

## 2022-08-23 ENCOUNTER — TELEPHONE (OUTPATIENT)
Dept: FAMILY MEDICINE CLINIC | Facility: CLINIC | Age: 72
End: 2022-08-23

## 2022-08-23 DIAGNOSIS — R26.2 IMPAIRED AMBULATION: ICD-10-CM

## 2022-08-23 DIAGNOSIS — I69.398 GAIT DISTURBANCE, POST-STROKE: Primary | ICD-10-CM

## 2022-08-23 DIAGNOSIS — R26.9 GAIT DISTURBANCE, POST-STROKE: Primary | ICD-10-CM

## 2022-08-23 DIAGNOSIS — R26.89 LOSS OF BALANCE: ICD-10-CM

## 2022-08-23 PROCEDURE — 97112 NEUROMUSCULAR REEDUCATION: CPT | Performed by: PHYSICAL THERAPIST

## 2022-08-23 PROCEDURE — 97530 THERAPEUTIC ACTIVITIES: CPT | Performed by: PHYSICAL THERAPIST

## 2022-08-23 PROCEDURE — 97110 THERAPEUTIC EXERCISES: CPT | Performed by: PHYSICAL THERAPIST

## 2022-08-23 NOTE — PROGRESS NOTES
Daily Note    Today's date: 2022  Patient name: Danay Grady  : 1950  MRN: [id number]  Referring provider: Charles Berry MD  Dx:   Encounter Diagnosis     ICD-10-CM    1  Gait disturbance, post-stroke  I69 398     R26 9    2  Loss of balance  R26 89    3  Impaired ambulation  R26 2                   Subjective: Reports still having some pain and discomfort in R shoulder, but unclear if back to baseline or not  Objective: See treatment diary below      Assessment: Tolerated treatment fair  Pt presents with loss of balance in harness system needing therapist's assistance to regain balance  Usually become unsteady following shuffling gait, or due to lack of corrective balance response  Held on kicking soccer ball today due to pt being distressed from anterior instability in harness sytem  Cueing needed to increase step size but not over stride "mediano"  Would benefit from continued PT  Plan  Plan details: Initiate POC     Patient would benefit from: skilled physical therapy  Planned therapy interventions: therapeutic exercise, therapeutic activities, strengthening, stretching, patient education, neuromuscular re-education, activity modification, balance/weight bearing training, gait training and home exercise program  Frequency: 2x week  Duration in weeks: 4  Plan of Care beginning date: 2022  Plan of Care expiration date: 2022  Treatment plan discussed with: patient and caregiver       Precautions: Grenadian Speaking,Chronic CVA, high fall risk, HTN, Diabetes mellitus type 2, seizures, osteoporosis, palpitations, chronic LBP with R sided sciatica, hx of depression and suicidal ideation     HEP: STS ( 5x, 2 sets daily) and walking program ( 5-6 min 1 or 2x a day)       shoulder ROM    Education       Neuro Re-Ed       Overground Ambulation  Solo no assistive device, ' Solo no assistive device, 500' Solostep no ad  500' SOLO no ad, 30ft x6    30ft x 4       Side stepping  20ft lap x2 20ft lap x2, SOLO 40' x 2 40' x 2   Step ups 14x  total 4" SOLO, HHA 20x  total 4" SOLO, HHA     Backwards ambulation   20ft laps x2 HHA SOLO 40' x 2    Eyes closed   40' x 2    4 square stepping  10x SOLO, HHA      kickball between guards   10 min changing directions  LOB forwards Attempted, significant fwd loss of balance in SOLO harness   Walk between two chairs  Steping over cones  14ft apart   x12     Foam beams   Forwards  5 laps LOB    Ther Ex       SCIFIT 10min L3 10min L3 10 min level 3 10min L3   Mini squat x10 //bars      Step ups       Standing Hip exercises 2x10 abd      STS x10 BL UE   x10 BL UE's   Heel/toe raises 2x10       Marches  2 laps in //bars      Ther Activity       Walk to retreive dumbell, bring back to chair    4x, 2 sets, 10 feet SOLO

## 2022-08-24 ENCOUNTER — PATIENT OUTREACH (OUTPATIENT)
Dept: FAMILY MEDICINE CLINIC | Facility: CLINIC | Age: 72
End: 2022-08-24

## 2022-08-24 NOTE — PROGRESS NOTES
CRISTI ARTHUR received response from Patience Backers that he has been unsuccessful in contacting the patient regarding the home modification and assessment for more hours  He is waiting on translation services  CRISTI ARTHUR did provide number for the son, Merissa Coughlin as he speaks [place] and had worked with CRISTI ARTHUR previously

## 2022-08-24 NOTE — PROGRESS NOTES
CRISTI ARTHUR sent email to the patient's Nery   [facility]), Judy Swanson requesting update regarding assessment for increase in home care hours and the home modification  CRISTI ARTHUR will continue to remain available for psychosocial support as needed

## 2022-08-25 ENCOUNTER — HOSPITAL ENCOUNTER (EMERGENCY)
Facility: HOSPITAL | Age: 72
Discharge: HOME | End: 2022-08-25
Attending: EMERGENCY MEDICINE
Payer: MEDICARE

## 2022-08-25 ENCOUNTER — TRANSCRIBE ORDERS (OUTPATIENT)
Dept: GASTROENTEROLOGY | Facility: CLINIC | Age: 72
End: 2022-08-25

## 2022-08-25 ENCOUNTER — APPOINTMENT (EMERGENCY)
Dept: CT IMAGING | Facility: HOSPITAL | Age: 72
End: 2022-08-25
Payer: MEDICARE

## 2022-08-25 ENCOUNTER — APPOINTMENT (OUTPATIENT)
Dept: PHYSICAL THERAPY | Facility: REHABILITATION | Age: 72
End: 2022-08-25
Payer: MEDICARE

## 2022-08-25 VITALS
DIASTOLIC BLOOD PRESSURE: 55 MMHG | HEART RATE: 68 BPM | OXYGEN SATURATION: 97 % | WEIGHT: 144.84 LBS | BODY MASS INDEX: 31.34 KG/M2 | RESPIRATION RATE: 18 BRPM | SYSTOLIC BLOOD PRESSURE: 117 MMHG | TEMPERATURE: 98.6 F
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
## 2022-08-25 DIAGNOSIS — S01.01XA LACERATION OF SCALP, INITIAL ENCOUNTER: ICD-10-CM

## 2022-08-25 DIAGNOSIS — S09.90XA INJURY OF HEAD, INITIAL ENCOUNTER: ICD-10-CM

## 2022-08-25 PROCEDURE — 99282 EMERGENCY DEPT VISIT SF MDM: CPT | Performed by: PHYSICIAN ASSISTANT

## 2022-08-25 PROCEDURE — 12001 RPR S/N/AX/GEN/TRNK 2.5CM/<: CPT | Performed by: PHYSICIAN ASSISTANT

## 2022-08-25 PROCEDURE — G1004 CDSM NDSC: HCPCS

## 2022-08-25 PROCEDURE — 72125 CT NECK SPINE W/O DYE: CPT

## 2022-08-25 PROCEDURE — 99284 EMERGENCY DEPT VISIT MOD MDM: CPT

## 2022-08-25 PROCEDURE — 70450 CT HEAD/BRAIN W/O DYE: CPT

## 2022-08-25 RX ORDER — LIDOCAINE HYDROCHLORIDE 10 MG/ML
5 INJECTION, SOLUTION EPIDURAL; INFILTRATION; INTRACAUDAL; PERINEURAL ONCE
Status: COMPLETED | OUTPATIENT
Start: 2022-08-25 | End: 2022-08-25

## 2022-08-25 RX ADMIN — LIDOCAINE HYDROCHLORIDE 5 ML: 10 INJECTION, SOLUTION EPIDURAL; INFILTRATION; INTRACAUDAL at 10:28

## 2022-08-25 NOTE — ED PROVIDER NOTES
History  Chief Complaint   Patient presents with    Fall     Pt fell 1 hour PTA, per  pt has hx of stroke and has balance issues  Reports hit head and takes ASA daily , lac noted to top of head     Patient is a 79 y/o female hx of CVA (right sided deficits - on ASA), HTN, HLD, arthritis, anxiety, hypothyroidism, seizures, migraine headaches, DM, presenting to the ED for evaluation of mechanical fall  Pt with , Occitan speaking ( used), reports 1 hour PTA patient was ambulating with walker (baseline using walker), when she slipped, falling over, hitting her head on coffee table, sustaining 2cm laceration to scalp  Pt denies LOC  Pt reporting mild neck pain as well  Pt without chest pain, SOB, dizziness/lightheadedness, hip pain, leg pain, back pain, abdominal pain  Tetanus 2019  Prior to Admission Medications   Prescriptions Last Dose Informant Patient Reported? Taking?    Cholecalciferol (VITAMIN D-3) 5000 units TABS  Self No No   Sig: Take 1 tablet by mouth daily   Patient not taking: Reported on 8/5/2021   Incontinence Supply Disposable (Brief Overnight Large) MISC   No No   Sig: Use 4 (four) times a day   Incontinence Supply Disposable (Underpads) MISC   No No   Sig: Use in the morning Please dispense washable underpads   Infant Care Products (Comfort-Smooth Baby Wipes) MISC   No No   Sig: Use 4 (four) times a day   Melatonin 5 MG TABS Not Taking at Unknown time  No No   Sig: Take 1 tablet (5 mg total) by mouth daily at bedtime   Patient not taking: No sig reported   albuterol (PROVENTIL HFA,VENTOLIN HFA) 90 mcg/act inhaler Not Taking at Unknown time Self Yes No   Patient not taking: Reported on 8/25/2022   aspirin (ECOTRIN LOW STRENGTH) 81 mg EC tablet  Self Yes Yes   Sig: Take 81 mg by mouth daily    atenolol (TENORMIN) 25 mg tablet   No Yes   Sig: Take 1 tablet (25 mg total) by mouth daily   atorvastatin (LIPITOR) 40 mg tablet Not Taking at Unknown time  No No   Sig: Take 1 tablet (40 mg total) by mouth daily   Patient not taking: Reported on 2022   benzonatate (TESSALON PERLES) 100 mg capsule   No No   Sig: Take 1 capsule (100 mg total) by mouth 3 (three) times a day as needed for cough   bisacodyl (DULCOLAX) 5 mg EC tablet   No No   Sig: Take 4 tablets (20 mg total) by mouth once for 1 dose Colonoscopy prep   citalopram (CeleXA) 40 mg tablet Not Taking at Unknown time  No No   Sig: Take 1 tablet (40 mg total) by mouth daily   Patient not taking: Reported on 2022   conjugated estrogens (PREMARIN) vaginal cream  Self No No   Sig: Insert 0 5 g into the vagina 3 (three) times a week   Patient not taking: Reported on 2021   cyclobenzaprine (FLEXERIL) 5 mg tablet Not Taking at Unknown time  No No   Sig: Take 1 tablet (5 mg total) by mouth 3 (three) times a day as needed for muscle spasms   Patient not taking: Reported on 2022   diclofenac sodium (VOLTAREN) 50 mg EC tablet   No No   Sig: Take 1 tablet (50 mg total) by mouth 2 (two) times a day   Patient not taking: Reported on 2021   fluticasone (FLONASE) 50 mcg/act nasal spray   No No   Si spray into each nostril daily   gabapentin (Neurontin) 100 mg capsule Not Taking at Unknown time  No No   Sig: Take 1 capsule (100 mg total) by mouth 2 (two) times a day   Patient not taking: Reported on 2022   meclizine (ANTIVERT) 12 5 MG tablet Not Taking at Unknown time Self Yes No   Patient not taking: No sig reported   meloxicam (MOBIC) 7 5 mg tablet Not Taking at Unknown time  No No   Sig: Take 1 tablet (7 5 mg total) by mouth daily   Patient not taking: No sig reported   neomycin-polymyxin-dexamethasone (MAXITROL) ophthalmic suspension   No No   Sig: Administer 1 drop to the right eye 4 (four) times a day for 7 days   omeprazole (PriLOSEC) 20 mg delayed release capsule   No Yes   Sig: Take 1 capsule (20 mg total) by mouth daily   polyethylene glycol (GLYCOLAX) powder Not Taking at Unknown time Self No No   Sig: Take 17 g by mouth daily   Patient not taking: No sig reported   polyethylene glycol (GOLYTELY) 4000 mL solution   No No   Sig: Take 4,000 mL by mouth once for 1 dose Colonoscopy prep   polyethylene glycol (MIRALAX) 17 g packet   No No   Sig: Take 17 g by mouth daily      Facility-Administered Medications: None       Past Medical History:   Diagnosis Date    Abdominal pain     Anxiety     last assessed: 10/19/2013    Arthritis     osteo both hands; last assessed: 10/19/2013    Bowel incontinence     Chest pain     Constipation     [facility]) [facility]     [facility]     Disease of thyroid gland     Epigastric pain     with distention    Falls     High cholesterol     Hyperlipidemia     Hypertension     last assessed: 4/3/2017    Migraine     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    Palpitations     Recurrent urinary tract infection     Seizures [facility]     Sleep disorder     last assessed: 10/19/2013    Stroke ([facility]     Thyroid disease        Past Surgical History:   Procedure Laterality Date    AXILLARY SURGERY      cyst surgery    BLADDER SURGERY       SECTION      CYSTOSCOPY N/A 2018    Procedure: CYSTOSCOPY;  Surgeon: Ariel Velázquez MD;  Location: [facility] OR;  Service: Gynecology    FACIAL BONE TUMOR EXCISION      KNEE SURGERY      OTHER SURGICAL HISTORY      cyst removed from axilla    GA ESOPHAGOGASTRODUODENOSCOPY TRANSORAL DIAGNOSTIC N/A 2017    Procedure: ESOPHAGOGASTRODUODENOSCOPY (EGD); Surgeon: Eileen Vegas MD;  Location[facility];   Service: Gastroenterology    GA SLING OPER STRES INCONTINENCE N/A 2018    Procedure: PUBOVAGINAL SLING;  Surgeon: Ariel Velázquez MD;  Location: [facility] OR;  Service: Gynecology    TUBAL LIGATION         Family History   Problem Relation Age of Onset    No Known Problems Father     Diabetes Family         mellitus    Hypertension Family     Stroke Family     Thyroid disease Family     No Known Problems Mother     No Known Problems Sister     No Known Problems Daughter     No Known Problems Maternal Grandmother     No Known Problems Maternal Grandfather     No Known Problems Paternal Grandmother     No Known Problems Paternal Grandfather      I have reviewed and agree with the history as documented  E-Cigarette/Vaping    E-Cigarette Use Never User      E-Cigarette/Vaping Substances    Nicotine No     THC No     CBD No     Flavoring No     Other No     Unknown No      Social History     Tobacco Use    Smoking status: Never Smoker    Smokeless tobacco: Never Used   Vaping Use    Vaping Use: Never used   Substance Use Topics    Alcohol use: No    Drug use: No       Review of Systems   Constitutional: Negative for chills and fever  HENT: Negative for ear pain and sore throat  Eyes: Negative for visual disturbance  Respiratory: Negative for cough and shortness of breath  Cardiovascular: Negative for chest pain, palpitations and leg swelling  Gastrointestinal: Negative for abdominal pain, diarrhea, nausea and vomiting  Genitourinary: Negative for dysuria and hematuria  Musculoskeletal: Positive for neck pain  Negative for back pain  Head injury   Skin: Positive for wound  Negative for rash  Neurological: Positive for weakness (chronic right sided)  Negative for speech difficulty and headaches  Psychiatric/Behavioral: Negative for confusion  Physical Exam  Physical Exam  Constitutional:       General: She is not in acute distress  Appearance: She is well-developed  She is not ill-appearing or toxic-appearing  HENT:      Head: Normocephalic  Laceration present  No raccoon eyes or Median's sign  Right Ear: Hearing and external ear normal       Left Ear: Hearing and external ear normal       Nose: Nose normal       Mouth/Throat:      Lips: Pink        Mouth: Mucous membranes are moist    Eyes: General: Vision grossly intact  Extraocular Movements: Extraocular movements intact  Conjunctiva/sclera: Conjunctivae normal       Pupils: Pupils are equal, round, and reactive to light  Cardiovascular:      Rate and Rhythm: Normal rate and regular rhythm  Pulmonary:      Effort: Pulmonary effort is normal       Breath sounds: Normal breath sounds  No decreased breath sounds, wheezing, rhonchi or rales  Musculoskeletal:         General: Normal range of motion  Cervical back: Normal range of motion  Tenderness present  Spinous process tenderness present  No muscular tenderness  Thoracic back: Normal       Lumbar back: Normal    Skin:     General: Skin is warm and dry  Capillary Refill: Capillary refill takes less than 2 seconds  Findings: Laceration present  Neurological:      Mental Status: She is alert and oriented to person, place, and time  GCS: GCS eye subscore is 4  GCS verbal subscore is 5  GCS motor subscore is 6        Comments: Right sided weakness (chronic)   Psychiatric:         Mood and Affect: Mood and affect normal          Vital Signs  ED Triage Vitals [08/25/22 1004]   Temperature Pulse Respirations Blood Pressure SpO2   98 6 °F (37 °C) 78 17 164/72 97 %      Temp Source Heart Rate Source Patient Position - Orthostatic VS BP Location FiO2 (%)   Oral Monitor Sitting Right arm --      Pain Score       --           Vitals:    08/25/22 1004 08/25/22 1100   BP: 164/72 117/55   Pulse: 78 68   Patient Position - Orthostatic VS: Sitting Lying         Visual Acuity  Visual Acuity    Flowsheet Row Most Recent Value   L Pupil Size (mm) 2   R Pupil Size (mm) 2          ED Medications  Medications   lidocaine (PF) (XYLOCAINE-MPF) 1 % injection 5 mL (5 mL Infiltration Given by Other 8/25/22 1028)       Diagnostic Studies  Results Reviewed     None                 CT head without contrast   Final Result by Kassandra Bashir MD (08/25 1220)      No acute intracranial abnormality  Workstation performed: [id number]         CT spine cervical without contrast   Final Result by Hugo Jordan MD (08/25 1222)      No cervical spine fracture or traumatic malalignment  Workstation performed: [id number]                    Procedures  Laceration repair    Date/Time: 8/25/2022 12:33 PM  Performed by: Osmar Garcia PA-C  Authorized by: Osmar Garcia PA-C   Consent: Verbal consent obtained  Risks and benefits: risks, benefits and alternatives were discussed  Consent given by: patient  Patient identity confirmed: verbally with patient and arm band  Body area: head/neck  Location details: scalp  Laceration length: 2 cm  Foreign bodies: no foreign bodies  Tendon involvement: none  Nerve involvement: none  Vascular damage: no  Anesthesia: local infiltration    Anesthesia:  Local Anesthetic: lidocaine 1% without epinephrine  Anesthetic total: 2 mL    Sedation:  Patient sedated: no        Procedure Details:  Preparation: Patient was prepped and draped in the usual sterile fashion  Irrigation solution: saline  Irrigation method: syringe  Amount of cleaning: standard  Skin closure: staples  Number of sutures: 4 staples  Patient tolerance: patient tolerated the procedure well with no immediate complications               ED Course                               SBIRT 22yo+    Flowsheet Row Most Recent Value   SBIRT (25 yo +)    In order to provide better care to our patients, we are screening all of our patients for alcohol and drug use  Would it be okay to ask you these screening questions? Yes Filed at: 08/25/2022 1013   Initial Alcohol Screen: US AUDIT-C     1  How often do you have a drink containing alcohol? 0 Filed at: 08/25/2022 1013   2  How many drinks containing alcohol do you have on a typical day you are drinking? 0 Filed at: 08/25/2022 1013   3a  Male UNDER 65: How often do you have five or more drinks on one occasion?  0 Filed at: 08/25/2022 1013   3b  FEMALE Any Age, or MALE 65+: How often do you have 4 or more drinks on one occassion? 0 Filed at: 08/25/2022 1013   Audit-C Score 0 Filed at: 08/25/2022 1013   LEVAR: How many times in the past year have you    Used an illegal drug or used a prescription medication for non-medical reasons? Never Filed at: 08/25/2022 1013                    MDM  Number of Diagnoses or Management Options  Fall, initial encounter  Injury of head, initial encounter  Laceration of scalp, initial encounter  Diagnosis management comments: Patient is a 79 y/o female hx of CVA (right sided deficits - on ASA), HTN, HLD, arthritis, anxiety, hypothyroidism, seizures, migraine headaches, DM, presenting to the ED for evaluation of mechanical fall  CT head and neck show no acute abnormality  Tetanus up to date  Wound cleansed, repaired with 4 staples   Staple care discussed, return in 10 days for staple removal  S/s infection and head injury precautions discussed with patient and     Patient verbalizes understanding and agrees with plan  The management plan was discussed in detail with the patient at bedside and all questions were answered  Prior to discharge, I provided both verbal and written instructions  I discussed with the patient the signs and symptoms for which to return to the emergency department  All questions were answered and patient was comfortable with the plan of care and discharged to home  The patient agrees to return to the Emergency Department for concerns and/or progression of illness  Disposition  Final diagnoses:   Fall, initial encounter   Injury of head, initial encounter   Laceration of scalp, initial encounter     Time reflects when diagnosis was documented in both MDM as applicable and the Disposition within this note     Time User Action Codes Description Comment    8/25/2022 12:33 PM Earlean Cotton Add [Y05  YOKE] Fall, initial encounter     8/25/2022 12:33 PM Earlean Cotton Add [S09 90XA] Injury of head, initial encounter     8/25/2022 12:33 PM Ed Abel Add [S01 01XA] Laceration of scalp, initial encounter       ED Disposition     ED Disposition   Discharge    Condition   Stable    Date/Time   Thu Aug 25, 2022 12:33 PM    Comment   Ana Rosa Damon discharge to home/self care  Follow-up Information     Follow up With Specialties Details Why [facility], [address] [place] [address]  [address] [facility]  [place] [address] [facility]  [phone]            Patient's Medications   Discharge Prescriptions    No medications on file       No discharge procedures on file      PDMP Review     None          ED Provider  Electronically Signed by           Morenita Joyce PA-C  08/25/22 1652

## 2022-08-30 ENCOUNTER — PATIENT OUTREACH (OUTPATIENT)
Dept: FAMILY MEDICINE CLINIC | Facility: CLINIC | Age: 72
End: 2022-08-30

## 2022-08-30 ENCOUNTER — OFFICE VISIT (OUTPATIENT)
Dept: PHYSICAL THERAPY | Facility: REHABILITATION | Age: 72
End: 2022-08-30
Payer: MEDICARE

## 2022-08-30 DIAGNOSIS — R26.89 LOSS OF BALANCE: ICD-10-CM

## 2022-08-30 DIAGNOSIS — R26.9 GAIT DISTURBANCE, POST-STROKE: ICD-10-CM

## 2022-08-30 DIAGNOSIS — R26.9 GAIT DISTURBANCE, POST-STROKE: Primary | ICD-10-CM

## 2022-08-30 DIAGNOSIS — R26.2 IMPAIRED AMBULATION: ICD-10-CM

## 2022-08-30 DIAGNOSIS — I69.30 HISTORY OF CVA WITH RESIDUAL DEFICIT: ICD-10-CM

## 2022-08-30 DIAGNOSIS — I69.398 GAIT DISTURBANCE, POST-STROKE: ICD-10-CM

## 2022-08-30 DIAGNOSIS — M75.02 ADHESIVE CAPSULITIS OF LEFT SHOULDER: ICD-10-CM

## 2022-08-30 DIAGNOSIS — I69.398 GAIT DISTURBANCE, POST-STROKE: Primary | ICD-10-CM

## 2022-08-30 DIAGNOSIS — M75.02 ADHESIVE CAPSULITIS OF LEFT SHOULDER: Primary | ICD-10-CM

## 2022-08-30 PROCEDURE — 97110 THERAPEUTIC EXERCISES: CPT

## 2022-08-30 PROCEDURE — 97140 MANUAL THERAPY 1/> REGIONS: CPT

## 2022-08-30 PROCEDURE — 97140 MANUAL THERAPY 1/> REGIONS: CPT | Performed by: PHYSICAL THERAPIST

## 2022-08-30 PROCEDURE — 97112 NEUROMUSCULAR REEDUCATION: CPT | Performed by: PHYSICAL THERAPIST

## 2022-08-30 PROCEDURE — 97112 NEUROMUSCULAR REEDUCATION: CPT

## 2022-08-30 PROCEDURE — 97110 THERAPEUTIC EXERCISES: CPT | Performed by: PHYSICAL THERAPIST

## 2022-08-30 NOTE — PROGRESS NOTES
Daily Note        Today's date: 2022  Patient name: Will Schneider  : 1950  MRN: [id number]  Referring provider: Idalia Soriano MD  Dx:   Encounter Diagnosis     ICD-10-CM    1  Gait disturbance, post-stroke  I69 398     R26 9    2  Loss of balance  R26 89    3  History of CVA with residual deficit  I69 30    4  Impaired ambulation  R26 2    5  Adhesive capsulitis of left shoulder  M75 02        Start Time: 1130  Stop Time: 1235  Total time in clinic (min): 65 minutes    Subjective: Patient reports form standing and using rolling walker fall last Thursday  She presents with hematoma left elbow and 4 stitches left left coronal scapl  Reports 7/10 pain left shoulder  Reports continued Right shoulder baseline pain 6/10  Objective: See treatment diary below    AROM left shoulder ROM WFL, elbow WFL, wrist WFL             Right shoulder  Flexion 65 degrees with pain, abd 45 degrees with pain, external rotation 35 degrees     Assessment:  Would benefit from continued PT  Plan  Plan details: Initiate POC  Patient would benefit from: skilled physical therapy  Planned therapy interventions: therapeutic exercise, therapeutic activities, strengthening, stretching, patient education, neuromuscular re-education, activity modification, balance/weight bearing training, gait training and home exercise program  Frequency: 2x week  Duration in weeks: 4  Plan of Care beginning date: 2022  Plan of Care expiration date: 2022  Treatment plan discussed with: patient and caregiver       Precautions: Belarusian Speaking,Chronic CVA, high fall risk, HTN, Diabetes mellitus type 2, seizures, osteoporosis, palpitations, chronic LBP with R sided sciatica, hx of depression and suicidal ideation     HEP: STS ( 5x, 2 sets daily) and walking program ( 5-6 min 1 or 2x a day)       shoulder ROM    Caregiver Education Educated patient caregiver in Mariah shoulder flexion and abduction   Demonstrated and performed  Written program issued  Neuro Re-Ed        Overground Ambulation  Overground ambulation 100'x Solo no assistive device, ' Solo no assistive device, 500' Solostep no ad  500' SOLO no ad, 30ft x6    30ft x 4       Side stepping   20ft lap x2 20ft lap x2, SOLO 40' x 2 40' x 2   Step ups  14x  total 4" SOLO, HHA 20x  total 4" SOLO, HHA     Backwards ambulation  Rolling walker 40' x 2  20ft laps x2 HHA SOLO 40' x 2    Eyes closed    40' x 2    4 square stepping   10x SOLO, HHA      kickball between guards    10 min changing directions  LOB forwards Attempted, significant fwd loss of balance in SOLO harness   Walk between two chairs   Steping over cones  14ft apart  x12     Foam beams    Forwards  5 laps LOB    Ther Ex        SCIFIT 10 mi level 1 s/p fall and reports generalized muscle pain 10min L3 10min L3 10 min level 3 10min L3   Mini squat  x10 //bars      Step ups        Standing Hip exercises  2x10 abd      STS  x10 BL UE   x10 BL UE's   Heel/toe raises  2x10       Marches   2 laps in //bars      Ther Activity        Walk to retreive dumbell, bring back to chair     4x, 2 sets, 10 feet SOLO   Transfers supine to sit   x 2 with verbal cues       Gait training Rolling walker  Verbal cues as for proper negotiation of environmental barriers   Patient with tendency to lift her walker during turns as well as excessive advancement of device       Manual therapy AA-PROM B Shoulders due to pain s/p fall

## 2022-08-30 NOTE — PROGRESS NOTES
CRISTI ARTHUR had received a message from , Jaquan Gonzalez on 8/26 regarding patient's spouse coming stating no one contact him regarding getting patient more home health  Iris did advise him of CRISTI ARTHUR communication with Waiver  [facility]) Johnykolby Lopez  Spouse indicated that he would like to get in touch with [facility]  CRISTI ARTHUR messaged the customer line and the email  Spouse requested CIRSTI ARTHUR reach out to [facility] stating that the home attendant speaks English and that [facility] can contact them any time before 3 pm      CRISTI ARTHUR did relay this message to [facility]

## 2022-09-02 ENCOUNTER — OFFICE VISIT (OUTPATIENT)
Dept: PHYSICAL THERAPY | Facility: REHABILITATION | Age: 72
End: 2022-09-02
Payer: MEDICARE

## 2022-09-02 DIAGNOSIS — R26.89 LOSS OF BALANCE: ICD-10-CM

## 2022-09-02 DIAGNOSIS — M75.02 ADHESIVE CAPSULITIS OF LEFT SHOULDER: ICD-10-CM

## 2022-09-02 DIAGNOSIS — I69.398 GAIT DISTURBANCE, POST-STROKE: Primary | ICD-10-CM

## 2022-09-02 DIAGNOSIS — I69.30 HISTORY OF CVA WITH RESIDUAL DEFICIT: ICD-10-CM

## 2022-09-02 DIAGNOSIS — R26.2 IMPAIRED AMBULATION: ICD-10-CM

## 2022-09-02 DIAGNOSIS — R26.9 GAIT DISTURBANCE, POST-STROKE: Primary | ICD-10-CM

## 2022-09-02 PROCEDURE — 97116 GAIT TRAINING THERAPY: CPT | Performed by: PHYSICAL THERAPIST

## 2022-09-02 PROCEDURE — 97110 THERAPEUTIC EXERCISES: CPT | Performed by: PHYSICAL THERAPIST

## 2022-09-02 PROCEDURE — 97112 NEUROMUSCULAR REEDUCATION: CPT | Performed by: PHYSICAL THERAPIST

## 2022-09-02 NOTE — PROGRESS NOTES
Daily Note        Today's date: 2022  Patient name: Mireille Stapleton  : 1950  MRN: [id number]  Referring provider: Cj Black MD  Dx:   Encounter Diagnosis     ICD-10-CM    1  Gait disturbance, post-stroke  I69 398     R26 9    2  Loss of balance  R26 89    3  History of CVA with residual deficit  I69 30    4  Impaired ambulation  R26 2    5  Adhesive capsulitis of left shoulder  M75 02                   Subjective: No falls  "Mucho dolor" in R shoulder  Uses a standard rolling walker on the second floor of her home, but the rollator on the lower level and when going out  Objective: See treatment diary below    AROM left shoulder ROM WFL, elbow WFL, wrist WFL             Right shoulder  Flexion 65 degrees with pain, abd 45 degrees with pain, external rotation 35 degrees     Assessment:  Minimal willingness to use RUE to peddle on Scifit today due to pain  Weight bears heavily into RW with and with more reliance on LUE occasionally cuasing tipping of walker, and some lateral deviation  Instructed to utilize lighter weigh bearing through AD and heavily cued to keep walker closer and reduce time picking up walker to turn around  Overall though gait is not greatly more stable with an AD, but does benefit from it to prevent total loss of balance when her gait becomes inconsistent  W/o AD continues with highly inconsistent balance and stride length  Continue practicing with and W/o and AD in sessions to help with properly utilizing AD at home  Recommended standard rolling walker over rollator  Was scheduled for OT evaluation  Plan  Plan details: Initiate POC     Patient would benefit from: skilled physical therapy  Planned therapy interventions: therapeutic exercise, therapeutic activities, strengthening, stretching, patient education, neuromuscular re-education, activity modification, balance/weight bearing training, gait training and home exercise program  Frequency: 2x week  Duration in weeks: 4  Plan of Care beginning date: 6/29/2022  Plan of Care expiration date: 9/7/2022  Treatment plan discussed with: patient and caregiver       Precautions: Ethiopian Speaking, high fall risk, HTN, Diabetes mellitus type 2, seizures, osteoporosis, palpitations, chronic LBP with R sided sciatica, chronic b/l shoulder pain, hx of depression and suicidal ideation     HEP: STS ( 5x, 2 sets daily) and walking program ( 5-6 min 1 or 2x a day)      8/30 9/2 8/18 shoulder ROM 8/23   Caregiver Education Educated patient caregiver in Natasha Woo shoulder flexion and abduction  Demonstrated and performed  Written program issued  Neuro Re-Ed        Overground Ambulation  Overground ambulation 100'x Overground 75ft x4 no AD    75ft x2 with standard RW  Solostep no ad  500' SOLO no ad, 30ft x6    30ft x 4       Side stepping     40' x 2 40' x 2   Step ups        Backwards ambulation  Rolling walker 40' x 2   40' x 2    Eyes closed    40' x 2    4 square stepping         kickball between guards    10 min changing directions  LOB forwards Attempted, significant fwd loss of balance in SOLO harness   Walk between two chairs  15ft x4 with standard RW    15x2 no AD      Foam beams    Forwards  5 laps LOB    Ther Ex        SCIFIT 10 mi level 1 s/p fall and reports generalized muscle pain 10min L2 minimal use of RUE due to pain  10 min level 3 10min L3   Mini squat        Step ups        Standing Hip exercises  abd and ext x10 ea      STS     x10 BL UE's   Heel/toe raises  x10 ea      Marches         Ther Activity        Walk to retreive dumbell, bring back to chair     4x, 2 sets, 10 feet SOLO   Transfers supine to sit   x 2 with verbal cues       Gait training Rolling walker  Verbal cues as for proper negotiation of environmental barriers  Patient with tendency to lift her walker during turns as well as excessive advancement of device Rolling walker around cones   Tactile and visual cues for staying close to walker and safely advancing device         Manual therapy AA-PROM B Shoulders due to pain s/p fall

## 2022-09-07 ENCOUNTER — OFFICE VISIT (OUTPATIENT)
Dept: PHYSICAL THERAPY | Facility: REHABILITATION | Age: 72
End: 2022-09-07
Payer: MEDICARE

## 2022-09-07 DIAGNOSIS — R26.89 LOSS OF BALANCE: ICD-10-CM

## 2022-09-07 DIAGNOSIS — R26.2 IMPAIRED AMBULATION: ICD-10-CM

## 2022-09-07 DIAGNOSIS — I69.30 HISTORY OF CVA WITH RESIDUAL DEFICIT: ICD-10-CM

## 2022-09-07 DIAGNOSIS — M75.02 ADHESIVE CAPSULITIS OF LEFT SHOULDER: ICD-10-CM

## 2022-09-07 DIAGNOSIS — R26.9 GAIT DISTURBANCE, POST-STROKE: Primary | ICD-10-CM

## 2022-09-07 DIAGNOSIS — I69.398 GAIT DISTURBANCE, POST-STROKE: Primary | ICD-10-CM

## 2022-09-07 PROCEDURE — 97112 NEUROMUSCULAR REEDUCATION: CPT | Performed by: PHYSICAL THERAPIST

## 2022-09-07 PROCEDURE — 97116 GAIT TRAINING THERAPY: CPT | Performed by: PHYSICAL THERAPIST

## 2022-09-07 NOTE — PROGRESS NOTES
Daily Note        Today's date: 2022  Patient name: Jackeline Hines  : 1950  MRN: [id number]  Referring provider: Warren Harris MD  Dx:   Encounter Diagnosis     ICD-10-CM    1  Gait disturbance, post-stroke  I69 398     R26 9    2  Loss of balance  R26 89    3  History of CVA with residual deficit  I69 30    4  Impaired ambulation  R26 2    5  Adhesive capsulitis of left shoulder  M75 02                   Subjective: No falls  "Mucho dolor" in R shoulder  Uses a standard rolling walker on the second floor of her home, but the rollator on the lower level and when going out  Objective: See treatment diary below    AROM left shoulder ROM WFL, elbow WFL, wrist WFL             Right shoulder  Flexion 65 degrees with pain, abd 45 degrees with pain, external rotation 35 degrees     Assessment:  Tolerated treatment well  More willingness to use RUE on scifit today  Overall more energy and confidence with walking today, but does report pain in R shoulder and points to pain through distal RUE as well  Cueing provided for "medium" steps as pt tends to over exaggerate if cued to take larger steps  Provided close supervision through out for pt comfort and safety  Updated HEP for additional LE strengthening exercises  Continues at times to move walker to far ways from her body or excessively lift walker off the ground when turning leading to saftey risk, however is improving with cueing and instruction  Is scheduled for OT evaluation  Plan  Plan details: Initiate POC     Patient would benefit from: skilled physical therapy  Planned therapy interventions: therapeutic exercise, therapeutic activities, strengthening, stretching, patient education, neuromuscular re-education, activity modification, balance/weight bearing training, gait training and home exercise program  Frequency: 2x week  Duration in weeks: 4  Plan of Care beginning date: 2022  Plan of Care expiration date: 2022  Treatment plan discussed with: patient and caregiver       Precautions: [place] Speaking, high fall risk, HTN, Diabetes mellitus type 2, seizures, osteoporosis, palpitations, chronic LBP with R sided sciatica, chronic b/l shoulder pain, hx of depression and suicidal ideation     HEP: STS ( 5x, 2 sets daily) and walking program ( 5-6 min 1 or 2x a day), heel/toe raises, hip abd, hip ext     8/30 9/2 9/7     Caregiver Education Educated patient caregiver in [place] shoulder flexion and abduction  Demonstrated and performed  Written program issued  Neuro Re-Ed        Overground Ambulation  Overground ambulation 100'x Overground 75ft x4 no AD    75ft x2 with standard RW Overground 75ft x4 no AD     Side stepping         Step ups   4in with HHA up/down x15     Backwards ambulation  Rolling walker 40' x 2       Eyes closed        4 square stepping         kickball between guards        Walk between two chairs  15ft x4 with standard RW    15x2 no AD 15ft x4 with standard RW    15ftx4 no AD     Foam beams        Ther Ex        SCIFIT 10 mi level 1 s/p fall and reports generalized muscle pain 10min L2 minimal use of RUE due to pain 10min L2 5 limited use of RUE due to pain     Mini squat        Step ups        Standing Hip exercises  abd and ext x10 ea      STS        Heel/toe raises  x10 ea      Marches         Ther Activity        Walk to retreive dumbell, bring back to chair        Transfers supine to sit   x 2 with verbal cues       Gait training Rolling walker  Verbal cues as for proper negotiation of environmental barriers  Patient with tendency to lift her walker during turns as well as excessive advancement of device Rolling walker around cones  Tactile and visual cues for staying close to walker and safely advancing device  Rolling walker around cones  Tactile and visual cues for staying close to walker and safely advancing device   20ft x6     Manual therapy AA-PROM B Shoulders due to pain s/p fall

## 2022-09-08 ENCOUNTER — OFFICE VISIT (OUTPATIENT)
Dept: PHYSICAL THERAPY | Facility: REHABILITATION | Age: 72
End: 2022-09-08
Payer: MEDICARE

## 2022-09-08 DIAGNOSIS — R26.89 LOSS OF BALANCE: ICD-10-CM

## 2022-09-08 DIAGNOSIS — R26.2 IMPAIRED AMBULATION: ICD-10-CM

## 2022-09-08 DIAGNOSIS — M75.02 ADHESIVE CAPSULITIS OF LEFT SHOULDER: ICD-10-CM

## 2022-09-08 DIAGNOSIS — R26.9 GAIT DISTURBANCE, POST-STROKE: Primary | ICD-10-CM

## 2022-09-08 DIAGNOSIS — I69.30 HISTORY OF CVA WITH RESIDUAL DEFICIT: ICD-10-CM

## 2022-09-08 DIAGNOSIS — I69.398 GAIT DISTURBANCE, POST-STROKE: Primary | ICD-10-CM

## 2022-09-08 PROCEDURE — 97116 GAIT TRAINING THERAPY: CPT | Performed by: PHYSICAL THERAPY ASSISTANT

## 2022-09-08 PROCEDURE — 97112 NEUROMUSCULAR REEDUCATION: CPT | Performed by: PHYSICAL THERAPY ASSISTANT

## 2022-09-08 NOTE — PROGRESS NOTES
Daily Note        Today's date: 2022  Patient name: Natasha Mesa  : 1950  MRN: [id number]  Referring provider: Aakash Tenorio MD  Dx:   Encounter Diagnosis     ICD-10-CM    1  Gait disturbance, post-stroke  I69 398     R26 9    2  Loss of balance  R26 89    3  History of CVA with residual deficit  I69 30    4  Impaired ambulation  R26 2    5  Adhesive capsulitis of left shoulder  M75 02                   Subjective: No new complaints  Pt reports R shoulder pain at the start of therapy today  Objective: See treatment diary below    AROM left shoulder ROM WFL, elbow WFL, wrist WFL             Right shoulder  Flexion 65 degrees with pain, abd 45 degrees with pain, external rotation 35 degrees     Assessment:  Tolerated treatment well  Provided close supervision through out for pt comfort and safety  Updated HEP for additional LE strengthening exercises  Continues at times to move walker to far away from her body or turn too sharply leading to saftey risk, however she did demonstrate improvement with obstacles today  Is isaias    Plan  Plan details: Initiate POC     Patient would benefit from: skilled physical therapy  Planned therapy interventions: therapeutic exercise, therapeutic activities, strengthening, stretching, patient education, neuromuscular re-education, activity modification, balance/weight bearing training, gait training and home exercise program  Frequency: 2x week  Duration in weeks: 4  Plan of Care beginning date: 2022  Plan of Care expiration date: 2022  Treatment plan discussed with: patient and caregiver       Precautions: Vietnamese Speaking, high fall risk, HTN, Diabetes mellitus type 2, seizures, osteoporosis, palpitations, chronic LBP with R sided sciatica, chronic b/l shoulder pain, hx of depression and suicidal ideation     HEP: STS ( 5x, 2 sets daily) and walking program ( 5-6 min 1 or 2x a day), heel/toe raises, hip abd, hip ext         Caregiver Education Educated patient caregiver in [place] shoulder flexion and abduction  Demonstrated and performed  Written program issued  Neuro Re-Ed        Overground Ambulation  Overground ambulation 100'x Overground 75ft x4 no AD    75ft x2 with standard RW Overground 75ft x4 no AD 75' x 4 no AD    Side stepping         Step ups   4in with HHA up/down x15 4" with intermittent HHA fwd and lat x 15 ea b/l     Backwards ambulation  Rolling walker 40' x 2       Eyes closed        4 square stepping         kickball between guards        Walk between two chairs  15ft x4 with standard RW    15x2 no AD 15ft x4 with standard RW    15ftx4 no AD     Foam beams        Ther Ex        SCIFIT 10 mi level 1 s/p fall and reports generalized muscle pain 10min L2 minimal use of RUE due to pain 10min L2 5 limited use of RUE due to pain 10 min L2 5 min use of RUE    Mini squat        Step ups        Standing Hip exercises  abd and ext x10 ea  Abd/ext x20 ea    STS        Heel/toe raises  x10 ea  x10 ea    Marches         Ther Activity        Walk to retreive dumbell, bring back to chair        Transfers supine to sit   x 2 with verbal cues       Gait training Rolling walker  Verbal cues as for proper negotiation of environmental barriers  Patient with tendency to lift her walker during turns as well as excessive advancement of device Rolling walker around cones  Tactile and visual cues for staying close to walker and safely advancing device  Rolling walker around cones  Tactile and visual cues for staying close to walker and safely advancing device  20ft x6 Rolling walker around cones  Tactile and visual cues for staing with walking and safely advancing walker    25'x4    Manual therapy AA-PROM B Shoulders due to pain s/p fall

## 2022-09-12 ENCOUNTER — OFFICE VISIT (OUTPATIENT)
Dept: PHYSICAL THERAPY | Facility: REHABILITATION | Age: 72
End: 2022-09-12
Payer: MEDICARE

## 2022-09-12 DIAGNOSIS — I69.30 HISTORY OF CVA WITH RESIDUAL DEFICIT: ICD-10-CM

## 2022-09-12 DIAGNOSIS — R26.2 IMPAIRED AMBULATION: ICD-10-CM

## 2022-09-12 DIAGNOSIS — M75.02 ADHESIVE CAPSULITIS OF LEFT SHOULDER: ICD-10-CM

## 2022-09-12 DIAGNOSIS — R26.89 LOSS OF BALANCE: ICD-10-CM

## 2022-09-12 DIAGNOSIS — R26.9 GAIT DISTURBANCE, POST-STROKE: Primary | ICD-10-CM

## 2022-09-12 DIAGNOSIS — I69.398 GAIT DISTURBANCE, POST-STROKE: Primary | ICD-10-CM

## 2022-09-12 PROCEDURE — 97112 NEUROMUSCULAR REEDUCATION: CPT | Performed by: PHYSICAL THERAPIST

## 2022-09-12 PROCEDURE — 97110 THERAPEUTIC EXERCISES: CPT | Performed by: PHYSICAL THERAPIST

## 2022-09-12 NOTE — PROGRESS NOTES
Progress Update        Today's date: 2022  Patient name: Leonila Nicole  : 1950  MRN: [id number]  Referring provider: Louis Shanks MD  Dx:   Encounter Diagnosis     ICD-10-CM    1  Gait disturbance, post-stroke  I69 398     R26 9    2  Loss of balance  R26 89    3  History of CVA with residual deficit  I69 30    4  Impaired ambulation  R26 2    5  Adhesive capsulitis of left shoulder  M75 02                   Subjective: Reports feeling that therapy is helpful for her  Her caregiver feels she has been moving a little safer with her walker at home, but admits she does allow the walker to get too far away from her at times  Needs help for almost all mobility  Her caregiver has to help her to get up from some chairs  To walk and sit down on the toilet in the bathroom, to walk into doctor appointments  She continues to complain of "mucho" shoulder pain on R side  10/10 pain  When asked how long she has had shoulder pain for states "a long time", but at some point after her first stroke  She does admit to some paresthessia in the R fingers       Patient Goals: to walk more "regular"       Objective: See treatment diary below      Balance Test Initial Eval   8   5x Sit to Stand: 58 78s w BUEs from 21" chair  43 seconds 2 UE push off 17 5" chair 50 sec (1st trial)    35 sec 2 UE push off  17/5" chair (2nd trial)   TUs  w rollator CGA-[place] for rollator mgmt and cues 36 sec rollator no assistance  28 seconds without assistive device 38s with rollator (1st trail)    39s with rollator (2nd trail)    29s no AD (1st trial)    24s no AD (2nd trial)       Gait Speed:         2 Minute Walk Test: 204ft w rollator and occ min A with rollator mgmt      6 Minute Walk Test: 544ft w rollator and occ min A with rollator mgmt     2, 10-30s standing rest breaks 620 ' with rollator  5' with rollator         Palpation: Allodynia noted in R upper trap, posterior shoulder, bicipital groove     Functional AROM screen: unalble to reach up to shoulder height, or behind back  [place] compensation noted, assists lifting her R arm using her L  Shoulder ROM Right  Left   Flexion 60 AROM     Extension     Abduction 58 AROM     Ext Rot     Int Rot         Special Testing:  Not appropriate today    Goals  STG  Pt will be independent in HEP  Pt will report improved balance with ambulation and no falls when ambulating at home with rollator  ongoing  Pt will improve TUG by 2 9s ([address] for chronic CVA population) for improved balance  MET    LTG  Pt will perform the TUG 6s faster and with CGA by therapist to demonstrate decreased fall risk and improved independence with mobility  ongoing  Pt will improve 6MWT by 164ft to demonstrate improved aerobic endurance  ongoing  Pt will be able to perform the 5XSTS in no more than 15 5 sec ( age matched norm) to demonstrate improved LE strength  ongoing  Pt will be able to demonstrate and report  improved use of the RUE/RLE during MMT and functional mobility  ongoing       Assessment: Stephen Thapa has presented consistently to PT for gait disturbance with hx of CVA  Since last progress update it is of note that she has experienced set backs with falls  Once of which leading to injury and brief hospitalization  Testing today indicates improvements in functional LE strength per 5xSTS test  Demonstrated similar findings on TUG and 6MWT today  Continues with high fall risk per multiple outcome measures  Of note that in general her performance on testing and her balance is inconsistent as noted above  She has improved with her safety awareness utilizing her rolling walker at home, but does continue at times to make poor safety decisions at times with her walker management  Reports R shoulder pain as a limiting factor in managing her walker  Difficult to fully assess R shoulder pain  Pt demonstrates wide spread pain with light pressure and reluctance to move her R shoulder   Difficult to determine if CVA is the primary cause of her shoulder dysfunction or more secondary to pure orthopedic source and disuse  Per pt values will focus more on improving shoulder pain and function  Due to these deficits, she is unable to independently perform ambulating in her home, ambulating in the community, negotiating stairs, and performing ADL's such as toiletting, and grooming  She would benefit from skilled PT to     Plan: Referral already made to OT regarding RUE, will await second opinion from them  Will address shoulder pain in conjuction with OT as appropriate  Continue addressing gait and falls  2x per week for 4 more weeks          Precautions: [place] Speaking, high fall risk, HTN, Diabetes mellitus type 2, seizures, osteoporosis, palpitations, chronic LBP with R sided sciatica, chronic b/l shoulder pain, hx of depression and suicidal ideation     HEP: STS ( 5x, 2 sets daily) and walking program ( 5-6 min 1 or 2x a day), heel/toe raises, hip abd, hip ext     9/2 9/7 9/8 9/12   Caregiver Education       Neuro Re-Ed       Overground Ambulation  Overground 75ft x4 no AD    75ft x2 with standard RW Overground 75ft x4 no AD 75' x 4 no AD    Side stepping     20ft x4 no AD SOLO   Step ups  4in with HHA up/down x15 4" with intermittent HHA fwd and lat x 15 ea b/l     Backwards ambulation        Eyes closed       4 square stepping        kickball between guards       Walk between two chairs 15ft x4 with standard RW    15x2 no AD 15ft x4 with standard RW    15ftx4 no AD  15ft x4 with standard RW   Foam beams       Ther Ex       SCIFIT 10min L2 minimal use of RUE due to pain 10min L2 5 limited use of RUE due to pain 10 min L2 5 min use of RUE    Mini squat       Step ups       Standing Hip exercises abd and ext x10 ea  Abd/ext x20 ea    STS       Heel/toe raises x10 ea  x10 ea    Marches        Ther Activity       Walk to retreive dumbell, bring back to chair       Transfers supine to sit        Gait training Rolling walker around cones  Tactile and visual cues for staying close to walker and safely advancing device  Rolling walker around cones  Tactile and visual cues for staying close to walker and safely advancing device  20ft x6 Rolling walker around cones  Tactile and visual cues for staing with walking and safely advancing walker    25'x4    Manual therapy

## 2022-09-13 ENCOUNTER — OFFICE VISIT (OUTPATIENT)
Dept: FAMILY MEDICINE CLINIC | Facility: CLINIC | Age: 72
End: 2022-09-13

## 2022-09-13 VITALS
TEMPERATURE: 97.7 F | HEIGHT: 57 IN | OXYGEN SATURATION: 97 % | WEIGHT: 146 LBS | DIASTOLIC BLOOD PRESSURE: 76 MMHG | HEART RATE: 72 BPM | SYSTOLIC BLOOD PRESSURE: 132 MMHG | BODY MASS INDEX: 31.5 KG/M2 | RESPIRATION RATE: 18 BRPM

## 2022-09-13 DIAGNOSIS — S01.01XD SCALP LACERATION, SUBSEQUENT ENCOUNTER: ICD-10-CM
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
## 2022-09-13 PROCEDURE — 99214 OFFICE O/P EST MOD 30 MIN: CPT | Performed by: FAMILY MEDICINE

## 2022-09-13 NOTE — PROGRESS NOTES
Assessment/Plan:    Fall  -Recent mechanical fall couple weeks ago  -CT scan of head and spine was unremarkable  -Patient did sustain scalp laceration requiring staples  -Recommend ambulating with walker or a wheelchair  -Recommend follow-up with PT /OT outpatient        Scalp laceration, subsequent encounter  -Sustained a small laceration to the scalp after a mechanical fall  -Required staples to her scalp  -Staples removed today  -Wound  Well-appearing and healing        Return in about 4 weeks (around 10/11/2022) for Next scheduled follow up medicare annual physical       There are no Patient Instructions on file for this visit  Diagnoses and all orders for this visit:    Fall, subsequent encounter    Scalp laceration, subsequent encounter    Other orders  -     Suture removal  -     Suture removal          Subjective:     Vallie Shone is a 80 y o  female who  has a past medical history of Abdominal pain, Anxiety, Arthritis, Bowel incontinence, Chest pain, Constipation, CVA (cerebral vascular accident) ([address] ), Diabetes mellitus type II, controlled ([address] ), Disease of thyroid gland, Epigastric pain, Falls, High cholesterol, Hyperlipidemia, Hypertension, Migraine, Palpitations, Recurrent urinary tract infection, Seizures ([address] ), Sleep disorder, Stroke ([address] ), and Thyroid disease  who presented to the office today for  Staple removal   Couple weeks ago patient had a mechanical fall and subsequent head trauma resulting in small  scalp laceration  She went to the ED a few weeks ago for evaluation  Her CT scan of head was negative for any acute pathology  She required 4 staples on her scalp  She is following with PT as outpatient for gait disturbance  She has not had another since she was in the ED a few weeks ago   She is requiring assistance with ambulation    HPI      The following portions of the patient's history were reviewed and updated as appropriate: allergies, current medications, past family history, past medical history, past social history, past surgical history and problem list     Current Outpatient Medications on File Prior to Visit   Medication Sig Dispense Refill    albuterol (PROVENTIL HFA,VENTOLIN HFA) 90 mcg/act inhaler  (Patient not taking: Reported on 8/25/2022)      aspirin (ECOTRIN LOW STRENGTH) 81 mg EC tablet Take 81 mg by mouth daily       atenolol (TENORMIN) 25 mg tablet Take 1 tablet (25 mg total) by mouth daily 90 tablet 1    atorvastatin (LIPITOR) 40 mg tablet Take 1 tablet (40 mg total) by mouth daily (Patient not taking: Reported on 8/25/2022) 90 tablet 1    benzonatate (TESSALON PERLES) 100 mg capsule Take 1 capsule (100 mg total) by mouth 3 (three) times a day as needed for cough 20 capsule 0    bisacodyl (DULCOLAX) 5 mg EC tablet Take 4 tablets (20 mg total) by mouth once for 1 dose Colonoscopy prep 4 tablet 0    Cholecalciferol (VITAMIN D-3) 5000 units TABS Take 1 tablet by mouth daily (Patient not taking: Reported on 8/5/2021) 90 tablet 1    citalopram (CeleXA) 40 mg tablet Take 1 tablet (40 mg total) by mouth daily (Patient not taking: Reported on 8/25/2022) 90 tablet 1    conjugated estrogens (PREMARIN) vaginal cream Insert 0 5 g into the vagina 3 (three) times a week (Patient not taking: Reported on 8/5/2021) 42 5 g 0    cyclobenzaprine (FLEXERIL) 5 mg tablet Take 1 tablet (5 mg total) by mouth 3 (three) times a day as needed for muscle spasms (Patient not taking: Reported on 8/25/2022) 90 tablet 0    diclofenac sodium (VOLTAREN) 50 mg EC tablet Take 1 tablet (50 mg total) by mouth 2 (two) times a day (Patient not taking: Reported on 8/20/2021) 60 tablet 0    fluticasone (FLONASE) 50 mcg/act nasal spray 1 spray into each nostril daily 16 g 0    gabapentin (Neurontin) 100 mg capsule Take 1 capsule (100 mg total) by mouth 2 (two) times a day (Patient not taking: Reported on 8/25/2022) 180 capsule 0    Incontinence Supply Disposable (Brief Overnight Large) MISC Use 4 (four) times a day 160 each 11    Incontinence Supply Disposable (Underpads) MISC Use in the morning Please dispense washable underpads 4 each 11    Infant Care Products (Comfort-Smooth Baby Wipes) MISC Use 4 (four) times a day 240 each 11    meclizine (ANTIVERT) 12 5 MG tablet  (Patient not taking: No sig reported)      Melatonin 5 MG TABS Take 1 tablet (5 mg total) by mouth daily at bedtime (Patient not taking: No sig reported) 90 tablet 0    meloxicam (MOBIC) 7 5 mg tablet Take 1 tablet (7 5 mg total) by mouth daily (Patient not taking: No sig reported) 90 tablet 0    neomycin-polymyxin-dexamethasone (MAXITROL) ophthalmic suspension Administer 1 drop to the right eye 4 (four) times a day for 7 days 5 mL 0    omeprazole (PriLOSEC) 20 mg delayed release capsule Take 1 capsule (20 mg total) by mouth daily 30 capsule 2    polyethylene glycol (GLYCOLAX) powder Take 17 g by mouth daily (Patient not taking: No sig reported) 850 g 1    polyethylene glycol (GOLYTELY) 4000 mL solution Take 4,000 mL by mouth once for 1 dose Colonoscopy prep 4000 mL 0    polyethylene glycol (MIRALAX) 17 g packet Take 17 g by mouth daily 510 g 5     No current facility-administered medications on file prior to visit  Review of Systems   Constitutional: Negative for chills and fever  Respiratory: Negative for shortness of breath  Cardiovascular: Negative for chest pain  Gastrointestinal: Negative for nausea and vomiting  Neurological: Negative for dizziness  Objective:    /76 (BP Location: Left arm, Patient Position: Sitting, Cuff Size: Standard)   Pulse 72   Temp 97 7 °F (36 5 °C) (Temporal)   Resp 18   Ht 4' 9" (1 448 m)   Wt 66 2 kg (146 lb)   SpO2 97%   BMI 31 59 kg/m²     Physical Exam  Constitutional:       General: She is not in acute distress  Appearance: Normal appearance  She is well-developed  She is not ill-appearing, toxic-appearing or diaphoretic  HENT:      Head: Normocephalic  Right Ear: External ear normal       Left Ear: External ear normal       Nose: Nose normal    Eyes:      Extraocular Movements: Extraocular movements intact  Neck:      Thyroid: No thyromegaly  Vascular: No JVD  Trachea: No tracheal deviation  Cardiovascular:      Rate and Rhythm: Normal rate and regular rhythm  Heart sounds: Normal heart sounds  No murmur heard  No friction rub  No gallop  Pulmonary:      Effort: Pulmonary effort is normal  No respiratory distress  Breath sounds: Normal breath sounds  No stridor  Abdominal:      General: Bowel sounds are normal       Palpations: Abdomen is soft  Musculoskeletal:      Cervical back: Normal range of motion  Comments: Wheel chair   Skin:     General: Skin is warm  Findings: No rash  Neurological:      Mental Status: She is alert and oriented to person, place, and time  Motor: No abnormal muscle tone  Suture removal    Date/Time: 9/13/2022 4:34 PM  Performed by: Boy Bertrand MD  Authorized by: Boy Bertrand MD   Universal Protocol:  Consent: Verbal consent obtained  Written consent not obtained  Consent given by: patient  Patient understanding: patient states understanding of the procedure being performed  Patient consent: the patient's understanding of the procedure matches consent given  Patient identity confirmed: verbally with patient        Patient location:  Clinic  Location:     Location:  [address] [facility] location:  Scalp  Procedure details: Tools used: Other (comment) (Staple remover)    Wound appearance:  No sign(s) of infection, good wound healing and clean    Number of staples removed:  4  Post-procedure details:     Post-removal:  No dressing applied    Patient tolerance of procedure:   Tolerated well, no immediate complications        Boy Bertrand MD  09/13/22  4:38 PM

## 2022-09-13 NOTE — ASSESSMENT & PLAN NOTE
-Sustained a small laceration to the scalp after a mechanical fall  -Required staples to her scalp  -Staples removed today  -Wound  Well-appearing and healing

## 2022-09-13 NOTE — ASSESSMENT & PLAN NOTE
-Recent mechanical fall couple weeks ago  -CT scan of head and spine was unremarkable  -Patient did sustain scalp laceration requiring staples  -Recommend ambulating with walker or a wheelchair  -Recommend follow-up with PT /OT outpatient

## 2022-09-15 ENCOUNTER — APPOINTMENT (OUTPATIENT)
Dept: PHYSICAL THERAPY | Facility: REHABILITATION | Age: 72
End: 2022-09-15
Payer: MEDICARE

## 2022-09-15 ENCOUNTER — APPOINTMENT (OUTPATIENT)
Dept: OCCUPATIONAL THERAPY | Facility: REHABILITATION | Age: 72
End: 2022-09-15
Payer: MEDICARE

## 2022-09-16 ENCOUNTER — EVALUATION (OUTPATIENT)
Dept: OCCUPATIONAL THERAPY | Facility: REHABILITATION | Age: 72
End: 2022-09-16
Payer: MEDICARE

## 2022-09-16 DIAGNOSIS — I63.9 CEREBROVASCULAR ACCIDENT (CVA), UNSPECIFIED MECHANISM (HCC): Primary | ICD-10-CM

## 2022-09-16 PROCEDURE — 97167 OT EVAL HIGH COMPLEX 60 MIN: CPT | Performed by: OCCUPATIONAL THERAPIST

## 2022-09-16 NOTE — PROGRESS NOTES
OCCUPATIONAL THERAPY INITIAL EVALUATION:    09/16/2022  Petey Mclaughlin  1950  [id number]  Jami Tam MD   Diagnosis ICD-10-CM Associated Orders   1  Cerebrovascular accident (CVA), unspecified mechanism ([address] )  I63 9          Assessment  Assessment details: See skilled analysis for further details  Skilled Analysis:  Pt is a 80 y o  female referred to Occupational Therapy s/p Cerebrovascular accident (CVA), unspecified mechanism ([address] ) [I63 9]  Pt participated in skilled OT evaluation and following formalized testing as well as clinical observation, Pt presents with the following areas of deficit:  Pain throughout RUE beginning proximally and radiating distally with occasional paresthesias resulting in decreased functional use of RUE, decreased R AAROM/AROM/PROM, inattention to RUE affecting ADL/IADL completion, decreased bilateral coordination, RUE proximal/distal/intrinsic weakness, and poor R proximal stabilization  Pt presented with increased R AROM to 80* when distracted and when performing 9-hole peg test and functional dexterity test  Pt will benefit from skilled Occupational Therapy services 2x/week for 12 weeks with focus on neuro re-ed, manual tx, thera act, thera ex, and self-care management to improve on the above deficits, increase functional use of RUE, maximize highest level of independence able to achieve, and enhance overall QOL       Short Term Goals:  - Pt's caregiver will demo with G carryover of Home Stretching Program in home environment to improve proximal mobility/flexibility 4 weeks  - Pt will demo with G tolerance to supine, seated, and in stance exercise x 30 minutes with minimal rest breaks required for increased engagement in weekly exercise regimen and increased engagement in life roles 4 weeks  · Pt will increase RUE to refined assist with <10% cuing for tabletop tasks for improved functional performance of life roles, increased RUE involvement with ADLS/IADLs, and salient tasks 4 weeks  · Pt will increase R UE shoulder FF and shoulder ABD strength to 3+/5 through the use of strengthening exercises and home program for improved functional use of RUE for ADLs/IADLs and to maximize highest level of independence able to achieve 4 weeks   · Pt will increase R UE  strength to #18, pincer grasp strength to #6, tripod pinch strength to #7, and lateral pinch to #11, through the use of strengthening exercises and home program for improved functional use of RUE for ADLs/IADLs and to maximize highest level of independence able to achieve 4 weeks   · Pt will demo with increased RUE shoulder FF and shoulder ABD AROM to 90* with less pain reported for improve functional use of RUE for ADLs/IADLS and to maximize highest level of independence able to achieve 4 weeks   · Pt will demo with improved abilities tolerating bilateral integrative tasks with increased attention/incorporation of RUE x 50% for improved overall engagement in salient and meaningful occupations 4 weeks     Long Term Goals:  - Pt will demo with G tolerance to supine, seated, and in stance exercise x 45 minutes with minimal rest breaks required for increased engagement in weekly exercise regimen and increased engagement in life roles   · Pt will increase RUE to Mod I with 0% cuing for tabletop tasks for improved functional performance of life roles, increased RUE involvement with ADLS/IADLs, and salient tasks  · Pt will increase R UE shoulder FF and shoulder ABD strength to 4-/5 through the use of strengthening exercises and home program for improved functional use of RUE for ADLs/IADLs and to maximize highest level of independence able to achieve   · Pt will increase R UE  strength to #21, pincer grasp strength to #8, tripod pinch strength to #10, and lateral pinch to #14, through the use of strengthening exercises and home program for improved functional use of RUE for ADLs/IADLs and to maximize highest level of independence able to achieve   · Pt will demo with increased RUE shoulder FF and shoulder ABD AROM to 120* with less pain reported for improve functional use of RUE for ADLs/IADLS and to maximize highest level of independence able to achieve 4 weeks   · Pt will demo with improved abilities tolerating bilateral integrative tasks with increased attention/incorporation of RUE x 75% for improved overall engagement in salient and meaningful occupations       Subjective Evaluation    Quality of life: good        SUBJECTIVE: Pt's caregiver present throughout full duration of evaluation to assist with translation  Pt is primarily English speaking  PATIENT GOAL: To move RUE with less pain  HISTORY OF PRESENT ILLNESS:     Pt is a 80 y o  female who was referred to Occupational Therapy s/p  Cerebrovascular accident (CVA), unspecified mechanism ([facility] [address] ) [I63 9]  Pt history of Abdominal pain, Anxiety, Arthritis, Bowel incontinence, Chest pain, Constipation, CVA (cerebral vascular accident) ([address] ) 2 years ago, Diabetes mellitus type II, controlled ([address] ), Disease of thyroid gland, Epigastric pain, Falls, High cholesterol, Hyperlipidemia, Hypertension, Migraine, Palpitations, Recurrent urinary tract infection, Seizures ([address] ), Sleep disorder, Stroke ([address] ), SAH, SDH, and Thyroid disease who was referred for skilled OT services 2* ongoing RUE pain and decreased functional use of RUE  Couple weeks ago Pt had a mechanical fall and subsequent head trauma resulting in small scalp laceration  Pt went to the ED a few weeks ago for evaluation  Her CT scan of head was negative for any acute pathology  Pt required 4 staples on her scalp  Pt has been following with PT as outpatient for gait disturbance 2* frequent falls throughout the last couple of years  Pt with self-report of ongoing pain reported throughout RUE beginning proximally and radiating distally with occasional occurrences of paresthesias    Pt with self-report of noticing inconsistent pain levels throughout RUE  Pt decreased functional use of RUE 2* pain  Pt continues to utilize RUE for meal time, though occasionally switches to LUE 2* fatigue and pain  Pt lives in a three story home though only resides on 1st and 2nd floor of home with   Pt currently requiring Min-Mod A for bathing routine with caregiver present to assist with functional transfer in and out of bath b and to assist with washing hair  Grab bars accessible in tub shower  Pt requires Min A to transfer to and from traditional style toilet with grab bars available for use  Pt dependent on caregiver for dressing routine at this time  Pt currently performing eating routine at Mod I status switching between RUE and LUE  Pt currently dependent on  and caregiver for all IADLs  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  Pt never performed  role  Pt's  is main transportation at this time  Pt currently having caregiver present at home for 32 hours/week  PMH:   Past Medical History:   Diagnosis Date    Abdominal pain     Anxiety     last assessed: 10/19/2013    Arthritis     osteo both hands; last assessed: 10/19/2013    Bowel incontinence     Chest pain     Constipation     CVA (cerebral vascular accident) ([address] )     Diabetes mellitus type II, controlled ([facility] [address] )     Disease of thyroid gland     Epigastric pain     with distention    Falls     High cholesterol     Hyperlipidemia     Hypertension     last assessed: 4/3/2017    Migraine     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    Palpitations     Recurrent urinary tract infection     Seizures ([address] )     Sleep disorder     last assessed: 10/19/2013    Stroke ([facility] [facility] )     Thyroid disease          Pain Levels:     Resting:  10    With Activity:  10    Objective     Functional Assessment        Comments  See impairment section for further details  Impairment Observations:        IE RUE IE LUE  Comments                 UPPER EXTREMITY FXN Impaired Intact  Pt is R hand dominant                              /Pinch Strength           Dynamometer       - Gross Grasp 15 lbs 16 lbs  abnormal   Pinch Meter        - PINCER 3 lbs 5 lbs  abnormal    - TRIPOD 4 lbs 6 lbs  abnormal    - LATERAL 8 lbs  9 lbs  subnormal               AROM (seated)        Elevation [facility] WFL     Shoulder FF Unable  WFL   Able to achieve 80* shoulder FF when engaged in Functional Dexterity Test   Shoulder Ext 50*  70*      Shoulder Abd Trace  WFL     Elbow Flex WFL  WFL      Elbow Ext WFL  WFL      Pronation WFL  WFL      Supination WFL  WFL      Wrist Flex 40*  WFL     Wrist Ext 40*  WFL      Digit Flex [facility]  WFL      Digit Ex [facility]      Composite Grasp [facility]  WFL      Hook Grasp [facility]  WFL      Opposition Bradykinetic pace   Bradykinetic pace      Dysdiadochokinesia Bradykinetic pace Bradykinetic pace     Subluxation  N/A  N/A                      AROM (supine)           Shoulder FF 70 * WFL      Shoulder ABD 70*  WFL      Elbow Flex [facility]  WFL      Elbow Ext WFL  WFL      Pronation WFL  WFL      Supination WFL  WFL                                    PROM (supine position)           Shoulder FF 90*  WFL      Shoulder *  WFL      Wrist Flex 85*  WFL      Wrist Ext 60*  WFL                                       MMT           Shoulder FF 2-/5 5/5     Shoulder Abd 2-/5 5/5     Elbow Flex 3+/5 5/5     Elbow Ext 3+/5 5/5     Wrist Flex 3+/5 5/5     Wrist Ext 3+/5 5/5     SENSATION       Myofilaments (2 83 Wnl) 2 83 2 83     Sharp Dull  Intact Intact     Proprioception Intact Intact     Hot/Cold Temp Intact Intact            COORDINATION       9 Hole Peg Test 1 minutes and 4 seconds 45 62 seconds x 2 droppage  abnormal   Fxnl Dexterity Test 1 minute and 50 seconds with x 1 droppage 1 minute and 4 seconds with x 1 droppage  abnormal   MODIFIED ITZEL SCALE (TONE)       No increase in muscle tone (0) 0 0     Slight Increase in muscle tone with catch and release or min resist at end range (1)       Slight Increase in muscle tone with catch and release, followed by min resistance through remainder of range (1+)       Increased muscle tone through full range, able to be moved easily (2)       Considerable increase in tone, difficult to move (3)       Rigid in Flexion/Extension (4)                                       OTHER PLANNED THERAPY INTERVENTIONS:   Supine, seated, and in stance neuro re-ed  FMC/prehension  Manual tx  Hand to target  Sensory re-ed  Seated functional reach: crossing midline  Supine place and hold  WBearing strategies   Closed chain activities  Open chain activities  Thera Ex  AROM/AAROM/PROM for RUE  Simulated dressing tasks    INTERVENTION COMMENTS:  Diagnosis: Cerebrovascular accident (CVA), unspecified mechanism ([facility] [address] ) [I63 9]  Precautions: fall risk, DM II, Anxiety, HTN, Seizures (NO STIM)  FOTO: 37  Insurance: Payor: [address] [place] [address],[address] [facility] REP / Plan: Wicho Jade / Product Type: Medicare HMO /   1 of ____ visits, PN due 10/16/2022

## 2022-09-20 ENCOUNTER — OFFICE VISIT (OUTPATIENT)
Dept: PHYSICAL THERAPY | Facility: REHABILITATION | Age: 72
End: 2022-09-20
Payer: MEDICARE

## 2022-09-20 DIAGNOSIS — R26.89 LOSS OF BALANCE: ICD-10-CM

## 2022-09-20 DIAGNOSIS — M75.02 ADHESIVE CAPSULITIS OF LEFT SHOULDER: ICD-10-CM

## 2022-09-20 DIAGNOSIS — I69.398 GAIT DISTURBANCE, POST-STROKE: Primary | ICD-10-CM

## 2022-09-20 DIAGNOSIS — R26.2 IMPAIRED AMBULATION: ICD-10-CM

## 2022-09-20 DIAGNOSIS — I69.30 HISTORY OF CVA WITH RESIDUAL DEFICIT: ICD-10-CM

## 2022-09-20 DIAGNOSIS — R26.9 GAIT DISTURBANCE, POST-STROKE: Primary | ICD-10-CM

## 2022-09-20 PROCEDURE — 97110 THERAPEUTIC EXERCISES: CPT

## 2022-09-20 PROCEDURE — 97112 NEUROMUSCULAR REEDUCATION: CPT

## 2022-09-20 NOTE — PROGRESS NOTES
Daily Note     Today's date: 2022  Patient name: Will Schneider  : 1950  MRN: [id number]  Referring provider: Idalia Soriano MD  Dx:   Encounter Diagnosis     ICD-10-CM    1  Gait disturbance, post-stroke  I69 398     R26 9    2  Loss of balance  R26 89    3  Impaired ambulation  R26 2    4  Adhesive capsulitis of left shoulder  M75 02    5  History of CVA with residual deficit  I69 30                   Subjective: Patient noted no new complaints  Objective: See treatment diary below      Assessment: Tolerated treatment fair  Patient had difficulty getting into [place] today therapist assist needed to re position patient  Patient was able to perform listed exercises  VC for patient to weight shift more equally throughout Bilateral  feet with standing from sit to stands  With VC and TC patient was able to improve with eccentrically lowering into chair going from standing to sitting  Patient would benefit from continued PT      Plan: Continue per plan of care        Precautions: [place] citizen Speaking, high fall risk, HTN, Diabetes mellitus type 2, seizures, osteoporosis, palpitations, chronic LBP with R sided sciatica, chronic b/l shoulder pain, hx of depression and suicidal ideation     HEP: STS ( 5x, 2 sets daily) and walking program ( 5-6 min 1 or 2x a day), heel/toe raises, hip abd, hip ext        Caregiver Education       Neuro Re-Ed       Overground Ambulation  20ft x 6 no AD Overground 75ft x4 no AD 75' x 4 no AD    Side stepping     20ft x4 no AD SOLO   Step ups  4in with HHA up/down x15 4" with intermittent HHA fwd and lat x 15 ea b/l     Backwards ambulation        Eyes closed       4 square stepping        kickball between guards       Walk between two chairs 15ft x4 with standard RW 15ft x4 with standard RW    15ftx4 no AD  15ft x4 with standard RW   Foam beams       Ther Ex       SCIFIT 10 min L2 5 min use of RUE 10min L2 5 limited use of RUE due to pain 10 min L2 5 min use of RUE    Mini squat       Step ups       Standing Hip exercises Ext x 20 ea  Abd/ext x20 ea    STS 10x       Heel/toe raises 10X HR  x10 ea    Marches        Ther Activity       Walk to retreive dumbell, bring back to chair       Transfers supine to sit        Gait training Rolling walker around cones  Tactile and visual cues for staing with walking and safely advancing walker  25'x4 Rolling walker around cones  Tactile and visual cues for staying close to walker and safely advancing device  20ft x6 Rolling walker around cones  Tactile and visual cues for staing with walking and safely advancing walker    25'x4    Manual therapy

## 2022-09-22 ENCOUNTER — OFFICE VISIT (OUTPATIENT)
Dept: PHYSICAL THERAPY | Facility: REHABILITATION | Age: 72
End: 2022-09-22
Payer: MEDICARE

## 2022-09-22 DIAGNOSIS — R26.9 GAIT DISTURBANCE, POST-STROKE: ICD-10-CM

## 2022-09-22 DIAGNOSIS — I69.30 HISTORY OF CVA WITH RESIDUAL DEFICIT: Primary | ICD-10-CM

## 2022-09-22 DIAGNOSIS — I69.398 GAIT DISTURBANCE, POST-STROKE: ICD-10-CM

## 2022-09-22 DIAGNOSIS — M75.02 ADHESIVE CAPSULITIS OF LEFT SHOULDER: ICD-10-CM

## 2022-09-22 DIAGNOSIS — R26.2 IMPAIRED AMBULATION: ICD-10-CM

## 2022-09-22 DIAGNOSIS — R26.89 LOSS OF BALANCE: ICD-10-CM

## 2022-09-22 PROCEDURE — 97112 NEUROMUSCULAR REEDUCATION: CPT

## 2022-09-22 PROCEDURE — 97110 THERAPEUTIC EXERCISES: CPT

## 2022-09-22 NOTE — PROGRESS NOTES
Daily Note     Today's date: 2022  Patient name: Estefanía Coats  : 1950  MRN: [id number]  Referring provider: Sudhakar Luo MD  Dx:   Encounter Diagnosis     ICD-10-CM    1  History of CVA with residual deficit  I69 30                   Subjective: Patient noted no new complaints  Objective: See treatment diary below      Assessment: Tolerated treatment fair  Patient was able to perform listed exercises  Improved with eccentric control with sit to stands throughout treatment today  Patient would benefit from continued PT      Plan: Continue per plan of care  Precautions: [place] Speaking, high fall risk, HTN, Diabetes mellitus type 2, seizures, osteoporosis, palpitations, chronic LBP with R sided sciatica, chronic b/l shoulder pain, hx of depression and suicidal ideation     HEP: STS ( 5x, 2 sets daily) and walking program ( 5-6 min 1 or 2x a day), heel/toe raises, hip abd, hip ext        Caregiver Education       Neuro Re-Ed       Overground Ambulation  20ft x 6 no AD 20 ft x 6 no AD 75' x 4 no AD    Side stepping     20ft x4 no AD SOLO   Step ups   4" with intermittent HHA fwd and lat x 15 ea b/l     Backwards ambulation        Eyes closed       4 square stepping        kickball between guards       Walk between two chairs 15ft x4 with standard RW 15ft x4 with standard RW  15ft x4 with standard RW   Foam beams       Ther Ex       SCIFIT 10 min L2 5 min use of RUE 10 min L 2  No UE 10 min L2 5 min use of RUE    Mini squat       Step ups       Standing Hip exercises Ext x 20 ea Ext 15x  Abd/ext x20 ea    STS 10x       Heel/toe raises 10X HR 10x HR x10 ea    Marches        Ther Activity       Walk to retreive dumbell, bring back to chair       Transfers supine to sit        Gait training Rolling walker around cones  Tactile and visual cues for staing with walking and safely advancing walker  25'x4 Rolling walker around cones    Tactile and visual cues for staing with walking and safely advancing walker  25'x4 Rolling walker around cones  Tactile and visual cues for staing with walking and safely advancing walker    25'x4    Manual therapy

## 2022-09-23 ENCOUNTER — OFFICE VISIT (OUTPATIENT)
Dept: OCCUPATIONAL THERAPY | Facility: REHABILITATION | Age: 72
End: 2022-09-23
Payer: MEDICARE

## 2022-09-23 DIAGNOSIS — I63.9 CEREBROVASCULAR ACCIDENT (CVA), UNSPECIFIED MECHANISM (HCC): Primary | ICD-10-CM

## 2022-09-23 PROCEDURE — 97530 THERAPEUTIC ACTIVITIES: CPT | Performed by: OCCUPATIONAL THERAPIST

## 2022-09-23 PROCEDURE — 97140 MANUAL THERAPY 1/> REGIONS: CPT | Performed by: OCCUPATIONAL THERAPIST

## 2022-09-23 PROCEDURE — 97110 THERAPEUTIC EXERCISES: CPT | Performed by: OCCUPATIONAL THERAPIST

## 2022-09-23 NOTE — PROGRESS NOTES
Daily Note     Today's date: 2022  Patient name: Chilo Copeland  : 1950  MRN: [id number]  Referring provider: Carolyn Marcus MD  Dx:   Encounter Diagnosis     ICD-10-CM    1  Cerebrovascular accident (CVA), unspecified mechanism ([facility] [address] )  I63 9                   Subjectve: Pt's caregiver self-report of Pt reporting resolved shoulder pain toward end of treatment session  Objective: See treatment description below    Manual Tx: MH with LPS x 10 minutes to R upper trap and shoulder/pec/scap/bicep regions with vibration applied over MH, followed by soft tissue mobilization with use of hawk  tools to L trap, anterior posterior/delt, pec, and bicep regions focusing on pain reduction to improve overall functional use of RUE and RUE AROM/AAROM/PROM  OTR advanced to performing PROM in shoulder FF/extension, shoulder ABD/ADD, horizontal ABD/ADD, and ER/IR planes, followed by performing scap mobility in side lying position focusing on improved proximal mobility, joint integrity, and pain reduction with active RUE movements  Thera Ex: Pt advanced to performing stability ball roll outs with trunk extension in shoulder FF plane with L hand over R hand focusing on improved RUE proximal mobility with less reports of pain and improved RUE AAROM  Thera Act: OTR then advanced to providing Pt with HEP to focus on improved proximal strength/stability  OTR educated Pt on the importance of performing 3 sets x 10 reps 7 days/week  OTR provided Pt with handout with both written and visual cues to improve carryover to home environment  Pt advanced to practicing both exercises included into HEP to improve understanding and performance with proper form  The following exercises are included into HEP:    - Seated scap retraction with x 3 seconds isometric holds  - Seated shoulder shrugs     Assessment: Tolerated treatment well   Patient would benefit from continued OT     Pt arrived to treatment session with significant reports of pain in R shoulder/bicep region, though pain completely resolved towards end of treatment session  Pt demo with excellent tolerance to [address] with LPS with vibration applied over MH without redness or skin integrity issues evident directly following  Pt also demo with G tolerance to soft tissue mobilization with use of hawk  tools and improved PROM in all RUE movement planes with less complaints of pain reported and less PROM restrictions evident  Pt continues to have light PROM restrictions in shoulder FF >110* and in ER, though improvement evident with repeated PROM  Pt demo with significant tightness is scap region affecting upward rotation of RUE  Stiffness reduction in scap regions evident directly following scap mobility in side lying position with improved upward rotation evident and improved tolerance to stability ball roll outs in shoulder FF plane without pain reported  Pt demo with G understanding and G performance of scap retraction and shoulder shrugs with abilities to perform with proper form after physical demonstration from OTR was performed  OTR educated Pt's caregiver on HEP to improve carryover to home environment  Plan: Continue per plan of care        INTERVENTION COMMENTS:   Diagnosis: Cerebrovascular accident (CVA), unspecified mechanism ([address] ) [I63 9]   Precautions: fall risk, DM II, Anxiety, HTN, Seizures (NO STIM)   FOTO: 37   Insurance: Payor: [address] [place] [address],[address] [facility] REP / Plan: Danya Wallace / Product Type: Medicare HMO /   2 of ____ visits, PN due 10/16/2022

## 2022-09-26 ENCOUNTER — OFFICE VISIT (OUTPATIENT)
Dept: PHYSICAL THERAPY | Facility: REHABILITATION | Age: 72
End: 2022-09-26
Payer: MEDICARE

## 2022-09-26 DIAGNOSIS — M75.02 ADHESIVE CAPSULITIS OF LEFT SHOULDER: ICD-10-CM

## 2022-09-26 DIAGNOSIS — I69.30 HISTORY OF CVA WITH RESIDUAL DEFICIT: Primary | ICD-10-CM

## 2022-09-26 DIAGNOSIS — I69.398 GAIT DISTURBANCE, POST-STROKE: ICD-10-CM

## 2022-09-26 DIAGNOSIS — R26.89 LOSS OF BALANCE: ICD-10-CM

## 2022-09-26 DIAGNOSIS — R26.9 GAIT DISTURBANCE, POST-STROKE: ICD-10-CM

## 2022-09-26 PROCEDURE — 97112 NEUROMUSCULAR REEDUCATION: CPT | Performed by: PHYSICAL THERAPIST

## 2022-09-26 PROCEDURE — 97110 THERAPEUTIC EXERCISES: CPT | Performed by: PHYSICAL THERAPIST

## 2022-09-26 NOTE — PROGRESS NOTES
Daily Note     Today's date: 2022  Patient name: Danay Grady  : 1950  MRN: [id number]  Referring provider: Charles Berry MD  Dx:   Encounter Diagnosis     ICD-10-CM    1  History of CVA with residual deficit  I69 30    2  Adhesive capsulitis of left shoulder  M75 02    3  Gait disturbance, post-stroke  I69 398     R26 9    4  Loss of balance  R26 89        Start Time: 1300  Stop Time: 1400  Total time in clinic (min): 60 minutes    Subjective: Caregiver reports patient has difficulty negotiating turns and  Pivot transfers  Objective: See treatment diary below      Assessment: Tolerated treatment well  Required resting periods between activities  Worked on pivot transfer with focus on control and proper body mechanics during small area negotiation  Added treadmill ambulation with two IE support  Verbal cues as for decreased UE weight bearing and increased step length  Carolyn Low would benefit from continued PT      Plan: Continue per plan of care  Precautions: [place] Speaking, high fall risk, HTN, Diabetes mellitus type 2, seizures, osteoporosis, palpitations, chronic LBP with R sided sciatica, chronic b/l shoulder pain, hx of depression and suicidal ideation     HEP: STS ( 5x, 2 sets daily) and walking program ( 5-6 min 1 or 2x a day), heel/toe raises, hip abd, hip ext        122/58 BP HR 74 /8    Caregiver Education        Neuro Re-Ed        Overground Ambulation  20ft x 6 no AD 20 ft x 6 no ' x2 solostep     Treadmill ambulation  4 min    6mph    3 min   7mph 75' x 4 no AD    Side stepping      20ft x4 no AD SOLO   Step ups   4" hurdles without cane x 4 LOB  With cane x 4 decreased LOB 4" with intermittent HHA fwd and lat x 15 ea b/l     Backwards ambulation         Eyes closed        4 square stepping         kickball between guards        Walk between two chairs 15ft x4 with standard RW 15ft x4 with standard RW Pivot transfers chair to chair x 6    Walk from chair to chair 14' away turn sit  x 6 laps  15ft x4 with standard RW   Foam beams        Ther Ex        SCIFIT 10 min L2 5 min use of RUE 10 min L 2  No UE 10 min lvl 2 for reciprocal movement pattern UE 10 min L2 5 min use of RUE    Mini squat        Step ups        Standing Hip exercises Ext x 20 ea Ext 15x   Abd/ext x20 ea    STS 10x        Heel/toe raises 10X HR 10x HR  x10 ea    Gait training Rolling walker around cones  Tactile and visual cues for staing with walking and safely advancing walker  25'x4 Rolling walker around cones  Tactile and visual cues for staing with walking and safely advancing walker  25'x4  Rolling walker around cones  Tactile and visual cues for staing with walking and safely advancing walker    25'x4    Manual therapy

## 2022-09-27 ENCOUNTER — OFFICE VISIT (OUTPATIENT)
Dept: OCCUPATIONAL THERAPY | Facility: REHABILITATION | Age: 72
End: 2022-09-27
Payer: MEDICARE

## 2022-09-27 DIAGNOSIS — I63.9 CEREBROVASCULAR ACCIDENT (CVA), UNSPECIFIED MECHANISM (HCC): Primary | ICD-10-CM

## 2022-09-27 PROCEDURE — 97530 THERAPEUTIC ACTIVITIES: CPT | Performed by: OCCUPATIONAL THERAPIST

## 2022-09-27 PROCEDURE — 97110 THERAPEUTIC EXERCISES: CPT | Performed by: OCCUPATIONAL THERAPIST

## 2022-09-27 PROCEDURE — 97140 MANUAL THERAPY 1/> REGIONS: CPT | Performed by: OCCUPATIONAL THERAPIST

## 2022-09-27 NOTE — PROGRESS NOTES
Daily Note     Today's date: 2022  Patient name: Elmer Ceja  : 1950  MRN: [id number]  Referring provider: Dimitry Roman MD  Dx:   Encounter Diagnosis     ICD-10-CM    1  Cerebrovascular accident (CVA), unspecified mechanism ([facility] [address] )  I63 9                   Subjectve: "No pain "      Objective: See treatment description below    Manual Tx: MH with LPS x 10 minutes to R upper trap and shoulder/pec/scap/bicep regions with vibration applied over MH, followed by soft tissue mobilization with use of hawk  tools to anterior posterior/delt, pec, and bicep regions focusing on pain reduction to improve overall functional use of RUE and RUE AROM/AAROM/PROM  OTR advanced to performing PROM in shoulder FF/extension, shoulder ABD/ADD, horizontal ABD/ADD, and ER/IR planes, followed by performing scap mobility in side lying position focusing on improved proximal mobility, joint integrity, and pain reduction with active RUE movements  Thera Ex: Pt advanced to performing 3 sets x 10 reps of supine place and holds at shoulder FF 90* position x 5 seconds, followed by performing slowed eccentric motor control focusing on improved RUE proximal strength/stability, improved eccentric motor control, and increased RUE AROM in shoulder FF plane  Pt advanced to completing 3 sets x 10 reps of motor combination of elbow flexion leading into ceiling punch up with full tricep extension with slowed eccentric motor control focusing on improved RUE proximal strength/endurance, improved RUE concentric/eccentric motor control, and improved RUE AROM with less pain reported  Pt performing 1 set x 10 reps of seated thera ex with UB ranger in scap retraction/protraction, horizontal ABD/ADD, and shoulder FF/extension movement planes focusing on improved RUE AAROM, improved RUE proximal strength/endurance, and improved overall RUE motor control       Thera Act: Pt concluded tx session with seated functional reach with midline crossing for dowel retrieval and placement in dowel board utilizing calibrated hand gripper at level #2 resistance with RUE, followed by removing dowels from dowel board and placement in container placed at eye level utilizing gross grasp to calibrated hand gripper with level #1 resistance focusing on improved proximal/distal teaming, improved RUE proximal/distal strength/endurance, and improved activity tolerance with RUE AROM with less pain reported  Assessment: Tolerated treatment well  Patient would benefit from continued OT     Pt arrived to tx session with pain 5/10 in R shoulder/bicep regions, though Pt responded significantly well to [address] with LPS with vibration and manual techniques with self-report of reduced pain to 0/10 directly following and resolved pain extending throughout full duration of tx session  Pt demo with excellent tolerance to supine thera ex for completion of place and holds at shoulder FF to 90* with x 5 second holds and performance of motor combinations without pain reported  Pt demo with ongoing poor eccentric motor control in shoulder FF movement planes with stabilization assist required  Pt presented with improved concentric motor control with shoulder FF/extension AAROM with UB ranger, though demo with Max difficulties maintaining eccentric control throughout  Pt demo with excellent motor control with scap retraction/protraction and horizontal ABD/ADD without stabilization assist required  Pt demo with initial G abilities sustaining gross grasp to calibrated hand gripper at level #2 resistance for dowel retrieval and placement in dowel board with 0% droppage, though Pt demo with increased difficulties sustaining grasp as activity persisted with increased droppage and Max fatigue reported  Pt required decreased resistance to #1 on calibrated hand gripper for removal of dowels and placement in container at eye level    Pt demo without complaints of pain when performing functional reach with shoulder FF at 90* for dowel placement  Plan: Continue per plan of care        INTERVENTION COMMENTS:   Diagnosis: Cerebrovascular accident (CVA), unspecified mechanism ([address] ) [I63 9]   Precautions: fall risk, DM II, Anxiety, HTN, Seizures (NO STIM)   FOTO: 37   Insurance: Payor: [address] [place], [place] [address] REP / Plan: ROBBY aMrroquin 53 / Product Type: Medicare HMO /   3 of ____ visits, PN due 10/16/2022

## 2022-09-30 ENCOUNTER — PATIENT OUTREACH (OUTPATIENT)
Dept: FAMILY MEDICINE CLINIC | Facility: CLINIC | Age: 72
End: 2022-09-30

## 2022-09-30 ENCOUNTER — OFFICE VISIT (OUTPATIENT)
Dept: PHYSICAL THERAPY | Facility: REHABILITATION | Age: 72
End: 2022-09-30
Payer: MEDICARE

## 2022-09-30 ENCOUNTER — OFFICE VISIT (OUTPATIENT)
Dept: OCCUPATIONAL THERAPY | Facility: REHABILITATION | Age: 72
End: 2022-09-30
Payer: MEDICARE

## 2022-09-30 DIAGNOSIS — I69.398 GAIT DISTURBANCE, POST-STROKE: ICD-10-CM

## 2022-09-30 DIAGNOSIS — I69.30 HISTORY OF CVA WITH RESIDUAL DEFICIT: Primary | ICD-10-CM

## 2022-09-30 DIAGNOSIS — R26.9 GAIT DISTURBANCE, POST-STROKE: ICD-10-CM

## 2022-09-30 DIAGNOSIS — M75.02 ADHESIVE CAPSULITIS OF LEFT SHOULDER: ICD-10-CM

## 2022-09-30 DIAGNOSIS — I63.9 CEREBROVASCULAR ACCIDENT (CVA), UNSPECIFIED MECHANISM (HCC): Primary | ICD-10-CM

## 2022-09-30 DIAGNOSIS — R26.89 LOSS OF BALANCE: ICD-10-CM

## 2022-09-30 DIAGNOSIS — R26.2 IMPAIRED AMBULATION: ICD-10-CM

## 2022-09-30 PROCEDURE — 97110 THERAPEUTIC EXERCISES: CPT

## 2022-09-30 PROCEDURE — 97530 THERAPEUTIC ACTIVITIES: CPT | Performed by: OCCUPATIONAL THERAPIST

## 2022-09-30 PROCEDURE — 97112 NEUROMUSCULAR REEDUCATION: CPT

## 2022-09-30 PROCEDURE — 97116 GAIT TRAINING THERAPY: CPT

## 2022-09-30 PROCEDURE — 97140 MANUAL THERAPY 1/> REGIONS: CPT | Performed by: OCCUPATIONAL THERAPIST

## 2022-09-30 PROCEDURE — 97110 THERAPEUTIC EXERCISES: CPT | Performed by: OCCUPATIONAL THERAPIST

## 2022-09-30 NOTE — PROGRESS NOTES
CRISTI ARTHUR noted in chart that the patient has continue to follow up with PT/OT  Patient recently in office for staples being removed following a mechanical fall  CRISTI ARTHUR placed follow up call to the son, Hector Moran regarding the request of increase in Waiver [facility] hours  CRISTI CM will continue to remain available for psychosocial support as needed

## 2022-09-30 NOTE — PROGRESS NOTES
Daily Note     Today's date: 2022  Patient name: Raegan Chopra  : 1950  MRN: [id number]  Referring provider: Donte Gilliland MD  Dx:   Encounter Diagnosis     ICD-10-CM    1  History of CVA with residual deficit  I69 30    2  Adhesive capsulitis of left shoulder  M75 02    3  Gait disturbance, post-stroke  I69 398     R26 9    4  Impaired ambulation  R26 2    5  Loss of balance  R26 89                   Subjective: Caregiver reports no changes since last session  Objective: See treatment diary below      Assessment: Tolerated treatment well  Focused on gait training  Several LOB forward requiring ( A )  Poor foot clearance and safety awareness requiring frequent cues  Eduardo [place] would benefit from continued PT      Plan: Continue per plan of care  Precautions: [place]ian Speaking, high fall risk, HTN, Diabetes mellitus type 2, seizures, osteoporosis, palpitations, chronic LBP with R sided sciatica, chronic b/l shoulder pain, hx of depression and suicidal ideation     HEP: STS ( 5x, 2 sets daily) and walking program ( 5-6 min 1 or 2x a day), heel/toe raises, hip abd, hip ext        122/58 BP HR 74    Caregiver Education        Neuro Re-Ed        Overground Ambulation  20ft x 6 no AD 20 ft x 6 no ' x2 solostep     Treadmill ambulation  4 min    6mph    3 min   7mph 100' with rollator x 2 laps without AD            Side stepping      20ft x4 no AD SOLO   Step ups   4" hurdles without cane x 4 LOB  With cane x 4 decreased LOB     Backwards ambulation         Eyes closed        4 square stepping         kickball between guards        Walk between two chairs 15ft x4 with standard RW 15ft x4 with standard RW Pivot transfers chair to chair x 6    Walk from chair to chair 14' away turn sit     x 6 laps Pivot transfers chair to chair x6    Walk from chair to chair 10ft away turn and sit 6 laps        15ft x4 with standard RW   Foam beams        Ther Ex        SCIFIT 10 min L2 5 min use of RUE 10 min L 2  No UE 10 min lvl 2 for reciprocal movement pattern UE 10 min L2 for reciprocal movement pattern UE    Mini squat        Step ups        Standing Hip exercises Ext x 20 ea Ext 15x       STS 10x        Heel/toe raises 10X HR 10x HR      Gait training Rolling walker around cones  Tactile and visual cues for staing with walking and safely advancing walker  25'x4 Rolling walker around cones  Tactile and visual cues for staing with walking and safely advancing walker    25'x4      Manual therapy

## 2022-09-30 NOTE — PROGRESS NOTES
Daily Note     Today's date: 2022  Patient name: Chilo Copeland  : 1950  MRN: [id number]  Referring provider: Carolyn Marcus MD  Dx:   Encounter Diagnosis     ICD-10-CM    1  Cerebrovascular accident (CVA), unspecified mechanism ([facility] [address] )  I63 9                   Subjectve: "Pain better "      Objective: See treatment description below    Manual Tx: MH with LPS x 10 minutes to R upper trap and shoulder/pec/scap/bicep regions with vibration applied over MH, followed by soft tissue mobilization with use of hawk  tools to anterior posterior/delt, pec, and bicep regions focusing on pain reduction to improve overall functional use of RUE and RUE AROM/AAROM/PROM  OTR advanced to performing PROM in shoulder FF/extension, shoulder ABD/ADD, horizontal ABD/ADD, and ER/IR planes, followed by performing scap mobility in side lying position focusing on improved proximal mobility, joint integrity, and pain reduction with active RUE movements  Thera Ex:  Pt performing 2 sets x 10 reps of seated thera ex with UB ranger in scap retraction/protraction, horizontal ABD/ADD, and shoulder FF/extension movement planes focusing on improved RUE AAROM with less pain reported, improved RUE proximal strength/endurance, and improved overall RUE motor control  Thera Act: Pt concluded tx session with seated functional reach with midline crossing for weaving loom material and placement on weaving loom board placed at eye level in a horizontal pattern with yellow theraband wrap around RUE focusing on improved RUE proximal strength/endurance/stability, improved RUE proximal/distal teaming, and improved bilateral coordination  Assessment: Tolerated treatment well   Patient would benefit from continued OT     Pt arrived to tx session with pain 4/10 in R shoulder/bicep regions, though Pt responded significantly well to [address] with LPS with vibration and manual techniques with self-report of reduced pain to 0/10 directly following and resolved pain extending throughout full duration of tx session  Pt demo with improved activity tolerance to performing AAROM in various planes with UB ranger with significant less stabilization assist required and improved abilities maintaining alignment  Pt demo with ongoing decreased eccentric motor control in shoulder FF/ext movement planes with stabilization assist required to maintain control throughout full range  Pt demo with significant improved bilateral coordination for placement of weaving loom on weaving loom board placed at eye level with abilities to achieve shoulder FF to 90* with theraband wrap without pain reported  Pt able to place 100% of weaving loom material without assistance and without droppage present  Plan: Continue per plan of care        INTERVENTION COMMENTS:   Diagnosis: Cerebrovascular accident (CVA), unspecified mechanism ([facility] [address] ) [I63 9]   Precautions: fall risk, DM II, Anxiety, HTN, Seizures (NO STIM)   FOTO: 37   Insurance: Payor: [address] [place], [place] [id number] [facility] REP / Plan: Tal Carreno / Product Type: Medicare HMO /   4 of ____ visits, PN due 10/16/2022

## 2022-10-03 ENCOUNTER — APPOINTMENT (OUTPATIENT)
Dept: OCCUPATIONAL THERAPY | Facility: REHABILITATION | Age: 72
End: 2022-10-03

## 2022-10-03 DIAGNOSIS — K21.9 GASTROESOPHAGEAL REFLUX DISEASE, UNSPECIFIED WHETHER ESOPHAGITIS PRESENT: ICD-10-CM

## 2022-10-03 NOTE — TELEPHONE ENCOUNTER
PATIENT IS REQUESTING REFILL ON HER MEDICATION     PLEASE ADVISE        omeprazole (PriLOSEC) 20 mg delayed release capsule

## 2022-10-04 RX ORDER — OMEPRAZOLE 20 MG/1
20 CAPSULE, DELAYED RELEASE ORAL DAILY
Qty: 30 CAPSULE | Refills: 2 | Status: SHIPPED | OUTPATIENT
Start: 2022-10-04

## 2022-10-06 ENCOUNTER — OFFICE VISIT (OUTPATIENT)
Dept: OCCUPATIONAL THERAPY | Facility: REHABILITATION | Age: 72
End: 2022-10-06
Payer: MEDICARE

## 2022-10-06 ENCOUNTER — OFFICE VISIT (OUTPATIENT)
Dept: PHYSICAL THERAPY | Facility: REHABILITATION | Age: 72
End: 2022-10-06
Payer: MEDICARE

## 2022-10-06 DIAGNOSIS — M75.02 ADHESIVE CAPSULITIS OF LEFT SHOULDER: ICD-10-CM

## 2022-10-06 DIAGNOSIS — R26.9 GAIT DISTURBANCE, POST-STROKE: ICD-10-CM

## 2022-10-06 DIAGNOSIS — R26.89 LOSS OF BALANCE: ICD-10-CM

## 2022-10-06 DIAGNOSIS — I63.9 CEREBROVASCULAR ACCIDENT (CVA), UNSPECIFIED MECHANISM (HCC): Primary | ICD-10-CM

## 2022-10-06 DIAGNOSIS — I69.398 GAIT DISTURBANCE, POST-STROKE: ICD-10-CM

## 2022-10-06 DIAGNOSIS — I69.30 HISTORY OF CVA WITH RESIDUAL DEFICIT: Primary | ICD-10-CM

## 2022-10-06 PROCEDURE — 97112 NEUROMUSCULAR REEDUCATION: CPT | Performed by: OCCUPATIONAL THERAPIST

## 2022-10-06 PROCEDURE — 97112 NEUROMUSCULAR REEDUCATION: CPT | Performed by: PHYSICAL THERAPIST

## 2022-10-06 PROCEDURE — 97110 THERAPEUTIC EXERCISES: CPT | Performed by: OCCUPATIONAL THERAPIST

## 2022-10-06 PROCEDURE — 97140 MANUAL THERAPY 1/> REGIONS: CPT | Performed by: OCCUPATIONAL THERAPIST

## 2022-10-06 NOTE — PROGRESS NOTES
Daily Note         Today's date: 10/6/2022  Patient name: Raegan Chopra  : 1950  MRN: [id number]  Referring provider: Donte Gilliland MD  Dx:   Encounter Diagnosis     ICD-10-CM    1  History of CVA with residual deficit  I69 30    2  Adhesive capsulitis of left shoulder  M75 02    3  Gait disturbance, post-stroke  I69 398     R26 9    4  Loss of balance  R26 89        Start Time: 1030  Stop Time: 1115  Total time in clinic (min): 45 minutes    Subjective: Caregiver reports she has been tryingto have pateint walk without the walker short distances  Objective: See treatment diary below      Assessment: Trial outdoor ambulation without walker  Verbal cues as for increaed step length  Client with tendency to reach towards surfaces, however, with verbal cues, she was shavon to ambulate without support  Focused treatment on endurance ambulation with alance challenges with self correction on treadmill  She was able to ambulate forwards, and towards each side  Eduardo [place] would benefit from continued PT      Plan: Continue per plan of care  Precautions: Korean Speaking, high fall risk, HTN, Diabetes mellitus type 2, seizures, osteoporosis, palpitations, chronic LBP with R sided sciatica, chronic b/l shoulder pain, hx of depression and suicidal ideation     HEP: STS ( 5x, 2 sets daily) and walking program ( 5-6 min 1 or 2x a day), heel/toe raises, hip abd, hip ext        122/58 BP HR 74 9/30 10/06  115/68 BP 78 HR   Caregiver Education        Neuro Re-Ed        Overground Ambulation  20ft x 6 no AD 20 ft x 6 no ' x2 solostep     Treadmill ambulation  4 min    6mph    3 min   7mph 100' with rollator x 2 laps without AD         Outdoor ambulation with use of gait belt and 1 person assist  No assistive device 100'  Negotiating   curb step x 3    Treadmill ambulation    6 mph x 5 min    7 mph x 4 min    Sideways  2 min Right  2 in left    Resting periods between activities    Overground ambulation 100' with contact guard with gait belt  Side stepping         Step ups   4" hurdles without cane x 4 LOB  With cane x 4 decreased LOB     Backwards ambulation         Eyes closed        4 square stepping         kickball between guards        Walk between two chairs 15ft x4 with standard RW 15ft x4 with standard RW Pivot transfers chair to chair x 6    Walk from chair to chair 14' away turn sit  x 6 laps Pivot transfers chair to chair x6    Walk from chair to chair 10ft away turn and sit 6 laps           Foam beams        Ther Ex    9/30    SCIFIT 10 min L2 5 min use of RUE 10 min L 2  No UE 10 min lvl 2 for reciprocal movement pattern UE 10 min L2 for reciprocal movement pattern UE    Mini squat        Step ups        Standing Hip exercises Ext x 20 ea Ext 15x       STS 10x        Heel/toe raises 10X HR 10x HR      Gait training Rolling walker around cones  Tactile and visual cues for staing with walking and safely advancing walker  25'x4 Rolling walker around cones  Tactile and visual cues for staing with walking and safely advancing walker    25'x4      Manual therapy

## 2022-10-06 NOTE — PROGRESS NOTES
Daily Note     Today's date: 10/6/2022  Patient name: Gerald Lopez  : 1950  MRN: [id number]  Referring provider: Alisa Sevilla MD  Dx:   Encounter Diagnosis     ICD-10-CM    1  Cerebrovascular accident (CVA), unspecified mechanism ([facility] [address] )  I63 9                   Subjective: "Little pain "      Objective: See treatment description below    Manual Tx: MH with LPS x 10 minutes to R upper trap and shoulder/pec/scap/bicep regions with vibration applied over MH, followed by soft tissue mobilization with use of hawk  tools to anterior posterior/delt, pec, and bicep regions focusing on pain reduction to improve overall functional use of RUE and RUE AROM/AAROM/PROM  OTR advanced to performing PROM in shoulder FF/extension, shoulder ABD/ADD, horizontal ABD/ADD, and ER/IR planes, followed by performing scap mobility in side lying position focusing on improved proximal mobility, joint integrity, and pain reduction with active RUE movements  Thera Ex: Pt engaged in performing supine thera ex for completion of 2 sets x 10 reps of unilateral chest press with full tricep extension with RUE focusing on improved R proximal strength/stability/endurance, improved activity tolerance to RUE functional movements with less pain reported, and increased RUE AROM  Neuro Re-ed: Pt concluded tx session with performing supine to seated functional transfers, followed by performing seated weight bearing to RUE with demands of righting self back to midline focusing on improved independence with bed mobility, improved RUE proximal strength/stability, and improved RUE proprioceptive feedback/kinesthestic awareness  Assessment: Tolerated treatment well   Patient would benefit from continued OT     Pt arrived to tx session with pain 4/10 in R shoulder/bicep/pec regions, though Pt responded significantly well to [address] with LPS with vibration and manual techniques with self-report of reduced pain to 0/10 directly following and resolved pain extending throughout full duration of tx session  Pt presented with decreased tolerance to soft tissue mobilization with use of hawk  tools to pec region on this date with significant discomfort reported  Pt required initial stabilization A to performing RUE chest press with full tricep extension, though concluded thera ex with abilities to perform without stabilization required and abilities to perform full tricep extension  Pt continues to require Mod A with performance of supine to seated functional transfer with ongoing difficulties performing safe bed mobility in home environment  Pt demo with ongoing Mod A to assist with righting self back to midline utilizing RUE with tendencies of utilizing LUE to compensate  Plan: Continue per plan of care        INTERVENTION COMMENTS:   Diagnosis: Cerebrovascular accident (CVA), unspecified mechanism ([facility] [address] ) [I63 9]   Precautions: fall risk, DM II, Anxiety, HTN, Seizures (NO STIM)   FOTO: 37   Insurance: Payor: [address] [place] [address],[address] [facility] [address] REP / Plan: Tiny Cole / Product Type: Medicare HMO /   5 of ____ visits, PN due 10/16/2022

## 2022-10-10 ENCOUNTER — APPOINTMENT (OUTPATIENT)
Dept: OCCUPATIONAL THERAPY | Facility: REHABILITATION | Age: 72
End: 2022-10-10

## 2022-10-10 ENCOUNTER — APPOINTMENT (OUTPATIENT)
Dept: PHYSICAL THERAPY | Facility: REHABILITATION | Age: 72
End: 2022-10-10

## 2022-10-13 ENCOUNTER — OFFICE VISIT (OUTPATIENT)
Dept: OCCUPATIONAL THERAPY | Facility: REHABILITATION | Age: 72
End: 2022-10-13
Payer: MEDICARE

## 2022-10-13 ENCOUNTER — OFFICE VISIT (OUTPATIENT)
Dept: PHYSICAL THERAPY | Facility: REHABILITATION | Age: 72
End: 2022-10-13
Payer: MEDICARE

## 2022-10-13 DIAGNOSIS — I63.9 CEREBROVASCULAR ACCIDENT (CVA), UNSPECIFIED MECHANISM (HCC): Primary | ICD-10-CM

## 2022-10-13 DIAGNOSIS — R26.2 IMPAIRED AMBULATION: ICD-10-CM

## 2022-10-13 DIAGNOSIS — I69.30 HISTORY OF CVA WITH RESIDUAL DEFICIT: Primary | ICD-10-CM

## 2022-10-13 DIAGNOSIS — M75.02 ADHESIVE CAPSULITIS OF LEFT SHOULDER: ICD-10-CM

## 2022-10-13 PROCEDURE — 97140 MANUAL THERAPY 1/> REGIONS: CPT | Performed by: OCCUPATIONAL THERAPIST

## 2022-10-13 PROCEDURE — 97112 NEUROMUSCULAR REEDUCATION: CPT | Performed by: PHYSICAL THERAPIST

## 2022-10-13 PROCEDURE — 97112 NEUROMUSCULAR REEDUCATION: CPT | Performed by: OCCUPATIONAL THERAPIST

## 2022-10-13 PROCEDURE — 97110 THERAPEUTIC EXERCISES: CPT | Performed by: OCCUPATIONAL THERAPIST

## 2022-10-13 NOTE — PROGRESS NOTES
Progress Update        Today's date: 10/13/2022  Patient name: Jazlyn Carrion  : 1950  MRN: [id number]  Referring provider: Vahid Lazar MD  Dx:   Encounter Diagnosis     ICD-10-CM    1  History of CVA with residual deficit  I69 30    2  Adhesive capsulitis of left shoulder  M75 02    3  Impaired ambulation  R26 2        Subjective:   Sometimes needs help getting up from sitting, but this is getting better  Have to be next to her  Always uses walking in the home, sometimes uses belt instead of walker        Objective: See treatment diary below      Balance Test Initial Eval 6/29  8/02 9/12 10/13/22   5x Sit to Stand: 58 78s w Sourav Lathe from 21" chair  43 seconds 2 UE push off 17 5" chair 50 sec (1st trial)    35 sec 2 UE push off  17/5" chair (2nd trial) Able without hands from 18" surface, without hands, but intermittent posterior loss of balance causing rapid posterior loss of balance to chair   TUs  w rollator CGA-Marina for rollator mgmt and cues 36 sec rollator no assistance  28 seconds without assistive device 38s with rollator (1st trail)    39s with rollator (2nd trail)    29s no AD (1st trial)    24s no AD (2nd trial)     36 seconds with rollator walker  41 seconds second trial with rollator walker   Gait Speed:          2 Minute Walk Test: 204ft w rollator and occ min A with rollator mgmt       6 Minute Walk Test: 544ft w rollator and occ min A with rollator mgmt     2, 10-30s standing rest breaks 620 ' with rollator  5' with rollator  450 with 4-wheeled walker and contact guard, no external assist      Precautions: [place] Islander Speaking, high fall risk, HTN, Diabetes mellitus type 2, seizures, osteoporosis, palpitations, chronic LBP with R sided sciatica, chronic b/l shoulder pain, hx of depression and suicidal ideation     HEP: STS ( 5x, 2 sets daily) and walking program ( 5-6 min 1 or 2x a day), heel/toe raises, hip abd, hip ext        122/58 BP HR 74 9/30 10/06  115/68 BP 78 HR 10/13/22   Caregiver Education      /63   67 bpm   Neuro Re-Ed    9/30     Overground Ambulation  20ft x 6 no AD 20 ft x 6 no ' x2 solostep     Treadmill ambulation  4 min    6mph    3 min   7mph 100' with rollator x 2 laps without AD         Outdoor ambulation with use of gait belt and 1 person assist  No assistive device 100'  Negotiating   curb step x 3    Treadmill ambulation    6 mph x 5 min    7 mph x 4 min    Sideways  2 min Right  2 in left    Resting periods between activities    Overground ambulation 100' with contact guard with gait belt  Overground walking with 4-wheeled walker 200 feet  Without AD   200 feet    Without AD tossing and catching ball in Solo, 100 feet x 2 sets    Standing kicking ball off wall, frequent hand hold assist, 3 min x 2 sets     Sit to stand, 18" surface, reaching up to target, 5 x 2 sets    100 feet overgroud 53 sec  44 sec  Stepping over 2" cones 20 feet x 2 sets  1 more 100 feet walk no AD    /72  64 bpm       Side stepping          Step ups   4" hurdles without cane x 4 LOB  With cane x 4 decreased LOB      Backwards ambulation          Eyes closed         4 square stepping          kickball between guards         Walk between two chairs 15ft x4 with standard RW 15ft x4 with standard RW Pivot transfers chair to chair x 6    Walk from chair to chair 14' away turn sit  x 6 laps Pivot transfers chair to chair x6    Walk from chair to chair 10ft away turn and sit 6 laps            Foam beams         Ther Ex    9/30     SCIFIT 10 min L2 5 min use of RUE 10 min L 2  No UE 10 min lvl 2 for reciprocal movement pattern UE 10 min L2 for reciprocal movement pattern UE     Mini squat         Step ups         Standing Hip exercises Ext x 20 ea Ext 15x        STS 10x         Heel/toe raises 10X HR 10x HR       Gait training Rolling walker around cones  Tactile and visual cues for staing with walking and safely advancing walker  25'x4 Rolling walker around cones    Tactile and visual cues for staing with walking and safely advancing walker  25'x4       Manual therapy                      Assessment:   Continues to struggle with mobility, continued increase risk of falling with household and community mobility  We appear to have a significant fear of falling that manifests with marked co-contraction of the lower extremities while walking, but this can be somewhat variable and does seem to improve a bit with distraction  Speed of movement can degrade to the very slow, taking roughly 10-12 seconds to move from standing to seated position  We also see less of this as we add more variability to the task  Better movements with external cueing  It's also possible very slowed movements are adaptive given limited capacity to maintain balance in certain conditions, with fear of falling, but we intermittently see really coordinated hip and knee flexion to step over objects  Goals  STG  Pt will be independent in HEP  Pt will report improved balance with ambulation and no falls when ambulating at home with rollator  ongoing  Pt will improve TUG by 2 9s ([address] for chronic CVA population) for improved balance  MET    LTG  Pt will perform the TUG 6s faster and with CGA by therapist to demonstrate decreased fall risk and improved independence with mobility  NOT MET  Pt will improve 6MWT by 164ft to demonstrate improved aerobic endurance  NOT MET  Pt will be able to perform the 5XSTS in no more than 15 5 sec ( age matched norm) to demonstrate improved LE strength  NOT MET  Pt will be able to demonstrate and report  improved use of the RUE/RLE during MMT and functional mobility  NOT MET  Plan: Neuromuscular re-education, therapeutic exercise, therapeutic activity,  2x per week for  1 month

## 2022-10-13 NOTE — PROGRESS NOTES
Daily Note     Today's date: 10/13/2022  Patient name: Jazlyn Carrion  : 1950  MRN: [id number]  Referring provider: Vahid Lazar MD  Dx:   Encounter Diagnosis     ICD-10-CM    1  Cerebrovascular accident (CVA), unspecified mechanism ([facility] [address] )  I63 9                   Subjective: "Better (meaning pain) "      Objective: See treatment description below    Manual Tx: MH with LPS x 10 minutes to R upper trap and shoulder/pec/scap/bicep regions with vibration applied over MH, followed by soft tissue mobilization with use of hawk  tools to anterior posterior/delt, pec, and bicep regions focusing on pain reduction to improve overall functional use of RUE and RUE AROM/AAROM/PROM  OTR advanced to performing PROM in shoulder FF/extension, shoulder ABD/ADD, horizontal ABD/ADD, and ER/IR planes, followed by performing scap mobility in side lying position and manual distraction focusing on improved proximal mobility, joint integrity, and pain reduction with active RUE movements  Thera Ex: Pt engaged in seated thera ex for completion of 2 sets x 10 reps of ball rolls on table top in scap retraction/protraction and POS movement planes, followed by completing 2 sets x 10 reps of bilateral shoulder FF AAROM to 160* with OTR providing stabilization assist throughout full range focusing on improved concentric/eccentric motor control, increased RUE AROM/AAROM, increased proximal/posterior strength/stability, and improved overall proximal motor functioning        Neuro Re-ed: Pt advanced to performing various closed chain and open chain tasks in quadruped position beginning with performing x 20 weight shifts in sagittal plane, advanced to performing RUE reach throughs in R horizontal ADD movement plane, and then concluded functional task with performing target taps with RUE and then with LUE in shoulder FF/shoulderABD/shoulder Ext movement planes focusing on increased RUE proximal strength/stability, increased RUE proprioceptive feedback/kinesthetic awareness, improved RUE hand target precision, increased RUE AROM in various movement planes, and improved activity tolerance for placing body weight throughout RUE to increase abilities incorporating RUE with functional transfers/bed mobility  Assessment: Tolerated treatment well  Patient would benefit from continued OT     Pt arrived to tx session with pain 3/10 in R shoulder/pec region  Pt continues to present positively to manual techniques with pain resolving to 0/10 and resolved pain lasting throughout remainder of tx session improving overall functional performance  Pt presents with significant reduced PROM restrictions in both shoulder FF/ABD plane with abilities to achieve full 180* PROM without pain reported  Pt demo with excellent tolerance to seated thera ex for ball rolls in both scap retraction/protraction and POS movement planes with abilities to perform with full range and without complaints of pain reported  Pt continues to present with increased difficulties maintaining eccentric motor control verses concentric motor control with shoulder FF AAROM requiring stabilization assist from OTR throughout  Pt demo with excellent tolerance to closed/open chain tasks in quadruped position with slight stabilization A required from OTR to R elbow for prevention of elbow buckling with weight shifts and sole weight bearing to RUE when LUE performed target taps  Pt demo with increased time required to achieve target taps with RUE in all movement planes 2* poor RUE automaticity with motor delays present  Pt presents with emerging increased incorporation of RUE with functional transfers and bed mobility with increased confidence placing more weight into RUE throughout  Plan: Continue per plan of care        INTERVENTION COMMENTS:   Diagnosis: Cerebrovascular accident (CVA), unspecified mechanism ([facility] [address] ) [I63 9]   Precautions: fall risk, DM II, Anxiety, HTN, Seizures (NO STIM)   FOTO: 37   Insurance: Payor: [address] [place] [address],[address]  REP / Plan: Annalise Rosas / Product Type: Medicare HMO /   6 of ____ visits, PN due 10/16/2022

## 2022-10-18 ENCOUNTER — OFFICE VISIT (OUTPATIENT)
Dept: PHYSICAL THERAPY | Facility: REHABILITATION | Age: 72
End: 2022-10-18
Payer: MEDICARE

## 2022-10-18 ENCOUNTER — EVALUATION (OUTPATIENT)
Dept: OCCUPATIONAL THERAPY | Facility: REHABILITATION | Age: 72
End: 2022-10-18
Payer: MEDICARE

## 2022-10-18 DIAGNOSIS — R26.9 GAIT DISTURBANCE, POST-STROKE: ICD-10-CM

## 2022-10-18 DIAGNOSIS — M75.02 ADHESIVE CAPSULITIS OF LEFT SHOULDER: ICD-10-CM

## 2022-10-18 DIAGNOSIS — I63.9 CEREBROVASCULAR ACCIDENT (CVA), UNSPECIFIED MECHANISM (HCC): Primary | ICD-10-CM

## 2022-10-18 DIAGNOSIS — R26.89 LOSS OF BALANCE: ICD-10-CM

## 2022-10-18 DIAGNOSIS — R26.2 IMPAIRED AMBULATION: ICD-10-CM

## 2022-10-18 DIAGNOSIS — I69.30 HISTORY OF CVA WITH RESIDUAL DEFICIT: Primary | ICD-10-CM

## 2022-10-18 DIAGNOSIS — I69.398 GAIT DISTURBANCE, POST-STROKE: ICD-10-CM

## 2022-10-18 PROCEDURE — 97112 NEUROMUSCULAR REEDUCATION: CPT | Performed by: OCCUPATIONAL THERAPIST

## 2022-10-18 PROCEDURE — 97110 THERAPEUTIC EXERCISES: CPT

## 2022-10-18 PROCEDURE — 97530 THERAPEUTIC ACTIVITIES: CPT | Performed by: OCCUPATIONAL THERAPIST

## 2022-10-18 PROCEDURE — 97112 NEUROMUSCULAR REEDUCATION: CPT

## 2022-10-18 NOTE — PROGRESS NOTES
OCCUPATIONAL THERAPY RE-EVALUATION:    10/18/2022  Chilo Copeland  1950  [id number]  Carolyn Marcus MD   Diagnosis ICD-10-CM Associated Orders   1  Cerebrovascular accident (CVA), unspecified mechanism ([facility] [address] )  I63 9          Assessment  Assessment details: See skilled analysis for further details  Skilled Analysis:  Pt is a 80 y o  female referred to Occupational Therapy s/p Cerebrovascular accident (CVA), unspecified mechanism ([address] ) [I63 9]  Pt participated in skilled OT evaluation and following formalized testing as well as clinical observation, Pt presents with the following areas of deficit:  Pain throughout RUE beginning proximally and radiating distally with occasional paresthesias resulting in decreased functional use of RUE, decreased R AAROM/AROM/PROM, inattention to RUE affecting ADL/IADL completion, decreased bilateral coordination, RUE proximal/distal/intrinsic weakness, and poor R proximal stabilization  Pt presented with increased R AROM to 80* when distracted and when performing 9-hole peg test and functional dexterity test  Pt will benefit from skilled Occupational Therapy services 2x/week for 12 weeks with focus on neuro re-ed, manual tx, thera act, thera ex, and self-care management to improve on the above deficits, increase functional use of RUE, maximize highest level of independence able to achieve, and enhance overall QOL  Upon this date (10/18/2022), Pt participated in re-evaluation to further assess fxnl progression towards Occupational Therapy goals   Pt demo with fair + functional progression towards goals in POC evident by increased shoulder FF/ABD AROM/AAROM/PROM in RUE with less pain reported, excellent response to manual tx with reduced pain to 0/10 directly following each session, decreased paresthesias reported improving sensory re-ed, significant improved attention to RUE increased overall proprioception/kinesthestic awareness, increased R in-hand manipulation with improved speed and accuracy, and increased abilities incorporating RUE in functional transfers/bed mobility  Following formalized testing and clinical observation, Pt continues to present with the following limitation:  Poor R proximal strength/stability, ongoing decreased proximal AROM/PROM limited by slight pain, learned non-use of RUE with tendencies to overcompensate with LUE, decreased bilateral coordination/integration, decreased R FMC/prehension with frequent droppage present, and ongoing pain reported throughout RUE  OTR recommending Pt to continue skilled OT services 1-2x/week for 6-8 more weeks to improve on the above deficits, improve functional use of RUE, maximize highest level of independence able to achieve, and enhance overall QOL          Short Term Goals:  - Pt's caregiver will demo with G carryover of Home Stretching Program in home environment to improve proximal mobility/flexibility 4 weeks-- PARTIALLY MET  - Pt will demo with G tolerance to supine, seated, and in stance exercise x 30 minutes with minimal rest breaks required for increased engagement in weekly exercise regimen and increased engagement in life roles 4 weeks-- MET  · Pt will increase RUE to refined assist with <10% cuing for tabletop tasks for improved functional performance of life roles, increased RUE involvement with ADLS/IADLs, and salient tasks 4 weeks-- NOT MET  · Pt will increase R UE shoulder FF and shoulder ABD strength to 3+/5 through the use of strengthening exercises and home program for improved functional use of RUE for ADLs/IADLs and to maximize highest level of independence able to achieve 4 weeks-- PARTIALLY MET   · Pt will increase R UE  strength to #18, pincer grasp strength to #6, tripod pinch strength to #7, and lateral pinch to #11, through the use of strengthening exercises and home program for improved functional use of RUE for ADLs/IADLs and to maximize highest level of independence able to achieve 4 weeks-- NOT MET   · Pt will demo with increased RUE shoulder FF and shoulder ABD AROM to 90* with less pain reported for improve functional use of RUE for ADLs/IADLS and to maximize highest level of independence able to achieve 4 weeks-- PARTIALLY MET  · Pt will demo with improved abilities tolerating bilateral integrative tasks with increased attention/incorporation of RUE x 50% for improved overall engagement in salient and meaningful occupations 4 weeks-- PARTIALLY MET     Long Term Goals:  - Pt will demo with G tolerance to supine, seated, and in stance exercise x 45 minutes with minimal rest breaks required for increased engagement in weekly exercise regimen and increased engagement in life roles-- PARTIALLY MET   · Pt will increase RUE to Mod I with 0% cuing for tabletop tasks for improved functional performance of life roles, increased RUE involvement with ADLS/IADLs, and salient tasks-- NOT MET  · Pt will increase R UE shoulder FF and shoulder ABD strength to 4-/5 through the use of strengthening exercises and home program for improved functional use of RUE for ADLs/IADLs and to maximize highest level of independence able to achieve-- NOT MET   · Pt will increase R UE  strength to #21, pincer grasp strength to #8, tripod pinch strength to #10, and lateral pinch to #14, through the use of strengthening exercises and home program for improved functional use of RUE for ADLs/IADLs and to maximize highest level of independence able to achieve-- NOT MET   · Pt will demo with increased RUE shoulder FF and shoulder ABD AROM to 120* with less pain reported for improve functional use of RUE for ADLs/IADLS and to maximize highest level of independence able to achieve 4 weeks -- NOT MET  · Pt will demo with improved abilities tolerating bilateral integrative tasks with increased attention/incorporation of RUE x 75% for improved overall engagement in salient and meaningful occupations -- NOT MET      Subjective Evaluation    Quality of life: good        SUBJECTIVE: "I am feeling better, but it still hurts "  Pt's aide present throughout full duration of re-evaluation to assist with translation  PATIENT GOAL: To move RUE with less pain  HISTORY OF PRESENT ILLNESS:     Pt is a 80 y o  female who was referred to Occupational Therapy s/p  Cerebrovascular accident (CVA), unspecified mechanism ([facility] [address] ) [I63 9]  Pt history of Abdominal pain, Anxiety, Arthritis, Bowel incontinence, Chest pain, Constipation, CVA (cerebral vascular accident) ([facility] [address] ) 2 years ago, Diabetes mellitus type II, controlled ([facility] [address] ), Disease of thyroid gland, Epigastric pain, Falls, High cholesterol, Hyperlipidemia, Hypertension, Migraine, Palpitations, Recurrent urinary tract infection, Seizures ([facility] [address] ), Sleep disorder, Stroke ([facility] [address] ), SAH, SDH, and Thyroid disease who was referred for skilled OT services 2* ongoing RUE pain and decreased functional use of RUE  Couple weeks ago Pt had a mechanical fall and subsequent head trauma resulting in small scalp laceration  Pt went to the ED a few weeks ago for evaluation  Her CT scan of head was negative for any acute pathology  Pt required 4 staples on her scalp  Pt has been following with PT as outpatient for gait disturbance 2* frequent falls throughout the last couple of years  Pt with self-report of ongoing pain reported throughout RUE beginning proximally and radiating distally with occasional occurrences of paresthesias  Pt with self-report of noticing inconsistent pain levels throughout RUE  Pt decreased functional use of RUE 2* pain  Pt continues to utilize RUE for meal time, though occasionally switches to LUE 2* fatigue and pain  Pt lives in a three story home though only resides on 1st and 2nd floor of home with     Pt currently requiring Min-Mod A for bathing routine with caregiver present to assist with functional transfer in and out of bath b and to assist with washing hair  Grab bars accessible in tub shower  Pt requires Min A to transfer to and from traditional style toilet with grab bars available for use  Pt dependent on caregiver for dressing routine at this time  Pt currently performing eating routine at Mod I status switching between RUE and LUE  Pt currently dependent on  and caregiver for all IADLs  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  Pt never performed  role  Pt's  is main transportation at this time  Pt currently having caregiver present at home for 32 hours/week  PMH:   Past Medical History:   Diagnosis Date   • Abdominal pain    • Anxiety     last assessed: 10/19/2013   • Arthritis     osteo both hands; last assessed: 10/19/2013   • Bowel incontinence    • Chest pain    • Constipation    • CVA (cerebral vascular accident) ([facility] [address] )    • Diabetes mellitus type II, controlled ([facility] [address] )    • Disease of thyroid gland    • Epigastric pain     with distention   • Falls    • High cholesterol    • Hyperlipidemia    • Hypertension     last assessed: 4/3/2017   • Migraine     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   • Palpitations    • Recurrent urinary tract infection    • Seizures ([facility] [address] )    • Sleep disorder     last assessed: 10/19/2013   • Stroke [facility])    • Thyroid disease          Pain Levels:     Restin    With Activity:  8    Objective     Functional Assessment        Comments  See impairment section for further details  Impairment Observations:        IE RUE IE LUE  Comments                 UPPER EXTREMITY FXN Impaired Intact  Pt is R hand dominant                              /Pinch Strength           Dynamometer       - Gross Grasp 11 lbs 19 lbs  abnormal   Pinch Meter        - PINCER 3 lbs 5 lbs  Maintained    - TRIPOD 4 lbs 6 lbs  Maintained    - LATERAL 6 lbs  7 lbs  subnormal               AROM (seated)        Elevation [facility]     Shoulder FF 85* WFL   Significant increased shoulder FF AROM with short level; increased 85* since evaluation   Shoulder Ext 70*  70*   Significant increased R shoulder extension AROM;  Increased by 20*   Shoulder Abd 75* WFL  Significant increased R shoulder ABD AROM; increased by 75* since evaluation   Elbow Flex [facility]  WFL      Elbow Ext [facility]  WFL      Pronation WFL  WFL      Supination [facility]  WFL      Wrist Flex 65*  WFL  Increased wrist extension AROM x 25*   Wrist Ext 50*  WFL   Increased wrist ext AROM x 10*   Digit Flex [facility]      Digit Ex [facility]      Composite Grasp [facility]  WFL      Hook Grasp [facility]  WFL      Opposition Bradykinetic pace   Bradykinetic pace   Continues   Dysdiadochokinesia Bradykinetic pace Bradykinetic pace  Continues   Subluxation  N/A  N/A                      AROM (supine)           Shoulder  * WFL   Significant increased shoulder FF AROM in RUE in supine position; increased by 30*   Shoulder ABD 95*  WFL   Significant increased shoulder ABD AROM in RUE in supine position; increased by 25*   Elbow Flex [facility]  WFL      Elbow Ext [facility]  WFL      Pronation WFL  WFL      Supination WFL  WFL                                    PROM (supine position)           Shoulder *  WFL   Increased shoulder FF PROM in RUE by 20*   Shoulder *  WFL   Maintained   Wrist Flex [facility] WFL      Wrist Ext [facility]                                       MMT           Shoulder FF 3-/5 5/5  Increased   Shoulder Abd 3-/5 5/5  Increased   Elbow Flex 4/5 5/5  Increased   Elbow Ext 4/5 5/5  Increased   Wrist Flex 3+/5 5/5     Wrist Ext 3+/5 5/5     SENSATION       Myofilaments (2 83 Wnl) 2 83 2 83     Sharp Dull  Intact Intact     Proprioception Intact Intact     Hot/Cold Temp Intact Intact            COORDINATION       9 Hole Peg Test 1 minutes and 53 seconds x 3 droppage 27 39 seconds x 0 droppage  abnormal;continues to present significant increased time to retrieve and place/remove x 9 pegs with increased droppage present on this date   Fxnl Dexterity Test 1 minute and 34 seconds with x 1 droppage 1 minute and 11 seconds with x 1 droppage  Improved speed and accuracy of in-hand manipulation abilities with R hand   MODIFIED ITZEL SCALE (TONE)       No increase in muscle tone (0) 0 0     Slight Increase in muscle tone with catch and release or min resist at end range (1)       Slight Increase in muscle tone with catch and release, followed by min resistance through remainder of range (1+)       Increased muscle tone through full range, able to be moved easily (2)       Considerable increase in tone, difficult to move (3)       Rigid in Flexion/Extension (4)                                       OTHER PLANNED THERAPY INTERVENTIONS:   Supine, seated, and in stance neuro re-ed  FMC/prehension  Manual tx  Hand to target  Sensory re-ed  Seated functional reach: crossing midline  Supine place and hold  WBearing strategies   Closed chain activities  Open chain activities  Thera Ex  AROM/AAROM/PROM for RUE  Simulated dressing tasks    INTERVENTION COMMENTS:  Diagnosis: Cerebrovascular accident (CVA), unspecified mechanism ([facility] [address] ) [I63 9]  Precautions: fall risk, DM II, Anxiety, HTN, Seizures (NO STIM)  FOTO: 31  Insurance: Payor: Michael Sugn MEDICARE [facility] REP / Plan: Venice Kimble / Product Type: Medicare HMO /   7 of 16 visits through 11/15, PN due 11/18/2022

## 2022-10-18 NOTE — PROGRESS NOTES
Daily Note     Today's date: 10/18/2022  Patient name: Elicia Cruz  : 1950  MRN: [id number]  Referring provider: Nancey Siemens, MD  Dx:   Encounter Diagnosis     ICD-10-CM    1  History of CVA with residual deficit  I69 30    2  Adhesive capsulitis of left shoulder  M75 02    3  Impaired ambulation  R26 2    4  Gait disturbance, post-stroke  I69 398     R26 9    5  Loss of balance  R26 89                   Subjective: Patient noted no new complaints  Objective: See treatment diary below      Assessment: Tolerated treatment fair  Patient needed VC to keep RW close with ambulation and to decrease forward lean with ambulation with walker  Patient was able to ambulate on TM for total of 7 minutes today  All exercises performed in SOLO step today  Patient would benefit from continued PT      Plan: Continue per plan of care        INTERVENTION COMMENTS:   Diagnosis: Cerebrovascular accident (CVA), unspecified mechanism ([facility] [address] ) [I63 9]   Precautions: fall risk, DM II, Anxiety, HTN, Seizures (NO STIM)   FOTO: 37   Insurance: Payor: [address] [place] [address],[address] [facility] Bhakta REP / Plan: Meghan Urena / Product Type: Medicare [facility] /   6 of ____ visits, PN due 10/16/2022      Precautions: Nepalese Speaking, high fall risk, HTN, Diabetes mellitus type 2, seizures, osteoporosis, palpitations, chronic LBP with R sided sciatica, chronic b/l shoulder pain, hx of depression and suicidal ideation     HEP: STS ( 5x, 2 sets daily) and walking program ( 5-6 min 1 or 2x a day), heel/toe raises, hip abd, hip ext     10/18/22   9/30 10/06  115/68 BP 78 HR 10/13/22   Caregiver Education      /63   67 bpm   Neuro Re-Ed         Overground Ambulation  Overground walking with 4-wheeled walker 200 feet  Without AD   200 feet    Without AD tossing and catching ball in Solo, 50ft x 2 with CG     Standing kicking ball off wall, frequent hand hold assist, 3 min x 2 sets     Sit to stand,  [facility] chair, 5 x 2    amb around cones with RW    2  Laps x 2     TM    Treadmill ambulation    5  mph x  2min    7 mph x 5 min   Total of 7 min    100' with rollator x 2 laps without AD         Outdoor ambulation with use of gait belt and 1 person assist  No assistive device 100'  Negotiating   curb step x 3    Treadmill ambulation    6 mph x 5 min    7 mph x 4 min    Sideways  2 min Right  2 in left    Resting periods between activities    Overground ambulation 100' with contact guard with gait belt  Overground walking with 4-wheeled walker 200 feet  Without AD   200 feet    Without AD tossing and catching ball in Solo, 100 feet x 2 sets    Standing kicking ball off wall, frequent hand hold assist, 3 min x 2 sets     Sit to stand, 18" surface, reaching up to target, 5 x 2 sets    100 feet overgroud 53 sec  44 sec  Stepping over 2" cones 20 feet x 2 sets  1 more 100 feet walk no AD    /72  64 bpm       Side stepping          Step ups   4" hurdles without cane x 4 LOB  With cane x 4 decreased LOB      Backwards ambulation          Eyes closed         4 square stepping          kickball between guards         Walk between two chairs   Pivot transfers chair to chair x 6    Walk from chair to chair 14' away turn sit     x 6 laps Pivot transfers chair to chair x6    Walk from chair to chair 10ft away turn and sit 6 laps            Foam beams         Ther Ex    9/30     SCIFIT   10 min lvl 2 for reciprocal movement pattern UE 10 min L2 for reciprocal movement pattern UE     Mini squat         Step ups         Standing Hip exercises         STS         Heel/toe raises         Gait training         Manual therapy

## 2022-10-20 ENCOUNTER — OFFICE VISIT (OUTPATIENT)
Dept: OCCUPATIONAL THERAPY | Facility: REHABILITATION | Age: 72
End: 2022-10-20
Payer: MEDICARE

## 2022-10-20 DIAGNOSIS — I63.9 CEREBROVASCULAR ACCIDENT (CVA), UNSPECIFIED MECHANISM (HCC): Primary | ICD-10-CM

## 2022-10-20 PROCEDURE — 97140 MANUAL THERAPY 1/> REGIONS: CPT | Performed by: OCCUPATIONAL THERAPIST

## 2022-10-20 PROCEDURE — 97110 THERAPEUTIC EXERCISES: CPT | Performed by: OCCUPATIONAL THERAPIST

## 2022-10-20 PROCEDURE — 97112 NEUROMUSCULAR REEDUCATION: CPT | Performed by: OCCUPATIONAL THERAPIST

## 2022-10-20 NOTE — PROGRESS NOTES
Daily Note     Today's date: 10/20/2022  Patient name: Bret Graff  : 1950  MRN: [id number]  Referring provider: Barbaraann Gaucher, MD  Dx:   Encounter Diagnosis     ICD-10-CM    1  Cerebrovascular accident (CVA), unspecified mechanism ([facility] [address] )  I63 9                   Subjective: "No pain "      Objective: See treatment description below    Manual Tx: MH with LPS x 10 minutes to R upper trap and shoulder/pec/scap/bicep regions with vibration applied over MH, followed by PROM in shoulder FF/extension, shoulder ABD/ADD, horizontal ABD/ADD, and ER/IR planes and manual distraction focusing on improved proximal mobility, joint integrity, and pain reduction with active RUE movements  Thera Ex:  Pt advanced to performing 3 sets x 10 reps of stability roll outs in shoulder FF/extension plane with full trunk extension, followed by completing 3 sets x 10 reps of circular movements while weight bearing to RUE on stability ball in clockwise/counter clockwise movement patterns focusing on increased RUE AAROM with less pain reported, improved proximal strength/endurance, and improved overall R proximal stability  Neuro Re-Ed:  Pt concluded tx session with completing 2 sets x 10 reps of towel slides on T-bar in PNF D1/D2 flexion/extension movement planes with RUE focusing on improved RUE proximal strength/endurance/stability, increased RUE proprioception/kinesthestic awareness, and improved overall motor control  Assessment: Tolerated treatment well  Patient would benefit from continued OT     Pt arrived to tx session with 0/10 pain reported that extended throughout full duration of tx session  Pt demo with significant improved activity tolerance to both thera ex and neuro re-ed on this date with abilities to complete each functional movement without increased pain reported    Pt demo with excellent shoulder FF/extension AAROM for stability ball roll outs with excellent motor control and shoulder alignment present throughout  Pt demo with increased difficulties maintaining amplitude of circular movements on stability ball with frequent verbal cues required to increase amplitude of functional movements  Pt demo with tendencies to utilize LUE to assist with achieving full AAROM in both PNF D1/D2 flexion/extension movement planes, though demo with improved both concentric/eccentric motor control throughout  Plan: Continue per plan of care        INTERVENTION COMMENTS:  Diagnosis: Cerebrovascular accident (CVA), unspecified mechanism ([facility] [address] ) [I63 9]  Precautions: fall risk, DM II, Anxiety, HTN, Seizures (NO STIM)  FOTO: 31  Insurance: Payor: [address] [place] [address] REP / Plan: Yaw Womack / Product Type: Medicare HMO /   8 of 16 visits through 11/15, PN due 11/18/2022

## 2022-10-21 ENCOUNTER — APPOINTMENT (OUTPATIENT)
Dept: PHYSICAL THERAPY | Facility: REHABILITATION | Age: 72
End: 2022-10-21

## 2022-10-25 ENCOUNTER — APPOINTMENT (OUTPATIENT)
Dept: OCCUPATIONAL THERAPY | Facility: REHABILITATION | Age: 72
End: 2022-10-25

## 2022-10-25 ENCOUNTER — OFFICE VISIT (OUTPATIENT)
Dept: PHYSICAL THERAPY | Facility: REHABILITATION | Age: 72
End: 2022-10-25
Payer: MEDICARE

## 2022-10-25 DIAGNOSIS — M75.02 ADHESIVE CAPSULITIS OF LEFT SHOULDER: ICD-10-CM

## 2022-10-25 DIAGNOSIS — I69.30 HISTORY OF CVA WITH RESIDUAL DEFICIT: Primary | ICD-10-CM

## 2022-10-25 DIAGNOSIS — R26.2 IMPAIRED AMBULATION: ICD-10-CM

## 2022-10-25 DIAGNOSIS — I69.398 GAIT DISTURBANCE, POST-STROKE: ICD-10-CM

## 2022-10-25 DIAGNOSIS — R26.9 GAIT DISTURBANCE, POST-STROKE: ICD-10-CM

## 2022-10-25 DIAGNOSIS — R26.89 LOSS OF BALANCE: ICD-10-CM

## 2022-10-25 PROCEDURE — 97110 THERAPEUTIC EXERCISES: CPT | Performed by: PHYSICAL THERAPIST

## 2022-10-25 PROCEDURE — 97112 NEUROMUSCULAR REEDUCATION: CPT | Performed by: PHYSICAL THERAPIST

## 2022-10-25 NOTE — PROGRESS NOTES
Discharge     Today's date: 10/25/2022  Patient name: Joie Flores  : 1950  MRN: [id number]  Referring provider: Sonali Tavarez MD  Dx:   Encounter Diagnosis     ICD-10-CM    1  History of CVA with residual deficit  I69 30    2  Adhesive capsulitis of left shoulder  M75 02    3  Impaired ambulation  R26 2    4  Gait disturbance, post-stroke  I69 398     R26 9    5  Loss of balance  R26 89                   Subjective:  Still using the walker at all time at home  Will sometimes walk for abotu 10ft with her aid holding the gait belt from behind  Does all ADL's at home with assistance from her aid  Does leg exercises in her bed, and does hand/arm exercises as well  Her Aid tries to encourage her to move and not be limited by her pain  Zev Lombardi denies having issues with depression or anxiety, and does not feel she needs help with this  No falls recently  Objective: See treatment diary below    Goals  STG  Pt will be independent in HEP  Pt will report improved balance with ambulation and no falls when ambulating at home with rollator  MET  Pt will improve TUG by 2 9s ([facility] for chronic CVA population) for improved balance  MET    LTG  Pt will perform the TUG 6s faster and with CGA by therapist to demonstrate decreased fall risk and improved independence with mobility  NOT MET  Pt will improve 6MWT by 164ft to demonstrate improved aerobic endurance   NOT MET  Pt will be able to perform the 5XSTS in no more than 15 5 sec ( age matched norm) to demonstrate improved LE strength  NOT MET  Pt will be able to demonstrate and report  improved use of the RUE/RLE during MMT and functional mobility  NOT MET  Assessment: Since starting PT, Zev Lombardi has presented for her gait dysfunction with hx of CVA  With repeated outcome measures, she has unfortunately demonstrated less and less progress with PT, and appears to have reached platuea at this time   In terms of her functional mobility outside of therapy, she demonstrates little change in function  Continues to rely on home health aid for performing some ADLs and safely ambulating in the community  Her presentation with her gait is not consistent with her hx of CVA  As previously noted, she is highly fearful of falling, and her gait deficits are more likely secondary to maladaptive behaviors/movement patterns, which has been discussed with pt and home health aid  Unfortunately these movement pattern with gait have not improved significantly with PT  Pt was not interested in seeking out psych services at this time  At this time recommended transition to HEP and follow with PT if mobility declines  Reviewed HEP and safety recommendations today  Plan: Discharge  INTERVENTION COMMENTS:   Diagnosis: Cerebrovascular accident (CVA), unspecified mechanism ([facility] [address] ) [I63 9]   Precautions: fall risk, DM II, Anxiety, HTN, Seizures (NO STIM)   FOTO: 37   Insurance: Payor: [address] [place], [place] [address]Th [facility] REP / Plan: Benedict Barriga / Product Type: Medicare HMO /   6 of ____ visits, PN due 10/16/2022      Precautions: [place], high fall risk, HTN, Diabetes mellitus type 2, seizures, osteoporosis, palpitations, chronic LBP with R sided sciatica, chronic b/l shoulder pain, hx of depression and suicidal ideation       HEP: STS ( 5x, 2 sets daily) and walking program ( 5-6 min 1 or 2x a day), heel/toe raises, hip abd, hip ext, minisquats      10/18/22 10/25  9/30 10/06  115/68 BP 78 HR 10/13/22   Caregiver Education      /63   67 bpm   Neuro Re-Ed    9/30     Overground Ambulation  Overground walking with 4-wheeled walker 200 feet  Without AD   200 feet    Without AD tossing and catching ball in Solo, 50ft x 2 with CG     Standing kicking ball off wall, frequent hand hold assist, 3 min x 2 sets     Sit to stand,  Wander chair, 5 x 2    amb around cones with RW    2  Laps x 2     TM    Treadmill ambulation    5  mph x  2min    7 mph x 5 min   Total of 7 min  250ft overground no AD, CG and solo due to occasional fwd loss of balance  200ft with rolator     Walk, take bean bag, walk to marker and toss  X10  Occasional loss of balance needing assistance to maintain balance  100' with rollator x 2 laps without AD         Outdoor ambulation with use of gait belt and 1 person assist  No assistive device 100'  Negotiating   curb step x 3    Treadmill ambulation    6 mph x 5 min    7 mph x 4 min    Sideways  2 min Right  2 in left    Resting periods between activities    Overground ambulation 100' with contact guard with gait belt   Overground walking with 4-wheeled walker 200 feet  Without AD   200 feet    Without AD tossing and catching ball in Solo, 100 feet x 2 sets    Standing kicking ball off wall, frequent hand hold assist, 3 min x 2 sets     Sit to stand, 18" surface, reaching up to target, 5 x 2 sets    100 feet overgroud 53 sec  44 sec  Stepping over 2" cones 20 feet x 2 sets  1 more 100 feet walk no AD    /72  64 bpm       Side stepping          Step ups         Backwards ambulation          Eyes closed         4 square stepping          kickball between guards         Walk between two chairs    Pivot transfers chair to chair x6    Walk from chair to chair 10ft away turn and sit 6 laps            Foam beams         Ther Ex    9/30     SCIFIT    10 min L2 for reciprocal movement pattern UE     Mini squat  Reviewed for HEP x10 reps       Step ups         Standing Hip exercises  Reviewed for HEP x10 reps       STS  Reviewed for HEP x10 reps       Heel/toe raises  Reviewed for HEP x10 reps       Gait training         Manual therapy

## 2022-10-26 ENCOUNTER — TELEPHONE (OUTPATIENT)
Dept: NEUROLOGY | Facility: CLINIC | Age: 72
End: 2022-10-26

## 2022-10-26 NOTE — TELEPHONE ENCOUNTER
Attempted to call patient in regards to seeing if patient is coming in for appt  Patient did not answer and call hung up after ringing 5 times

## 2022-10-27 ENCOUNTER — OFFICE VISIT (OUTPATIENT)
Dept: OCCUPATIONAL THERAPY | Facility: REHABILITATION | Age: 72
End: 2022-10-27
Payer: MEDICARE

## 2022-10-27 ENCOUNTER — APPOINTMENT (OUTPATIENT)
Dept: PHYSICAL THERAPY | Facility: REHABILITATION | Age: 72
End: 2022-10-27

## 2022-10-27 DIAGNOSIS — I63.9 CEREBROVASCULAR ACCIDENT (CVA), UNSPECIFIED MECHANISM (HCC): Primary | ICD-10-CM

## 2022-10-27 PROCEDURE — 97110 THERAPEUTIC EXERCISES: CPT | Performed by: OCCUPATIONAL THERAPIST

## 2022-10-27 PROCEDURE — 97140 MANUAL THERAPY 1/> REGIONS: CPT | Performed by: OCCUPATIONAL THERAPIST

## 2022-10-27 NOTE — PROGRESS NOTES
Daily Note     Today's date: 10/27/2022  Patient name: Tim Mcnulty  : 1950  MRN: [id number]  Referring provider: Last Miguel MD  Dx:   Encounter Diagnosis     ICD-10-CM    1  Cerebrovascular accident (CVA), unspecified mechanism ([facility] [address] )  I63 9                   Subjective: "Little pain "      Objective: See treatment description below    Manual Tx: MH with LPS x 10 minutes to R upper trap and shoulder/pec/scap/bicep regions with vibration applied over MH, followed by PROM in shoulder FF/extension, shoulder ABD/ADD, horizontal ABD/ADD, and ER/IR planes and manual distraction focusing on improved proximal mobility, joint integrity, and pain reduction with active RUE movements  Thera Ex:  Pt advanced to performing x 15 minutes of motor combination of scap retraction/protation, tricep extension, and shoulder FF to 90* bilaterally with sustained grasp to power web focusing on improved activity tolerance to RUE strengthening tasks/functional movements, improved concentric/eccentric motor control, improved bilateral coordination/symmetry, and increased proximal strength/endurance/stability/AROM  Pt concluded tx session with performing x 5 minutes of performing composite grasp to green power web with x 5 second isometric holds focusing on increase R intrinsic/distal strength/endurance for improved functional use of RUE  Assessment: Tolerated treatment well  Patient would benefit from continued OT     Pt arrived to tx session with 3/10 pain reported in R shoulder/pec regions, though pain resolved to 0/10 after manual techniques with resolved pain extending throughout full duration of tx session improving overall functional performance/activity tolerance  Pt demo with abilities to tolerate 15 minutes of seated thera ex with improved both concentric/eccentric motor control for completion of motor combination bilaterally with power web  Pt able to achieve shoulder FF to 90* without pain reported    Pt demo with excellent activity tolerance to composite grasp to green power web with abilities to advance to performing x 5 second isometric holds without pain/discomfort or fatigue reported  Pt and Pt's aide with self-report of continues to demo with G carryover of HEP to home environment and significant increased use of RUE with ADLs  Plan: Continue per plan of care        INTERVENTION COMMENTS:  Diagnosis: Cerebrovascular accident (CVA), unspecified mechanism ([facility] [address] ) [I63 9]  Precautions: fall risk, DM II, Anxiety, HTN, Seizures (NO STIM)  FOTO: 31  Insurance: Payor: [address] [place] [address],[address] [facility] REP / Plan: Gege Favorite / Product Type: Medicare HMO /   9 of 16 visits through 11/15, PN due 11/18/2022

## 2022-10-31 ENCOUNTER — OFFICE VISIT (OUTPATIENT)
Dept: OCCUPATIONAL THERAPY | Facility: REHABILITATION | Age: 72
End: 2022-10-31

## 2022-10-31 DIAGNOSIS — I63.9 CEREBROVASCULAR ACCIDENT (CVA), UNSPECIFIED MECHANISM (HCC): Primary | ICD-10-CM

## 2022-10-31 NOTE — PROGRESS NOTES
Daily Note     Today's date: 10/31/2022  Patient name: Amy Sen  : 1950  MRN: [id number]  Referring provider: Malcom Major MD  Dx:   Encounter Diagnosis     ICD-10-CM    1  Cerebrovascular accident (CVA), unspecified mechanism ([facility] [address] )  I63 9                   Subjective: "Pain today "      Objective: See treatment description below    Manual Tx: MH with LPS x 10 minutes to R upper trap and shoulder/pec/scap/bicep regions with vibration applied over MH, followed by soft tissue mobilization with use of hawk  tools to R pec/anterior and posterior delt/bicep/tricep and PROM in shoulder FF/extension, shoulder ABD/ADD, horizontal ABD/ADD, and ER/IR planes and manual distraction focusing on improved proximal mobility, joint integrity, and pain reduction with active RUE movements  Thera Ex: Pt advanced to performing thera ex in prone position for completion of 2 sets x 10 reps of scap stabilization exercise in I, Y, T planes with RUE focusing on improved R proximal/posterior strength/endurance/stability, improved overall RUE motor control, and improved shoulder alignment  Thera Act: Pt transitioned to completion of bilateral integrative task of peg retrieval with LUE and transitioning peg to RUE with IR/ER functional movements for peg placement in peg board utilizing RUE focusing on improved bilateral coordination, improved RUE proximal strength/endurance, improved proximal/distal teaming, and improved overall R FMC/prehension abilities  Thera Ex: Pt concluded tx session with completion of UBE x 2 minutes prograde and x 2 minutes retrograde in seated position bilaterally without resistance focusing on improved RUE strength/endurance, improved bilateral coordination, and improved grasp to handle with RUE  Assessment: Tolerated treatment well   Patient would benefit from continued OT     Pt arrived to tx session with 3/10 pain reported in R shoulder/pec regions, though pain resolved to 0/10 after manual techniques with resolved pain extending throughout full duration of tx session improving overall functional performance/activity tolerance  Pt demo with improved concentric/eccentric motor control when performing I scap stabilizing exercises, though required stabilization A when performing both Y and T scap stabilizing exercise 2* poor eccentric motor control evident  Pt demo with abilities to pass peg from one hand to the other for placement in peg board without difficulties performing IR, though increased challenges presented when performing ER functional movements with frequent tendencies to compensate for decreased RUE AROM with LUE  Pt demo with excellent tolerance to UBE demands with abilities to complete x 4 minutes bilaterally without resistance without complaints of pain or fatigue and with abilities to maintain appropriate pace throughout  Plan: Continue per plan of care        INTERVENTION COMMENTS:  Diagnosis: Cerebrovascular accident (CVA), unspecified mechanism ([facility] [address] ) [I63 9]  Precautions: fall risk, DM II, Anxiety, HTN, Seizures (NO STIM)  FOTO: 31  Insurance: Payor: [address] [place] [address],[address] [facility] REP / Plan: Vic Mann / Product Type: Medicare HMO /   10 of 16 visits through 11/15, PN due 11/18/2022

## 2022-11-01 ENCOUNTER — TELEPHONE (OUTPATIENT)
Dept: FAMILY MEDICINE CLINIC | Facility: CLINIC | Age: 72
End: 2022-11-01

## 2022-11-01 NOTE — TELEPHONE ENCOUNTER
PATIENT'S SPOUSE CONTACTED OFFICE AND IS REQUESTING PER  MORE HOURS FOR HOME HEALTH AND BED COMMODE    PLEASE ADVISE

## 2022-11-01 NOTE — TELEPHONE ENCOUNTER
Caregiver Alanna Inman called asking if a script for :    Adult wipes  Adult pullups large  underpads large    Can be sent to medical supply   Please advise and or call [place] care giver  @ [phone]

## 2022-11-03 ENCOUNTER — OFFICE VISIT (OUTPATIENT)
Dept: OCCUPATIONAL THERAPY | Facility: REHABILITATION | Age: 72
End: 2022-11-03

## 2022-11-03 DIAGNOSIS — I63.9 CEREBROVASCULAR ACCIDENT (CVA), UNSPECIFIED MECHANISM (HCC): Primary | ICD-10-CM

## 2022-11-03 NOTE — PROGRESS NOTES
Daily Note     Today's date: 11/3/2022  Patient name: Michaelle Wharton  : 1950  MRN: [id number]  Referring provider: Betty Hua MD  Dx:   Encounter Diagnosis     ICD-10-CM    1  Cerebrovascular accident (CVA), unspecified mechanism ([facility] [address] )  I63 9                   Subjective: "Pain today "      Objective: See treatment description below    Manual Tx: MH with LPS x 10 minutes to R upper trap and shoulder/pec/scap/bicep regions with vibration applied over MH, followed by soft tissue mobilization with use of hawk  tools to R pec/anterior and posterior delt/bicep/tricep and PROM in shoulder FF/extension, shoulder ABD/ADD, horizontal ABD/ADD, and ER/IR planes and manual distraction focusing on improved proximal mobility, joint integrity, and pain reduction with active RUE movements  Thera Ex: Pt advanced to performing 3 sets x 10 reps of motor combination in supine position for completion of elbow flexion leading into tricep extension/ceiling punch ups with slowed eccentric motor control focusing on improved R concentric/eccentric motor control, improved R proximal strength/endurance, and improved overall activity tolerance to R proximal movements  Thera Act: Pt transitioned to performing 3 sets x 10 reps of ball pushes bilaterally with #1 T-bar focusing on improved B/L coordination/symmetry, improved R proximal stability/strength, and improved RUE automaticity  Thera Ex: Pt concluded tx session with completion of UBE x 5 minutes prograde and x 5 minutes retrograde in seated position bilaterally with 1 1 resistance focusing on improved RUE strength/endurance, improved bilateral coordination, and improved grasp to handle with RUE  Assessment: Tolerated treatment well   Patient would benefit from continued OT     Pt arrived to tx session with 2/10 pain reported in R shoulder/pec regions, though pain resolved to 0/10 after manual techniques with resolved pain extending throughout full duration of tx session improving overall functional performance/activity tolerance  Pt demo with initial stabilization A required throughout motor combination in supine position, though significant less stabilization A required throughout 2nd and 3rd set when OTR provided Pt with visual cues for motivation/encouragement  Pt continues to present with poor eccentric motor control despite verbal cues provided  Pt demo with excellent activity tolerance to ball pushes with #1 T-bar with improved bilateral coordination/symmetry throughout with only x 2 verbal cues required to position RUE with LUE  Pt demo without complaints of pain throughout  Pt demo with significant improved activity tolerance to UBE demands with abilities to complete increased time of UBE to x 10 minutes with increased resistance to 1 1 level with improved pace and with Min fatigue reported towards conclusion  Pt with self-report of slight pain in R elbow  Plan: Continue per plan of care        INTERVENTION COMMENTS:  Diagnosis: Cerebrovascular accident (CVA), unspecified mechanism ([facility] [address] ) [I63 9]  Precautions: fall risk, DM II, Anxiety, HTN, Seizures (NO STIM)  FOTO: 31  Insurance: Payor: [address] [place] [address],[address] [facility] REP / Plan: Rachel Morales / Product Type: Medicare HMO /   11 of 16 visits through 11/15, PN due 11/18/2022

## 2022-11-07 ENCOUNTER — OFFICE VISIT (OUTPATIENT)
Dept: OCCUPATIONAL THERAPY | Facility: REHABILITATION | Age: 72
End: 2022-11-07

## 2022-11-07 ENCOUNTER — TELEPHONE (OUTPATIENT)
Dept: FAMILY MEDICINE CLINIC | Facility: CLINIC | Age: 72
End: 2022-11-07

## 2022-11-07 DIAGNOSIS — R26.89 LOSS OF BALANCE: ICD-10-CM

## 2022-11-07 DIAGNOSIS — R26.9 GAIT DISTURBANCE, POST-STROKE: ICD-10-CM

## 2022-11-07 DIAGNOSIS — R32 URINARY INCONTINENCE, UNSPECIFIED TYPE: ICD-10-CM

## 2022-11-07 DIAGNOSIS — I69.398 GAIT DISTURBANCE, POST-STROKE: ICD-10-CM

## 2022-11-07 DIAGNOSIS — I63.9 CEREBROVASCULAR ACCIDENT (CVA), UNSPECIFIED MECHANISM (HCC): Primary | ICD-10-CM

## 2022-11-07 RX ORDER — BROMPHENIRAMIN/PSEUDOEPHEDRINE 1-15MG/5ML
LIQUID (ML) ORAL 4 TIMES DAILY
Qty: 160 EACH | Refills: 11 | Status: SHIPPED | OUTPATIENT
Start: 2022-11-07

## 2022-11-07 RX ORDER — UBIDECARENONE 75 MG
CAPSULE ORAL 4 TIMES DAILY
Qty: 240 EACH | Refills: 11 | Status: SHIPPED | OUTPATIENT
Start: 2022-11-07

## 2022-11-07 RX ORDER — UNDERPADS 23" X 36"
EACH MISCELLANEOUS DAILY
Qty: 4 EACH | Refills: 11 | Status: SHIPPED | OUTPATIENT
Start: 2022-11-07

## 2022-11-07 NOTE — PROGRESS NOTES
Daily Note     Today's date: 2022  Patient name: Aníbal Christiansen  : 1950  MRN: [id number]  Referring provider: Tricia Cosby MD  Dx:   Encounter Diagnosis     ICD-10-CM    1  Cerebrovascular accident (CVA), unspecified mechanism ([facility] [address] )  I63 9                   Subjective: "No pain right now "      Objective: See treatment description below    Manual Tx: MH with LPS x 10 minutes to R upper trap and shoulder/pec/scap/bicep regions with vibration applied over MH, followed by PROM in shoulder FF/extension, shoulder ABD/ADD, horizontal ABD/ADD, and ER/IR planes focusing on improved proximal mobility, joint integrity, and pain reduction with active RUE movements  Neuro Re-ed: Supine neuro re-ed for completion of 2 sets x 10 reps of bean bag retrieval and drop in PNF D1/D2 flexion/extension movement planes with air cast donned to R elbow focusing on improved concentric/eccentric motor control, improved R proximal strength/endurance/stability, and increased RUE proprioceptive feedback/kinesthetic awareness  Thera Act: Pt advanced to completing bilateral integrative task of tying/untying x 5 knots in red theraband, followed by performing shoulder forward functional reach for tying/untying knots with rubber bands to metal pole at eye level bilaterally focusing on improved RUE proximal/distal teaming, improved forward functional reach with improved shoulder alignment/activity tolerance, improved bilateral coordination, improved R intrinsic/distal strength, and improved overall R digit coordination  Assessment: Tolerated treatment well  Patient would benefit from continued OT     Pt arrived to OT session with 0/10 pain with significant improved tolerance to PROM in shoulder FF/ABD/IR planes without restrictions or pain reported  Pt with self-report of slight increased pain when performing ER PROM, though resolved with completion of PROM    Pt responded positively to air cast donned to R tricep to improve tricep extension throughout PNF planes for bean bag retrieval and drop  Pt demo with abilities to complete first x 3 reps without difficulties, though increased difficulties maintaining concentric/eccentric motor control presented as activity persisted  Pt demo with abilities to tie/untie x 5 knots in red theraband with increased involvement of RUE improving overall bilateral coordination  Pt demo with ongoing improved bilateral coordination when tying/untying knots with rubber bands to metal pole placed at eye level with significant improved activity tolerance to sustained forward functional reach at 85* without pain or discomfort reported  Pt continues to present with inconsistent functional performance throughout tx session  Pt demo with significant improved activity tolerance to RUE functional movement with distraction techniques to prevent hyperfocus on pain  Plan: Continue per plan of care        INTERVENTION COMMENTS:  Diagnosis: Cerebrovascular accident (CVA), unspecified mechanism ([facility] [address] ) [I63 9]  Precautions: fall risk, DM II, Anxiety, HTN, Seizures (NO STIM)  FOTO: 31  Insurance: Payor: [address] [place] [address],[address] [facility] [address] REP / Plan: Amparo Vazquez / Product Type: Medicare HMO /   12 of 16 visits through 11/15, PN due 11/18/2022

## 2022-11-07 NOTE — TELEPHONE ENCOUNTER
Faxed the incontinence supply to [facility] pharmacy at [phone]  Confirmation was received

## 2022-11-10 ENCOUNTER — OFFICE VISIT (OUTPATIENT)
Dept: OCCUPATIONAL THERAPY | Facility: REHABILITATION | Age: 72
End: 2022-11-10

## 2022-11-10 DIAGNOSIS — I63.9 CEREBROVASCULAR ACCIDENT (CVA), UNSPECIFIED MECHANISM (HCC): Primary | ICD-10-CM

## 2022-11-10 NOTE — PROGRESS NOTES
Daily Note     Today's date: 11/10/2022  Patient name: Sanju Coronado  : 1950  MRN: [id number]  Referring provider: Jocelynn Davis MD  Dx:   Encounter Diagnosis     ICD-10-CM    1  Cerebrovascular accident (CVA), unspecified mechanism ([facility] [address] )  I63 9                   Subjective: "I'm good "      Objective: See treatment description below    Manual Tx: MH with LPS x 10 minutes to R upper trap and shoulder/pec/scap/bicep regions with vibration applied over MH, followed by PROM in shoulder FF/extension, shoulder ABD/ADD, horizontal ABD/ADD, and ER/IR planes focusing on improved proximal mobility, joint integrity, and pain reduction with active RUE movements  Thera Ex:  Pt performed seated thera ex with UB ranger for completion of 3 sets x 10 reps of scap retraction/protraction with added resistance from red theraband, following completion of 3 sets x 10 reps of shoulder FF/extension focusing on increased RUE proximal strength/endurance, improved eccentric motor control, and improved shoulder alignment  Pt concluded tx session with completion of UBE x 5 minutes prograde and x 5 minutes retrograde in seated position bilaterally with increased resistance to 1 5 level focusing on improved RUE strength/endurance, improved bilateral coordination, and improved grasp to handle with RUE  Assessment: Tolerated treatment well  Patient would benefit from continued OT     Pt arrived to OT session with 0/10 pain with significant improved tolerance to PROM in all planes without restrictions or pain reported  Pt continues to present with increased activity tolerance to RUE proximal strengthening/AROM without complaints of pain throughout full duration of tx session  Pt demo with excellent motor control/alignment with full range of scap retraction/protraction with UB ranger with abilities to advance to performing scap retraction/protraction with added resistance from red theraband   Pt demo with ongoing Max difficulties maintaining eccentric motor control in shoulder FF/extension plane with stabilization assistance required, despite verbal cues provided  Pt demo with abilities to complete x 10 minutes of UBE with increased resistance to 1 5 level without complaints of fatigue/discomfort/fatigue and with abilities to maintain appropriate pace throughout  Plan: Continue per plan of care        INTERVENTION COMMENTS:  Diagnosis: Cerebrovascular accident (CVA), unspecified mechanism ([facility] [address] ) [I63 9]  Precautions: fall risk, DM II, Anxiety, HTN, Seizures (NO STIM)  FOTO: 31  Insurance: Payor: [address] [place], [place] [address] [facility] REP / Plan: Oxana Miguel / Product Type: Medicare HMO /   13 of 16 visits through 11/15, PN due 11/18/2022

## 2022-11-12 PROBLEM — S01.01XD SCALP LACERATION, SUBSEQUENT ENCOUNTER: Status: RESOLVED | Noted: 2022-09-13 | Resolved: 2022-11-12

## 2022-11-14 ENCOUNTER — OFFICE VISIT (OUTPATIENT)
Dept: OCCUPATIONAL THERAPY | Facility: REHABILITATION | Age: 72
End: 2022-11-14

## 2022-11-14 DIAGNOSIS — I63.9 CEREBROVASCULAR ACCIDENT (CVA), UNSPECIFIED MECHANISM (HCC): Primary | ICD-10-CM

## 2022-11-14 NOTE — PROGRESS NOTES
Daily Note     Today's date: 2022  Patient name: Vallie Shone  : 1950  MRN: [id number]  Referring provider: Jaimee Munoz MD  Dx:   Encounter Diagnosis     ICD-10-CM    1  Cerebrovascular accident (CVA), unspecified mechanism ([facility] [address] )  I63 9                   Subjective: "Little pain here "      Objective: See treatment description below    Manual Tx: MH with LPS x 10 minutes to R upper trap and shoulder/pec/scap/bicep regions with vibration applied over MH, followed by PROM in shoulder FF/extension, shoulder ABD/ADD, horizontal ABD/ADD, and ER/IR planes focusing on improved proximal mobility, joint integrity, and pain reduction with active RUE movements  Thera Ex: Pt transitioned to seated thera ex for completion of 3 sets x 10 reps of towel slides on elevated wedge placed on table top in shoulder FF plane with added scap retraction and 3 sets x 10 reps of towel slides on table top in horizontal ABD/ADD plane focusing on increased RUE proximal strength/stability/endurance, increased activity tolerance to RUE proximal movements, increased attention to RUE, and improved overall RUE motor control  Thera Act: Pt advanced to completing bilateral integrative task of placing weaving loom material on weaving loom board placed on elevated wedge at eye level focusing on improved bilateral coordination, improved R FMC/prehension, improved shoulder FF AROM, and increased RUE stability to perform distal movements  Thera Ex: Pt concluded tx session with completion of UBE x 5 minutes prograde and x 5 minutes retrograde in seated position bilaterally with resistance maintaining at 1 5 level focusing on improved RUE strength/endurance, improved bilateral coordination, and improved grasp to handle with RUE  Assessment: Tolerated treatment well  Patient would benefit from continued OT     Pt arrived to OT session with 3/10 pain reported in R shoulder    Pt continues to respond positively to manual techniques with pain resolved to 0/10 after completion of MH with LPS with vibration and PROM  Pt demo with G activity tolerance to towel slides in shoulder FF plane with added scap retraction and horizontal ABD/ADD with abilities to complete 3 sets x 10 reps without pain or fatigue reported  Pt able to advance to performing towel slides on elevated wedge to challenge RUE shoulder FF AROM and motor control further  Pt demo with improved bilateral coordination on this date with abilities to incorporate RUE throughout placement of weaving loom material on weaving loom board throughout with out compensation of LUE evident  Pt able to sustain forward functional reach to 80* throughout without complaints of discomfort  Pt demo with G tolerance to UBE demands with abilities to complete x 10 minutes with 1 5 resistance level throughout without complaints of discomfort or fatigue  Pt performed prograde and retrograde movements at bradykinetic pace throughout  Plan: Continue per plan of care        INTERVENTION COMMENTS:  Diagnosis: Cerebrovascular accident (CVA), unspecified mechanism ([facility] [address] ) [I63 9]  Precautions: fall risk, DM II, Anxiety, HTN, Seizures (NO STIM)  FOTO: 31  Insurance: Payor: [address] [place] [address],[address] [facility] REP / Plan: Joya Christian / Product Type: Medicare HMO /   14 of 16 visits through 11/15, PN due 11/18/2022

## 2022-11-17 ENCOUNTER — EVALUATION (OUTPATIENT)
Dept: OCCUPATIONAL THERAPY | Facility: REHABILITATION | Age: 72
End: 2022-11-17

## 2022-11-17 ENCOUNTER — PATIENT OUTREACH (OUTPATIENT)
Dept: FAMILY MEDICINE CLINIC | Facility: CLINIC | Age: 72
End: 2022-11-17

## 2022-11-17 DIAGNOSIS — I63.9 CEREBROVASCULAR ACCIDENT (CVA), UNSPECIFIED MECHANISM (HCC): Primary | ICD-10-CM

## 2022-11-17 NOTE — LETTER
11/17/22    Dear Maged Shell,    I am a Care Manager with [facility]  We have made several attempts to call you by phone  It is important that you contact us back at [phone] so that we can assist with your care needs       Sincerely,           Chhaya Etienne  Outpatient Care Manager

## 2022-11-17 NOTE — PROGRESS NOTES
OCCUPATIONAL THERAPY RE-EVALUATION:    11/18/2022  Michoacano Sher  1950  [id number]  Chivo Alejandra MD   Diagnosis ICD-10-CM Associated Orders   1  Cerebrovascular accident (CVA), unspecified mechanism ([address] )  I63 9             Assessment  Assessment details: See skilled analysis for further details  Skilled Analysis:  Pt is a 71 y o  female referred to Occupational Therapy s/p Cerebrovascular accident (CVA), unspecified mechanism ([address] ) [I63 9]  Pt participated in skilled OT evaluation and following formalized testing as well as clinical observation, Pt presents with the following areas of deficit:  Pain throughout RUE beginning proximally and radiating distally with occasional paresthesias resulting in decreased functional use of RUE, decreased R AAROM/AROM/PROM, inattention to RUE affecting ADL/IADL completion, decreased bilateral coordination, RUE proximal/distal/intrinsic weakness, and poor R proximal stabilization  Pt presented with increased R AROM to 80* when distracted and when performing 9-hole peg test and functional dexterity test  Pt will benefit from skilled Occupational Therapy services 2x/week for 12 weeks with focus on neuro re-ed, manual tx, thera act, thera ex, and self-care management to improve on the above deficits, increase functional use of RUE, maximize highest level of independence able to achieve, and enhance overall QOL  Upon this date (11/17/2022), Pt participated in re-evaluation to further assess fxnl progression towards Occupational Therapy goals   Pt demo with fair + functional progression towards goals in POC evident by increased R intrinsic/distal strength, increased shoulder FF/ABD AROM/AAROM/PROM in RUE with less pain reported, excellent response to manual tx with reduced pain to 0/10 directly following improved overall RUE functional use, resolved paresthesias reported improving sensory re-ed, significant improved attention to RUE increasing overall proprioception/kinesthestic awareness, increased R in-hand manipulation with improved speed and accuracy, and increased abilities incorporating RUE in functional transfers/bed mobility  Pt presents with G carryover of HEP improving functional progression towards goals in POC  Following formalized testing and clinical observation, Pt continues to present with the following limitation:  Poor R proximal strength/stability, hypervigilance to pain and functional deficits of RUE, ongoing decreased proximal AROM/PROM limited by slight pain, learned non-use of RUE with tendencies to overcompensate with LUE, decreased bilateral coordination/integration, decreased R FMC/prehension with bradykinetic pace evident, and ongoing pain reported throughout RUE (though less since beginning skilled OT services)  OTR recommending Pt to continue skilled OT services 1-2x/week for 4-6 more weeks to improve on the above deficits, improve functional use of RUE, maximize highest level of independence able to achieve, and enhance overall QOL      Short Term Goals:  - Pt's caregiver will demo with G carryover of Home Stretching Program in home environment to improve proximal mobility/flexibility 4 weeks--  MET  - Pt will demo with G tolerance to supine, seated, and in stance exercise x 30 minutes with minimal rest breaks required for increased engagement in weekly exercise regimen and increased engagement in life roles 4 weeks-- MET  · Pt will increase RUE to refined assist with <10% cuing for tabletop tasks for improved functional performance of life roles, increased RUE involvement with ADLS/IADLs, and salient tasks 4 weeks-- PARTIALLY MET  · Pt will increase R UE shoulder FF and shoulder ABD strength to 3+/5 through the use of strengthening exercises and home program for improved functional use of RUE for ADLs/IADLs and to maximize highest level of independence able to achieve 4 weeks-- PARTIALLY MET   · Pt will increase R UE  strength to #18, pincer grasp strength to #6, tripod pinch strength to #7, and lateral pinch to #11, through the use of strengthening exercises and home program for improved functional use of RUE for ADLs/IADLs and to maximize highest level of independence able to achieve 4 weeks-- NOT MET   · Pt will demo with increased RUE shoulder FF and shoulder ABD AROM to 90* with less pain reported for improve functional use of RUE for ADLs/IADLS and to maximize highest level of independence able to achieve 4 weeks-- PARTIALLY MET  · Pt will demo with improved abilities tolerating bilateral integrative tasks with increased attention/incorporation of RUE x 50% for improved overall engagement in salient and meaningful occupations 4 weeks-- PARTIALLY MET     Long Term Goals:  - Pt will demo with G tolerance to supine, seated, and in stance exercise x 45 minutes with minimal rest breaks required for increased engagement in weekly exercise regimen and increased engagement in life roles-- PARTIALLY MET   · Pt will increase RUE to Mod I with 0% cuing for tabletop tasks for improved functional performance of life roles, increased RUE involvement with ADLS/IADLs, and salient tasks-- PARTIALLY MET  · Pt will increase R UE shoulder FF and shoulder ABD strength to 4-/5 through the use of strengthening exercises and home program for improved functional use of RUE for ADLs/IADLs and to maximize highest level of independence able to achieve-- NOT MET   · Pt will increase R UE  strength to #21, pincer grasp strength to #8, tripod pinch strength to #10, and lateral pinch to #14, through the use of strengthening exercises and home program for improved functional use of RUE for ADLs/IADLs and to maximize highest level of independence able to achieve-- NOT MET   · Pt will demo with increased RUE shoulder FF and shoulder ABD AROM to 120* with less pain reported for improve functional use of RUE for ADLs/IADLS and to maximize highest level of independence able to achieve 4 weeks -- NOT MET  · Pt will demo with improved abilities tolerating bilateral integrative tasks with increased attention/incorporation of RUE x 75% for improved overall engagement in salient and meaningful occupations -- NOT MET      Subjective Evaluation    Quality of life: good        SUBJECTIVE: Pt's aide with self-report of noticing increased functional use of RUE during ADLs and salient tasks  Pt's aide with self-report of still requiring Min-Mod assistance for all ADLs at this time  PATIENT GOAL: To move RUE with less pain  HISTORY OF PRESENT ILLNESS:     Pt is a 71 y o  female who was referred to Occupational Therapy s/p  Cerebrovascular accident (CVA), unspecified mechanism ([facility] [address] ) [I63 9]  Pt history of Abdominal pain, Anxiety, Arthritis, Bowel incontinence, Chest pain, Constipation, CVA (cerebral vascular accident) ([facility] [address] ) 2 years ago, Diabetes mellitus type II, controlled ([facility] [address] ), Disease of thyroid gland, Epigastric pain, Falls, High cholesterol, Hyperlipidemia, Hypertension, Migraine, Palpitations, Recurrent urinary tract infection, Seizures ([facility] [address] ), Sleep disorder, Stroke ([facility] [address] ), SAH, SDH, and Thyroid disease who was referred for skilled OT services 2* ongoing RUE pain and decreased functional use of RUE  Couple weeks ago Pt had a mechanical fall and subsequent head trauma resulting in small scalp laceration  Pt went to the ED a few weeks ago for evaluation  Her CT scan of head was negative for any acute pathology  Pt required 4 staples on her scalp  Pt has been following with PT as outpatient for gait disturbance 2* frequent falls throughout the last couple of years  Pt with self-report of ongoing pain reported throughout RUE beginning proximally and radiating distally with occasional occurrences of paresthesias  Pt with self-report of noticing inconsistent pain levels throughout RUE  Pt decreased functional use of RUE 2* pain    Pt continues to utilize RUE for meal time, though occasionally switches to LUE 2* fatigue and pain  Pt lives in a three story home though only resides on 1st and 2nd floor of home with   Pt currently requiring Min-Mod A for bathing routine with caregiver present to assist with functional transfer in and out of bath b and to assist with washing hair  Grab bars accessible in tub shower  Pt requires Min A to transfer to and from traditional style toilet with grab bars available for use  Pt dependent on caregiver for dressing routine at this time  Pt currently performing eating routine at Mod I status switching between RUE and LUE  Pt currently dependent on  and caregiver for all IADLs  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  Pt never performed  role  Pt's  is main transportation at this time  Pt currently having caregiver present at home for 32 hours/week  PMH:   Past Medical History:   Diagnosis Date   • Abdominal pain    • Anxiety     last assessed: 10/19/2013   • Arthritis     osteo both hands; last assessed: 10/19/2013   • Bowel incontinence    • Chest pain    • Constipation    • CVA (cerebral vascular accident) ([address] )    • Diabetes mellitus type II, controlled ([address] )    • Disease of thyroid gland    • Epigastric pain     with distention   • Falls    • High cholesterol    • Hyperlipidemia    • Hypertension     last assessed: 4/3/2017   • Migraine     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   • Palpitations    • Recurrent urinary tract infection    • Seizures ([facility] [address] )    • Sleep disorder     last assessed: 10/19/2013   • Stroke [facility])    • Thyroid disease          Pain Levels:     Restin    With Activity:  4    Pain in R shoulder    Objective     Functional Assessment        Comments  See impairment section for further details         Impairment Observations:        IE RUE IE LUE  Comments                 UPPER EXTREMITY FXN Impaired Intact  Pt is R hand dominant                              /Pinch Strength           Dynamometer       - Gross Grasp 11 lbs 11 lbs  Maintained in R; Decreased L gross grasp strength present   Pinch Meter        - PINCER 4 lbs 5 lbs  Slight increased pincer strength in R hand evident; Maintained in L    - TRIPOD 5 lbs 6 5 lbs  Slight increased tripod strength in bilatera hands evident     - LATERAL 8 5 lbs  9 5 lbs  Increased lateral pinch in bilateral hands (increased by 2 5 lbs )                AROM (seated)        Elevation [facility]     Shoulder * WFL   Significant increased shoulder FF AROM with longer lever; increased 20*    Shoulder Ext 70*  70*   Maintained   Shoulder Abd 75* WFL  Maintained   Elbow Flex [facility]  WFL      Elbow Ext WFL  WFL      Pronation WFL  WFL      Supination WFL  WFL      Wrist Flex 65*  WFL  Maintained   Wrist Ext 50*  WFL   Maintained   Digit Flex [facility]  WFL      Digit Ex [facility]  WFL      Composite Grasp WFL  WFL      Hook Grasp [facility]  WFL      Opposition Bradykinetic pace; though improved paced and accuracy evident on this date  Bradykinetic pace      Dysdiadochokinesia Bradykinetic pace Bradykinetic pace  Continues   Subluxation  N/A  N/A                      AROM (supine)           Shoulder  * WFL   Significant increased shoulder FF AROM in RUE in supine position; increased by 45*   Shoulder ABD 70*  WFL   Slight decreased shoulder ABD AROM in RUE in supine position noted on this date   Elbow Flex [facility]  WFL      Elbow Ext [facility]  WFL      Pronation WFL  WFL      Supination WFL  WFL                                    PROM (supine position)           Shoulder *  WFL   Increased shoulder FF PROM in RUE by 25*   Shoulder *  WFL   Increased shoulder ABD PROM in RUE by 25*   Wrist Flex [facility]      Wrist Ext [facility]                                       MMT           Shoulder FF 3/5 5/5  Increased   Shoulder Abd 3/5 5/5  Increased   Elbow Flex 4/5 5/5  Maintained   Elbow Ext 4/5 5/5  Maintained   Wrist Flex 3+/5 5/5  Maintained   Wrist Ext 3+/5 5/5  Maintained   SENSATION       Myofilaments (2 83 Wnl) 2 83 2 83     Sharp Dull  Intact Intact     Proprioception Intact Intact     Hot/Cold Temp Intact Intact            COORDINATION       9 Hole Peg Test 1 minutes and 9 seconds x 1 droppage 30 32 seconds x 0 droppage  Pt demo with improved R FMC/prehension speed and accuracy with less frequency of droppage evident with R hand; Pt able to perform 9-hole peg test x 44 seconds faster   Fxnl Dexterity Test 1 minute and 30 seconds with x 0 droppage 1 minute and 13 seconds with x 0 droppage  Improved speed and accuracy of in-hand manipulation abilities with R hand with abilities to complete x 4 seconds faster with less frequency of droppage evident;  Pt continues to require to utilize board to improved in-hand manipulation abilities   MODIFIED ITZEL SCALE (TONE)       No increase in muscle tone (0) 0 0     Slight Increase in muscle tone with catch and release or min resist at end range (1)       Slight Increase in muscle tone with catch and release, followed by min resistance through remainder of range (1+)       Increased muscle tone through full range, able to be moved easily (2)       Considerable increase in tone, difficult to move (3)       Rigid in Flexion/Extension (4)                                       OTHER PLANNED THERAPY INTERVENTIONS:   Supine, seated, and in stance neuro re-ed  FMC/prehension  Manual tx  Hand to target  Sensory re-ed  Seated functional reach: crossing midline  Supine place and hold  WBearing strategies   Closed chain activities  Open chain activities  Thera Ex  AROM/AAROM/PROM for RUE  Simulated dressing tasks    INTERVENTION COMMENTS:  Diagnosis: Cerebrovascular accident (CVA), unspecified mechanism ([address] ) [I63 9]  Precautions: fall risk, DM II, Anxiety, HTN, Seizures (NO STIM)  FOTO: 55  Insurance: Payor: Guillermo Math MEDICARE [facility] REP / Plan: [employer] [address] / Product Type: Medicare HMO /   15 of 16 visits through 12/15, PN due 12/17/2022

## 2022-11-17 NOTE — PROGRESS NOTES
CRISTI ARTHUR placed additional follow up call to the patient's son and emergency contact, Karlee Alvarez and left message to check on status of request for increased HHA hours  Information provided on how to contact Waiver   CRISTI ARTHUR had previously provided the  the family's information and  is aware of their request for an increase in hours and home modification for the stair lift  CRISTI ARTHUR sent Unable to Reach letter and will close referral due to lost communication  CRISTI ARTHUR will remain available for psychosocial support as needed

## 2022-11-21 ENCOUNTER — APPOINTMENT (OUTPATIENT)
Dept: OCCUPATIONAL THERAPY | Facility: REHABILITATION | Age: 72
End: 2022-11-21

## 2022-11-25 ENCOUNTER — TELEPHONE (OUTPATIENT)
Dept: FAMILY MEDICINE CLINIC | Facility: CLINIC | Age: 72
End: 2022-11-25

## 2022-11-25 NOTE — TELEPHONE ENCOUNTER
PCP SIGNATURE NEEDED FOR [address]  FORM RECEIVED VIA FAX AND PLACED IN PCP FOLDER TO BE DELIVERED AT ASSIGNED TIMES    [facility]#[id number]

## 2022-11-28 ENCOUNTER — APPOINTMENT (OUTPATIENT)
Dept: OCCUPATIONAL THERAPY | Facility: REHABILITATION | Age: 72
End: 2022-11-28

## 2022-11-28 ENCOUNTER — TELEPHONE (OUTPATIENT)
Dept: FAMILY MEDICINE CLINIC | Facility: CLINIC | Age: 72
End: 2022-11-28

## 2022-11-28 ENCOUNTER — OFFICE VISIT (OUTPATIENT)
Dept: FAMILY MEDICINE CLINIC | Facility: CLINIC | Age: 72
End: 2022-11-28

## 2022-11-28 VITALS
TEMPERATURE: 96.9 F | HEIGHT: 57 IN | DIASTOLIC BLOOD PRESSURE: 82 MMHG | OXYGEN SATURATION: 96 % | BODY MASS INDEX: 31.59 KG/M2 | SYSTOLIC BLOOD PRESSURE: 134 MMHG | RESPIRATION RATE: 18 BRPM | HEART RATE: 74 BPM

## 2022-11-28 DIAGNOSIS — U07.1 COVID-19: Primary | ICD-10-CM

## 2022-11-28 LAB
SARS-COV-2 AG UPPER RESP QL IA: POSITIVE
VALID CONTROL: ABNORMAL

## 2022-11-28 RX ORDER — NIRMATRELVIR AND RITONAVIR 300-100 MG
3 KIT ORAL 2 TIMES DAILY
Qty: 30 TABLET | Refills: 0 | Status: CANCELLED | OUTPATIENT
Start: 2022-11-28 | End: 2022-12-03

## 2022-11-28 RX ORDER — NIRMATRELVIR AND RITONAVIR 300-100 MG
3 KIT ORAL 2 TIMES DAILY
Qty: 30 TABLET | Refills: 0 | Status: SHIPPED | OUTPATIENT
Start: 2022-11-28 | End: 2022-12-03

## 2022-11-28 RX ORDER — GUAIFENESIN 100 MG/5ML
200 SYRUP ORAL 3 TIMES DAILY PRN
Qty: 120 ML | Refills: 0 | Status: SHIPPED | OUTPATIENT
Start: 2022-11-28

## 2022-11-28 NOTE — PROGRESS NOTES
Name: Ford Hurtado      : 1950      MRN: [id number]  Encounter Provider: Clari Nathan MD  Encounter Date: 2022   Encounter department: [address] [place] [address]  COVID-19  Assessment & Plan:  -Day 2 of symptoms  -POCT test positive  -Tylenol as needed, max 3-4g/day for fever, headache, and myalgia  -Salt water gargles and tea with ginger and honey for sore throat  -Robitussin and tessalon perles  -Start on Paxlovid for 5 days given risk factros  -RTC as needed    Orders:  -     POCT Rapid Covid Ag  -     nirmatrelvir & ritonavir (Paxlovid, 300/100,) tablet therapy pack; Take 3 tablets by mouth 2 (two) times a day for 5 days Take 2 nirmatrelvir tablets + 1 ritonavir tablet together per dose  -     guaiFENesin (ROBITUSSIN) 100 MG/5ML oral liquid; Take 10 mL (200 mg total) by mouth 3 (three) times a day as needed for cough  -     benzonatate (TESSALON PERLES) 100 mg capsule; Take 1 capsule (100 mg total) by mouth 3 (three) times a day as needed for cough       Subjective      HPI   Patient is a 72 y/o female, wih hx of traumatic subdural and subarachnoid hemorrhage 2016, with residual weakness, wheelchair bound, presenting today with sore throat and headaches for the past night  Son is also sick with similar symptoms  Denies any fever, chest pain or tightness, SOB, dizziness, fatigue, diarrhea, vision or speech changes, denies any weakness that is new compared to her residual stroke sxs  Reports normal appetite  Used tylenol 1x earlier this morning, which helped with her headache  She is COVID vaccinated x4      Review of Systems  As stated in HPI    Current Outpatient Medications on File Prior to Visit   Medication Sig   • albuterol (PROVENTIL HFA,VENTOLIN HFA) 90 mcg/act inhaler  (Patient not taking: Reported on 2022)   • aspirin (ECOTRIN LOW STRENGTH) 81 mg EC tablet Take 81 mg by mouth daily    • atenolol (TENORMIN) 25 mg tablet Take 1 tablet (25 mg total) by mouth daily   • atorvastatin (LIPITOR) 40 mg tablet Take 1 tablet (40 mg total) by mouth daily (Patient not taking: Reported on 8/25/2022)   • benzonatate (TESSALON PERLES) 100 mg capsule Take 1 capsule (100 mg total) by mouth 3 (three) times a day as needed for cough   • bisacodyl (DULCOLAX) 5 mg EC tablet Take 4 tablets (20 mg total) by mouth once for 1 dose Colonoscopy prep   • Cholecalciferol (VITAMIN D-3) 5000 units TABS Take 1 tablet by mouth daily (Patient not taking: Reported on 8/5/2021)   • citalopram (CeleXA) 40 mg tablet Take 1 tablet (40 mg total) by mouth daily (Patient not taking: Reported on 8/25/2022)   • conjugated estrogens (PREMARIN) vaginal cream Insert 0 5 g into the vagina 3 (three) times a week (Patient not taking: Reported on 8/5/2021)   • cyclobenzaprine (FLEXERIL) 5 mg tablet Take 1 tablet (5 mg total) by mouth 3 (three) times a day as needed for muscle spasms (Patient not taking: Reported on 8/25/2022)   • diclofenac sodium (VOLTAREN) 50 mg EC tablet Take 1 tablet (50 mg total) by mouth 2 (two) times a day (Patient not taking: Reported on 8/20/2021)   • fluticasone (FLONASE) 50 mcg/act nasal spray 1 spray into each nostril daily   • gabapentin (Neurontin) 100 mg capsule Take 1 capsule (100 mg total) by mouth 2 (two) times a day (Patient not taking: Reported on 8/25/2022)   • Incontinence Supply Disposable (Brief Overnight Large) MISC Use 4 (four) times a day   • Incontinence Supply Disposable (Underpads) MISC Use in the morning Please dispense washable underpads   • Infant Care Products (Comfort-Smooth Baby Wipes) MISC Use 4 (four) times a day   • meclizine (ANTIVERT) 12 5 MG tablet  (Patient not taking: No sig reported)   • Melatonin 5 MG TABS Take 1 tablet (5 mg total) by mouth daily at bedtime (Patient not taking: No sig reported)   • meloxicam (MOBIC) 7 5 mg tablet Take 1 tablet (7 5 mg total) by mouth daily (Patient not taking: No sig reported)   • neomycin-polymyxin-dexamethasone (MAXITROL) ophthalmic suspension Administer 1 drop to the right eye 4 (four) times a day for 7 days   • omeprazole (PriLOSEC) 20 mg delayed release capsule Take 1 capsule (20 mg total) by mouth daily   • polyethylene glycol (GLYCOLAX) powder Take 17 g by mouth daily (Patient not taking: No sig reported)   • polyethylene glycol (GOLYTELY) 4000 mL solution Take 4,000 mL by mouth once for 1 dose Colonoscopy prep   • polyethylene glycol (MIRALAX) 17 g packet Take 17 g by mouth daily       Objective     /82 (BP Location: Left arm, Patient Position: Sitting, Cuff Size: Standard)   Pulse 74   Temp (!) 96 9 °F (36 1 °C) (Temporal)   Resp 18   Ht 4' 9" (1 448 m)   SpO2 96%   BMI 31 59 kg/m²     Physical Exam  Vitals (coughing) reviewed  Constitutional:       General: She is not in acute distress  Appearance: Normal appearance  She is not ill-appearing  HENT:      Right Ear: Tympanic membrane normal  There is no impacted cerumen  Left Ear: Tympanic membrane normal  There is no impacted cerumen  Nose: No congestion  Eyes:      Conjunctiva/sclera: Conjunctivae normal    Neck:      Thyroid: No thyromegaly  Cardiovascular:      Rate and Rhythm: Normal rate and regular rhythm  Pulses: Normal pulses  Heart sounds: Normal heart sounds  No murmur heard  Pulmonary:      Effort: Pulmonary effort is normal  No respiratory distress  Breath sounds: Normal breath sounds  No wheezing  Chest:      Chest wall: No tenderness  Abdominal:      General: Bowel sounds are normal  There is no distension  Palpations: Abdomen is soft  Tenderness: There is no abdominal tenderness  There is no guarding  Musculoskeletal:         General: No swelling  Cervical back: Neck supple  Right lower leg: No edema  Left lower leg: No edema  Lymphadenopathy:      Cervical: No cervical adenopathy  Skin:     General: Skin is warm        Capillary Refill: Capillary refill takes less than 2 seconds  Neurological:      Mental Status: She is alert and oriented to person, place, and time  Mental status is at baseline  Gait: Gait abnormal (wheelchair bound)         Shweta Jones MD

## 2022-11-28 NOTE — TELEPHONE ENCOUNTER
PT LEFT VOICEMAIL REQUESTING APPT, I CALLED BACK THE PT AND PT STATES SHE HAVE HEADACHES, SORE THROAT

## 2022-11-29 RX ORDER — BENZONATATE 100 MG/1
100 CAPSULE ORAL 3 TIMES DAILY PRN
Qty: 20 CAPSULE | Refills: 0 | Status: SHIPPED | OUTPATIENT
Start: 2022-11-29 | End: 2022-12-06 | Stop reason: SDUPTHER

## 2022-11-29 NOTE — ASSESSMENT & PLAN NOTE
-Day 2 of symptoms  -POCT test positive  -Tylenol as needed, max 3-4g/day for fever, headache, and myalgia  -Salt water gargles and tea with ginger and honey for sore throat  -Robitussin and tessalon perles  -Start on Paxlovid for 5 days given risk factros  -RTC as needed

## 2022-11-30 ENCOUNTER — APPOINTMENT (OUTPATIENT)
Dept: OCCUPATIONAL THERAPY | Facility: REHABILITATION | Age: 72
End: 2022-11-30

## 2022-12-01 ENCOUNTER — TELEPHONE (OUTPATIENT)
Dept: FAMILY MEDICINE CLINIC | Facility: CLINIC | Age: 72
End: 2022-12-01

## 2022-12-01 NOTE — TELEPHONE ENCOUNTER
[phone]  FAX CONFIRMATION RECEIVED  SCANNED INTO CHART

## 2022-12-01 NOTE — TELEPHONE ENCOUNTER
Pt called requesting a call back regarding medication; I called pt back and was unable to reach it left a voice mail to give us a call back

## 2022-12-06 ENCOUNTER — OFFICE VISIT (OUTPATIENT)
Dept: FAMILY MEDICINE CLINIC | Facility: CLINIC | Age: 72
End: 2022-12-06

## 2022-12-06 VITALS
OXYGEN SATURATION: 98 % | TEMPERATURE: 97.7 F | DIASTOLIC BLOOD PRESSURE: 60 MMHG | WEIGHT: 139 LBS | BODY MASS INDEX: 30.08 KG/M2 | RESPIRATION RATE: 18 BRPM | HEART RATE: 63 BPM | SYSTOLIC BLOOD PRESSURE: 118 MMHG

## 2022-12-06 DIAGNOSIS — U07.1 COVID-19: ICD-10-CM

## 2022-12-06 DIAGNOSIS — E11.9 CONTROLLED TYPE 2 DIABETES MELLITUS WITHOUT COMPLICATION, WITHOUT LONG-TERM CURRENT USE OF INSULIN (HCC): Primary | ICD-10-CM

## 2022-12-06 LAB — SL AMB POCT HEMOGLOBIN AIC: 6 (ref ?–6.5)

## 2022-12-06 RX ORDER — BENZONATATE 100 MG/1
100 CAPSULE ORAL 3 TIMES DAILY PRN
Qty: 20 CAPSULE | Refills: 0 | Status: SHIPPED | OUTPATIENT
Start: 2022-12-06

## 2022-12-06 NOTE — PROGRESS NOTES
Name: Mariaelena Meyers      : 1950      MRN: [id number]  Encounter Provider: Joel Rodriguez MD  Encounter Date: 2022   Encounter department: [address] [place] [address]     1  Controlled type 2 diabetes mellitus without complication, without long-term current use of insulin ([facility])  -     POCT hemoglobin A1c    2  COVID-19  -     benzonatate (TESSALON PERLES) 100 mg capsule; Take 1 capsule (100 mg total) by mouth 3 (three) times a day as needed for cough         Subjective      72 yo  female with well controlled DM, diagnosed with COVID on 2022 here today for follow up  Patient states she is feeling better, denies CP, SOB, wheezing, fever, chills  Still has cough worse at night  No change of appetite, sense of smell or taste  Has good appetite     Review of Systems   Constitutional: Positive for fatigue  All other systems reviewed and are negative        Current Outpatient Medications on File Prior to Visit   Medication Sig   • albuterol (PROVENTIL HFA,VENTOLIN HFA) 90 mcg/act inhaler  (Patient not taking: Reported on 2022)   • aspirin (ECOTRIN LOW STRENGTH) 81 mg EC tablet Take 81 mg by mouth daily    • atenolol (TENORMIN) 25 mg tablet Take 1 tablet (25 mg total) by mouth daily   • atorvastatin (LIPITOR) 40 mg tablet Take 1 tablet (40 mg total) by mouth daily (Patient not taking: Reported on 2022)   • benzonatate (TESSALON PERLES) 100 mg capsule Take 1 capsule (100 mg total) by mouth 3 (three) times a day as needed for cough   • bisacodyl (DULCOLAX) 5 mg EC tablet Take 4 tablets (20 mg total) by mouth once for 1 dose Colonoscopy prep   • Cholecalciferol (VITAMIN D-3) 5000 units TABS Take 1 tablet by mouth daily (Patient not taking: Reported on 2021)   • citalopram (CeleXA) 40 mg tablet Take 1 tablet (40 mg total) by mouth daily (Patient not taking: Reported on 2022)   • conjugated estrogens (PREMARIN) vaginal cream Insert 0 5 g into the vagina 3 (three) times a week (Patient not taking: Reported on 8/5/2021)   • cyclobenzaprine (FLEXERIL) 5 mg tablet Take 1 tablet (5 mg total) by mouth 3 (three) times a day as needed for muscle spasms (Patient not taking: Reported on 8/25/2022)   • diclofenac sodium (VOLTAREN) 50 mg EC tablet Take 1 tablet (50 mg total) by mouth 2 (two) times a day (Patient not taking: Reported on 8/20/2021)   • fluticasone (FLONASE) 50 mcg/act nasal spray 1 spray into each nostril daily   • gabapentin (Neurontin) 100 mg capsule Take 1 capsule (100 mg total) by mouth 2 (two) times a day (Patient not taking: Reported on 8/25/2022)   • guaiFENesin (ROBITUSSIN) 100 MG/5ML oral liquid Take 10 mL (200 mg total) by mouth 3 (three) times a day as needed for cough   • Incontinence Supply Disposable (Brief Overnight Large) MISC Use 4 (four) times a day   • Incontinence Supply Disposable (Underpads) MISC Use in the morning Please dispense washable underpads   • Infant Care Products (Comfort-Smooth Baby Wipes) MISC Use 4 (four) times a day   • meclizine (ANTIVERT) 12 5 MG tablet  (Patient not taking: No sig reported)   • Melatonin 5 MG TABS Take 1 tablet (5 mg total) by mouth daily at bedtime (Patient not taking: No sig reported)   • meloxicam (MOBIC) 7 5 mg tablet Take 1 tablet (7 5 mg total) by mouth daily (Patient not taking: No sig reported)   • neomycin-polymyxin-dexamethasone (MAXITROL) ophthalmic suspension Administer 1 drop to the right eye 4 (four) times a day for 7 days   • omeprazole (PriLOSEC) 20 mg delayed release capsule Take 1 capsule (20 mg total) by mouth daily   • polyethylene glycol (GLYCOLAX) powder Take 17 g by mouth daily (Patient not taking: No sig reported)   • polyethylene glycol (GOLYTELY) 4000 mL solution Take 4,000 mL by mouth once for 1 dose Colonoscopy prep   • polyethylene glycol (MIRALAX) 17 g packet Take 17 g by mouth daily   • [DISCONTINUED] benzonatate (TESSALON PERLES) 100 mg capsule Take 1 capsule (100 mg total) by mouth 3 (three) times a day as needed for cough       Objective     /60 (BP Location: Left arm, Patient Position: Sitting, Cuff Size: Standard)   Pulse 63   Temp 97 7 °F (36 5 °C) (Temporal)   Resp 18   Wt 63 kg (139 lb)   SpO2 98%   BMI 30 08 kg/m²     Physical Exam  Ras Armenta MD

## 2022-12-09 ENCOUNTER — OFFICE VISIT (OUTPATIENT)
Dept: OCCUPATIONAL THERAPY | Facility: REHABILITATION | Age: 72
End: 2022-12-09

## 2022-12-09 DIAGNOSIS — I63.9 CEREBROVASCULAR ACCIDENT (CVA), UNSPECIFIED MECHANISM (HCC): Primary | ICD-10-CM

## 2022-12-09 NOTE — PROGRESS NOTES
OCCUPATIONAL THERAPY RE-EVALUATION:    12/09/2022  Bayron Clancy  1950  [id number]  Kristian Coffey MD   Diagnosis ICD-10-CM Associated Orders   1  Cerebrovascular accident (CVA), unspecified mechanism ([facility] [address] )  I63 9             Assessment  Assessment details: See skilled analysis for further details  Skilled Analysis:  Pt is a 71 y o  female referred to Occupational Therapy s/p Cerebrovascular accident (CVA), unspecified mechanism ([address] ) [I63 9]  Pt participated in skilled OT evaluation and following formalized testing as well as clinical observation, Pt presents with the following areas of deficit:  Pain throughout RUE beginning proximally and radiating distally with occasional paresthesias resulting in decreased functional use of RUE, decreased R AAROM/AROM/PROM, inattention to RUE affecting ADL/IADL completion, decreased bilateral coordination, RUE proximal/distal/intrinsic weakness, and poor R proximal stabilization  Pt presented with increased R AROM to 80* when distracted and when performing 9-hole peg test and functional dexterity test  Pt will benefit from skilled Occupational Therapy services 2x/week for 12 weeks with focus on neuro re-ed, manual tx, thera act, thera ex, and self-care management to improve on the above deficits, increase functional use of RUE, maximize highest level of independence able to achieve, and enhance overall QOL  Upon this date (12/09/2022), Pt participated in re-evaluation to further assess fxnl progression towards Occupational Therapy goals   Pt demo with fair + functional progression towards goals in POC evident by increased R intrinsic/distal strength, increased shoulder FFAROM/AAROM/PROM in RUE with less pain reported improving overall functional reach abilities, excellent response to manual tx with reduced pain to 0/10 directly following improved overall RUE functional use and incorporation of RUE in functional transfer, resolved paresthesias reported improving sensory re-ed, significant improved attention to RUE increasing overall proprioception/kinesthestic awareness, increased R in-hand manipulation with improved speed and accuracy, and increased abilities incorporating RUE in functional transfers/bed mobility with less verbal prompting/encouragement required  Pt presents with ongoing G carryover of HEP improving functional progression towards goals in POC  Following formalized testing and clinical observation, Pt continues to present with the following limitation:  Poor R proximal strength/stability, hypervigilance to pain and functional deficits of RUE, ongoing decreased proximal AROM/PROM limited by slight pain, learned non-use of RUE with tendencies to overcompensate with LUE, decreased bilateral coordination/integration, decreased R FMC/prehension/in-hand manipulation with bradykinetic pace evident and droppage present, and ongoing pain reported throughout RUE (though less since beginning skilled OT services)  Pt continues to require verbal prompting/encouragement to incorporate RUE for functional tasks and functional transfers 2* learned non-use patterns  OTR recommending Pt to continue skilled OT services 1-2x/week for 4-6 more weeks to improve on the above deficits, improve functional use of RUE, maximize highest level of independence able to achieve, and enhance overall QOL      Short Term Goals:  - Pt's caregiver will demo with G carryover of Home Stretching Program in home environment to improve proximal mobility/flexibility 4 weeks--  MET  - Pt will demo with G tolerance to supine, seated, and in stance exercise x 30 minutes with minimal rest breaks required for increased engagement in weekly exercise regimen and increased engagement in life roles 4 weeks-- MET  · Pt will increase RUE to refined assist with <10% cuing for tabletop tasks for improved functional performance of life roles, increased RUE involvement with ADLS/IADLs, and salient tasks 4 weeks-- PARTIALLY MET  · Pt will increase R UE shoulder FF and shoulder ABD strength to 3+/5 through the use of strengthening exercises and home program for improved functional use of RUE for ADLs/IADLs and to maximize highest level of independence able to achieve 4 weeks-- PARTIALLY MET   · Pt will increase R UE  strength to #18, pincer grasp strength to #6, tripod pinch strength to #7, and lateral pinch to #11, through the use of strengthening exercises and home program for improved functional use of RUE for ADLs/IADLs and to maximize highest level of independence able to achieve 4 weeks-- NOT MET   · Pt will demo with increased RUE shoulder FF and shoulder ABD AROM to 90* with less pain reported for improve functional use of RUE for ADLs/IADLS and to maximize highest level of independence able to achieve 4 weeks-- PARTIALLY MET  · Pt will demo with improved abilities tolerating bilateral integrative tasks with increased attention/incorporation of RUE x 50% for improved overall engagement in salient and meaningful occupations 4 weeks-- PARTIALLY MET     Long Term Goals:  - Pt will demo with G tolerance to supine, seated, and in stance exercise x 45 minutes with minimal rest breaks required for increased engagement in weekly exercise regimen and increased engagement in life roles-- PARTIALLY MET   · Pt will increase RUE to Mod I with 0% cuing for tabletop tasks for improved functional performance of life roles, increased RUE involvement with ADLS/IADLs, and salient tasks-- PARTIALLY MET  · Pt will increase R UE shoulder FF and shoulder ABD strength to 4-/5 through the use of strengthening exercises and home program for improved functional use of RUE for ADLs/IADLs and to maximize highest level of independence able to achieve-- NOT MET   · Pt will increase R UE  strength to #21, pincer grasp strength to #8, tripod pinch strength to #10, and lateral pinch to #14, through the use of strengthening exercises and home program for improved functional use of RUE for ADLs/IADLs and to maximize highest level of independence able to achieve-- NOT MET   · Pt will demo with increased RUE shoulder FF and shoulder ABD AROM to 120* with less pain reported for improve functional use of RUE for ADLs/IADLS and to maximize highest level of independence able to achieve 4 weeks -- NOT MET  · Pt will demo with improved abilities tolerating bilateral integrative tasks with increased attention/incorporation of RUE x 75% for improved overall engagement in salient and meaningful occupations -- NOT MET      Subjective Evaluation    Quality of life: good        SUBJECTIVE: Pt's aide with self-report of noticing increased functional use of RUE during ADLs and salient tasks  Pt's aide with self-report of less reports of pain throughout RUE improving functional use of RUE  PATIENT GOAL: To move RUE with less pain  HISTORY OF PRESENT ILLNESS:     Pt is a 71 y o  female who was referred to Occupational Therapy s/p  Cerebrovascular accident (CVA), unspecified mechanism ([facility] [address] ) [I63 9]  Pt history of Abdominal pain, Anxiety, Arthritis, Bowel incontinence, Chest pain, Constipation, CVA (cerebral vascular accident) ([address] ) 2 years ago, Diabetes mellitus type II, controlled ([address] ), Disease of thyroid gland, Epigastric pain, Falls, High cholesterol, Hyperlipidemia, Hypertension, Migraine, Palpitations, Recurrent urinary tract infection, Seizures ([address] ), Sleep disorder, Stroke ([address] ), SAH, SDH, and Thyroid disease who was referred for skilled OT services 2* ongoing RUE pain and decreased functional use of RUE  Couple weeks ago Pt had a mechanical fall and subsequent head trauma resulting in small scalp laceration  Pt went to the ED a few weeks ago for evaluation  Her CT scan of head was negative for any acute pathology  Pt required 4 staples on her scalp   Pt has been following with PT as outpatient for gait disturbance 2* frequent falls throughout the last couple of years  Pt with self-report of ongoing pain reported throughout RUE beginning proximally and radiating distally with occasional occurrences of paresthesias  Pt with self-report of noticing inconsistent pain levels throughout RUE  Pt decreased functional use of RUE 2* pain  Pt continues to utilize RUE for meal time, though occasionally switches to LUE 2* fatigue and pain  Pt lives in a three story home though only resides on 1st and 2nd floor of home with   Pt currently requiring Min-Mod A for bathing routine with caregiver present to assist with functional transfer in and out of bath b and to assist with washing hair  Grab bars accessible in tub shower  Pt requires Min A to transfer to and from traditional style toilet with grab bars available for use  Pt dependent on caregiver for dressing routine at this time  Pt currently performing eating routine at Mod I status switching between RUE and LUE  Pt currently dependent on  and caregiver for all IADLs  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  Pt never performed  role  Pt's  is main transportation at this time  Pt currently having caregiver present at home for 32 hours/week           PMH:   Past Medical History:   Diagnosis Date   • Abdominal pain    • Anxiety     last assessed: 10/19/2013   • Arthritis     osteo both hands; last assessed: 10/19/2013   • Bowel incontinence    • Chest pain    • Constipation    • CVA (cerebral vascular accident) ([address] )    • Diabetes mellitus type II, controlled ([address] )    • Disease of thyroid gland    • Epigastric pain     with distention   • Falls    • High cholesterol    • Hyperlipidemia    • Hypertension     last assessed: 4/3/2017   • Migraine     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   • Palpitations    • Recurrent urinary tract infection    • Seizures ([address] )    • Sleep disorder     last assessed: 10/19/2013 • Stroke [facility])    • Thyroid disease          Pain Levels:     Restin    With Activity:  4    Pain in R shoulder    Objective     Functional Assessment        Comments  See impairment section for further details  Impairment Observations:        IE RUE IE LUE  Comments                 UPPER EXTREMITY FXN Impaired Intact  Pt is R hand dominant  /Pinch Strength           Dynamometer       - Gross Grasp 11 lbs 13 lbs  Maintained in R; Slight increased L gross grasp strength evident- increased by x 2lbs  Pinch Meter        - PINCER 4 lbs 5 lbs  Slight increased pincer strength in R hand evident; Maintained in L    - TRIPOD 5 5 lbs 6 5 lbs  Slight increased tripod strength in R hand; increased by 0 5lbs      - LATERAL 8 5 lbs  9 5 lbs  Maintained               AROM (seated)        [facility]     Shoulder * WFL   Maintained   Shoulder Ext 70*  70*   Maintained   Shoulder Abd 60* WFL  Decreased shoulder ABD in seated position noted on this date; decreased x 15*   [facility]  W[facility]      [facility]bow Ext [facility]  WFL      Pronation WFL  WFL      Supination WFL  WFL      Wrist Flex 75*  WFL  Increased wrist flexion AROM evident; increased x 10*   Wrist Ext 65*  WFL   Increased wrist extension AROM evident; increased x 15*   Digit Flex [facility]  WFL      Digit Ex [facility]  WFL      Composite Grasp WFL  WFL      Hook Grasp [facility]  WFL      Opposition Bradykinetic pace; though improved paced and accuracy evident on this date  Bradykinetic pace      Dysdiadochokinesia Bradykinetic pace; emerging improved speed with RUE Bradykinetic pace  Continues   Subluxation  N/A  N/A                      AROM (supine)           Shoulder  * WFL   Maintained   Shoulder ABD 75*  WFL   Slight increased shoulder ABD AROM in RUE in supine position; increased by 5*   Elbow Flex [facility]  WFL      Elbow Ext [facility]  WFL      Pronation WFL  WFL      Supination WFL  WFL                                    PROM (supine position)           Shoulder *  WFL   Significant increased shoulder FF PROM in RUE by 25*   Shoulder *  WFL   Significant increased shoulder ABD PROM in RUE by 25*   Wrist Flex [facility] WFL      Wrist Ext [facility]                                       MMT           Shoulder FF 3/5 5/5  Maintained   Shoulder Abd 3/5 5/5  Maintained   Elbow Flex 4/5 5/5  Maintained   Elbow Ext 4/5 5/5  Maintained   Wrist Flex 3+/5 5/5  Maintained   Wrist Ext 3+/5 5/5  Maintained   SENSATION       Myofilaments (2 83 Wnl) 2 83 2 83     Sharp Dull  Intact Intact     Proprioception Intact Intact     Hot/Cold Temp Intact Intact            COORDINATION       9 Hole Peg Test 1 minutes and 38 seconds x 1 droppage 30 32 seconds x 0 droppage  Pt demo with increased difficulties retrieving pegs on this date with frequent tendencies of attempting to utilize LUE to compensate   Fxnl Dexterity Test 1 minute and 51 seconds with x 1 droppage 1 minute and 13 seconds with x 0 droppage  Pt demo with increased difficulties with in-hand manipulation on this date with frequent tendencies of attempting to utilize LUE to compensate   MODIFIED ITZEL SCALE (TONE)       No increase in muscle tone (0) 0 0     Slight Increase in muscle tone with catch and release or min resist at end range (1)       Slight Increase in muscle tone with catch and release, followed by min resistance through remainder of range (1+)       Increased muscle tone through full range, able to be moved easily (2)       Considerable increase in tone, difficult to move (3)       Rigid in Flexion/Extension (4)                                       OTHER PLANNED THERAPY INTERVENTIONS:   Supine, seated, and in stance neuro re-ed  FMC/prehension  Manual tx  Hand to target  Sensory re-ed  Seated functional reach: crossing midline  Supine place and hold  WBearing strategies   Closed chain activities  Open chain activities  Thera Ex  AROM/AAROM/PROM for RUE  Simulated dressing tasks    Draper COMMENTS:  MH with LPS x 10 minutes to R upper trap and shoulder/pec/scap/bicep regions with vibration applied over MH, followed by PROM in shoulder FF/extension, shoulder ABD/ADD, horizontal ABD/ADD, and ER/IR planes focusing on improved proximal mobility, joint integrity, and pain reduction with active RUE movements  Pt continues to respond positively to manual techniques with pain resolved to 0/10 after completion of MH with LPS and PROM      INTERVENTION COMMENTS:  Diagnosis: Cerebrovascular accident (CVA), unspecified mechanism ([facility] [address] ) [I63 9]  Precautions: fall risk, DM II, Anxiety, HTN, Seizures (NO STIM)  FOTO: [id number]  Insurance: Payor: [address] [place] [address],[address] [facility] REP / Plan: Selena Ramirez / Product Type: Medicare HMO /   12 of 16 visits through 12/15, PN due 12/17/2022

## 2022-12-16 ENCOUNTER — TELEPHONE (OUTPATIENT)
Dept: FAMILY MEDICINE CLINIC | Facility: CLINIC | Age: 72
End: 2022-12-16

## 2022-12-16 NOTE — TELEPHONE ENCOUNTER
PCP SIGNATURE NEEDED FOR [place]  FORM RECEIVED VIA FAX AND PLACED IN PCP FOLDER TO BE DELIVERED AT ASSIGNED TIMES        Adele Carrasco [phone]

## 2022-12-22 ENCOUNTER — APPOINTMENT (OUTPATIENT)
Dept: OCCUPATIONAL THERAPY | Facility: REHABILITATION | Age: 72
End: 2022-12-22

## 2022-12-22 ENCOUNTER — TELEPHONE (OUTPATIENT)
Dept: FAMILY MEDICINE CLINIC | Facility: CLINIC | Age: 72
End: 2022-12-22

## 2022-12-22 NOTE — TELEPHONE ENCOUNTER
Jagdish Hill from [facility] lvm stating they faxed the form for the supplies, PCP need to sign and fax [phone]

## 2022-12-27 ENCOUNTER — TELEPHONE (OUTPATIENT)
Dept: FAMILY MEDICINE CLINIC | Facility: CLINIC | Age: 72
End: 2022-12-27

## 2022-12-27 NOTE — TELEPHONE ENCOUNTER
After reviewing pts chart it does not appear we have received the form  I called [facility] and left a msg informing them of this and if they would like they can refax the form to us

## 2022-12-30 NOTE — TELEPHONE ENCOUNTER
Form was faxed to [facility] on 12/27  It is a revision from the previous form already completed and faxed on 12/1    I did advise them to follow up call back next week as it does take time for our faxes to get to us since they are coming from a centralized dept outside of the office

## 2023-01-10 ENCOUNTER — TELEPHONE (OUTPATIENT)
Dept: FAMILY MEDICINE CLINIC | Facility: CLINIC | Age: 73
End: 2023-01-10

## 2023-01-10 NOTE — TELEPHONE ENCOUNTER
Tiago from [facility] [employer] called wanting to know if we received  the fax, I advised her that we did not receive it yet and I made sure she had the right fax number

## 2023-02-08 ENCOUNTER — HOSPITAL ENCOUNTER (EMERGENCY)
Facility: HOSPITAL | Age: 73
Discharge: HOME | End: 2023-02-08
Attending: EMERGENCY MEDICINE

## 2023-02-08 ENCOUNTER — APPOINTMENT (EMERGENCY)
Dept: CT IMAGING | Facility: HOSPITAL | Age: 73
End: 2023-02-08

## 2023-02-08 VITALS
HEART RATE: 73 BPM | TEMPERATURE: 98.4 F | SYSTOLIC BLOOD PRESSURE: 241 MMHG | DIASTOLIC BLOOD PRESSURE: 105 MMHG | OXYGEN SATURATION: 95 % | RESPIRATION RATE: 18 BRPM

## 2023-02-08 DIAGNOSIS — S01.01XA LACERATION OF SCALP, INITIAL ENCOUNTER: ICD-10-CM
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
## 2023-02-08 DIAGNOSIS — S09.90XA INJURY OF HEAD, INITIAL ENCOUNTER: ICD-10-CM

## 2023-02-08 RX ORDER — LIDOCAINE HYDROCHLORIDE 10 MG/ML
10 INJECTION, SOLUTION EPIDURAL; INFILTRATION; INTRACAUDAL; PERINEURAL ONCE
Status: COMPLETED | OUTPATIENT
Start: 2023-02-08 | End: 2023-02-08

## 2023-02-08 RX ORDER — ACETAMINOPHEN 325 MG/1
975 TABLET ORAL ONCE
Status: COMPLETED | OUTPATIENT
Start: 2023-02-08 | End: 2023-02-08

## 2023-02-08 RX ADMIN — ACETAMINOPHEN 325MG 975 MG: 325 TABLET ORAL at 19:56

## 2023-02-08 RX ADMIN — LIDOCAINE HYDROCHLORIDE 10 ML: 10 INJECTION, SOLUTION EPIDURAL; INFILTRATION; INTRACAUDAL; PERINEURAL at 19:02

## 2023-02-08 NOTE — ED PROVIDER NOTES
History  Chief Complaint   Patient presents with   • Fall     Fall, hit head on radiator  No LOC,  mentation at pt baseline - normally confused due to stroke hx     70 y/o female with a hx of a previous stroke presents after falling  Her family usually walk right with her at home but she didn't stay seated at home and tried to walk on her own, she tripped and hit her head on a radiator  +headache and laceration to scalp  She is on a baby aspirin daily otherwise no blood thinners  No LOC, no n/v   Pt  Is acting at her baseline  Prior to Admission Medications   Prescriptions Last Dose Informant Patient Reported? Taking?    Cholecalciferol (VITAMIN D-3) 5000 units TABS Not Taking  No No   Sig: Take 1 tablet by mouth daily   Patient not taking: Reported on 8/5/2021   Incontinence Supply Disposable (Brief Overnight Large) MISC   No No   Sig: Use 4 (four) times a day   Incontinence Supply Disposable (Underpads) MISC   No No   Sig: Use in the morning Please dispense washable underpads   Infant Care Products (Comfort-Smooth Baby Wipes) MISC   No No   Sig: Use 4 (four) times a day   albuterol (PROVENTIL HFA,VENTOLIN HFA) 90 mcg/act inhaler Not Taking  Yes No   Patient not taking: Reported on 8/25/2022   aspirin (ECOTRIN LOW STRENGTH) 81 mg EC tablet   Yes Yes   Sig: Take 81 mg by mouth daily    atenolol (TENORMIN) 25 mg tablet   No Yes   Sig: Take 1 tablet (25 mg total) by mouth daily   atorvastatin (LIPITOR) 40 mg tablet Not Taking  No No   Sig: Take 1 tablet (40 mg total) by mouth daily   Patient not taking: Reported on 8/25/2022   benzonatate (TESSALON PERLES) 100 mg capsule Not Taking  No No   Sig: Take 1 capsule (100 mg total) by mouth 3 (three) times a day as needed for cough   Patient not taking: Reported on 2/8/2023   bisacodyl (DULCOLAX) 5 mg EC tablet   No Yes   Sig: Take 4 tablets (20 mg total) by mouth once for 1 dose Colonoscopy prep   citalopram (CeleXA) 40 mg tablet   No Yes   Sig: Take 1 tablet (40 mg total) by mouth daily   conjugated estrogens (PREMARIN) vaginal cream Not Taking  No No   Sig: Insert 0 5 g into the vagina 3 (three) times a week   Patient not taking: Reported on 2021   diclofenac sodium (VOLTAREN) 50 mg EC tablet   No No   Sig: Take 1 tablet (50 mg total) by mouth 2 (two) times a day   Patient not taking: Reported on 2021   fluticasone (FLONASE) 50 mcg/act nasal spray   No No   Si spray into each nostril daily   gabapentin (Neurontin) 100 mg capsule Not Taking  No No   Sig: Take 1 capsule (100 mg total) by mouth 2 (two) times a day   Patient not taking: Reported on 2022   omeprazole (PriLOSEC) 20 mg delayed release capsule   No Yes   Sig: Take 1 capsule (20 mg total) by mouth daily   polyethylene glycol (GLYCOLAX) powder Not Taking  No No   Sig: Take 17 g by mouth daily   Patient not taking: Reported on 2021   polyethylene glycol (MIRALAX) 17 g packet Not Taking  No No   Sig: Take 17 g by mouth daily   Patient not taking: Reported on 2023      Facility-Administered Medications: None       Past Medical History:   Diagnosis Date   • Abdominal pain    • Anxiety     last assessed: 10/19/2013   • Arthritis     osteo both hands; last assessed: 10/19/2013   • Bowel incontinence    • Chest pain    • Constipation    • CVA (cerebral vascular accident) ([address] )    • Diabetes mellitus type II, controlled ([address] )    • Disease of thyroid gland    • Epigastric pain     with distention   • Falls    • High cholesterol    • Hyperlipidemia    • Hypertension     last assessed: 4/3/2017   • Migraine     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   • Palpitations    • Recurrent urinary tract infection    • Seizures ([address] )    • Sleep disorder     last assessed: 10/19/2013   • Stroke [facility])    • Thyroid disease        Past Surgical History:   Procedure Laterality Date   • AXILLARY SURGERY      cyst surgery   • BLADDER SURGERY     •  SECTION     • CYSTOSCOPY N/A 2018 Procedure: CYSTOSCOPY;  Surgeon: Lloyd Israel MD;  Location: [facility] OR;  Service: Gynecology   • FACIAL BONE TUMOR EXCISION     • KNEE SURGERY     • OTHER SURGICAL HISTORY      cyst removed from axilla   • IN ESOPHAGOGASTRODUODENOSCOPY TRANSORAL DIAGNOSTIC N/A 6/5/2017    Procedure: ESOPHAGOGASTRODUODENOSCOPY (EGD); Surgeon: Holly Manzano MD;  Location: [facility] GI LAB; Service: Gastroenterology   • IN SLING OPERATION STRESS INCONTINENCE N/A 1/18/2018    Procedure: PUBOVAGINAL SLING;  Surgeon: Lloyd Israel MD;  Location: [facility] OR;  Service: Gynecology   • TUBAL LIGATION         Family History   Problem Relation Age of Onset   • No Known Problems Father    • Diabetes Family         mellitus   • Hypertension Family    • Stroke Family    • Thyroid disease Family    • No Known Problems Mother    • No Known Problems Sister    • No Known Problems Daughter    • No Known Problems Maternal Grandmother    • No Known Problems Maternal Grandfather    • No Known Problems Paternal Grandmother    • No Known Problems Paternal Grandfather      I have reviewed and agree with the history as documented  E-Cigarette/Vaping   • E-Cigarette Use Never User      E-Cigarette/Vaping Substances   • Nicotine No    • THC No    • CBD No    • Flavoring No    • Other No    • Unknown No      Social History     Tobacco Use   • Smoking status: Never   • Smokeless tobacco: Never   Vaping Use   • Vaping Use: Never used   Substance Use Topics   • Alcohol use: No   • Drug use: No       Review of Systems   Constitutional: Negative for appetite change, fatigue and fever  HENT: Negative for rhinorrhea and sore throat  Eyes: Negative for pain  Respiratory: Negative for cough, shortness of breath and wheezing  Cardiovascular: Negative for chest pain and leg swelling  Gastrointestinal: Negative for abdominal pain, diarrhea and vomiting  Genitourinary: Negative for dysuria and flank pain     Musculoskeletal: Negative for back pain and neck pain    Skin: Positive for wound  Negative for rash  Neurological: Positive for headaches  Negative for syncope  Psychiatric/Behavioral:        Mood normal       Physical Exam  Physical Exam  Vitals and nursing note reviewed  Constitutional:       Appearance: She is well-developed  HENT:      Head: Normocephalic  Right Ear: External ear normal       Left Ear: External ear normal    Eyes:      General: No scleral icterus  Extraocular Movements: Extraocular movements intact  Neck:      Comments: No cspine tenderness  Cardiovascular:      Rate and Rhythm: Normal rate and regular rhythm  Pulmonary:      Effort: Pulmonary effort is normal  No respiratory distress  Breath sounds: Normal breath sounds  Abdominal:      Palpations: Abdomen is soft  Tenderness: There is no abdominal tenderness  Musculoskeletal:         General: No deformity or signs of injury  Normal range of motion  Cervical back: Normal range of motion and neck supple  Comments: No spinal tenderness, all ext  FROM and nontender   Skin:     Coloration: Skin is not jaundiced or pale  Comments: 2-3cm linear vertical laceration in hairline/top of head, bleeding controlled   Neurological:      General: No focal deficit present  Mental Status: She is alert and oriented to person, place, and time        Comments: Slightly weaker on RUE/RLE, chronic   Psychiatric:         Mood and Affect: Mood normal          Behavior: Behavior normal          Vital Signs  ED Triage Vitals   Temperature Pulse Respirations Blood Pressure SpO2   02/08/23 1824 02/08/23 1824 02/08/23 1824 02/08/23 1824 02/08/23 1824   98 4 °F (36 9 °C) 67 16 (!) 209/88 95 %      Temp src Heart Rate Source Patient Position - Orthostatic VS BP Location FiO2 (%)   -- 02/08/23 2023 02/08/23 2023 02/08/23 2023 --    Monitor Sitting Right arm       Pain Score       02/08/23 1824       4           Vitals:    02/08/23 1824 02/08/23 1832 02/08/23 2023   BP: (!) 209/88 (!) 211/91 (!) 241/105   Pulse: 67  73   Patient Position - Orthostatic VS:   Sitting         Visual Acuity  Visual Acuity    Flowsheet Row Most Recent Value   L Pupil Size (mm) 4   R Pupil Size (mm) 4          ED Medications  Medications   lidocaine (PF) (XYLOCAINE-MPF) 1 % injection 10 mL (10 mL Infiltration Given by Other 2/8/23 1902)   acetaminophen (TYLENOL) tablet 975 mg (975 mg Oral Given 2/8/23 1956)       Diagnostic Studies  Results Reviewed     None                 CT head without contrast   Final Result by Jordin Haji MD (02/08 2011)      No acute intracranial abnormality  Workstation performed: [id number]                    Procedures  Procedures         ED Course                               SBIRT 22yo+    Flowsheet Row Most Recent Value   SBIRT (23 yo +)    In order to provide better care to our patients, we are screening all of our patients for alcohol and drug use  Would it be okay to ask you these screening questions? Yes Filed at: 02/08/2023 1827   Initial Alcohol Screen: US AUDIT-C     1  How often do you have a drink containing alcohol? 0 Filed at: 02/08/2023 1827   2  How many drinks containing alcohol do you have on a typical day you are drinking? 0 Filed at: 02/08/2023 1827   3a  Male UNDER 65: How often do you have five or more drinks on one occasion? 0 Filed at: 02/08/2023 1827   3b  FEMALE Any Age, or MALE 65+: How often do you have 4 or more drinks on one occassion? 0 Filed at: 02/08/2023 1827   Audit-C Score 0 Filed at: 02/08/2023 1827   LEVAR: How many times in the past year have you    Used an illegal drug or used a prescription medication for non-medical reasons? Never Filed at: 02/08/2023 1827                    Medical Decision Making  I went over CT with pt  And her family  Pt  Ambulated like she normally does, with family assisting her          Prepped scalp laceration sterilly, injected 5mL of lidocaine 1% locally and irrigated the wound with NSS  Placed 6 staples  Pt  Tolerated procedure well  Fall, initial encounter: self-limited or minor problem  Injury of head, initial encounter: acute illness or injury  Laceration of scalp, initial encounter: acute illness or injury  Amount and/or Complexity of Data Reviewed  Radiology: ordered  Risk  OTC drugs  Disposition  Final diagnoses:   Fall, initial encounter   Injury of head, initial encounter   Laceration of scalp, initial encounter     Time reflects when diagnosis was documented in both MDM as applicable and the Disposition within this note     Time User Action Codes Description Comment    2/8/2023  8:18 PM Velia Mansfield Add [B40  MBXU] Fall, initial encounter     2/8/2023  8:18 PM Faby Watts Add [S09 90XA] Injury of head, initial encounter     2/8/2023  8:18 PM Brandi Watts Add [S01 01XA] Laceration of scalp, initial encounter       ED Disposition     ED Disposition   Discharge    Condition   Stable    Date/Time   Wed Feb 8, 2023  8:18 PM    Comment   [place] discharge to home/self care                 Follow-up Information     Follow up With Specialties Details Why [address] [facility], [address] [address] [facility] [address] [place]  [address] [facility]  [address]  [facility] [address]             Discharge Medication List as of 2/8/2023  8:19 PM      CONTINUE these medications which have NOT CHANGED    Details   aspirin (ECOTRIN LOW STRENGTH) 81 mg EC tablet Take 81 mg by mouth daily , Historical Med      atenolol (TENORMIN) 25 mg tablet Take 1 tablet (25 mg total) by mouth daily, Starting Fri 9/17/2021, Normal      bisacodyl (DULCOLAX) 5 mg EC tablet Take 4 tablets (20 mg total) by mouth once for 1 dose Colonoscopy prep, Starting Mon 2/21/2022, Normal      citalopram (CeleXA) 40 mg tablet Take 1 tablet (40 mg total) by mouth daily, Starting Wed 8/3/2022, Normal      omeprazole (PriLOSEC) 20 mg delayed release capsule Take 1 capsule (20 mg total) by mouth daily, Starting Tue 10/4/2022, Normal      albuterol (PROVENTIL HFA,VENTOLIN HFA) 90 mcg/act inhaler Starting Mon 12/30/2013, Historical Med      atorvastatin (LIPITOR) 40 mg tablet Take 1 tablet (40 mg total) by mouth daily, Starting Tue 1/18/2022, Normal      benzonatate (TESSALON PERLES) 100 mg capsule Take 1 capsule (100 mg total) by mouth 3 (three) times a day as needed for cough, Starting Tue 12/6/2022, Normal      Cholecalciferol (VITAMIN D-3) 5000 units TABS Take 1 tablet by mouth daily, Starting Fri 10/12/2018, Normal      conjugated estrogens (PREMARIN) vaginal cream Insert 0 5 g into the vagina 3 (three) times a week, Starting Fri 2/9/2018, Normal      diclofenac sodium (VOLTAREN) 50 mg EC tablet Take 1 tablet (50 mg total) by mouth 2 (two) times a day, Starting Fri 5/21/2021, Normal      fluticasone (FLONASE) 50 mcg/act nasal spray 1 spray into each nostril daily, Starting Tue 1/18/2022, Normal      gabapentin (Neurontin) 100 mg capsule Take 1 capsule (100 mg total) by mouth 2 (two) times a day, Starting Thu 8/11/2022, Normal      !! Incontinence Supply Disposable (Brief Overnight Large) MISC Use 4 (four) times a day, Starting Mon 11/7/2022, Print      !! Incontinence Supply Disposable (Underpads) MISC Use in the morning Please dispense washable underpads, Starting Mon 11/7/2022, Print      Infant Care Products (Comfort-Smooth Baby Wipes) MISC Use 4 (four) times a day, Starting Mon 11/7/2022, Print      polyethylene glycol (GLYCOLAX) powder Take 17 g by mouth daily, Starting Fri 2/8/2019, Normal      polyethylene glycol (MIRALAX) 17 g packet Take 17 g by mouth daily, Starting Mon 2/21/2022, Until Sat 8/20/2022, Normal       !! - Potential duplicate medications found  Please discuss with provider  No discharge procedures on file      PDMP Review     None          ED Provider  Electronically Signed by           Alexandru Loera MD  02/08/23 0031

## 2023-02-08 NOTE — ED NOTES
Bed alarm placed on bed upon arrival & family remains at [address], [facility]  02/08/23 5315

## 2023-02-21 ENCOUNTER — TELEPHONE (OUTPATIENT)
Dept: FAMILY MEDICINE CLINIC | Facility: CLINIC | Age: 73
End: 2023-02-21

## 2023-02-21 DIAGNOSIS — I69.30 HISTORY OF CVA WITH RESIDUAL DEFICIT: Primary | ICD-10-CM

## 2023-02-21 NOTE — TELEPHONE ENCOUNTER
Hi, my name is Vi Pittman  I'm a  with [facility]  I'm calling about one of our members at [facility] [place]  Last name Beryle Ducking, date of birth [id number]  So she needs a new order for a rollator  So a Walker with four wheels and a seat  I'm not sure who exactly her PCP is  I know she goes to that office though So if you can place that order and follow up with the Member or you can always call me  My phone number is [phone]  Thanks  Have a great day

## 2023-03-06 ENCOUNTER — OFFICE VISIT (OUTPATIENT)
Dept: FAMILY MEDICINE CLINIC | Facility: CLINIC | Age: 73
End: 2023-03-06

## 2023-03-06 VITALS
TEMPERATURE: 97.5 F | RESPIRATION RATE: 18 BRPM | WEIGHT: 140 LBS | OXYGEN SATURATION: 96 % | BODY MASS INDEX: 30.3 KG/M2 | DIASTOLIC BLOOD PRESSURE: 60 MMHG | HEART RATE: 63 BPM | SYSTOLIC BLOOD PRESSURE: 118 MMHG

## 2023-03-06 DIAGNOSIS — I69.30 HISTORY OF CVA WITH RESIDUAL DEFICIT: ICD-10-CM

## 2023-03-06 DIAGNOSIS — R47.9 SPEECH DISTURBANCE, UNSPECIFIED TYPE: ICD-10-CM

## 2023-03-06 DIAGNOSIS — I10 ESSENTIAL HYPERTENSION: ICD-10-CM

## 2023-03-06 DIAGNOSIS — E11.9 CONTROLLED TYPE 2 DIABETES MELLITUS WITHOUT COMPLICATION, WITHOUT LONG-TERM CURRENT USE OF INSULIN (HCC): Primary | ICD-10-CM

## 2023-03-06 DIAGNOSIS — M79.601 RIGHT ARM PAIN: ICD-10-CM

## 2023-03-06 DIAGNOSIS — G62.9 NEUROPATHY: ICD-10-CM

## 2023-03-06 DIAGNOSIS — E55.9 VITAMIN D DEFICIENCY: ICD-10-CM

## 2023-03-06 DIAGNOSIS — M62.81 MUSCLE WEAKNESS: ICD-10-CM

## 2023-03-06 DIAGNOSIS — F32.89 OTHER DEPRESSION: ICD-10-CM

## 2023-03-06 LAB — SL AMB POCT HEMOGLOBIN AIC: 6 (ref ?–6.5)

## 2023-03-06 RX ORDER — LIDOCAINE 50 MG/G
1 PATCH TOPICAL DAILY
Qty: 30 PATCH | Refills: 1 | Status: SHIPPED | OUTPATIENT
Start: 2023-03-06

## 2023-03-06 RX ORDER — CITALOPRAM 40 MG/1
40 TABLET ORAL DAILY
Qty: 90 TABLET | Refills: 1 | Status: SHIPPED | OUTPATIENT
Start: 2023-03-06

## 2023-03-06 NOTE — ASSESSMENT & PLAN NOTE
Lab Results   Component Value Date    HGBA1C 6 0 03/06/2023   Stable unchanged from prior  Diet controlled

## 2023-03-06 NOTE — PROGRESS NOTES
Diabetic Foot Exam    Patient's shoes and socks removed  Right Foot/Ankle   Right Foot Inspection  Skin Exam: skin normal and skin intact  No dry skin, no warmth, no callus, no erythema, no maceration, no abnormal color, no pre-ulcer, no ulcer and no callus  Sensory   Vibration: intact  Proprioception: intact  Monofilament testing: intact    Vascular  Capillary refills: < 3 seconds  The right DP pulse is 1+  The right PT pulse is 1+  Left Foot/Ankle  Left Foot Inspection  Skin Exam: skin normal and skin intact  No dry skin, no warmth, no erythema, no maceration, normal color, no pre-ulcer, no ulcer and no callus  Sensory   Vibration: intact  Proprioception: intact  Monofilament testing: intact    Vascular  Capillary refills: < 3 seconds  The left DP pulse is 1+  The left PT pulse is 1+  Assign Risk Category  No deformity present  No loss of protective sensation  No weak pulses  Risk: 0  Name: Tammy Ruano      : 1950      MRN: [id number]  Encounter Provider: Colletta Maple, MD  Encounter Date: 3/6/2023   Encounter department: [address] [place] [address]     1  Controlled type 2 diabetes mellitus without complication, without long-term current use of insulin ([facility])  Assessment & Plan:    Lab Results   Component Value Date    HGBA1C 6 0 2023   Stable unchanged from prior  Diet controlled     Orders:  -     POCT hemoglobin A1c  -     CBC and differential; Future  -     Comprehensive metabolic panel; Future  -     Lipid panel; Future  -     Vitamin D 25 hydroxy; Future  -     Microalbumin / creatinine urine ratio; Future  -     TSH, 3rd generation with Free T4 reflex; Future    2  Other depression  -     citalopram (CeleXA) 40 mg tablet; Take 1 tablet (40 mg total) by mouth daily    3  History of CVA with residual deficit  -     Ambulatory Referral to Speech Therapy; Future    4   Speech disturbance, unspecified type  -     Ambulatory Referral to Speech Therapy; Future    5  Right arm pain  -     lidocaine (Lidoderm) 5 %; Apply 1 patch topically over 12 hours daily Remove & Discard patch within 12 hours or as directed by MD    6  Neuropathy  -     CBC and differential; Future  -     Comprehensive metabolic panel; Future  -     Lipid panel; Future  -     Vitamin D 25 hydroxy; Future  -     Microalbumin / creatinine urine ratio; Future  -     TSH, 3rd generation with Free T4 reflex; Future  -     Folate; Future  -     Vitamin B12; Future    7  Essential hypertension  -     CBC and differential; Future  -     Comprehensive metabolic panel; Future  -     Lipid panel; Future  -     Vitamin D 25 hydroxy; Future  -     Microalbumin / creatinine urine ratio; Future  -     TSH, 3rd generation with Free T4 reflex; Future    8  Vitamin D deficiency  -     Vitamin D 25 hydroxy; Future    9  Muscle weakness  Assessment & Plan:  Discussed importance of daily activity  Complete ROM exercises as described by therapy              Subjective      Patient is a 70 y/o  female with hx of CVA (right sided deficits, wheelchair bound - on ASA), HTN, HLD, arthritis, anxiety, hypothyroidism,  DM, here today for follow up  DM: not checking BG regularly  Denies s/s of hypo or hyperglycemia  Does not follow any particular diet  No exercise  CVA: right sided weakness, states has been having increasing problems with transferring, getting up an walking a few steps  States tries to do exercises as outlined by PT/OT at home  As per her SO her voice is difficult to understand at times, but seems more clear when she is upset  Complains of R shoulder pain and stiffness  Received 2 lidocaine patches from a friend which she feels have helped considerably with the pain  Complains of burning like sensation in legs and feet worse at night        Review of Systems   Constitutional: Positive for fatigue  Musculoskeletal: Positive for arthralgias and gait problem     All other systems reviewed and are negative        Current Outpatient Medications on File Prior to Visit   Medication Sig   • albuterol (PROVENTIL HFA,VENTOLIN HFA) 90 mcg/act inhaler  (Patient not taking: Reported on 8/25/2022)   • aspirin (ECOTRIN LOW STRENGTH) 81 mg EC tablet Take 81 mg by mouth daily    • atenolol (TENORMIN) 25 mg tablet Take 1 tablet (25 mg total) by mouth daily   • atorvastatin (LIPITOR) 40 mg tablet Take 1 tablet (40 mg total) by mouth daily (Patient not taking: Reported on 8/25/2022)   • Cholecalciferol (VITAMIN D-3) 5000 units TABS Take 1 tablet by mouth daily (Patient not taking: Reported on 8/5/2021)   • conjugated estrogens (PREMARIN) vaginal cream Insert 0 5 g into the vagina 3 (three) times a week (Patient not taking: Reported on 8/5/2021)   • diclofenac sodium (VOLTAREN) 50 mg EC tablet Take 1 tablet (50 mg total) by mouth 2 (two) times a day (Patient not taking: Reported on 8/20/2021)   • fluticasone (FLONASE) 50 mcg/act nasal spray 1 spray into each nostril daily   • gabapentin (Neurontin) 100 mg capsule Take 1 capsule (100 mg total) by mouth 2 (two) times a day (Patient not taking: Reported on 8/25/2022)   • Incontinence Supply Disposable (Brief Overnight Large) MISC Use 4 (four) times a day   • Incontinence Supply Disposable (Underpads) MISC Use in the morning Please dispense washable underpads   • Infant Care Products (Comfort-Smooth Baby Wipes) MISC Use 4 (four) times a day   • omeprazole (PriLOSEC) 20 mg delayed release capsule Take 1 capsule (20 mg total) by mouth daily   • polyethylene glycol (GLYCOLAX) powder Take 17 g by mouth daily (Patient not taking: Reported on 8/5/2021)   • [DISCONTINUED] benzonatate (TESSALON PERLES) 100 mg capsule Take 1 capsule (100 mg total) by mouth 3 (three) times a day as needed for cough (Patient not taking: Reported on 2/8/2023)   • [DISCONTINUED] bisacodyl (DULCOLAX) 5 mg EC tablet Take 4 tablets (20 mg total) by mouth once for 1 dose Colonoscopy prep   • [DISCONTINUED] citalopram (CeleXA) 40 mg tablet Take 1 tablet (40 mg total) by mouth daily   • [DISCONTINUED] polyethylene glycol (MIRALAX) 17 g packet Take 17 g by mouth daily (Patient not taking: Reported on 2/8/2023)       Objective     /60 (BP Location: Left arm, Patient Position: Sitting, Cuff Size: Standard)   Pulse 63   Temp 97 5 °F (36 4 °C) (Temporal)   Resp 18   Wt 63 5 kg (140 lb)   SpO2 96%   BMI 30 30 kg/m²     Physical Exam  Vitals and nursing note reviewed  Constitutional:       Appearance: She is well-developed  HENT:      Head: Normocephalic  Right Ear: External ear normal       Left Ear: External ear normal       Nose: Nose normal    Eyes:      Conjunctiva/sclera: Conjunctivae normal       Pupils: Pupils are equal, round, and reactive to light  Neck:      Thyroid: No thyromegaly  Cardiovascular:      Rate and Rhythm: Normal rate and regular rhythm  Pulses: no weak pulses          Dorsalis pedis pulses are 1+ on the right side and 1+ on the left side  Posterior tibial pulses are 1+ on the right side and 1+ on the left side  Heart sounds: Normal heart sounds  Pulmonary:      Effort: Pulmonary effort is normal       Breath sounds: Normal breath sounds  Abdominal:      Palpations: Abdomen is soft  Tenderness: There is no abdominal tenderness  There is no guarding or rebound  Musculoskeletal:         General: Tenderness present  Right shoulder: Tenderness present  Decreased range of motion  Left shoulder: Normal       Cervical back: Normal range of motion and neck supple  Spasms present  Feet:      Right foot:      Skin integrity: No ulcer, skin breakdown, erythema, warmth, callus or dry skin  Left foot:      Skin integrity: No ulcer, skin breakdown, erythema, warmth, callus or dry skin  Skin:     General: Skin is dry  Neurological:      Mental Status: She is alert and oriented to person, place, and time        Motor: Weakness present  Gait: Gait abnormal       Deep Tendon Reflexes: Reflexes are normal and symmetric         Rebecca Kussmaul, MD

## 2023-03-07 ENCOUNTER — OFFICE VISIT (OUTPATIENT)
Dept: OBGYN CLINIC | Facility: MEDICAL CENTER | Age: 73
End: 2023-03-07

## 2023-03-07 ENCOUNTER — TELEPHONE (OUTPATIENT)
Dept: FAMILY MEDICINE CLINIC | Facility: CLINIC | Age: 73
End: 2023-03-07

## 2023-03-07 VITALS
BODY MASS INDEX: 30.2 KG/M2 | WEIGHT: 140 LBS | SYSTOLIC BLOOD PRESSURE: 129 MMHG | HEIGHT: 57 IN | HEART RATE: 69 BPM | DIASTOLIC BLOOD PRESSURE: 69 MMHG

## 2023-03-07 DIAGNOSIS — M54.42 CHRONIC RIGHT-SIDED LOW BACK PAIN WITH BILATERAL SCIATICA: Primary | ICD-10-CM

## 2023-03-07 DIAGNOSIS — G89.29 CHRONIC RIGHT-SIDED LOW BACK PAIN WITH BILATERAL SCIATICA: Primary | ICD-10-CM

## 2023-03-07 DIAGNOSIS — M54.41 CHRONIC RIGHT-SIDED LOW BACK PAIN WITH BILATERAL SCIATICA: Primary | ICD-10-CM

## 2023-03-07 DIAGNOSIS — M51.36 LUMBAR DEGENERATIVE DISC DISEASE: ICD-10-CM

## 2023-03-07 RX ORDER — GABAPENTIN 300 MG/1
CAPSULE ORAL
Qty: 90 CAPSULE | Refills: 0 | Status: SHIPPED | OUTPATIENT
Start: 2023-03-07

## 2023-03-07 RX ORDER — METHYLPREDNISOLONE 4 MG/1
TABLET ORAL
Qty: 1 EACH | Refills: 0 | Status: SHIPPED | OUTPATIENT
Start: 2023-03-07

## 2023-03-07 NOTE — TELEPHONE ENCOUNTER
03/07/23    [employer] / PRIOR AUTH for the Following med:   lidocaine (Lidoderm) 5 % [address] Comprehensive

## 2023-03-07 NOTE — LETTER
March 7, 2023     Edwige Campoverde, [address]  [address]    Patient: Eloisa Adkins   YOB: 1950   Date of Visit: 3/7/2023       Dear Dr Squires Dominguez:    Thank you for referring Eloisa Adkins to me for evaluation  Below are the relevant portions of my assessment and plan of care  If you have questions, please do not hesitate to call me  I look forward to following Farideh Baig along with you           Sincerely,        Meaghan Miller MD        CC: No Recipients

## 2023-03-07 NOTE — PROGRESS NOTES
Assessment/Plan:    Diagnoses and all orders for this visit:    Chronic right-sided low back pain with bilateral sciatica  -     gabapentin (Neurontin) 300 mg capsule; 1 tab PO qHS for pain, may take up to 3 times per day  -     methylPREDNISolone 4 MG tablet therapy pack; Use as directed on package  -     Ambulatory Referral to Pain Management; Future    Lumbar degenerative disc disease  -     gabapentin (Neurontin) 300 mg capsule; 1 tab PO qHS for pain, may take up to 3 times per day  -     methylPREDNISolone 4 MG tablet therapy pack; Use as directed on package  -     Ambulatory Referral to Pain Management; Future       [id number] used for entire encounter  Patient referred to Pain Management for chronic symptoms  While taking the oral steroid Medrol Dose Pack, do not take any NSAIDs such as Advil, Motrin, ibuprofen, Motrin, Aleve, naproxen, Celebrex or Meloxicam   You can restart the NSAIDs after you finish the steroids  However you may take Tylenol 500mg every 4-6 hours as needed OR max 1,000mg per dose up to 3 times per day for a total of 3,000mg per day  While taking oral steroids, you may experience mild side effects such as feeling jittery or flushing  Please call if your side effects are significant or you have any questions  Restart Gabapentin    Return if symptoms worsen or fail to improve  Subjective:   Patient ID: Taylor Christy is a 71 y o  female  Patient is a 70 y/o  female with hx of CVA (right sided deficits, wheelchair bound - on ASA), HTN, HLD, arthritis, anxiety, hypothyroidism,  DM, here for symptoms similar to previous visit 8/19/21 for which she underwent MRI L spine, however was not seen by Pain Management  Initial note:  New patient presents referred by PCP for right lower back, buttocks, groin and right leg pain down past knee into foot for approximately 2 months    She denies any specific injury but she does have a history of multiple falls over the last 2-3 years  Denies changes in bowel / bladder, no n/t  She has been treated with a prescription for cyclobenzaprine and diclofenac by PCP, and was provided an IM injection of Toradol  Review of Systems    The following portions of the patient's chart were reviewed and updated as appropriate:    Allergy:    Allergies   Allergen Reactions   • Tramadol      Causes nausea and vomiting        Medications:    Current Outpatient Medications:   •  aspirin (ECOTRIN LOW STRENGTH) 81 mg EC tablet, Take 81 mg by mouth daily , Disp: , Rfl:   •  atenolol (TENORMIN) 25 mg tablet, Take 1 tablet (25 mg total) by mouth daily, Disp: 90 tablet, Rfl: 1  •  citalopram (CeleXA) 40 mg tablet, Take 1 tablet (40 mg total) by mouth daily, Disp: 90 tablet, Rfl: 1  •  fluticasone (FLONASE) 50 mcg/act nasal spray, 1 spray into each nostril daily, Disp: 16 g, Rfl: 0  •  gabapentin (Neurontin) 300 mg capsule, 1 tab PO qHS for pain, may take up to 3 times per day, Disp: 90 capsule, Rfl: 0  •  Incontinence Supply Disposable (Brief Overnight Large) MISC, Use 4 (four) times a day, Disp: 160 each, Rfl: 11  •  Incontinence Supply Disposable (Underpads) MISC, Use in the morning Please dispense washable underpads, Disp: 4 each, Rfl: 11  •  Infant Care Products (Comfort-Smooth Baby Wipes) MISC, Use 4 (four) times a day, Disp: 240 each, Rfl: 11  •  lidocaine (Lidoderm) 5 %, Apply 1 patch topically over 12 hours daily Remove & Discard patch within 12 hours or as directed by MD, Disp: 30 patch, Rfl: 1  •  methylPREDNISolone 4 MG tablet therapy pack, Use as directed on package, Disp: 1 each, Rfl: 0  •  omeprazole (PriLOSEC) 20 mg delayed release capsule, Take 1 capsule (20 mg total) by mouth daily, Disp: 30 capsule, Rfl: 2  •  albuterol (PROVENTIL HFA,VENTOLIN HFA) 90 mcg/act inhaler, , Disp: , Rfl:   •  atorvastatin (LIPITOR) 40 mg tablet, Take 1 tablet (40 mg total) by mouth daily (Patient not taking: Reported on 8/25/2022), Disp: 90 tablet, Rfl: 1  •  Cholecalciferol (VITAMIN D-3) 5000 units TABS, Take 1 tablet by mouth daily (Patient not taking: Reported on 8/5/2021), Disp: 90 tablet, Rfl: 1  •  conjugated estrogens (PREMARIN) vaginal cream, Insert 0 5 g into the vagina 3 (three) times a week (Patient not taking: Reported on 8/5/2021), Disp: 42 5 g, Rfl: 0  •  diclofenac sodium (VOLTAREN) 50 mg EC tablet, Take 1 tablet (50 mg total) by mouth 2 (two) times a day (Patient not taking: Reported on 8/20/2021), Disp: 60 tablet, Rfl: 0  •  polyethylene glycol (GLYCOLAX) powder, Take 17 g by mouth daily (Patient not taking: Reported on 8/5/2021), Disp: 850 g, Rfl: 1    Patient Active Problem List   Diagnosis   • Fall   • Subdural hemorrhage following injury   • Subarachnoid hemorrhage following injury   • Hypertension   • Collapsed vertebra, not elsewhere classified, sacral and sacrococcygeal region, initial encounter for fracture ([facility] [address] )   • HLD (hyperlipidemia)   • History of CVA with residual deficit   • Depression   • Headache   • Dizziness   • Urinary, incontinence, stress female   • Adhesive capsulitis of left shoulder   • Asymptomatic bilateral carotid artery stenosis   • Cervical radiculitis   • Constipation   • Elevated TSH   • Gastroesophageal reflux disease   • Hyperlipidemia   • Memory difficulties   • Muscle weakness   • Osteoporosis   • Osteoarthritis of both hands   • Urge and stress incontinence   • Primary insomnia   • Diabetes mellitus type II, controlled ([facility] [address] )   • Arthritis   • Left upper quadrant pain   • Epigastric abdominal pain   • NSAID long-term use   • Contusion of right breast   • Elevated BP without diagnosis of hypertension   • Breast pain, right   • Hip strain, right, initial encounter   • Acute bacterial conjunctivitis of left eye   • Sciatica of right side   • Class 1 obesity due to excess calories without serious comorbidity with body mass index (BMI) of 30 0 to 30 9 in adult   • COVID-19       Objective:  /69   Pulse 69   Ht 4' 9" (1 448 m)   Wt 63 5 kg (140 lb)   BMI 30 30 kg/m²     Ortho Exam    Physical Exam      Neurologic Exam    Procedures    I have personally reviewed the written report of the pertinent studies  MRI LUMBAR SPINE WITHOUT CONTRAST     INDICATION: M54 41: Lumbago with sciatica, right side  M54 42: Lumbago with sciatica, left side  G89 29: Other chronic pain  R20 8: Other disturbances of skin sensation  M51 36: Other intervertebral disc degeneration, lumbar region      COMPARISON:  10/30/2016     TECHNIQUE:  Sagittal T1, sagittal T2, sagittal inversion recovery, axial T1 and axial T2, coronal T2     IMAGE QUALITY:  Diagnostic     FINDINGS:     VERTEBRAL BODIES:  There are 5 lumbar type vertebral bodies  Normal alignment of the lumbar spine  No spondylolysis or spondylolisthesis  No scoliosis  No compression fracture  Normal marrow signal is identified within the visualized bony   structures  No discrete marrow lesion      SACRUM:  Normal signal within the sacrum  No evidence of insufficiency or stress fracture      DISTAL CORD AND CONUS:  Normal size and signal within the distal cord and conus      PARASPINAL SOFT TISSUES:  Paraspinal soft tissues are unremarkable      LOWER THORACIC DISC SPACES:  Normal disc height and signal   No disc herniation, canal stenosis or foraminal narrowing      LUMBAR DISC SPACES:     L1-L2:  Normal      L2-L3:  Normal      L3-L4:  Normal      L4-L5:  Slight annular bulging without focal disc herniation, canal stenosis or foraminal narrowing  Previously seen superiorly extruded disc herniation within the left neural foramen is no longer present      L5-S1:  Mild annular bulging  There is a small broad-based left foraminal disc protrusion with minor endplate osteophyte formation within the foraminal and extraforaminal portion of the endplates  Minor facet degenerative change  There is no canal   stenosis    Mild left foraminal narrowing with disc and endplate changes abutting the undersurface of the exiting nerve without compression or displacement      IMPRESSION:     Mild noncompressive lumbar degenerative disc disease L4-5 and L5-S1                Past Medical History:   Diagnosis Date   • Abdominal pain    • Anxiety     last assessed: 10/19/2013   • Arthritis     osteo both hands; last assessed: 10/19/2013   • Bowel incontinence    • Chest pain    • Constipation    • CVA (cerebral vascular accident) ([facility] [address] )    • Diabetes mellitus type II, controlled ([facility] [address] )    • Disease of thyroid gland    • Epigastric pain     with distention   • Falls    • High cholesterol    • Hyperlipidemia    • Hypertension     last assessed: 4/3/2017   • Migraine     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)    • Thyroid disease        Past Surgical History:   Procedure Laterality Date   • AXILLARY SURGERY      cyst surgery   • BLADDER SURGERY     •  SECTION     • CYSTOSCOPY N/A 2018    Procedure: Ronna Radha;  Surgeon: Margarita Brown MD;  Location: [facility] OR;  Service: Gynecology   • FACIAL BONE TUMOR EXCISION     • KNEE SURGERY     • OTHER SURGICAL HISTORY      cyst removed from axilla   • OR ESOPHAGOGASTRODUODENOSCOPY TRANSORAL DIAGNOSTIC N/A 2017    Procedure: ESOPHAGOGASTRODUODENOSCOPY (EGD); Surgeon: Yamileth Alexander MD;  Location: [facility] GI LAB;   Service: Gastroenterology   • OR SLING OPERATION STRESS INCONTINENCE N/A 2018    Procedure: PUBOVAGINAL SLING;  Surgeon: Margarita Brown MD;  Location: [facility];  Service: Gynecology   • TUBAL LIGATION         Social History     Socioeconomic History   • Marital status: /Civil Union     Spouse name: Not on file   • Number of children: Not on file   • Years of education: Grade 12 completed   • Highest education level: Not on file   Occupational History   • Not on file   Tobacco Use   • Smoking status: Never   • Smokeless tobacco: Never   Vaping Use   • Vaping Use: Never used   Substance and Sexual Activity   • Alcohol use: No   • Drug use: No   • Sexual activity: Not on file   Other Topics Concern   • Not on file   Social History Narrative    Caffeine use    Lives with spouse    Ep[employer]     Social Determinants of Health     Financial Resource Strain: Not on file   Food Insecurity: Not on file   Transportation Needs: Not on file   Physical Activity: Not on file   Stress: Not on file   Social Connections: Not on file   Intimate Partner Violence: Not on file   Housing Stability: Not on file       Family History   Problem Relation Age of Onset   • No Known Problems Father    • Diabetes Family         mellitus   • Hypertension Family    • Stroke Family    • Thyroid disease Family    • No Known Problems Mother    • No Known Problems Sister    • No Known Problems Daughter    • No Known Problems Maternal Grandmother    • No Known Problems Maternal Grandfather    • No Known Problems Paternal Grandmother    • No Known Problems Paternal Grandfather

## 2023-03-07 NOTE — TELEPHONE ENCOUNTER
Directly from [facility] [place]:    Prior Authorization requirement only applies to members when a non-FDA approved diagnosis is submitted  FDA-approved diagnosis codes submitted will pay without prior authorization requirement  They are available OTC, usually in 4% strength    How would you like to proceed?

## 2023-03-08 ENCOUNTER — TELEPHONE (OUTPATIENT)
Dept: OBGYN CLINIC | Facility: MEDICAL CENTER | Age: 73
End: 2023-03-08

## 2023-03-08 NOTE — TELEPHONE ENCOUNTER
Good Afternoon, Patient was last seen in 2017 by Dale Ramos  Patient wishes to schedule with Dr Almaraz  Please advise transfer of care

## 2023-03-09 ENCOUNTER — TELEPHONE (OUTPATIENT)
Dept: FAMILY MEDICINE CLINIC | Facility: CLINIC | Age: 73
End: 2023-03-09

## 2023-03-09 NOTE — TELEPHONE ENCOUNTER
03/09/23    PCP Andersonberg / Confirmation Order FORM RECEIVED VIA FAX AND PLACED IN PCP FOLDER TO BE DELIVERED AT ASSIGNED TIMES        EQUIPMENT:  Adult pull on large    Disposable underpads    Reusable underpads    Attends unscented hypoallergen washcloths    Exam gloves

## 2023-03-13 ENCOUNTER — HOSPITAL ENCOUNTER (OUTPATIENT)
Dept: NEUROLOGY | Facility: CLINIC | Age: 73
Discharge: HOME | End: 2023-03-13

## 2023-03-13 DIAGNOSIS — G62.9 NEUROPATHY: ICD-10-CM

## 2023-03-14 ENCOUNTER — CONSULT (OUTPATIENT)
Dept: PAIN MEDICINE | Facility: CLINIC | Age: 73
End: 2023-03-14

## 2023-03-14 VITALS
BODY MASS INDEX: 30.2 KG/M2 | HEART RATE: 70 BPM | HEIGHT: 57 IN | OXYGEN SATURATION: 96 % | DIASTOLIC BLOOD PRESSURE: 60 MMHG | WEIGHT: 140 LBS | SYSTOLIC BLOOD PRESSURE: 120 MMHG

## 2023-03-14 DIAGNOSIS — G89.29 CHRONIC RIGHT-SIDED LOW BACK PAIN WITH BILATERAL SCIATICA: ICD-10-CM

## 2023-03-14 DIAGNOSIS — M54.41 CHRONIC RIGHT-SIDED LOW BACK PAIN WITH BILATERAL SCIATICA: ICD-10-CM

## 2023-03-14 DIAGNOSIS — M54.16 LUMBAR RADICULITIS: Primary | ICD-10-CM

## 2023-03-14 DIAGNOSIS — M54.42 CHRONIC RIGHT-SIDED LOW BACK PAIN WITH BILATERAL SCIATICA: ICD-10-CM

## 2023-03-14 NOTE — PROGRESS NOTES
Assessment  1  Lumbar radiculitis    2  Chronic right-sided low back pain with bilateral sciatica        Plan  Patient presenting with chronic back pain with right greater than left radicular pain for greater than 5 years, worsening over the past 1 year  Pain consistent with lumbar radicular pain accompanied by pain >7/10 on the pain scale with inability to participate in IADLs for >6 weeks  Patient has fully participated with PT  Has been taking gabapentin, Medrol Dosepak with minimal benefit  Denies any gait instability, saddle anesthesia  I personally reviewed and interpreted pertinent imaging studies - specifically lumbar spine MRI from 5/12/2022 and discussed the results and clinical significance with the patient  This does show L5-S1 disc space narrowing and a left L5-S1 foraminal disc osteophyte complex  Patient does endorse right greater than left radicular symptoms  I reviewed external notes from the relevant aspects of the patient's medical record, specifically sports medicine, primary care physician, physical therapy/Occupational Therapy notes in regards to current and prior treatments tried (as mentioned in history of present illness)  I reviewed pertinent laboratory studies, specifically renal function, hemoglobin A1c, CBC, coagulation studies, prior to initiating the recommended medication therapies/interventional treatment options  1  At this time patient has exhausted conservative treatments and I do believe an epidural steroid injection is warranted for her radicular symptoms at this time  Complete risks and benefits including bleeding, infection, tissue reaction, nerve injury and allergic reaction were discussed  The approach was demonstrated using models and literature was provided  We will schedule for right biased L5-S1 lumbar interlaminar epidural steroid injection  Follow-up approximately 4 weeks after lumbar epidural steroid injection    Continue with conservative medication and physical therapy/occupational therapy management  Handouts provided questions answered to patient's satisfaction  Lifestyle modifications extensively discussed including diet, exercise and weight loss in addition to core strengthening  [place] Prescription Drug Monitoring Program report was reviewed and was appropriate     My impressions and treatment recommendations were discussed in detail with the patient who verbalized understanding and had no further questions  Discharge instructions were provided  I personally saw and examined the patient and I agree with the above discussed plan of care  Orders Placed This Encounter   Procedures   • FL spine and pain procedure     Standing Status:   Future     Standing Expiration Date:   3/14/2027     Order Specific Question:   Reason for Exam:     Answer:   right l5-s1 LESI     Order Specific Question:   Anticoagulant hold needed? Answer:   No     No orders of the defined types were placed in this encounter  History of Present Illness    Taylor Christy is a 71 y o  female presenting for new patient visit at [place] [address] [facility] for exam and evaluation of chronic right greater than left lower back pain radiating to the bilateral lower extremities for greater than 5 years, worsening over the past 1 year  Patient denies any specific inciting event  Over the past month the intensity of pain has been severe, today is an 8 out of 10 on the pain scale  Symptoms do significantly affect activities of daily living  Pain is nearly constant with no typical pattern throughout the day  The pain is described as shooting, sharp, cutting  Patient endorses weakness in the lower extremities  Patient utilizes a wheelchair for assistance  Pain is increased with lying down, standing, bending, walking, exercise, coughing, sneezing  Patient denies any improving factors      Patient's past medical history is significant for CVA, hypertension, depression  Patient's social history is negative for tobacco, marijuana, alcohol use  Prior pertinent treatments tried: Physical therapy    Primary care pertinent medications tried: Gabapentin, Medrol Dosepak, lidocaine patch    I have personally reviewed and/or updated the patient's past medical history, past surgical history, family history, social history, current medications, allergies, and vital signs today  Review of Systems   Constitutional: Negative for chills and fever  HENT: Negative for ear pain and sore throat  Eyes: Negative for pain and visual disturbance  Respiratory: Negative for cough and shortness of breath  Cardiovascular: Negative for chest pain and palpitations  Gastrointestinal: Negative for abdominal pain and vomiting  Genitourinary: Negative for dysuria and hematuria  Musculoskeletal: Positive for back pain, gait problem and myalgias  Negative for arthralgias  Skin: Negative for color change and rash  Neurological: Positive for weakness  Negative for seizures and syncope  All other systems reviewed and are negative        Patient Active Problem List   Diagnosis   • Fall   • Subdural hemorrhage following injury   • Subarachnoid hemorrhage following injury   • Hypertension   • Collapsed vertebra, not elsewhere classified, sacral and sacrococcygeal region, initial encounter for fracture ([facility] [address] )   • HLD (hyperlipidemia)   • History of CVA with residual deficit   • Depression   • Headache   • Dizziness   • Urinary, incontinence, stress female   • Adhesive capsulitis of left shoulder   • Asymptomatic bilateral carotid artery stenosis   • Cervical radiculitis   • Constipation   • Elevated TSH   • Gastroesophageal reflux disease   • Hyperlipidemia   • Memory difficulties   • Muscle weakness   • Osteoporosis   • Osteoarthritis of both hands   • Urge and stress incontinence   • Primary insomnia   • Diabetes mellitus type II, controlled ([facility] [address] )   • Arthritis   • Left upper quadrant pain   • Epigastric abdominal pain   • NSAID long-term use   • Contusion of right breast   • Elevated BP without diagnosis of hypertension   • Breast pain, right   • Hip strain, right, initial encounter   • Acute bacterial conjunctivitis of left eye   • Sciatica of right side   • Class 1 obesity due to excess calories without serious comorbidity with body mass index (BMI) of 30 0 to 30 9 in adult   • COVID-19   • Numbness and tingling in both hands       Past Medical History:   Diagnosis Date   • Abdominal pain    • Anxiety     last assessed: 10/19/2013   • Arthritis     osteo both hands; last assessed: 10/19/2013   • Bowel incontinence    • Chest pain    • Constipation    • CVA (cerebral vascular accident) ([facility] [address] )    • Diabetes mellitus type II, controlled (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 [address] )    • Sleep disorder     last assessed: 10/19/2013   • Stroke [facility])    • Thyroid disease        Past Surgical History:   Procedure Laterality Date   • AXILLARY SURGERY      cyst surgery   • BLADDER SURGERY     •  SECTION     • CYSTOSCOPY N/A 2018    Procedure: CYSTOSCOPY;  Surgeon: Margarita Brown MD;  Location: [facility] OR;  Service: Gynecology   • FACIAL BONE TUMOR EXCISION     • KNEE SURGERY     • OTHER SURGICAL HISTORY      cyst removed from axilla   • NM ESOPHAGOGASTRODUODENOSCOPY TRANSORAL DIAGNOSTIC N/A 2017    Procedure: ESOPHAGOGASTRODUODENOSCOPY (EGD); Surgeon: Yamileth Alexander MD;  Location: [facility] GI LAB;   Service: Gastroenterology   • NM SLING OPERATION STRESS INCONTINENCE N/A 2018    Procedure: PUBOVAGINAL SLING;  Surgeon: Margarita Brown MD;  Location: [facility] OR;  Service: Gynecology   • TUBAL LIGATION         Family History   Problem Relation Age of Onset   • No Known Problems Father    • Diabetes Family         mellitus   • Hypertension Family    • Stroke Family    • Thyroid disease Family    • No Known Problems Mother    • No Known Problems Sister    • No Known Problems Daughter    • No Known Problems Maternal Grandmother    • No Known Problems Maternal Grandfather    • No Known Problems Paternal Grandmother    • No Known Problems Paternal Grandfather        Social History     Occupational History   • Not on file   Tobacco Use   • Smoking status: Never   • Smokeless tobacco: Never   Vaping Use   • Vaping Use: Never used   Substance and Sexual Activity   • Alcohol use: No   • Drug use: No   • Sexual activity: Not on file       Current Outpatient Medications on File Prior to Visit   Medication Sig   • aspirin (ECOTRIN LOW STRENGTH) 81 mg EC tablet Take 81 mg by mouth daily    • atenolol (TENORMIN) 25 mg tablet Take 1 tablet (25 mg total) by mouth daily   • citalopram (CeleXA) 40 mg tablet Take 1 tablet (40 mg total) by mouth daily   • fluticasone (FLONASE) 50 mcg/act nasal spray 1 spray into each nostril daily   • gabapentin (Neurontin) 300 mg capsule 1 tab PO qHS for pain, may take up to 3 times per day   • Incontinence Supply Disposable (Brief Overnight Large) MISC Use 4 (four) times a day   • Incontinence Supply Disposable (Underpads) MISC Use in the morning Please dispense washable underpads   • Infant Care Products (Comfort-Smooth Baby Wipes) MISC Use 4 (four) times a day   • lidocaine (Lidoderm) 5 % Apply 1 patch topically over 12 hours daily Remove & Discard patch within 12 hours or as directed by MD   • methylPREDNISolone 4 MG tablet therapy pack Use as directed on package   • omeprazole (PriLOSEC) 20 mg delayed release capsule Take 1 capsule (20 mg total) by mouth daily   • albuterol (PROVENTIL HFA,VENTOLIN HFA) 90 mcg/act inhaler  (Patient not taking: Reported on 8/25/2022)   • atorvastatin (LIPITOR) 40 mg tablet Take 1 tablet (40 mg total) by mouth daily (Patient not taking: Reported on 8/25/2022)   • Cholecalciferol (VITAMIN D-3) 5000 units TABS Take 1 tablet by mouth daily (Patient not taking: Reported on 8/5/2021)   • conjugated estrogens (PREMARIN) vaginal cream Insert 0 5 g into the vagina 3 (three) times a week (Patient not taking: Reported on 8/5/2021)   • diclofenac sodium (VOLTAREN) 50 mg EC tablet Take 1 tablet (50 mg total) by mouth 2 (two) times a day (Patient not taking: Reported on 8/20/2021)   • polyethylene glycol (GLYCOLAX) powder Take 17 g by mouth daily (Patient not taking: Reported on 8/5/2021)     No current facility-administered medications on file prior to visit  Allergies   Allergen Reactions   • Tramadol      Causes nausea and vomiting        Physical Exam    /60   Pulse 70   Ht 4' 9" (1 448 m)   Wt 63 5 kg (140 lb)   SpO2 96%   BMI 30 30 kg/m²     Constitutional: normal, well developed, well nourished, alert, in no distress and non-toxic and no overt pain behavior  Eyes: anicteric  HEENT: grossly intact  Neck: symmetric, trachea midline and no masses   Pulmonary:even and unlabored  Cardiovascular:No edema or pitting edema present  Skin:Normal without rashes or lesions and well hydrated  Psychiatric:Mood and affect appropriate  Neurologic: Gait is slow  In wheelchair  Motor function is grossly intact  Musculoskeletal: Diffuse tenderness throughout the lumbar paraspinal region  Positive right straight leg raise  Negative left straight leg raise  Imaging  MRI lumbar spine 5/12/2022:  FINDINGS:     VERTEBRAL BODIES:  There are 5 lumbar type vertebral bodies  Normal alignment of the lumbar spine  No spondylolysis or spondylolisthesis  No scoliosis  No compression fracture  Normal marrow signal is identified within the visualized bony   structures  No discrete marrow lesion      SACRUM:  Normal signal within the sacrum   No evidence of insufficiency or stress fracture      DISTAL CORD AND CONUS:  Grossly unremarkable but significantly limited evaluation due to patient motion      PARASPINAL SOFT TISSUES:  Paraspinal soft tissues are unremarkable      LOWER THORACIC DISC SPACES:  Small Schmorl's node deformity at the superior endplate of K93  No compressive degenerative discogenic disease      LUMBAR DISC SPACES:  Disc space narrowing at L5 upon S1      L1-L2:  Normal      L2-L3:  Normal      L3-L4:  Normal      L4-L5:  Minimal disc bulging and questionable shallow left paracentral protrusion      L5-S1:  Disc space narrowing and mild disc bulging with suggestion of left foraminal and far lateral disc osteophyte complex  Mild ipsilateral foraminal stenosis suspected         IMPRESSION:     Limited evaluation of the lumbar spine with motion degradation      Grossly stable degenerative changes at L4-L5 and L5-S1 with redemonstrated left L5-S1 foraminal disc osteophyte complex with grossly unchanged foraminal encroachment and abutment of the exiting ipsilateral L5 nerve root

## 2023-03-15 ENCOUNTER — APPOINTMENT (OUTPATIENT)
Dept: LAB | Facility: CLINIC | Age: 73
End: 2023-03-15

## 2023-03-15 DIAGNOSIS — E11.9 CONTROLLED TYPE 2 DIABETES MELLITUS WITHOUT COMPLICATION, WITHOUT LONG-TERM CURRENT USE OF INSULIN (HCC): ICD-10-CM

## 2023-03-15 DIAGNOSIS — E55.9 VITAMIN D DEFICIENCY: ICD-10-CM

## 2023-03-15 DIAGNOSIS — G62.9 NEUROPATHY: ICD-10-CM

## 2023-03-15 DIAGNOSIS — I10 ESSENTIAL HYPERTENSION: ICD-10-CM

## 2023-03-15 LAB
25(OH)D3 SERPL-MCNC: 46.1 NG/ML (ref 30–100)
ALBUMIN SERPL BCP-MCNC: 4.1 G/DL (ref 3.5–5)
ALP SERPL-CCNC: 74 U/L (ref 46–116)
ALT SERPL W P-5'-P-CCNC: 30 U/L (ref 12–78)
ANION GAP SERPL CALCULATED.3IONS-SCNC: 2 MMOL/L (ref 4–13)
AST SERPL W P-5'-P-CCNC: 16 U/L (ref 5–45)
BASOPHILS # BLD AUTO: 0.07 THOUSANDS/ÂΜL (ref 0–0.1)
BASOPHILS NFR BLD AUTO: 1 % (ref 0–1)
BILIRUB SERPL-MCNC: 0.41 MG/DL (ref 0.2–1)
BUN SERPL-MCNC: 25 MG/DL (ref 5–25)
CALCIUM SERPL-MCNC: 9.2 MG/DL (ref 8.3–10.1)
CHLORIDE SERPL-SCNC: 107 MMOL/L (ref 96–108)
CHOLEST SERPL-MCNC: 189 MG/DL
CO2 SERPL-SCNC: 30 MMOL/L (ref 21–32)
CREAT SERPL-MCNC: 0.96 MG/DL (ref 0.6–1.3)
EOSINOPHIL # BLD AUTO: 0.26 THOUSAND/ÂΜL (ref 0–0.61)
EOSINOPHIL NFR BLD AUTO: 4 % (ref 0–6)
ERYTHROCYTE [DISTWIDTH] IN BLOOD BY AUTOMATED COUNT: 13.2 % (ref 11.6–15.1)
FOLATE SERPL-MCNC: 11.4 NG/ML (ref 3.1–17.5)
GFR SERPL CREATININE-BSD FRML MDRD: 59 ML/MIN/1.73SQ M
GLUCOSE P FAST SERPL-MCNC: 98 MG/DL (ref 65–99)
HCT VFR BLD AUTO: 45.2 % (ref 34.8–46.1)
HDLC SERPL-MCNC: 49 MG/DL
HGB BLD-MCNC: 14.3 G/DL (ref 11.5–15.4)
IMM GRANULOCYTES # BLD AUTO: 0.04 THOUSAND/UL (ref 0–0.2)
IMM GRANULOCYTES NFR BLD AUTO: 1 % (ref 0–2)
LDLC SERPL CALC-MCNC: 107 MG/DL (ref 0–100)
LYMPHOCYTES # BLD AUTO: 2.63 THOUSANDS/ÂΜL (ref 0.6–4.47)
LYMPHOCYTES NFR BLD AUTO: 37 % (ref 14–44)
MCH RBC QN AUTO: 30.4 PG (ref 26.8–34.3)
MCHC RBC AUTO-ENTMCNC: 31.6 G/DL (ref 31.4–37.4)
MCV RBC AUTO: 96 FL (ref 82–98)
MONOCYTES # BLD AUTO: 0.54 THOUSAND/ÂΜL (ref 0.17–1.22)
MONOCYTES NFR BLD AUTO: 8 % (ref 4–12)
NEUTROPHILS # BLD AUTO: 3.67 THOUSANDS/ÂΜL (ref 1.85–7.62)
NEUTS SEG NFR BLD AUTO: 49 % (ref 43–75)
NONHDLC SERPL-MCNC: 140 MG/DL
NRBC BLD AUTO-RTO: 0 /100 WBCS
PLATELET # BLD AUTO: 303 THOUSANDS/UL (ref 149–390)
PMV BLD AUTO: 11.1 FL (ref 8.9–12.7)
POTASSIUM SERPL-SCNC: 4.4 MMOL/L (ref 3.5–5.3)
PROT SERPL-MCNC: 7.2 G/DL (ref 6.4–8.4)
RBC # BLD AUTO: 4.7 MILLION/UL (ref 3.81–5.12)
SODIUM SERPL-SCNC: 139 MMOL/L (ref 135–147)
TRIGL SERPL-MCNC: 166 MG/DL
TSH SERPL DL<=0.05 MIU/L-ACNC: 3.65 UIU/ML (ref 0.45–4.5)
VIT B12 SERPL-MCNC: 239 PG/ML (ref 100–900)
WBC # BLD AUTO: 7.21 THOUSAND/UL (ref 4.31–10.16)

## 2023-03-16 ENCOUNTER — APPOINTMENT (OUTPATIENT)
Dept: LAB | Facility: CLINIC | Age: 73
End: 2023-03-16

## 2023-03-16 DIAGNOSIS — G62.9 NEUROPATHY: ICD-10-CM

## 2023-03-16 DIAGNOSIS — I10 ESSENTIAL HYPERTENSION: ICD-10-CM

## 2023-03-16 DIAGNOSIS — E11.9 CONTROLLED TYPE 2 DIABETES MELLITUS WITHOUT COMPLICATION, WITHOUT LONG-TERM CURRENT USE OF INSULIN (HCC): ICD-10-CM

## 2023-03-16 LAB
CREAT UR-MCNC: 144 MG/DL
MICROALBUMIN UR-MCNC: 14.6 MG/L (ref 0–20)
MICROALBUMIN/CREAT 24H UR: 10 MG/G CREATININE (ref 0–30)

## 2023-03-28 ENCOUNTER — TELEPHONE (OUTPATIENT)
Dept: FAMILY MEDICINE CLINIC | Facility: CLINIC | Age: 73
End: 2023-03-28

## 2023-03-28 NOTE — TELEPHONE ENCOUNTER
Her insurance will not cover both at the same time  If a wheelchair is medically necessary then she would need to purchase a walker out of pocket and it appears she already has one  From the telephone note last month, insurance called on her behalf looking for a rolator walker with a seat      Thoughts on which you think pt would benefit from the most?

## 2023-03-29 NOTE — TELEPHONE ENCOUNTER
Per pt's  they returned the wheelchair 1 month ago, due to  they was paying for the wheelchair, at this moment pt don't have any equipment

## 2023-03-29 NOTE — TELEPHONE ENCOUNTER
I should have probably looked in parachute before all this but pt already received a wheelchair in 2022 per the notes  We already attempted to get her a rolator back in February  If there is an issue with her current wheelchair, she will need to contact the number on the sticker on her wheelchair  If her walker is broken, unfortunately, she will have to purchase one out of pocket  [FreeTextEntry1] : rec aspercreme.

## 2023-04-05 NOTE — TELEPHONE ENCOUNTER
Unfortunately, they'll have to take that up with the company   The wheelchair was billed to her insurance so I assume whatever they were paying was part of her deductible/coinsurance/etc     Customer service # is [phone]

## 2023-04-27 ENCOUNTER — HOSPITAL ENCOUNTER (OUTPATIENT)
Dept: RADIOLOGY | Facility: MEDICAL CENTER | Age: 73
Discharge: HOME | End: 2023-04-27
Admitting: ANESTHESIOLOGY

## 2023-04-27 VITALS
OXYGEN SATURATION: 96 % | SYSTOLIC BLOOD PRESSURE: 145 MMHG | HEART RATE: 64 BPM | RESPIRATION RATE: 20 BRPM | DIASTOLIC BLOOD PRESSURE: 70 MMHG | TEMPERATURE: 98.5 F

## 2023-04-27 DIAGNOSIS — M54.16 LUMBAR RADICULITIS: ICD-10-CM

## 2023-04-27 RX ORDER — BUPIVACAINE HCL/PF 2.5 MG/ML
1 VIAL (ML) INJECTION ONCE
Status: COMPLETED | OUTPATIENT
Start: 2023-04-27 | End: 2023-04-27

## 2023-04-27 RX ORDER — METHYLPREDNISOLONE ACETATE 80 MG/ML
80 INJECTION, SUSPENSION INTRA-ARTICULAR; INTRALESIONAL; INTRAMUSCULAR; PARENTERAL; SOFT TISSUE ONCE
Status: COMPLETED | OUTPATIENT
Start: 2023-04-27 | End: 2023-04-27

## 2023-04-27 RX ADMIN — Medication 1 ML: at 12:01

## 2023-04-27 RX ADMIN — METHYLPREDNISOLONE ACETATE 80 MG: 80 INJECTION, SUSPENSION INTRA-ARTICULAR; INTRALESIONAL; INTRAMUSCULAR; PARENTERAL; SOFT TISSUE at 12:01

## 2023-04-27 RX ADMIN — IOHEXOL 1 ML: 300 INJECTION, SOLUTION INTRAVENOUS at 12:01

## 2023-04-27 NOTE — H&P
History of Present Illness: The patient is a 71 y o  female who presents with complaints of low back and leg pain    Past Medical History:   Diagnosis Date   • Abdominal pain    • Anxiety     last assessed: 10/19/2013   • Arthritis     osteo both hands; last assessed: 10/19/2013   • Bowel incontinence    • Chest pain    • Constipation    • CVA (cerebral vascular accident) ([facility] [address] )    • Diabetes mellitus type II, controlled ([facility] [address] )    • Disease of thyroid gland    • Epigastric pain     with distention   • Falls    • High cholesterol    • Hyperlipidemia    • Hypertension     last assessed: 4/3/2017   • Migraine     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   • Palpitations    • Recurrent urinary tract infection    • Seizures ([facility] [address] )    • Sleep disorder     last assessed: 10/19/2013   • Stroke [facility])    • Thyroid disease        Past Surgical History:   Procedure Laterality Date   • AXILLARY SURGERY      cyst surgery   • BLADDER SURGERY     •  SECTION     • CYSTOSCOPY N/A 2018    Procedure: [place];  Surgeon: Bart Morejon MD;  Location: [facility];  Service: Gynecology   • FACIAL BONE TUMOR EXCISION     • KNEE SURGERY     • OTHER SURGICAL HISTORY      cyst removed from axilla   • [place] ESOPHAGOGASTRODUODENOSCOPY TRANSORAL DIAGNOSTIC N/A 2017    Procedure: ESOPHAGOGASTRODUODENOSCOPY (EGD); Surgeon: Roderick Hawkins MD;  Location: [facility] GI LAB;   Service: Gastroenterology   • OK SLING OPERATION STRESS INCONTINENCE N/A 2018    Procedure: PUBOVAGINAL SLING;  Surgeon: Bart Morejon MD;  Location: [facility] OR;  Service: Gynecology   • TUBAL LIGATION           Current Outpatient Medications:   •  albuterol (PROVENTIL HFA,VENTOLIN HFA) 90 mcg/act inhaler, , Disp: , Rfl:   •  aspirin (ECOTRIN LOW STRENGTH) 81 mg EC tablet, Take 81 mg by mouth daily , Disp: , Rfl:   •  atenolol (TENORMIN) 25 mg tablet, Take 1 tablet (25 mg total) by mouth daily, Disp: 90 tablet, Rfl: 1  •  atorvastatin (LIPITOR) 40 mg tablet, Take 1 tablet (40 mg total) by mouth daily (Patient not taking: Reported on 8/25/2022), Disp: 90 tablet, Rfl: 1  •  Cholecalciferol (VITAMIN D-3) 5000 units TABS, Take 1 tablet by mouth daily (Patient not taking: Reported on 8/5/2021), Disp: 90 tablet, Rfl: 1  •  citalopram (CeleXA) 40 mg tablet, Take 1 tablet (40 mg total) by mouth daily, Disp: 90 tablet, Rfl: 1  •  conjugated estrogens (PREMARIN) vaginal cream, Insert 0 5 g into the vagina 3 (three) times a week (Patient not taking: Reported on 8/5/2021), Disp: 42 5 g, Rfl: 0  •  diclofenac sodium (VOLTAREN) 50 mg EC tablet, Take 1 tablet (50 mg total) by mouth 2 (two) times a day (Patient not taking: Reported on 8/20/2021), Disp: 60 tablet, Rfl: 0  •  fluticasone (FLONASE) 50 mcg/act nasal spray, 1 spray into each nostril daily, Disp: 16 g, Rfl: 0  •  gabapentin (Neurontin) 300 mg capsule, 1 tab PO qHS for pain, may take up to 3 times per day, Disp: 90 capsule, Rfl: 0  •  Incontinence Supply Disposable (Brief Overnight Large) MISC, Use 4 (four) times a day, Disp: 160 each, Rfl: 11  •  Incontinence Supply Disposable (Underpads) MISC, Use in the morning Please dispense washable underpads, Disp: 4 each, Rfl: 11  •  Infant Care Products (Comfort-Smooth Baby Wipes) MISC, Use 4 (four) times a day, Disp: 240 each, Rfl: 11  •  lidocaine (Lidoderm) 5 %, Apply 1 patch topically over 12 hours daily Remove & Discard patch within 12 hours or as directed by MD, Disp: 30 patch, Rfl: 1  •  methylPREDNISolone 4 MG tablet therapy pack, Use as directed on package, Disp: 1 each, Rfl: 0  •  omeprazole (PriLOSEC) 20 mg delayed release capsule, Take 1 capsule (20 mg total) by mouth daily, Disp: 30 capsule, Rfl: 2  •  polyethylene glycol (GLYCOLAX) powder, Take 17 g by mouth daily (Patient not taking: Reported on 8/5/2021), Disp: 850 g, Rfl: 1    Current Facility-Administered Medications:   •  bupivacaine (PF) (MARCAINE) 0 25 % injection 1 mL, 1 mL, Epidural, Once, Will Lima Celestina Hoffman MD  •  iohexol (OMNIPAQUE) 300 mg/mL injection 1 mL, 1 mL, Epidural, Once, Will Rosita Gray MD  •  methylPREDNISolone acetate (DEPO-MEDROL) injection 80 mg, 80 mg, Epidural, Once, Will Rosita Gray MD    Allergies   Allergen Reactions   • Tramadol      Causes nausea and vomiting        Physical Exam:   Vitals:    04/27/23 1134   BP: 133/68   Pulse: 66   Resp: 18   Temp: 98 5 °F (36 9 °C)   SpO2: 94%     General: Awake, Alert, Oriented x 3, Mood and affect appropriate  Respiratory: Respirations even and unlabored  Cardiovascular: Peripheral pulses intact; no edema  Musculoskeletal Exam: TTP lumbar paraspinals    ASA Score: 3         Assessment:   1   Lumbar radiculitis        Plan: right l5-s1 LESI

## 2023-04-27 NOTE — DISCHARGE INSTRUCTIONS
Epidural Steroid Injection   WHAT YOU NEED TO KNOW:   An epidural steroid injection (ALANA) is a procedure to inject steroid medicine into the epidural space  The epidural space is between your spinal cord and vertebrae  Steroids reduce inflammation and fluid buildup in your spine that may be causing pain  You may be given pain medicine along with the steroids  ACTIVITY  Do not drive or operate machinery today  No strenuous activity today - bending, lifting, etc   You may resume normal activites starting tomorrow - start slowly and as tolerated  You may shower today, but no tub baths or hot tubs  You may have numbness for several hours from the local anesthetic  Please use caution and common sense, especially with weight-bearing activities  CARE OF THE INJECTION SITE  If you have soreness or pain, apply ice to the area today (20 minutes on/20 minutes off)  Starting tomorrow, you may use warm, moist heat or ice if needed  You may have an increase or change in your discomfort for 36-48 hours after your treatment  Apply ice and continue with any pain medication you have been prescribed  Notify [facility] if you have any of the following: redness, drainage, swelling, headache, stiff neck or fever above 100°F     SPECIAL INSTRUCTIONS  Our office will contact you in approximately 7 days for a progress report  MEDICATIONS  Continue to take all routine medications  Our office may have instructed you to hold some medications  As no general anesthesia was used in today's procedure, you should not experience any side effects related to anesthesia  If you are diabetic, the steroids used in today's injection may temporarily increase your blood sugar levels after the first few days after your injection  Please keep a close eye on your sugars and alert the doctor who manages your diabetes if your sugars are significantly high from your baseline or you are symptomatic       If you have a problem specifically related to your procedure, please call our office at [phone]  Problems not related to your procedure should be directed to your primary care physician  INSTRUCCIONES PARA EL ARIS DE МАРИЯ INYECCIÓN EPIDURAL DE ESTEROIDES    ACTIVIDAD   No conduzca ni opere maquinarias por hoy  No realice actividades extenuantes por hoy, flori agacharse, levantar objetos, etc    Puede retomar milana actividades normales desde mañana  Comience progresivamente y en la medida que lo tolere  Puede ducharse, julianna no se bañe en tinas ni en jacuzzis por hoy  Puede sentir entumecimiento josiah varias horas debido a la anestesia local  Sea precavido y use el sentido común, en especial con las actividades que implican la carga de [place]  CUIDADO DEL ÁREA DE APLICACIÓN DE LA INYECCIÓN   Si siente molestias o dolor, aplique hielo en el área por mala (colóquelo josiah 20 minutos y retírelo josiah otros 20 minutos)  A partir de mañana, podrá aplicar calor húmedo o hielo, si lo necesita  Puede experimentar un aumento o cambio en el malestar josiah las 36-48 horas posteriores al tratamiento  Aplique hielo y continúe tomando cualquier analgésico que le hayan recetado  Informe a [facility] si se presenta alguno de estos síntomas: enrojecimiento, secreción, inflamación, dolor de olivia, rigidez de janette o fiebre superior a 100 °F  INSTRUCCIONES ESPECIALES  Se pondrán en contacto de nuestro consultorio con usbenitez en aproximadamente 7 días para pedir un informe de progreso  MEDICAMENTOS   Continúe tomando todos milana medicamentos de [place]  Es posible que en nuestro consultorio le hayan indicado que deje de nabor algunos medicamentos  Puede volver a tomarlos el:              Si tiene algún problema relacionado específicamente con el procedimiento, llame a nuestro consultorio al [phone]   Si tiene problemas que no están relacionados con santiago procedimiento, debe comunicarse con santiago médico de genaro

## 2023-05-03 ENCOUNTER — TELEPHONE (OUTPATIENT)
Dept: FAMILY MEDICINE CLINIC | Facility: CLINIC | Age: 73
End: 2023-05-03

## 2023-05-04 ENCOUNTER — TELEPHONE (OUTPATIENT)
Dept: PAIN MEDICINE | Facility: CLINIC | Age: 73
End: 2023-05-04

## 2023-05-08 NOTE — TELEPHONE ENCOUNTER
2nd attempt w/   Unable to lm to cb with % improvement and pain level.   She said number kept ringing, no vm.

## 2023-05-09 ENCOUNTER — EVALUATION (OUTPATIENT)
Dept: SPEECH THERAPY | Facility: REHABILITATION | Age: 73
End: 2023-05-09

## 2023-05-09 DIAGNOSIS — I69.320 APHASIA AS LATE EFFECT OF CEREBROVASCULAR ACCIDENT: ICD-10-CM

## 2023-05-09 DIAGNOSIS — I69.30 HISTORY OF CVA WITH RESIDUAL DEFICIT: ICD-10-CM

## 2023-05-09 DIAGNOSIS — R47.9 SPEECH DISTURBANCE, UNSPECIFIED TYPE: Primary | ICD-10-CM

## 2023-05-09 DIAGNOSIS — R49.8 OTHER VOICE AND RESONANCE DISORDERS: ICD-10-CM

## 2023-05-09 NOTE — PROGRESS NOTES
Speech-Language Pathology Re-Evaluation    Today's date: 2023  Patient’s name: Alcira Burns  : 1950  MRN: [id number]  Safety measures: CVA  Referring provider: Marcelo Colvin MD    Encounter Diagnosis     ICD-10-CM    1  Speech disturbance, unspecified type  R47 9       2  Other voice and resonance disorders  R49 8       3  Aphasia as late effect of cerebrovascular accident  I69 320       4  History of CVA with residual deficit  I69 30             Visit Tracking:    -Referring provider: Epic  -Billing guidelines: CMS  -Visit #1  (1 visit completed at 8th e location) (David Hernandez)  -Insurance: [employer] MCR  -RE due 2023    Subjective comments: Patient presents with caregiver this date, use of interpretation services for evaluation (Bruneian)    Patient's goal(s): to improve communication    Assessments      Motor Speech Evaluation:    -Facial appearance Symmetrical   -Mandible function Adequate ROM   -Dentition Upper dentures and edentulous lower (awaiting dentures for lower)   -Labial function Decreased strength (bilateral) and Decreased coordination   -Lingual function Decreased ROM (elevation) and Decreased strength (protrusion)   -Velar function Symmetrical   -Oral apraxia? Absent   -Vocal quality Breathy and Weak   -Volitional cough DNT this date   -Respiration Support appears weak   -Drooling? No   -Tremor/involuntary movement? Not present   -Tracheostomy present? No       Sustained phonation    -Vowel prolongation (norm=15-20 sec) 5 59 sec (avg across 3 trials)       Diadochokinetic (DDK) Rates **DNT this date due to time limitations**       Articulation    -Conversation Patient with 50% accuracy at a conversational level, inadequate volume for speech tasks  Reduced length of utterance  Counting    -1 to 20 Patient able to complete 2-3 numbers prior to taking breath          Overall speech intelligibility rating 50% intelligible, largely d/t volume          Patient presents with severe dysarthria characterized by Imprecise articulation and Decreased breath support for speech  VOICE:     S:Z Ratio Task: Patient was instructed to sustain the sounds /s/ and /z/ across three trials to examine the coordination and efficiency of respiration and voice production  Normative data suggests that adults can prolong these sounds for 20-25 seconds  Ratios of 1 4 and above are consistent with laryngeal inefficiency, and ratios of 2 0 and above are suggestive of vocal fold pathology  Task: Trial 1 (sec): Trial 2 (sec): Trial 3 (sec):   /s/ 2 43 2 85 2 57   /z/ 2 63 1 83 Unable to complete     Best /s/: 2 85  Best /z/: 2 63      Ratio: 1 08     Maximum Phonation Time: Patient was instructed to sustain the sound /ah/ across three trials to measure respiratory and laryngeal coordination and efficiency  Adults are typically able to prolong these vowels sound for 15-20 seconds  Reduced MPT may suggest poor respiratory support, or poor medial glottal closure  Trial 1 (sec): Trial 2 (sec): Trial 3 (sec):   6 82 6 59 3 38     Average Duration: 5 59 seconds    Patient reports voice difficulties for approximately 3 years now, significantly reduced volume this date, breathy VQ  1  Voice Handicap Index (VHI): The VHI is a list of 30 statements that many people have used to describe their voices and the effects of their voices on their lives  Patient indicated how frequently she has the same experience using a rating point scale (“never” = 0, “almost never” = 1, “sometimes” = 2, “almost always” = 3, and “always” = 4)  Patient's results were as follows:    DNT due to time limitations          Goals      Short-term goals:    Patient will complete a full motor speech evaluation including voice measurements, to be met in 2-4 weeks   --GOAL MET-- DISCHARGE      Patient will name an appropriate category for given words (e g , apple, banana, pear = fruits / sun, bus, lemon = yellow things) with 80% accuracy to facilitate improved semantic organization, to be achieved in 6-8 weeks --CONTINUE    Patient will name up to 5 features to describe a person/place/thing with 80% accuracy to facilitate improved utilization of circumlocution strategy, to be achieved in 6-8 weeks --CONTINUE    Patient will complete picture description tasks using language organization strategies with 80% accuracy to facilitate improved utilization of circumlocution strategy, to be achieved in 6-8 weeks --CONTINUE    MOTOR SPEECH/VOICE GOALS         Patient will complete structured oral reading and conversational tasks with the consistent use of compensatory strategies >90% of the time to facilitate increased speech intelligibility, to be achieved in 6-8 weeks  Pt will utilize diaphragmatic breathing during oral sentence/paragraph reading in 80% of opportunities to facilitate increased breath support for voice/speaking, to be achieved in 6-8 weeks  Patient will increase MPT to >10 seconds in order to facilitate improved breath support for speech tasks, to be achieved in 6-8 weeks  Vocal quality/speech clarity during 1:1 conversation will improve with use of trained compensatory strategies with 80% accuracy, to be achieved in 6-8 weeks  Long-term Goals:        Patient will independently utilize trained compensatory strategies with 80% accuracy independently at a conversational level allowing for improved vocal quality for communication by discharge  Patient will demonstrate 80% intelligibility at a conversational /phrase level with use of trained compensatory strategies in order to improve communication accuracy within environment, to be achieved by discharge  Patient will improve ability to facilitate communication to meet needs including use of compensatory strategies to promote meaningful interactions for improved quality of life and maximize level of independence, by discharge   --continue    Patient will demonstrate improved functional and intelligible speech with the use of adequate labial and lingual function, increased articulatory precision, and speech prosody by discharge  --continue    Patient will independently utilize speech intelligibility strategies (e g , over-exaggeration, slow rate, breath groups, etc ) during oral reading tasks >90% intelligibility to facilitate increased generalization of skills into conversational speech by discharge  --continue             Impressions/Recommendations    Impressions:   Patient presents with severe voice and motor speech difficulties at this time, characterized by breathy VQ, reduced breath support for speech tasks, slurred speech, variable speech pattern impacting communication at this time  Patient with very limited length of utterance and familiar caregivers demonstrate difficulties understanding, as patient volume barely above whisper level at times  Recommendations at this time for ENT to further assess vocal function due to patient symptoms at this time  Patient would benefit from continued skilled SLP interventions at this time in order to facilitate improved word finding accuracy/naming accuracy, improved vocal quality and speech intelligibility and to train in strategies in order to facilitate improved communication accuracy/speech intelligibility within environment  Impressions from IE:    -Patient presents with mild-moderate expressive/receptive aphasia c/b decreased MLU, using mostly single words and decreased word generation; patient also with moderate-severe dysarthria c/b decreased vocal loudness and labored speech  Additional testing to be completed at next visit to further evaluate motor speech/voice       Recommendations:  -Patient would benefit from outpatient skilled Speech Therapy services : Speech/ language therapy and Voice therapy    -Frequency: 1-2x weekly  -Duration: 6-8 weeks    -Intervention certification from: 2/3/8746  -Intervention certification to: 3/1/3382

## 2023-05-16 ENCOUNTER — OFFICE VISIT (OUTPATIENT)
Dept: SPEECH THERAPY | Facility: REHABILITATION | Age: 73
End: 2023-05-16

## 2023-05-16 DIAGNOSIS — I69.30 HISTORY OF CVA WITH RESIDUAL DEFICIT: ICD-10-CM

## 2023-05-16 DIAGNOSIS — R47.9 SPEECH DISTURBANCE, UNSPECIFIED TYPE: Primary | ICD-10-CM

## 2023-05-16 DIAGNOSIS — R49.8 OTHER VOICE AND RESONANCE DISORDERS: ICD-10-CM

## 2023-05-16 DIAGNOSIS — I69.320 APHASIA AS LATE EFFECT OF CEREBROVASCULAR ACCIDENT: ICD-10-CM

## 2023-05-16 NOTE — PROGRESS NOTES
Daily Speech Treatment Note    Today's date: 2023  Patient’s name: Zuri Mendieta  : 1950  MRN: [id number]  Safety measures: CVA, reduced communication  Referring provider: Dev Hogue MD    Encounter Diagnosis     ICD-10-CM    1  Speech disturbance, unspecified type  R47 9       2  Other voice and resonance disorders  R49 8       3  Aphasia as late effect of cerebrovascular accident  I69 320       4  History of CVA with residual deficit  I69 30             Visit Tracking:    -Referring provider: Epic  -Billing guidelines: CMS  -Visit #2  (1 visit completed at 8th e location) (Helen Rothdock)  -Insurance: [employer] MCR  -RE due 2023    Subjective/Behavioral:  -Patient presents with caregiver   - utilized this date (Serbian)  -Patient reports she has made an ENT appointment     Objective/Assessment:  -Patient education on plan of therapy, patient verbalized understanding    - Education on aphasia and handout provided for recommended strategies  Patient/caregiver verbalized understanding       Short-term goals:    Patient will name an appropriate category for given words (e g , apple, banana, pear = fruits / sun, bus, lemon = yellow things) with 80% accuracy to facilitate improved semantic organization, to be achieved in 6-8 weeks --CONTINUE     Patient will name up to 5 features to describe a person/place/thing with 80% accuracy to facilitate improved utilization of circumlocution strategy, to be achieved in 6-8 weeks --CONTINUE     Patient will complete picture description tasks using language organization strategies with 80% accuracy to facilitate improved utilization of circumlocution strategy, to be achieved in 6-8 weeks --CONTINUE     MOTOR SPEECH/VOICE GOALS          Patient will complete structured oral reading and conversational tasks with the consistent use of compensatory strategies >90% of the time to facilitate increased speech intelligibility, to be achieved in 6-8 weeks       Pt will utilize diaphragmatic breathing during oral sentence/paragraph reading in 80% of opportunities to facilitate increased breath support for voice/speaking, to be achieved in 6-8 weeks    Education on breath support for speech tasks to improve length of utterance and improved speech intelligibility  Patient verbalized understanding, demonstrated provided moderate to max cues  Patient will increase MPT to >10 seconds in order to facilitate improved breath support for speech tasks, to be achieved in 6-8 weeks       Vocal quality/speech clarity during 1:1 conversation will improve with use of trained compensatory strategies with 80% accuracy, to be achieved in 6-8 weeks    Patient noted to have strained VQ even with use of breath support  Patient trained in cup bubbling/SOVTEs with and without phonation this date provided mild to moderate level cues in order to facilitate improved vocal function for improved vocal quality during conversational tasks  Patient reports improvements s/p completion  Patient trained in vocal relaxation tasks (audible sigh, etc ) to improve breathing coordination for speech and promote improved voicing during speech tasks  Patient required moderate cues and modeling from SLP, however able to achieve voicing with good VQ s/p completion  Plan:  -Patient was provided with home exercises/activities to target goals in plan of care at the end of today's session   -Continue with current plan of care

## 2023-05-24 ENCOUNTER — OFFICE VISIT (OUTPATIENT)
Dept: SPEECH THERAPY | Facility: REHABILITATION | Age: 73
End: 2023-05-24

## 2023-05-24 DIAGNOSIS — I69.30 HISTORY OF CVA WITH RESIDUAL DEFICIT: ICD-10-CM

## 2023-05-24 DIAGNOSIS — I69.320 APHASIA AS LATE EFFECT OF CEREBROVASCULAR ACCIDENT: ICD-10-CM

## 2023-05-24 DIAGNOSIS — R47.9 SPEECH DISTURBANCE, UNSPECIFIED TYPE: Primary | ICD-10-CM

## 2023-05-24 DIAGNOSIS — R49.8 OTHER VOICE AND RESONANCE DISORDERS: ICD-10-CM

## 2023-05-24 NOTE — PROGRESS NOTES
Daily Speech Treatment Note    Today's date: 2023  Patient’s name: Pawel Magallanes  : 1950  MRN: [id number]  Safety measures: CVA, reduced communication  Referring provider: Shelli Peralta MD    Encounter Diagnosis     ICD-10-CM    1  Speech disturbance, unspecified type  R47 9       2  Other voice and resonance disorders  R49 8       3  Aphasia as late effect of cerebrovascular accident  I69 320       4  History of CVA with residual deficit  I69 30             Visit Tracking:    -Referring provider: Epic  -Billing guidelines: CMS  -Visit #3  (1 visit completed at 8th [facility] location) ([facility])  -Insurance: Yael Buckner MCR  -RE due 2023    Subjective/Behavioral:  -Patient presents with caregiver   - utilized this date (English)  -Patient reports she has been implementing SOVTEs at home and feels they are helpful    Objective/Assessment:  -Patient education on plan of therapy, patient verbalized understanding  Patient reports her main concerns at this time are difficulties being understood, not necessarily finding the word       Short-term goals:    Patient will name an appropriate category for given words (e g , apple, banana, pear = fruits / sun, bus, lemon = yellow things) with 80% accuracy to facilitate improved semantic organization, to be achieved in 6-8 weeks --CONTINUE     Patient will name up to 5 features to describe a person/place/thing with 80% accuracy to facilitate improved utilization of circumlocution strategy, to be achieved in 6-8 weeks --CONTINUE     Patient will complete picture description tasks using language organization strategies with 80% accuracy to facilitate improved utilization of circumlocution strategy, to be achieved in 6-8 weeks --CONTINUE     MOTOR SPEECH/VOICE GOALS          Patient will complete structured oral reading and conversational tasks with the consistent use of compensatory strategies >90% of the time to facilitate increased speech intelligibility, to be achieved in 6-8 weeks       Pt will utilize diaphragmatic breathing during oral sentence/paragraph reading in 80% of opportunities to facilitate increased breath support for voice/speaking, to be achieved in 6-8 weeks    Education on breath support for speech tasks to improve length of utterance and improved speech intelligibility  Patient verbalized understanding, demonstrated provided moderate to max cues  Patient demonstrating understanding provided moderate cues at short sentence level during reading tasks, able to reduce to min as task progressed  Patient required mild to moderate cues for improved accuracy at a conversational level  Patient with significant improvements in intelligibility with use of strategies  Patient will increase MPT to >10 seconds in order to facilitate improved breath support for speech tasks, to be achieved in 6-8 weeks       Vocal quality/speech clarity during 1:1 conversation will improve with use of trained compensatory strategies with 80% accuracy, to be achieved in 6-8 weeks  Patient re-trained in cup bubbling/SOVTEs with and without phonation this date provided mild level cues in order to facilitate improved vocal function for improved vocal quality during conversational tasks  Plan:  -Patient was provided with home exercises/activities to target goals in plan of care at the end of today's session   -Continue with current plan of care

## 2023-05-25 ENCOUNTER — OFFICE VISIT (OUTPATIENT)
Dept: FAMILY MEDICINE CLINIC | Facility: CLINIC | Age: 73
End: 2023-05-25

## 2023-05-25 VITALS
BODY MASS INDEX: 31.71 KG/M2 | HEIGHT: 57 IN | OXYGEN SATURATION: 97 % | SYSTOLIC BLOOD PRESSURE: 110 MMHG | TEMPERATURE: 97.6 F | HEART RATE: 76 BPM | WEIGHT: 147 LBS | DIASTOLIC BLOOD PRESSURE: 60 MMHG | RESPIRATION RATE: 18 BRPM

## 2023-05-25 DIAGNOSIS — M25.561 PAIN IN BOTH KNEES, UNSPECIFIED CHRONICITY: ICD-10-CM

## 2023-05-25 DIAGNOSIS — R05.2 SUBACUTE COUGH: ICD-10-CM

## 2023-05-25 DIAGNOSIS — M25.562 PAIN IN BOTH KNEES, UNSPECIFIED CHRONICITY: ICD-10-CM

## 2023-05-25 DIAGNOSIS — R13.10 DYSPHAGIA, UNSPECIFIED TYPE: Primary | ICD-10-CM

## 2023-05-25 RX ORDER — BENZONATATE 100 MG/1
100 CAPSULE ORAL 3 TIMES DAILY PRN
Qty: 20 CAPSULE | Refills: 0 | Status: SHIPPED | OUTPATIENT
Start: 2023-05-25

## 2023-05-25 NOTE — PROGRESS NOTES
"Name: Misty Lombardo      : 1950      MRN: [id number]  Encounter Provider: Lolis Johnson MD  Encounter Date: 2023   Encounter department: [facility]     1  Dysphagia, unspecified type  -     FL barium swallow video w speech; Future; Expected date: 2023    2  Pain in both knees, unspecified chronicity  -     Ambulatory Referral to Orthopedic Surgery; Future    3  Subacute cough  -     benzonatate (TESSALON PERLES) 100 mg capsule; Take 1 capsule (100 mg total) by mouth 3 (three) times a day as needed for cough           Subjective      70 yo  female with h/o CVA with residual weakness complains of cough when eating or drinking, feels like she chokes when she swallows  At times feels like she is choking on her own saliva  Denies SOB, CARLSON, CP  Also complains of increasing b/l knee pain  Patient is requesting a rollator she is currently using a wheelchair a neighbor gaver her as she did not like the one that was dlivered by the insurance earlier this year  As per patient she returned it to the company and her insurance \"should know that  \"     Cough  This is a recurrent problem  The current episode started more than 1 month ago  The problem has been unchanged  The cough is non-productive  Exacerbated by: eating, drinking both liquids and solids  She has tried nothing for the symptoms  Review of Systems   Respiratory: Positive for cough  Musculoskeletal: Positive for arthralgias  All other systems reviewed and are negative        Current Outpatient Medications on File Prior to Visit   Medication Sig   • albuterol (PROVENTIL HFA,VENTOLIN HFA) 90 mcg/act inhaler  (Patient not taking: Reported on 2022)   • aspirin (ECOTRIN LOW STRENGTH) 81 mg EC tablet Take 81 mg by mouth daily    • atenolol (TENORMIN) 25 mg tablet Take 1 tablet (25 mg total) by mouth daily   • atorvastatin (LIPITOR) 40 mg tablet Take 1 tablet (40 mg total) by " "mouth daily (Patient not taking: Reported on 8/25/2022)   • Cholecalciferol (VITAMIN D-3) 5000 units TABS Take 1 tablet by mouth daily (Patient not taking: Reported on 8/5/2021)   • citalopram (CeleXA) 40 mg tablet Take 1 tablet (40 mg total) by mouth daily   • conjugated estrogens (PREMARIN) vaginal cream Insert 0 5 g into the vagina 3 (three) times a week (Patient not taking: Reported on 8/5/2021)   • diclofenac sodium (VOLTAREN) 50 mg EC tablet Take 1 tablet (50 mg total) by mouth 2 (two) times a day (Patient not taking: Reported on 8/20/2021)   • fluticasone (FLONASE) 50 mcg/act nasal spray 1 spray into each nostril daily   • gabapentin (Neurontin) 300 mg capsule 1 tab PO qHS for pain, may take up to 3 times per day   • Incontinence Supply Disposable (Brief Overnight Large) MISC Use 4 (four) times a day   • Incontinence Supply Disposable (Underpads) MISC Use in the morning Please dispense washable underpads   • Infant Care Products (Comfort-Smooth Baby Wipes) MISC Use 4 (four) times a day   • lidocaine (Lidoderm) 5 % Apply 1 patch topically over 12 hours daily Remove & Discard patch within 12 hours or as directed by MD   • methylPREDNISolone 4 MG tablet therapy pack Use as directed on package   • omeprazole (PriLOSEC) 20 mg delayed release capsule Take 1 capsule (20 mg total) by mouth daily   • polyethylene glycol (GLYCOLAX) powder Take 17 g by mouth daily (Patient not taking: Reported on 8/5/2021)       Objective     /60 (BP Location: Left arm, Patient Position: Sitting, Cuff Size: Standard)   Pulse 76   Temp 97 6 °F (36 4 °C) (Temporal)   Resp 18   Ht 4' 9\" (1 448 m)   Wt 66 7 kg (147 lb)   SpO2 97%   BMI 31 81 kg/m²     Physical Exam  Vitals and nursing note reviewed  Constitutional:       Appearance: She is well-developed  HENT:      Head: Normocephalic        Right Ear: External ear normal       Left Ear: External ear normal       Nose: Nose normal    Eyes:      Conjunctiva/sclera: " Conjunctivae normal       Pupils: Pupils are equal, round, and reactive to light  Neck:      Thyroid: No thyromegaly  Cardiovascular:      Rate and Rhythm: Normal rate and regular rhythm  Heart sounds: Normal heart sounds  Pulmonary:      Effort: Pulmonary effort is normal       Breath sounds: Normal breath sounds  Abdominal:      Palpations: Abdomen is soft  Tenderness: There is no abdominal tenderness  There is no guarding or rebound  Musculoskeletal:         General: Tenderness present  Normal range of motion  Cervical back: Normal range of motion and neck supple  Right knee: Tenderness present  Left knee: Tenderness present  Skin:     General: Skin is dry  Neurological:      Mental Status: She is alert and oriented to person, place, and time  Deep Tendon Reflexes: Reflexes are normal and symmetric         Zenia Jose MD

## 2023-05-30 LAB — HBA1C MFR BLD HPLC: 6.1 %

## 2023-05-31 ENCOUNTER — OFFICE VISIT (OUTPATIENT)
Dept: SPEECH THERAPY | Facility: REHABILITATION | Age: 73
End: 2023-05-31

## 2023-05-31 DIAGNOSIS — I69.320 APHASIA AS LATE EFFECT OF CEREBROVASCULAR ACCIDENT: ICD-10-CM

## 2023-05-31 DIAGNOSIS — R47.9 SPEECH DISTURBANCE, UNSPECIFIED TYPE: Primary | ICD-10-CM

## 2023-05-31 DIAGNOSIS — I69.30 HISTORY OF CVA WITH RESIDUAL DEFICIT: ICD-10-CM

## 2023-05-31 DIAGNOSIS — R49.8 OTHER VOICE AND RESONANCE DISORDERS: ICD-10-CM

## 2023-05-31 NOTE — PROGRESS NOTES
Daily Speech Treatment Note    Today's date: 2023  Patient’s name: Steve Montiel  : 1950  MRN: [id number]  Safety measures: CVA, reduced communication  Referring provider: Bisi Anand MD    Encounter Diagnosis     ICD-10-CM    1  Speech disturbance, unspecified type  R47 9       2  Other voice and resonance disorders  R49 8       3  Aphasia as late effect of cerebrovascular accident  I69 320       4  History of CVA with residual deficit  I69 30             Visit Tracking:    -Referring provider: Epic  -Billing guidelines: CMS  -Visit #4  (1 visit completed at 8th [facility] location) (Juani Srinivasan)  -Insurance: [employer] MCR  -RE due 2023    Subjective/Behavioral:  -Patient presents with caregiver   - utilized this date (Kyrgyz)  -Patient reports she has been utilizing strategies for breath support during communication    Objective/Assessment:  -Patient education on plan of therapy, patient verbalized understanding         Short-term goals:    Patient will name an appropriate category for given words (e g , apple, banana, pear = fruits / sun, bus, lemon = yellow things) with 80% accuracy to facilitate improved semantic organization, to be achieved in 6-8 weeks --CONTINUE     Patient will name up to 5 features to describe a person/place/thing with 80% accuracy to facilitate improved utilization of circumlocution strategy, to be achieved in 6-8 weeks --CONTINUE     Patient will complete picture description tasks using language organization strategies with 80% accuracy to facilitate improved utilization of circumlocution strategy, to be achieved in 6-8 weeks --CONTINUE     MOTOR SPEECH/VOICE GOALS          Patient will complete structured oral reading and conversational tasks with the consistent use of compensatory strategies >90% of the time to facilitate increased speech intelligibility, to be achieved in 6-8 weeks       Pt will utilize diaphragmatic breathing during oral "sentence/paragraph reading in 80% of opportunities to facilitate increased breath support for voice/speaking, to be achieved in 6-8 weeks    Education on breath support for speech tasks to improve length of utterance and improved speech intelligibility  Patient verbalized understanding, demonstrated provided moderate cues  Training in use of \"strong voice\" at a conversational level, with use patient noted to have improved breathing coordination for speech, improved volume and speech clarity  Patient required moderate cues to utilize at a conversational level  Patient will increase MPT to >10 seconds in order to facilitate improved breath support for speech tasks, to be achieved in 6-8 weeks       Vocal quality/speech clarity during 1:1 conversation will improve with use of trained compensatory strategies with 80% accuracy, to be achieved in 6-8 weeks  Plan:  -Patient was provided with home exercises/activities to target goals in plan of care at the end of today's session   -Continue with current plan of care    "

## 2023-06-02 ENCOUNTER — OFFICE VISIT (OUTPATIENT)
Dept: PAIN MEDICINE | Facility: CLINIC | Age: 73
End: 2023-06-02

## 2023-06-02 VITALS — WEIGHT: 147 LBS | HEIGHT: 57 IN | BODY MASS INDEX: 31.71 KG/M2

## 2023-06-02 DIAGNOSIS — R26.2 AMBULATORY DYSFUNCTION: ICD-10-CM

## 2023-06-02 DIAGNOSIS — M54.41 CHRONIC BILATERAL LOW BACK PAIN WITH RIGHT-SIDED SCIATICA: Primary | ICD-10-CM

## 2023-06-02 DIAGNOSIS — G89.29 CHRONIC BILATERAL LOW BACK PAIN WITH RIGHT-SIDED SCIATICA: Primary | ICD-10-CM

## 2023-06-02 NOTE — PROGRESS NOTES
Assessment:  1  Chronic bilateral low back pain with right-sided sciatica    2  Ambulatory dysfunction      Encounter was conducted with  ID #[id number]  Plan:    Patient presenting with chronic back pain with right greater than left radicular pain for greater than 5 years, worsening over the past 1 year  Pain consistent with lumbar radicular pain accompanied by pain >7/10 on the pain scale with inability to participate in IADLs for >6 weeks  Has tried taking gabapentin, Medrol Dosepak with minimal benefit  Denies any gait instability, saddle anesthesia      1  At this time patient would benefit with formal physical therapy for ambulatory dysfunction and ongoing lumbar back pain with right radicular leg pain  Her MRI findings overall are mild for the degree of pain that she is experiencing  Discussed the possibility of repeating epidural steroid injection as she derives 70% improvement from her recent ALANA, however she does not want to undergo any repeat injections in the future  Patient will be referred to formal physical therapy  Patient can follow-up on an as-needed basis      Recommend continuing with conservative medication and physical therapy/occupational therapy management  I reviewed external notes from the relevant aspects of the patient's medical record, specifically sports medicine, primary care physician, physical therapy/Occupational Therapy notes in regards to current and prior treatments tried (as mentioned in history of present illness)       I reviewed pertinent laboratory studies, specifically renal function, hemoglobin A1c, CBC, coagulation studies, prior to initiating the recommended medication therapies/interventional treatment options      [place] Prescription Drug Monitoring Program report was reviewed and was appropriate      My impressions and treatment recommendations were discussed in detail with the patient who verbalized understanding and had no further questions  Discharge instructions were provided  I personally saw and examined the patient and I agree with the above discussed plan of care  Orders Placed This Encounter   Procedures   • Ambulatory referral to Physical Therapy     Standing Status:   Future     Standing Expiration Date:   6/2/2024     Referral Priority:   Routine     Referral Type:   Physical Therapy     Referral Reason:   Specialty Services Required     Requested Specialty:   Physical Therapy     Number of Visits Requested:   1     Expiration Date:   6/2/2024     No orders of the defined types were placed in this encounter  History of Present Illness:  Giles Hanna is a 71 y o  female who presents for a follow up office visit in regards to Back Pain (LESI f/u)  The patient’s current symptoms include lower back pain radiating to the right leg  Reports that pain has improved since the last visit, but currently rates her pain at a 10 out of 10 on the pain scale  Patient reports a constant cramping pain worse at night  Patient recently underwent a L5-S1 interlaminar epidural steroid injection on 4/27/2023 with reported greater than 70% improvement  I have personally reviewed and/or updated the patient's past medical history, past surgical history, family history, social history, current medications, allergies, and vital signs today  Review of Systems   Constitutional: Negative for chills and fever  HENT: Negative for ear pain and sore throat  Eyes: Negative for pain and visual disturbance  Respiratory: Negative for cough and shortness of breath  Cardiovascular: Negative for chest pain and palpitations  Gastrointestinal: Negative for abdominal pain and vomiting  Genitourinary: Negative for dysuria and hematuria  Musculoskeletal: Positive for back pain, gait problem and myalgias  Negative for arthralgias  Skin: Negative for color change and rash  Neurological: Positive for weakness and numbness   Negative for seizures and syncope  All other systems reviewed and are negative        Patient Active Problem List   Diagnosis   • Fall   • Subdural hemorrhage following injury ([facility] [address] )   • Subarachnoid hemorrhage following injury ([facility] [address] )   • Hypertension   • Collapsed vertebra, not elsewhere classified, sacral and sacrococcygeal region, initial encounter for fracture ([facility] [address] )   • HLD (hyperlipidemia)   • History of CVA with residual deficit   • Depression   • Headache   • Dizziness   • Urinary, incontinence, stress female   • Adhesive capsulitis of left shoulder   • Asymptomatic bilateral carotid artery stenosis   • Cervical radiculitis   • Constipation   • Elevated TSH   • Gastroesophageal reflux disease   • Hyperlipidemia   • Memory difficulties   • Muscle weakness   • Osteoporosis   • Osteoarthritis of both hands   • Urge and stress incontinence   • Primary insomnia   • Diabetes mellitus type II, controlled ([facility] [address] )   • Arthritis   • Left upper quadrant pain   • Epigastric abdominal pain   • NSAID long-term use   • Contusion of right breast   • Elevated BP without diagnosis of hypertension   • Breast pain, right   • Hip strain, right, initial encounter   • Acute bacterial conjunctivitis of left eye   • Sciatica of right side   • Class 1 obesity due to excess calories without serious comorbidity with body mass index (BMI) of 30 0 to 30 9 in adult   • COVID-19   • Numbness and tingling in both hands   • Lumbar radiculitis       Past Medical History:   Diagnosis Date   • Abdominal pain    • Anxiety     last assessed: 10/19/2013   • Arthritis     osteo both hands; last assessed: 10/19/2013   • Bowel incontinence    • Chest pain    • Constipation    • CVA (cerebral vascular accident) ([facility] [address] )    • Diabetes mellitus type II, controlled ([facility] [address] )    • Disease of thyroid gland    • Epigastric pain     with distention   • Falls    • High cholesterol    • Hyperlipidemia    • Hypertension     last assessed: 4/3/2017   • Migraine     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   • Palpitations    • Recurrent urinary tract infection    • Seizures ([facility] [address] )    • Sleep disorder     last assessed: 10/19/2013   • Stroke [facility])    • Thyroid disease        Past Surgical History:   Procedure Laterality Date   • AXILLARY SURGERY      cyst surgery   • BLADDER SURGERY     •  SECTION     • CYSTOSCOPY N/A 2018    Procedure: CYSTOSCOPY;  Surgeon: Fouzia Singh MD;  Location: [facility];  Service: Gynecology   • FACIAL BONE TUMOR EXCISION     • KNEE SURGERY     • OTHER SURGICAL HISTORY      cyst removed from axilla   • IA ESOPHAGOGASTRODUODENOSCOPY TRANSORAL DIAGNOSTIC N/A 2017    Procedure: ESOPHAGOGASTRODUODENOSCOPY (EGD); Surgeon: Mary Garcia MD;  Location: [facility] GI LAB;   Service: Gastroenterology   • IA SLING OPERATION STRESS INCONTINENCE N/A 2018    Procedure: PUBOVAGINAL SLING;  Surgeon: Fouzia Singh MD;  Location: [facility];  Service: Gynecology   • TUBAL LIGATION         Family History   Problem Relation Age of Onset   • No Known Problems Father    • Diabetes Family         mellitus   • Hypertension Family    • Stroke Family    • Thyroid disease Family    • No Known Problems Mother    • No Known Problems Sister    • No Known Problems Daughter    • No Known Problems Maternal Grandmother    • No Known Problems Maternal Grandfather    • No Known Problems Paternal Grandmother    • No Known Problems Paternal Grandfather        Social History     Occupational History   • Not on file   Tobacco Use   • Smoking status: Never   • Smokeless tobacco: Never   Vaping Use   • Vaping Use: Never used   Substance and Sexual Activity   • Alcohol use: No   • Drug use: No   • Sexual activity: Not on file       Current Outpatient Medications on File Prior to Visit   Medication Sig   • aspirin (ECOTRIN LOW STRENGTH) 81 mg EC tablet Take 81 mg by mouth daily    • atenolol (TENORMIN) 25 mg tablet Take 1 tablet (25 mg total) by mouth daily   • benzonatate "(TESSALON PERLES) 100 mg capsule Take 1 capsule (100 mg total) by mouth 3 (three) times a day as needed for cough   • citalopram (CeleXA) 40 mg tablet Take 1 tablet (40 mg total) by mouth daily   • fluticasone (FLONASE) 50 mcg/act nasal spray 1 spray into each nostril daily   • gabapentin (Neurontin) 300 mg capsule 1 tab PO qHS for pain, may take up to 3 times per day   • Incontinence Supply Disposable (Brief Overnight Large) MISC Use 4 (four) times a day   • Incontinence Supply Disposable (Underpads) MISC Use in the morning Please dispense washable underpads   • Infant Care Products (Comfort-Smooth Baby Wipes) MISC Use 4 (four) times a day   • lidocaine (Lidoderm) 5 % Apply 1 patch topically over 12 hours daily Remove & Discard patch within 12 hours or as directed by MD   • methylPREDNISolone 4 MG tablet therapy pack Use as directed on package   • omeprazole (PriLOSEC) 20 mg delayed release capsule Take 1 capsule (20 mg total) by mouth daily   • albuterol (PROVENTIL HFA,VENTOLIN HFA) 90 mcg/act inhaler  (Patient not taking: Reported on 8/25/2022)   • atorvastatin (LIPITOR) 40 mg tablet Take 1 tablet (40 mg total) by mouth daily (Patient not taking: Reported on 8/25/2022)   • Cholecalciferol (VITAMIN D-3) 5000 units TABS Take 1 tablet by mouth daily (Patient not taking: Reported on 8/5/2021)   • conjugated estrogens (PREMARIN) vaginal cream Insert 0 5 g into the vagina 3 (three) times a week (Patient not taking: Reported on 8/5/2021)   • diclofenac sodium (VOLTAREN) 50 mg EC tablet Take 1 tablet (50 mg total) by mouth 2 (two) times a day (Patient not taking: Reported on 8/20/2021)   • polyethylene glycol (GLYCOLAX) powder Take 17 g by mouth daily (Patient not taking: Reported on 8/5/2021)     No current facility-administered medications on file prior to visit         Allergies   Allergen Reactions   • Tramadol      Causes nausea and vomiting        Physical Exam:    Ht 4' 9\" (1 448 m)   Wt 66 7 kg (147 lb)   BMI " 31 81 kg/m²     Constitutional:normal, well developed, well nourished, alert, in no distress and non-toxic and no overt pain behavior  Eyes:anicteric  HEENT:grossly intact  Neck:supple, symmetric, trachea midline and no masses   Pulmonary:even and unlabored  Cardiovascular:No edema or pitting edema present  Skin:Normal without rashes or lesions and well hydrated  Psychiatric:Mood and affect appropriate  Neurologic: Gait is antalgic  Use of wheelchair  Motor function is grossly intact  Speech is slurred  Musculoskeletal: Diffuse tenderness in the lumbar and thoracic paraspinal regions  Positive right straight leg raise  Imaging  MRI lumbar spine 5/12/2022:  FINDINGS:     VERTEBRAL BODIES:  There are 5 lumbar type vertebral bodies   Normal alignment of the lumbar spine   No spondylolysis or spondylolisthesis  No scoliosis   No compression fracture     Normal marrow signal is identified within the visualized bony   structures   No discrete marrow lesion      SACRUM:  Normal signal within the sacrum   No evidence of insufficiency or stress fracture      DISTAL CORD AND CONUS:  Grossly unremarkable but significantly limited evaluation due to patient motion      PARASPINAL SOFT TISSUES:  Paraspinal soft tissues are unremarkable      LOWER THORACIC DISC SPACES:  Small Schmorl's node deformity at the superior endplate of X70   No compressive degenerative discogenic disease      LUMBAR DISC SPACES:  Disc space narrowing at L5 upon S1      L1-L2:  Normal      L2-L3:  Normal      L3-L4:  Normal      L4-L5:  Minimal disc bulging and questionable shallow left paracentral protrusion      L5-S1:  Disc space narrowing and mild disc bulging with suggestion of left foraminal and far lateral disc osteophyte complex   Mild ipsilateral foraminal stenosis suspected         IMPRESSION:     Limited evaluation of the lumbar spine with motion degradation      Grossly stable degenerative changes at L4-L5 and L5-S1 with redemonstrated left L5-S1 foraminal disc osteophyte complex with grossly unchanged foraminal encroachment and abutment of the exiting ipsilateral L5 nerve root

## 2023-06-13 ENCOUNTER — APPOINTMENT (OUTPATIENT)
Dept: RADIOLOGY | Facility: MEDICAL CENTER | Age: 73
End: 2023-06-13
Payer: MEDICARE

## 2023-06-13 ENCOUNTER — OFFICE VISIT (OUTPATIENT)
Dept: OBGYN CLINIC | Facility: MEDICAL CENTER | Age: 73
End: 2023-06-13
Payer: MEDICARE

## 2023-06-13 VITALS
WEIGHT: 147 LBS | HEIGHT: 57 IN | BODY MASS INDEX: 31.71 KG/M2 | HEART RATE: 76 BPM | DIASTOLIC BLOOD PRESSURE: 77 MMHG | SYSTOLIC BLOOD PRESSURE: 129 MMHG

## 2023-06-13 DIAGNOSIS — M25.561 RIGHT KNEE PAIN, UNSPECIFIED CHRONICITY: ICD-10-CM

## 2023-06-13 DIAGNOSIS — M25.562 PAIN IN BOTH KNEES, UNSPECIFIED CHRONICITY: ICD-10-CM

## 2023-06-13 DIAGNOSIS — M17.11 PRIMARY OSTEOARTHRITIS OF RIGHT KNEE: ICD-10-CM

## 2023-06-13 DIAGNOSIS — M17.12 PRIMARY OSTEOARTHRITIS OF LEFT KNEE: Primary | ICD-10-CM

## 2023-06-13 DIAGNOSIS — M25.561 PAIN IN BOTH KNEES, UNSPECIFIED CHRONICITY: ICD-10-CM

## 2023-06-13 DIAGNOSIS — M25.562 LEFT KNEE PAIN, UNSPECIFIED CHRONICITY: ICD-10-CM

## 2023-06-13 PROCEDURE — 99214 OFFICE O/P EST MOD 30 MIN: CPT | Performed by: STUDENT IN AN ORGANIZED HEALTH CARE EDUCATION/TRAINING PROGRAM

## 2023-06-13 PROCEDURE — 73564 X-RAY EXAM KNEE 4 OR MORE: CPT

## 2023-06-13 NOTE — PROGRESS NOTES
Knee New Office Note    Assessment:     1  Primary osteoarthritis of left knee    2  Primary osteoarthritis of right knee    3  Pain in both knees, unspecified chronicity        Plan:  Findings today are consistent with bilateral knee osteoarthritis  Imaging and prognosis was reviewed with the patient today  Discussed conservative treatments in the form of cortisone steroid injections, VISCO, physical therapy, and anti-inflammatories  Patient did not see success with previous cortisone injections  Referral for bilateral VISCO injection was placed today (Pain Scale 8/10)  Referral for physical therapy was placed today to work on lower extremity strengthening  Office will call patient once VISCO has been received  Problem List Items Addressed This Visit    None  Visit Diagnoses     Primary osteoarthritis of left knee    -  Primary    Relevant Orders    XR knee 4+ vw left injury    Injection Procedure Prior Authorization    Ambulatory Referral to Physical Therapy    Primary osteoarthritis of right knee        Relevant Orders    XR knee 4+ vw right injury    XR bone length (scanogram)    Injection Procedure Prior Authorization    Ambulatory Referral to Physical Therapy    Pain in both knees, unspecified chronicity             Subjective:     Patient ID: García Cowan is a 71 y o  female  Chief Complaint:  HPI:  The patient presents with a chief complaint of bilateral knee pain  Pain has been present for several years and is not associated with an acute injury  The patient describes the pain as aching and sharp and 8 out of 10 in intensity  It is constant in timing, and localizes the pain to the anterior knee  Patient has trouble with ambulation due to pain  She reports instability of the knee  Patient has treated with cortisone injections in the past- last being 2-3 months ago with no significant relief       Allergy:  Allergies   Allergen Reactions   • Tramadol      Causes nausea and vomiting Medications:  all current active meds have been reviewed  Past Medical History:  Past Medical History:   Diagnosis Date   • Abdominal pain    • Anxiety     last assessed: 10/19/2013   • Arthritis     osteo both hands; last assessed: 10/19/2013   • Bowel incontinence    • Chest pain    • Constipation    • CVA (cerebral vascular accident) ([facility] [address] )    • Diabetes mellitus type II, controlled ([facility] [address] )    • Disease of thyroid gland    • Epigastric pain     with distention   • Falls    • High cholesterol    • HL (hearing loss)    • Hyperlipidemia    • Hypertension     last assessed: 4/3/2017   • Migraine     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   • Palpitations    • Recurrent urinary tract infection    • Seizures ([facility] [address] )    • Sleep disorder     last assessed: 10/19/2013   • Speech impairment    • Stroke [facility])    • Thyroid disease    • Tinnitus      Past Surgical History:  Past Surgical History:   Procedure Laterality Date   • AXILLARY SURGERY      cyst surgery   • BLADDER SURGERY     •  SECTION     • CYSTOSCOPY N/A 2018    Procedure: CYSTOSCOPY;  Surgeon: Ron Bonilla MD;  Location[facility];  Service: Gynecology   • FACIAL BONE TUMOR EXCISION     • KNEE SURGERY     • OTHER SURGICAL HISTORY      cyst removed from axilla   • OR ESOPHAGOGASTRODUODENOSCOPY TRANSORAL DIAGNOSTIC N/A 2017    Procedure: ESOPHAGOGASTRODUODENOSCOPY (EGD); Surgeon: Maddi Malhotra MD;  Location: [facility] GI LAB;   Service: Gastroenterology   • OR SLING OPERATION STRESS INCONTINENCE N/A 2018    Procedure: PUBOVAGINAL SLING;  Surgeon: Ron Bonilla MD;  Location: [facility] OR;  Service: Gynecology   • TUBAL LIGATION       Family History:  Family History   Problem Relation Age of Onset   • No Known Problems Father    • Diabetes Family         mellitus   • Hypertension Family    • Stroke Family    • Thyroid disease Family    • No Known Problems Mother    • No Known Problems Sister    • No Known Problems Daughter    • No Known Problems Maternal Grandmother    • No Known Problems Maternal Grandfather    • No Known Problems Paternal Grandmother    • No Known Problems Paternal Grandfather      Social History:  Social History     Substance and Sexual Activity   Alcohol Use No     Social History     Substance and Sexual Activity   Drug Use No     Social History     Tobacco Use   Smoking Status Never   Smokeless Tobacco Never           ROS:  General: Per HPI  Skin: Negative, except if noted below  HEENT: Negative  Respiratory: Negative  Cardiovascular: Negative  Gastrointestinal: Negative  Urinary: Negative  Vascular: Negative  Musculoskeletal: Positive per HPI   Neurologic: Positive per HPI  Endocrine: Negative    Objective:  BP Readings from Last 1 Encounters:   06/13/23 129/77      Wt Readings from Last 1 Encounters:   06/13/23 66 7 kg (147 lb)        Respiratory:   non-labored respirations    Lymphatics:  no palpable lymph nodes    Gait:   Presents in wheelchair    Neurologic:   Alert and oriented times 3  Patient with normal sensation except as noted below  Deep tendon reflexes 2+ except as noted in MSK exam    Bilateral Lower Extremity:  Left Knee: Inspection:  Skin intact     Overall limb alignment neutral    Effusion: none    ROM 3-115 w  pain    Extensor Lag: none    Palpation: medial and lateral Joint line tenderness to palpation    AP Stability at 90 deg stable    M/L stability in full extension stable    M/L stability in midflexion stable    Motor: 5/5 Q/HS/TA/GS/P    Pulses: 2+ DP / 2+ PT    SILT DP/SP/S/S/TN    Right knee: Inspection:  Skin intact     Overall limb alignment neutral    Effusion: none    ROM 3-115 w/ pain    Extensor Lag: none    Palpation: medial and lateral Joint line tenderness to palpation    AP Stability at 90 deg stable    M/L stability in full extension stable    M/L stability in midflexion stable    Motor: 5/5 Q/HS/TA/GS/P    Pulses: 2+ DP / 2+ PT    SILT DP/SP/S/S/TN    Imaging:   My "interpretation XR AP scanogram/AP bilateral knee/lateral/ugalde/sunrise bilateral knee: moderate joint space narrowing, subchondral sclerosis, subchondral cysts, osteophyte formation  No fracture or dislocation  BMI:   Estimated body mass index is 31 81 kg/m² as calculated from the following:    Height as of this encounter: 4' 9\" (1 448 m)  Weight as of this encounter: 66 7 kg (147 lb)  BSA:   Estimated body surface area is 1 58 meters squared as calculated from the following:    Height as of this encounter: 4' 9\" (1 448 m)  Weight as of this encounter: 66 7 kg (147 lb)  Scribe Attestation    I,:  Bert Garcia am acting as a scribe while in the presence of the attending physician :       I,:  Sony Clark, DO personally performed the services described in this documentation    as scribed in my presence  :           "

## 2023-06-16 ENCOUNTER — OFFICE VISIT (OUTPATIENT)
Dept: SPEECH THERAPY | Facility: REHABILITATION | Age: 73
End: 2023-06-16
Payer: MEDICARE

## 2023-06-16 DIAGNOSIS — I69.320 APHASIA AS LATE EFFECT OF CEREBROVASCULAR ACCIDENT: ICD-10-CM

## 2023-06-16 DIAGNOSIS — R49.8 OTHER VOICE AND RESONANCE DISORDERS: ICD-10-CM

## 2023-06-16 DIAGNOSIS — I69.30 HISTORY OF CVA WITH RESIDUAL DEFICIT: ICD-10-CM

## 2023-06-16 DIAGNOSIS — R47.9 SPEECH DISTURBANCE, UNSPECIFIED TYPE: Primary | ICD-10-CM

## 2023-06-16 PROCEDURE — 92507 TX SP LANG VOICE COMM INDIV: CPT

## 2023-06-16 NOTE — PROGRESS NOTES
Daily Speech Treatment Note    Today's date: 2023  Patient’s name: Singh Walls  : 1950  MRN: [id number]  Safety measures: CVA, reduced communication  Referring provider: Avila Mederos MD    Encounter Diagnosis     ICD-10-CM    1  Speech disturbance, unspecified type  R47 9       2  Other voice and resonance disorders  R49 8       3  Aphasia as late effect of cerebrovascular accident  I69 320       4  History of CVA with residual deficit  I69 30             Visit Tracking:    -Referring provider: Epic  -Billing guidelines: CMS  -Visit #5-- 8 visits auth-- 23  (1 visit completed at 8th e location) (Chava Grant)  -Insurance: [facility] MCR  -RE due 2023    Subjective/Behavioral:    -Patient presents with caregiver   - utilized this date (Turkmen)  -Patient reports she is doing well, recent ENT visit went well no problems noted with VF    Objective/Assessment:  -Patient education on plan of therapy, patient verbalized understanding         Short-term goals:    Patient will name an appropriate category for given words (e g , apple, banana, pear = fruits / sun, bus, lemon = yellow things) with 80% accuracy to facilitate improved semantic organization, to be achieved in 6-8 weeks --CONTINUE     Patient will name up to 5 features to describe a person/place/thing with 80% accuracy to facilitate improved utilization of circumlocution strategy, to be achieved in 6-8 weeks --CONTINUE     Patient will complete picture description tasks using language organization strategies with 80% accuracy to facilitate improved utilization of circumlocution strategy, to be achieved in 6-8 weeks --CONTINUE     MOTOR SPEECH/VOICE GOALS          Patient will complete structured oral reading and conversational tasks with the consistent use of compensatory strategies >90% of the time to facilitate increased speech intelligibility, to be achieved in 6-8 weeks       Pt will utilize diaphragmatic breathing "during oral sentence/paragraph reading in 80% of opportunities to facilitate increased breath support for voice/speaking, to be achieved in 6-8 weeks      Training in use of \"strong voice\" at a sound, word, counting, sentence and conversational level, with use patient noted to have improved breathing coordination for speech, improved volume and speech clarity  Patient required moderate level cues for improved accuracy, able to reduce to mild as task progressed  Patient will increase MPT to >10 seconds in order to facilitate improved breath support for speech tasks, to be achieved in 6-8 weeks       Vocal quality/speech clarity during 1:1 conversation will improve with use of trained compensatory strategies with 80% accuracy, to be achieved in 6-8 weeks  Plan:  -Patient was provided with home exercises/activities to target goals in plan of care at the end of today's session   -Continue with current plan of care    "

## 2023-06-20 ENCOUNTER — HOSPITAL ENCOUNTER (OUTPATIENT)
Dept: RADIOLOGY | Facility: HOSPITAL | Age: 73
Discharge: HOME | End: 2023-06-20
Payer: MEDICARE

## 2023-06-20 DIAGNOSIS — R13.10 DYSPHAGIA, UNSPECIFIED TYPE: ICD-10-CM

## 2023-06-20 PROCEDURE — 92611 MOTION FLUOROSCOPY/SWALLOW: CPT

## 2023-06-20 PROCEDURE — 74230 X-RAY XM SWLNG FUNCJ C+: CPT

## 2023-06-20 NOTE — PROCEDURES
Speech Pathology Videofluoroscopic Swallow Study (VFSS/VBSS/MBSS)      Patient Name: Ok Paredes    OVQSW'O Date: 6/20/2023        Assessment Summary:    Pt presents with minimal/mild oropharyngeal dysphagia most notably characterized by prolonged mastication, mild anterior loss, decreased base of tongue retraction, incomplete laryngeal elevation and diminished pharyngeal stripping wave  Collectively this resulted in flash penetration of thin liquids via large cup sips  No aspiration observed throughout study  Trace retrograde flow appreciated below the pharyngoesophageal segment  Narrowing in cricopharyngeus did not impact bolus flow  Pt had difficulty moving pill posteriorly in oral cavity with sips of thin liquid  Ultimately was able to swallow pill with bite of applesauce  Patient any family report pt has frequent coughing and choking at home on foods, liquids and saliva outside of PO intake  Pt had no coughing or observed difficulty throughout study  Per family, pt's coughing/choking is quite pronounced and frequent at home and presentation during study today may not be a good representation of baseline status  Esophageal sweep completed and clearance noted to be slightly prolonged with brief stasis of barium pill that was cleared with additional trial of puree  Consider GI consult if dedicated esophageal testing is desired  Recommend a Soft and bite-size/thin liquid diet  May consider giving pills whole in puree at home  Recommend general aspiration precautions (slow rate, small bites/sips, upright posture)  SLP reviewed results of study with pt and family  Ipad  ([place]) utilized throughout study  Please see below for full analysis of swallow components  Note: Images are available for review in PACS as desired  General Information;  Pt is a 71 y o  female with a PMH remarkable for 3 strokes in the past 5 years  Pt currently received OP ST services for speech/language deficits  Pt has had no previous dysphagia testing  Pt has h/o anxiety, thyroid disorder, hiatal hernia, seizures, falls, and diabetes  Current concerns for dysphagia include pt's report to ENT that she chokes when eating, drinking and on her own secretions  The chocking spells consist of her coughing uncontrollably and then having to deep breath or drink water to stop the spell  A VFSS was recommended to assess oropharyngeal stage swallowing skills at that time  Pt was viewed sitting upright in the lateral and AP positions  Trials administered were [facility] Protocol: 5-mL thin liquid x2, 20-mL cup sip thin, 40-mL sequential swallow thin, 5-mL nectar thick, 20-mL cup sip nectar thick, 40-mL sequential swallow nectar thick, 5-mL Honey thick, 5-mL pudding, and ½ cookie coated with 3-mL pudding  Unable to complete AP view 2/2 positioning  Pt was also given thin liquids by straw, a barium tablet with thin liquid, and soft bread  A robust esophageal sweep test (REST protocol) was utilized in conjunction with MBSImp protocol and included scanning the esophagus after a solid, multiple large volume sips of liquids, and a pill  EGD February 2021:   FINDINGS:  • 2 cm sliding hiatal hernia (type I hiatal hernia) - GE junction 34 cm from the incisors, diaphragmatic impression 36 cm from the incisors  • [place]-colored mucosa in the lower third of the esophagus; performed 4 cold forceps biopsies to rule out Yee's esophagus  • The duodenum appeared normal       Oral stage:  Pt presented with mild oral stage dysphagia        Lip closure: impaired with interlabial escape with no progression to anterior lip   Bolus control with liquids: intact   Mastication: impaired with prolonged/slow mastication   Anterior-Posterior Transit: intact with prompt, smooth transit   Oral residue: mild with location on floor of mouth   Initiation of pharyngeal swallow: Initiated when bolus head at posterior aspect of epiglottis      Pharyngeal stage:  Pt presented with minimal pharyngeal dysphagia  Velopharyngeal Closure: intact with no bolus between soft palate and pharyngeal wall   Laryngeal Elevation:impaired with partial superior movement of thyroid cartilage and approximation of epiglottic petiole and arytenoids   Anterior Hyoid Excursion: impaired with partial anterior movement   Epiglottic Retroflexion: intact with complete retroflexion   Laryngeal Vestibular Closure: impaired with partial closure with narrow column air in laryngeal vestibule   Pharyngeal Stripping Wave:impaired with diminished stripping   Pharyngoesophageal segment Opening: complete extension and duration with no obstruction of bolus flow into the esophagus   Base of Tongue Retraction: impaired with trace column of contrast noted between the base of tongue and pharyngeal wall   Pharyngeal residue: intact with no significant residue in pharynx  Aspiration Response and Efficacy:  No aspiration observed     Esophageal stage: A sweep of esophagus was completed after administration of the barium tablet, large sips of thin liquid, and soft bread  Esophageal sweep completed and clearance noted to be slightly prolonged with brief stasis of barium pill that was cleared with additional trial of puree  Penetration/Aspiration   8 point Penetration/Aspiration Scale score (Akash et al , 1996):    Thin liquids 2-material enters the airway, remains above the vocal folds and is ejected from the airway     [place] liquids 1-material does not enter the airway     Honey liquids 1-material does not enter the airway         Recommendations:   Recommended Diet:  soft/level 3 diet and thin liquids   Recommended Form of Medications: whole with liquid and whole with puree (pt preference)   Aspiration precautions and compensatory swallowing strategies: upright posture, only feed when fully alert, slow rate of feeding, small bites/sips, effortful swallow and alternating bites and sips  SLP Dysphagia therapy recommended: May benefit from training in aspiration precautions (pt follows with OP SLP)    Results Reviewed with: patient and family       Patient Stated Goal: Swallow evaluation       Problem List  Active Problems: There are no active Hospital Problems  Past Medical History  Past Medical History:   Diagnosis Date   • Abdominal pain    • Anxiety     last assessed: 10/19/2013   • Arthritis     osteo both hands; last assessed: 10/19/2013   • Bowel incontinence    • Chest pain    • Constipation    • CVA (cerebral vascular accident) ([facility] [address] )    • Diabetes mellitus type II, controlled ([facility] [address] )    • Disease of thyroid gland    • Epigastric pain     with distention   • Falls    • High cholesterol    • HL (hearing loss)    • Hyperlipidemia    • Hypertension     last assessed: 4/3/2017   • Migraine     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   • Palpitations    • Recurrent urinary tract infection    • Seizures ([facility] [address] )    • Sleep disorder     last assessed: 10/19/2013   • Speech impairment    • Stroke [facility])    • Thyroid disease    • Tinnitus        Past Surgical History  Past Surgical History:   Procedure Laterality Date   • AXILLARY SURGERY      cyst surgery   • BLADDER SURGERY     •  SECTION     • CYSTOSCOPY N/A 2018    Procedure: CYSTOSCOPY;  Surgeon: Neo Prince MD;  Location: [facility] OR;  Service: Gynecology   • FACIAL BONE TUMOR EXCISION     • KNEE SURGERY     • OTHER SURGICAL HISTORY      cyst removed from axilla   • IL ESOPHAGOGASTRODUODENOSCOPY TRANSORAL DIAGNOSTIC N/A 2017    Procedure: ESOPHAGOGASTRODUODENOSCOPY (EGD); Surgeon: Nando Christianson MD;  Location: [facility] GI LAB;   Service: Gastroenterology   • IL SLING OPERATION STRESS INCONTINENCE N/A 2018    Procedure: PUBOVAGINAL SLING;  Surgeon: Neo Prince MD;  Location: [facility] OR;  Service: Gynecology   • TUBAL LIGATION

## 2023-06-21 ENCOUNTER — OFFICE VISIT (OUTPATIENT)
Dept: SPEECH THERAPY | Facility: REHABILITATION | Age: 73
End: 2023-06-21
Payer: MEDICARE

## 2023-06-21 DIAGNOSIS — I69.320 APHASIA AS LATE EFFECT OF CEREBROVASCULAR ACCIDENT: ICD-10-CM

## 2023-06-21 DIAGNOSIS — R13.10 DYSPHAGIA, UNSPECIFIED TYPE: Primary | ICD-10-CM

## 2023-06-21 DIAGNOSIS — I69.30 HISTORY OF CVA WITH RESIDUAL DEFICIT: ICD-10-CM

## 2023-06-21 DIAGNOSIS — R49.8 OTHER VOICE AND RESONANCE DISORDERS: ICD-10-CM

## 2023-06-21 DIAGNOSIS — R47.9 SPEECH DISTURBANCE, UNSPECIFIED TYPE: ICD-10-CM

## 2023-06-21 PROCEDURE — 92610 EVALUATE SWALLOWING FUNCTION: CPT

## 2023-06-21 PROCEDURE — 92507 TX SP LANG VOICE COMM INDIV: CPT

## 2023-06-21 NOTE — PROGRESS NOTES
"Speech-Language Pathology Re-Evaluation    Today's date: 2023  Patient’s name: Lyndsay Dyson  : 1950  MRN: [id number]  Safety measures: CVA, reduced communication  Referring provider: Coy Summers MD    Encounter Diagnosis     ICD-10-CM    1  Dysphagia, unspecified type  R13 10       2  Speech disturbance, unspecified type  R47 9       3  Other voice and resonance disorders  R49 8       4  Aphasia as late effect of cerebrovascular accident  I69 320       5  History of CVA with residual deficit  I69 30             Visit Tracking:  -Referring provider: Epic  -Billing guidelines: CMS  -Visit #6-- 8 visits auth-- 23  (1 visit completed at 8th e location) (Tiffanie Joaquin)  -Insurance: Gaile Balls MCR  -RE due 2023    Subjective comments: Patient with recent VFSS completed, patient with questions and requesting further intervention as she feels she has significant difficulties with coughing at home  RE-evaluation required in order to further assess and modify plan of therapy to include dysphagia interventions  -Patient reports she feels she has \"changed the way she talks\" and is continuing to work on this  -Use of  for session via iPad    Patient's goal(s): to improve swallowing, communication    Assessments    Dysphagia:     Patient with recent VFSS completed  Patient noted to have coughing this date x1 however cough triggered outside of PO intake, patient feels she coughs on her saliva  Patient tolerating thin liquids with no overt s/s of aspiration 90% of the time this date via single cup sips  No trials of solids administered this date, however please refer to recent VFSS for full results  Significant education on results of VFSS, safe swallow recommendations (written cues provided) as patient reports she eats rapidly, typically utilizes large bolus size and only drinks following completion of solid textures   Patient/caregiver trained in safe swallow " "compensatory strategy recommendations (small bites/sips, slow rate, alternate liquids/solids, upright for PO consumption, upright for PO consumption and following PO consumption for at least 30 mins and 1 bolus presentation at a time)  Patient and caregiver verbalized understanding         Training in use of \"strong voice\" at a sound, word, counting, sentence and conversational level, with use patient with improved breathing coordination for speech, improved volume and speech clarity  Patient required moderate level cues for improved accuracy this date  Instructions provided for home program recommendations        Goals  Short-term goals:    Patient will name an appropriate category for given words (e g , apple, banana, pear = fruits / sun, bus, lemon = yellow things) with 80% accuracy to facilitate improved semantic organization, to be achieved in 6-8 weeks --CONTINUE--did not target this reporting period due to patient main concern of voice/speech difficulties at this time, will plan to address in future sessions    Patient will name up to 5 features to describe a person/place/thing with 80% accuracy to facilitate improved utilization of circumlocution strategy, to be achieved in 6-8 weeks --CONTINUEdid not target this reporting period due to patient main concern of voice/speech difficulties at this time, will plan to address in future sessions      Patient will complete picture description tasks using language organization strategies with 80% accuracy to facilitate improved utilization of circumlocution strategy, to be achieved in 6-8 weeks --CONTINUEdid not target this reporting period due to patient main concern of voice/speech difficulties at this time, will plan to address in future sessions      [address] [facility] [address]         Patient will complete structured oral reading and conversational tasks with the consistent use of compensatory strategies >90% of the time to facilitate increased speech " intelligibility, to be achieved in 6-8 weeks  ---patient requires moderate level cues for improved accuracy     Pt will utilize diaphragmatic breathing during oral sentence/paragraph reading in 80% of opportunities to facilitate increased breath support for voice/speaking, to be achieved in 6-8 weeks  --patient requires moderate cues for breath support for speech    Patient will increase MPT to >10 seconds in order to facilitate improved breath support for speech tasks, to be achieved in 6-8 weeks  --continue -- DNT this reporting period, however reudced breath support for speech evident during therapeutic tasks     Vocal quality/speech clarity during 1:1 conversation will improve with use of trained compensatory strategies with 80% accuracy, to be achieved in 6-8 weeks  --continue-- patient requires moderate level cues for improved VQ    Patient will demonstrate consistent use of compensatory strategies (e g , upright positioning, small bites/sips, slow rate, alternation of consistencies, multiple swallows, effortful swallow, chin tuck, head turn, etc ) with 80% accuracy to reduce risk of  penetration/aspiration during consumption of liquids/solids, to be achieved in 6-8 weeks  --new goal    Long-term Goals:       Patient will improve ability to facilitate communication to meet needs with 90% accuracy including use of compensatory strategies to promote meaningful interactions for improved quality of life and maximize level of independence, by discharge  --continue-- patient requires moderate cues for use of strategies    Patient will independently utilize speech intelligibility strategies (e g , over-exaggeration, slow rate, breath groups, etc ) during oral reading tasks >90% intelligibility to facilitate increased generalization of skills into conversational speech by discharge   --continue--- patient requires moderate cues at this time for improved use of strategies     Patient will utilize compensatory strategies 80% of the time independently optimizing safety and efficacy of swallowing function on P O  intake without overt s/sx of penetration or aspiration 90% of the time by discharge  --new goal                 Impressions/Recommendations    Impressions:   Patient presents with moderate to severe voice and motor speech difficulties (improving with use of strategies) at this time, characterized by breathy VQ, reduced breath support for speech tasks, slurred speech, variable speech pattern impacting communication at this time  Patient presents with mild oropharyngeal dysphagia with recommendations for soft diet textures, use of compensatory strategies to facilitate improved toleration of liquids/solids  As per VFSS report, recommendations for GI consultation to further assess as appropriate  Patient requires training in use of strategies in order to reduce risk of aspiration and related complications  Patient would benefit from continued skilled SLP interventions at this time in order to facilitate improved word finding accuracy/naming accuracy, improved vocal quality and speech intelligibility and to train in strategies in order to facilitate improved communication accuracy/speech intelligibility/toleration of liquids and solids within environment         Recommendations:  -Patient would benefit from outpatient skilled Speech Therapy services : Speech/ language therapy and Dysphagia therapy    -Frequency: 1-2x weekly  -Duration: 4-6 weeks    -Intervention certification from: 0/78/0121  -Intervention certification to: 7/3/6071

## 2023-06-28 ENCOUNTER — OFFICE VISIT (OUTPATIENT)
Dept: SPEECH THERAPY | Facility: REHABILITATION | Age: 73
End: 2023-06-28
Payer: MEDICARE

## 2023-06-28 DIAGNOSIS — R47.9 SPEECH DISTURBANCE, UNSPECIFIED TYPE: ICD-10-CM

## 2023-06-28 DIAGNOSIS — R49.8 OTHER VOICE AND RESONANCE DISORDERS: ICD-10-CM

## 2023-06-28 DIAGNOSIS — I69.30 HISTORY OF CVA WITH RESIDUAL DEFICIT: ICD-10-CM

## 2023-06-28 DIAGNOSIS — I69.320 APHASIA AS LATE EFFECT OF CEREBROVASCULAR ACCIDENT: ICD-10-CM

## 2023-06-28 DIAGNOSIS — R13.10 DYSPHAGIA, UNSPECIFIED TYPE: Primary | ICD-10-CM

## 2023-06-28 PROCEDURE — 92507 TX SP LANG VOICE COMM INDIV: CPT

## 2023-06-28 PROCEDURE — 92526 ORAL FUNCTION THERAPY: CPT

## 2023-06-28 NOTE — PROGRESS NOTES
Daily Speech Treatment Note    Today's date: 2023  Patient’s name: Jayson Padilla  : 1950  MRN: [id number]  Safety measures: cva, reduced communication  Referring provider: Marley Gonzalez MD    Encounter Diagnosis     ICD-10-CM    1  Dysphagia, unspecified type  R13 10       2  Speech disturbance, unspecified type  R47 9       3  Other voice and resonance disorders  R49 8       4  Aphasia as late effect of cerebrovascular accident  I69 320       5  History of CVA with residual deficit  I69 30               Visit Tracking:  -Referring provider: Epic  -Billing guidelines: CMS  -Visit #7-- 8 visits auth-- 23  (1 visit completed at 8th e location) (Sean Moise)  -Insurance: [employer]  MCR  -RE due 2023    Subjective/Behavioral:  -Patient reports she has been practicing every day  -Patient reports she has been practicing use of swallow compensatory strategies    Objective/Assessment:  -Patient's family member/caregiver was present during today's session  Short-term goals:      Patient will name an appropriate category for given words (e g , apple, banana, pear = fruits / sun, bus, lemon = yellow things) with 80% accuracy to facilitate improved semantic organization, to be achieved in 6-8 weeks --CONTINUE--did not target this reporting period due to patient main concern of voice/speech difficulties at this time, will plan to address in future sessions    Patient completed abstract level add to the category tasks where patient was asked to name an additional item that belongs in the same category for 3 items presented,  provided moderate cues for improved accuracy in order to facilitate improved word finding accuracy allowing for improved conversational word finding  Patient demonstrating 90% accuracy with concrete tasks       Patient will name up to 5 features to describe a person/place/thing with 80% accuracy to facilitate improved utilization of circumlocution strategy, to be achieved in "6-8 weeks --CONTINUEdid not target this reporting period due to patient main concern of voice/speech difficulties at this time, will plan to address in future sessions      Patient will complete picture description tasks using language organization strategies with 80% accuracy to facilitate improved utilization of circumlocution strategy, to be achieved in 6-8 weeks --CONTINUEdid not target this reporting period due to patient main concern of voice/speech difficulties at this time, will plan to address in future sessions     Patient will complete structured oral reading and conversational tasks with the consistent use of compensatory strategies >90% of the time to facilitate increased speech intelligibility, to be achieved in 6-8 weeks  ---patient requires moderate level cues for improved accuracy     Training in use of \"strong voice\" at a sound, word, counting, sentence and conversational level, with use patient with improved breathing coordination for speech, improved volume and speech clarity  Patient required moderate to max, able to reduce to mild to moderate level cues for improved accuracy this date  Instructions provided for home program recommendations  Pt will utilize diaphragmatic breathing during oral sentence/paragraph reading in 80% of opportunities to facilitate increased breath support for voice/speaking, to be achieved in 6-8 weeks  --patient requires moderate cues for breath support for speech    Patient will increase MPT to >10 seconds in order to facilitate improved breath support for speech tasks, to be achieved in 6-8 weeks  --continue -- DNT this reporting period, however reudced breath support for speech evident during therapeutic tasks     Vocal quality/speech clarity during 1:1 conversation will improve with use of trained compensatory strategies with 80% accuracy, to be achieved in 6-8 weeks  --continue-- patient requires moderate level cues for improved VQ    Patient will demonstrate " consistent use of compensatory strategies (e g , upright positioning, small bites/sips, slow rate, alternation of consistencies, multiple swallows, effortful swallow, chin tuck, head turn, etc ) with 80% accuracy to reduce risk of  penetration/aspiration during consumption of liquids/solids, to be achieved in 6-8 weeks  --new goal  Patient trained in safe swallow compensatory strategy recommendations (small bites/sips, slow rate and alternate liquids/solids)  Patient verbalized understanding  Plan:  -Patient was provided with home exercises/activities to target goals in plan of care at the end of today's session   -Continue with current plan of care

## 2023-06-30 ENCOUNTER — EVALUATION (OUTPATIENT)
Dept: PHYSICAL THERAPY | Facility: REHABILITATION | Age: 73
End: 2023-06-30
Payer: MEDICARE

## 2023-06-30 DIAGNOSIS — I69.30 HISTORY OF CVA WITH RESIDUAL DEFICIT: ICD-10-CM

## 2023-06-30 DIAGNOSIS — M62.81 MUSCLE WEAKNESS: ICD-10-CM

## 2023-06-30 DIAGNOSIS — M54.16 LUMBAR RADICULITIS: ICD-10-CM

## 2023-06-30 DIAGNOSIS — M17.11 PRIMARY OSTEOARTHRITIS OF RIGHT KNEE: ICD-10-CM

## 2023-06-30 DIAGNOSIS — M17.12 PRIMARY OSTEOARTHRITIS OF LEFT KNEE: ICD-10-CM

## 2023-06-30 DIAGNOSIS — I69.398 GAIT DISTURBANCE, POST-STROKE: Primary | ICD-10-CM

## 2023-06-30 DIAGNOSIS — R53.1 WEAKNESS: ICD-10-CM

## 2023-06-30 DIAGNOSIS — R26.9 GAIT DISTURBANCE, POST-STROKE: Primary | ICD-10-CM

## 2023-06-30 PROCEDURE — 97140 MANUAL THERAPY 1/> REGIONS: CPT | Performed by: PHYSICAL THERAPIST

## 2023-06-30 NOTE — PROGRESS NOTES
PT Evaluation     Name: Virginie Draper  Date: 23  : 1950  Referring Provider: Florina Galvin MD  AUTHORIZATION:   Insurance: Payor: Elba Aguirre MEDICARE [facility] REP / Plan: Iain Eddy / Product Type: Medicare HMO /   1 of 16 visits through , PN due       SUBJECTIVE:  HPI: Virginie Draper is a 71 y o  female referred to outpatient physical therapy for the following diagnosis   Encounter Diagnoses   Name Primary? • History of CVA with residual deficit Yes   • Lumbar radiculitis    • Muscle weakness    • Gait disturbance, post-stroke    • Weakness           Patient reports Lower back pain  -8/10 and  Bilateral knee pain Right > left  She requires assistance of walker and aide for short distance ambulation, which is limited by pain  She has had many almost falls which she has required assistance to correct  She lives with supportive  and has a caregiver who is with her 40 hours and is very involved in patient care  Patient-Specific Functional Scale   Task is scored 0 (unable to perform activity) to 10 (ability to perform activity independently)  Activity      1  Getting in and out of bed without increased pain 0    2  Walk more without increased pain 2    3  Be able to stand up better 4      Imaging:  My interpretation XR AP scanogram/AP bilateral knee/lateral/ugalde/sunrise bilateral knee: moderate joint space narrowing, subchondral sclerosis, subchondral cysts, osteophyte formation   No fracture or dislocation       23 Epidural steroid injection      Past Medical History:   Diagnosis Date   • Abdominal pain    • Anxiety     last assessed: 10/19/2013   • Arthritis     osteo both hands; last assessed: 10/19/2013   • Bowel incontinence    • Chest pain    • Constipation    • CVA (cerebral vascular accident) ([address] )    • Diabetes mellitus type II, controlled ([address] )    • Disease of thyroid gland    • Epigastric pain     with distention   • Falls    • High cholesterol    • HL (hearing loss)    • Hyperlipidemia    • Hypertension     last assessed: 4/3/2017   • Migraine     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   • Palpitations    • Recurrent urinary tract infection    • Seizures ([address] )    • Sleep disorder     last assessed: 10/19/2013   • Speech impairment    • Stroke [facility])    • Thyroid disease    • Tinnitus        Current Outpatient Medications:   •  aspirin (ECOTRIN LOW STRENGTH) 81 mg EC tablet, Take 81 mg by mouth daily , Disp: , Rfl:   •  benzonatate (TESSALON PERLES) 100 mg capsule, Take 1 capsule (100 mg total) by mouth 3 (three) times a day as needed for cough, Disp: 20 capsule, Rfl: 0  •  Cholecalciferol (VITAMIN D-3) 5000 units TABS, Take 1 tablet by mouth daily, Disp: 90 tablet, Rfl: 1  •  citalopram (CeleXA) 40 mg tablet, Take 1 tablet (40 mg total) by mouth daily, Disp: 90 tablet, Rfl: 1  •  gabapentin (Neurontin) 300 mg capsule, 1 tab PO qHS for pain, may take up to 3 times per day, Disp: 90 capsule, Rfl: 0  •  Incontinence Supply Disposable (Brief Overnight Large) MISC, Use 4 (four) times a day, Disp: 160 each, Rfl: 11  •  Incontinence Supply Disposable (Underpads) MISC, Use in the morning Please dispense washable underpads, Disp: 4 each, Rfl: 11  •  Infant Care Products (Comfort-Smooth Baby Wipes) MISC, Use 4 (four) times a day, Disp: 240 each, Rfl: 11  •  lidocaine (Lidoderm) 5 %, Apply 1 patch topically over 12 hours daily Remove & Discard patch within 12 hours or as directed by MD, Disp: 30 patch, Rfl: 1  •  omeprazole (PriLOSEC) 20 mg delayed release capsule, Take 1 capsule (20 mg total) by mouth daily, Disp: 30 capsule, Rfl: 2  •  polyethylene glycol (GLYCOLAX) powder, Take 17 g by mouth daily, Disp: 850 g, Rfl: 1  6/30/2023      Objective    Palpation; Lumbar Hypomobility, Lumbar paraspinal muscle spasm    Core Movement Tasks Description of task    Sitting Abnormal - Left sided listing due to left radicular pain Sitting to Standing Posterior losses of balance, Unable without use of arms and Poor control in descent   Standing Uses assistive device, increased base of support   Walking Lacks knee flexion during swing phase gait, Lacks heel strike, Lacks knee extension during stance phase gait, Decreased trunk rotation and Decreased arm swing       AROM  Lumbar spine Left Right   Flexion  35   With pain   Extension 10    Lateral Flexion 10 10    Rotation 15 15       AROM Hip Left Right   Flexion supine 90 45   Abduction  25 25   External Rotation 45 20   Internal rotation 35 15   SLR 65  35         AROM  Knee Left Right   Flexion 115 115   Extension 5 15 with increased pain     AROM Ankle Left WFl Right  WFl       Manual Muscle Testing - Hip Left Right   Flexion 3+ 3-   Extension 3+ 3-   Abduction 3+ 3-   External Rotation 3+ 3-     Manual Muscle Testing - Knee Left Right   Flexion 3+ 3+   Extension 3+ 3+     Manual Muscle Testing - Ankle Left Right   Doriflexion 3+ 3+   Plantarflexion 3+ 3+        Transfers    Sit To Stand Contact Guard   W/C To Bed Mod Assist   Sit To Supine Mod Assist   Roll Mod Assist     Mobility Measures 6/30   Assistive device used? 4-Wheeled Walker   6 Minute Walk Test (100 foot circular course) 46' stopped due to pain after 1:45 min       5 times sit to stand 1' 43 seconds   Patient-Reported Outcome Measure:   Patient Specific Functional Scale (PSFS) 6/30     Primary Impairments:  1) Lumbopelvic hypomobility--> addressing with stretching and active exercise  2) R sciatic neural tension--> addressing with nerve glides and neuromotor re-education  3) Core and hip girdle weakness--> addressing with neuromotor re-education and progressive strengthening    Assessment/Plan    Assessment:  Patient presents to outpatient physical therapy with her  and caretaker  PMH includes lumbar radiculopathy and B knee pain   She presents with decreased AROM and  strength, right lower back with radiation to right calf, lumbar hypomobility, sciatic neural tension, Bilateral knee pain, decreased endurance and decreased mobility  She underwent Epidural steroid injection which patient reports was of some relief  These impairments are limiting the patient's ability to participate in ADls and hs increased burden on caregivers  Patient will benefit from skilled outpatient physical therapy to address the above impairments in order to develop a HEP and  improve patient independence and safety in home and the community     STG's:         - Patient is independent with initial home exercise program for improvements at home within 2 weeks    - Reports decreased pain symptoms <4/10 at worst in 6 consecutive days in 4 weeks    - Patient ambulates for 4 consecutive min with appropriate vital sign response for improved endurance within 4 weeks    - patient will be able to transfer w/c to supine with cg assist with decreased pain to decrease caregiver burden  LTG's:   - Patient improves distance ambulated on 6MWT by 30% within 8 weeks for improved endurance   - Patient decreased time to complete 5 x sit to stand  By 15 seconds for increased muscle strength   - patient will demonstrate improve lumbar ROM by 20 %    Plan:  Patient will benefit from skilled outpatient physical therapy 2 x/wk for 8 weeks  Therapeutic exercises, Neuromuscular re-education, manual therapy,  there activity and modalities as indicated              Precautions: Vincentian Speaking,Chronic CVA, high fall risk, HTN, Diabetes mellitus type 2, seizures, osteoporosis, palpitations, chronic LBP with R sided sciatica, hx of depression and suicidal ideation        6/30     Manual lumbar paraspinal sot tissue mobs      Supine Right hip stretches, flex,abd, external, internal rotations  Sciatic nerve glide     Modalities    nv          Neuro Re-Ed                  There activity Transfers  Sit to stand  Pivot onto mat  Supine to prone     Ther Ex nv SKC  Trunk rotation  Hamstring stretch                                                     Ther Activity                  Gait Training                  Modalities

## 2023-07-05 ENCOUNTER — OFFICE VISIT (OUTPATIENT)
Facility: REHABILITATION | Age: 73
End: 2023-07-05
Payer: MEDICARE

## 2023-07-05 ENCOUNTER — OFFICE VISIT (OUTPATIENT)
Dept: SPEECH THERAPY | Facility: REHABILITATION | Age: 73
End: 2023-07-05
Payer: MEDICARE

## 2023-07-05 DIAGNOSIS — I69.30 HISTORY OF CVA WITH RESIDUAL DEFICIT: ICD-10-CM

## 2023-07-05 DIAGNOSIS — R47.9 SPEECH DISTURBANCE, UNSPECIFIED TYPE: ICD-10-CM

## 2023-07-05 DIAGNOSIS — M17.12 PRIMARY OSTEOARTHRITIS OF LEFT KNEE: ICD-10-CM

## 2023-07-05 DIAGNOSIS — R49.8 OTHER VOICE AND RESONANCE DISORDERS: ICD-10-CM

## 2023-07-05 DIAGNOSIS — I69.30 HISTORY OF CVA WITH RESIDUAL DEFICIT: Primary | ICD-10-CM

## 2023-07-05 DIAGNOSIS — I69.320 APHASIA AS LATE EFFECT OF CEREBROVASCULAR ACCIDENT: ICD-10-CM

## 2023-07-05 DIAGNOSIS — R13.10 DYSPHAGIA, UNSPECIFIED TYPE: Primary | ICD-10-CM

## 2023-07-05 DIAGNOSIS — R53.1 WEAKNESS: ICD-10-CM

## 2023-07-05 DIAGNOSIS — M54.16 LUMBAR RADICULITIS: ICD-10-CM

## 2023-07-05 DIAGNOSIS — M17.11 PRIMARY OSTEOARTHRITIS OF RIGHT KNEE: ICD-10-CM

## 2023-07-05 DIAGNOSIS — M62.81 MUSCLE WEAKNESS: ICD-10-CM

## 2023-07-05 PROCEDURE — 97140 MANUAL THERAPY 1/> REGIONS: CPT | Performed by: PHYSICAL THERAPIST

## 2023-07-05 PROCEDURE — 92507 TX SP LANG VOICE COMM INDIV: CPT

## 2023-07-05 PROCEDURE — 97112 NEUROMUSCULAR REEDUCATION: CPT | Performed by: PHYSICAL THERAPIST

## 2023-07-05 PROCEDURE — 97110 THERAPEUTIC EXERCISES: CPT | Performed by: PHYSICAL THERAPIST

## 2023-07-05 NOTE — PROGRESS NOTES
PT Evaluation     Name: Oxana Forde  Date: 23  : 1950  Referring Provider: Bela Burk MD  AUTHORIZATION:   Insurance: Payor: Alvah Dubois MEDICARE [facility] REP / Plan: Rylee Balbuena / Product Type: Medicare HMO /   1 of 16 visits through , PN due       SUBJECTIVE:  HPI: Oxana Forde is a 71 y.o. female referred to outpatient physical therapy for the following diagnosis   Encounter Diagnoses   Name Primary? • History of CVA with residual deficit Yes   • Lumbar radiculitis    • Weakness    • Primary osteoarthritis of left knee    • Muscle weakness    • Primary osteoarthritis of right knee           Shae reports decreased pain today. No falls or almost falls since last session. Patient-Specific Functional Scale   Task is scored 0 (unable to perform activity) to 10 (ability to perform activity independently)  Activity      1. Getting in and out of bed without increased pain 0    2. Walk more without increased pain 2    3. Be able to stand up better 4      Imaging:  My interpretation XR AP scanogram/AP bilateral knee/lateral/ugalde/sunrise bilateral knee: moderate joint space narrowing, subchondral sclerosis, subchondral cysts, osteophyte formation. No fracture or dislocation.      23 Epidural steroid injection    Objective      1) Lumbopelvic hypomobility--> addressing with stretching and active exercise  2) R sciatic neural tension--> addressing with nerve glides and neuromotor re-education  3) Core and hip girdle weakness--> addressing with neuromotor re-education and progressive strengthening    Assessment/Plan    Assessment:  Initiated lumbar mobility exercises wit rolling blue theraball. Responded well to manual therapy with reports of decreased pain and increased ROM. Performed overground ambulation for neuro muscular education for proper body mechanics facilitating trunk rotation and improved LE advancement.  Lokesh Renee will continue to benefit from skilled PT. Plan:  Patient will benefit from skilled outpatient physical therapy 2 x/wk for 8 weeks. Therapeutic exercises, Neuromuscular re-education, manual therapy,  there activity and modalities as indicated. Precautions: Georgian Speaking,Chronic CVA, high fall risk, HTN, Diabetes mellitus type 2, seizures, osteoporosis, palpitations, chronic LBP with R sided sciatica, hx of depression and suicidal ideation        6/30     Manual lumbar paraspinal sot tissue mobs Hamstring  Stretches 4 x :20 each  Sciatic Nerve glides x 10     Supine Right hip stretches, flex,abd, external, internal rotations  Sciatic nerve glide Long sitting  End range  1 x 10  :08 each  Hip ext rotation  Hip internal rotation    Modalities  MH  performed          Neuro Re-Ed                  There activity Transfers  Sit to stand  Pivot onto mat  Supine to prone Sit to stand/pivot onto chair  X 10 throughout    Ther Ex nv SKC  Trunk rotation  Hamstring stretch Modified long sitting  SKC  Trunk rotation  Blue theraball   2 x 10        Overground ambulation 100' x 4 SoLOStep 100 % verbal cues as for increased step length and proper body mechanics within the  rollator.       Overground ambulation with device mod assist.

## 2023-07-05 NOTE — PROGRESS NOTES
Daily Speech Treatment Note    Today's date: 2023  Patient’s name: Laci Zimmerman  : 1950  MRN: [id number]  Safety measures: cva, reduced communication  Referring provider: Aly Kilpatrick MD    Encounter Diagnosis     ICD-10-CM    1. Dysphagia, unspecified type  R13.10       2. Speech disturbance, unspecified type  R47.9       3. Other voice and resonance disorders  R49.8       4. Aphasia as late effect of cerebrovascular accident  I69.320       5. History of CVA with residual deficit  I69.30               Visit Tracking:  -Referring provider: Epic  -Billing guidelines: CMS  -Visit #8-- 8 visits auth-- 23  (1 visit completed at 8th e location) (Jennifer Alex)  -Insurance: Annabel Hinton MCR  -RE due 2023    Subjective/Behavioral:  -Patient reports she feels she is having trouble with using a louder voice as she has trouble with hernia. Patient reports this is nothing new, and is able to produce a strong voice without pain or discomfort during voicing tasks. Patient reporting "I cant" in regard to voicing with increased volume, however SLP has observed patient ability to produce increased volume, encouragement provided. Objective/Assessment:  -Patient's family member/caregiver was present during today's session.     Short-term goals:      Patient will name an appropriate category for given words (e.g., apple, banana, pear = fruits / sun, bus, lemon = yellow things) with 80% accuracy to facilitate improved semantic organization, to be achieved in 6-8 weeks.--CONTINUE--did not target this reporting period due to patient main concern of voice/speech difficulties at this time, will plan to address in future sessions    Patient will name up to 5 features to describe a person/place/thing with 80% accuracy to facilitate improved utilization of circumlocution strategy, to be achieved in 6-8 weeks.--CONTINUEdid not target this reporting period due to patient main concern of voice/speech difficulties at this time, will plan to address in future sessions  Patient completed semantic feature analysis tasks with 60% accuracy, improving to 80% accuracy provided moderate to max level cues in order to facilitate improved word retrieval to allow for improved word finding during conversational tasks. Patient will complete picture description tasks using language organization strategies with 80% accuracy to facilitate improved utilization of circumlocution strategy, to be achieved in 6-8 weeks.--CONTINUEdid not target this reporting period due to patient main concern of voice/speech difficulties at this time, will plan to address in future sessions     Patient will complete structured oral reading and conversational tasks with the consistent use of compensatory strategies >90% of the time to facilitate increased speech intelligibility, to be achieved in 6-8 weeks. ---patient requires moderate level cues for improved accuracy     Training in use of "strong voice" at a sound, word and phrase level, with use patient with improved breathing coordination for speech, improved volume and speech clarity. Patient required max cues for improved accuracy this date. Pt will utilize diaphragmatic breathing during oral sentence/paragraph reading in 80% of opportunities to facilitate increased breath support for voice/speaking, to be achieved in 6-8 weeks. --patient requires moderate cues for breath support for speech    Patient will increase MPT to >10 seconds in order to facilitate improved breath support for speech tasks, to be achieved in 6-8 weeks. --continue -- DNT this reporting period, however reudced breath support for speech evident during therapeutic tasks     Vocal quality/speech clarity during 1:1 conversation will improve with use of trained compensatory strategies with 80% accuracy, to be achieved in 6-8 weeks. --continue-- patient requires moderate level cues for improved VQ    Patient will demonstrate consistent use of compensatory strategies (e.g., upright positioning, small bites/sips, slow rate, alternation of consistencies, multiple swallows, effortful swallow, chin tuck, head turn, etc.) with 80% accuracy to reduce risk of  penetration/aspiration during consumption of liquids/solids, to be achieved in 6-8 weeks. --new goal      Plan:  -Patient was provided with home exercises/activities to target goals in plan of care at the end of today's session.  -Continue with current plan of care.

## 2023-07-11 ENCOUNTER — PROCEDURE VISIT (OUTPATIENT)
Dept: OBGYN CLINIC | Facility: MEDICAL CENTER | Age: 73
End: 2023-07-11
Payer: MEDICARE

## 2023-07-11 VITALS
SYSTOLIC BLOOD PRESSURE: 130 MMHG | BODY MASS INDEX: 31.81 KG/M2 | HEIGHT: 57 IN | HEART RATE: 74 BPM | DIASTOLIC BLOOD PRESSURE: 82 MMHG

## 2023-07-11 DIAGNOSIS — M17.11 PRIMARY OSTEOARTHRITIS OF RIGHT KNEE: ICD-10-CM

## 2023-07-11 DIAGNOSIS — M17.12 PRIMARY OSTEOARTHRITIS OF LEFT KNEE: Primary | ICD-10-CM

## 2023-07-11 PROCEDURE — 20610 DRAIN/INJ JOINT/BURSA W/O US: CPT | Performed by: STUDENT IN AN ORGANIZED HEALTH CARE EDUCATION/TRAINING PROGRAM

## 2023-07-11 NOTE — PROGRESS NOTES
[facility]  Pain 6/10      Large joint arthrocentesis: bilateral knee  Universal Protocol:  Consent: Verbal consent obtained.   Risks and benefits: risks, benefits and alternatives were discussed  Consent given by: patient  Patient understanding: patient states understanding of the procedure being performed  Patient identity confirmed: verbally with patient    Supporting Documentation  Indications: pain   Procedure Details  Location: knee - bilateral knee  Preparation: Patient was prepped and draped in the usual sterile fashion  Needle size: 20 G  Approach: anterolateral    Medications (Right): 48 mg hylan 48 MG/6MLMedications (Left): 48 mg hylan 48 MG/6ML   Patient tolerance: patient tolerated the procedure well with no immediate complications  Dressing:  Sterile dressing applied            Elvira Fuentes PA-C

## 2023-07-12 ENCOUNTER — APPOINTMENT (OUTPATIENT)
Facility: REHABILITATION | Age: 73
End: 2023-07-12
Payer: MEDICARE

## 2023-07-12 ENCOUNTER — APPOINTMENT (OUTPATIENT)
Dept: SPEECH THERAPY | Facility: REHABILITATION | Age: 73
End: 2023-07-12
Payer: MEDICARE

## 2023-07-19 ENCOUNTER — APPOINTMENT (OUTPATIENT)
Facility: REHABILITATION | Age: 73
End: 2023-07-19
Payer: MEDICARE

## 2023-07-19 ENCOUNTER — APPOINTMENT (OUTPATIENT)
Dept: SPEECH THERAPY | Facility: REHABILITATION | Age: 73
End: 2023-07-19
Payer: MEDICARE

## 2023-07-25 ENCOUNTER — OFFICE VISIT (OUTPATIENT)
Dept: FAMILY MEDICINE CLINIC | Facility: CLINIC | Age: 73
End: 2023-07-25

## 2023-07-25 VITALS
TEMPERATURE: 97.8 F | OXYGEN SATURATION: 95 % | DIASTOLIC BLOOD PRESSURE: 82 MMHG | HEART RATE: 70 BPM | SYSTOLIC BLOOD PRESSURE: 116 MMHG | RESPIRATION RATE: 18 BRPM | WEIGHT: 150 LBS | HEIGHT: 57 IN | BODY MASS INDEX: 32.36 KG/M2

## 2023-07-25 DIAGNOSIS — R21 RASH: ICD-10-CM

## 2023-07-25 DIAGNOSIS — M51.36 LUMBAR DEGENERATIVE DISC DISEASE: ICD-10-CM

## 2023-07-25 DIAGNOSIS — M54.42 CHRONIC RIGHT-SIDED LOW BACK PAIN WITH BILATERAL SCIATICA: ICD-10-CM

## 2023-07-25 DIAGNOSIS — G89.29 CHRONIC RIGHT-SIDED LOW BACK PAIN WITH BILATERAL SCIATICA: ICD-10-CM

## 2023-07-25 DIAGNOSIS — M54.41 CHRONIC RIGHT-SIDED LOW BACK PAIN WITH BILATERAL SCIATICA: ICD-10-CM

## 2023-07-25 DIAGNOSIS — R13.10 DYSPHAGIA, UNSPECIFIED TYPE: Primary | ICD-10-CM

## 2023-07-25 DIAGNOSIS — K21.9 GASTROESOPHAGEAL REFLUX DISEASE, UNSPECIFIED WHETHER ESOPHAGITIS PRESENT: ICD-10-CM

## 2023-07-25 PROCEDURE — 99214 OFFICE O/P EST MOD 30 MIN: CPT | Performed by: FAMILY MEDICINE

## 2023-07-25 RX ORDER — OMEPRAZOLE 20 MG/1
20 CAPSULE, DELAYED RELEASE ORAL DAILY
Qty: 90 CAPSULE | Refills: 2 | Status: SHIPPED | OUTPATIENT
Start: 2023-07-25

## 2023-07-25 RX ORDER — GABAPENTIN 300 MG/1
CAPSULE ORAL
Qty: 90 CAPSULE | Refills: 3 | Status: SHIPPED | OUTPATIENT
Start: 2023-07-25

## 2023-07-25 NOTE — PROGRESS NOTES
Name: Oxana Forde      : 1950      MRN: [id number]  Encounter Provider: Bela Burk MD  Encounter Date: 2023   Encounter department: [address] [facility]     1. Dysphagia, unspecified type    2. Chronic right-sided low back pain with bilateral sciatica  -     gabapentin (Neurontin) 300 mg capsule; 1 tab PO qHS for pain, may take up to 3 times per day    3. Lumbar degenerative disc disease  -     gabapentin (Neurontin) 300 mg capsule; 1 tab PO qHS for pain, may take up to 3 times per day    4. Gastroesophageal reflux disease, unspecified whether esophagitis present  -     omeprazole (PriLOSEC) 20 mg delayed release capsule; Take 1 capsule (20 mg total) by mouth daily    5. Rash  -     triamcinolone (KENALOG) 0.1 % ointment; Apply topically 2 (two) times a day         Encouraged patient to attend Physical Therapy  Swallowing study reviewed with patient. Subjective      70 yo  female who presents with dysphagia s/p 3 strokes in the past 5 years. She chokes when eating, drinking and on her own secretions. The chocking spells consist of her coughing uncontrollably and then having to deep breath or drink water to stop the spell. She was seen and evaluated by ENT and had a Swallowing study done. She has a soft voice and is going to Speech Therapy currently. Denies SOB, CARLSON, CP  Also complains of increasing b/l knee pain and chronic LBP  Follows with Orthopedics, patient to get bilateral VISCO injection   LBP stable, no red flags, ws following with Pain Management who suggested repeat ALANA  as she derives 70% improvement from her prior Epidural Steroid Injection but patient states she does not wish to go through that again  Patient was referred to PT for both knee and back pain, but has not started that yet either     Review of Systems   HENT: Positive for trouble swallowing and voice change.     Musculoskeletal: Positive for arthralgias, back pain and gait problem. Psychiatric/Behavioral: The patient is nervous/anxious. All other systems reviewed and are negative. Current Outpatient Medications on File Prior to Visit   Medication Sig   • aspirin (ECOTRIN LOW STRENGTH) 81 mg EC tablet Take 81 mg by mouth daily    • benzonatate (TESSALON PERLES) 100 mg capsule Take 1 capsule (100 mg total) by mouth 3 (three) times a day as needed for cough   • Cholecalciferol (VITAMIN D-3) 5000 units TABS Take 1 tablet by mouth daily   • citalopram (CeleXA) 40 mg tablet Take 1 tablet (40 mg total) by mouth daily   • Incontinence Supply Disposable (Brief Overnight Large) MISC Use 4 (four) times a day   • Incontinence Supply Disposable (Underpads) MISC Use in the morning Please dispense washable underpads   • Infant Care Products (Comfort-Smooth Baby Wipes) MISC Use 4 (four) times a day   • lidocaine (Lidoderm) 5 % Apply 1 patch topically over 12 hours daily Remove & Discard patch within 12 hours or as directed by MD   • polyethylene glycol (GLYCOLAX) powder Take 17 g by mouth daily   • [DISCONTINUED] gabapentin (Neurontin) 300 mg capsule 1 tab PO qHS for pain, may take up to 3 times per day   • [DISCONTINUED] omeprazole (PriLOSEC) 20 mg delayed release capsule Take 1 capsule (20 mg total) by mouth daily       Objective     /82 (BP Location: Left arm, Patient Position: Sitting, Cuff Size: Standard)   Pulse 70   Temp 97.8 °F (36.6 °C) (Temporal)   Resp 18   Ht 4' 9" (1.448 m)   Wt 68 kg (150 lb)   SpO2 95%   BMI 32.46 kg/m²     Physical Exam  Vitals and nursing note reviewed. Constitutional:       Appearance: She is well-developed. HENT:      Head: Normocephalic. Right Ear: External ear normal.      Left Ear: External ear normal.      Nose: Nose normal.      Mouth/Throat:      Comments: Soft voice   Eyes:      Conjunctiva/sclera: Conjunctivae normal.      Pupils: Pupils are equal, round, and reactive to light. Neck:      Thyroid: No thyromegaly. Cardiovascular:      Rate and Rhythm: Normal rate and regular rhythm. Heart sounds: Normal heart sounds. Pulmonary:      Effort: Pulmonary effort is normal.      Breath sounds: Normal breath sounds. Abdominal:      Palpations: Abdomen is soft. Tenderness: There is no abdominal tenderness. There is no guarding or rebound. Musculoskeletal:         General: Normal range of motion. Cervical back: Normal range of motion and neck supple. Skin:     General: Skin is dry. Neurological:      Mental Status: She is alert and oriented to person, place, and time. Deep Tendon Reflexes: Reflexes are normal and symmetric.        Jannette Thrasher MD

## 2023-07-26 ENCOUNTER — OFFICE VISIT (OUTPATIENT)
Dept: SPEECH THERAPY | Facility: REHABILITATION | Age: 73
End: 2023-07-26
Payer: MEDICARE

## 2023-07-26 ENCOUNTER — OFFICE VISIT (OUTPATIENT)
Facility: REHABILITATION | Age: 73
End: 2023-07-26
Payer: MEDICARE

## 2023-07-26 DIAGNOSIS — R53.1 WEAKNESS: ICD-10-CM

## 2023-07-26 DIAGNOSIS — I69.320 APHASIA AS LATE EFFECT OF CEREBROVASCULAR ACCIDENT: ICD-10-CM

## 2023-07-26 DIAGNOSIS — R26.9 GAIT DISTURBANCE, POST-STROKE: ICD-10-CM

## 2023-07-26 DIAGNOSIS — R13.10 DYSPHAGIA, UNSPECIFIED TYPE: Primary | ICD-10-CM

## 2023-07-26 DIAGNOSIS — I69.398 GAIT DISTURBANCE, POST-STROKE: ICD-10-CM

## 2023-07-26 DIAGNOSIS — I69.30 HISTORY OF CVA WITH RESIDUAL DEFICIT: ICD-10-CM

## 2023-07-26 DIAGNOSIS — M17.11 PRIMARY OSTEOARTHRITIS OF RIGHT KNEE: Primary | ICD-10-CM

## 2023-07-26 DIAGNOSIS — M17.12 PRIMARY OSTEOARTHRITIS OF LEFT KNEE: ICD-10-CM

## 2023-07-26 DIAGNOSIS — R49.8 OTHER VOICE AND RESONANCE DISORDERS: ICD-10-CM

## 2023-07-26 DIAGNOSIS — R47.9 SPEECH DISTURBANCE, UNSPECIFIED TYPE: ICD-10-CM

## 2023-07-26 DIAGNOSIS — M54.16 LUMBAR RADICULITIS: ICD-10-CM

## 2023-07-26 PROCEDURE — 97110 THERAPEUTIC EXERCISES: CPT

## 2023-07-26 PROCEDURE — 92507 TX SP LANG VOICE COMM INDIV: CPT

## 2023-07-26 PROCEDURE — 97530 THERAPEUTIC ACTIVITIES: CPT

## 2023-07-26 PROCEDURE — 97112 NEUROMUSCULAR REEDUCATION: CPT

## 2023-07-26 NOTE — PROGRESS NOTES
Daily Speech Treatment Note    Today's date: 2023  Patient’s name: Kimber Scheuermann  : 1950  MRN: [id number]  Safety measures: cva, reduced communication  Referring provider: Judson Wu MD    Encounter Diagnosis     ICD-10-CM    1. Dysphagia, unspecified type  R13.10       2. Speech disturbance, unspecified type  R47.9       3. Other voice and resonance disorders  R49.8       4. Aphasia as late effect of cerebrovascular accident  I69.320       5. History of CVA with residual deficit  I69.30               Visit Tracking:  -Referring provider: Epic  -Billing guidelines: CMS  -Visit #/-- 4 visits auth-- 23  (1 visit completed at 8th e location - 9 total visits) (Justin Alfonso)  -Insurance: Esteban Garcia MCR  -RE due 2023    Subjective/Behavioral:  -Patient reports she had injections in the knee since last session and her knee is giving her pain. Objective/Assessment:  -Patient's family member/caregiver was present during today's session. Short-term goals:      Patient will name an appropriate category for given words (e.g., apple, banana, pear = fruits / sun, bus, lemon = yellow things) with 80% accuracy to facilitate improved semantic organization, to be achieved in 6-8 weeks. To target generative naming, patient was given three items ans asked to identify the category they belong to. She was then asked to add an additional item in that category. 80% accuracy identifying categories and 100% accuracy adding an item. Patient will name up to 5 features to describe a person/place/thing with 80% accuracy to facilitate improved utilization of circumlocution strategy, to be achieved in 6-8 weeks. To improve use of circumlocution strategy, patient named 5 features to describe an item in 2/5 opportunities (40%) independently. Given verbal semantic cues, increased to 4/5 opportunities (80%).     Patient will complete picture description tasks using language organization strategies with 80% accuracy to facilitate improved utilization of circumlocution strategy, to be achieved in 6-8 weeks. Patient will complete structured oral reading and conversational tasks with the consistent use of compensatory strategies >90% of the time to facilitate increased speech intelligibility, to be achieved in 6-8 weeks.     Training in use of "strong voice" at a sound, word and phrase level, with use patient with improved breathing coordination for speech, improved volume and speech clarity. Patient required max cues for improved accuracy this date. Pt will utilize diaphragmatic breathing during oral sentence/paragraph reading in 80% of opportunities to facilitate increased breath support for voice/speaking, to be achieved in 6-8 weeks    Patient will increase MPT to >10 seconds in order to facilitate improved breath support for speech tasks, to be achieved in 6-8 weeks. --continue     Patient sustained /ah/ with an average of 11.74 seconds across 10 trials today. Vocal quality/speech clarity during 1:1 conversation will improve with use of trained compensatory strategies with 80% accuracy, to be achieved in 6-8 weeks. Patient used compensatory speaking strategies in conversation with novel clinician to achieve 85% intelligibility. Patient will demonstrate consistent use of compensatory strategies (e.g., upright positioning, small bites/sips, slow rate, alternation of consistencies, multiple swallows, effortful swallow, chin tuck, head turn, etc.) with 80% accuracy to reduce risk of  penetration/aspiration during consumption of liquids/solids, to be achieved in 6-8 weeks. Plan:  -Patient was provided with home exercises/activities to target goals in plan of care at the end of today's session.  -Continue with current plan of care.

## 2023-07-26 NOTE — PROGRESS NOTES
Daily Note    Name: Oxana Forde  Date: 23  : 1950  Referring Provider: Bela Burk MD  AUTHORIZATION:   Insurance: Payor: Miriam Coronelois MEDICARE [id number] REP / Plan: Rylee Balbuena / Product Type: Medicare HMO /   3 of 16 visits through , PN due       SUBJECTIVE:  HPI: Oxana Forde is a 71 y.o. female referred to outpatient physical therapy for the following diagnosis   Encounter Diagnoses   Name Primary? • Primary osteoarthritis of right knee Yes   • Lumbar radiculitis    • Weakness    • Gait disturbance, post-stroke    • Primary osteoarthritis of left knee           Lokesh Renee had an injection in B/L knee on . Last visit to PT on . Arrives with caregiver who served as  throughout session. Notes that she has not felt any relief with the injections that she received. Was unable to come to therapy 2/2 increased sx. Patient-Specific Functional Scale   Task is scored 0 (unable to perform activity) to 10 (ability to perform activity independently)  Activity      1. Getting in and out of bed without increased pain 0    2. Walk more without increased pain 2    3. Be able to stand up better 4         23 Epidural steroid injection    Objective      1) Lumbopelvic hypomobility--> addressing with stretching and active exercise  2) R sciatic neural tension--> addressing with nerve glides and neuromotor re-education  3) Core and hip girdle weakness--> addressing with neuromotor re-education and progressive strengthening    Assessment/Plan    Assessment:  Continued lumbar mobility exercises wit rolling blue theraball with minor relief noted. Continues to be extremely pain dominant limiting overground and overall mobility exercises. . Performed overground ambulation for neuro muscular education for proper body mechanics facilitating trunk rotation and improved LE advancement to tolerance - patient showing carry over for increased step length and height with reduced need for cuing. Encouraged patient and caregiver to continue with home exercises with safe set up of chair behind patient, bilateral UE support and supervision to perform. Joy Keenan will continue to benefit from skilled PT. PN next visit. Plan:  Patient will benefit from skilled outpatient physical therapy 2 x/wk for 8 weeks. Therapeutic exercises, Neuromuscular re-education, manual therapy,  there activity and modalities as indicated. Precautions: Ghanaian Speaking,Chronic CVA, high fall risk, HTN, Diabetes mellitus type 2, seizures, osteoporosis, palpitations, chronic LBP with R sided sciatica, hx of depression and suicidal ideation        6/30 7/26   Manual lumbar paraspinal sot tissue mobs Hamstring  Stretches 4 x :20 each  Sciatic Nerve glides x 10 Seated hamstring stretch 4x20"     Sciatic nerve glides x10 interspersed between ambs    Supine Right hip stretches, flex,abd, external, internal rotations  Sciatic nerve glide Long sitting  End range  1 x 10  :08 each  Hip ext rotation  Hip internal rotation Seated in chair STM lumbar paraspinals   Modalities    performed          Neuro Re-Ed         //bar amb with BUE support. Fwd + lat x2 laps ea     //bar BUE HKM          There activity Transfers  Sit to stand  Pivot onto mat  Supine to prone Sit to stand/pivot onto chair  X 10 throughout Sit <> stand chair x10 throughout    Ther Ex nv SKC  Trunk rotation  Hamstring stretch Modified long sitting  SKC  Trunk rotation  Blue theraball   2 x 10   PB rollout blue 2x10 interspersed OG amb     Overground ambulation 100' x 4 SoLOStep 100 % verbal cues as for increased step length and proper body mechanics within the  rollator. 2x100' SOLO. 100% cuing for steering and management of rollator. MinAx1 for steering.  No A needed for trunk control      Overground ambulation with device mod assist.

## 2023-07-28 ENCOUNTER — OFFICE VISIT (OUTPATIENT)
Facility: REHABILITATION | Age: 73
End: 2023-07-28
Payer: MEDICARE

## 2023-07-28 DIAGNOSIS — R53.1 WEAKNESS: ICD-10-CM

## 2023-07-28 DIAGNOSIS — I69.398 GAIT DISTURBANCE, POST-STROKE: Primary | ICD-10-CM

## 2023-07-28 DIAGNOSIS — M17.11 PRIMARY OSTEOARTHRITIS OF RIGHT KNEE: ICD-10-CM

## 2023-07-28 DIAGNOSIS — M17.12 PRIMARY OSTEOARTHRITIS OF LEFT KNEE: ICD-10-CM

## 2023-07-28 DIAGNOSIS — M62.81 MUSCLE WEAKNESS: ICD-10-CM

## 2023-07-28 DIAGNOSIS — I69.30 HISTORY OF CVA WITH RESIDUAL DEFICIT: ICD-10-CM

## 2023-07-28 DIAGNOSIS — R26.9 GAIT DISTURBANCE, POST-STROKE: Primary | ICD-10-CM

## 2023-07-28 DIAGNOSIS — M54.16 LUMBAR RADICULITIS: ICD-10-CM

## 2023-07-28 PROCEDURE — 97112 NEUROMUSCULAR REEDUCATION: CPT | Performed by: PHYSICAL THERAPIST

## 2023-07-28 PROCEDURE — 97116 GAIT TRAINING THERAPY: CPT | Performed by: PHYSICAL THERAPIST

## 2023-07-28 PROCEDURE — 97530 THERAPEUTIC ACTIVITIES: CPT | Performed by: PHYSICAL THERAPIST

## 2023-07-28 PROCEDURE — 97110 THERAPEUTIC EXERCISES: CPT | Performed by: PHYSICAL THERAPIST

## 2023-07-28 NOTE — PROGRESS NOTES
Re-Evaluation      Today's date: 2023  Patient name: Ian Orozco  : 1950  MRN: [id number]  Referring provider: Tobin Urrutia MD  Dx:   Encounter Diagnosis     ICD-10-CM    1. Gait disturbance, post-stroke  I69.398     R26.9       2. History of CVA with residual deficit  I69.30       3. Primary osteoarthritis of right knee  M17.11       4. Primary osteoarthritis of left knee  M17.12       5. Lumbar radiculitis  M54.16       6. Weakness  R53.1       7. Muscle weakness  M62.81                      Subjective: Reports feeling ok today. Caregiver reports they try to have her get up and walk as much as possible, but she is limited by pain at home or will decline. She had an injection for her knee recently, but unfortunately doesn't seem to be helping yet. Objective: See treatment diary below    Mobility Measures    Assistive device used?  4-Wheeled Walker 4 wheeled walker    6 Minute Walk Test (100 foot circular course) 46' stopped due to pain after 1:45 min 100' stopped due to pain after 2:20 min          5 times sit to stand 1' 43 seconds 1:43    Patient-Reported Outcome Measure:   Patient Specific Functional Scale (PSFS)  Will assess at [place] due to language barrier and lack of proxy          STG's:         - Patient is independent with initial home exercise program for improvements at home within 2 weeks - progressing     - Reports decreased pain symptoms <4/10 at worst in 6 consecutive days in 4 weeks - not met     - Patient ambulates for 4 consecutive min with appropriate vital sign response for improved endurance within 4 weeks - not met    - patient will be able to transfer w/c to supine with cg assist with decreased pain to decrease caregiver burden - progressing   LTG's:   - Patient improves distance ambulated on 6MWT by 30% within 8 weeks for improved endurance - progressing    - Patient decreased time to complete 5 x sit to stand  By 15 seconds for increased muscle strength - not met    - patient will demonstrate improve lumbar ROM by 20 % - not met     Assessment:  Lucretia Bonilla was re-evaluated today for a hx of functional decline and falls secondary to old CVA and chronic pain. Unfortunately Lucretia Bonilla is very limited by chronic widespread pain through out her visits. And has been limited in attending PT due to social/transportation limitations. Frequently reporting "mucho dolor" and with non verbal signs of pain including wincing and writhing. Demonstrates abnormal gait inconsistent with her hx of CVA with excessive, occasional scissoring, standing on toes, inconsistent stride length, spending excessive time in single leg stance. Requires assistance to steer walker and cues to maintain safe distance, however she has demonstrated improvements in overall ambulatory distance since initial evaluation. Often descends into chair too quickly needing cues for control and technique. Would benefit from further pain neuroscience education but unable today w/ language barrier. Although improved, these impairments are resulting in difficulty with ambulating in the home, performing transfers, and performing ADLs independently. She requires the skills of a PT to maintain safety through out session with skilled cueing, guarding, and use of harness system, and to modify exercises in accordance with pain limitations. Plan: Continue per plan of care. 2x per week for 4 more weeks.       Precautions: Hebrew Speaking,Chronic CVA, high fall risk, HTN, Diabetes mellitus type 2, seizures, osteoporosis, palpitations, chronic LBP with R sided sciatica, hx of depression and suicidal ideation        6/30 7/26 7/28   Manual lumbar paraspinal sot tissue mobs Hamstring  Stretches 4 x :20 each  Sciatic Nerve glides x 10 Seated hamstring stretch 4x20"     Sciatic nerve glides x10 interspersed between ambs Seated hamstring stretch manual 30" ea (limited by pain)          Supine Right hip stretches, flex,abd, external, internal rotations  Sciatic nerve glide Long sitting  End range  1 x 10  :08 each  Hip ext rotation  Hip internal rotation Seated in chair STM lumbar paraspinals      Modalities    performed            Neuro Re-Ed          //bar amb with BUE support. Fwd + lat x2 laps ea     //bar BUE HKM  Walking in //bars fwd 3laps x2    Side stepping //bars 2 laps           There activity Transfers  Sit to stand  Pivot onto mat  Supine to prone Sit to stand/pivot onto chair  X 10 throughout Sit <> stand chair x10 throughout  Sit to stand through out session. Cues for control and safety   Ther Ex nv SKC  Trunk rotation  Hamstring stretch Modified long sitting  SKC  Trunk rotation  Blue theraball   2 x 10   PB rollout blue 2x10 interspersed OG amb Self seated hamstring str 10"x3 ea    PB rollout 2x10   Gait Training   Overground ambulation 100' x 4 SoLOStep 100 % verbal cues as for increased step length and proper body mechanics within the  rollator. 2x100' SOLO. 100% cuing for steering and management of rollator. MinAx1 for steering.  No A needed for trunk control      50ft SOLO RW          Overground ambulation with device mod assist.

## 2023-08-01 ENCOUNTER — EVALUATION (OUTPATIENT)
Facility: REHABILITATION | Age: 73
End: 2023-08-01
Payer: MEDICARE

## 2023-08-01 DIAGNOSIS — M17.11 PRIMARY OSTEOARTHRITIS OF RIGHT KNEE: ICD-10-CM

## 2023-08-01 DIAGNOSIS — I69.30 HISTORY OF CVA WITH RESIDUAL DEFICIT: ICD-10-CM

## 2023-08-01 DIAGNOSIS — R26.9 GAIT DISTURBANCE, POST-STROKE: Primary | ICD-10-CM

## 2023-08-01 DIAGNOSIS — I69.398 GAIT DISTURBANCE, POST-STROKE: Primary | ICD-10-CM

## 2023-08-01 DIAGNOSIS — M62.81 MUSCLE WEAKNESS: ICD-10-CM

## 2023-08-01 DIAGNOSIS — M54.16 LUMBAR RADICULITIS: ICD-10-CM

## 2023-08-01 DIAGNOSIS — R53.1 WEAKNESS: ICD-10-CM

## 2023-08-01 PROCEDURE — 97140 MANUAL THERAPY 1/> REGIONS: CPT | Performed by: PHYSICAL THERAPIST

## 2023-08-01 PROCEDURE — 97110 THERAPEUTIC EXERCISES: CPT | Performed by: PHYSICAL THERAPIST

## 2023-08-01 PROCEDURE — 97112 NEUROMUSCULAR REEDUCATION: CPT | Performed by: PHYSICAL THERAPIST

## 2023-08-01 NOTE — PROGRESS NOTES
Re-Evaluation      Today's date: 2023  Patient name: Alma Youngblood  : 1950  MRN: [id number]  Referring provider: Claudia Nguyen MD  Dx:   Encounter Diagnosis     ICD-10-CM    1. Gait disturbance, post-stroke  I69.398     R26.9       2. Lumbar radiculitis  M54.16       3. History of CVA with residual deficit  I69.30       4. Weakness  R53.1       5. Primary osteoarthritis of right knee  M17.11       6. Muscle weakness  M62.81           Start Time: 1330  Stop Time: 1430  Total time in clinic (min): 60 minutes    Subjective: Reports has been having increased knee pain since her knee injections. Continues to complain of back pain with increased spasticity. Objective: See treatment diary below        Assessment:   Meaghan Hui continues with debilitatiing pin. Her caregiver reports decreased ambulation at home. They do use a walker for transfers. She responded well to manual therapy intervention with improved pain and distance ambulation. She presents with unorganized gait patter with Right inversion foot placement which is corrected by weight bearing. Discussed importance of increased mobility and activity at home to promote generalized wellness and continued improvement in functional mobility. Plan: Continue per plan of care. 2x per week for 4 more weeks.       Precautions: Estonian Speaking,Chronic CVA, high fall risk, HTN, Diabetes mellitus type 2, seizures, osteoporosis, palpitations, chronic LBP with R sided sciatica, hx of depression and suicidal ideation           Manual lumbar paraspinal sot tissue mobs Hamstring  Stretches 4 x :20 each  Sciatic Nerve glides x 10 Seated hamstring stretch 4x20"     Sciatic nerve glides x10 interspersed between ambs Seated hamstring stretch manual 30" ea (limited by pain)       sidelying hip flex, ext rotatins, hip flex    Soft tissue mobs Lumbar paraspinals, pirifirminss pressure point release   15 min    Supine Right hip stretches, flex,abd, external, internal rotations  Sciatic nerve glide Long sitting  End range  1 x 10  :08 each  Hip ext rotation  Hip internal rotation Seated in chair STM lumbar paraspinals       Modalities    performed              Neuro Re-Ed           //bar amb with BUE support. Fwd + lat x2 laps ea     //bar BUE HKM  Walking in //bars fwd 3laps x2    Side stepping //bars 2 laps             There activity Transfers  Sit to stand  Pivot onto mat  Supine to prone Sit to stand/pivot onto chair  X 10 throughout Sit <> stand chair x10 throughout  Sit to stand through out session. Cues for control and safety Sit to stand 5 x vc as for proper body mechanics   Ther Ex nv SKC  Trunk rotation  Hamstring stretch Modified long sitting  SK  Trunk rotation  Blue theraball   2 x 10   PB rollout blue 2x10 interspersed OG amb Self seated hamstring str 10"x3 ea    PB rollout 2x10 Seated hamstring  Stretch :30 x 3 each  Sciatic nerve glides  10 x  sidelying SKC   Gait Training   Overground ambulation 100' x 4 SoLOStep 100 % verbal cues as for increased step length and proper body mechanics within the  rollator. 2x100' SOLO. 100% cuing for steering and management of rollator. MinAx1 for steering.  No A needed for trunk control      50ft SOLO RW      80' on solos with rollator    60' x 2 with rollator in DivXW [employer] cues as for Le clearance and improved standing posture to minimize weight bearing on UEs     Overground ambulation with device mod assist.

## 2023-08-03 ENCOUNTER — OFFICE VISIT (OUTPATIENT)
Dept: SPEECH THERAPY | Facility: REHABILITATION | Age: 73
End: 2023-08-03
Payer: MEDICARE

## 2023-08-03 DIAGNOSIS — R13.10 DYSPHAGIA, UNSPECIFIED TYPE: Primary | ICD-10-CM

## 2023-08-03 DIAGNOSIS — I69.320 APHASIA AS LATE EFFECT OF CEREBROVASCULAR ACCIDENT: ICD-10-CM

## 2023-08-03 DIAGNOSIS — R47.9 SPEECH DISTURBANCE, UNSPECIFIED TYPE: ICD-10-CM

## 2023-08-03 DIAGNOSIS — R49.8 OTHER VOICE AND RESONANCE DISORDERS: ICD-10-CM

## 2023-08-03 DIAGNOSIS — I69.30 HISTORY OF CVA WITH RESIDUAL DEFICIT: ICD-10-CM

## 2023-08-03 PROCEDURE — 92507 TX SP LANG VOICE COMM INDIV: CPT

## 2023-08-03 NOTE — PROGRESS NOTES
Speech-Language Pathology Re-Evaluation/DISCHARGE    Today's date: 8/3/2023  Patient’s name: Chang Friend  : 1950  MRN: [id number]  Safety measures: CVA, reduced communication  Referring provider: Jessica Mckeon MD    Encounter Diagnosis     ICD-10-CM    1. Dysphagia, unspecified type  R13.10       2. Speech disturbance, unspecified type  R47.9       3. Other voice and resonance disorders  R49.8       4. Aphasia as late effect of cerebrovascular accident  I69.320       5. History of CVA with residual deficit  I69.30           Visit Tracking:  -Referring provider: Epic  -Billing guidelines: CMS  -Visit #2/-- 4 visits auth-- 23  (1 visit completed at 8th [facility] location - 9 total visits) (Prasanna Boogie)  -Insurance: Redd Chatterjee [facility]      Subjective comments: Patient reports she is "bad", referring to increased pain as of late. Patient's goal(s): to improve communication    Assessments      No formalized assessments completed this date due to time limitations, session required discontinuation as patient reports increased pain impacting participation. Training in recommendations for improved word finding accuracy, compensatory strategies for improved accuracy. Patient verbalized understanding. Patient with no significant noted word finding difficulties during session this date, however noted to be perseverative with pain in response to questions this date. Patient with significantly reduced speech intelligibility requiring max cues for improved use of trained compensatory strategies. Patient reports limited carryover within home environment as increased pain has been impacting carryover. Patient with 60% intelligibility this date. Patient continues with slow, slurred rate of speech, reduced volume during speech tasks without use of compensatory strategies.          Goals      Short-term goals:    Patient will name an appropriate category for given words (e.g., apple, banana, pear = fruits / sun, bus, lemon = yellow things) with 80% accuracy to facilitate improved semantic organization, to be achieved in 6-8 weeks.--HIGHEST LEVEL    Patient will name up to 5 features to describe a person/place/thing with 80% accuracy to facilitate improved utilization of circumlocution strategy, to be achieved in 6-8 weeks. ----HIGHEST LEVEL      Patient will complete picture description tasks using language organization strategies with 80% accuracy to facilitate improved utilization of circumlocution strategy, to be achieved in 6-8 weeks.---HIGHEST LEVEL      MOTOR SPEECH/VOICE GOALS         Patient will complete structured oral reading and conversational tasks with the consistent use of compensatory strategies >90% of the time to facilitate increased speech intelligibility, to be achieved in 6-8 weeks.---HIGHEST LEVEL     Pt will utilize diaphragmatic breathing during oral sentence/paragraph reading in 80% of opportunities to facilitate increased breath support for voice/speaking, to be achieved in 6-8 weeks.--HIGHEST LEVEL    Patient will increase MPT to >10 seconds in order to facilitate improved breath support for speech tasks, to be achieved in 6-8 weeks.--HIGHEST LEVEL     Vocal quality/speech clarity during 1:1 conversation will improve with use of trained compensatory strategies with 80% accuracy, to be achieved in 6-8 weeks. ----HIGHEST LEVEL    Patient will demonstrate consistent use of compensatory strategies (e.g., upright positioning, small bites/sips, slow rate, alternation of consistencies, multiple swallows, effortful swallow, chin tuck, head turn, etc.) with 80% accuracy to reduce risk of  penetration/aspiration during consumption of liquids/solids, to be achieved in 6-8 weeks. ----HIGHEST LEVEL    Long-term Goals:       Patient will improve ability to facilitate communication to meet needs with 90% accuracy including use of compensatory strategies to promote meaningful interactions for improved quality of life and maximize level of independence, by discharge. ----HIGHEST LEVEL    Patient will independently utilize speech intelligibility strategies (e.g., over-exaggeration, slow rate, breath groups, etc.) during oral reading tasks >90% intelligibility to facilitate increased generalization of skills into conversational speech by discharge. ----HIGHEST LEVEL     Patient will utilize compensatory strategies 80% of the time independently optimizing safety and efficacy of swallowing function on P.O. intake without overt s/sx of penetration or aspiration 90% of the time by discharge.--HIGHEST LEVEL            Impressions/Recommendations    Impressions: Patient presents with moderate to severe voice and motor speech difficulties at this time, characterized by breathy VQ, reduced breath support for speech tasks, slurred speech, variable speech pattern impacting communication at this time. Patient continues with mild dysphagia at this time as previously indicated, however did not assess this date due to time limitations. Due to patient complaints/VFSS results, recommendations for GI consultation to further assess as appropriate. Instructions provided to schedule appointment for GI. Patient /caregiver re trained in use of strategies for improved word finding, improved speech accuracy. Progress not as anticipated this reporting period, patient is appropriate for discharge at this time. Patient and caregiver verbalized understanding. Recommendations:  -Patient to be discharged from outpatient skilled Speech Therapy services: Patient has achieved maximum potential. If patient would like to resume therapy in the future, a new prescription will be required. Patient is not meting goals in plan of care due lack of compliance with recommendations/HEP.     Recommendations for speech/language therapy 1x per week for 1 week 8/3/2023-8/3/2023

## 2023-08-04 ENCOUNTER — APPOINTMENT (OUTPATIENT)
Facility: REHABILITATION | Age: 73
End: 2023-08-04
Payer: MEDICARE

## 2023-08-08 ENCOUNTER — OFFICE VISIT (OUTPATIENT)
Facility: REHABILITATION | Age: 73
End: 2023-08-08
Payer: MEDICARE

## 2023-08-08 DIAGNOSIS — I69.398 GAIT DISTURBANCE, POST-STROKE: Primary | ICD-10-CM

## 2023-08-08 DIAGNOSIS — M62.81 MUSCLE WEAKNESS: ICD-10-CM

## 2023-08-08 DIAGNOSIS — R26.9 GAIT DISTURBANCE, POST-STROKE: Primary | ICD-10-CM

## 2023-08-08 DIAGNOSIS — M17.12 PRIMARY OSTEOARTHRITIS OF LEFT KNEE: ICD-10-CM

## 2023-08-08 DIAGNOSIS — I69.30 HISTORY OF CVA WITH RESIDUAL DEFICIT: ICD-10-CM

## 2023-08-08 DIAGNOSIS — M54.16 LUMBAR RADICULITIS: ICD-10-CM

## 2023-08-08 DIAGNOSIS — R53.1 WEAKNESS: ICD-10-CM

## 2023-08-08 DIAGNOSIS — M17.11 PRIMARY OSTEOARTHRITIS OF RIGHT KNEE: ICD-10-CM

## 2023-08-08 PROCEDURE — 97116 GAIT TRAINING THERAPY: CPT | Performed by: PHYSICAL THERAPIST

## 2023-08-08 PROCEDURE — 97110 THERAPEUTIC EXERCISES: CPT | Performed by: PHYSICAL THERAPIST

## 2023-08-08 PROCEDURE — 97112 NEUROMUSCULAR REEDUCATION: CPT | Performed by: PHYSICAL THERAPIST

## 2023-08-08 NOTE — PROGRESS NOTES
Daily Note       Today's date: 2023  Patient name: Janeen Dior  : 1950  MRN: [id number]  Referring provider: Nicolasa Mercado MD  Dx:   Encounter Diagnosis     ICD-10-CM    1. Gait disturbance, post-stroke  I69.398     R26.9       2. Lumbar radiculitis  M54.16       3. History of CVA with residual deficit  I69.30       4. Weakness  R53.1       5. Primary osteoarthritis of right knee  M17.11       6. Muscle weakness  M62.81       7. Primary osteoarthritis of left knee  M17.12                      Subjective: Reports "Mucho dolor" in both her knees. Objective: See treatment diary below        Assessment:  Continues to be greatly limited by pain through out visit. Requires seated rest breaks often through out visit as a result. Demonstrates very disorganized gait requiring cueing and physical assistance managing walker. With use of standard 2 wheel walker demonstrates some improvements in control of AD, but still requires assistance. Pt with difficulty responding to verbal and visual cues. Struggled more towards end of visit with fatigue, but was able and willing to end with scifit today which she tolerated fair. Plan: Continue per plan of care. 2x per week for 4 more weeks. Precautions: Czech Speaking,Chronic CVA, high fall risk, HTN, Diabetes mellitus type 2, seizures, osteoporosis, palpitations, chronic LBP with R sided sciatica, hx of depression and suicidal ideation           Manual   Seated hamstring stretch 4x20"     Sciatic nerve glides x10 interspersed between ambs Seated hamstring stretch manual 30" ea (limited by pain)       sidelying hip flex, ext rotatins, hip flex    Soft tissue mobs Lumbar paraspinals, pirifirminss pressure point release   15 min      Seated in chair STM lumbar paraspinals       Modalities                Neuro Re-Ed         Fwd/bwd walking in //bars 4 laps    Side stepping in //bars 3 laps   //bar amb with BUE support.  Fwd + lat x2 laps ea     //bar BUE HKM  Walking in //bars fwd 3laps x2    Side stepping //bars 2 laps             There activity Sit to stand through out visit cues for control and safety. Sit <> stand chair x10 throughout  Sit to stand through out session. Cues for control and safety Sit to stand 5 x vc as for proper body mechanics   Ther Ex Scifit for LE strength: L1 8min  PB rollout blue 2x10 interspersed OG amb Self seated hamstring str 10"x3 ea    PB rollout 2x10 Seated hamstring  Stretch :30 x 3 each  Sciatic nerve glides  10 x  sidelying SK   Gait Training  90ft x3 standard rolling walker      2x100' SOLO. 100% cuing for steering and management of rollator. MinAx1 for steering.  No A needed for trunk control      50ft SOLO RW      80' on solos with rollator    60' x 2 with rollator in mGaadi cues as for Le clearance and improved standing posture to minimize weight bearing on UEs

## 2023-08-11 ENCOUNTER — APPOINTMENT (OUTPATIENT)
Facility: REHABILITATION | Age: 73
End: 2023-08-11
Payer: MEDICARE

## 2023-08-15 ENCOUNTER — APPOINTMENT (OUTPATIENT)
Facility: REHABILITATION | Age: 73
End: 2023-08-15
Payer: MEDICARE

## 2023-08-18 ENCOUNTER — APPOINTMENT (OUTPATIENT)
Facility: REHABILITATION | Age: 73
End: 2023-08-18
Payer: MEDICARE

## 2023-08-22 ENCOUNTER — APPOINTMENT (OUTPATIENT)
Facility: REHABILITATION | Age: 73
End: 2023-08-22
Payer: MEDICARE

## 2023-08-25 ENCOUNTER — APPOINTMENT (OUTPATIENT)
Facility: REHABILITATION | Age: 73
End: 2023-08-25
Payer: MEDICARE

## 2023-08-29 ENCOUNTER — OFFICE VISIT (OUTPATIENT)
Dept: FAMILY MEDICINE CLINIC | Facility: CLINIC | Age: 73
End: 2023-08-29

## 2023-08-29 ENCOUNTER — APPOINTMENT (OUTPATIENT)
Facility: REHABILITATION | Age: 73
End: 2023-08-29
Payer: MEDICARE

## 2023-08-29 VITALS
WEIGHT: 146 LBS | DIASTOLIC BLOOD PRESSURE: 80 MMHG | SYSTOLIC BLOOD PRESSURE: 112 MMHG | OXYGEN SATURATION: 99 % | HEART RATE: 67 BPM | TEMPERATURE: 97.5 F | BODY MASS INDEX: 31.5 KG/M2 | RESPIRATION RATE: 16 BRPM | HEIGHT: 57 IN

## 2023-08-29 DIAGNOSIS — K59.04 CHRONIC IDIOPATHIC CONSTIPATION: ICD-10-CM

## 2023-08-29 DIAGNOSIS — R10.84 GENERALIZED ABDOMINAL PAIN: Primary | ICD-10-CM

## 2023-08-29 DIAGNOSIS — N30.01 ACUTE CYSTITIS WITH HEMATURIA: ICD-10-CM

## 2023-08-29 DIAGNOSIS — K21.9 GASTROESOPHAGEAL REFLUX DISEASE, UNSPECIFIED WHETHER ESOPHAGITIS PRESENT: ICD-10-CM

## 2023-08-29 PROBLEM — H10.32 ACUTE BACTERIAL CONJUNCTIVITIS OF LEFT EYE: Status: RESOLVED | Noted: 2019-09-06 | Resolved: 2023-08-29

## 2023-08-29 PROBLEM — R10.12 LEFT UPPER QUADRANT PAIN: Status: RESOLVED | Noted: 2019-02-08 | Resolved: 2023-08-29

## 2023-08-29 PROBLEM — U07.1 COVID-19: Status: RESOLVED | Noted: 2022-11-28 | Resolved: 2023-08-29

## 2023-08-29 LAB
SL AMB  POCT GLUCOSE, UA: NORMAL
SL AMB LEUKOCYTE ESTERASE,UA: ABNORMAL
SL AMB POCT BILIRUBIN,UA: NEGATIVE
SL AMB POCT BLOOD,UA: ABNORMAL
SL AMB POCT CLARITY,UA: ABNORMAL
SL AMB POCT COLOR,UA: YELLOW
SL AMB POCT KETONES,UA: NEGATIVE
SL AMB POCT NITRITE,UA: ABNORMAL
SL AMB POCT PH,UA: 5
SL AMB POCT SPECIFIC GRAVITY,UA: 1.02
SL AMB POCT URINE PROTEIN: NEGATIVE
SL AMB POCT UROBILINOGEN: NEGATIVE

## 2023-08-29 PROCEDURE — 99215 OFFICE O/P EST HI 40 MIN: CPT

## 2023-08-29 PROCEDURE — 87086 URINE CULTURE/COLONY COUNT: CPT

## 2023-08-29 PROCEDURE — 81002 URINALYSIS NONAUTO W/O SCOPE: CPT

## 2023-08-29 PROCEDURE — 87077 CULTURE AEROBIC IDENTIFY: CPT

## 2023-08-29 PROCEDURE — 87186 SC STD MICRODIL/AGAR DIL: CPT

## 2023-08-29 RX ORDER — DOCUSATE SODIUM 100 MG/1
100 CAPSULE, LIQUID FILLED ORAL 2 TIMES DAILY PRN
Qty: 90 CAPSULE | Refills: 0 | Status: SHIPPED | OUTPATIENT
Start: 2023-08-29

## 2023-08-29 RX ORDER — SULFAMETHOXAZOLE AND TRIMETHOPRIM 800; 160 MG/1; MG/1
1 TABLET ORAL EVERY 12 HOURS SCHEDULED
Qty: 14 TABLET | Refills: 0 | Status: SHIPPED | OUTPATIENT
Start: 2023-08-29 | End: 2023-09-05

## 2023-08-29 RX ORDER — OMEPRAZOLE 20 MG/1
20 CAPSULE, DELAYED RELEASE ORAL 2 TIMES DAILY
Qty: 90 CAPSULE | Refills: 2 | Status: SHIPPED | OUTPATIENT
Start: 2023-08-29

## 2023-08-29 NOTE — ASSESSMENT & PLAN NOTE
Increase fluid intake, primarily water. Increase daily dietary fiber (fruits and vegetables), may use OTC fiber source to supplement diet. Increase daily activity which can help stimulate bowel movements. Set aside designated time following meals to attempt to move bowels and do not ignore the urge to have a bowel movement (bowel retraining). - Start using miralax daily & colace prn.

## 2023-08-29 NOTE — ASSESSMENT & PLAN NOTE
(+) nitrates & (+) leuks in urine dip & (+) hematuria  - caregiver feels as though urine has been dark & foul smelling lately. Denies dysuria, urinary frequency & urinary urgency  Possible atypical presentation in geriatric patient. Will treat with bactrim.

## 2023-08-29 NOTE — ASSESSMENT & PLAN NOTE
- suspect pain is multifactorial, possibly 2/2 constipation, GERD, or possible hernia.   - Will treat constipation, GERD, r/o hernia & have pt f/u GI

## 2023-08-29 NOTE — PROGRESS NOTES
Name: Larry Chinchilla      : 1950      MRN: [id number]  Encounter Provider: PRO Guzman  Encounter Date: 2023   Encounter department: [place] [facility]    Assessment & Plan     1. Generalized abdominal pain  Assessment & Plan:  - suspect pain is multifactorial, possibly 2/2 constipation, GERD, or possible hernia. - Will treat constipation, GERD, r/o hernia & have pt f/u GI    Orders:  -     POCT urine dip  -     Ambulatory Referral to Gastroenterology; Future  -     US abdomen complete; Future; Expected date: 2023    2. Gastroesophageal reflux disease, unspecified whether esophagitis present  Assessment & Plan:  - Increase omeprazole to BID daily. F/u GI    Orders:  -     omeprazole (PriLOSEC) 20 mg delayed release capsule; Take 1 capsule (20 mg total) by mouth 2 (two) times a day    3. Chronic idiopathic constipation  Assessment & Plan:   Increase fluid intake, primarily water. Increase daily dietary fiber (fruits and vegetables), may use OTC fiber source to supplement diet. Increase daily activity which can help stimulate bowel movements. Set aside designated time following meals to attempt to move bowels and do not ignore the urge to have a bowel movement (bowel retraining). - Start using miralax daily & colace prn. Orders:  -     docusate sodium (COLACE) 100 mg capsule; Take 1 capsule (100 mg total) by mouth 2 (two) times a day as needed for constipation    4. Acute cystitis with hematuria  Assessment & Plan:  (+) nitrates & (+) leuks in urine dip & (+) hematuria  - caregiver feels as though urine has been dark & foul smelling lately. Denies dysuria, urinary frequency & urinary urgency  Possible atypical presentation in geriatric patient. Will treat with bactrim. Orders:  -     sulfamethoxazole-trimethoprim (BACTRIM DS) 800-160 mg per tablet;  Take 1 tablet by mouth every 12 (twelve) hours for 7 days  -     Urine culture; Future    BMI Counseling: There is no height or weight on file to calculate BMI. The BMI is above normal. Nutrition recommendations include decreasing portion sizes, encouraging healthy choices of fruits and vegetables, decreasing fast food intake, consuming healthier snacks, limiting drinks that contain sugar, moderation in carbohydrate intake, increasing intake of lean protein, reducing intake of saturated and trans fat and reducing intake of cholesterol. Exercise recommendations include moderate physical activity 150 minutes/week. No pharmacotherapy was ordered. Rationale for BMI follow-up plan is due to patient being overweight or obese. Subjective      Gonzalez aKm is a 71 y.o. female  has a past medical history of Abdominal pain, Anxiety, Arthritis, Bowel incontinence, Chest pain, Constipation, CVA (cerebral vascular accident) ([address]), Diabetes mellitus type II, controlled ([address]), Disease of thyroid gland, Epigastric pain, Falls, High cholesterol, HL (hearing loss), Hyperlipidemia, Hypertension, Migraine, Palpitations, Recurrent urinary tract infection, Seizures ([address]), Sleep disorder, Speech impairment, Stroke [facility]), Thyroid disease, and Tinnitus. has a past surgical history that includes Knee surgery; Other surgical history;  section; Axillary Surgery; pr esophagogastroduodenoscopy transoral diagnostic (N/A, 2017); pr sling operation stress incontinence (N/A, 2018); CYSTOSCOPY (N/A, 2018); Bladder surgery; Facial bone tumor excision; and Tubal ligation. Abdominal Pain  Patient complains of abdominal pain present for many months. The pain is poorly described, and is 8/10 in intensity. The patient is experiencing bilateral lower quadrant pain with radiation to back. Onset was several months ago. Symptoms have been gradually worsening. Aggravating factors: none. Alleviating factors: acetaminophen. Associated symptoms: constipation.  The patient denies anorexia, arthralagias, belching, chills, diarrhea, dysuria, fever, flatus, frequency, headache, hematochezia, hematuria, melena, myalgias, nausea, sweats and vomiting. Review of Systems   Constitutional: Negative for chills and fever. HENT: Negative for ear pain and sore throat. Eyes: Negative for pain and visual disturbance. Respiratory: Negative for cough and shortness of breath. Cardiovascular: Negative for chest pain and palpitations. Gastrointestinal: Positive for abdominal pain and constipation. Negative for abdominal distention, anal bleeding, blood in stool, diarrhea, nausea, rectal pain and vomiting. Genitourinary: Negative for decreased urine volume, difficulty urinating, dyspareunia, dysuria, enuresis, flank pain, frequency, genital sores, hematuria, menstrual problem, pelvic pain, urgency, vaginal bleeding, vaginal discharge and vaginal pain. Musculoskeletal: Positive for back pain and gait problem. Negative for arthralgias. Skin: Negative for color change and rash. Neurological: Negative for seizures and syncope. All other systems reviewed and are negative.       Current Outpatient Medications on File Prior to Visit   Medication Sig   • aspirin (ECOTRIN LOW STRENGTH) 81 mg EC tablet Take 81 mg by mouth daily    • benzonatate (TESSALON PERLES) 100 mg capsule Take 1 capsule (100 mg total) by mouth 3 (three) times a day as needed for cough   • Cholecalciferol (VITAMIN D-3) 5000 units TABS Take 1 tablet by mouth daily   • citalopram (CeleXA) 40 mg tablet Take 1 tablet (40 mg total) by mouth daily   • gabapentin (Neurontin) 300 mg capsule 1 tab PO qHS for pain, may take up to 3 times per day   • Incontinence Supply Disposable (Brief Overnight Large) MISC Use 4 (four) times a day   • Incontinence Supply Disposable (Underpads) MISC Use in the morning Please dispense washable underpads   • Infant Care Products (Comfort-Smooth Baby Wipes) MISC Use 4 (four) times a day   • lidocaine (Lidoderm) 5 % Apply 1 patch topically over 12 hours daily Remove & Discard patch within 12 hours or as directed by MD   • polyethylene glycol (GLYCOLAX) powder Take 17 g by mouth daily   • triamcinolone (KENALOG) 0.1 % ointment Apply topically 2 (two) times a day   • [DISCONTINUED] omeprazole (PriLOSEC) 20 mg delayed release capsule Take 1 capsule (20 mg total) by mouth daily       Objective     /80 (BP Location: Right arm, Patient Position: Sitting, Cuff Size: Standard)   Pulse 67   Temp 97.5 °F (36.4 °C) (Temporal)   Resp 16   Ht 4' 9" (1.448 m)   Wt 66.2 kg (146 lb)   SpO2 99%   BMI 31.59 kg/m²     Physical Exam  Vitals and nursing note reviewed. Constitutional:       Appearance: She is obese. HENT:      Head: Normocephalic and atraumatic. Right Ear: External ear normal.      Left Ear: External ear normal.      Nose: Nose normal.   Eyes:      Extraocular Movements: Extraocular movements intact. Conjunctiva/sclera: Conjunctivae normal.      Pupils: Pupils are equal, round, and reactive to light. Cardiovascular:      Rate and Rhythm: Normal rate and regular rhythm. Pulses: Normal pulses. Heart sounds: Normal heart sounds. Pulmonary:      Effort: Pulmonary effort is normal.      Breath sounds: Normal breath sounds. Abdominal:      General: Bowel sounds are normal.      Palpations: Abdomen is soft. Tenderness: There is generalized abdominal tenderness. Hernia: A hernia is present. Musculoskeletal:         General: Normal range of motion. Cervical back: Normal range of motion. Comments: Ambulates via wheelchair   Skin:     General: Skin is warm and dry. Capillary Refill: Capillary refill takes less than 2 seconds. Neurological:      General: No focal deficit present. Mental Status: She is alert and oriented to person, place, and time. Mental status is at baseline. Psychiatric:         Mood and Affect: Affect is flat. Speech: Speech is delayed. Behavior: Behavior is slowed.        Raul Nicolas

## 2023-08-31 LAB
BACTERIA UR CULT: ABNORMAL
BACTERIA UR CULT: ABNORMAL

## 2023-09-05 ENCOUNTER — HOSPITAL ENCOUNTER (EMERGENCY)
Facility: HOSPITAL | Age: 73
Discharge: HOME | End: 2023-09-05
Attending: EMERGENCY MEDICINE
Payer: MEDICARE

## 2023-09-05 VITALS
HEART RATE: 68 BPM | OXYGEN SATURATION: 96 % | RESPIRATION RATE: 17 BRPM | DIASTOLIC BLOOD PRESSURE: 65 MMHG | SYSTOLIC BLOOD PRESSURE: 144 MMHG | TEMPERATURE: 98.1 F

## 2023-09-05 DIAGNOSIS — H11.32 SUBCONJUNCTIVAL HEMORRHAGE OF LEFT EYE: Primary | ICD-10-CM

## 2023-09-05 PROCEDURE — 99283 EMERGENCY DEPT VISIT LOW MDM: CPT | Performed by: EMERGENCY MEDICINE

## 2023-09-05 PROCEDURE — 99283 EMERGENCY DEPT VISIT LOW MDM: CPT

## 2023-09-05 NOTE — ED PROVIDER NOTES
History  Chief Complaint   Patient presents with   • Eye Redness     Redness in b/l eyes for the past few days. Using otc artifical tears without relief. Denies headache or congestion. 1 week with redness L eye. No pain no discharge  Constant  Very sharp red, has been using visine w/o relief  Also with HA s and hx of stroke              Prior to Admission Medications   Prescriptions Last Dose Informant Patient Reported? Taking?    Cholecalciferol (VITAMIN D-3) 5000 units TABS  Self No No   Sig: Take 1 tablet by mouth daily   Incontinence Supply Disposable (Brief Overnight Large) MISC  Self No No   Sig: Use 4 (four) times a day   Incontinence Supply Disposable (Underpads) MISC  Self No No   Sig: Use in the morning Please dispense washable underpads   Infant Care Products (Comfort-Smooth Baby Wipes) MISC  Self No No   Sig: Use 4 (four) times a day   aspirin (ECOTRIN LOW STRENGTH) 81 mg EC tablet  Self Yes No   Sig: Take 81 mg by mouth daily    benzonatate (TESSALON PERLES) 100 mg capsule  Self No No   Sig: Take 1 capsule (100 mg total) by mouth 3 (three) times a day as needed for cough   citalopram (CeleXA) 40 mg tablet  Self No No   Sig: Take 1 tablet (40 mg total) by mouth daily   docusate sodium (COLACE) 100 mg capsule   No No   Sig: Take 1 capsule (100 mg total) by mouth 2 (two) times a day as needed for constipation   gabapentin (Neurontin) 300 mg capsule   No No   Si tab PO qHS for pain, may take up to 3 times per day   lidocaine (Lidoderm) 5 %  Self No No   Sig: Apply 1 patch topically over 12 hours daily Remove & Discard patch within 12 hours or as directed by MD   omeprazole (PriLOSEC) 20 mg delayed release capsule   No No   Sig: Take 1 capsule (20 mg total) by mouth 2 (two) times a day   polyethylene glycol (GLYCOLAX) powder  Self No No   Sig: Take 17 g by mouth daily   sulfamethoxazole-trimethoprim (BACTRIM DS) 800-160 mg per tablet   No No   Sig: Take 1 tablet by mouth every 12 (twelve) hours for 7 days   triamcinolone (KENALOG) 0.1 % ointment   No No   Sig: Apply topically 2 (two) times a day      Facility-Administered Medications: None       Past Medical History:   Diagnosis Date   • Abdominal pain    • Anxiety     last assessed: 10/19/2013   • Arthritis     osteo both hands; last assessed: 10/19/2013   • Bowel incontinence    • Chest pain    • Constipation    • CVA (cerebral vascular accident) ([address])    • Diabetes mellitus type II, controlled ([address])    • Disease of thyroid gland    • Epigastric pain     with distention   • Falls    • High cholesterol    • HL (hearing loss)    • Hyperlipidemia    • Hypertension     last assessed: 4/3/2017   • Migraine     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   • Palpitations    • Recurrent urinary tract infection    • Seizures ([address])    • Sleep disorder     last assessed: 10/19/2013   • Speech impairment    • Stroke [facility])    • Thyroid disease    • Tinnitus        Past Surgical History:   Procedure Laterality Date   • AXILLARY SURGERY      cyst surgery   • BLADDER SURGERY     •  SECTION     • CYSTOSCOPY N/A 2018    Procedure: Brian Rodriguez;  Surgeon: Roopa Baez MD;  Location: [facility];  Service: Gynecology   • FACIAL BONE TUMOR EXCISION     • KNEE SURGERY     • OTHER SURGICAL HISTORY      cyst removed from axilla   • OR ESOPHAGOGASTRODUODENOSCOPY TRANSORAL DIAGNOSTIC N/A 2017    Procedure: ESOPHAGOGASTRODUODENOSCOPY (EGD); Surgeon: Gracy Schlatter, MD;  Location: [facility] GI LAB;   Service: Gastroenterology   • OR SLING OPERATION STRESS INCONTINENCE N/A 2018    Procedure: PUBOVAGINAL SLING;  Surgeon: Roopa Baez MD;  Location: [facility] OR;  Service: Gynecology   • TUBAL LIGATION         Family History   Problem Relation Age of Onset   • No Known Problems Father    • Diabetes Family         mellitus   • Hypertension Family    • Stroke Family    • Thyroid disease Family    • No Known Problems Mother    • No Known Problems Sister    • No Known Problems Daughter    • No Known Problems Maternal Grandmother    • No Known Problems Maternal Grandfather    • No Known Problems Paternal Grandmother    • No Known Problems Paternal Grandfather      I have reviewed and agree with the history as documented. E-Cigarette/Vaping   • E-Cigarette Use Never User      E-Cigarette/Vaping Substances   • Nicotine No    • THC No    • CBD No    • Flavoring No    • Other No    • Unknown No      Social History     Tobacco Use   • Smoking status: Never   • Smokeless tobacco: Never   Vaping Use   • Vaping Use: Never used   Substance Use Topics   • Alcohol use: No   • Drug use: No       Review of Systems   Constitutional: Negative for chills and fever. HENT: Negative for ear pain and sore throat. Eyes: Positive for redness. Negative for photophobia, pain, discharge and visual disturbance. Respiratory: Negative for cough and shortness of breath. Cardiovascular: Negative for chest pain and palpitations. Gastrointestinal: Negative for abdominal pain and vomiting. Genitourinary: Negative for dysuria and hematuria. Musculoskeletal: Negative for arthralgias and back pain. Skin: Negative for color change and rash. Neurological: Negative for seizures and syncope. All other systems reviewed and are negative. Physical Exam  Physical Exam  Vitals and nursing note reviewed. Constitutional:       General: She is not in acute distress. Appearance: Normal appearance. She is well-developed. HENT:      Head: Normocephalic and atraumatic. Right Ear: External ear normal.      Left Ear: External ear normal.      Nose: Nose normal.   Eyes:      General: No scleral icterus. Left eye: No discharge. Extraocular Movements: Extraocular movements intact. Pupils: Pupils are equal, round, and reactive to light. Comments: Typical exam for externsive subconjunctival hemorrhage    Cardiovascular:      Rate and Rhythm: Normal rate and regular rhythm. Heart sounds: No murmur heard. Pulmonary:      Effort: Pulmonary effort is normal. No respiratory distress. Breath sounds: Normal breath sounds. Abdominal:      Palpations: Abdomen is soft. Tenderness: There is no abdominal tenderness. Musculoskeletal:         General: No swelling. Cervical back: Neck supple. Skin:     General: Skin is warm and dry. Capillary Refill: Capillary refill takes less than 2 seconds. Neurological:      General: No focal deficit present. Mental Status: She is alert. Psychiatric:         Mood and Affect: Mood normal.         Thought Content: Thought content normal.         Vital Signs  ED Triage Vitals [09/05/23 1243]   Temperature Pulse Respirations Blood Pressure SpO2   98.1 °F (36.7 °C) 68 17 144/65 96 %      Temp Source Heart Rate Source Patient Position - Orthostatic VS BP Location FiO2 (%)   Oral Monitor Sitting Right arm --      Pain Score       --           Vitals:    09/05/23 1243   BP: 144/65   Pulse: 68   Patient Position - Orthostatic VS: Sitting         Visual Acuity      ED Medications  Medications - No data to display    Diagnostic Studies  Results Reviewed     None                 No orders to display              Procedures  Procedures         ED Course                                             Medical Decision Making  Subconjunctival hemorrhage of left eye: acute illness or injury     Details: stable and normal v/s   clinically well       Disposition  Final diagnoses:   Subconjunctival hemorrhage of left eye     Time reflects when diagnosis was documented in both MDM as applicable and the Disposition within this note     Time User Action Codes Description Comment    9/5/2023 12:53 PM Scott Murry Add [H11.32] Subconjunctival hemorrhage of left eye       ED Disposition     ED Disposition   Discharge    Condition   Stable    Date/Time   Tue Sep 5, 2023 12:53 PM    Comment   Jordan Jeffrey discharge to home/self care. Follow-up Information     Follow up With Specialties Details Why Contact Delinda Dubin, MD Family Medicine In 1 week  [address]  [address]  [address]            Patient's Medications   Discharge Prescriptions    NAPHAZOLINE-PHENIRAMINE (NAPHCON-A) 0.025-0.3 % OPHTHALMIC SOLUTION    Apply 1 drop to eye every 4 (four) hours as needed for irritation       Start Date: 9/5/2023  End Date: --       Order Dose: 1 drop       Quantity: 15 mL    Refills: 0       No discharge procedures on file.     PDMP Review       Value Time User    PDMP Reviewed  Yes 6/2/2023 10:33 AM Will Mary Strong MD          ED Provider  Electronically Signed by           Maribell Mensah MD  09/05/23 3996

## 2023-09-05 NOTE — DISCHARGE INSTRUCTIONS
A  personal message from Dr. Monica Castillo,  Thank you so much for allowing me to care for you today. I pride myself in the care and attention I give all my patients. I hope you were a witness to this tonight. If for any reason your condition does not improve or worsens, or you have a question that was not answered during your visit you can feel free to text me on my personal phone #  # [phone]. I will answer to your message and continue your care past your emergency room visit. Please understand that although you are being discharged because your condition has been deemed stable and able to be managed on an outpatient setting. However your condition may worsen as part of the natural progression of the illness/condition, if this occurs please come back to the emergency department for a repeat evaluation.

## 2023-09-18 ENCOUNTER — OFFICE VISIT (OUTPATIENT)
Dept: GASTROENTEROLOGY | Facility: CLINIC | Age: 73
End: 2023-09-18
Payer: MEDICARE

## 2023-09-18 VITALS
WEIGHT: 146 LBS | DIASTOLIC BLOOD PRESSURE: 90 MMHG | TEMPERATURE: 98.1 F | HEART RATE: 71 BPM | SYSTOLIC BLOOD PRESSURE: 167 MMHG | BODY MASS INDEX: 31.59 KG/M2

## 2023-09-18 DIAGNOSIS — K59.00 CONSTIPATION, UNSPECIFIED CONSTIPATION TYPE: ICD-10-CM

## 2023-09-18 DIAGNOSIS — I69.320 APHASIA AS LATE EFFECT OF CEREBROVASCULAR ACCIDENT: ICD-10-CM

## 2023-09-18 DIAGNOSIS — R49.8 OTHER VOICE AND RESONANCE DISORDERS: ICD-10-CM

## 2023-09-18 DIAGNOSIS — I69.30 HISTORY OF CVA WITH RESIDUAL DEFICIT: ICD-10-CM

## 2023-09-18 DIAGNOSIS — R47.9 SPEECH DISTURBANCE, UNSPECIFIED TYPE: ICD-10-CM

## 2023-09-18 DIAGNOSIS — K21.00 GASTROESOPHAGEAL REFLUX DISEASE WITH ESOPHAGITIS WITHOUT HEMORRHAGE: Primary | ICD-10-CM

## 2023-09-18 DIAGNOSIS — K21.9 GASTROESOPHAGEAL REFLUX DISEASE, UNSPECIFIED WHETHER ESOPHAGITIS PRESENT: ICD-10-CM

## 2023-09-18 DIAGNOSIS — R13.10 DYSPHAGIA, UNSPECIFIED TYPE: ICD-10-CM

## 2023-09-18 PROCEDURE — 99214 OFFICE O/P EST MOD 30 MIN: CPT | Performed by: INTERNAL MEDICINE

## 2023-09-18 RX ORDER — POLYETHYLENE GLYCOL 3350 17 G/17G
17 POWDER, FOR SOLUTION ORAL DAILY
Qty: 30 EACH | Refills: 3 | Status: SHIPPED | OUTPATIENT
Start: 2023-09-18

## 2023-09-18 RX ORDER — FAMOTIDINE 20 MG/1
20 TABLET, FILM COATED ORAL
Qty: 30 TABLET | Refills: 1 | Status: SHIPPED | OUTPATIENT
Start: 2023-09-18

## 2023-09-18 RX ORDER — OMEPRAZOLE 20 MG/1
40 CAPSULE, DELAYED RELEASE ORAL 2 TIMES DAILY
Qty: 90 CAPSULE | Refills: 2 | Status: SHIPPED | OUTPATIENT
Start: 2023-09-18

## 2023-09-18 NOTE — PROGRESS NOTES
[facility] - Outpatient Consultation  Mallory Centeno 71 y.o. female MRN: [id number]  Encounter: [id number]      PCP: Jose Viera MD  Referring: Jose Viera MD  [facility] [address]      ASSESSMENT AND PLAN:    71 yr old F w/ history of CVA, aphasia and speech difficulty, GERD, constipation who presents for evaluation of dyphagia. 1. Dysphagia, unspecified type  2. History of CVA with residual deficit    Patient has been having dysphagia to both solids and liquids. She has had multiple CVAs and reports her dysphagia started after her strokes. Recent VBS reviewed with brief stasis of barium pill that was cleared with additional trial of puree. · Discussed in detail with patient that her EGD in June 2022 did not reveal any strictures or mass which is reassuring. It showed a 2 cm hiatal hernia which would not be the cause of her dysphagia. Her dysphagia is most likely due to residual deficits from neurovascular insults. If there is concerns of esophageal motility we can only rule it out via manometry but the procedure is uncomfortable and will not change the management. · Recommend dyphagia diet as per speech. She can eat slowly in upright position and follow her food with water     3. GERD     · EGD with possible Yee's but biopsies are negative and only showed inflammation  · Increase omeprazole to 40 mg BID and pepcid at bedtime     4. Constipation   ·   Continue prunes  · Add miralax     5. Colon cancer surveillance    · Colonoscopy in 2022 with 2 tubular adenomas- repeat reccommended in 7 years.  Can determine is patient is in good clinical health before proceeding with colonoscopy     Yareli Hernandez MD   Gastroenterology Fellow     ______________________________________________________________________    CC:  Chief Complaint   Patient presents with   • Follow-up     PT is her for speech disturbance PT states that she has a hernia and causes pain    • Dysphagia     PT has trouble with soild foods and liquids    • Bloated     PT does have bloating    • Heartburn     Pt states that she does have heartburn and chest pain    • Constipation     Pt states that sometimes 2-3 days and only goes a little bit at a time        HPI:  71 yr old F w/ history of CVA, aphasia and speech difficulty, GERD, constipation who presents for evaluation of dysphagia. Difficulty swallowing solids and liquids since her stroke. She occasionally has odynophagia. Had a VBS in June 2023 which revealed "esophageal sweep completed and clearance noted to be slightly prolonged with brief stasis of barium pill that was cleared with additional trial of puree. Consider GI consult if dedicated esophageal testing is desired"     On dysphagia 3 diet. No weight loss. Has a lot of phlegm. Takes omeprazole 40 mg daily. Still feels that her symptoms of heartburn are uncontrolled. Notes reviewed from PCP office and it appears that she has been having episodes of coughing after eating. She denies any dysphagia prior to her strokes. REVIEW OF SYSTEMS:    CONSTITUTIONAL: Denies any fever, chills, rigors, and weight loss. HEENT: No earache or tinnitus. Denies hearing loss or visual disturbances. CARDIOVASCULAR: No chest pain or palpitations. RESPIRATORY: Denies any cough, hemoptysis, shortness of breath or dyspnea on exertion. GASTROINTESTINAL: As noted in the History of Present Illness. GENITOURINARY: No problems with urination. Denies any hematuria or dysuria. NEUROLOGIC: No dizziness or vertigo, denies headaches. MUSCULOSKELETAL: Denies any muscle or joint pain. SKIN: Denies skin rashes or itching. ENDOCRINE: Denies excessive thirst. Denies intolerance to heat or cold. PSYCHOSOCIAL: Denies depression or anxiety. Denies any recent memory loss.        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)    • Thyroid disease    • Tinnitus      Past Surgical History:   Procedure Laterality Date   • AXILLARY SURGERY      cyst surgery   • BLADDER SURGERY     •  SECTION     • CYSTOSCOPY N/A 2018    Procedure: Reginaldo Solomon;  Surgeon: Isaiah Pelletier MD;  Location: [facility] OR;  Service: Gynecology   • FACIAL BONE TUMOR EXCISION     • KNEE SURGERY     • OTHER SURGICAL HISTORY      cyst removed from axilla   • MI ESOPHAGOGASTRODUODENOSCOPY TRANSORAL DIAGNOSTIC N/A 2017    Procedure: ESOPHAGOGASTRODUODENOSCOPY (EGD); Surgeon: Jia York MD;  Location: [facility] GI LAB;   Service: Gastroenterology   • MI SLING OPERATION STRESS INCONTINENCE N/A 2018    Procedure: PUBOVAGINAL SLING;  Surgeon: Isaiah Pelletier MD;  Location: [facility] OR;  Service: Gynecology   • TUBAL LIGATION       Social History   Social History     Substance and Sexual Activity   Alcohol Use No     Social History     Substance and Sexual Activity   Drug Use No     Social History     Tobacco Use   Smoking Status Never   Smokeless Tobacco Never     Family History   Problem Relation Age of Onset   • No Known Problems Father    • Diabetes Family         mellitus   • Hypertension Family    • Stroke Family    • Thyroid disease Family    • No Known Problems Mother    • No Known Problems Sister    • No Known Problems Daughter    • No Known Problems Maternal Grandmother    • No Known Problems Maternal Grandfather    • No Known Problems Paternal Grandmother    • No Known Problems Paternal Grandfather        Meds/Allergies       Current Outpatient Medications:   •  aspirin (ECOTRIN LOW STRENGTH) 81 mg EC tablet  •  benzonatate (TESSALON PERLES) 100 mg capsule  •  Cholecalciferol (VITAMIN D-3) 5000 units TABS  •  citalopram (CeleXA) 40 mg tablet  •  docusate sodium (COLACE) 100 mg capsule  •  gabapentin (Neurontin) 300 mg capsule  •  Incontinence Supply Disposable (Brief Overnight Large) MISC  •  Incontinence Supply Disposable (Underpads) MISC  •  Infant Care Products (Comfort-Smooth Baby Wipes) MISC  •  lidocaine (Lidoderm) 5 %  •  naphazoline-pheniramine (NAPHCON-A) 0.025-0.3 % ophthalmic solution  •  omeprazole (PriLOSEC) 20 mg delayed release capsule  •  polyethylene glycol (GLYCOLAX) powder  •  triamcinolone (KENALOG) 0.1 % ointment    Allergies   Allergen Reactions   • Tramadol      Causes nausea and vomiting            Objective     Blood pressure 167/90, pulse 71, temperature 98.1 °F (36.7 °C), weight 66.2 kg (146 lb), not currently breastfeeding. Body mass index is 31.59 kg/m². PHYSICAL EXAM:      General Appearance:   Alert, cooperative, no distress   HEENT:   Normocephalic, atraumatic, anicteric. Neck:  Supple, symmetrical, trachea midline   Lungs:   Clear to auscultation bilaterally; no rales, rhonchi or wheezing; respirations unlabored    Heart[de-identified]   Regular rate and rhythm; no murmur, rub, or gallop.    Abdomen:   Soft, non-tender, non-distended; normal bowel sounds; no masses, no organomegaly    Genitalia:   Deferred    Rectal:   Deferred    Extremities:  No cyanosis, clubbing or edema    Pulses:  2+ and symmetric    Skin:  No jaundice, rashes, or lesions    Lymph nodes:  No palpable cervical lymphadenopathy        Lab Results:     Lab Results   Component Value Date    WBC 7.21 03/15/2023    HGB 14.3 03/15/2023    HCT 45.2 03/15/2023    MCV 96 03/15/2023     03/15/2023       Lab Results   Component Value Date     04/08/2015    K 4.4 03/15/2023     03/15/2023    CO2 30 03/15/2023    ANIONGAP 9 04/08/2015    BUN 25 03/15/2023    CREATININE 0.96 03/15/2023    GLUCOSE 98 04/08/2015    GLUF 98 03/15/2023    CALCIUM 9.2 03/15/2023    AST 16 03/15/2023    ALT 30 03/15/2023    ALKPHOS 74 03/15/2023    PROT 7.9 04/07/2015    BILITOT 0.3 04/07/2015    EGFR 59 03/15/2023       Lab Results   Component Value Date    INR 0.95 05/12/2019    INR 0.93 01/16/2016    INR 0.89 04/07/2015    PROTIME 12.8 05/12/2019    PROTIME 12.5 01/16/2016    PROTIME 12.2 04/07/2015         Radiology Results:   No results found. Portions of the record may have been created with voice recognition software. Occasional wrong word or "sound a like" substitutions may have occurred due to the inherent limitations of voice recognition software. Read the chart carefully and recognize, using context, where substitutions have occurred.

## 2023-10-16 ENCOUNTER — OFFICE VISIT (OUTPATIENT)
Dept: FAMILY MEDICINE CLINIC | Facility: CLINIC | Age: 73
End: 2023-10-16

## 2023-10-16 VITALS
DIASTOLIC BLOOD PRESSURE: 88 MMHG | OXYGEN SATURATION: 96 % | HEART RATE: 64 BPM | RESPIRATION RATE: 14 BRPM | WEIGHT: 137 LBS | TEMPERATURE: 97.8 F | BODY MASS INDEX: 29.65 KG/M2 | SYSTOLIC BLOOD PRESSURE: 130 MMHG

## 2023-10-16 DIAGNOSIS — N18.31 STAGE 3A CHRONIC KIDNEY DISEASE (HCC): ICD-10-CM

## 2023-10-16 DIAGNOSIS — M25.551 PAIN OF RIGHT HIP: ICD-10-CM

## 2023-10-16 DIAGNOSIS — I10 ESSENTIAL HYPERTENSION: ICD-10-CM

## 2023-10-16 DIAGNOSIS — E55.9 VITAMIN D DEFICIENCY: Primary | ICD-10-CM

## 2023-10-16 DIAGNOSIS — F32.89 OTHER DEPRESSION: ICD-10-CM

## 2023-10-16 PROCEDURE — 99213 OFFICE O/P EST LOW 20 MIN: CPT | Performed by: FAMILY MEDICINE

## 2023-10-16 RX ORDER — CITALOPRAM 40 MG/1
40 TABLET ORAL DAILY
Qty: 90 TABLET | Refills: 1 | Status: SHIPPED | OUTPATIENT
Start: 2023-10-16 | End: 2023-11-17 | Stop reason: SDUPTHER

## 2023-10-16 NOTE — PROGRESS NOTES
Assessment/Plan:    No problem-specific Assessment & Plan notes found for this encounter. Diagnoses and all orders for this visit:    Vitamin D deficiency  -     CBC and differential; Future    Other depression  -     citalopram (CeleXA) 40 mg tablet; Take 1 tablet (40 mg total) by mouth daily  -     Ambulatory referral to [employer]; Future    Essential hypertension  -     Comprehensive metabolic panel; Future  -     CBC and differential; Future  -     Vitamin D 25 hydroxy; Future  -     TSH, 3rd generation with Free T4 reflex; Future    Stage 3a chronic kidney disease (HCC)    Pain of right hip  -     Ambulatory Referral to Orthopedic Surgery; Future          Subjective:      Patient ID: Sofie Rogers is a 71 y.o. female. R hip pain, radiated towards her thigh   Denies fall or trauma   Pain has been going for "a long time."          The following portions of the patient's history were reviewed and updated as appropriate: She  has a past medical history of Abdominal pain, Anxiety, Arthritis, Bowel incontinence, Chest pain, Constipation, CVA (cerebral vascular accident) ([address]), Diabetes mellitus type II, controlled ([address]), Disease of thyroid gland, Epigastric pain, Falls, High cholesterol, HL (hearing loss), Hyperlipidemia, Hypertension, Migraine, Palpitations, Recurrent urinary tract infection, Seizures ([address]), Sleep disorder, Speech impairment, Stroke [facility]), Thyroid disease, and Tinnitus.   She   Patient Active Problem List    Diagnosis Date Noted   • Stage 3a chronic kidney disease ([address]) 10/16/2023   • Generalized abdominal pain 08/29/2023   • Lumbar radiculitis    • Numbness and tingling in both hands    • Class 1 obesity due to excess calories without serious comorbidity with body mass index (BMI) of 30.0 to 30.9 in adult 04/02/2020   • Sciatica of right side 02/11/2020   • Hip strain, right, initial encounter 09/06/2019   • Breast pain, right 07/31/2019   • Elevated BP without diagnosis of hypertension 2019   • Contusion of right breast 2019   • Epigastric abdominal pain 2019   • NSAID long-term use 2019   • Diabetes mellitus type II, controlled ([address]) 10/12/2018   • Arthritis 10/12/2018   • Primary insomnia 2018   • Urinary, incontinence, stress female 2018   • Urge and stress incontinence 10/26/2017   • Osteoporosis 09/15/2017   • Adhesive capsulitis of left shoulder 2017   • Constipation 05/10/2017   • Cervical radiculitis 2017   • Elevated TSH 2017   • Osteoarthritis of both hands 2017   • Memory difficulties 2016   • Asymptomatic bilateral carotid artery stenosis 2016   • Gastroesophageal reflux disease 2016   • Hypertension 2016   • Collapsed vertebra, not elsewhere classified, sacral and sacrococcygeal region, initial encounter for fracture ([address]) 2016   • HLD (hyperlipidemia) 2016   • History of CVA with residual deficit 2016   • Depression 2016   • Headache 2016   • Dizziness 2016   • Fall 2016   • Subdural hemorrhage following injury ([address]) 2016   • Subarachnoid hemorrhage following injury ([address]) 2016   • Hyperlipidemia 10/19/2013   • Muscle weakness 10/19/2013     She  has a past surgical history that includes Knee surgery; Other surgical history;  section; Axillary Surgery; pr esophagogastroduodenoscopy transoral diagnostic (N/A, 2017); pr sling operation stress incontinence (N/A, 2018); CYSTOSCOPY (N/A, 2018); Bladder surgery; Facial bone tumor excision; and Tubal ligation. Her family history includes Diabetes in her family; Hypertension in her family; No Known Problems in her daughter, father, maternal grandfather, maternal grandmother, mother, paternal grandfather, paternal grandmother, and sister; Stroke in her family; Thyroid disease in her family. She  reports that she has never smoked.  She has never used smokeless tobacco. She reports that she does not drink alcohol and does not use drugs.   Current Outpatient Medications   Medication Sig Dispense Refill   • citalopram (CeleXA) 40 mg tablet Take 1 tablet (40 mg total) by mouth daily 90 tablet 1   • aspirin (ECOTRIN LOW STRENGTH) 81 mg EC tablet Take 81 mg by mouth daily      • benzonatate (TESSALON PERLES) 100 mg capsule Take 1 capsule (100 mg total) by mouth 3 (three) times a day as needed for cough 20 capsule 0   • Cholecalciferol (VITAMIN D-3) 5000 units TABS Take 1 tablet by mouth daily 90 tablet 1   • docusate sodium (COLACE) 100 mg capsule Take 1 capsule (100 mg total) by mouth 2 (two) times a day as needed for constipation 90 capsule 0   • famotidine (PEPCID) 20 mg tablet Take 1 tablet (20 mg total) by mouth daily at bedtime as needed for heartburn 30 tablet 1   • gabapentin (Neurontin) 300 mg capsule 1 tab PO qHS for pain, may take up to 3 times per day 90 capsule 3   • Incontinence Supply Disposable (Brief Overnight Large) MISC Use 4 (four) times a day 160 each 11   • Incontinence Supply Disposable (Underpads) MISC Use in the morning Please dispense washable underpads 4 each 11   • Infant Care Products (Comfort-Smooth Baby Wipes) MISC Use 4 (four) times a day 240 each 11   • lidocaine (Lidoderm) 5 % Apply 1 patch topically over 12 hours daily Remove & Discard patch within 12 hours or as directed by MD 30 patch 1   • naphazoline-pheniramine (NAPHCON-A) 0.025-0.3 % ophthalmic solution Apply 1 drop to eye every 4 (four) hours as needed for irritation 15 mL 0   • omeprazole (PriLOSEC) 20 mg delayed release capsule Take 2 capsules (40 mg total) by mouth 2 (two) times a day 90 capsule 2   • polyethylene glycol (GLYCOLAX) powder Take 17 g by mouth daily 850 g 1   • polyethylene glycol (MIRALAX) 17 g packet Take 17 g by mouth daily 30 each 3   • triamcinolone (KENALOG) 0.1 % ointment Apply topically 2 (two) times a day 30 g 0     No current facility-administered medications for this visit. Current Outpatient Medications on File Prior to Visit   Medication Sig   • aspirin (ECOTRIN LOW STRENGTH) 81 mg EC tablet Take 81 mg by mouth daily    • benzonatate (TESSALON PERLES) 100 mg capsule Take 1 capsule (100 mg total) by mouth 3 (three) times a day as needed for cough   • Cholecalciferol (VITAMIN D-3) 5000 units TABS Take 1 tablet by mouth daily   • docusate sodium (COLACE) 100 mg capsule Take 1 capsule (100 mg total) by mouth 2 (two) times a day as needed for constipation   • famotidine (PEPCID) 20 mg tablet Take 1 tablet (20 mg total) by mouth daily at bedtime as needed for heartburn   • gabapentin (Neurontin) 300 mg capsule 1 tab PO qHS for pain, may take up to 3 times per day   • Incontinence Supply Disposable (Brief Overnight Large) MISC Use 4 (four) times a day   • Incontinence Supply Disposable (Underpads) MISC Use in the morning Please dispense washable underpads   • Infant Care Products (Comfort-Smooth Baby Wipes) MISC Use 4 (four) times a day   • naphazoline-pheniramine (NAPHCON-A) 0.025-0.3 % ophthalmic solution Apply 1 drop to eye every 4 (four) hours as needed for irritation   • omeprazole (PriLOSEC) 20 mg delayed release capsule Take 2 capsules (40 mg total) by mouth 2 (two) times a day   • polyethylene glycol (GLYCOLAX) powder Take 17 g by mouth daily   • polyethylene glycol (MIRALAX) 17 g packet Take 17 g by mouth daily   • triamcinolone (KENALOG) 0.1 % ointment Apply topically 2 (two) times a day     No current facility-administered medications on file prior to visit. She is allergic to tramadol. .    Review of Systems   Constitutional:  Positive for fatigue. Musculoskeletal:  Positive for arthralgias. Psychiatric/Behavioral:  The patient is nervous/anxious. All other systems reviewed and are negative.         Objective:      /88 (BP Location: Left arm, Patient Position: Sitting, Cuff Size: Standard)   Pulse 64   Temp 97.8 °F (36.6 °C) (Temporal)   Resp 14 Wt 62.1 kg (137 lb)   SpO2 96%   BMI 29.65 kg/m²          Physical Exam  Vitals and nursing note reviewed. Constitutional:       Appearance: She is well-developed. HENT:      Head: Normocephalic. Right Ear: External ear normal.      Left Ear: External ear normal.      Nose: Nose normal.   Eyes:      Conjunctiva/sclera: Conjunctivae normal.      Pupils: Pupils are equal, round, and reactive to light. Neck:      Thyroid: No thyromegaly. Cardiovascular:      Rate and Rhythm: Normal rate and regular rhythm. Heart sounds: Normal heart sounds. Pulmonary:      Effort: Pulmonary effort is normal.      Breath sounds: Normal breath sounds. Abdominal:      Palpations: Abdomen is soft. Tenderness: There is no abdominal tenderness. There is no guarding or rebound. Musculoskeletal:         General: Tenderness present. Normal range of motion. Cervical back: Normal range of motion and neck supple. Skin:     General: Skin is dry. Neurological:      Mental Status: She is alert and oriented to person, place, and time. Deep Tendon Reflexes: Reflexes are normal and symmetric.

## 2023-10-18 ENCOUNTER — APPOINTMENT (OUTPATIENT)
Dept: LAB | Facility: CLINIC | Age: 73
End: 2023-10-18
Payer: MEDICARE

## 2023-10-18 DIAGNOSIS — I10 ESSENTIAL HYPERTENSION: ICD-10-CM

## 2023-10-18 DIAGNOSIS — E55.9 VITAMIN D DEFICIENCY: ICD-10-CM

## 2023-10-18 LAB
25(OH)D3 SERPL-MCNC: 75.4 NG/ML (ref 30–100)
ALBUMIN SERPL BCP-MCNC: 4.4 G/DL (ref 3.5–5)
ALP SERPL-CCNC: 57 U/L (ref 34–104)
ALT SERPL W P-5'-P-CCNC: 37 U/L (ref 7–52)
ANION GAP SERPL CALCULATED.3IONS-SCNC: 9 MMOL/L
AST SERPL W P-5'-P-CCNC: 31 U/L (ref 13–39)
BASOPHILS # BLD AUTO: 0.04 THOUSANDS/ÂΜL (ref 0–0.1)
BASOPHILS NFR BLD AUTO: 1 % (ref 0–1)
BILIRUB SERPL-MCNC: 0.51 MG/DL (ref 0.2–1)
BUN SERPL-MCNC: 14 MG/DL (ref 5–25)
CALCIUM SERPL-MCNC: 9.2 MG/DL (ref 8.4–10.2)
CHLORIDE SERPL-SCNC: 106 MMOL/L (ref 96–108)
CO2 SERPL-SCNC: 27 MMOL/L (ref 21–32)
CREAT SERPL-MCNC: 0.63 MG/DL (ref 0.6–1.3)
EOSINOPHIL # BLD AUTO: 0.25 THOUSAND/ÂΜL (ref 0–0.61)
EOSINOPHIL NFR BLD AUTO: 6 % (ref 0–6)
ERYTHROCYTE [DISTWIDTH] IN BLOOD BY AUTOMATED COUNT: 13.2 % (ref 11.6–15.1)
GFR SERPL CREATININE-BSD FRML MDRD: 90 ML/MIN/1.73SQ M
GLUCOSE P FAST SERPL-MCNC: 115 MG/DL (ref 65–99)
HCT VFR BLD AUTO: 43 % (ref 34.8–46.1)
HGB BLD-MCNC: 13.9 G/DL (ref 11.5–15.4)
IMM GRANULOCYTES # BLD AUTO: 0.01 THOUSAND/UL (ref 0–0.2)
IMM GRANULOCYTES NFR BLD AUTO: 0 % (ref 0–2)
LYMPHOCYTES # BLD AUTO: 1.46 THOUSANDS/ÂΜL (ref 0.6–4.47)
LYMPHOCYTES NFR BLD AUTO: 33 % (ref 14–44)
MCH RBC QN AUTO: 30.1 PG (ref 26.8–34.3)
MCHC RBC AUTO-ENTMCNC: 32.3 G/DL (ref 31.4–37.4)
MCV RBC AUTO: 93 FL (ref 82–98)
MONOCYTES # BLD AUTO: 0.35 THOUSAND/ÂΜL (ref 0.17–1.22)
MONOCYTES NFR BLD AUTO: 8 % (ref 4–12)
NEUTROPHILS # BLD AUTO: 2.29 THOUSANDS/ÂΜL (ref 1.85–7.62)
NEUTS SEG NFR BLD AUTO: 52 % (ref 43–75)
NRBC BLD AUTO-RTO: 0 /100 WBCS
PLATELET # BLD AUTO: 228 THOUSANDS/UL (ref 149–390)
PMV BLD AUTO: 11.7 FL (ref 8.9–12.7)
POTASSIUM SERPL-SCNC: 4 MMOL/L (ref 3.5–5.3)
PROT SERPL-MCNC: 6.8 G/DL (ref 6.4–8.4)
RBC # BLD AUTO: 4.62 MILLION/UL (ref 3.81–5.12)
SODIUM SERPL-SCNC: 142 MMOL/L (ref 135–147)
TSH SERPL DL<=0.05 MIU/L-ACNC: 2.33 UIU/ML (ref 0.45–4.5)
WBC # BLD AUTO: 4.4 THOUSAND/UL (ref 4.31–10.16)

## 2023-10-18 PROCEDURE — 85025 COMPLETE CBC W/AUTO DIFF WBC: CPT

## 2023-10-18 PROCEDURE — 82306 VITAMIN D 25 HYDROXY: CPT

## 2023-10-18 PROCEDURE — 80053 COMPREHEN METABOLIC PANEL: CPT

## 2023-10-18 PROCEDURE — 84443 ASSAY THYROID STIM HORMONE: CPT

## 2023-10-18 PROCEDURE — 36415 COLL VENOUS BLD VENIPUNCTURE: CPT

## 2023-10-26 ENCOUNTER — TELEPHONE (OUTPATIENT)
Dept: PAIN MEDICINE | Facility: CLINIC | Age: 73
End: 2023-10-26

## 2023-10-26 ENCOUNTER — OFFICE VISIT (OUTPATIENT)
Dept: OBGYN CLINIC | Facility: MEDICAL CENTER | Age: 73
End: 2023-10-26
Payer: MEDICARE

## 2023-10-26 ENCOUNTER — APPOINTMENT (OUTPATIENT)
Dept: RADIOLOGY | Facility: MEDICAL CENTER | Age: 73
End: 2023-10-26
Payer: MEDICARE

## 2023-10-26 VITALS — BODY MASS INDEX: 29.56 KG/M2 | HEIGHT: 57 IN | WEIGHT: 137 LBS

## 2023-10-26 DIAGNOSIS — M25.551 PAIN OF RIGHT HIP: ICD-10-CM

## 2023-10-26 DIAGNOSIS — M51.36 LUMBAR DEGENERATIVE DISC DISEASE: ICD-10-CM

## 2023-10-26 DIAGNOSIS — M25.551 PAIN OF RIGHT HIP: Primary | ICD-10-CM

## 2023-10-26 DIAGNOSIS — M79.601 RIGHT ARM PAIN: ICD-10-CM

## 2023-10-26 DIAGNOSIS — G89.29 CHRONIC RIGHT-SIDED LOW BACK PAIN WITH BILATERAL SCIATICA: ICD-10-CM

## 2023-10-26 DIAGNOSIS — M16.0 PRIMARY OSTEOARTHRITIS OF BOTH HIPS: ICD-10-CM

## 2023-10-26 DIAGNOSIS — M54.42 CHRONIC RIGHT-SIDED LOW BACK PAIN WITH BILATERAL SCIATICA: ICD-10-CM

## 2023-10-26 DIAGNOSIS — M54.41 CHRONIC RIGHT-SIDED LOW BACK PAIN WITH BILATERAL SCIATICA: ICD-10-CM

## 2023-10-26 PROCEDURE — 73502 X-RAY EXAM HIP UNI 2-3 VIEWS: CPT

## 2023-10-26 PROCEDURE — 99214 OFFICE O/P EST MOD 30 MIN: CPT | Performed by: EMERGENCY MEDICINE

## 2023-10-26 RX ORDER — LIDOCAINE 50 MG/G
1 PATCH TOPICAL DAILY
Qty: 30 PATCH | Refills: 1 | Status: SHIPPED | OUTPATIENT
Start: 2023-10-26

## 2023-10-26 NOTE — TELEPHONE ENCOUNTER
I reviewed with Dr. Billy Hence.   Patient is to have the hip injection at the hospital and is to have f/u for her lumbar with Dr. Ashley Hayes.

## 2023-10-26 NOTE — PROGRESS NOTES
Assessment/Plan:    Diagnoses and all orders for this visit:    Pain of right hip  -     Ambulatory Referral to Orthopedic Surgery  -     XR hip/pelv 2-3 vws right if performed; Future  -     Steroid Injection; Future  -     Ambulatory Referral to Physical Therapy; Future    Primary osteoarthritis of both hips  -     Steroid Injection; Future  -     Ambulatory Referral to Physical Therapy; Future    Chronic right-sided low back pain with bilateral sciatica  -     XR hip/pelv 2-3 vws right if performed; Future  -     Ambulatory Referral to Physical Therapy; Future    Lumbar degenerative disc disease  -     XR hip/pelv 2-3 vws right if performed; Future  -     Ambulatory Referral to Physical Therapy; Future    Right arm pain  -     lidocaine (Lidoderm) 5 %; Apply 1 patch topically over 12 hours daily Remove & Discard patch within 12 hours or as directed by MD    Will try Right hip CSI for diagnostic and therapeutic purposes. However would recommend patient follow-up with Dr. Keyon Guzman as this most likely is arising from her lower back after reviewing pain management's last note they did discuss possibly repeating LESI.  used for entire encounter  History is limited from the patient due to history of CVA    Return for F/U with Dr. Keyon Guzman for lower back pain. .      Subjective:   Patient ID: Teresa Nelson is a 71 y.o. female. Patient presents with  and caretaker for right hip pain. Patient points to the location of the pain in the groin and in the thigh  S/p right l5-s1 LESI with Pain Management    Previous note 3/2023:  Patient is a 72 y/o  female with hx of CVA (right sided deficits, wheelchair bound - on ASA), HTN, HLD, arthritis, anxiety, hypothyroidism,  DM, here for symptoms similar to previous visit 8/19/21 for which she underwent MRI L spine, however was not seen by Pain Management.       Initial note:  New patient presents referred by PCP for right lower back, buttocks, groin and right leg pain down past knee into foot for approximately 2 months. She denies any specific injury but she does have a history of multiple falls over the last 2-3 years. Denies changes in bowel / bladder, no n/t. She has been treated with a prescription for cyclobenzaprine and diclofenac by PCP, and was provided an IM injection of Toradol. Review of Systems    The following portions of the patient's chart were reviewed and updated as appropriate:    Allergy:    Allergies   Allergen Reactions    Tramadol      Causes nausea and vomiting        Medications:    Current Outpatient Medications:     aspirin (ECOTRIN LOW STRENGTH) 81 mg EC tablet, Take 81 mg by mouth daily , Disp: , Rfl:     benzonatate (TESSALON PERLES) 100 mg capsule, Take 1 capsule (100 mg total) by mouth 3 (three) times a day as needed for cough, Disp: 20 capsule, Rfl: 0    Cholecalciferol (VITAMIN D-3) 5000 units TABS, Take 1 tablet by mouth daily, Disp: 90 tablet, Rfl: 1    citalopram (CeleXA) 40 mg tablet, Take 1 tablet (40 mg total) by mouth daily, Disp: 90 tablet, Rfl: 1    docusate sodium (COLACE) 100 mg capsule, Take 1 capsule (100 mg total) by mouth 2 (two) times a day as needed for constipation, Disp: 90 capsule, Rfl: 0    famotidine (PEPCID) 20 mg tablet, Take 1 tablet (20 mg total) by mouth daily at bedtime as needed for heartburn, Disp: 30 tablet, Rfl: 1    gabapentin (Neurontin) 300 mg capsule, 1 tab PO qHS for pain, may take up to 3 times per day, Disp: 90 capsule, Rfl: 3    Incontinence Supply Disposable (Brief Overnight Large) MISC, Use 4 (four) times a day, Disp: 160 each, Rfl: 11    Incontinence Supply Disposable (Underpads) MISC, Use in the morning Please dispense washable underpads, Disp: 4 each, Rfl: 11    Infant Care Products (Comfort-Smooth Baby Wipes) MISC, Use 4 (four) times a day, Disp: 240 each, Rfl: 11    lidocaine (Lidoderm) 5 %, Apply 1 patch topically over 12 hours daily Remove & Discard patch within 12 hours or as directed by MD, Disp: 30 patch, Rfl: 1    naphazoline-pheniramine (NAPHCON-A) 0.025-0.3 % ophthalmic solution, Apply 1 drop to eye every 4 (four) hours as needed for irritation, Disp: 15 mL, Rfl: 0    omeprazole (PriLOSEC) 20 mg delayed release capsule, Take 2 capsules (40 mg total) by mouth 2 (two) times a day, Disp: 90 capsule, Rfl: 2    polyethylene glycol (GLYCOLAX) powder, Take 17 g by mouth daily, Disp: 850 g, Rfl: 1    polyethylene glycol (MIRALAX) 17 g packet, Take 17 g by mouth daily, Disp: 30 each, Rfl: 3    triamcinolone (KENALOG) 0.1 % ointment, Apply topically 2 (two) times a day, Disp: 30 g, Rfl: 0    Patient Active Problem List   Diagnosis    Fall    Subdural hemorrhage following injury ([address])    Subarachnoid hemorrhage following injury ([address])    Hypertension    Collapsed vertebra, not elsewhere classified, sacral and sacrococcygeal region, initial encounter for fracture ([address])    HLD (hyperlipidemia)    History of CVA with residual deficit    Depression    Headache    Dizziness    Urinary, incontinence, stress female    Adhesive capsulitis of left shoulder    Asymptomatic bilateral carotid artery stenosis    Cervical radiculitis    Constipation    Elevated TSH    Gastroesophageal reflux disease    Hyperlipidemia    Memory difficulties    Muscle weakness    Osteoporosis    Osteoarthritis of both hands    Urge and stress incontinence    Primary insomnia    Diabetes mellitus type II, controlled (HCC)    Arthritis    Epigastric abdominal pain    NSAID long-term use    Contusion of right breast    Elevated BP without diagnosis of hypertension    Breast pain, right    Hip strain, right, initial encounter    Sciatica of right side    Class 1 obesity due to excess calories without serious comorbidity with body mass index (BMI) of 30.0 to 30.9 in adult    Numbness and tingling in both hands    Lumbar radiculitis    Generalized abdominal pain    Acute cystitis with hematuria    Stage 3a chronic kidney disease ([address])       Objective:  Ht 4' 9" (1.448 m)   Wt 62.1 kg (137 lb)   BMI 29.65 kg/m²     Right Hip Exam     Comments:  Pain does not seem to increase with PROM hip  Negative straight leg raise          Physical Exam      Neurologic Exam    Procedures    I have personally reviewed the written report of the pertinent studies. 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    Palpitations     Recurrent urinary tract infection     Seizures ([address])     Sleep disorder     last assessed: 10/19/2013    Speech impairment     Stroke [facility])     Thyroid disease     Tinnitus        Past Surgical History:   Procedure Laterality Date    AXILLARY SURGERY      cyst surgery    BLADDER SURGERY       SECTION      CYSTOSCOPY N/A 2018    Procedure: Freeman Delay;  Surgeon: Evelin Mcclellan MD;  Location: [facility] OR;  Service: Gynecology    FACIAL BONE TUMOR EXCISION      KNEE SURGERY      OTHER SURGICAL HISTORY      cyst removed from axilla    ND ESOPHAGOGASTRODUODENOSCOPY TRANSORAL DIAGNOSTIC N/A 2017    Procedure: ESOPHAGOGASTRODUODENOSCOPY (EGD); Surgeon: Miriam Rodriguez MD;  Location: [facility] GI LAB;   Service: Gastroenterology    ND SLING OPERATION STRESS INCONTINENCE N/A 2018    Procedure: PUBOVAGINAL SLING;  Surgeon: Evelin Mcclellan MD;  Location: [facility] OR;  Service: Gynecology    TUBAL LIGATION         Social History     Socioeconomic History    Marital status: /Civil Union     Spouse name: Not on file    Number of children: Not on file    Years of education: Grade 12 completed    Highest education level: Not on file   Occupational History    Not on file   Tobacco Use    Smoking status: Never    Smokeless tobacco: Never   Vaping Use    Vaping Use: Never used   Substance and Sexual Activity    Alcohol use: No    Drug use: No    Sexual activity: Not on file   Other Topics Concern    Not on file   Social History Narrative    Caffeine use    Lives with spouse    [employer]     Social Determinants of Health     Financial Resource Strain: Low Risk  (7/25/2023)    Overall Financial Resource Strain (CARDIA)     Difficulty of Paying Living Expenses: Not hard at all   Food Insecurity: No Food Insecurity (7/25/2023)    Hunger Vital Sign     Worried About Running Out of Food in the Last Year: Never true     Ran Out of Food in the Last Year: Never true   Transportation Needs: No Transportation Needs (7/25/2023)    PRAPARE - Transportation     Lack of Transportation (Medical): No     Lack of Transportation (Non-Medical): No   Physical Activity: Not on file   Stress: Stress Concern Present (2/3/2022)    [facility]     Feeling of Stress :  To some extent   Social Connections: Not on file   Intimate Partner Violence: Not on file   Housing Stability: Not on file       Family History   Problem Relation Age of Onset    No Known Problems Father     Diabetes Family         mellitus    Hypertension Family     Stroke Family     Thyroid disease Family     No Known Problems Mother     No Known Problems Sister     No Known Problems Daughter     No Known Problems Maternal Grandmother     No Known Problems Maternal Grandfather     No Known Problems Paternal Grandmother     No Known Problems Paternal Grandfather

## 2023-10-28 PROBLEM — N30.01 ACUTE CYSTITIS WITH HEMATURIA: Status: RESOLVED | Noted: 2023-08-29 | Resolved: 2023-10-28

## 2023-10-30 NOTE — TELEPHONE ENCOUNTER
Dr. Skyla Casarez,    Can you please place an order for hip injection. Patient requesting to be scheduled. She is supposed to see Dr. Jarred Morley after procedure performed at the hospital and to follow up per last note.     Please advise FACILITY: [facility] 

 

PATIENT NAME: Andre Julian

: 1967

MR: [id number]

V: [id number]

EXAM DATE: 

ORDERING PHYSICIAN: EVANGELISTA JUAREZ

TECHNOLOGIST: 

 

Location: [facility]

Patient: Andre Julian

: 1967

MRN: [id number]

Visit/Account:[id number]

Date of Sevice:  3/06/2019

 

ACCESSION #: [id number]

 

Exam type: KUB SINGLE VIEW ABDOMEN

 

History: Abdomen pain

 

Comparison: None.

 

Findings:

 

There is a nonspecific bowel gas pattern.  There is no gross evidence organomegaly or pathologic intr
a-abdominal calcification.  The visualized bones are unremarkable for age.

 

IMPRESSION:

 

1.  Nonspecific bowel gas pattern

 

Report Dictated By: Sparkle Kelly MD at 3/6/2019 11:23 AM

 

Report E-Signed By: Sparkle Kelly MD  at 3/6/2019 11:23 AM

 

WSN:[id number]

## 2023-10-31 NOTE — TELEPHONE ENCOUNTER
Patient needs appointment to see Dr. Branden Whelan for back issue per Dr. Isac Wall. Please schedule. Thanks!

## 2023-11-02 ENCOUNTER — TELEPHONE (OUTPATIENT)
Dept: FAMILY MEDICINE CLINIC | Facility: CLINIC | Age: 73
End: 2023-11-02

## 2023-11-02 ENCOUNTER — TELEPHONE (OUTPATIENT)
Dept: OBGYN CLINIC | Facility: CLINIC | Age: 73
End: 2023-11-02

## 2023-11-02 NOTE — TELEPHONE ENCOUNTER
11/02/23     [facility]     FORM RECEIVED VIA FAX AND PLACED IN PCP FOLDER TO BE DELIVERED AT ASSIGNED TIMES.

## 2023-11-12 NOTE — TELEPHONE ENCOUNTER
PA already aware. Repeat B/C was sent earlier
Pt is requesting a referral for occupational therapy, please advise
Referral has been placed  Thanks!
PA made aware.
pt denies discomfort.

## 2023-11-14 NOTE — TELEPHONE ENCOUNTER
FAXED ON 11/14/23 TO [facility] at [phone]. FAX CONFIRMATION RECEIVED.  SCANNED INTO CHART

## 2023-11-17 ENCOUNTER — OFFICE VISIT (OUTPATIENT)
Dept: FAMILY MEDICINE CLINIC | Facility: CLINIC | Age: 73
End: 2023-11-17

## 2023-11-17 ENCOUNTER — TELEPHONE (OUTPATIENT)
Dept: FAMILY MEDICINE CLINIC | Facility: CLINIC | Age: 73
End: 2023-11-17

## 2023-11-17 VITALS — OXYGEN SATURATION: 96 % | SYSTOLIC BLOOD PRESSURE: 120 MMHG | HEART RATE: 70 BPM | DIASTOLIC BLOOD PRESSURE: 60 MMHG

## 2023-11-17 DIAGNOSIS — I69.30 HISTORY OF CVA WITH RESIDUAL DEFICIT: Primary | ICD-10-CM

## 2023-11-17 DIAGNOSIS — R49.0 DYSPHONIA: ICD-10-CM

## 2023-11-17 DIAGNOSIS — M54.41 CHRONIC RIGHT-SIDED LOW BACK PAIN WITH BILATERAL SCIATICA: ICD-10-CM

## 2023-11-17 DIAGNOSIS — R05.2 SUBACUTE COUGH: ICD-10-CM

## 2023-11-17 DIAGNOSIS — M19.041 PRIMARY OSTEOARTHRITIS OF BOTH HANDS: ICD-10-CM

## 2023-11-17 DIAGNOSIS — G89.29 CHRONIC RIGHT-SIDED LOW BACK PAIN WITH BILATERAL SCIATICA: ICD-10-CM

## 2023-11-17 DIAGNOSIS — M54.42 CHRONIC RIGHT-SIDED LOW BACK PAIN WITH BILATERAL SCIATICA: ICD-10-CM

## 2023-11-17 DIAGNOSIS — K21.00 GASTROESOPHAGEAL REFLUX DISEASE WITH ESOPHAGITIS WITHOUT HEMORRHAGE: ICD-10-CM

## 2023-11-17 DIAGNOSIS — K21.9 GASTROESOPHAGEAL REFLUX DISEASE, UNSPECIFIED WHETHER ESOPHAGITIS PRESENT: ICD-10-CM

## 2023-11-17 DIAGNOSIS — M51.36 LUMBAR DEGENERATIVE DISC DISEASE: ICD-10-CM

## 2023-11-17 DIAGNOSIS — F32.89 OTHER DEPRESSION: ICD-10-CM

## 2023-11-17 DIAGNOSIS — S06.5X0S TRAUMATIC SUBDURAL HEMORRHAGE WITHOUT LOSS OF CONSCIOUSNESS, SEQUELA (HCC): ICD-10-CM

## 2023-11-17 DIAGNOSIS — K59.04 CHRONIC IDIOPATHIC CONSTIPATION: ICD-10-CM

## 2023-11-17 DIAGNOSIS — M19.042 PRIMARY OSTEOARTHRITIS OF BOTH HANDS: ICD-10-CM

## 2023-11-17 LAB
DME PARACHUTE DELIVERY DATE REQUESTED: NORMAL
DME PARACHUTE ITEM DESCRIPTION: NORMAL
DME PARACHUTE ITEM DESCRIPTION: NORMAL
DME PARACHUTE ORDER STATUS: NORMAL
DME PARACHUTE SUPPLIER NAME: NORMAL
DME PARACHUTE SUPPLIER PHONE: NORMAL

## 2023-11-17 PROCEDURE — 99215 OFFICE O/P EST HI 40 MIN: CPT | Performed by: FAMILY MEDICINE

## 2023-11-17 RX ORDER — DOCUSATE SODIUM 100 MG/1
100 CAPSULE, LIQUID FILLED ORAL 2 TIMES DAILY PRN
Qty: 90 CAPSULE | Refills: 0 | Status: SHIPPED | OUTPATIENT
Start: 2023-11-17

## 2023-11-17 RX ORDER — BENZONATATE 100 MG/1
100 CAPSULE ORAL 3 TIMES DAILY PRN
Qty: 20 CAPSULE | Refills: 0 | Status: SHIPPED | OUTPATIENT
Start: 2023-11-17 | End: 2023-11-17 | Stop reason: SDUPTHER

## 2023-11-17 RX ORDER — OMEPRAZOLE 20 MG/1
40 CAPSULE, DELAYED RELEASE ORAL 2 TIMES DAILY
Qty: 90 CAPSULE | Refills: 2 | Status: SHIPPED | OUTPATIENT
Start: 2023-11-17

## 2023-11-17 RX ORDER — BENZONATATE 100 MG/1
100 CAPSULE ORAL 3 TIMES DAILY PRN
Qty: 20 CAPSULE | Refills: 0 | Status: SHIPPED | OUTPATIENT
Start: 2023-11-17

## 2023-11-17 RX ORDER — FLUTICASONE PROPIONATE 50 MCG
1 SPRAY, SUSPENSION (ML) NASAL DAILY
Qty: 16 G | Refills: 1 | Status: SHIPPED | OUTPATIENT
Start: 2023-11-17 | End: 2023-11-17 | Stop reason: SDUPTHER

## 2023-11-17 RX ORDER — GABAPENTIN 300 MG/1
CAPSULE ORAL
Qty: 90 CAPSULE | Refills: 3 | Status: SHIPPED | OUTPATIENT
Start: 2023-11-17

## 2023-11-17 RX ORDER — FLUTICASONE PROPIONATE 50 MCG
1 SPRAY, SUSPENSION (ML) NASAL DAILY
Qty: 16 G | Refills: 1 | Status: SHIPPED | OUTPATIENT
Start: 2023-11-17

## 2023-11-17 RX ORDER — CITALOPRAM 40 MG/1
40 TABLET ORAL DAILY
Qty: 90 TABLET | Refills: 1 | Status: SHIPPED | OUTPATIENT
Start: 2023-11-17

## 2023-11-17 NOTE — TELEPHONE ENCOUNTER
Akash, my name is Juanito Heath. I am calling in regards to Geraldo Arreola Her birthday is November 16, 1950. I am calling. I've been trying to call since 1130. Nobody's been answering the phone. I'm calling because I had to report. The incident of her falling last night and bumping her head around 2:00 in the morning. It's my job protocol that I have. Should refuse any treatment. When I came in this morning. OK, I didn't see anything on her, but if you have any further questions, let me give you her number. It's [phone]. Again a [facility]. Her birthday is November One 6th 1950. Thank you.

## 2023-11-17 NOTE — PROGRESS NOTES
Assessment/Plan:    No problem-specific Assessment & Plan notes found for this encounter. Diagnoses and all orders for this visit:    History of CVA with residual deficit  -     Ambulatory Referral to Speech Therapy; Future  -     Ambulatory Referral to Occupational Therapy; Future    Subacute cough  -     Discontinue: fluticasone (FLONASE) 50 mcg/act nasal spray; 1 spray into each nostril daily  -     Discontinue: benzonatate (TESSALON PERLES) 100 mg capsule; Take 1 capsule (100 mg total) by mouth 3 (three) times a day as needed for cough  -     benzonatate (TESSALON PERLES) 100 mg capsule; Take 1 capsule (100 mg total) by mouth 3 (three) times a day as needed for cough  -     fluticasone (FLONASE) 50 mcg/act nasal spray; 1 spray into each nostril daily    Traumatic subdural hemorrhage without loss of consciousness, sequela ([address])  -     Ambulatory Referral to Occupational Therapy; Future    Chronic idiopathic constipation  -     docusate sodium (COLACE) 100 mg capsule; Take 1 capsule (100 mg total) by mouth 2 (two) times a day as needed for constipation    Lumbar degenerative disc disease  -     gabapentin (Neurontin) 300 mg capsule; 1 tab PO qHS for pain, may take up to 3 times per day    Chronic right-sided low back pain with bilateral sciatica  -     gabapentin (Neurontin) 300 mg capsule; 1 tab PO qHS for pain, may take up to 3 times per day    Gastroesophageal reflux disease with esophagitis without hemorrhage    Gastroesophageal reflux disease, unspecified whether esophagitis present  -     omeprazole (PriLOSEC) 20 mg delayed release capsule; Take 2 capsules (40 mg total) by mouth 2 (two) times a day    Other depression  -     citalopram (CeleXA) 40 mg tablet; Take 1 tablet (40 mg total) by mouth daily    Primary osteoarthritis of both hands    Dysphonia  -     Ambulatory Referral to Speech Therapy;  Future    Other orders  -     Standard Transport Chair        Referred to wheelchair clinic at [facility]  Transport wheelchair script written through [employer] than 50% of 40 minute encounter was spent counseling/coordinating care of the patient regarding the diagnosis and understanding pathogenesis of disease process, discussion of differential diagnoses, review of laboratory data and imaging, discussion of medications to include side effect profile, precautions, and plan of care. Subjective:      Patient ID: Yousuf Chow is a 68 y.o. female. 69 yo  female with ambulatory dysfunction, dysphonia, cognitive impairment here today for the followin- s/p fall. Imelda Anderson yesterday evening trying to get out of bed to get to the bathroom. No LOC, thinks she hit her head. Currently complains of pain on her L arm. 2- Has increasing difficulty getting around her house. Requires caregiver assistance to walk anywhere inside the house. She would benefit from a wheelchair to be more independent and be able to complete ADLs by herself safely. The following portions of the patient's history were reviewed and updated as appropriate: She  has a past medical history of Abdominal pain, Anxiety, Arthritis, Bowel incontinence, Chest pain, Constipation, CVA (cerebral vascular accident) ([address]), Diabetes mellitus type II, controlled ([address]), Disease of thyroid gland, Epigastric pain, Falls, High cholesterol, HL (hearing loss), Hyperlipidemia, Hypertension, Migraine, Palpitations, Recurrent urinary tract infection, Seizures ([address]), Sleep disorder, Speech impairment, Stroke [facility]), Thyroid disease, and Tinnitus.   She   Patient Active Problem List    Diagnosis Date Noted   • Stage 3a chronic kidney disease ([address]) 10/16/2023   • Generalized abdominal pain 2023   • Lumbar radiculitis    • Numbness and tingling in both hands    • Class 1 obesity due to excess calories without serious comorbidity with body mass index (BMI) of 30.0 to 30.9 in adult 2020   • Sciatica of right side 2020   • Hip strain, right, initial encounter 2019   • Breast pain, right 2019   • Elevated BP without diagnosis of hypertension 2019   • Contusion of right breast 2019   • Epigastric abdominal pain 2019   • NSAID long-term use 2019   • Diabetes mellitus type II, controlled ([address]) 10/12/2018   • Arthritis 10/12/2018   • Primary insomnia 2018   • Urinary, incontinence, stress female 2018   • Urge and stress incontinence 10/26/2017   • Osteoporosis 09/15/2017   • Adhesive capsulitis of left shoulder 2017   • Constipation 05/10/2017   • Cervical radiculitis 2017   • Elevated TSH 2017   • Osteoarthritis of both hands 2017   • Memory difficulties 2016   • Asymptomatic bilateral carotid artery stenosis 2016   • Gastroesophageal reflux disease 2016   • Hypertension 2016   • Collapsed vertebra, not elsewhere classified, sacral and sacrococcygeal region, initial encounter for fracture ([address]) 2016   • HLD (hyperlipidemia) 2016   • History of CVA with residual deficit 2016   • Depression 2016   • Headache 2016   • Dizziness 2016   • Fall 2016   • Subdural hemorrhage following injury ([address]) 2016   • Subarachnoid hemorrhage following injury ([address]) 2016   • Hyperlipidemia 10/19/2013   • Muscle weakness 10/19/2013     She  has a past surgical history that includes Knee surgery; Other surgical history;  section; Axillary Surgery; pr esophagogastroduodenoscopy transoral diagnostic (N/A, 2017); pr sling operation stress incontinence (N/A, 2018); CYSTOSCOPY (N/A, 2018); Bladder surgery; Facial bone tumor excision; and Tubal ligation. Her family history includes Diabetes in her family; Hypertension in her family;  No Known Problems in her daughter, father, maternal grandfather, maternal grandmother, mother, paternal grandfather, paternal grandmother, and sister; Stroke in her family; Thyroid disease in her family. She  reports that she has never smoked. She has never used smokeless tobacco. She reports that she does not drink alcohol and does not use drugs.   Current Outpatient Medications   Medication Sig Dispense Refill   • aspirin (ECOTRIN LOW STRENGTH) 81 mg EC tablet Take 81 mg by mouth daily      • benzonatate (TESSALON PERLES) 100 mg capsule Take 1 capsule (100 mg total) by mouth 3 (three) times a day as needed for cough 20 capsule 0   • Cholecalciferol (VITAMIN D-3) 5000 units TABS Take 1 tablet by mouth daily 90 tablet 1   • citalopram (CeleXA) 40 mg tablet Take 1 tablet (40 mg total) by mouth daily 90 tablet 1   • docusate sodium (COLACE) 100 mg capsule Take 1 capsule (100 mg total) by mouth 2 (two) times a day as needed for constipation 90 capsule 0   • famotidine (PEPCID) 20 mg tablet Take 1 tablet (20 mg total) by mouth daily at bedtime as needed for heartburn 30 tablet 1   • fluticasone (FLONASE) 50 mcg/act nasal spray 1 spray into each nostril daily 16 g 1   • gabapentin (Neurontin) 300 mg capsule 1 tab PO qHS for pain, may take up to 3 times per day 90 capsule 3   • Incontinence Supply Disposable (Brief Overnight Large) MISC Use 4 (four) times a day 160 each 11   • Incontinence Supply Disposable (Underpads) MISC Use in the morning Please dispense washable underpads 4 each 11   • Infant Care Products (Comfort-Smooth Baby Wipes) MISC Use 4 (four) times a day 240 each 11   • lidocaine (Lidoderm) 5 % Apply 1 patch topically over 12 hours daily Remove & Discard patch within 12 hours or as directed by MD 30 patch 1   • omeprazole (PriLOSEC) 20 mg delayed release capsule Take 2 capsules (40 mg total) by mouth 2 (two) times a day 90 capsule 2   • polyethylene glycol (GLYCOLAX) powder Take 17 g by mouth daily 850 g 1   • polyethylene glycol (MIRALAX) 17 g packet Take 17 g by mouth daily 30 each 3   • triamcinolone (KENALOG) 0.1 % ointment Apply topically 2 (two) times a day 30 g 0     No current facility-administered medications for this visit. Current Outpatient Medications on File Prior to Visit   Medication Sig   • aspirin (ECOTRIN LOW STRENGTH) 81 mg EC tablet Take 81 mg by mouth daily    • Cholecalciferol (VITAMIN D-3) 5000 units TABS Take 1 tablet by mouth daily   • famotidine (PEPCID) 20 mg tablet Take 1 tablet (20 mg total) by mouth daily at bedtime as needed for heartburn   • Incontinence Supply Disposable (Brief Overnight Large) MISC Use 4 (four) times a day   • Incontinence Supply Disposable (Underpads) MISC Use in the morning Please dispense washable underpads   • Infant Care Products (Comfort-Smooth Baby Wipes) MISC Use 4 (four) times a day   • lidocaine (Lidoderm) 5 % Apply 1 patch topically over 12 hours daily Remove & Discard patch within 12 hours or as directed by MD   • polyethylene glycol (GLYCOLAX) powder Take 17 g by mouth daily   • polyethylene glycol (MIRALAX) 17 g packet Take 17 g by mouth daily   • triamcinolone (KENALOG) 0.1 % ointment Apply topically 2 (two) times a day   • [DISCONTINUED] benzonatate (TESSALON PERLES) 100 mg capsule Take 1 capsule (100 mg total) by mouth 3 (three) times a day as needed for cough   • [DISCONTINUED] citalopram (CeleXA) 40 mg tablet Take 1 tablet (40 mg total) by mouth daily   • [DISCONTINUED] docusate sodium (COLACE) 100 mg capsule Take 1 capsule (100 mg total) by mouth 2 (two) times a day as needed for constipation   • [DISCONTINUED] gabapentin (Neurontin) 300 mg capsule 1 tab PO qHS for pain, may take up to 3 times per day   • [DISCONTINUED] naphazoline-pheniramine (NAPHCON-A) 0.025-0.3 % ophthalmic solution Apply 1 drop to eye every 4 (four) hours as needed for irritation   • [DISCONTINUED] omeprazole (PriLOSEC) 20 mg delayed release capsule Take 2 capsules (40 mg total) by mouth 2 (two) times a day     No current facility-administered medications on file prior to visit. She is allergic to tramadol. .    Review of Systems Musculoskeletal:  Positive for arthralgias and back pain. Psychiatric/Behavioral:  Positive for sleep disturbance. The patient is nervous/anxious. All other systems reviewed and are negative. Objective:      /60 (BP Location: Left arm, Patient Position: Sitting, Cuff Size: Standard)   Pulse 70   SpO2 96%          Physical Exam  Vitals and nursing note reviewed. Constitutional:       Appearance: She is well-developed. HENT:      Head: Normocephalic. Right Ear: External ear normal.      Left Ear: External ear normal.      Nose: Nose normal.   Eyes:      Conjunctiva/sclera: Conjunctivae normal.      Pupils: Pupils are equal, round, and reactive to light. Neck:      Thyroid: No thyromegaly. Cardiovascular:      Rate and Rhythm: Normal rate and regular rhythm. Heart sounds: Normal heart sounds. Pulmonary:      Effort: Pulmonary effort is normal.      Breath sounds: Normal breath sounds. Abdominal:      Palpations: Abdomen is soft. Tenderness: There is no abdominal tenderness. There is no guarding or rebound. Musculoskeletal:         General: Tenderness present. Normal range of motion. Cervical back: Normal range of motion and neck supple. Spasms and tenderness present. Lumbar back: Spasms and tenderness present. Right hip: Tenderness present. No bony tenderness or crepitus. Normal range of motion. Skin:     General: Skin is dry. Findings: Bruising present. Neurological:      Mental Status: She is alert and oriented to person, place, and time. Deep Tendon Reflexes: Reflexes are normal and symmetric.

## 2023-11-24 DIAGNOSIS — K21.9 GASTROESOPHAGEAL REFLUX DISEASE, UNSPECIFIED WHETHER ESOPHAGITIS PRESENT: ICD-10-CM

## 2023-11-24 NOTE — TELEPHONE ENCOUNTER
Pharmacy is requesting medication change due that the one prescribed is not covered by pt insurance.      Please advise, thanks

## 2023-11-27 ENCOUNTER — TELEPHONE (OUTPATIENT)
Dept: FAMILY MEDICINE CLINIC | Facility: CLINIC | Age: 73
End: 2023-11-27

## 2023-11-27 RX ORDER — OMEPRAZOLE 20 MG/1
40 CAPSULE, DELAYED RELEASE ORAL 2 TIMES DAILY
Qty: 90 CAPSULE | Refills: 2 | OUTPATIENT
Start: 2023-11-27

## 2023-11-27 NOTE — TELEPHONE ENCOUNTER
Leslee called to let you know that patient fell and denies care.  She didn't hit her head, has a scrape knee. But she is having hallucinations.  Stating there is a man in the yard and there is no one there. Leslee just wanted to let you know of this new behavier.

## 2023-12-08 ENCOUNTER — TELEPHONE (OUTPATIENT)
Dept: PSYCHIATRY | Facility: CLINIC | Age: 73
End: 2023-12-08

## 2023-12-08 NOTE — TELEPHONE ENCOUNTER
Reached out to patient in regards to routine referral and adding to proper wait list.   Left VM for pt to contact intake department.

## 2023-12-13 ENCOUNTER — TELEPHONE (OUTPATIENT)
Dept: FAMILY MEDICINE CLINIC | Facility: CLINIC | Age: 73
End: 2023-12-13

## 2023-12-13 NOTE — TELEPHONE ENCOUNTER
Message left on Jean's machine (patient son) to return call to reschedule appointment on 12/22. If patient calls back, please offer appointment for 12/20.

## 2023-12-13 NOTE — TELEPHONE ENCOUNTER
Reached out to patient in regards to routine referral and adding to proper wait list. Unable to LVM for to contact intake department automated message "subscriber you are calling is not receiving calls at this time".  Nathalie Pinzon

## 2023-12-15 NOTE — TELEPHONE ENCOUNTER
second attempt to contact patient. left message to return my call on answering machine. Appointment now cancelled.

## 2024-01-30 ENCOUNTER — TELEPHONE (OUTPATIENT)
Dept: FAMILY MEDICINE CLINIC | Facility: CLINIC | Age: 74
End: 2024-01-30

## 2024-01-30 NOTE — TELEPHONE ENCOUNTER
Demetrio the patients name is Agustín HECTOR. Last name Erich. I'm calling to make an appointment. This is like the 14th time that I called you kwadwo. Nobody ever picks up. This is very important. I need to schedule her appointment. I've been trying to schedule an appointment with you kwadwo for the last 2 1/2 almost three weeks now and nobody picks up the phone. If you could give me a call back to my phone number at [phone] I would greatly appreciate it. Like I said, I've been trying to call you guhawa for the last 2 1/2 weeks, left numerous voicemails. Nobody gets back to me. I need to schedule an appointment for my mother. It's very important. And please return my phone call [phone]. This would be like my 10th voicemail. Bye, bye.      Appointment has been scheduled for 2/2/24

## 2024-01-31 ENCOUNTER — TELEPHONE (OUTPATIENT)
Dept: FAMILY MEDICINE CLINIC | Facility: CLINIC | Age: 74
End: 2024-01-31

## 2024-01-31 NOTE — TELEPHONE ENCOUNTER
Hi, this is Weston with [facility]. I'm a  here calling about a mutual patient Shae Jung, date of birth 1950 of family here who's been trying to schedule an appointment. You can call myself back at [phone] or if you want to provide your contact information, the members call backs [phone]. Nely, thanks. Bye. Hello. Let me see. I'm still here. Hold on. I don't know if it's still leaving the voicemail.      Appointment has been scheduled.

## 2024-02-02 ENCOUNTER — OFFICE VISIT (OUTPATIENT)
Dept: DENTISTRY | Facility: CLINIC | Age: 74
End: 2024-02-02

## 2024-02-02 ENCOUNTER — OFFICE VISIT (OUTPATIENT)
Dept: FAMILY MEDICINE CLINIC | Facility: CLINIC | Age: 74
End: 2024-02-02

## 2024-02-02 VITALS — SYSTOLIC BLOOD PRESSURE: 145 MMHG | DIASTOLIC BLOOD PRESSURE: 73 MMHG | TEMPERATURE: 99 F | HEART RATE: 67 BPM

## 2024-02-02 VITALS — DIASTOLIC BLOOD PRESSURE: 60 MMHG | SYSTOLIC BLOOD PRESSURE: 120 MMHG | HEART RATE: 72 BPM | TEMPERATURE: 97.7 F

## 2024-02-02 DIAGNOSIS — M51.36 LUMBAR DEGENERATIVE DISC DISEASE: ICD-10-CM

## 2024-02-02 DIAGNOSIS — M54.41 CHRONIC RIGHT-SIDED LOW BACK PAIN WITH BILATERAL SCIATICA: ICD-10-CM

## 2024-02-02 DIAGNOSIS — M54.42 CHRONIC RIGHT-SIDED LOW BACK PAIN WITH BILATERAL SCIATICA: ICD-10-CM

## 2024-02-02 DIAGNOSIS — R29.6 FALLS FREQUENTLY: ICD-10-CM

## 2024-02-02 DIAGNOSIS — G89.29 CHRONIC RIGHT-SIDED LOW BACK PAIN WITH BILATERAL SCIATICA: ICD-10-CM

## 2024-02-02 DIAGNOSIS — R53.81 PHYSICAL DECONDITIONING: ICD-10-CM

## 2024-02-02 DIAGNOSIS — F41.9 ANXIETY: ICD-10-CM

## 2024-02-02 DIAGNOSIS — F51.01 PRIMARY INSOMNIA: ICD-10-CM

## 2024-02-02 DIAGNOSIS — R25.2 SPASM: Primary | ICD-10-CM

## 2024-02-02 DIAGNOSIS — K08.109 EDENTULISM: Primary | ICD-10-CM

## 2024-02-02 PROCEDURE — D0140 LIMITED ORAL EVALUATION - PROBLEM FOCUSED: HCPCS

## 2024-02-02 PROCEDURE — 99215 OFFICE O/P EST HI 40 MIN: CPT | Performed by: FAMILY MEDICINE

## 2024-02-02 PROCEDURE — D0330 PANORAMIC RADIOGRAPHIC IMAGE: HCPCS

## 2024-02-02 RX ORDER — GABAPENTIN 300 MG/1
CAPSULE ORAL
Qty: 270 CAPSULE | Refills: 3 | Status: SHIPPED | OUTPATIENT
Start: 2024-02-02

## 2024-02-02 RX ORDER — CYCLOBENZAPRINE HCL 5 MG
5 TABLET ORAL 3 TIMES DAILY PRN
Qty: 90 TABLET | Refills: 1 | Status: SHIPPED | OUTPATIENT
Start: 2024-02-02

## 2024-02-02 NOTE — LETTER
Re:  Shae Jung   1950      To whom it may concern,     Shae Jung is a longstanding patient in our office. She has multiple medical concerns including Ambulatory Dysfunction needing total assistance for transfer and safety to avoid falls. Patient has had multiple falls and requires someone with her. Patient also has history of subdural hematoma, cervical radiculitis, lumbar radiculitis. Given these issue patient would greatly benefit from having Home Health Aide for 114 hours a week.       Please let me know if I can be of any further assistance,         Sincerely,       Tami Arechiga MD

## 2024-02-02 NOTE — DENTAL PROCEDURE DETAILS
"Shae Jung is a 74 y/o F that presents to [facility] with son and nurse for emergency visit. Son helped with translation.     PMH reviewed: ASA III  Treatment consents signed: Yes  Perio: NA   Pain scale: 2  Caries risk assessment: NA   Radiographs: Pan taken today  Oral hygiene instruction reviewed and given.   Hygiene recall visits recommended to patient.     CC: \"I don't have any bottom teeth so my top teeth are hurting my lower gums.\" Patient said that she has 2 upper complete dentures but no lower denture. She said her upper denture was made 6 mos ago at another dentist's office. Patient is satisfied with denture.     Panoramic image taken to assess amount of bone. Panoramic image and clinical exam shows that amount of bone is minimal. Patient's frenum attachment is high and is very likely to displace denture. Explained to patient that if we make denture, we cannot guarantee that retention will be good. Explained to patient that even with fixodent, retention might be poor. Patient still wants to try to make a denture even if retention is poor.     Explained to patient denture making process and amount of appointments required to make a denture. Informed patient that we will sent the denture to her insurance for approval or denial. Explained to patient that we have to wait until we hear from her insurance before scheduling any appointments.     Patient left satisfied and ambulatory.   Attending: Dr. Ruano  NV: preliminary impression for mandibular denture (when pre-authorization comes back)    HALEY Cesar  "

## 2024-02-02 NOTE — PROGRESS NOTES
Assessment/Plan:    No problem-specific Assessment & Plan notes found for this encounter.       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 Increased Gabapentin at night to 600 mg    Anxiety  Comments:  On [facility] waiting list. Suggested calling insurance for list of psychiatrist        Greater than 50% of 40 minute encounter was spent counseling/coordinating care of the patient regarding the diagnosis and understanding pathogenesis of disease process, discussion of differential diagnoses, review of laboratory data and imaging, discussion of medications to include side effect profile, precautions, and plan of care.   Subjective:      Patient ID: Shae Jung is a 73 y.o. female.    74 yo female here today with her pain caregiver, her son Jean and his wife.  Caregivers have the following concerns:  1- Increasing anxiety which leads to panic attacks.  2- Poor sleep, has difficulty both falling and staying asleep  3- Multiple falls: tries to do things on her own, gets up  quickly out of bed and falls, thus they are asking for a letter to increase paid caregiver hours  4- Leg and back cramps         The following portions of the patient's history were reviewed and updated as appropriate: She  has a past medical history of Abdominal pain, Anxiety, Arthritis, Bowel incontinence, Chest pain, Constipation, CVA (cerebral vascular accident) ([facility]), Diabetes mellitus type II, controlled ([facility]), Disease of thyroid gland, Epigastric pain, Falls, High cholesterol, HL (hearing loss), Hyperlipidemia, Hypertension, Migraine, Palpitations, Recurrent urinary tract infection, Seizures ([facility]), Sleep disorder, Speech impairment, Stroke ([facility]), Thyroid disease, and Tinnitus.  She   Patient Active Problem List    Diagnosis Date Noted   • Stage 3a chronic kidney disease ([facility]) 10/16/2023   • Generalized abdominal pain 08/29/2023   • Lumbar radiculitis    • Numbness and tingling in both hands    • Class 1 obesity due to excess calories without serious comorbidity with body mass index (BMI) of 30.0 to 30.9 in adult 04/02/2020   • Sciatica of right side 02/11/2020   • Hip strain, right, initial encounter 09/06/2019   • Breast pain, right 07/31/2019   • Elevated BP without diagnosis of hypertension 07/01/2019   • Contusion of right breast 05/24/2019   • Epigastric abdominal pain 03/28/2019   • NSAID long-term use 03/28/2019   • Diabetes mellitus type II, controlled ([facility]) 10/12/2018   • Arthritis 10/12/2018   • Primary insomnia 04/18/2018   • Urinary, incontinence, stress female 02/08/2018   • Urge and stress incontinence 10/26/2017   • Osteoporosis 09/15/2017   • Adhesive capsulitis of left shoulder 09/11/2017   • Constipation 05/10/2017   • Cervical radiculitis 05/09/2017   • Elevated TSH 04/11/2017   • Osteoarthritis of both hands 04/03/2017   • Memory difficulties 03/18/2016   • Asymptomatic bilateral carotid artery stenosis 02/29/2016   • Gastroesophageal reflux disease 02/03/2016   • Hypertension 01/20/2016    • Collapsed vertebra, not elsewhere classified, sacral and sacrococcygeal region, initial encounter for fracture ([facility]) 2016   • HLD (hyperlipidemia) 2016   • History of CVA with residual deficit 2016   • Depression 2016   • Headache 2016   • Dizziness 2016   • Fall 2016   • Subdural hemorrhage following injury ([facility]) 2016   • Subarachnoid hemorrhage following injury ([facility]) 2016   • Hyperlipidemia 10/19/2013   • Muscle weakness 10/19/2013     She  has a past surgical history that includes Knee surgery; Other surgical history;  section; Axillary Surgery; pr esophagogastroduodenoscopy transoral diagnostic (N/A, 2017); pr sling operation stress incontinence (N/A, 2018); CYSTOSCOPY (N/A, 2018); Bladder surgery; Facial bone tumor excision; and Tubal ligation.  Her family history includes Diabetes in her family; Hypertension in her family; No Known Problems in her daughter, father, maternal grandfather, maternal grandmother, mother, paternal grandfather, paternal grandmother, and sister; Stroke in her family; Thyroid disease in her family.  She  reports that she has never smoked. She has never used smokeless tobacco. She reports that she does not drink alcohol and does not use drugs.  Current Outpatient Medications   Medication Sig Dispense Refill   • cyclobenzaprine (FLEXERIL) 5 mg tablet Take 1 tablet (5 mg total) by mouth 3 (three) times a day as needed for muscle spasms 2.5 mg in the am, 2.5 mg in the pm and 5 mg qHs 90 tablet 1   • gabapentin (Neurontin) 300 mg capsule 1 capsule  at the am, 1 capsule in the afternoon and 2 capsules at night 1 hour prior to bedtime 270 capsule 3   • aspirin (ECOTRIN LOW STRENGTH) 81 mg EC tablet Take 81 mg by mouth daily      • benzonatate (TESSALON PERLES) 100 mg capsule Take 1 capsule (100 mg total) by mouth 3 (three) times a day as needed for cough 20 capsule 0   • Cholecalciferol (VITAMIN D-3) 5000 units  TABS Take 1 tablet by mouth daily 90 tablet 1   • citalopram (CeleXA) 40 mg tablet Take 1 tablet (40 mg total) by mouth daily 90 tablet 1   • docusate sodium (COLACE) 100 mg capsule Take 1 capsule (100 mg total) by mouth 2 (two) times a day as needed for constipation 90 capsule 0   • famotidine (PEPCID) 20 mg tablet Take 1 tablet (20 mg total) by mouth daily at bedtime as needed for heartburn 30 tablet 1   • fluticasone (FLONASE) 50 mcg/act nasal spray 1 spray into each nostril daily 16 g 1   • Incontinence Supply Disposable (Brief Overnight Large) MISC Use 4 (four) times a day 160 each 11   • Incontinence Supply Disposable (Underpads) MISC Use in the morning Please dispense washable underpads 4 each 11   • Infant Care Products (Comfort-Smooth Baby Wipes) MISC Use 4 (four) times a day 240 each 11   • lidocaine (Lidoderm) 5 % Apply 1 patch topically over 12 hours daily Remove & Discard patch within 12 hours or as directed by MD 30 patch 1   • omeprazole (PriLOSEC) 20 mg delayed release capsule Take 2 capsules (40 mg total) by mouth 2 (two) times a day 90 capsule 2   • polyethylene glycol (GLYCOLAX) powder Take 17 g by mouth daily 850 g 1   • polyethylene glycol (MIRALAX) 17 g packet Take 17 g by mouth daily 30 each 3   • triamcinolone (KENALOG) 0.1 % ointment Apply topically 2 (two) times a day 30 g 0     No current facility-administered medications for this visit.     Current Outpatient Medications on File Prior to Visit   Medication Sig   • aspirin (ECOTRIN LOW STRENGTH) 81 mg EC tablet Take 81 mg by mouth daily    • benzonatate (TESSALON PERLES) 100 mg capsule Take 1 capsule (100 mg total) by mouth 3 (three) times a day as needed for cough   • Cholecalciferol (VITAMIN D-3) 5000 units TABS Take 1 tablet by mouth daily   • citalopram (CeleXA) 40 mg tablet Take 1 tablet (40 mg total) by mouth daily   • docusate sodium (COLACE) 100 mg capsule Take 1 capsule (100 mg total) by mouth 2 (two) times a day as needed for  constipation   • famotidine (PEPCID) 20 mg tablet Take 1 tablet (20 mg total) by mouth daily at bedtime as needed for heartburn   • fluticasone (FLONASE) 50 mcg/act nasal spray 1 spray into each nostril daily   • Incontinence Supply Disposable (Brief Overnight Large) MISC Use 4 (four) times a day   • Incontinence Supply Disposable (Underpads) MISC Use in the morning Please dispense washable underpads   • Infant Care Products (Comfort-Smooth Baby Wipes) MISC Use 4 (four) times a day   • lidocaine (Lidoderm) 5 % Apply 1 patch topically over 12 hours daily Remove & Discard patch within 12 hours or as directed by MD   • omeprazole (PriLOSEC) 20 mg delayed release capsule Take 2 capsules (40 mg total) by mouth 2 (two) times a day   • polyethylene glycol (GLYCOLAX) powder Take 17 g by mouth daily   • polyethylene glycol (MIRALAX) 17 g packet Take 17 g by mouth daily   • triamcinolone (KENALOG) 0.1 % ointment Apply topically 2 (two) times a day   • [DISCONTINUED] gabapentin (Neurontin) 300 mg capsule 1 tab PO qHS for pain, may take up to 3 times per day     No current facility-administered medications on file prior to visit.     She is allergic to tramadol..    Review of Systems   Musculoskeletal:  Positive for arthralgias, back pain and gait problem.   All other systems reviewed and are negative.        Objective:      /60 (BP Location: Left arm, Patient Position: Sitting, Cuff Size: Standard)   Pulse 72   Temp 97.7 °F (36.5 °C) (Temporal)          Physical Exam  Vitals and nursing note reviewed.   Constitutional:       Appearance: She is well-developed.   HENT:      Head: Normocephalic.      Right Ear: External ear normal.      Left Ear: External ear normal.      Nose: Nose normal.   Eyes:      Conjunctiva/sclera: Conjunctivae normal.      Pupils: Pupils are equal, round, and reactive to light.   Neck:      Thyroid: No thyromegaly.   Cardiovascular:      Rate and Rhythm: Normal rate and regular rhythm.      Heart  sounds: Normal heart sounds.   Pulmonary:      Effort: Pulmonary effort is normal.      Breath sounds: Normal breath sounds.   Abdominal:      Palpations: Abdomen is soft.      Tenderness: There is no abdominal tenderness. There is no guarding or rebound.   Musculoskeletal:         General: Tenderness present. Normal range of motion.      Cervical back: Normal range of motion and neck supple.      Thoracic back: Spasms present.      Lumbar back: Spasms present.   Skin:     General: Skin is dry.   Neurological:      Mental Status: She is alert and oriented to person, place, and time.      Deep Tendon Reflexes: Reflexes are normal and symmetric.   Psychiatric:         Mood and Affect: Mood is anxious.         Speech: Speech is delayed.

## 2024-02-05 ENCOUNTER — TELEPHONE (OUTPATIENT)
Dept: FAMILY MEDICINE CLINIC | Facility: CLINIC | Age: 74
End: 2024-02-05

## 2024-02-05 NOTE — TELEPHONE ENCOUNTER
[facility] [employer] form received on 2/5/24  to be completed by PCP. Copy made and placed in PCP folder.    Forms to be delivered to PCP mailbox at assigned time.

## 2024-02-08 ENCOUNTER — TELEPHONE (OUTPATIENT)
Dept: FAMILY MEDICINE CLINIC | Facility: CLINIC | Age: 74
End: 2024-02-08

## 2024-02-08 NOTE — TELEPHONE ENCOUNTER
PCP SIGNATURE NEEDED FOR [facility]  FORM RECEIVED VIA FAX AND PLACED IN PCP FOLDER TO BE DELIVERED AT ASSIGNED TIMES.

## 2024-02-12 NOTE — TELEPHONE ENCOUNTER
FAXED ON 02/12/24 TO [facility] at [phone]. FAX CONFIRMATION RECEIVED.      FORMS SCANNED INTO PT CHART.

## 2024-02-14 ENCOUNTER — HOSPITAL ENCOUNTER (EMERGENCY)
Facility: HOSPITAL | Age: 74
Discharge: HOME | End: 2024-02-14
Attending: EMERGENCY MEDICINE
Payer: MEDICARE

## 2024-02-14 VITALS
OXYGEN SATURATION: 96 % | HEART RATE: 83 BPM | SYSTOLIC BLOOD PRESSURE: 159 MMHG | TEMPERATURE: 98.2 F | RESPIRATION RATE: 18 BRPM | DIASTOLIC BLOOD PRESSURE: 74 MMHG

## 2024-02-14 DIAGNOSIS — M54.41 ACUTE LOW BACK PAIN WITH RIGHT-SIDED SCIATICA: Primary | ICD-10-CM

## 2024-02-14 PROCEDURE — 96372 THER/PROPH/DIAG INJ SC/IM: CPT

## 2024-02-14 PROCEDURE — 99284 EMERGENCY DEPT VISIT MOD MDM: CPT | Performed by: EMERGENCY MEDICINE

## 2024-02-14 PROCEDURE — 99282 EMERGENCY DEPT VISIT SF MDM: CPT

## 2024-02-14 RX ORDER — METHOCARBAMOL 500 MG/1
500 TABLET, FILM COATED ORAL 2 TIMES DAILY
Qty: 20 TABLET | Refills: 0 | Status: SHIPPED | OUTPATIENT
Start: 2024-02-14 | End: 2024-02-23

## 2024-02-14 RX ORDER — HALOPERIDOL 5 MG/ML
5 INJECTION INTRAMUSCULAR ONCE
Status: COMPLETED | OUTPATIENT
Start: 2024-02-14 | End: 2024-02-14

## 2024-02-14 RX ORDER — METHYLPREDNISOLONE 4 MG/1
TABLET ORAL
Qty: 21 TABLET | Refills: 0 | Status: SHIPPED | OUTPATIENT
Start: 2024-02-14

## 2024-02-14 RX ADMIN — HALOPERIDOL LACTATE 5 MG: 5 INJECTION, SOLUTION INTRAMUSCULAR at 18:20

## 2024-02-15 ENCOUNTER — TELEPHONE (OUTPATIENT)
Dept: PHYSICAL THERAPY | Facility: OTHER | Age: 74
End: 2024-02-15

## 2024-02-15 NOTE — TELEPHONE ENCOUNTER
Call placed to the patient per [facility] referral.    Voice message left for patient to call back. Phone number and hours of business provided.     This is the 1st attempt to reach the patient.  Will defer per protocol.    I.S#[id number]    Preferred phone number might be patient's son Jean? He is listed on her communication consent

## 2024-02-15 NOTE — TELEPHONE ENCOUNTER
Patient's son Jean returned call to [facility].     Jean states his mom is seeking PT at [facility]. However, they are waiting on the insurance company, and also a referral for [facility] from the PCP.     I suggested they sign up in [facility] to have access to any paperwork or records. Access code on AVS from ED visit 2/14/24    [facility] closed

## 2024-02-15 NOTE — ED PROVIDER NOTES
History  Chief Complaint   Patient presents with    Back Pain     Right lower back pain for few days. Denies fall or injury. Denies urinary symptoms. Taking otc medication with no relief. Per visitors she is unable to walk due to pain.     73-year-old male presents for relation of sharp pain in the right lower back that radiates down into her right leg.  Pains are gradually 2 weeks ago without precipitating event or trauma.  The pain is constant, worse with movement.  No saddle anesthesia, abdominal pain, fevers, chills, recent falls or back trauma      History provided by:  Patient   used: Yes    Back Pain  Associated symptoms: no abdominal pain, no chest pain, no dysuria, no fever, no numbness and no weakness        Prior to Admission Medications   Prescriptions Last Dose Informant Patient Reported? Taking?   Cholecalciferol (VITAMIN D-3) 5000 units TABS  Self No No   Sig: Take 1 tablet by mouth daily   Incontinence Supply Disposable (Brief Overnight Large) MISC  Self No No   Sig: Use 4 (four) times a day   Incontinence Supply Disposable (Underpads) MISC  Self No No   Sig: Use in the morning Please dispense washable underpads   Infant Care Products (Comfort-Smooth Baby Wipes) MISC  Self No No   Sig: Use 4 (four) times a day   aspirin (ECOTRIN LOW STRENGTH) 81 mg EC tablet  Self Yes No   Sig: Take 81 mg by mouth daily    benzonatate (TESSALON PERLES) 100 mg capsule   No No   Sig: Take 1 capsule (100 mg total) by mouth 3 (three) times a day as needed for cough   citalopram (CeleXA) 40 mg tablet   No No   Sig: Take 1 tablet (40 mg total) by mouth daily   cyclobenzaprine (FLEXERIL) 5 mg tablet   No No   Sig: Take 1 tablet (5 mg total) by mouth 3 (three) times a day as needed for muscle spasms 2.5 mg in the am, 2.5 mg in the pm and 5 mg qHs   docusate sodium (COLACE) 100 mg capsule   No No   Sig: Take 1 capsule (100 mg total) by mouth 2 (two) times a day as needed for constipation   famotidine  (PEPCID) 20 mg tablet   No No   Sig: Take 1 tablet (20 mg total) by mouth daily at bedtime as needed for heartburn   fluticasone (FLONASE) 50 mcg/act nasal spray   No No   Si spray into each nostril daily   gabapentin (Neurontin) 300 mg capsule   No No   Si capsule  at the am, 1 capsule in the afternoon and 2 capsules at night 1 hour prior to bedtime   lidocaine (Lidoderm) 5 %   No No   Sig: Apply 1 patch topically over 12 hours daily Remove & Discard patch within 12 hours or as directed by MD   omeprazole (PriLOSEC) 20 mg delayed release capsule   No No   Sig: Take 2 capsules (40 mg total) by mouth 2 (two) times a day   polyethylene glycol (GLYCOLAX) powder  Self No No   Sig: Take 17 g by mouth daily   polyethylene glycol (MIRALAX) 17 g packet   No No   Sig: Take 17 g by mouth daily   triamcinolone (KENALOG) 0.1 % ointment   No No   Sig: Apply topically 2 (two) times a day      Facility-Administered Medications: None       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    Palpitations     Recurrent urinary tract infection     Seizures (HCC)     Sleep disorder     last assessed: 10/19/2013    Speech impairment     Stroke (HCC)     Thyroid disease     Tinnitus        Past Surgical History:   Procedure Laterality Date    AXILLARY SURGERY      cyst surgery    BLADDER SURGERY       SECTION      CYSTOSCOPY N/A 2018    Procedure: CYSTOSCOPY;  Surgeon: Charis Major MD;  Location: [facility];   Service: Gynecology    FACIAL BONE TUMOR EXCISION      KNEE SURGERY      OTHER SURGICAL HISTORY      cyst removed from axilla    RI ESOPHAGOGASTRODUODENOSCOPY TRANSORAL DIAGNOSTIC N/A 6/5/2017    Procedure: ESOPHAGOGASTRODUODENOSCOPY (EGD);  Surgeon: Jose Antonio Platt MD;  Location: [facility];  Service: Gastroenterology    RI SLING OPERATION STRESS INCONTINENCE N/A 1/18/2018    Procedure: PUBOVAGINAL SLING;  Surgeon: Charis Major MD;  Location: [facility] OR;  Service: Gynecology    TUBAL LIGATION         Family History   Problem Relation Age of Onset    No Known Problems Father     Diabetes Family         mellitus    Hypertension Family     Stroke Family     Thyroid disease Family     No Known Problems Mother     No Known Problems Sister     No Known Problems Daughter     No Known Problems Maternal Grandmother     No Known Problems Maternal Grandfather     No Known Problems Paternal Grandmother     No Known Problems Paternal Grandfather      I have reviewed and agree with the history as documented.    E-Cigarette/Vaping    E-Cigarette Use Never User      E-Cigarette/Vaping Substances    Nicotine No     THC No     CBD No     Flavoring No     Other No     Unknown No      Social History     Tobacco Use    Smoking status: Never    Smokeless tobacco: Never   Vaping Use    Vaping status: Never Used   Substance Use Topics    Alcohol use: No    Drug use: No       Review of Systems   Constitutional:  Negative for activity change, appetite change, fatigue and fever.   HENT:  Negative for congestion, dental problem, ear pain, rhinorrhea and sore throat.    Eyes:  Negative for pain and redness.   Respiratory:  Negative for chest tightness, shortness of breath and wheezing.    Cardiovascular:  Negative for chest pain and palpitations.   Gastrointestinal:  Negative for abdominal pain, blood in stool, constipation, diarrhea, nausea and vomiting.   Endocrine: Negative for cold intolerance and heat intolerance.   Genitourinary:   Negative for dysuria, frequency and hematuria.   Musculoskeletal:  Positive for back pain. Negative for arthralgias and myalgias.   Skin:  Negative for color change, pallor and rash.   Neurological:  Negative for weakness and numbness.   Hematological:  Does not bruise/bleed easily.   Psychiatric/Behavioral:  Negative for agitation, hallucinations and suicidal ideas.        Physical Exam  Physical Exam  Vitals and nursing note reviewed.   Constitutional:       Appearance: She is well-developed.   HENT:      Mouth/Throat:      Pharynx: No oropharyngeal exudate.   Eyes:      Conjunctiva/sclera: Conjunctivae normal.   Neck:      Comments: No meningeal signs  Cardiovascular:      Rate and Rhythm: Normal rate and regular rhythm.      Heart sounds: Normal heart sounds.   Pulmonary:      Effort: Pulmonary effort is normal. No respiratory distress.      Breath sounds: Normal breath sounds. No wheezing or rales.   Chest:      Chest wall: No tenderness.   Abdominal:      General: Bowel sounds are normal. There is no distension.      Palpations: Abdomen is soft. There is no mass.      Tenderness: There is no abdominal tenderness. There is no right CVA tenderness or left CVA tenderness.      Hernia: No hernia is present.      Comments: No cvat evaluation over the thoracic and lumbar spines.  Patient is under ovation of the right lower lumbar region with mild hypertonicity, positive straight leg raise on the right, neuro vas intact distal, normal gait.   Musculoskeletal:         General: Normal range of motion.      Cervical back: Normal range of motion and neck supple.   Lymphadenopathy:      Cervical: No cervical adenopathy.   Skin:     Findings: No erythema or rash.      Comments: No edema   Neurological:      Mental Status: She is alert and oriented to person, place, and time.      Cranial Nerves: No cranial nerve deficit.   Psychiatric:         Behavior: Behavior normal.         Vital Signs  ED Triage Vitals [02/14/24 1733]    Temperature Pulse Respirations Blood Pressure SpO2   98.2 °F (36.8 °C) 83 18 159/74 96 %      Temp Source Heart Rate Source Patient Position - Orthostatic VS BP Location FiO2 (%)   Oral Monitor Sitting Right arm --      Pain Score       --           Vitals:    02/14/24 1733   BP: 159/74   Pulse: 83   Patient Position - Orthostatic VS: Sitting         Visual Acuity      ED Medications  Medications   haloperidol lactate (HALDOL) injection 5 mg (5 mg Intramuscular Given 2/14/24 1820)       Diagnostic Studies  Results Reviewed       None                   No orders to display              Procedures  Procedures         ED Course                                             Medical Decision Making  Acute low right back pain with right-sided sciatica without history exam findings just infectious etiology, cauda equina, fracture and imaging/medical workup is not indicated.  Will treat for sciatica, refer to comprehensive spine.    Risk  Prescription drug management.             Disposition  Final diagnoses:   Acute low back pain with right-sided sciatica     Time reflects when diagnosis was documented in both MDM as applicable and the Disposition within this note       Time User Action Codes Description Comment    2/14/2024  5:59 PM Jean Roman Add [M54.41] Acute low back pain with right-sided sciatica           ED Disposition       ED Disposition   Discharge    Condition   Stable    Date/Time   Wed Feb 14, 2024  5:59 PM    Comment   Shae Jung discharge to home/self care.                   Follow-up Information       Follow up With Specialties Details Why Contact Info Additional Information    [facility] Physical Therapy Schedule an appointment as soon as possible for a visit in 2 days  [phone]            Discharge Medication List as of 2/14/2024  6:00 PM        START taking these medications    Details   Diclofenac Sodium (VOLTAREN) 1 % Apply 2 g topically 4 (four)  times a day, Starting Wed 2/14/2024, Normal      methocarbamol (ROBAXIN) 500 mg tablet Take 1 tablet (500 mg total) by mouth 2 (two) times a day, Starting Wed 2/14/2024, Normal      methylPREDNISolone 4 MG tablet therapy pack Use as directed on package, Normal           CONTINUE these medications which have NOT CHANGED    Details   aspirin (ECOTRIN LOW STRENGTH) 81 mg EC tablet Take 81 mg by mouth daily , Historical Med      benzonatate (TESSALON PERLES) 100 mg capsule Take 1 capsule (100 mg total) by mouth 3 (three) times a day as needed for cough, Starting Fri 11/17/2023, Normal      Cholecalciferol (VITAMIN D-3) 5000 units TABS Take 1 tablet by mouth daily, Starting Fri 10/12/2018, Normal      citalopram (CeleXA) 40 mg tablet Take 1 tablet (40 mg total) by mouth daily, Starting Fri 11/17/2023, Normal      cyclobenzaprine (FLEXERIL) 5 mg tablet Take 1 tablet (5 mg total) by mouth 3 (three) times a day as needed for muscle spasms 2.5 mg in the am, 2.5 mg in the pm and 5 mg qHs, Starting Fri 2/2/2024, Normal      docusate sodium (COLACE) 100 mg capsule Take 1 capsule (100 mg total) by mouth 2 (two) times a day as needed for constipation, Starting Fri 11/17/2023, Normal      famotidine (PEPCID) 20 mg tablet Take 1 tablet (20 mg total) by mouth daily at bedtime as needed for heartburn, Starting Mon 9/18/2023, Normal      fluticasone (FLONASE) 50 mcg/act nasal spray 1 spray into each nostril daily, Starting Fri 11/17/2023, Normal      gabapentin (Neurontin) 300 mg capsule 1 capsule  at the am, 1 capsule in the afternoon and 2 capsules at night 1 hour prior to bedtime, Normal      !! Incontinence Supply Disposable (Brief Overnight Large) MISC Use 4 (four) times a day, Starting Mon 11/7/2022, Print      !! Incontinence Supply Disposable (Underpads) MISC Use in the morning Please dispense washable underpads, Starting Mon 11/7/2022, Print      Infant Care Products (Comfort-Smooth Baby Wipes) MISC Use 4 (four) times a day,  Starting Mon 11/7/2022, Print      lidocaine (Lidoderm) 5 % Apply 1 patch topically over 12 hours daily Remove & Discard patch within 12 hours or as directed by MD, Starting Thu 10/26/2023, Normal      omeprazole (PriLOSEC) 20 mg delayed release capsule Take 2 capsules (40 mg total) by mouth 2 (two) times a day, Starting Fri 11/17/2023, Normal      polyethylene glycol (GLYCOLAX) powder Take 17 g by mouth daily, Starting Fri 2/8/2019, Normal      polyethylene glycol (MIRALAX) 17 g packet Take 17 g by mouth daily, Starting Mon 9/18/2023, Normal      triamcinolone (KENALOG) 0.1 % ointment Apply topically 2 (two) times a day, Starting Tue 7/25/2023, Normal       !! - Potential duplicate medications found. Please discuss with provider.              PDMP Review         Value Time User    PDMP Reviewed  Yes 6/2/2023 10:33 AM Will Basil Almaraz MD            ED Provider  Electronically Signed by             Jean Roman MD  02/14/24 [id number]

## 2024-02-22 ENCOUNTER — APPOINTMENT (EMERGENCY)
Dept: CT IMAGING | Facility: HOSPITAL | Age: 74
End: 2024-02-22
Payer: MEDICARE

## 2024-02-22 ENCOUNTER — HOSPITAL ENCOUNTER (EMERGENCY)
Facility: HOSPITAL | Age: 74
Discharge: HOME | End: 2024-02-23
Attending: EMERGENCY MEDICINE
Payer: MEDICARE

## 2024-02-22 DIAGNOSIS — M54.41 ACUTE LOW BACK PAIN WITH RIGHT-SIDED SCIATICA: ICD-10-CM

## 2024-02-22 DIAGNOSIS — S32.010A COMPRESSION FRACTURE OF L1 VERTEBRA, INITIAL ENCOUNTER (HCC): Primary | ICD-10-CM

## 2024-02-22 LAB
ALBUMIN SERPL BCP-MCNC: 4.4 G/DL (ref 3.5–5)
ALP SERPL-CCNC: 80 U/L (ref 34–104)
ALT SERPL W P-5'-P-CCNC: 19 U/L (ref 7–52)
ANION GAP SERPL CALCULATED.3IONS-SCNC: 4 MMOL/L
AST SERPL W P-5'-P-CCNC: 21 U/L (ref 13–39)
BASOPHILS # BLD AUTO: 0.04 THOUSANDS/ÂΜL (ref 0–0.1)
BASOPHILS NFR BLD AUTO: 1 % (ref 0–1)
BILIRUB SERPL-MCNC: 0.26 MG/DL (ref 0.2–1)
BUN SERPL-MCNC: 15 MG/DL (ref 5–25)
CALCIUM SERPL-MCNC: 8.9 MG/DL (ref 8.4–10.2)
CHLORIDE SERPL-SCNC: 106 MMOL/L (ref 96–108)
CO2 SERPL-SCNC: 32 MMOL/L (ref 21–32)
CREAT SERPL-MCNC: 0.72 MG/DL (ref 0.6–1.3)
EOSINOPHIL # BLD AUTO: 0.2 THOUSAND/ÂΜL (ref 0–0.61)
EOSINOPHIL NFR BLD AUTO: 3 % (ref 0–6)
ERYTHROCYTE [DISTWIDTH] IN BLOOD BY AUTOMATED COUNT: 13.4 % (ref 11.6–15.1)
GFR SERPL CREATININE-BSD FRML MDRD: 83 ML/MIN/1.73SQ M
GLUCOSE SERPL-MCNC: 122 MG/DL (ref 65–140)
HCT VFR BLD AUTO: 40.1 % (ref 34.8–46.1)
HGB BLD-MCNC: 13.3 G/DL (ref 11.5–15.4)
IMM GRANULOCYTES # BLD AUTO: 0.02 THOUSAND/UL (ref 0–0.2)
IMM GRANULOCYTES NFR BLD AUTO: 0 % (ref 0–2)
LYMPHOCYTES # BLD AUTO: 2.23 THOUSANDS/ÂΜL (ref 0.6–4.47)
LYMPHOCYTES NFR BLD AUTO: 37 % (ref 14–44)
MCH RBC QN AUTO: 31.4 PG (ref 26.8–34.3)
MCHC RBC AUTO-ENTMCNC: 33.2 G/DL (ref 31.4–37.4)
MCV RBC AUTO: 95 FL (ref 82–98)
MONOCYTES # BLD AUTO: 0.51 THOUSAND/ÂΜL (ref 0.17–1.22)
MONOCYTES NFR BLD AUTO: 8 % (ref 4–12)
NEUTROPHILS # BLD AUTO: 3.08 THOUSANDS/ÂΜL (ref 1.85–7.62)
NEUTS SEG NFR BLD AUTO: 51 % (ref 43–75)
NRBC BLD AUTO-RTO: 0 /100 WBCS
PLATELET # BLD AUTO: 238 THOUSANDS/UL (ref 149–390)
PMV BLD AUTO: 10.8 FL (ref 8.9–12.7)
POTASSIUM SERPL-SCNC: 4.2 MMOL/L (ref 3.5–5.3)
PROT SERPL-MCNC: 6.7 G/DL (ref 6.4–8.4)
RBC # BLD AUTO: 4.23 MILLION/UL (ref 3.81–5.12)
SODIUM SERPL-SCNC: 142 MMOL/L (ref 135–147)
WBC # BLD AUTO: 6.08 THOUSAND/UL (ref 4.31–10.16)

## 2024-02-22 PROCEDURE — 80053 COMPREHEN METABOLIC PANEL: CPT | Performed by: EMERGENCY MEDICINE

## 2024-02-22 PROCEDURE — 36415 COLL VENOUS BLD VENIPUNCTURE: CPT | Performed by: EMERGENCY MEDICINE

## 2024-02-22 PROCEDURE — 96366 THER/PROPH/DIAG IV INF ADDON: CPT

## 2024-02-22 PROCEDURE — 99285 EMERGENCY DEPT VISIT HI MDM: CPT | Performed by: EMERGENCY MEDICINE

## 2024-02-22 PROCEDURE — 99284 EMERGENCY DEPT VISIT MOD MDM: CPT

## 2024-02-22 PROCEDURE — 74177 CT ABD & PELVIS W/CONTRAST: CPT

## 2024-02-22 PROCEDURE — 72125 CT NECK SPINE W/O DYE: CPT

## 2024-02-22 PROCEDURE — 96375 TX/PRO/DX INJ NEW DRUG ADDON: CPT

## 2024-02-22 PROCEDURE — 70450 CT HEAD/BRAIN W/O DYE: CPT

## 2024-02-22 PROCEDURE — 96365 THER/PROPH/DIAG IV INF INIT: CPT

## 2024-02-22 PROCEDURE — 71260 CT THORAX DX C+: CPT

## 2024-02-22 PROCEDURE — 85025 COMPLETE CBC W/AUTO DIFF WBC: CPT | Performed by: EMERGENCY MEDICINE

## 2024-02-22 RX ORDER — KETOROLAC TROMETHAMINE 30 MG/ML
15 INJECTION, SOLUTION INTRAMUSCULAR; INTRAVENOUS ONCE
Status: COMPLETED | OUTPATIENT
Start: 2024-02-22 | End: 2024-02-22

## 2024-02-22 RX ADMIN — IOHEXOL 100 ML: 350 INJECTION, SOLUTION INTRAVENOUS at 23:24

## 2024-02-22 RX ADMIN — KETOROLAC TROMETHAMINE 15 MG: 30 INJECTION, SOLUTION INTRAMUSCULAR; INTRAVENOUS at 22:28

## 2024-02-22 RX ADMIN — SODIUM CHLORIDE, SODIUM LACTATE, POTASSIUM CHLORIDE, AND CALCIUM CHLORIDE 1000 ML: .6; .31; .03; .02 INJECTION, SOLUTION INTRAVENOUS at 22:29

## 2024-02-23 VITALS
DIASTOLIC BLOOD PRESSURE: 80 MMHG | SYSTOLIC BLOOD PRESSURE: 169 MMHG | HEART RATE: 70 BPM | TEMPERATURE: 98.1 F | WEIGHT: 132.5 LBS | BODY MASS INDEX: 28.67 KG/M2 | RESPIRATION RATE: 16 BRPM | OXYGEN SATURATION: 99 %

## 2024-02-23 RX ORDER — METHOCARBAMOL 500 MG/1
500 TABLET, FILM COATED ORAL 2 TIMES DAILY
Qty: 20 TABLET | Refills: 0 | Status: SHIPPED | OUTPATIENT
Start: 2024-02-23

## 2024-02-23 RX ORDER — LIDOCAINE 50 MG/G
1 PATCH TOPICAL DAILY
Qty: 15 PATCH | Refills: 0 | Status: SHIPPED | OUTPATIENT
Start: 2024-02-23

## 2024-02-23 NOTE — DISCHARGE INSTRUCTIONS
Please return immediately if you have increased back or leg pain, loss of bowel or bladder function,  and/or new numbness, tingling and/or weakness in your legs.

## 2024-02-23 NOTE — ED PROVIDER NOTES
History  Chief Complaint   Patient presents with    Medical Problem     Pt's son reports pt c/o bilateral hip/back pain. Pt has frequent falls at home. Pt's son reports pt has stage 3 kidney disease unsure if pain is from that or falling.     Patient presents with family tonight for acute on chronic back pain.  Has been having more falls and pain has been uncontrolled over the last 2 days.  Family denies any bladder or bowel incontinence since the falls, leg weakness, altered mental status, or any deformities from the fall but they do state the patient suffered a head injury yesterday and did have a cut to the right scalp along with a bruise to the left forehead from a fall last week.  Patient has been trying to get into outpatient rehab but is having insurance issues.  Tried giving her normal pain medications without relief.      History provided by:  Relative   used: No    Medical Problem  Associated symptoms: no abdominal pain, no fever, no nausea, no rash and no vomiting        Prior to Admission Medications   Prescriptions Last Dose Informant Patient Reported? Taking?   Cholecalciferol (VITAMIN D-3) 5000 units TABS  Self No Yes   Sig: Take 1 tablet by mouth daily   Diclofenac Sodium (VOLTAREN) 1 %   No Yes   Sig: Apply 2 g topically 4 (four) times a day   Incontinence Supply Disposable (Brief Overnight Large) MISC  Self No Yes   Sig: Use 4 (four) times a day   Incontinence Supply Disposable (Underpads) MISC  Self No Yes   Sig: Use in the morning Please dispense washable underpads   Infant Care Products (Comfort-Smooth Baby Wipes) MISC  Self No Yes   Sig: Use 4 (four) times a day   aspirin (ECOTRIN LOW STRENGTH) 81 mg EC tablet  Self Yes Yes   Sig: Take 81 mg by mouth daily    benzonatate (TESSALON PERLES) 100 mg capsule   No Yes   Sig: Take 1 capsule (100 mg total) by mouth 3 (three) times a day as needed for cough   citalopram (CeleXA) 40 mg tablet   No Yes   Sig: Take 1 tablet (40 mg  total) by mouth daily   cyclobenzaprine (FLEXERIL) 5 mg tablet   No Yes   Sig: Take 1 tablet (5 mg total) by mouth 3 (three) times a day as needed for muscle spasms 2.5 mg in the am, 2.5 mg in the pm and 5 mg qHs   docusate sodium (COLACE) 100 mg capsule   No Yes   Sig: Take 1 capsule (100 mg total) by mouth 2 (two) times a day as needed for constipation   famotidine (PEPCID) 20 mg tablet Not Taking  No No   Sig: Take 1 tablet (20 mg total) by mouth daily at bedtime as needed for heartburn   Patient not taking: Reported on 2024   fluticasone (FLONASE) 50 mcg/act nasal spray   No Yes   Si spray into each nostril daily   gabapentin (Neurontin) 300 mg capsule   No Yes   Si capsule  at the am, 1 capsule in the afternoon and 2 capsules at night 1 hour prior to bedtime   lidocaine (Lidoderm) 5 %   No Yes   Sig: Apply 1 patch topically over 12 hours daily Remove & Discard patch within 12 hours or as directed by MD   methocarbamol (ROBAXIN) 500 mg tablet   No Yes   Sig: Take 1 tablet (500 mg total) by mouth 2 (two) times a day   methocarbamol (ROBAXIN) 500 mg tablet   No Yes   Sig: Take 1 tablet (500 mg total) by mouth 2 (two) times a day   methylPREDNISolone 4 MG tablet therapy pack Not Taking  No No   Sig: Use as directed on package   Patient not taking: Reported on 2024   omeprazole (PriLOSEC) 20 mg delayed release capsule   No Yes   Sig: Take 2 capsules (40 mg total) by mouth 2 (two) times a day   polyethylene glycol (GLYCOLAX) powder  Self No Yes   Sig: Take 17 g by mouth daily   polyethylene glycol (MIRALAX) 17 g packet   No Yes   Sig: Take 17 g by mouth daily   triamcinolone (KENALOG) 0.1 % ointment   No Yes   Sig: Apply topically 2 (two) times a day      Facility-Administered Medications: None       Past Medical History:   Diagnosis Date    Abdominal pain     Anxiety     last assessed: 10/19/2013    Arthritis     osteo both hands; last assessed: 10/19/2013    Bowel incontinence     Chest pain      Constipation     CVA (cerebral vascular accident) (HCC)     Diabetes mellitus type II, controlled (HCC)     Disease of thyroid gland     Epigastric pain     with distention    Falls     High cholesterol     HL (hearing loss)     Hyperlipidemia     Hypertension     last assessed: 4/3/2017    Migraine     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    Palpitations     Recurrent urinary tract infection     Seizures (HCC)     Sleep disorder     last assessed: 10/19/2013    Speech impairment     Stroke (HCC)     Thyroid disease     Tinnitus        Past Surgical History:   Procedure Laterality Date    AXILLARY SURGERY      cyst surgery    BLADDER SURGERY       SECTION      CYSTOSCOPY N/A 2018    Procedure: CYSTOSCOPY;  Surgeon: Charis Major MD;  Location: [facility] OR;  Service: Gynecology    FACIAL BONE TUMOR EXCISION      KNEE SURGERY      OTHER SURGICAL HISTORY      cyst removed from axilla    MI ESOPHAGOGASTRODUODENOSCOPY TRANSORAL DIAGNOSTIC N/A 2017    Procedure: ESOPHAGOGASTRODUODENOSCOPY (EGD);  Surgeon: Jose Antonio Platt MD;  Location: [facility] GI LAB;  Service: Gastroenterology    MI SLING OPERATION STRESS INCONTINENCE N/A 2018    Procedure: PUBOVAGINAL SLING;  Surgeon: Charis Major MD;  Location: [facility] OR;  Service: Gynecology    TUBAL LIGATION         Family History   Problem Relation Age of Onset    No Known Problems Father     Diabetes Family         mellitus    Hypertension Family     Stroke Family     Thyroid disease Family     No Known Problems Mother     No Known Problems Sister     No Known Problems Daughter     No Known Problems Maternal Grandmother     No Known Problems Maternal Grandfather     No Known Problems Paternal Grandmother     No Known Problems Paternal Grandfather      I have reviewed and agree with the history as documented.    E-Cigarette/Vaping    E-Cigarette Use Never User      E-Cigarette/Vaping Substances    Nicotine No     THC No     CBD No     Flavoring  No     Other No     Unknown No      Social History     Tobacco Use    Smoking status: Never    Smokeless tobacco: Never   Vaping Use    Vaping status: Never Used   Substance Use Topics    Alcohol use: No    Drug use: No       Review of Systems   Constitutional:  Negative for chills and fever.   Gastrointestinal:  Negative for abdominal pain, nausea and vomiting.   Genitourinary:  Negative for difficulty urinating, dysuria, flank pain, frequency and urgency.   Musculoskeletal:  Positive for back pain and gait problem.   Skin:  Positive for wound. Negative for color change, pallor and rash.   Allergic/Immunologic: Negative for immunocompromised state.   Neurological:  Negative for weakness and numbness.   All other systems reviewed and are negative.      Physical Exam  Physical Exam  Vitals and nursing note reviewed.   Constitutional:       General: She is in acute distress.      Appearance: She is well-developed. She is not ill-appearing, toxic-appearing or diaphoretic.   HENT:      Head: Normocephalic. Contusion present.        Right Ear: External ear normal.      Left Ear: External ear normal.      Nose: Nose normal. No congestion or rhinorrhea.   Eyes:      General: No scleral icterus.        Right eye: No discharge.         Left eye: No discharge.      Conjunctiva/sclera: Conjunctivae normal.   Neck:      Trachea: No tracheal deviation.   Cardiovascular:      Rate and Rhythm: Normal rate.   Pulmonary:      Effort: Pulmonary effort is normal. No tachypnea, accessory muscle usage or respiratory distress.      Breath sounds: No stridor.   Abdominal:      General: There is no distension.   Musculoskeletal:      Lumbar back: Tenderness present.        Back:       Right hip: Normal. Normal range of motion.      Left hip: Normal. Normal range of motion.   Skin:     General: Skin is warm and dry.   Neurological:      General: No focal deficit present.      Mental Status: She is alert. She is not disoriented.      Gait:  Gait normal.   Psychiatric:         Speech: Speech normal.         Behavior: Behavior normal.         Vital Signs  ED Triage Vitals   Temperature Pulse Respirations Blood Pressure SpO2   02/22/24 1938 02/22/24 1938 02/22/24 1938 02/22/24 1938 02/22/24 1938   98.1 °F (36.7 °C) 70 18 (!) 180/76 99 %      Temp Source Heart Rate Source Patient Position - Orthostatic VS BP Location FiO2 (%)   02/22/24 1938 02/22/24 1938 02/22/24 1938 02/22/24 1938 --   Oral Monitor Sitting Right arm       Pain Score       02/22/24 2228       10 - Worst Possible Pain           Vitals:    02/22/24 1938 02/22/24 2332 02/22/24 2337   BP: (!) 180/76 (!) 196/95 169/80   Pulse: 70 77    Patient Position - Orthostatic VS: Sitting Lying Lying         Visual Acuity      ED Medications  Medications   lactated ringers bolus 1,000 mL (0 mL Intravenous Stopped 2/23/24 0030)   ketorolac (TORADOL) injection 15 mg (15 mg Intravenous Given 2/22/24 2228)   iohexol (OMNIPAQUE) 350 MG/ML injection (MULTI-DOSE) 100 mL (100 mL Intravenous Given 2/22/24 2324)       Diagnostic Studies  Results Reviewed       Procedure Component Value Units Date/Time    Comprehensive metabolic panel [[id number]] Collected: 02/22/24 2219    Lab Status: Final result Specimen: Blood from Arm, Left Updated: 02/22/24 2253     Sodium 142 mmol/L      Potassium 4.2 mmol/L      Chloride 106 mmol/L      CO2 32 mmol/L      ANION GAP 4 mmol/L      BUN 15 mg/dL      Creatinine 0.72 mg/dL      Glucose 122 mg/dL      Calcium 8.9 mg/dL      AST 21 U/L      ALT 19 U/L      Alkaline Phosphatase 80 U/L      Total Protein 6.7 g/dL      Albumin 4.4 g/dL      Total Bilirubin 0.26 mg/dL      eGFR 83 ml/min/1.73sq m     Narrative:      National Kid[employer] Disease Foundation guidelines for Chronic Kidney Disease (CKD):     Stage 1 with normal or high GFR (GFR > 90 mL/min/1.73 square meters)    Stage 2 Mild CKD (GFR = 60-89 mL/min/1.73 square meters)    Stage 3A Moderate CKD (GFR = 45-59 mL/min/1.73 square  meters)    Stage 3B Moderate CKD (GFR = 30-44 mL/min/1.73 square meters)    Stage 4 Severe CKD (GFR = 15-29 mL/min/1.73 square meters)    Stage 5 End Stage CKD (GFR <15 mL/min/1.73 square meters)  Note: GFR calculation is accurate only with a steady state creatinine    CBC and differential [[id number]] Collected: 02/22/24 2219    Lab Status: Final result Specimen: Blood from Arm, Left Updated: 02/22/24 2239     WBC 6.08 Thousand/uL      RBC 4.23 Million/uL      Hemoglobin 13.3 g/dL      Hematocrit 40.1 %      MCV 95 fL      MCH 31.4 pg      MCHC 33.2 g/dL      RDW 13.4 %      MPV 10.8 fL      Platelets 238 Thousands/uL      nRBC 0 /100 WBCs      Neutrophils Relative 51 %      Immat GRANS % 0 %      Lymphocytes Relative 37 %      Monocytes Relative 8 %      Eosinophils Relative 3 %      Basophils Relative 1 %      Neutrophils Absolute 3.08 Thousands/µL      Immature Grans Absolute 0.02 Thousand/uL      Lymphocytes Absolute 2.23 Thousands/µL      Monocytes Absolute 0.51 Thousand/µL      Eosinophils Absolute 0.20 Thousand/µL      Basophils Absolute 0.04 Thousands/µL                    CT head without contrast   Final Result by Issac Doyle MD (02/23 0017)      No acute intracranial abnormality.                  Workstation performed: [id number]         CT cervical spine without contrast   Final Result by Issac Doyle MD (02/23 0021)      No cervical spine fracture or traumatic malalignment.                  Workstation performed: [id number]         CT chest abdomen pelvis w contrast   Final Result by Issac Doyle MD (02/23 0047)      No evidence of solid organ injury.      Mild acute to subacute appearing compression fracture at the superior aspect of the L1 vertebral body.   No retropulsion into the spinal canal.      No pneumothorax.      No acute intra-abdominal abnormality. No free air or free fluid      Workstation performed: [id number]         CT recon only thoracolumbar (No Charge)   Final  Result by Issac Doyle MD (02/23 0049)      Mild acute to subacute appearing compression fracture at the superior aspect of the L1 vertebral body.   No retropulsion into the spinal canal.                  Workstation performed: [id number]                    Procedures  Procedures         ED Course  ED Course as of 02/23/24 0225   Thu Feb 22, 2024   2353 Comprehensive metabolic panel  Normal   2353 CBC and differential  Normal                               SBIRT 20yo+      Flowsheet Row Most Recent Value   Initial Alcohol Screen: US AUDIT-C     1. How often do you have a drink containing alcohol? 0 Filed at: 02/22/2024 1939   2. How many drinks containing alcohol do you have on a typical day you are drinking?  0 Filed at: 02/22/2024 1939   3b. FEMALE Any Age, or MALE 65+: How often do you have 4 or more drinks on one occassion? 0 Filed at: 02/22/2024 1939   Audit-C Score 0 Filed at: 02/22/2024 1939   LEVAR: How many times in the past year have you...    Used an illegal drug or used a prescription medication for non-medical reasons? Never Filed at: 02/22/2024 1939                      Medical Decision Making  Patient presents with acute on chronic back pain, worsening ambulatory dysfunction, inability to control pain at home.  Patient seen here few weeks ago and then by her family doctor.  Outpatient physical therapy has been arranged but there has been insurance issues.  Patient has been taking her medications without much relief.  Patient has been falling more and fell a few times already this week suffering head injury.  Patient has a history of stroke causing chronic ataxia.  Blood pressure is elevated which is probably from pain.  She has no abdominal tenderness, distention or palpable mass to suggest a AAA at this time.  Given her falls with head injury and worsening pain I will obtain trauma scans, labs, hydrate with IV fluids and start with Toradol.  Last creatinine and GFR on file were normal.    2:14  AM  Labs are normal.  CTs are overall negative with the exception of an acute to subacute L1 compression fracture.  Patient has been much improved with Toradol here and was able to ambulate with minimal pain.  LSO brace placed on the patient.  Order sent for comprehensive spine for follow-up.  Robaxin reordered and sent to [facility] per family request.  Patient feels well enough to go home and is ambulating well.  She does have help at home.  Vies to continue with supportive care, Tylenol and even a low-dose anti-inflammatory such as ibuprofen for a few days since her kidney function is normal.  Return ER precautions if she does develop any signs of cauda equina.  Patient's family understands and agrees with plan of care.  Questions and concerns answered.    Amount and/or Complexity of Data Reviewed  Labs: ordered. Decision-making details documented in ED Course.  Radiology: ordered.    Risk  Prescription drug management.             Disposition  Final diagnoses:   Compression fracture of L1 vertebra, initial encounter (HCC)     Time reflects when diagnosis was documented in both MDM as applicable and the Disposition within this note       Time User Action Codes Description Comment    2/23/2024  2:20 AM William Watson Add [S32.010A] Compression fracture of L1 vertebra, initial encounter (HCC)     2/23/2024  2:23 AM William Watson Add [M54.41] Acute low back pain with right-sided sciatica           ED Disposition       ED Disposition   Discharge    Condition   Stable    Date/Time   Fri Feb 23, 2024 0220    Comment   Shae Jung discharge to home/self care.                   Follow-up Information       Follow up With Specialties Details Why Contact Info Additional Information    Tami Arechiga MD Family Medicine Call today To schedule an appointment as soon as you can [address] [place] [id number]  [phone]       [facility] Physical Therapy Call  To schedule  an appointment as soon as you can [phone]            Patient's Medications   Discharge Prescriptions    DICLOFENAC SODIUM (VOLTAREN) 1 %    Apply 2 g topically 4 (four) times a day       Start Date: 2/23/2024 End Date: --       Order Dose: 2 g       Quantity: 350 g    Refills: 0    LIDOCAINE (LIDODERM) 5 %    Apply 1 patch topically over 12 hours daily Remove & Discard patch within 12 hours or as directed by MD       Start Date: 2/23/2024 End Date: --       Order Dose: 1 patch       Quantity: 15 patch    Refills: 0           PDMP Review         Value Time User    PDMP Reviewed  Yes 6/2/2023 10:33 AM Will Basil Almaraz MD            ED Provider  Electronically Signed by             William Watson Jr., DO  02/23/24 0222

## 2024-02-23 NOTE — ED NOTES
Patient called to see which pharmacy her prescriptions were sent to. This RN accessed the chart at this time to confirm which pharmacy medications were sent to.      Anita Kincaid RN  02/23/24 6552

## 2024-03-20 DIAGNOSIS — R47.9 SPEECH DISTURBANCE, UNSPECIFIED TYPE: ICD-10-CM

## 2024-03-20 DIAGNOSIS — I69.30 HISTORY OF CVA WITH RESIDUAL DEFICIT: Primary | ICD-10-CM

## 2024-03-20 DIAGNOSIS — R53.81 PHYSICAL DECONDITIONING: ICD-10-CM

## 2024-04-22 ENCOUNTER — TELEPHONE (OUTPATIENT)
Dept: FAMILY MEDICINE CLINIC | Facility: CLINIC | Age: 74
End: 2024-04-22

## 2024-04-22 NOTE — TELEPHONE ENCOUNTER
PCP SIGNATURE NEEDED FOR [facility] [employer] FORM RECEIVED VIA FAX AND PLACED IN PCP FOLDER TO BE DELIVERED AT ASSIGNED TIMES.     Admit date: 03/14/24

## 2024-04-25 ENCOUNTER — OFFICE VISIT (OUTPATIENT)
Dept: DENTISTRY | Facility: CLINIC | Age: 74
End: 2024-04-25

## 2024-04-25 VITALS — TEMPERATURE: 98 F | SYSTOLIC BLOOD PRESSURE: 134 MMHG | DIASTOLIC BLOOD PRESSURE: 90 MMHG | HEART RATE: 73 BPM

## 2024-04-25 DIAGNOSIS — Z46.3 ENCOUNTER FOR FITTING OF DENTURES: Primary | ICD-10-CM

## 2024-04-25 PROCEDURE — WIS5000 PRELIMINARY IMPRESSIONS

## 2024-04-26 ENCOUNTER — OFFICE VISIT (OUTPATIENT)
Dept: NEUROSURGERY | Facility: CLINIC | Age: 74
End: 2024-04-26
Payer: MEDICARE

## 2024-04-26 ENCOUNTER — HOSPITAL ENCOUNTER (OUTPATIENT)
Dept: RADIOLOGY | Facility: HOSPITAL | Age: 74
Discharge: HOME | End: 2024-04-26
Payer: MEDICARE

## 2024-04-26 VITALS
TEMPERATURE: 98 F | SYSTOLIC BLOOD PRESSURE: 118 MMHG | WEIGHT: 128 LBS | OXYGEN SATURATION: 98 % | RESPIRATION RATE: 20 BRPM | BODY MASS INDEX: 27.61 KG/M2 | HEIGHT: 57 IN | HEART RATE: 81 BPM | DIASTOLIC BLOOD PRESSURE: 70 MMHG

## 2024-04-26 DIAGNOSIS — I69.30 HISTORY OF CVA WITH RESIDUAL DEFICIT: ICD-10-CM

## 2024-04-26 DIAGNOSIS — S32.010A COMPRESSION FRACTURE OF L1 VERTEBRA, INITIAL ENCOUNTER (HCC): ICD-10-CM

## 2024-04-26 DIAGNOSIS — N95.1 MENOPAUSAL STATE: ICD-10-CM

## 2024-04-26 DIAGNOSIS — M62.81 MUSCLE WEAKNESS (GENERALIZED): Primary | ICD-10-CM

## 2024-04-26 PROCEDURE — 72110 X-RAY EXAM L-2 SPINE 4/>VWS: CPT

## 2024-04-26 PROCEDURE — 99204 OFFICE O/P NEW MOD 45 MIN: CPT | Performed by: NURSE PRACTITIONER

## 2024-04-26 NOTE — DENTAL PROCEDURE DETAILS
Preliminary Impression    Shae Jung 73 y.o. female presents with caregiver to [facility] for Preliminary Impression Lower Complete Denture. Pt is happy with existing Upper Complete Denture and has no lower one and wants opposing teeth  PMH reviewed, no changes, ASA III  Pain level 0/10    Diagnosis: Missing teeth, Severe alveolar ridge atrophy    Informed pt and her son that making a retentive denture for the mandible will be extremely difficult and unlikely to succeed to the expectations of her upper denture due to the retention of the hard palate. Stated that if we make a denture with her given anatomy of poor alveolar ridge height and high muscle attachments, she will likely be unhappy with the function of it but we can try our best. Informed her that she will need to use denture adhesive as well. Informed caregiver about implant option, pt and caregiver stated to try and make a denture for her now so that she can have teeth for her speech therapy sessions and then they will consider implants in the future.    Consent: Tx plan reviewed. Patient understands and consents.    Procedure details:  Performed intra-oral exam. Soft tissue within normal limited; conditions adequate for preliminary impressions  Obtained preliminary impressions with [employer] in stock tray extended with rope wax.  Impression free of voids and captures important anatomy including vestibules and peripheral roll.  Impression sent to [facility] for Custom tray fabrication    Patient dismissed ambulatory and alert    Attending: Alden    NV: Final impression

## 2024-04-26 NOTE — PROGRESS NOTES
Assessment/Plan:    Compression fracture of L1 lumbar vertebra (HCC)  Presented for initial neurosurgery visit 9 weeks status post diagnosed compression fracture in the ED on imaging.  Son reports patient wore the TLSO brace for approximately 1 month faithfully about 6 hours a day.  Reports the brace has been off now for 2 to 3 weeks.  Patient has never completed upright lumbar spine imaging in a brace.  Patient arrives via wheelchair not wearing the brace.  Patient denies thoracic go lumbar pain during palpation or movement in the chair flexion, extension, and side-to-side rotation.  Patient does admit to pain in her lower back L5-S1 and between the buttocks.  Patient denies radicular symptoms and does have a history of degenerative arthritis/spondylosis on.  Imaging last imaging CT recon of  Staff is post CVA with ataxia/late effects, lower extremities are rigid with increased tone.  Right foot toes are down going, lower extremities are rigid and stiff.  Most recent DEXA with January 2019 demonstrated normal and patient considered low risk for fracture.  With her recommendations for calcium and vitamin D supplementation as well as weightbearing and muscle strengthening exercises, fall prevention etc.  Recommendation for repeat DEXA on the same equipment in 18 to 24 months as clinically indicated.    Plan  Reviewed with son CT thoracic or lumbar 2/22/2024 in great detail identifying L1 vertebral body fracture and other degenerative changes.  Ordered today Lumbar spine 6 views flexion extension to for completion today upright without brace.  Due to red flag symptoms with sinus patient were to develop he should take her to the closest emergency department for immediate assessment.  Advised son if he has additional questions or concerns he should notify the neurosurgery department.  Ordered DEXA, clinical indication ground-level fall sustained an L1 compression fracture, no prophylactic treatment for bone health.  She  From: Sofia Xavier  To: Chelly Booth MD  Sent: 4/24/2018 9:56 AM EDT  Subject: Medication Renewal Request    Original authorizing provider: Chelly Booth MD    Mikhail Barajas would like a refill of the following medications:  nebivolol-valsartan (BYVALSON) 5-80 mg tab Chelly Booth MD]    Preferred pharmacy: [id number], [facility]. Comment:  Pharmacy never received the continued 30 day refill request after my appointment. I will run out of my medication on Thursday, 4/26. is postmenopausal.    Addendum  4/26/2024 completed x-ray lumbar spine 4 views none injury--Stable L1 compression fracture.Alignment is unremarkable. No dynamic instability with flexion and extension. Stable multilevel degenerative changes.    Telephoned phone son and reviewed x-ray results.  Explained to son although patient has been out of brace for about 2 to 3 weeks office assessment demonstrated some generalized weakness in the thoracic goal lumbar and upper extremities, and expected deconditioning after bracing for compression fracture.  Explained to son, will order physical therapy for Evaluate and Treat  please provide strengthening of muscles thoracic, lumbar, core, and upper extremities for generalized muscle weakness in areas status post compression fracture L1  treated conservatively with TLSO brace   Son is in agreement with plan.  Reports he will  physical therapy prescription on Monday as he wishes his mother to attend physical therapy at [facility].    METRICS   RM 18/24,  ODQ 33/45, EQ5D5L 555-0,678 your health today 70    VAS 5/10    History of CVA with residual deficit  Son question about the patient's CVA and that he read a report about some blockages his mother has in her head and he does not know what to do about that who should he contact.  Record review indicates patient had referral to neurology in 2022 initially for Dr. Peter due to to her gait instability, CVA with ataxia.  There is documentation neurology made several phone calls in an attempt to schedule appointment.  There is then another referral to Dr. Arguello for CVA phone calls made again and attempt to schedule appointment.    Son was not aware of the neurology referral status post stroke with the missed appointments.  Son reports he recently was awarded custody of his mother as there were social issues with the father and he is no longer in the picture.  Patient also has a homemaker from an agency who accompanied  patient during visit  Son reports his mother has recurrent falls, cannot stand, and is unable to walk.  He has been trying to get an order for physical therapy, he would like her to start therapy at [facility] as his girlfriend works there.  He is hoping physical therapy will improve her ability to walk and decrease her falls.  Patient is treating with ASA 81 mg p.o. daily.  BP today 118/70.    Plan  Referral neurology status post CVA with ataxia and ambulatory dysfunction lost to neurology follow up.          Compression fracture of L1 vertebra, initial encounter ([facility])  -     Ambulatory referral to Spine Surgery        Subjective: Initial neurosurgery visit for compression fracture, 9 weeks post diagnosis on imaging.    Patient ID: Shae Jung is a 73 y.o. female PM/SH: CKD , chronic low back pain, CVA with chronic ataxia    HPI   Presented in the emergency department on 2/22/2024 as per provider documentation son reports patient complains of bilateral hip/back pain.  Patient has a history of recurrent falls.  She is status post a CVA with late effects.  Family also reported patient has had an increase in falls and she suffered a head injury yesterday and did have a cut to the right scalp along with a bruise to the left forehead from the fall last week.  Patient has been trying to get into an outpatient rehab but is having insurance issues.  Pain medications Toradol administered in the emergency department and the patient was able to ambulate with minimal pain there.  2/22/2024 CT recon only thoracolumbar--Mild acute to subacute appearing compression fracture at the superior aspect of the L1 vertebral body. No retropulsion into the spinal canal.     ED diagnosis acute on chronic back pain, worsening ambulatory dysfunction, inability to control pain at home.  Patient was also seen in the emergency room a few weeks prior with complaint of back pain with negative imaging and was referred to her family  doctor.  ED ordered referrals to neurosurgery and comprehensive spine program.  Patient was fitted for an LSO brace.  Methocarbamol was reordered.  Also recommended the use of Tylenol and low-dose anti-inflammatory such as ibuprofen for a few days, her kidney function is normal.  Patient was ambulating at the end of ED treatment therefore discharged home with family    Of note during visit today son remarked that his mother hit her head during a fall and she has been concerned about what is going on in her head.  He reports that this fall occurred before her visit to the emergency department and is questioning if she can have a scan of her head.  ED record report review indicates patient did receive head imaging.    2/22/2024 CTA head without contrast no acute intracranial abnormality.    She presents for an initial neurosurgery visit 9 weeks after diagnosis of a compression fracture of L1 vertebrae seen on CT imaging.          REVIEW OF SYSTEMS  Review of Systems   Constitutional:  Positive for fatigue.   HENT: Negative.     Eyes: Negative.    Respiratory: Negative.     Cardiovascular: Negative.    Gastrointestinal: Negative.    Endocrine: Negative.    Genitourinary:  Positive for urgency (incontinent sometimes).   Musculoskeletal:  Positive for back pain (radiates to legs and hips) and gait problem (fall more than three time this month, off balance only wheelchair only).   Allergic/Immunologic: Negative.    Neurological:  Positive for dizziness (dizzy when getting up) and numbness (right hands). Negative for weakness.   Psychiatric/Behavioral:  Positive for sleep disturbance.          Meds/Allergies     Current Outpatient Medications   Medication Sig Dispense Refill    aspirin (ECOTRIN LOW STRENGTH) 81 mg EC tablet Take 81 mg by mouth daily       benzonatate (TESSALON PERLES) 100 mg capsule Take 1 capsule (100 mg total) by mouth 3 (three) times a day as needed for cough 20 capsule 0    Cholecalciferol (VITAMIN  D-3) 5000 units TABS Take 1 tablet by mouth daily 90 tablet 1    citalopram (CeleXA) 40 mg tablet Take 1 tablet (40 mg total) by mouth daily 90 tablet 1    cyclobenzaprine (FLEXERIL) 5 mg tablet Take 1 tablet (5 mg total) by mouth 3 (three) times a day as needed for muscle spasms 2.5 mg in the am, 2.5 mg in the pm and 5 mg qHs 90 tablet 1    Diclofenac Sodium (VOLTAREN) 1 % Apply 2 g topically 4 (four) times a day 150 g 0    Diclofenac Sodium (VOLTAREN) 1 % Apply 2 g topically 4 (four) times a day 350 g 0    docusate sodium (COLACE) 100 mg capsule Take 1 capsule (100 mg total) by mouth 2 (two) times a day as needed for constipation 90 capsule 0    fluticasone (FLONASE) 50 mcg/act nasal spray 1 spray into each nostril daily 16 g 1    gabapentin (Neurontin) 300 mg capsule 1 capsule  at the am, 1 capsule in the afternoon and 2 capsules at night 1 hour prior to bedtime 270 capsule 3    Incontinence Supply Disposable (Brief Overnight Large) MISC Use 4 (four) times a day 160 each 11    Incontinence Supply Disposable (Underpads) MISC Use in the morning Please dispense washable underpads 4 each 11    Infant Care Products (Comfort-Smooth Baby Wipes) MISC Use 4 (four) times a day 240 each 11    lidocaine (Lidoderm) 5 % Apply 1 patch topically over 12 hours daily Remove & Discard patch within 12 hours or as directed by MD 30 patch 1    lidocaine (LIDODERM) 5 % Apply 1 patch topically over 12 hours daily Remove & Discard patch within 12 hours or as directed by MD 15 patch 0    methocarbamol (ROBAXIN) 500 mg tablet Take 1 tablet (500 mg total) by mouth 2 (two) times a day 20 tablet 0    omeprazole (PriLOSEC) 20 mg delayed release capsule Take 2 capsules (40 mg total) by mouth 2 (two) times a day 90 capsule 2    polyethylene glycol (GLYCOLAX) powder Take 17 g by mouth daily 850 g 1    polyethylene glycol (MIRALAX) 17 g packet Take 17 g by mouth daily 30 each 3    triamcinolone (KENALOG) 0.1 % ointment Apply topically 2 (two)  times a day 30 g 0    famotidine (PEPCID) 20 mg tablet Take 1 tablet (20 mg total) by mouth daily at bedtime as needed for heartburn (Patient not taking: Reported on 2024) 30 tablet 1    methylPREDNISolone 4 MG tablet therapy pack Use as directed on package (Patient not taking: Reported on 2024) 21 tablet 0     No current facility-administered medications for this visit.       Allergies   Allergen Reactions    Tramadol      Causes nausea and vomiting        PAST HISTORY    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    Palpitations     Recurrent urinary tract infection     Seizures (HCC)     Sleep disorder     last assessed: 10/19/2013    Speech impairment     Stroke (HCC)     Thyroid disease     Tinnitus        Past Surgical History:   Procedure Laterality Date    AXILLARY SURGERY      cyst surgery    BLADDER SURGERY       SECTION      CYSTOSCOPY N/A 2018    Procedure: CYSTOSCOPY;  Surgeon: Charis Major MD;  Location: [facility];  Service: Gynecology    FACIAL BONE TUMOR EXCISION      KNEE SURGERY      OTHER SURGICAL HISTORY      cyst removed from axilla    OR ESOPHAGOGASTRODUODENOSCOPY TRANSORAL DIAGNOSTIC N/A 2017    Procedure: ESOPHAGOGASTRODUODENOSCOPY (EGD);  Surgeon: Jose Antonio Platt MD;  Location: [facility];  Service: Gastroenterology    OR SLING OPERATION STRESS INCONTINENCE N/A 2018    Procedure: PUBOVAGINAL SLING;  Surgeon: Charis Major MD;  Location: [facility] OR;   "Service: Gynecology    TUBAL LIGATION         Social History     Tobacco Use    Smoking status: Never    Smokeless tobacco: Never   Vaping Use    Vaping status: Never Used   Substance Use Topics    Alcohol use: No    Drug use: No       Family History   Problem Relation Age of Onset    No Known Problems Father     Diabetes Family         mellitus    Hypertension Family     Stroke Family     Thyroid disease Family     No Known Problems Mother     No Known Problems Sister     No Known Problems Daughter     No Known Problems Maternal Grandmother     No Known Problems Maternal Grandfather     No Known Problems Paternal Grandmother     No Known Problems Paternal Grandfather        The following portions of the patient's history were reviewed and updated as appropriate: allergies, current medications, past family history, past medical history, past social history, past surgical history and problem list.      EXAM    Vitals:Blood pressure 118/70, pulse 81, temperature 98 °F (36.7 °C), temperature source Temporal, resp. rate 20, height 4' 9\" (1.448 m), weight 58.1 kg (128 lb), SpO2 98%, not currently breastfeeding.,Body mass index is 27.7 kg/m².     Physical Exam  Vitals and nursing note reviewed. Chaperone present: Home care provider and son.   Constitutional:       General: She is not in acute distress.     Appearance: She is ill-appearing.   HENT:      Head: Normocephalic and atraumatic.   Eyes:      General: No scleral icterus.        Right eye: No discharge.         Left eye: No discharge.      Extraocular Movements: Extraocular movements intact.   Pulmonary:      Effort: Pulmonary effort is normal. No respiratory distress.   Musculoskeletal:      Right lower leg: No edema.      Left lower leg: No edema.   Skin:     General: Skin is warm and dry.   Neurological:      Mental Status: She is alert.      Motor: No weakness.      Coordination: Coordination normal. Heel to Shin Test abnormal.      Gait: Gait normal. Tandem " gait test: Cannot perform.     Comments: Patient follows simple directions when assessing upper trunk movement flexion, extension, and rotation side-to-side.         Neurologic Exam     Mental Status   Speech: (She is nonverbal during the visit, son addresses her in Cook Islander she does not verbally respond.)  Level of consciousness: alert    Motor Exam   Muscle bulk: decreased  Overall muscle tone: increased  Right leg tone: increased  Left leg tone: increased    Strength   Right deltoid: 4/5  Left deltoid: 4/5  Right biceps: 4/5  Left biceps: 4/5  Right triceps: 4/5  Left triceps: 4/5  Right wrist flexion: 4/5  Left wrist flexion: 4/5  Right wrist extension: 4/5  Left wrist extension: 4/5  Right interossei: 4/5  Left interossei: 4/5  Right iliopsoas: 4/5  Left iliopsoas: 4/5  Right quadriceps: 4/5  Left quadriceps: 4/5  Right hamstrin/5  Left hamstrin/5  Right glutei: 4/5  Left glutei: 4/5  Right posterior tibial: 4/5  Left posterior tibial: 4/5Difficult to assess motor function   Lower extremities are rigidity and apraxic.     Gait, Coordination, and Reflexes     Gait  Gait: (Wheelchair dependent mobility, stiff rigid extremities, toes are downgoing on right lower extremity.  Upper trunk movement appears normal for bending extension and twisting side-to-side.  She remained in wheelchair during the entire visit.)    Coordination   Heel to shin coordination: abnormal  Tandem gait test: Cannot perform.      Imaging Studies      2024  LUMBAR SPINE     INDICATION:   Wedge compression fracture of first lumbar vertebra, initial encounter for closed fracture. Unspecified sequelae of cerebral infarction.      COMPARISON: CT 2024     VIEWS:  XR SPINE LUMBAR MINIMUM 4 VIEWS NON INJURY  Images: 4     FINDINGS:     There are 5 non rib bearing lumbar vertebral bodies.      Stable L1 compression fracture.     Alignment is unremarkable. No dynamic instability with flexion and extension.     Stable multilevel  degenerative changes.     The pedicles appear intact.     There are atherosclerotic calcifications. Soft tissues are otherwise unremarkable.     IMPRESSION:   Stable L1 compression fracture.   Electronically signed: 04/26/2024 04:44 PM Matthew Patel, MPH,MD      1/18/2019 DEXA bone density of the spine hip and pelvis  IMPRESSION:  1. Based on the WHO classification, this study is normal and the patient is considered at low risk for fracture.     2. A daily intake of calcium of at least 1200 mg and vitamin D, 800-1000 IU, as well as weight bearing and muscle strengthening exercise, fall prevention and avoidance of tobacco and excessive alcohol intake as basic preventive measures are recommended.  The3. Repeat DXA scan on the same equipment in 18-24 months as clinically indicated.           I have personally reviewed pertinent reports.   and I have personally reviewed pertinent films in PACS    I have spent a total time of 45 minutes on 04/27/24 in caring for this patient including Diagnostic results, Risks and benefits of tx options, Instructions for management, Patient and family education, Importance of tx compliance, Risk factor reductions, Impressions, Counseling / Coordination of care, Documenting in the medical record, Reviewing / ordering tests, medicine, procedures  , Obtaining or reviewing history  , and Communicating with other healthcare professionals .     PLEASE NOTE:  This encounter may have been completed utilizing the [employer] Direct Speech Voice Recognition Software. Grammatical errors, random word insertions, pronoun errors and incomplete sentences are occasional consequences of the system due to software limitations, ambient noise and hardware issues.These may be missed even after proof reading prior to affixing electronic signature. Please do not hesitate to contact me directly if you have any questions or concerns about the content, text or information contained within the body of this  dictation.

## 2024-04-26 NOTE — ASSESSMENT & PLAN NOTE
Presented for initial neurosurgery visit 9 weeks status post diagnosed compression fracture in the ED on imaging.  Son reports patient wore the TLSO brace for approximately 1 month faithfully about 6 hours a day.  Reports the brace has been off now for 2 to 3 weeks.  Patient has never completed upright lumbar spine imaging in a brace.  Patient arrives via wheelchair not wearing the brace.  Patient denies thoracic go lumbar pain during palpation or movement in the chair flexion, extension, and side-to-side rotation.  Patient does admit to pain in her lower back L5-S1 and between the buttocks.  Patient denies radicular symptoms and does have a history of degenerative arthritis/spondylosis on.  Imaging last imaging CT recon of  Staff is post CVA with ataxia/late effects, lower extremities are rigid with increased tone.  Right foot toes are down going, lower extremities are rigid and stiff.  Most recent DEXA with January 2019 demonstrated normal and patient considered low risk for fracture.  With her recommendations for calcium and vitamin D supplementation as well as weightbearing and muscle strengthening exercises, fall prevention etc.  Recommendation for repeat DEXA on the same equipment in 18 to 24 months as clinically indicated.    Plan  Reviewed with son CT thoracic or lumbar 2/22/2024 in great detail identifying L1 vertebral body fracture and other degenerative changes.  Ordered today Lumbar spine 6 views flexion extension to for completion today upright without brace.  Due to red flag symptoms with sinus patient were to develop he should take her to the closest emergency department for immediate assessment.  Advised son if he has additional questions or concerns he should notify the neurosurgery department.  Ordered DEXA, clinical indication ground-level fall sustained an L1 compression fracture, no prophylactic treatment for bone health.  She is postmenopausal.    Addendum  4/26/2024 completed x-ray lumbar  spine 4 views none injury--Stable L1 compression fracture.Alignment is unremarkable. No dynamic instability with flexion and extension. Stable multilevel degenerative changes.    Telephoned phone son and reviewed x-ray results.  Explained to son although patient has been out of brace for about 2 to 3 weeks office assessment demonstrated some generalized weakness in the thoracic goal lumbar and upper extremities, and expected deconditioning after bracing for compression fracture.  Explained to son, will order physical therapy for Evaluate and Treat  please provide strengthening of muscles thoracic, lumbar, core, and upper extremities for generalized muscle weakness in areas status post compression fracture L1  treated conservatively with TLSO brace   Son is in agreement with plan.  Reports he will  physical therapy prescription on Monday as he wishes his mother to attend physical therapy at [facility].    METRICS   RM 18/24,  ODQ 33/45, EQ5D5L 555-0,678 your health today 70    VAS 5/10

## 2024-04-27 NOTE — ASSESSMENT & PLAN NOTE
Son question about the patient's CVA and that he read a report about some blockages his mother has in her head and he does not know what to do about that who should he contact.  Record review indicates patient had referral to neurology in 2022 initially for Dr. Peter due to to her gait instability, CVA with ataxia.  There is documentation neurology made several phone calls in an attempt to schedule appointment.  There is then another referral to Dr. Arguello for CVA phone calls made again and attempt to schedule appointment.    Son was not aware of the neurology referral status post stroke with the missed appointments.  Son reports he recently was awarded custody of his mother as there were social issues with the father and he is no longer in the picture.  Patient also has a homemaker from an agency who accompanied patient during visit  Son reports his mother has recurrent falls, cannot stand, and is unable to walk.  He has been trying to get an order for physical therapy, he would like her to start therapy at [facility] as his girlfriend works there.  He is hoping physical therapy will improve her ability to walk and decrease her falls.  Patient is treating with ASA 81 mg p.o. daily.  BP today 118/70.    Plan  Referral neurology status post CVA with ataxia and ambulatory dysfunction lost to neurology follow up.

## 2024-05-09 ENCOUNTER — TELEPHONE (OUTPATIENT)
Dept: FAMILY MEDICINE CLINIC | Facility: CLINIC | Age: 74
End: 2024-05-09

## 2024-05-09 ENCOUNTER — TELEPHONE (OUTPATIENT)
Dept: NEUROLOGY | Facility: CLINIC | Age: 74
End: 2024-05-09

## 2024-05-09 NOTE — TELEPHONE ENCOUNTER
PCP SIGNATURE NEEDED FOR [facility]  FORM RECEIVED VIA FAX AND PLACED IN PCP FOLDER TO BE DELIVERED AT ASSIGNED TIMES.    Encounter: [id number]  Start 5/7/24  End 7/15/24

## 2024-05-09 NOTE — TELEPHONE ENCOUNTER
5/6/24, 11:31    Hi, my name is Jean Jung. I'm calling on behalf of our mother Shae Jung. I'm just calling to see if there's any sooner openings for her appointment. If you could give me a call back to my number, which you guys have on file, greatly appreciate it. Thank you and have a wonderful day.  [phone]     Pls see referral

## 2024-05-14 NOTE — TELEPHONE ENCOUNTER
FAXED ON 05/14/24 TO [facility]  at [phone]. FAX CONFIRMATION RECEIVED.      Pt Outpatient Certification & Summary

## 2024-05-15 ENCOUNTER — TELEPHONE (OUTPATIENT)
Dept: FAMILY MEDICINE CLINIC | Facility: CLINIC | Age: 74
End: 2024-05-15

## 2024-05-15 DIAGNOSIS — Z12.31 SCREENING MAMMOGRAM FOR BREAST CANCER: Primary | ICD-10-CM

## 2024-05-15 NOTE — TELEPHONE ENCOUNTER
Pts son came into office and is requesting a referral be placed for a mammogram. Pts son was trying to schedule one and was told pt would need a referral. Ps son stated she has been having a pain and he would like her to get a mammogram. Pt was seeing bar and a f/u appt has been scheduled for 6/3/24.        Any questions please contact pt. Thank you !

## 2024-05-28 ENCOUNTER — RA CDI HCC (OUTPATIENT)
Dept: OTHER | Facility: HOSPITAL | Age: 74
End: 2024-05-28

## 2024-05-28 NOTE — PROGRESS NOTES
HCC coding opportunities          Chart Reviewed number of suggestions sent to Provider: 1     Patients Insurance     Medicare Insurance: [employer]          E11.22

## 2024-06-03 ENCOUNTER — OFFICE VISIT (OUTPATIENT)
Dept: FAMILY MEDICINE CLINIC | Facility: CLINIC | Age: 74
End: 2024-06-03

## 2024-06-03 VITALS
HEIGHT: 57 IN | HEART RATE: 89 BPM | WEIGHT: 131.4 LBS | RESPIRATION RATE: 18 BRPM | DIASTOLIC BLOOD PRESSURE: 72 MMHG | BODY MASS INDEX: 28.35 KG/M2 | TEMPERATURE: 98.5 F | OXYGEN SATURATION: 99 % | SYSTOLIC BLOOD PRESSURE: 117 MMHG

## 2024-06-03 DIAGNOSIS — I69.30 HISTORY OF CVA WITH RESIDUAL DEFICIT: ICD-10-CM

## 2024-06-03 DIAGNOSIS — F32.89 OTHER DEPRESSION: ICD-10-CM

## 2024-06-03 DIAGNOSIS — I10 PRIMARY HYPERTENSION: ICD-10-CM

## 2024-06-03 DIAGNOSIS — M21.961 DEFORMITY OF BOTH FEET: ICD-10-CM

## 2024-06-03 DIAGNOSIS — K21.9 GASTROESOPHAGEAL REFLUX DISEASE, UNSPECIFIED WHETHER ESOPHAGITIS PRESENT: ICD-10-CM

## 2024-06-03 DIAGNOSIS — M21.962 DEFORMITY OF BOTH FEET: ICD-10-CM

## 2024-06-03 DIAGNOSIS — R26.2 AMBULATORY DYSFUNCTION: ICD-10-CM

## 2024-06-03 DIAGNOSIS — S32.010D COMPRESSION FRACTURE OF L1 VERTEBRA WITH ROUTINE HEALING, SUBSEQUENT ENCOUNTER: Primary | ICD-10-CM

## 2024-06-03 PROCEDURE — 99214 OFFICE O/P EST MOD 30 MIN: CPT | Performed by: FAMILY MEDICINE

## 2024-06-03 PROCEDURE — G2211 COMPLEX E/M VISIT ADD ON: HCPCS | Performed by: FAMILY MEDICINE

## 2024-06-03 RX ORDER — CITALOPRAM 20 MG/1
20 TABLET ORAL DAILY
Qty: 30 TABLET | Refills: 2 | Status: SHIPPED | OUTPATIENT
Start: 2024-06-03

## 2024-06-03 RX ORDER — CITALOPRAM 40 MG/1
40 TABLET ORAL DAILY
Qty: 90 TABLET | Refills: 1 | Status: SHIPPED | OUTPATIENT
Start: 2024-06-03 | End: 2024-06-03

## 2024-06-03 RX ORDER — CYCLOBENZAPRINE HCL 5 MG
5 TABLET ORAL 3 TIMES DAILY PRN
Qty: 30 TABLET | Refills: 1 | Status: SHIPPED | OUTPATIENT
Start: 2024-06-03

## 2024-06-03 RX ORDER — OMEPRAZOLE 20 MG/1
40 CAPSULE, DELAYED RELEASE ORAL 2 TIMES DAILY
Qty: 30 CAPSULE | Refills: 2 | Status: SHIPPED | OUTPATIENT
Start: 2024-06-03

## 2024-06-03 NOTE — ASSESSMENT & PLAN NOTE
Stable  PHQ 9 score 5  Patient wants to reduce Celexa from 40 mg to 20 mg due to GI side effect  We will reduce Celexa

## 2024-06-03 NOTE — ASSESSMENT & PLAN NOTE
History of CVA  Patient has residual lower extremity weakness  Patient has gait instability  Recommend outpatient follow-up with physical therapy

## 2024-06-03 NOTE — ASSESSMENT & PLAN NOTE
History of lumbar degenerative disease  Patient with history of CVA with gait disturbance  Recommend that they get DEXA scan since patient is postmenopausal with fragility fracture

## 2024-06-03 NOTE — ASSESSMENT & PLAN NOTE
GERD  Continue omeprazole twice daily  Discussed with family risk of chronic PPI use including osteoporosis, electrolyte abnormality, pneumonia, and kidney failure  Recommend trial of transitioning from PPI to H2 antagonist in the future

## 2024-06-03 NOTE — PROGRESS NOTES
Ambulatory Visit  Name: Shae Jung      : 1950      MRN: [id number]  Encounter Provider: Vicente Zhu MD  Encounter Date: 6/3/2024   Encounter department: [facility]    Assessment & Plan   1. Compression fracture of L1 vertebra with routine healing, subsequent encounter  Assessment & Plan:  History of lumbar degenerative disease  Patient with history of CVA with gait disturbance  Recommend that they get DEXA scan since patient is postmenopausal with fragility fracture  Orders:  -     cyclobenzaprine (FLEXERIL) 5 mg tablet; Take 1 tablet (5 mg total) by mouth 3 (three) times a day as needed for muscle spasms 2.5 mg in the am, 2.5 mg in the pm and 5 mg qHs  2. Deformity of both feet  -     Ambulatory Referral to Podiatry; Future  3. Ambulatory dysfunction  Assessment & Plan:  History of CVA  Difficulty ambulating and wheelchair-bound    4. Gastroesophageal reflux disease, unspecified whether esophagitis present  Assessment & Plan:  GERD  Continue omeprazole twice daily  Discussed with family risk of chronic PPI use including osteoporosis, electrolyte abnormality, pneumonia, and kidney failure  Recommend trial of transitioning from PPI to H2 antagonist in the future  Orders:  -     omeprazole (PriLOSEC) 20 mg delayed release capsule; Take 2 capsules (40 mg total) by mouth 2 (two) times a day  5. Other depression  Assessment & Plan:  Stable  PHQ 9 score 5  Patient wants to reduce Celexa from 40 mg to 20 mg due to GI side effect  We will reduce Celexa  Orders:  -     citalopram (CeleXA) 20 mg tablet; Take 1 tablet (20 mg total) by mouth daily  6. History of CVA with residual deficit  Assessment & Plan:  History of CVA  Patient has residual lower extremity weakness  Patient has gait instability  Recommend outpatient follow-up with physical therapy   7. Primary hypertension  Assessment & Plan:  Well-controlled      Depression Screening and Follow-up Plan:  "Patient's depression screening was positive with a PHQ-9 score of 5. Patient with underlying depression and was advised to continue current medications as prescribed.       History of Present Illness     73-year-old female with a history of hypertension and CVA who presents today with her son for follow-up. He also reports history of bilateral back pain.  Reports that patient has been having more falls the past few months.  Patient is currently wheelchair bound.  Patient has difficulty ambulating after CVA.  Patient has loss of equilibrium and gait disturbance.  Patient is risk for falling.  Her son is requesting podiatry evaluation.  He reports that her bilateral feet tends to curl up which makes it difficult for her to ambulate.          Review of Systems   Constitutional:  Negative for diaphoresis, fatigue and fever.   Respiratory:  Negative for shortness of breath and wheezing.    Cardiovascular:  Negative for chest pain and palpitations.   Gastrointestinal:  Negative for abdominal pain, nausea and vomiting.   Endocrine: Negative for polydipsia, polyphagia and polyuria.   Genitourinary:  Negative for urgency.   Musculoskeletal:  Positive for arthralgias, back pain, gait problem and myalgias.   Skin:  Negative for rash.   Neurological:  Negative for dizziness.   Psychiatric/Behavioral:  Positive for dysphoric mood.        Objective     /72 (BP Location: Left arm, Patient Position: Sitting, Cuff Size: Standard)   Pulse 89   Temp 98.5 °F (36.9 °C) (Temporal)   Resp 18   Ht 4' 9\" (1.448 m)   Wt 59.6 kg (131 lb 6.4 oz)   SpO2 99%   BMI 28.43 kg/m²     Physical Exam  Constitutional:       General: She is not in acute distress.     Appearance: Normal appearance. She is not ill-appearing, toxic-appearing or diaphoretic.   HENT:      Head: Normocephalic.      Nose: Nose normal.   Cardiovascular:      Pulses: Normal pulses.      Heart sounds: Normal heart sounds. No murmur heard.     No friction rub. No " gallop.   Pulmonary:      Breath sounds: Normal breath sounds.   Abdominal:      General: There is no distension.      Palpations: Abdomen is soft. There is no mass.      Tenderness: There is no abdominal tenderness.      Hernia: No hernia is present.   Musculoskeletal:      Right foot: Decreased range of motion. Deformity present.      Left foot: Decreased range of motion. Deformity present.   Neurological:      Mental Status: She is alert.      Cranial Nerves: Cranial nerve deficit present.      Motor: Weakness present.      Gait: Gait abnormal.      Comments: Wheelchair-bound   Psychiatric:         Mood and Affect: Mood normal.         Behavior: Behavior normal.       Administrative Statements

## 2024-06-07 ENCOUNTER — OFFICE VISIT (OUTPATIENT)
Dept: DENTISTRY | Facility: CLINIC | Age: 74
End: 2024-06-07

## 2024-06-07 VITALS — TEMPERATURE: 97.5 F | HEART RATE: 75 BPM | SYSTOLIC BLOOD PRESSURE: 137 MMHG | DIASTOLIC BLOOD PRESSURE: 65 MMHG

## 2024-06-07 DIAGNOSIS — K08.109 TEETH MISSING: Primary | ICD-10-CM

## 2024-06-07 NOTE — PROGRESS NOTES
Final Impression    Shae Jung 73 y.o. female presents with  son  to [facility] for Final impression Lower Complete Denture  PMH reviewed, no changes, ASA II  Pain level 0/10    Diagnosis: Missing teeth    Consent: Tx plan reviewed. Patient understands and consents.    Procedure details:  Custom tray tried intra-orally. Confirmed proper seating of the tray, adequate extension over significant anatomy, and short of vestibules.  Rest seats: ** prepped with high speed handpiece and sandoval bur  Tray adhesive applied and dried.  Border molded with Heavy body PVS  Final impression taken with Heavy and light body PVS in border molded custom tray  Impression free of voids and captures significant anatomy and vestibules completely.    Sent to [facility] for Bite rims fabrication. Reminded pt to bring maxillary CD for  impression    Attending Dr. Ruano    [place]: Lower bite rims

## 2024-06-12 ENCOUNTER — TELEPHONE (OUTPATIENT)
Dept: FAMILY MEDICINE CLINIC | Facility: CLINIC | Age: 74
End: 2024-06-12

## 2024-06-12 NOTE — TELEPHONE ENCOUNTER
Good Morning ,     A representative from [facility] contacted office in regards to patient. [facility] is asking for a referral for a DEXA SCAN for a fracture that patient has. Please advise?    Representative would also like a call when referral is placed!

## 2024-06-12 NOTE — TELEPHONE ENCOUNTER
Voice message was left for representative [facility], informing of Dexa Scan appointment.

## 2024-06-26 ENCOUNTER — HOSPITAL ENCOUNTER (OUTPATIENT)
Dept: ULTRASOUND IMAGING | Facility: CLINIC | Age: 74
Discharge: HOME | End: 2024-06-26
Payer: MEDICARE

## 2024-06-26 ENCOUNTER — HOSPITAL ENCOUNTER (OUTPATIENT)
Dept: MAMMOGRAPHY | Facility: CLINIC | Age: 74
Discharge: HOME | End: 2024-06-26
Payer: MEDICARE

## 2024-06-26 VITALS — WEIGHT: 131 LBS | HEIGHT: 57 IN | BODY MASS INDEX: 28.26 KG/M2

## 2024-06-26 DIAGNOSIS — N63.0 BREAST NODULE: ICD-10-CM

## 2024-06-26 PROCEDURE — G0279 TOMOSYNTHESIS, MAMMO: HCPCS

## 2024-06-26 PROCEDURE — 77066 DX MAMMO INCL CAD BI: CPT

## 2024-06-26 PROCEDURE — 76642 ULTRASOUND BREAST LIMITED: CPT

## 2024-07-02 ENCOUNTER — TELEPHONE (OUTPATIENT)
Dept: FAMILY MEDICINE CLINIC | Facility: CLINIC | Age: 74
End: 2024-07-02

## 2024-07-03 NOTE — TELEPHONE ENCOUNTER
FAXED ON 07/03/24 TO [facility] at [phone]. FAX CONFIRMATION RECEIVED.

## 2024-07-05 ENCOUNTER — TELEPHONE (OUTPATIENT)
Dept: FAMILY MEDICINE CLINIC | Facility: CLINIC | Age: 74
End: 2024-07-05

## 2024-07-05 NOTE — TELEPHONE ENCOUNTER
FAXED ON 07/05/24 TO [facility] at [phone]. FAX CONFIRMATION RECEIVED.

## 2024-07-11 ENCOUNTER — TELEPHONE (OUTPATIENT)
Dept: FAMILY MEDICINE CLINIC | Facility: CLINIC | Age: 74
End: 2024-07-11

## 2024-07-11 ENCOUNTER — OFFICE VISIT (OUTPATIENT)
Dept: DENTISTRY | Facility: CLINIC | Age: 74
End: 2024-07-11

## 2024-07-11 VITALS — HEART RATE: 76 BPM | DIASTOLIC BLOOD PRESSURE: 73 MMHG | SYSTOLIC BLOOD PRESSURE: 131 MMHG | TEMPERATURE: 99.1 F

## 2024-07-11 DIAGNOSIS — K08.109 TEETH MISSING: Primary | ICD-10-CM

## 2024-07-11 DIAGNOSIS — R49.0 HOARSENESS: Primary | ICD-10-CM

## 2024-07-11 PROCEDURE — WIS5002 BITE RIMS

## 2024-07-11 NOTE — TELEPHONE ENCOUNTER
Good afternoon ,   Patient son came into office in regards to patient needing a referral for Referral For (Fiberoptic Endoscopic Evaluation For Swallowing). Patient is enrolled with [facility] and they won't continue therapy until she has this referral. Patient needs referral by tomorrow. Please advise?

## 2024-07-16 NOTE — PROGRESS NOTES
Reviewed health history-  Pt is ASA type II  Treatment consents signed: Yes   Perio: Non applicable   Pain Scale: 0   Caries Assessment: Non applicable     Radiographs: Films are current  Oral Hygiene instruction reviewed  Annual evaluation of soft tissue recommended    Dental Procedure:    Pt presents for sara wax rim bite registration. PMH reviewed, no changes. Wax rim tried intraorally. Adjusted mandibular rim to maxillary rim at VDO with proper tissue support. Marked midline, high smile line, and distal of canine lines. Notched rims and obtained bite registration with bluprint PVS.  shade will  Be matched with the Upper A2 and confirmed by pt via mirror. Impression of existing upper denture taken and sent to the lab. Pt left satisfied and ambulatory.     Prognosis is Good.   Referrals Needed: No  Next visit: wax try in of the lower

## 2024-07-18 ENCOUNTER — TELEPHONE (OUTPATIENT)
Dept: FAMILY MEDICINE CLINIC | Facility: CLINIC | Age: 74
End: 2024-07-18

## 2024-07-19 ENCOUNTER — TELEPHONE (OUTPATIENT)
Dept: NEUROSURGERY | Facility: CLINIC | Age: 74
End: 2024-07-19

## 2024-07-19 NOTE — TELEPHONE ENCOUNTER
Received call on nurseline from Physical Therapist at [facility] wanting to speak directly to the provider who ordered PT referral. She states that it is a complex case and would just like to talk to [place]. After review of the chart, patient was last seen in clinic 4/26/2024, L1 compression fracture. Requesting callback to Miriam at [phone], will route to provider as requested.

## 2024-07-23 ENCOUNTER — TELEPHONE (OUTPATIENT)
Dept: NEUROSURGERY | Facility: CLINIC | Age: 74
End: 2024-07-23

## 2024-07-23 NOTE — TELEPHONE ENCOUNTER
ELYSSA, PHYSICAL THERAPIST FROM [facility] CB TO SPEAK WITH JESSENIA IN REGARD TO [facility] PT.    ELYSSA IS LOOKING FOR PAST OFFICE VISITS OR NOTES TO BETTER UNDERSTAND PT'S MEDICAL HISTORY.    ELYSSA IS OPEN TO TALKING ON THE PHONE OR IF FAXING IS EASIER WILL TAKE ANY INFORMATION TO BETTER HELP PT.    ELYSSA'S WORK PHONE - [phone]  FAX - [phone]

## 2024-07-23 NOTE — TELEPHONE ENCOUNTER
Returned call left message to call me before 0800 today otherwise I am not avaliable until 430 PM today.Regerding message below.    Received call on nurseline from Physical Therapist at [facility] wanting to speak directly to the provider who ordered PT referral. She states that it is a complex case and would just like to talk to [place]. After review of the chart, patient was last seen in clinic 4/26/2024, L1 compression fracture. Requesting callback to Miriam at [phone], will route to provider as requested.

## 2024-07-25 NOTE — TELEPHONE ENCOUNTER
FAXED ON 07/26/24 TO [facility]  at [phone]. FAX CONFIRMATION RECEIVED.    Scanned into pt chart.

## 2024-08-02 ENCOUNTER — TELEPHONE (OUTPATIENT)
Dept: NEUROLOGY | Facility: CLINIC | Age: 74
End: 2024-08-02

## 2024-08-02 NOTE — TELEPHONE ENCOUNTER
8/2/24 Lvm to conf appt with  on 8/7/24            .Please conf appt if pt calls back.Thank you in advance

## 2024-08-07 ENCOUNTER — CONSULT (OUTPATIENT)
Dept: NEUROLOGY | Facility: CLINIC | Age: 74
End: 2024-08-07
Payer: MEDICARE

## 2024-08-07 VITALS
BODY MASS INDEX: 28.35 KG/M2 | HEIGHT: 57 IN | DIASTOLIC BLOOD PRESSURE: 68 MMHG | SYSTOLIC BLOOD PRESSURE: 102 MMHG | OXYGEN SATURATION: 97 % | HEART RATE: 89 BPM | TEMPERATURE: 97.6 F

## 2024-08-07 DIAGNOSIS — R53.1 RIGHT SIDED WEAKNESS: ICD-10-CM

## 2024-08-07 DIAGNOSIS — I69.30 HISTORY OF CVA WITH RESIDUAL DEFICIT: ICD-10-CM

## 2024-08-07 DIAGNOSIS — R06.83 SNORING: ICD-10-CM

## 2024-08-07 DIAGNOSIS — G44.209 TENSION HEADACHE: Primary | ICD-10-CM

## 2024-08-07 PROCEDURE — 99205 OFFICE O/P NEW HI 60 MIN: CPT | Performed by: PSYCHIATRY & NEUROLOGY
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
## 2024-08-08 ENCOUNTER — OFFICE VISIT (OUTPATIENT)
Dept: DENTISTRY | Facility: CLINIC | Age: 74
End: 2024-08-08

## 2024-08-08 VITALS — HEART RATE: 64 BPM | TEMPERATURE: 97.7 F | DIASTOLIC BLOOD PRESSURE: 78 MMHG | SYSTOLIC BLOOD PRESSURE: 117 MMHG

## 2024-08-08 DIAGNOSIS — K08.109 TEETH MISSING: Primary | ICD-10-CM

## 2024-08-08 PROCEDURE — WIS5003 WAX TRY-IN

## 2024-08-08 NOTE — ASSESSMENT & PLAN NOTE
-for secondary stroke prevention, recommend continuation of combination of aspirin.   -Blood Pressure goal < 130/80, BP is at goal currently   -LDL goal <70  -snoring - does snore, will refer patient to sleep medicine     Counseling/stroke education -   -I advised patient to avoid using NSAIDs for headaches or other pain and to stick to tylenol if needed  -Recommend lifestyle modifications such as mediterranean diet & regular exercise regimen atleast 4-5 times a week for 20-30 minutes.   -I educated patient/family regarding medication compliance  -encourage smoking cessation, and control of diabetes and hypertension; defer management to primary

## 2024-08-08 NOTE — DENTAL PROCEDURE DETAILS
Teeth Try-In    Shae Jung 73 y.o. female presents with son and nurse to [facility] for denture teeth try-in for lower complete denture. PMH reviewed, no changes, ASA III.    Procedure details:  Record base with teeth set in wax tried intra-orally. Pt has knife-edged mandibular ridge (very little retention). Informed son that it will be difficult to keep the denture in place- replied he was already told about it.   Verified occlusion and aesthetics with patient mirror.  Pt informed this is the last chance to request changes if desired. Patient accepted denture for final processing.  Selected gingival shade:  Dark L 1-999 .    Patient dismissed ambulatory and alert.    NV: lower denture delivery.    Attending: Dr. Ruano was present in clinic.

## 2024-08-20 ENCOUNTER — TELEPHONE (OUTPATIENT)
Dept: DENTISTRY | Facility: CLINIC | Age: 74
End: 2024-08-20

## 2024-08-20 NOTE — TELEPHONE ENCOUNTER
Called Pt LVM to Cancel appt. For her denture delivery. Need to send pre-auth to ins. For approval.

## 2024-08-21 ENCOUNTER — TELEPHONE (OUTPATIENT)
Dept: DENTISTRY | Facility: CLINIC | Age: 74
End: 2024-08-21

## 2024-08-21 ENCOUNTER — OFFICE VISIT (OUTPATIENT)
Dept: DENTISTRY | Facility: CLINIC | Age: 74
End: 2024-08-21

## 2024-08-21 VITALS — DIASTOLIC BLOOD PRESSURE: 74 MMHG | TEMPERATURE: 98 F | SYSTOLIC BLOOD PRESSURE: 124 MMHG | HEART RATE: 76 BPM

## 2024-08-21 DIAGNOSIS — K08.109 TEETH MISSING: Primary | ICD-10-CM

## 2024-08-21 PROCEDURE — D5120 COMPLETE DENTURE - MANDIBULAR: HCPCS

## 2024-08-21 NOTE — TELEPHONE ENCOUNTER
Spoke to ins. due to approval expiring. As per rep Arturo pt has sandoval plan and auth is not required. Pt is allowed one denture per year ref call # [phone] -YMATTHIAS   ** Spoke to Zarina for delivery approval, approval was given-NAI

## 2024-08-21 NOTE — DENTAL PROCEDURE DETAILS
Denture Delivery    Shae Jung 73 y.o. female presents with self and caregiver/son to [facility] for denture delivery.  PMH reviewed, no changes, ASA III.    Procedure details:  Denture tried intraorally  Confirmed adequate retention, phonetics, occlusion, and aesthetics with patient mirror.  Identified areas of impingement by seating denture with Light body PVS.  Adjusted denture base with acrylic bur until patient comfortable.  Occlusion verified and adjusted.  Demonstrated appropriate use of denture adhesive paste for if needed.  Provided denture home care instructions and storage case.     Patient dismissed ambulatory and alert.    NV: Denture adjustments as needed.    Attending: Dr. Phillips was present in clinic.

## 2024-08-22 ENCOUNTER — EVALUATION (OUTPATIENT)
Dept: PHYSICAL THERAPY | Facility: CLINIC | Age: 74
End: 2024-08-22
Payer: MEDICARE

## 2024-08-22 VITALS — SYSTOLIC BLOOD PRESSURE: 136 MMHG | DIASTOLIC BLOOD PRESSURE: 74 MMHG

## 2024-08-22 DIAGNOSIS — G44.209 TENSION HEADACHE: ICD-10-CM

## 2024-08-22 PROCEDURE — 97163 PT EVAL HIGH COMPLEX 45 MIN: CPT

## 2024-08-22 PROCEDURE — 97112 NEUROMUSCULAR REEDUCATION: CPT

## 2024-08-22 PROCEDURE — 97110 THERAPEUTIC EXERCISES: CPT

## 2024-08-22 NOTE — TELEPHONE ENCOUNTER
Additional Information  • Negative: Is this related to an MVA?  • Negative: Is this related to a work injury?  • Negative: Are you currently recieving homecare services?  • Affirmative: Is this a chronic condition?  • Affirmative: Has the patient experienced major trauma? (fall from height, high speed collision, direct blow to spine) and is also experiencing nausea, light-headedness, or loss of consciousness?  • Negative: Is the patient experiencing blood in sputum?  • Negative: Has the patient had unexplained weight loss?  • Negative: Does the patient have a fever?  • Negative: Is the patient experiencing urine retention?  • Negative: Is the patient experiencing acute drop foot or paralysis?    Protocols used: [facility] Protocol

## 2024-08-22 NOTE — PROGRESS NOTES
PT Evaluation     Today's date: 2024  Patient name: Shae Jung  : 1950  MRN: [id number]  Referring provider: Nelly Hancock MD  Dx:   Encounter Diagnosis     ICD-10-CM    1. Tension headache  G44.209 Ambulatory Referral to [facility]                     Assessment  Impairments: abnormal muscle tone, abnormal or restricted ROM, activity intolerance, impaired balance, impaired physical strength, pain with function, poor posture  and unable to perform ADL  Symptom irritability: high    Assessment details: Shae Jung  is a pleasant 73 y.o. female who presents with tension HA, cervical limitation and pain. Note moderate postural deficits throughout with increased forward head and shoulder elevation throughout with UT compensation noted. Periscapular and UE weakness present throughout as well as increased muscle spasming/ cervical limitation causing movement deficits.  Recommended consult with mental health services, and PT/ OT services for additional residual limitations secondary to CVA. Comprehensive education provided to son on dx, posture and continuing to monitor for symptoms of HTN as well as positioning at home.     Problem List:  1) postural deficits   2) cervical muscle spasming/ restriction  3) post / donald scap strength deficits     Tolerated session without adverse effects.   Pt. will benefit from skilled PT services that includes manual therapy techniques to enhance tissue extensibility, neuromuscular re-education to facilitate motor control, therapeutic exercise to increase functional mobility, and modalities prn to reduce pain and inflammation.     Access Code: [email or web address]  URL: [email or web address]  Date: 2024  Prepared by: Sheela Moffett    Exercises  - Seated Upper Trapezius Stretch  - 1 x daily - 7 x weekly - 3 sets - 30 hold  - Gentle Levator Scapulae Stretch  - 1 x daily - 7 x weekly - 3 sets - 30 hold  - Seated Cervical Retraction  - 1 x daily  - 7 x weekly - 10 reps - 3 hold  - Seated Scapular Retraction  - 1 x daily - 7 x weekly - 2 sets - 10 reps  Barriers to intervention: medical complexity  Understanding of Dx/Px/POC: good     Prognosis: fair  Prognosis details: Movement limitation     Goals  Impairment Goals  - Pt I with initial HEP in 1-2 visits  - Improve cervical ROM to WNL in 4-6 weeks  - Increase UE strength to 5/5 in all affected areas in 4-6 weeks  - Improve periscapular strength to 5/5 throughout in 4-6 weeks     Functional Goals  - Increase Functional Status Measure to: MDC  - Patient will be independent with comprehensive HEP in 6-8 weeks  - Patient will be able to reach for object from shelf at shoulder height with min reports of difficulty in 2-4 weeks without pain   -Patient will be able to sit for prolonged periods without increased pain or compensation by d.c     Plan  Patient would benefit from: PT eval and skilled physical therapy  Referral necessary: Yes  Planned modality interventions: thermotherapy: hydrocollator packs and cryotherapy    Planned therapy interventions: IASTM, joint mobilization, kinesiology taping, manual therapy, nerve gliding, neuromuscular re-education, patient/caregiver education, postural training, strengthening, stretching, therapeutic exercise, therapeutic activities, home exercise program and flexibility    Frequency: 2x week  Treatment plan discussed with: patient and caregiver  Plan details: 12 visits as per start back        Subjective Evaluation    History of Present Illness  Mechanism of injury: Shae Jung presents with c/c of neck pain/ HA. No JOHNNIE- notes headaches occurred since last CVA in 2019. Reports HA occur out of nowhere unable to say how often. Multiple hx of falls with head injury occurring in the back of the head. Reports periodic dizziness with movement in the bed. Reports numbness/ tingling in the R UE into all of the fingers periodically but frequent.   Aggravating factors: pain from  "the neck causes headaches   Relieving factors: tylenol, heat   24hr pain pattern: 4/10 (current), 2/10 (best), 5/10 (worst), location:L UT to the head , descriptors: pressure   Imaging: none  Previous treatments: none for the neck   Patient goals: \" to just figure out what is going on and to help the pain and help her sleep\"    Family member/ caregiver presents for translation           Recurrent probem    Quality of life: fair    Social Support  Lives in: multiple-level home  Lives with: adult children    Employment status: not working        Objective     Concurrent Complaints  Positive for night pain, disturbed sleep, dizziness (periodic), headaches, tinnitus (ongoing 1 year) and trouble swallowing. Negative for faints and nausea/motion sickness    Additional Special Questions  Swallowing from CVA     Postural Observations  Seated posture: poor  Standing posture: poor  Correction of posture: makes symptoms better    Additional Postural Observation Details  Elevated shoulders and restrictions  increased elevation of shoulders     Palpation     Additional Palpation Details  Palpable spasming throughout the L levator and UT with increased tenderness and referral into the head   R sided spsasming as well less tenderness   SOR restrictions B L >R with HA elicted   [place] 1st rib symptoms- elevation noted B     Neurological Testing     Sensation   Cervical/Thoracic   Left   Intact: light touch  Diminished: light touch    Right   Intact: light touch    Comments   Left light touch: C2- T1 C5, T1/ T2 decreased.   Right light touch: C2- T1.     Reflexes   Left   Biceps (C5/C6): normal (2+)  Brachioradialis (C6): normal (2+)  Triceps (C7): normal (2+)  Palacios's reflex: negative    Right   Deltoid (C5): normal (2+)  Biceps (C5/C6): normal (2+)  Brachioradialis (C6): normal (2+)  Palacios's reflex: negative    Active Range of Motion   Cervical/Thoracic Spine       Cervical    Flexion: Neck active flexion: stretching in the L " shoulder.  Restriction level: minimal  Extension: Neck active extension: feels like the neck shakes.     Restriction level: moderate  Left lateral flexion:  Restriction level: minimal  Right lateral flexion:  Restriction level minimal  Left rotation: Neck active rotation left: turns body to compensate. Restriction level: moderate  Right rotation: Neck active rotation right: L sided restirctions and pain.    with pain Restriction level: moderate    Additional Active Range of Motion Details  Dizziness with coming back up from flexion     Shoulder AROM:   Flexion ER/ IR WNL on the L with pain on the L side   ER decreased on the R side     Joint Play     Hypomobile: C3, C4, C5, C6, C7, T1, T2, T3, T4, T5, T6, T7, T8, T9, T10, T11 and T12     Pain: C5, C6 and C7     Strength/Myotome Testing     Left Shoulder     Planes of Motion   Flexion: 4-   Abduction: 3+     Isolated Muscles   Lower trapezius: 3-   Middle trapezius: 3-     Right Shoulder     Planes of Motion   Flexion: 4-   Abduction: 3+     Isolated Muscles   Lower trapezius: 3-   Middle trapezius: 3-     Left Elbow   Flexion: 4+  Extension: 4    Right Elbow   Flexion: 4+  Extension: 4-    Left Wrist/Hand   Wrist extension: 4  Wrist flexion: 4-    Right Wrist/Hand   Wrist extension: 4+  Wrist flexion: 4-    Additional Strength Details   strength slightly weaker R>L    Tests     Left Shoulder   Positive ULTT1 and ULTT2.     Right Shoulder   Positive ULTT1 and ULTT2.   Neuro Exam:     Headaches   Patient reports headaches: Yes.              Precautions: FALL RISK, CVA hx x 3, subdural and subarachnoid hemorrhage following injury, R sided weakness, DM 2, depression, HA, dizziness, memory deficits, muscle weakness OSTEOPOROSIS,elevated BP, lumbar and sacral fx hx, ambulatory dysfunction       Manuals 8/22            SOR/ MFR release                                                     Neuro Re-Ed 8/22            Chin tucks (HEP)             Scap retraction (HEP)       "       Thoracic ext                                                                  Ther Ex 8/22            UT stretch (HEP) 3x20\"             Levator stretch (HEP) 3x20\"             Cane flexion/ ER                                                                               Ther Activity 8/22            Patient education  KR                                                   FoTO             Modalities 8/22            HP prn                               "

## 2024-08-26 ENCOUNTER — OFFICE VISIT (OUTPATIENT)
Dept: PHYSICAL THERAPY | Facility: CLINIC | Age: 74
End: 2024-08-26
Payer: MEDICARE

## 2024-08-26 DIAGNOSIS — G44.209 TENSION HEADACHE: Primary | ICD-10-CM

## 2024-08-26 PROCEDURE — 97112 NEUROMUSCULAR REEDUCATION: CPT

## 2024-08-26 PROCEDURE — 97140 MANUAL THERAPY 1/> REGIONS: CPT

## 2024-08-26 PROCEDURE — 97110 THERAPEUTIC EXERCISES: CPT

## 2024-08-26 NOTE — PROGRESS NOTES
"Daily Note     Today's date: 2024  Patient name: Shae Jung  : 1950  MRN: [id number]  Referring provider: Nelly Hancock MD  Dx:   Encounter Diagnosis     ICD-10-CM    1. Tension headache  G44.209                      Subjective: Son available for translation.  Pt reports no change in pain but reports no headache. Pt denies increased pain post PT session.      Objective: See treatment diary below      Assessment: Pt appears to demonstrate good tolerance to progression.  Pt's son educated about not assisting the patient when transferring, lifting lower and upper extremities. Pt does require assistance with AMB secondary to stroke and balance issues.  Pt requires frequent cuing secondary to comprehension issues.  Noted muscles restrictions along B UT, L>R.  Patient demonstrated fatigue post treatment, exhibited good technique with therapeutic exercises, and would benefit from continued PT to increase flexibility, strength and function.      Plan: Continue per plan of care.      Precautions: FALL RISK, CVA hx x 3, subdural and subarachnoid hemorrhage following injury, R sided weakness, DM 2, depression, HA, dizziness, memory deficits, muscle weakness OSTEOPOROSIS,elevated BP, lumbar and sacral fx hx, ambulatory dysfunction       Manuals            SOR/ MFR release   PK                                                  Neuro Re-Ed            Chin tucks (HEP)  5\" x 15           Scap retraction (HEP)  5\" x 15           Thoracic ext                                                                  Ther Ex            UT stretch (HEP) 3x20\"  3x20\"            Levator stretch (HEP) 3x20\"  3x20\"            Cane flexion/ ER   15 x flexion                                                                            Ther Activity            Patient education  KR PK                                                  FoTO             Modalities            HP prn           "

## 2024-09-04 ENCOUNTER — OFFICE VISIT (OUTPATIENT)
Dept: PHYSICAL THERAPY | Facility: CLINIC | Age: 74
End: 2024-09-04
Payer: MEDICARE

## 2024-09-04 DIAGNOSIS — G44.209 TENSION HEADACHE: Primary | ICD-10-CM

## 2024-09-04 PROCEDURE — 97140 MANUAL THERAPY 1/> REGIONS: CPT

## 2024-09-04 PROCEDURE — 97112 NEUROMUSCULAR REEDUCATION: CPT

## 2024-09-04 PROCEDURE — 97110 THERAPEUTIC EXERCISES: CPT

## 2024-09-04 NOTE — PROGRESS NOTES
"Daily Note     Today's date: 2024  Patient name: Shae Jung  : 1950  MRN: [id number]  Referring provider: Nelly Hancock MD  Dx:   Encounter Diagnosis     ICD-10-CM    1. Tension headache  G44.209                      Subjective: Pt presents with son to PT.  He reports she had a little relief after last session but didn't last.  Pt reports her neck feels better post PT session.      Objective: See treatment diary below      Assessment: Tolerated treatment well. Pt demonstrates balance issues when transferring from wheel chair to chair or table and require B contact assist while pt requires instructions.  Pt continues to have more pain in B UT and lat, L>R albertina with stretches and TTP with STM.    Patient demonstrated fatigue post treatment, exhibited good technique with therapeutic exercises, and would benefit from continued PT to increase flexibility, strength and function.      Plan: Continue per plan of care.      Precautions: FALL RISK, CVA hx x 3, subdural and subarachnoid hemorrhage following injury, R sided weakness, DM 2, depression, HA, dizziness, memory deficits, muscle weakness OSTEOPOROSIS,elevated BP, lumbar and sacral fx hx, ambulatory dysfunction       Manuals           SOR/ MFR release   PK                                                  Neuro Re-Ed           Chin tucks (HEP)  5\" x 15 5\" x 15          Scap retraction (HEP)  5\" x 15 5\" x 15          Thoracic ext                                                                  Ther Ex           UT stretch (HEP) 3x20\"  3x20\"  30\" x3          Levator stretch (HEP) 3x20\"  3x20\"  30\" x3          Cane flexion/ ER   15 x flexion nv                                                                           Ther Activity           Patient education  KR PK PK                                                 FoTO             Modalities           HP prn                        "

## 2024-09-11 ENCOUNTER — APPOINTMENT (EMERGENCY)
Dept: RADIOLOGY | Facility: HOSPITAL | Age: 74
DRG: 074 | End: 2024-09-11
Payer: MEDICARE

## 2024-09-11 ENCOUNTER — APPOINTMENT (EMERGENCY)
Dept: CT IMAGING | Facility: HOSPITAL | Age: 74
DRG: 074 | End: 2024-09-11
Payer: MEDICARE

## 2024-09-11 ENCOUNTER — HOSPITAL ENCOUNTER (EMERGENCY)
Facility: HOSPITAL | Age: 74
Discharge: HOME | DRG: 074 | End: 2024-09-11
Attending: EMERGENCY MEDICINE | Admitting: EMERGENCY MEDICINE
Payer: MEDICARE

## 2024-09-11 ENCOUNTER — OFFICE VISIT (OUTPATIENT)
Dept: PHYSICAL THERAPY | Facility: CLINIC | Age: 74
End: 2024-09-11
Payer: MEDICARE

## 2024-09-11 VITALS
RESPIRATION RATE: 18 BRPM | HEART RATE: 74 BPM | OXYGEN SATURATION: 97 % | SYSTOLIC BLOOD PRESSURE: 171 MMHG | TEMPERATURE: 98.5 F | DIASTOLIC BLOOD PRESSURE: 79 MMHG

## 2024-09-11 DIAGNOSIS — R93.89 ABNORMAL COMPUTED TOMOGRAPHY ANGIOGRAPHY (CTA): ICD-10-CM

## 2024-09-11 DIAGNOSIS — R26.89 BALANCE PROBLEM: ICD-10-CM

## 2024-09-11 DIAGNOSIS — R00.2 PALPITATIONS: Primary | ICD-10-CM

## 2024-09-11 DIAGNOSIS — G44.209 TENSION HEADACHE: Primary | ICD-10-CM

## 2024-09-11 LAB
2HR DELTA HS TROPONIN: <-1 NG/L
ALBUMIN SERPL BCG-MCNC: 4.2 G/DL (ref 3.5–5)
ALP SERPL-CCNC: 64 U/L (ref 34–104)
ALT SERPL W P-5'-P-CCNC: 19 U/L (ref 7–52)
ANION GAP SERPL CALCULATED.3IONS-SCNC: 6 MMOL/L (ref 4–13)
AST SERPL W P-5'-P-CCNC: 22 U/L (ref 13–39)
ATRIAL RATE: 65 BPM
ATRIAL RATE: 70 BPM
BASOPHILS # BLD AUTO: 0.04 THOUSANDS/ΜL (ref 0–0.1)
BASOPHILS NFR BLD AUTO: 1 % (ref 0–1)
BILIRUB SERPL-MCNC: 0.37 MG/DL (ref 0.2–1)
BUN SERPL-MCNC: 16 MG/DL (ref 5–25)
CALCIUM SERPL-MCNC: 9.1 MG/DL (ref 8.4–10.2)
CARDIAC TROPONIN I PNL SERPL HS: 3 NG/L
CARDIAC TROPONIN I PNL SERPL HS: <2 NG/L
CHLORIDE SERPL-SCNC: 105 MMOL/L (ref 96–108)
CO2 SERPL-SCNC: 28 MMOL/L (ref 21–32)
CREAT SERPL-MCNC: 0.61 MG/DL (ref 0.6–1.3)
EOSINOPHIL # BLD AUTO: 0.15 THOUSAND/ΜL (ref 0–0.61)
EOSINOPHIL NFR BLD AUTO: 2 % (ref 0–6)
ERYTHROCYTE [DISTWIDTH] IN BLOOD BY AUTOMATED COUNT: 13 % (ref 11.6–15.1)
GFR SERPL CREATININE-BSD FRML MDRD: 90 ML/MIN/1.73SQ M
GLUCOSE SERPL-MCNC: 137 MG/DL (ref 65–140)
HCT VFR BLD AUTO: 38.7 % (ref 34.8–46.1)
HGB BLD-MCNC: 13 G/DL (ref 11.5–15.4)
IMM GRANULOCYTES # BLD AUTO: 0.01 THOUSAND/UL (ref 0–0.2)
IMM GRANULOCYTES NFR BLD AUTO: 0 % (ref 0–2)
LYMPHOCYTES # BLD AUTO: 1.57 THOUSANDS/ΜL (ref 0.6–4.47)
LYMPHOCYTES NFR BLD AUTO: 23 % (ref 14–44)
MAGNESIUM SERPL-MCNC: 2.1 MG/DL (ref 1.9–2.7)
MCH RBC QN AUTO: 30.8 PG (ref 26.8–34.3)
MCHC RBC AUTO-ENTMCNC: 33.6 G/DL (ref 31.4–37.4)
MCV RBC AUTO: 92 FL (ref 82–98)
MONOCYTES # BLD AUTO: 0.52 THOUSAND/ΜL (ref 0.17–1.22)
MONOCYTES NFR BLD AUTO: 8 % (ref 4–12)
NEUTROPHILS # BLD AUTO: 4.47 THOUSANDS/ΜL (ref 1.85–7.62)
NEUTS SEG NFR BLD AUTO: 66 % (ref 43–75)
NRBC BLD AUTO-RTO: 0 /100 WBCS
P AXIS: 58 DEGREES
P AXIS: 61 DEGREES
PLATELET # BLD AUTO: 252 THOUSANDS/UL (ref 149–390)
PMV BLD AUTO: 10.8 FL (ref 8.9–12.7)
POTASSIUM SERPL-SCNC: 4 MMOL/L (ref 3.5–5.3)
PR INTERVAL: 138 MS
PR INTERVAL: 142 MS
PROT SERPL-MCNC: 6.7 G/DL (ref 6.4–8.4)
QRS AXIS: 12 DEGREES
QRS AXIS: 31 DEGREES
QRSD INTERVAL: 92 MS
QRSD INTERVAL: 92 MS
QT INTERVAL: 424 MS
QT INTERVAL: 444 MS
QTC INTERVAL: 457 MS
QTC INTERVAL: 461 MS
RBC # BLD AUTO: 4.22 MILLION/UL (ref 3.81–5.12)
SODIUM SERPL-SCNC: 139 MMOL/L (ref 135–147)
T WAVE AXIS: 64 DEGREES
T WAVE AXIS: 64 DEGREES
TSH SERPL DL<=0.05 MIU/L-ACNC: 1.53 UIU/ML (ref 0.45–4.5)
VENTRICULAR RATE: 65 BPM
VENTRICULAR RATE: 70 BPM
WBC # BLD AUTO: 6.76 THOUSAND/UL (ref 4.31–10.16)

## 2024-09-11 PROCEDURE — 93005 ELECTROCARDIOGRAM TRACING: CPT

## 2024-09-11 PROCEDURE — 73564 X-RAY EXAM KNEE 4 OR MORE: CPT

## 2024-09-11 PROCEDURE — 99285 EMERGENCY DEPT VISIT HI MDM: CPT

## 2024-09-11 PROCEDURE — 70496 CT ANGIOGRAPHY HEAD: CPT

## 2024-09-11 PROCEDURE — 73502 X-RAY EXAM HIP UNI 2-3 VIEWS: CPT

## 2024-09-11 PROCEDURE — 84484 ASSAY OF TROPONIN QUANT: CPT | Performed by: EMERGENCY MEDICINE

## 2024-09-11 PROCEDURE — 71045 X-RAY EXAM CHEST 1 VIEW: CPT

## 2024-09-11 PROCEDURE — 97530 THERAPEUTIC ACTIVITIES: CPT

## 2024-09-11 PROCEDURE — 93010 ELECTROCARDIOGRAM REPORT: CPT | Performed by: INTERNAL MEDICINE

## 2024-09-11 PROCEDURE — 99285 EMERGENCY DEPT VISIT HI MDM: CPT | Performed by: EMERGENCY MEDICINE

## 2024-09-11 PROCEDURE — 85025 COMPLETE CBC W/AUTO DIFF WBC: CPT | Performed by: EMERGENCY MEDICINE

## 2024-09-11 PROCEDURE — 36415 COLL VENOUS BLD VENIPUNCTURE: CPT

## 2024-09-11 PROCEDURE — 83735 ASSAY OF MAGNESIUM: CPT | Performed by: EMERGENCY MEDICINE

## 2024-09-11 PROCEDURE — 70498 CT ANGIOGRAPHY NECK: CPT

## 2024-09-11 PROCEDURE — 84443 ASSAY THYROID STIM HORMONE: CPT | Performed by: EMERGENCY MEDICINE

## 2024-09-11 PROCEDURE — 80053 COMPREHEN METABOLIC PANEL: CPT | Performed by: EMERGENCY MEDICINE

## 2024-09-11 RX ADMIN — IOHEXOL 85 ML: 350 INJECTION, SOLUTION INTRAVENOUS at 18:13

## 2024-09-11 NOTE — DISCHARGE INSTRUCTIONS
Follow up with your neurologist for further evaluation of your balance concerns.    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  Please follow-up with your family doctor

## 2024-09-11 NOTE — INCIDENTAL FINDINGS
The following findings require follow up:  Radiographic finding   Finding: CT Brain:  No acute intracranial abnormality.     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     Otherwise unremarkable CTA of the neck and brain.      Follow up required: YES   Follow up should be done within 1 week(s)    Please notify the following clinician to assist with the follow up:   Dr. MCNEILL    Incidental finding results were discussed with the Patient's POA by Miriam Lopes DO on 09/11/24.   They expressed understanding and all questions answered.

## 2024-09-11 NOTE — ED PROVIDER NOTES
1. Palpitations    2. Balance problem      ED Disposition       None          Assessment & Plan       Medical Decision Making  Palpitations w/ PVCs on the monitor, no evidence of arrhythmia.  ?increased balance issues w/ no other new concerns for stroke.  Some urinary frequency.  Will get EKG to r/o arrhythmia, ischemic changes.  Will get labs to r/o acute life threatening metabolic abnl, cardiac ischemia, significant anemia.  Will get CXR to r/o occult pna, will get UA to r/o occult infection.  Will get hip/knee xray to r/o fracture.  Will get cta to r/o new cva/bleed.      Problems Addressed:  Balance problem: acute illness or injury that poses a threat to life or bodily functions  Palpitations: acute illness or injury that poses a threat to life or bodily functions     Details: No evidence of arrhythmia or acute electrolyte abnl    Amount and/or Complexity of Data Reviewed  Independent Historian:      Details: son  External Data Reviewed: notes.     Details: Outpt notes  Labs: ordered.  Radiology: ordered and independent interpretation performed.  ECG/medicine tests: ordered and independent interpretation performed.    Risk  Prescription drug management.                  ED Course as of 09/11/24 1930   Wed Sep 11, 2024   1845 Labs unremarkable, no electrolyte abnl that may contribute to increased frequency of PVCs. No evidence of thyroid dysfunction.     Renal function at baseline, hgb stable   1915 Pending CTA.  Care transferred to Dr. Lopes. If CTA unremarkable, dc home. Son aware of plan       Medications   iohexol (OMNIPAQUE) 350 MG/ML injection (MULTI-DOSE) 85 mL (85 mL Intravenous Given 9/11/24 1813)       History of Present Illness       Patient presents with:  Palpitations: Palpitations starting yesterday. Was at PT prior and was told to go to ED for further eval.           History provided by:  Patient and relative   used: Yes (son)        73y F here for evaluation of multiple  "complaints.  Hx of multiple CVAs w/ residual weakness / dysarthria.  Pt was at initial PT appointment for chronic neck pain when she mentioned she's been having palpitations. Per son the therapist said she should come to the ED b/c her \"oxygen level was lower than usual\".      Son also reports trouble walking/coordination for a few days now and he's concerned pt may have had another stroke or a head injury from an unwitnessed fall. Pt noted to have bruising/abrasions to the left knee and is complaining of left hip/knee pain.    No reported f/c/s.  Pt only complains of left hip/leg pain    Review of Systems   All other systems reviewed and are negative.          Objective     ED Triage Vitals   Temperature Pulse Blood Pressure Respirations SpO2 Patient Position - Orthostatic VS   09/11/24 1518 09/11/24 1502 09/11/24 1503 09/11/24 1502 09/11/24 1503 09/11/24 1515   98.5 °F (36.9 °C) 94 158/77 20 97 % Lying      Temp Source Heart Rate Source BP Location FiO2 (%) Pain Score    09/11/24 1518 09/11/24 1515 09/11/24 1515 -- --    Oral Monitor Right arm          Physical Exam  Vitals and nursing note reviewed.   Constitutional:       General: She is not in acute distress.     Appearance: Normal appearance. She is not ill-appearing, toxic-appearing or diaphoretic.   HENT:      Mouth/Throat:      Mouth: Mucous membranes are moist.   Eyes:      Conjunctiva/sclera: Conjunctivae normal.      Pupils: Pupils are equal, round, and reactive to light.   Cardiovascular:      Rate and Rhythm: Normal rate.   Pulmonary:      Effort: Pulmonary effort is normal.   Abdominal:      Palpations: Abdomen is soft.   Musculoskeletal:         General: No swelling.      Cervical back: Normal range of motion.      Right hip: Normal.      Left hip: No deformity, lacerations, tenderness or bony tenderness. Normal range of motion.      Right knee: Normal.      Left knee: Ecchymosis present. No bony tenderness or crepitus. Normal range of motion. " Tenderness present. No medial joint line or lateral joint line tenderness.        Legs:    Skin:     General: Skin is warm.      Findings: Bruising present.          Neurological:      Mental Status: She is alert. Mental status is at baseline.      Comments: Dysarthria at baseline. Moves all extremities equally   Psychiatric:         Mood and Affect: Mood normal.         Labs Reviewed   HS TROPONIN I 0HR - Normal       Result Value    hs TnI 0hr 3     HS TROPONIN I 2HR - Normal    hs TnI 2hr <2      Delta 2hr hsTnI <-1     TSH, 3RD GENERATION WITH FREE T4 REFLEX - Normal    TSH 3RD GENERATON 1.530     MAGNESIUM - Normal    Magnesium 2.1     CBC AND DIFFERENTIAL    WBC 6.76      RBC 4.22      Hemoglobin 13.0      Hematocrit 38.7      MCV 92      MCH 30.8      MCHC 33.6      RDW 13.0      MPV 10.8      Platelets 252      nRBC 0      Segmented % 66      Immature Grans % 0      Lymphocytes % 23      Monocytes % 8      Eosinophils Relative 2      Basophils Relative 1      Absolute Neutrophils 4.47      Absolute Immature Grans 0.01      Absolute Lymphocytes 1.57      Absolute Monocytes 0.52      Eosinophils Absolute 0.15      Basophils Absolute 0.04     COMPREHENSIVE METABOLIC PANEL    Sodium 139      Potassium 4.0      Chloride 105      CO2 28      ANION GAP 6      BUN 16      Creatinine 0.61      Glucose 137      Calcium 9.1      AST 22      ALT 19      Alkaline Phosphatase 64      Total Protein 6.7      Albumin 4.2      Total Bilirubin 0.37      eGFR 90      Narrative:     [employer] Kid[employer] guidelines for Chronic Kidney Disease (CKD):     Stage 1 with normal or high GFR (GFR > 90 mL/min/1.73 square meters)    Stage 2 Mild CKD (GFR = 60-89 mL/min/1.73 square meters)    Stage 3A Moderate CKD (GFR = 45-59 mL/min/1.73 square meters)    Stage 3B Moderate CKD (GFR = 30-44 mL/min/1.73 square meters)    Stage 4 Severe CKD (GFR = 15-29 mL/min/1.73 square meters)    Stage 5 End Stage CKD (GFR <15 mL/min/1.73  square meters)  Note: GFR calculation is accurate only with a steady state creatinine     XR knee 4+ views left injury   ED Interpretation by Sharee Verdugo DO (09/11 1829)   Xray reviewed and independently interpreted by me: no acute findings.  Formal reading per radiology        XR hip/pelv 2-3 vws left   ED Interpretation by Sharee Verdugo DO (09/11 1829)   Xray reviewed and independently interpreted by me: no acute findings.  Formal reading per radiology        XR chest 1 view portable   ED Interpretation by Sharee Verdugo DO (09/11 1829)   Abnormal   Xray reviewed and independently interpreted by me: hyperdense lesion on the right ? Pulm nodule, no acute infiltrates.  Formal reading per radiology            ECG 12 Lead Documentation Only    Date/Time: 9/11/2024 3:10 PM    Performed by: Sharee Verdugo DO  Authorized by: Sharee Verdugo DO    Indications / Diagnosis:  Palpitations  ECG reviewed by me, the ED Provider: yes    Patient location:  ED  Previous ECG:     Previous ECG:  Compared to current    Similarity:  No change  Interpretation:     Interpretation: normal    Rate:     ECG rate:  70    ECG rate assessment: normal    Rhythm:     Rhythm: sinus rhythm    QRS:     QRS axis:  Normal  ST segments:     ST segments:  Normal  T waves:     T waves: normal           Sharee Verdugo DO  09/11/24 1931

## 2024-09-11 NOTE — ED CARE HANDOFF
"Emergency Department Sign Out Note        Sign out and transfer of care from Dr Verdugo. See Separate Emergency Department note.     The patient, Shae Jung, was evaluated by the previous provider for palpitations and per family \"balance issues\".  Patient was sent over from physical therapy for \"lower than normal oxygen level\".  However patient is not hypoxic and no respiratory distress.    Workup Completed:  Xray's normal (chest, hip/pelvis, left knee), EKG, Troponin's (including delta)    ED Course / Workup Pending (followup):  CTA head and neck.         7:50 PM    Patient resting in bed in no distress.  Son at bedside and updated him on CTA with occlusion of right internal carotid artery which is not new.  No other acute findings.    Per son, patient is at baseline.  No respiratory distress and has independent bed mobility.  Will plan for DC home and instructed son to follow-up with PCP    CTA head and neck with and without contrast   Final Result      CT Brain:  No acute intracranial abnormality.      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      Otherwise unremarkable CTA of the neck and brain.                  Workstation performed: [id number]         XR knee 4+ views left injury   ED Interpretation   Xray reviewed and independently interpreted by me: no acute findings.  Formal reading per radiology        XR hip/pelv 2-3 vws left   ED Interpretation   Xray reviewed and independently interpreted by me: no acute findings.  Formal reading per radiology        XR chest 1 view portable   ED Interpretation   Abnormal   Xray reviewed and independently interpreted by me: hyperdense lesion on the right ? Pulm nodule, no acute infiltrates.  Formal reading per radiology                                   "   Procedures  Medical Decision Making  Amount and/or Complexity of Data Reviewed  Labs: ordered.  Radiology: ordered and independent interpretation performed.    Risk  Prescription drug management.            Disposition  Final diagnoses:   None     ED Disposition       None          Follow-up Information    None       Patient's Medications   Discharge Prescriptions    No medications on file     No discharge procedures on file.       ED Provider  Electronically Signed by     Miriam Lopes DO  09/11/24 1950

## 2024-09-11 NOTE — PROGRESS NOTES
"Daily Note     Today's date: 2024  Patient name: Shae Jung  : 1950  MRN: [id number]  Referring provider: Nelly Hancock MD  Dx:   Encounter Diagnosis     ICD-10-CM    1. Tension headache  G44.209             Start Time: 1400          Subjective: \" She is reporting increased palpations yesterday, increased LE hip pain 2-3 coordination off, unable to tell if she is dizzy, a little bit shortness of breath\"       Objective: See treatment diary below      Assessment: Shae presents with HA and neck pain. Patient/ son report increased coordination/ balance deficits beginning the past couple of days as well as mild SOB and heart palpations. Vitals assessed. O2 lower. BP/ HR WNL. Denies dizziness/ chest pain Patient and son chose to seek medical care. Session ended early. Educated on monitoring symptoms throughout-  knee abrasion on the L side unknown origin. Tolerated session without adverse effects. Recommend continued skilled therapy to improve overall strength and mobility for functional return with decreased compensation and pain.        Plan: Continue per plan of care.      Precautions: FALL RISK, CVA hx x 3, subdural and subarachnoid hemorrhage following injury, R sided weakness, DM 2, depression, HA, dizziness, memory deficits, muscle weakness OSTEOPOROSIS,elevated BP, lumbar and sacral fx hx, ambulatory dysfunction       Manuals          SOR/ MFR release   PK           Vitals     /64  O2 94%  HR 74                                   Neuro Re-Ed          Chin tucks (HEP)  5\" x 15 5\" x 15          Scap retraction (HEP)  5\" x 15 5\" x 15          Thoracic ext                                                                  Ther Ex          UT stretch (HEP) 3x20\"  3x20\"  30\" x3          Levator stretch (HEP) 3x20\"  3x20\"  30\" x3          Cane flexion/ ER   15 x flexion nv                                                                   "         Ther Activity 8/22 8/26 9/4 9/11         Patient education  KR PK PK KR- caregiver                                                 FoTO    X         Modalities 8/22 8/26 9/4 9/11         HP prn

## 2024-09-12 ENCOUNTER — OFFICE VISIT (OUTPATIENT)
Dept: FAMILY MEDICINE CLINIC | Facility: CLINIC | Age: 74
End: 2024-09-12

## 2024-09-12 VITALS
TEMPERATURE: 97.7 F | HEART RATE: 72 BPM | WEIGHT: 122 LBS | BODY MASS INDEX: 26.4 KG/M2 | SYSTOLIC BLOOD PRESSURE: 124 MMHG | RESPIRATION RATE: 16 BRPM | OXYGEN SATURATION: 98 % | DIASTOLIC BLOOD PRESSURE: 66 MMHG

## 2024-09-12 DIAGNOSIS — F51.01 PRIMARY INSOMNIA: ICD-10-CM

## 2024-09-12 DIAGNOSIS — E11.9 CONTROLLED TYPE 2 DIABETES MELLITUS WITHOUT COMPLICATION, WITHOUT LONG-TERM CURRENT USE OF INSULIN (HCC): Primary | ICD-10-CM

## 2024-09-12 DIAGNOSIS — R00.2 PALPITATIONS: ICD-10-CM

## 2024-09-12 DIAGNOSIS — I69.30 HISTORY OF CVA WITH RESIDUAL DEFICIT: ICD-10-CM

## 2024-09-12 DIAGNOSIS — Z74.2 NEED FOR HOME HEALTH CARE: ICD-10-CM

## 2024-09-12 DIAGNOSIS — Z65.8 INADEQUATE SOCIAL SUPPORT: ICD-10-CM

## 2024-09-12 DIAGNOSIS — R26.2 AMBULATORY DYSFUNCTION: ICD-10-CM

## 2024-09-12 DIAGNOSIS — I65.23 ASYMPTOMATIC BILATERAL CAROTID ARTERY STENOSIS: ICD-10-CM

## 2024-09-12 DIAGNOSIS — R13.12 OROPHARYNGEAL DYSPHAGIA: ICD-10-CM

## 2024-09-12 PROBLEM — N18.31 STAGE 3A CHRONIC KIDNEY DISEASE (HCC): Status: RESOLVED | Noted: 2023-10-16 | Resolved: 2024-09-12

## 2024-09-12 LAB — SL AMB POCT HEMOGLOBIN AIC: 6 (ref ?–6.5)

## 2024-09-12 PROCEDURE — 83036 HEMOGLOBIN GLYCOSYLATED A1C: CPT | Performed by: FAMILY MEDICINE

## 2024-09-12 PROCEDURE — 99215 OFFICE O/P EST HI 40 MIN: CPT | Performed by: FAMILY MEDICINE

## 2024-09-12 RX ORDER — TRAZODONE HYDROCHLORIDE 50 MG/1
50 TABLET, FILM COATED ORAL
Qty: 30 TABLET | Refills: 1 | Status: SHIPPED | OUTPATIENT
Start: 2024-09-12

## 2024-09-12 NOTE — PROGRESS NOTES
Ambulatory Visit  Name: Shae Jung      : 1950      MRN: [id number]  Encounter Provider: Vicente Zhu MD  Encounter Date: 2024   Encounter department: [facility]    Assessment & Plan  Controlled type 2 diabetes mellitus without complication, without long-term current use of insulin (HCC)    Lab Results   Component Value Date    HGBA1C 6.0 2024   Well-controlled  Continue low carbohydrate diet    Orders:    POCT hemoglobin A1c    Palpitations  Intermittent palpitations  No associated syncope  Referral to cardiology    Orders:    Ambulatory Referral to Cardiology; Future    Asymptomatic bilateral carotid artery stenosis  History of multiple CVA  Chronic gait disturbances and difficulty with coordination  Recent CTA showed chronic occlusion of right internal carotid artery  Patient is lost to follow-up with vascular surgery  Referred to vascular surgery      Orders:    Ambulatory Referral to Vascular Surgery; Future    Primary insomnia  Sleep hygiene counseling provided to patient  No improvement with melatonin  Recommended 25 mg of trazodone daily for 1 week and increase to 50 mg daily as needed  Orders:    traZODone (DESYREL) 50 mg tablet; Take 1 tablet (50 mg total) by mouth daily at bedtime    Ambulatory dysfunction  Difficulty with ambulation  History of recurrent stroke  She has had recurrent falls  Fall precautions discussed with patient and family  Continue PT OT       Inadequate social support  Patient needs additional home health support  Referral to        Need for home health care    Orders:    Ambulatory Referral to Social Work Care Management Program; Future    Oropharyngeal dysphagia  Continue follow-up with speech pathology outpatient       History of CVA with residual deficit  Recommend outpatient follow-up with neurology  LDL goal less than 70  Continue aspirin         BMI Counseling: Body mass index is 26.4 kg/m².  The BMI is above normal. Nutrition recommendations include decreasing portion sizes, encouraging healthy choices of fruits and vegetables, decreasing fast food intake, consuming healthier snacks, limiting drinks that contain sugar, moderation in carbohydrate intake and reducing intake of cholesterol. Exercise recommendations include exercising 3-5 times per week. Rationale for BMI follow-up plan is due to patient being overweight or obese.     Falls Plan of Care: referral to physical therapy.       History of Present Illness     73-year-old female with complex medical history including multiple CVA, carotid stenosis, type 2 diabetes, hypertension, subdural hemorrhage, recurrent fall, and dysphagia who presents today for follow-up.  Patient is here with her son who is also her her POA.  Her son voices concern that patient has been suffering for insomnia for the past few weeks.  He has tried her on 10 mg of melatonin without significant improvement.  Patient states that she has been tossing and turning at nighttime.  She snores occasionally.  She occasionally takes small naps during the day.  Her insomnia is affecting her ability to participate in physical therapy.  Would like to Alternative Medication to Help Her Sleep.  Patient used to take Celexa for anxiety and mood disorder.  According to her son, this medication has not really been ineffective so it was discontinued.  Son is also requesting for additional home health aide.  Her mom requires assistance with IADLs and ADLs.  Her mom has home health aide during the day but not at night.  Patient states that he is concerned that she is by herself during the evening hours.  Patient states that she has cognitive issues.  Patient states that she sometimes attempts to ambulate by herself and she has experienced multiple falls.  He is requesting for additional home health aide support.  He is requesting a minimum of 20 hours daily for his mom.  Patient states that she  has been experiencing some palpitations for the past week or so.  She was seen in emergency room for evaluation.  Her labs and EKG was unremarkable.              Review of Systems   Constitutional:  Negative for appetite change, chills, diaphoresis, fatigue and fever.   Eyes:  Negative for visual disturbance.   Respiratory:  Negative for shortness of breath and wheezing.    Cardiovascular:  Positive for palpitations. Negative for chest pain and leg swelling.   Gastrointestinal:  Negative for abdominal pain, nausea and vomiting.   Genitourinary:  Negative for dysuria, hematuria and urgency.   Musculoskeletal:  Positive for arthralgias.   Skin:  Negative for rash.   Neurological:  Positive for speech difficulty and weakness. Negative for dizziness, tremors, seizures, syncope and light-headedness.   Psychiatric/Behavioral:  Positive for sleep disturbance. Negative for agitation and confusion.            Objective     /66 (BP Location: Left arm, Patient Position: Sitting, Cuff Size: Standard)   Pulse 72   Temp 97.7 °F (36.5 °C) (Temporal)   Resp 16   Wt 55.3 kg (122 lb)   SpO2 98%   BMI 26.40 kg/m²     Physical Exam  Constitutional:       General: She is not in acute distress.     Appearance: Normal appearance. She is not ill-appearing, toxic-appearing or diaphoretic.   HENT:      Head: Normocephalic.      Nose: Nose normal.   Eyes:      General: No scleral icterus.        Right eye: No discharge.         Left eye: No discharge.      Extraocular Movements: Extraocular movements intact.   Cardiovascular:      Rate and Rhythm: Normal rate and regular rhythm.      Pulses: Normal pulses.      Heart sounds: Normal heart sounds. No murmur heard.     No friction rub. No gallop.   Pulmonary:      Effort: No respiratory distress.      Breath sounds: Normal breath sounds. No stridor. No wheezing or rhonchi.   Abdominal:      General: There is no distension.      Palpations: Abdomen is soft. There is no mass.       Tenderness: There is no abdominal tenderness.      Hernia: No hernia is present.   Musculoskeletal:      Right foot: Decreased range of motion. Deformity present.      Left foot: Decreased range of motion. Deformity present.   Neurological:      Mental Status: She is alert. Mental status is at baseline.      Motor: Weakness present.      Gait: Gait abnormal.      Comments: Sitting in wheelchair   Psychiatric:         Mood and Affect: Mood normal.         Behavior: Behavior normal.       Administrative Statements   I have spent a total time of 50 minutes in caring for this patient on the day of the visit/encounter including Diagnostic results, Risks and benefits of tx options, Patient and family education, Impressions, Documenting in the medical record, Reviewing / ordering tests, medicine, procedures  , and Obtaining or reviewing history  .

## 2024-09-12 NOTE — ASSESSMENT & PLAN NOTE
Lab Results   Component Value Date    HGBA1C 6.0 09/12/2024   Well-controlled  Continue low carbohydrate diet    Orders:    POCT hemoglobin A1c    
Continue follow-up with speech pathology outpatient       
Difficulty with ambulation  History of recurrent stroke  She has had recurrent falls  Fall precautions discussed with patient and family  Continue PT OT       
History of multiple CVA  Chronic gait disturbances and difficulty with coordination  Recent CTA showed chronic occlusion of right internal carotid artery  Patient is lost to follow-up with vascular surgery  Referred to vascular surgery      Orders:    Ambulatory Referral to Vascular Surgery; Future    
Lab Results   Component Value Date    EGFR 90 09/11/2024    EGFR 83 02/22/2024    EGFR 90 10/18/2023    CREATININE 0.61 09/11/2024    CREATININE 0.72 02/22/2024    CREATININE 0.63 10/18/2023     
Recommend outpatient follow-up with neurology  LDL goal less than 70  Continue aspirin         
Sleep hygiene counseling provided to patient  No improvement with melatonin  Recommended 25 mg of trazodone daily for 1 week and increase to 50 mg daily as needed  Orders:    traZODone (DESYREL) 50 mg tablet; Take 1 tablet (50 mg total) by mouth daily at bedtime    
- - -

## 2024-09-12 NOTE — LETTER
September 12, 2024     Patient: Shae Jung  YOB: 1950  Date of Visit: 9/12/2024      To Whom it May Concern:    Shae Jung is under my professional care. Shae was seen in my office on 9/12/2024. Patient needs at 20 hours of home care services. She has multiple complex medical conditions including Stroke, speech impairment, hypertension, type II diabetes, Recurrent fall, compression fracture, Osteoporosis , chronic kidney disease, impaired memory, ambulatory dysfunction, and Carotid stenosis. Patient is dependent on family and home health aide to complete her activities of daily living. She requires assistance with feeding, cooking, ambulating, cleaning, transportation and bathing.     If you have any questions or concerns, please don't hesitate to call.         Sincerely,          Vicente Zhu MD        CC: No Recipients

## 2024-09-13 ENCOUNTER — PATIENT OUTREACH (OUTPATIENT)
Dept: FAMILY MEDICINE CLINIC | Facility: CLINIC | Age: 74
End: 2024-09-13

## 2024-09-13 LAB
DME PARACHUTE DELIVERY DATE EXPECTED: NORMAL
DME PARACHUTE DELIVERY DATE REQUESTED: NORMAL
DME PARACHUTE ITEM DESCRIPTION: NORMAL
DME PARACHUTE ORDER STATUS: NORMAL
DME PARACHUTE SUPPLIER NAME: NORMAL
DME PARACHUTE SUPPLIER PHONE: NORMAL

## 2024-09-13 NOTE — PROGRESS NOTES
CRISTI ARTHUR did receive a new referral from provider regarding Pt need for home health care. After chart review CRISTI ARTHUR did place a call to Pt to assist as needed but she did not  the phone, was able to leave a detailed VM requesting a return call. CRISTI ARTHUR will attempt to reach out Pt a later time.    CRISTI ARTHUR is remain available for further assistance as needed.

## 2024-09-14 ENCOUNTER — HOSPITAL ENCOUNTER (INPATIENT)
Facility: HOSPITAL | Age: 74
LOS: 3 days | Discharge: HOME HEALTH | DRG: 074 | End: 2024-09-17
Attending: EMERGENCY MEDICINE | Admitting: INTERNAL MEDICINE
Payer: MEDICARE

## 2024-09-14 ENCOUNTER — APPOINTMENT (EMERGENCY)
Dept: CT IMAGING | Facility: HOSPITAL | Age: 74
DRG: 074 | End: 2024-09-14
Payer: MEDICARE

## 2024-09-14 DIAGNOSIS — M54.42 CHRONIC RIGHT-SIDED LOW BACK PAIN WITH BILATERAL SCIATICA: ICD-10-CM

## 2024-09-14 DIAGNOSIS — M54.41 CHRONIC RIGHT-SIDED LOW BACK PAIN WITH BILATERAL SCIATICA: ICD-10-CM

## 2024-09-14 DIAGNOSIS — R26.2 AMBULATORY DYSFUNCTION: ICD-10-CM

## 2024-09-14 DIAGNOSIS — M21.372 LEFT FOOT DROP: Primary | ICD-10-CM

## 2024-09-14 DIAGNOSIS — Z86.73 HISTORY OF CVA (CEREBROVASCULAR ACCIDENT): ICD-10-CM

## 2024-09-14 DIAGNOSIS — S32.010D COMPRESSION FRACTURE OF L1 VERTEBRA WITH ROUTINE HEALING, SUBSEQUENT ENCOUNTER: ICD-10-CM

## 2024-09-14 DIAGNOSIS — M51.36 LUMBAR DEGENERATIVE DISC DISEASE: ICD-10-CM

## 2024-09-14 DIAGNOSIS — G89.29 CHRONIC RIGHT-SIDED LOW BACK PAIN WITH BILATERAL SCIATICA: ICD-10-CM

## 2024-09-14 DIAGNOSIS — I77.71 INTERNAL CAROTID ARTERY DISSECTION (HCC): ICD-10-CM

## 2024-09-14 DIAGNOSIS — I10 HYPERTENSION: ICD-10-CM

## 2024-09-14 LAB
ANION GAP SERPL CALCULATED.3IONS-SCNC: 5 MMOL/L (ref 4–13)
APTT PPP: 28 SECONDS (ref 23–34)
BASOPHILS # BLD AUTO: 0.03 THOUSANDS/ΜL (ref 0–0.1)
BASOPHILS NFR BLD AUTO: 1 % (ref 0–1)
BUN SERPL-MCNC: 17 MG/DL (ref 5–25)
CALCIUM SERPL-MCNC: 9.5 MG/DL (ref 8.4–10.2)
CHLORIDE SERPL-SCNC: 105 MMOL/L (ref 96–108)
CO2 SERPL-SCNC: 31 MMOL/L (ref 21–32)
CREAT SERPL-MCNC: 0.7 MG/DL (ref 0.6–1.3)
EOSINOPHIL # BLD AUTO: 0.13 THOUSAND/ΜL (ref 0–0.61)
EOSINOPHIL NFR BLD AUTO: 2 % (ref 0–6)
ERYTHROCYTE [DISTWIDTH] IN BLOOD BY AUTOMATED COUNT: 12.9 % (ref 11.6–15.1)
GFR SERPL CREATININE-BSD FRML MDRD: 86 ML/MIN/1.73SQ M
GLUCOSE SERPL-MCNC: 120 MG/DL (ref 65–140)
HCT VFR BLD AUTO: 38.5 % (ref 34.8–46.1)
HGB BLD-MCNC: 12.6 G/DL (ref 11.5–15.4)
IMM GRANULOCYTES # BLD AUTO: 0.01 THOUSAND/UL (ref 0–0.2)
IMM GRANULOCYTES NFR BLD AUTO: 0 % (ref 0–2)
INR PPP: 0.98 (ref 0.85–1.19)
LYMPHOCYTES # BLD AUTO: 1.32 THOUSANDS/ΜL (ref 0.6–4.47)
LYMPHOCYTES NFR BLD AUTO: 24 % (ref 14–44)
MCH RBC QN AUTO: 30.2 PG (ref 26.8–34.3)
MCHC RBC AUTO-ENTMCNC: 32.7 G/DL (ref 31.4–37.4)
MCV RBC AUTO: 92 FL (ref 82–98)
MONOCYTES # BLD AUTO: 0.41 THOUSAND/ΜL (ref 0.17–1.22)
MONOCYTES NFR BLD AUTO: 7 % (ref 4–12)
NEUTROPHILS # BLD AUTO: 3.67 THOUSANDS/ΜL (ref 1.85–7.62)
NEUTS SEG NFR BLD AUTO: 66 % (ref 43–75)
NRBC BLD AUTO-RTO: 0 /100 WBCS
PLATELET # BLD AUTO: 240 THOUSANDS/UL (ref 149–390)
PMV BLD AUTO: 10.8 FL (ref 8.9–12.7)
POTASSIUM SERPL-SCNC: 4 MMOL/L (ref 3.5–5.3)
PROTHROMBIN TIME: 13.2 SECONDS (ref 12.3–15)
RBC # BLD AUTO: 4.17 MILLION/UL (ref 3.81–5.12)
SODIUM SERPL-SCNC: 141 MMOL/L (ref 135–147)
WBC # BLD AUTO: 5.57 THOUSAND/UL (ref 4.31–10.16)

## 2024-09-14 PROCEDURE — 72131 CT LUMBAR SPINE W/O DYE: CPT

## 2024-09-14 PROCEDURE — 93005 ELECTROCARDIOGRAM TRACING: CPT

## 2024-09-14 PROCEDURE — 85025 COMPLETE CBC W/AUTO DIFF WBC: CPT | Performed by: EMERGENCY MEDICINE

## 2024-09-14 PROCEDURE — 99223 1ST HOSP IP/OBS HIGH 75: CPT | Performed by: INTERNAL MEDICINE

## 2024-09-14 PROCEDURE — 85610 PROTHROMBIN TIME: CPT | Performed by: EMERGENCY MEDICINE

## 2024-09-14 PROCEDURE — 70498 CT ANGIOGRAPHY NECK: CPT

## 2024-09-14 PROCEDURE — 36415 COLL VENOUS BLD VENIPUNCTURE: CPT | Performed by: EMERGENCY MEDICINE

## 2024-09-14 PROCEDURE — 99285 EMERGENCY DEPT VISIT HI MDM: CPT | Performed by: EMERGENCY MEDICINE

## 2024-09-14 PROCEDURE — 85730 THROMBOPLASTIN TIME PARTIAL: CPT | Performed by: EMERGENCY MEDICINE

## 2024-09-14 PROCEDURE — 80048 BASIC METABOLIC PNL TOTAL CA: CPT | Performed by: EMERGENCY MEDICINE

## 2024-09-14 PROCEDURE — 70496 CT ANGIOGRAPHY HEAD: CPT

## 2024-09-14 RX ORDER — ATORVASTATIN CALCIUM 40 MG/1
40 TABLET, FILM COATED ORAL
Status: DISCONTINUED | OUTPATIENT
Start: 2024-09-15 | End: 2024-09-17 | Stop reason: HOSPADM

## 2024-09-14 RX ORDER — CYCLOBENZAPRINE HCL 5 MG
2.5 TABLET ORAL 3 TIMES DAILY
Status: DISCONTINUED | OUTPATIENT
Start: 2024-09-14 | End: 2024-09-17 | Stop reason: HOSPADM

## 2024-09-14 RX ORDER — ACETAMINOPHEN 10 MG/ML
1000 INJECTION, SOLUTION INTRAVENOUS EVERY 6 HOURS PRN
Status: DISCONTINUED | OUTPATIENT
Start: 2024-09-14 | End: 2024-09-17 | Stop reason: HOSPADM

## 2024-09-14 RX ORDER — DOCUSATE SODIUM 100 MG/1
100 CAPSULE, LIQUID FILLED ORAL 2 TIMES DAILY PRN
Status: DISCONTINUED | OUTPATIENT
Start: 2024-09-14 | End: 2024-09-17

## 2024-09-14 RX ORDER — TRAZODONE HYDROCHLORIDE 50 MG/1
25 TABLET, FILM COATED ORAL
Status: DISCONTINUED | OUTPATIENT
Start: 2024-09-14 | End: 2024-09-17 | Stop reason: HOSPADM

## 2024-09-14 RX ORDER — BENZONATATE 100 MG/1
100 CAPSULE ORAL 3 TIMES DAILY PRN
Status: DISCONTINUED | OUTPATIENT
Start: 2024-09-14 | End: 2024-09-17 | Stop reason: HOSPADM

## 2024-09-14 RX ORDER — HEPARIN SODIUM 5000 [USP'U]/ML
5000 INJECTION, SOLUTION INTRAVENOUS; SUBCUTANEOUS EVERY 8 HOURS SCHEDULED
Status: DISCONTINUED | OUTPATIENT
Start: 2024-09-14 | End: 2024-09-17 | Stop reason: HOSPADM

## 2024-09-14 RX ORDER — PANTOPRAZOLE SODIUM 40 MG/1
40 TABLET, DELAYED RELEASE ORAL
Status: DISCONTINUED | OUTPATIENT
Start: 2024-09-15 | End: 2024-09-17 | Stop reason: HOSPADM

## 2024-09-14 RX ADMIN — TRAZODONE HYDROCHLORIDE 25 MG: 50 TABLET ORAL at 22:23

## 2024-09-14 RX ADMIN — IOHEXOL 85 ML: 350 INJECTION, SOLUTION INTRAVENOUS at 18:40

## 2024-09-14 RX ADMIN — ACETAMINOPHEN 1000 MG: 10 INJECTION INTRAVENOUS at 21:16

## 2024-09-14 NOTE — ED PROVIDER NOTES
1. Left foot drop    2. History of CVA (cerebrovascular accident)    3. Ambulatory dysfunction    4. Internal carotid artery dissection (HCC)      ED Disposition       ED Disposition   Admit    Condition   Stable    Date/Time   Sat Sep 14, 2024  7:48 PM    Comment   Case was discussed with SHANNAN and the patient's admission status was agreed to be Admission Status: inpatient status to the service of Dr. Tavarez .               Assessment & Plan       Medical Decision Making  74 yo F with acute onset L foot drop/weakness- stroke workup with CTA head to r/o bleed/LVO, EKG to r/o arrhythmia, CBC to assess for leukocytosis/anemia, CMP to assess for elevated LFTs/SHAUN/electrolyte derangements, CT L-spine to r/o osseous abn    Admitted for further workup    Problems Addressed:  History of CVA (cerebrovascular accident): chronic illness or injury  Internal carotid artery dissection (HCC): chronic illness or injury  Left foot drop: acute illness or injury    Amount and/or Complexity of Data Reviewed  External Data Reviewed: labs, radiology, ECG and notes.  Labs: ordered. Decision-making details documented in ED Course.  Radiology: ordered and independent interpretation performed. Decision-making details documented in ED Course.  ECG/medicine tests: ordered and independent interpretation performed. Decision-making details documented in ED Course.    Risk  Prescription drug management.  Decision regarding hospitalization.              Stroke Assessment       Row Name 09/14/24 1949             NIH Stroke Scale    Interval Baseline      Level of Consciousness (1a.) 0      LOC Questions (1b.) 0      LOC Commands (1c.) 0      Best Gaze (2.) 0      Visual (3.) 0      Facial Palsy (4.) 0      Motor Arm, Left (5a.) 0      Motor Arm, Right (5b.) 0      Motor Leg, Left (6a.) 0      Motor Leg, Right (6b.) 0      Limb Ataxia (7.) 0      Sensory (8.) 0      Best Language (9.) 0      Dysarthria (10.) 0      Extinction and Inattention  (11.) (Formerly Neglect) 0      Total 0                    Flowsheet Row Most Recent Value   Thrombolytic Decision Options    Thrombolytic Decision Patient not a candidate.   Patient is not a candidate options Unclear time of onset outside appropriate time window.            ED Course as of 09/14/24 2152   Sat Sep 14, 2024   1736 EKG independently interpreted by myself:  NSR @ 68 bpm, normal axis, normal intervals, qtc 440, no sig st changes, twi v1/v2   1849 CT independently interpreted by myself: no acute intracranial bleed, CT L spine with chronic L1 compression fracture, pending radiologist read   1937   IMPRESSION:     No acute intracranial hemorrhage, new proximal large vessel occlusion in the head and neck or high-grade vascular stenosis. Chronic right ICA dissection, as detailed above.         Medications   aspirin (ECOTRIN LOW STRENGTH) EC tablet 81 mg (has no administration in time range)   benzonatate (TESSALON PERLES) capsule 100 mg (has no administration in time range)   Cholecalciferol (VITAMIN D3) tablet 5,000 Units (has no administration in time range)   cyclobenzaprine (FLEXERIL) tablet 2.5 mg (has no administration in time range)   docusate sodium (COLACE) capsule 100 mg (has no administration in time range)   pantoprazole (PROTONIX) EC tablet 40 mg (has no administration in time range)   traZODone (DESYREL) tablet 25 mg (has no administration in time range)   heparin (porcine) subcutaneous injection 5,000 Units (has no administration in time range)   iohexol (OMNIPAQUE) 350 MG/ML injection (MULTI-DOSE) 85 mL (85 mL Intravenous Given 9/14/24 1840)       History of Present Illness       HPI    Chief Complaint   Patient presents with    Medical Problem     Per son pt is dragging her left foot not sure if this is since yesterday or started today      74 yo F h/o prior CVAs; presenting for evaluation of L foot drop.  Son at bedside is caregiver and provides history/translation.  Sx started  "yesterday/this morning, first noticed when at physical therapy today.  H/o prior strokes with residual R sided deficits/speech changes, never had L foot/leg sx  Weakness with L foot dorsiflexion and dragging foot when ambulating    Denies trauma, HA, CP, SOB, back pain, numbness  Other neuro deficits/sx are at baseline per son                Per independent review of external records:   - 9/11 ER visit-   \"H/o multiple CVAs with residual weakness/dysarthria\"  Palpitations- PVCs on monitor  Son reported trouble walking/balance issues- Balance issues  CTA head/neck:   Chronic occlusion of the right internal carotid artery at its origin in the neck which remains occluded throughout its course in the neck, reconstituting is a tiny right ICA intracranially at the distal cavernous segment.     - 8/7/24 Neuro  istory of 3 prior CVAs presents today with her son and caretaker for evaluation because patient has not been seen by a neurologist since 2022. Patient's son believes patient's first stroke was around 2016 and the most recent stroke was 2019. Since the stroke in 2019, patient has experienced loss of coordination, most notably in her R upper extremity, and difficulty ambulating. Son adds that patient's R foot became deformed after the stroke, causing the patient to become off-balance when walking. Patient has been using a wheelchair since 2020, but can walk short distances if someone is holding both of her hands to keep her steady. She has also experienced hoarseness since the stroke and currently whispers when speaking.         Review of Systems        Objective     ED Triage Vitals [09/14/24 1617]   Temperature Pulse Blood Pressure Respirations SpO2 Patient Position - Orthostatic VS   98.2 °F (36.8 °C) 84 165/74 18 99 % Sitting      Temp Source Heart Rate Source BP Location FiO2 (%) Pain Score    Oral Monitor Right arm -- No Pain        Physical Exam  Vitals and nursing note reviewed.   Constitutional:       General: " She is not in acute distress.     Appearance: Normal appearance. She is well-developed.   HENT:      Head: Normocephalic and atraumatic.      Nose: Nose normal.   Eyes:      Extraocular Movements: Extraocular movements intact.      Conjunctiva/sclera: Conjunctivae normal.   Cardiovascular:      Rate and Rhythm: Normal rate and regular rhythm.      Heart sounds: Normal heart sounds.   Pulmonary:      Effort: Pulmonary effort is normal. No respiratory distress.      Breath sounds: Normal breath sounds. No stridor. No wheezing or rales.   Chest:      Chest wall: No tenderness.   Abdominal:      General: There is no distension.      Palpations: Abdomen is soft.      Tenderness: There is no abdominal tenderness. There is no guarding or rebound.      Comments: Back: no skin changes, no ttp/stepoff/deformity   Musculoskeletal:         General: No swelling, tenderness or deformity.      Cervical back: Normal range of motion and neck supple.   Skin:     General: Skin is warm and dry.      Findings: No rash.   Neurological:      Mental Status: She is alert.      Motor: No abnormal muscle tone.      Comments: Strength 5/5 throughout except L foot dorsiflexion is 0-1/5. Sensation grossly intact, No facial droop. Tongue midline. Soft spoken, son reports speech is at baseline.    Psychiatric:         Thought Content: Thought content normal.         Judgment: Judgment normal.         Labs Reviewed   APTT - Normal       Result Value    PTT 28     PROTIME-INR - Normal    Protime 13.2      INR 0.98      Narrative:     INR Therapeutic Range    Indication                                             INR Range      Atrial Fibrillation                                               2.0-3.0  Hypercoagulable State                                    2.0.2.3  Left Ventricular Asist Device                            2.0-3.0  Mechanical Heart Valve                                  -    Aortic(with afib, MI, embolism, HF, LA enlargement,     and/or coagulopathy)                                     2.0-3.0 (2.5-3.5)     Mitral                                                             2.5-3.5  Prosthetic/Bioprosthetic Heart Valve               2.0-3.0  Venous thromboembolism (VTE: VT, PE        2.0-3.0   CBC AND DIFFERENTIAL    WBC 5.57      RBC 4.17      Hemoglobin 12.6      Hematocrit 38.5      MCV 92      MCH 30.2      MCHC 32.7      RDW 12.9      MPV 10.8      Platelets 240      nRBC 0      Segmented % 66      Immature Grans % 0      Lymphocytes % 24      Monocytes % 7      Eosinophils Relative 2      Basophils Relative 1      Absolute Neutrophils 3.67      Absolute Immature Grans 0.01      Absolute Lymphocytes 1.32      Absolute Monocytes 0.41      Eosinophils Absolute 0.13      Basophils Absolute 0.03     BASIC METABOLIC PANEL    Sodium 141      Potassium 4.0      Chloride 105      CO2 31      ANION GAP 5      BUN 17      Creatinine 0.70      Glucose 120      Calcium 9.5      eGFR 86      Narrative:     National Kidney Disease Foundation guidelines for Chronic Kidney Disease (CKD):     Stage 1 with normal or high GFR (GFR > 90 mL/min/1.73 square meters)    Stage 2 Mild CKD (GFR = 60-89 mL/min/1.73 square meters)    Stage 3A Moderate CKD (GFR = 45-59 mL/min/1.73 square meters)    Stage 3B Moderate CKD (GFR = 30-44 mL/min/1.73 square meters)    Stage 4 Severe CKD (GFR = 15-29 mL/min/1.73 square meters)    Stage 5 End Stage CKD (GFR <15 mL/min/1.73 square meters)  Note: GFR calculation is accurate only with a steady state creatinine     CTA head and neck with and without contrast   ED Interpretation by Zaira Nolasco DO (09/14 1937)     IMPRESSION:     No acute intracranial hemorrhage, new proximal large vessel occlusion in the head and neck or high-grade vascular stenosis. Chronic right ICA dissection, as detailed above.        Final Interpretation by Jair Workman MD (09/14 1910)      No acute intracranial hemorrhage, new proximal large  vessel occlusion in the head and neck or high-grade vascular stenosis. Chronic right ICA dissection, as detailed above.                  Workstation performed: [id number]         CT spine lumbar without contrast   ED Interpretation by Zaira Nolasco DO (09/14 2002)     IMPRESSION:     Compression fracture anterior superior plate of L1 has slightly further progressed as above. No significant retropulsion.     Multilevel minimal disc bulges and facet arthropathy as above. No significant central canal stenoses. Mild bilateral neural foraminal narrowing left greater than right at L5-S1.        Final Interpretation by Jean Wills MD (09/14 1952)      Compression fracture anterior superior plate of L1 has slightly further progressed as above. No significant retropulsion.      Multilevel minimal disc bulges and facet arthropathy as above. No significant central canal stenoses. Mild bilateral neural foraminal narrowing left greater than right at L5-S1.                  Workstation performed: [id number]         MRI Inpatient Order    (Results Pending)       Procedures       Zaira Nolasco DO  09/14/24 8500

## 2024-09-14 NOTE — LETTER
[place] [id number]  [address] [facility] [address]  Dept: [phone]    September 17, 2024     Patient: Shae Jung   YOB: 1950   Date of Visit: 9/14/2024       To Whom it May Concern:    Shae Jung is under my professional care. She was seen in the hospital from 9/14/2024 to 09/17/24.     If you have any questions or concerns, please don't hesitate to call.       Sincerely,          Ally Estrada PA-C

## 2024-09-15 ENCOUNTER — APPOINTMENT (INPATIENT)
Dept: MRI IMAGING | Facility: HOSPITAL | Age: 74
DRG: 074 | End: 2024-09-15
Payer: MEDICARE

## 2024-09-15 LAB
ANION GAP SERPL CALCULATED.3IONS-SCNC: 6 MMOL/L (ref 4–13)
BUN SERPL-MCNC: 14 MG/DL (ref 5–25)
CALCIUM SERPL-MCNC: 8.7 MG/DL (ref 8.4–10.2)
CHLORIDE SERPL-SCNC: 108 MMOL/L (ref 96–108)
CHOLEST SERPL-MCNC: 180 MG/DL
CO2 SERPL-SCNC: 26 MMOL/L (ref 21–32)
CREAT SERPL-MCNC: 0.54 MG/DL (ref 0.6–1.3)
ERYTHROCYTE [DISTWIDTH] IN BLOOD BY AUTOMATED COUNT: 13 % (ref 11.6–15.1)
EST. AVERAGE GLUCOSE BLD GHB EST-MCNC: 126 MG/DL
GFR SERPL CREATININE-BSD FRML MDRD: 94 ML/MIN/1.73SQ M
GLUCOSE SERPL-MCNC: 98 MG/DL (ref 65–140)
HBA1C MFR BLD: 6 %
HCT VFR BLD AUTO: 35.9 % (ref 34.8–46.1)
HDLC SERPL-MCNC: 47 MG/DL
HGB BLD-MCNC: 12.1 G/DL (ref 11.5–15.4)
LDLC SERPL CALC-MCNC: 109 MG/DL (ref 0–100)
MCH RBC QN AUTO: 31.8 PG (ref 26.8–34.3)
MCHC RBC AUTO-ENTMCNC: 33.7 G/DL (ref 31.4–37.4)
MCV RBC AUTO: 94 FL (ref 82–98)
PLATELET # BLD AUTO: 202 THOUSANDS/UL (ref 149–390)
PMV BLD AUTO: 11.1 FL (ref 8.9–12.7)
POTASSIUM SERPL-SCNC: 4.1 MMOL/L (ref 3.5–5.3)
RBC # BLD AUTO: 3.81 MILLION/UL (ref 3.81–5.12)
SODIUM SERPL-SCNC: 140 MMOL/L (ref 135–147)
TRIGL SERPL-MCNC: 118 MG/DL
WBC # BLD AUTO: 5.08 THOUSAND/UL (ref 4.31–10.16)

## 2024-09-15 PROCEDURE — 85027 COMPLETE CBC AUTOMATED: CPT | Performed by: INTERNAL MEDICINE

## 2024-09-15 PROCEDURE — 80061 LIPID PANEL: CPT | Performed by: INTERNAL MEDICINE

## 2024-09-15 PROCEDURE — 70551 MRI BRAIN STEM W/O DYE: CPT

## 2024-09-15 PROCEDURE — 80048 BASIC METABOLIC PNL TOTAL CA: CPT | Performed by: INTERNAL MEDICINE

## 2024-09-15 PROCEDURE — 92610 EVALUATE SWALLOWING FUNCTION: CPT

## 2024-09-15 PROCEDURE — 99232 SBSQ HOSP IP/OBS MODERATE 35: CPT | Performed by: INTERNAL MEDICINE

## 2024-09-15 PROCEDURE — 83036 HEMOGLOBIN GLYCOSYLATED A1C: CPT | Performed by: INTERNAL MEDICINE

## 2024-09-15 PROCEDURE — 99223 1ST HOSP IP/OBS HIGH 75: CPT | Performed by: PSYCHIATRY & NEUROLOGY

## 2024-09-15 RX ORDER — GABAPENTIN 100 MG/1
100 CAPSULE ORAL 3 TIMES DAILY
Status: DISCONTINUED | OUTPATIENT
Start: 2024-09-15 | End: 2024-09-17 | Stop reason: HOSPADM

## 2024-09-15 RX ADMIN — ASPIRIN 81 MG: 81 TABLET, COATED ORAL at 08:38

## 2024-09-15 RX ADMIN — Medication 5000 UNITS: at 08:37

## 2024-09-15 RX ADMIN — CYCLOBENZAPRINE HYDROCHLORIDE 2.5 MG: 5 TABLET, FILM COATED ORAL at 21:59

## 2024-09-15 RX ADMIN — ACETAMINOPHEN 1000 MG: 10 INJECTION INTRAVENOUS at 20:07

## 2024-09-15 RX ADMIN — CYCLOBENZAPRINE HYDROCHLORIDE 2.5 MG: 5 TABLET, FILM COATED ORAL at 08:38

## 2024-09-15 RX ADMIN — CYCLOBENZAPRINE HYDROCHLORIDE 2.5 MG: 5 TABLET, FILM COATED ORAL at 15:58

## 2024-09-15 RX ADMIN — TRAZODONE HYDROCHLORIDE 25 MG: 50 TABLET ORAL at 21:59

## 2024-09-15 RX ADMIN — ATORVASTATIN CALCIUM 40 MG: 40 TABLET, FILM COATED ORAL at 15:58

## 2024-09-15 RX ADMIN — GABAPENTIN 100 MG: 100 CAPSULE ORAL at 21:59

## 2024-09-15 NOTE — PHYSICAL THERAPY NOTE
Physical Therapy Cancellation Note:    Pt off floor for MRI. Cancel PT services for today and will cont to follow as able to initiate PT IE.

## 2024-09-15 NOTE — ASSESSMENT & PLAN NOTE
Per chart review, patient's son believes her first stroke was in 2016 and most recent stroke was in 2019.  Since her last stroke, patient's son reports she has experienced loss of coordination (most notable in RUE), difficulty ambulating resulting in a wheelchair (however can walk short distances with assistance) and hoarseness.    - Continue Aspirin/statin  - PT/OT as able

## 2024-09-15 NOTE — ASSESSMENT & PLAN NOTE
Has a history of 3 prior CVAs with residual right leg weakness and dysarthria  Continue statin, aspirin

## 2024-09-15 NOTE — CONSULTS
Consultation - Neurology   Name: Shae Jung 73 y.o. female I MRN: [id number]  Unit/Bed#: E4 -01 I Date of Admission: 9/14/2024   Date of Service: 9/15/2024 I Hospital Day: 1   Inpatient consult to Neurology  Consult performed by: PRO Handy  Consult ordered by: Loraine Tavarez MD        Physician Requesting Evaluation: Daniella Sanchez DO   Reason for Evaluation / Principal Problem: Left foot drop, hx of CVA    Assessment & Plan  Left foot drop  73 year old female with history of 3 prior CVA (residual RLE weakness, ambulatory dysfunction and dysarthria, on aspirin/statin), HTN, GERD, HLD, DM, and compression fracture of L1 vertebrae who presents with left foot drop.      Patient has been going to PT for residual stroke deficits. While she was at PT on 9/14, she was noted to have increased difficulty when moving around, c/o headache and intermittent dizziness.  During this time, PT and her son noticed she had a new left foot drop, and came to the ED for further evaluation.  On neurology evaluation, patient reported left ankle pain x 2 to 3 days PTA.    Workup:  - CTA head/neck (9/14/24) negative for acute intracranial abnormalities, chronic right ICA dissection seen.   - CT L-spine (9/14/24) revealed chronic L1 compression fracture, minimal disc bulges and facet arthropathy, mild bilateral neural foraminal narrowing L>R at L5-S1  - MRI brain wo (9/14/24) :  1. No acute infarct identified. Left greater than right anterior/inferior frontal lobe encephalomalacia with gliosis from prior trauma.  2. Loss of normal flow void involving the visualized extracranial, petrous and cavernous right internal carotid artery related to chronic occlusion as demonstrated on prior CT angiograms    Exam notable for left foot drop with decreased sensation below knee.  Neuroimaging negative for acute infarct.  Unclear etiology at this time, could be compressive peroneal nerve injury however patient has no strength with  twisted inversion vs possible lumbar radiculopathy, however only distal LLE is affected.    Plan:  -Discontinue stroke pathway  -Recommend MRI L-spine wo  -PT/OT as able  -Consider outpatient EMG if symptoms persist  -Fall precautions  -Medical management and supportive care per primary team. Correction of any metabolic or infectious disturbances.     Plan discussed with Attending Neurologist, please see attestation for further input/recommendations.   History of stroke  Per chart review, patient's son believes her first stroke was in 2016 and most recent stroke was in 2019.  Since her last stroke, patient's son reports she has experienced loss of coordination (most notable in RUE), difficulty ambulating resulting in a wheelchair (however can walk short distances with assistance) and hoarseness.    - Continue Aspirin/statin  - PT/OT as able      Recommendations for outpatient neurological follow up have yet to be determined.  Workup pending    History of Present Illness   HPI: Shae Jung is a 73 y.o.  female with history of 3 prior CVA (residual RLE weakness, ambulatory dysfunction and dysarthria, on aspirin/statin), HTN, GERD, HLD, DM, and compression fracture of L1 vertebrae who presents with left foot drop.      Patient has residual deficits from prior stroke including RLE weakness and dysarthria and has been going to PT.  Patient was recently in ED on 9/11 she became hypoxic during PT and was discharged as workup was negative.  While patient was at PT on 9/14, she was noted to have increased difficulty when moving around, c/o headache and intermittent dizziness.  During this time, PT and her son noticed she had a new left foot drop and came to the ED for further evaluation.    CTA negative for acute intracranial abnormalities, chronic right ICA dissection seen.  CT L-spine revealed chronic L1 compression fracture, minimal disc bulges and facet arthropathy, mild bilateral neural foraminal narrowing L>R at  L5-S1.  Neurology consulted as there is concern for stroke causing acute left foot drop.    Pertinent neurological history:  -Patient seen by Dr. Hancock August 2024 to establish care as she has not seen a neurologist since 2022 (no prior neurology records to review)  -Per chart review, patient's son believes her first stroke was in 2016 and most recent stroke was in 2019.  Since her last stroke, patient's son reports she has experienced loss of coordination (most notable in RUE), difficulty ambulating resulting in a wheelchair (however can walk short distances with assistance) and hoarseness.  -During this visit, patient also reported experiencing pressure headaches in the back of her head and neck bilaterally for the last 1 to 2 months.  Tylenol is effective, and her son thinks stress is contributing.      Performed by Attending Neurologist, AP present at bedside acting as scribe  Review of Systems   HENT:  Positive for voice change (Chronic).    Eyes:  Negative for photophobia and visual disturbance.   Respiratory:  Negative for cough.    Musculoskeletal:  Positive for gait problem (Chronic). Negative for back pain.        Patient complaining of left ankle pain x 2 to 3 days PTA   Skin:  Negative for pallor.        Bruising noted on bilateral knees   Neurological:  Positive for weakness and numbness. Negative for tremors, facial asymmetry and speech difficulty.   Psychiatric/Behavioral:  Negative for confusion. The patient is not nervous/anxious.         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    Palpitations     Recurrent urinary tract infection     Seizures (HCC)     Sleep disorder     last assessed: 10/19/2013    Speech impairment     Stroke (HCC)     Thyroid disease     Tinnitus      Past Surgical History:   Procedure Laterality Date    AXILLARY SURGERY      cyst surgery    BLADDER SURGERY       SECTION      CYSTOSCOPY N/A 2018    Procedure: CYSTOSCOPY;  Surgeon: Charis Major MD;  Location: [facility];  Service: Gynecology    FACIAL BONE TUMOR EXCISION      KNEE SURGERY      OTHER SURGICAL HISTORY      cyst removed from axilla    CA ESOPHAGOGASTRODUODENOSCOPY TRANSORAL DIAGNOSTIC N/A 2017    Procedure: ESOPHAGOGASTRODUODENOSCOPY (EGD);  Surgeon: Jose Antonio Platt MD;  Location: [facility];  Service: Gastroenterology    CA SLING OPERATION STRESS INCONTINENCE N/A 2018    Procedure: PUBOVAGINAL SLING;  Surgeon: Charis Major MD;  Location: [facility];  Service: Gynecology    TUBAL LIGATION       Social History     Tobacco Use    Smoking status: Never    Smokeless tobacco: Never   Vaping Use    Vaping status: Never Used   Substance and Sexual Activity    Alcohol use: No    Drug use: No    Sexual activity: Not on file     E-Cigarette/Vaping    E-Cigarette Use Never User      E-Cigarette/Vaping Substances    Nicotine No     THC No     CBD No     Flavoring No     Other No     Unknown No      Family History   Problem Relation Age of Onset    No Known Problems Father     Diabetes Family         mellitus    Hypertension Family     Stroke Family     Thyroid disease Family     No Known Problems Mother     No Known Problems Sister     No Known Problems Daughter     No Known Problems Maternal Grandmother     No Known Problems Maternal Grandfather     No Known Problems Paternal Grandmother     No Known Problems Paternal Grandfather      Social History     Tobacco  Use    Smoking status: Never    Smokeless tobacco: Never   Vaping Use    Vaping status: Never Used   Substance and Sexual Activity    Alcohol use: No    Drug use: No    Sexual activity: Not on file       Current Facility-Administered Medications:     acetaminophen (Ofirmev) injection 1,000 mg, Q6H PRN, Last Rate: Stopped (09/14/24 2136)    aspirin (ECOTRIN LOW STRENGTH) EC tablet 81 mg, Daily    atorvastatin (LIPITOR) tablet 40 mg, Daily With Dinner    benzonatate (TESSALON PERLES) capsule 100 mg, TID PRN    Cholecalciferol (VITAMIN D3) tablet 5,000 Units, Daily    cyclobenzaprine (FLEXERIL) tablet 2.5 mg, TID    docusate sodium (COLACE) capsule 100 mg, BID PRN    heparin (porcine) subcutaneous injection 5,000 Units, Q8H LAM **AND** [CANCELED] Platelet count, Once    pantoprazole (PROTONIX) EC tablet 40 mg, Daily Before Breakfast    traZODone (DESYREL) tablet 25 mg, HS  Prior to Admission Medications   Prescriptions Last Dose Informant Patient Reported? Taking?   Cholecalciferol (VITAMIN D-3) 5000 units TABS 9/14/2024 Self No Yes   Sig: Take 1 tablet by mouth daily   Diclofenac Sodium (VOLTAREN) 1 % Past Week  No Yes   Sig: Apply 2 g topically 4 (four) times a day   Diclofenac Sodium (VOLTAREN) 1 %   No No   Sig: Apply 2 g topically 4 (four) times a day   Incontinence Supply Disposable (Brief Overnight Large) MISC  Self No No   Sig: Use 4 (four) times a day   Incontinence Supply Disposable (Underpads) MISC  Self No No   Sig: Use in the morning Please dispense washable underpads   Infant Care Products (Comfort-Smooth Baby Wipes) MISC  Self No No   Sig: Use 4 (four) times a day   aspirin (ECOTRIN LOW STRENGTH) 81 mg EC tablet 9/14/2024 Self Yes Yes   Sig: Take 81 mg by mouth daily    benzonatate (TESSALON PERLES) 100 mg capsule Not Taking  No No   Sig: Take 1 capsule (100 mg total) by mouth 3 (three) times a day as needed for cough   Patient not taking: Reported on 9/14/2024   cyclobenzaprine (FLEXERIL) 5 mg tablet  Past Week  No Yes   Sig: Take 1 tablet (5 mg total) by mouth 3 (three) times a day as needed for muscle spasms 2.5 mg in the am, 2.5 mg in the pm and 5 mg qHs   docusate sodium (COLACE) 100 mg capsule Past Week  No Yes   Sig: Take 1 capsule (100 mg total) by mouth 2 (two) times a day as needed for constipation   famotidine (PEPCID) 20 mg tablet Past Month  No Yes   Sig: Take 1 tablet (20 mg total) by mouth daily at bedtime as needed for heartburn   fluticasone (FLONASE) 50 mcg/act nasal spray Past Month  No Yes   Si spray into each nostril daily   gabapentin (Neurontin) 300 mg capsule Past Week  No Yes   Si capsule  at the am, 1 capsule in the afternoon and 2 capsules at night 1 hour prior to bedtime   lidocaine (LIDODERM) 5 %   No No   Sig: Apply 1 patch topically over 12 hours daily Remove & Discard patch within 12 hours or as directed by MD   lidocaine (Lidoderm) 5 %   No No   Sig: Apply 1 patch topically over 12 hours daily Remove & Discard patch within 12 hours or as directed by MD   omeprazole (PriLOSEC) 20 mg delayed release capsule Past Week  No Yes   Sig: Take 2 capsules (40 mg total) by mouth 2 (two) times a day   polyethylene glycol (GLYCOLAX) powder  Self No No   Sig: Take 17 g by mouth daily   polyethylene glycol (MIRALAX) 17 g packet   No No   Sig: Take 17 g by mouth daily   traZODone (DESYREL) 50 mg tablet 2024  No Yes   Sig: Take 1 tablet (50 mg total) by mouth daily at bedtime   triamcinolone (KENALOG) 0.1 % ointment   No No   Sig: Apply topically 2 (two) times a day      Facility-Administered Medications: None     Tramadol    Objective      Temp:  [96.6 °F (35.9 °C)-98.2 °F (36.8 °C)] 97.3 °F (36.3 °C)  HR:  [50-84] 57  Resp:  [16-18] 18  BP: ()/(55-90) 144/75  O2 Device: None (Room air)          I/O last 24 hours:  In: 250 [P.O.:120; IV Piggyback:130]  Out: -   Lines/Drains/Airways       Active Status       None                  Performed by Attending Neurologist, AP present at  bedside acting as scribe  Physical Exam  Vitals reviewed. Exam conducted with a chaperone present (Son present at bedside).   Constitutional:       General: She is not in acute distress.     Appearance: Normal appearance. She is normal weight.   HENT:      Head: Normocephalic and atraumatic.      Right Ear: External ear normal.      Left Ear: External ear normal.      Nose: Nose normal.      Mouth/Throat:      Mouth: Mucous membranes are moist.   Eyes:      General:         Right eye: No discharge.         Left eye: No discharge.      Extraocular Movements: Extraocular movements intact and EOM normal.      Conjunctiva/sclera: Conjunctivae normal.      Pupils: Pupils are equal, round, and reactive to light.   Cardiovascular:      Rate and Rhythm: Normal rate.   Pulmonary:      Effort: Pulmonary effort is normal. No respiratory distress.   Musculoskeletal:         General: Normal range of motion.      Right lower leg: No edema.      Left lower leg: No edema.   Skin:     General: Skin is warm and dry.      Coloration: Skin is not jaundiced.      Findings: Bruising present.   Neurological:      Mental Status: She is alert.      Coordination: Finger-Nose-Finger Test and Heel to Shin Test (Slight clumsiness secondary to foot drop however no ataxia noted) normal.      Comments: See below      Neurologic Exam     Mental Status     Patient is alert.  Able to follow commands with repeated cueing and demonstration.  Speech is very quiet, however no obvious dysarthria or aphasia.  Normal attention and concentration.     Cranial Nerves     CN II   Visual fields full to confrontation.     CN III, IV, VI   Pupils are equal, round, and reactive to light.  Extraocular motions are normal.   Nystagmus: none   Diplopia: none  Conjugate gaze: present    CN V   Facial sensation intact.     CN VII   Facial expression full, symmetric.     CN VIII   CN VIII normal.     CN XII   CN XII normal.     Motor Exam   Muscle bulk: normal  Bilateral  upper extremities 5/5 throughout  LLE: Hip extension 4/5, knee extension 4+/5 however pain limited, knee flexion 5/5, 0/5 dorsiflexion, 5/5 plantarflexion, 0/5 twisted inversion and eversion, able to flex left toes however cannot resist    RLE: Hip extension 5/5, knee flexion and extension 5/5, 5/5 dorsi and plantarflexion, limited participation with twisted eversion, 5/5 twisted inversion, toe flexion 5/5     Sensory Exam     Decreased pinprick sensation throughout distal LLE, however intact on proximal LLE and hands,   Decreased temperature sensation in LLE, does not feel temperature sensation in left foot  Absent vibration sensation below bilateral ankles     Gait, Coordination, and Reflexes     Coordination   Finger to nose coordination: normal  Heel to shin coordination: normal (Slight clumsiness secondary to foot drop however no ataxia noted)  Downgoing toes bilaterally  2+ reflexes throughout BLE  Negative Tinel's on LLE         Imaging Review: Personally reviewed the following image studies in PACS and associated radiology reports: CT head, CT A head/neck, and MRI brain. My interpretation of the radiology images/reports is: consistent with radiologist report.  Other Studies: No additional pertinent studies reviewed.    VTE Prophylaxis: VTE covered by:  heparin (porcine), Subcutaneous

## 2024-09-15 NOTE — ASSESSMENT & PLAN NOTE
Patient is a 73-year-old female with a past medical history of hypertension, hyperlipidemia, history of prior stroke with right-sided residual deficit, L1 compression fracture who presented to the hospital after episodes of dizziness, headache and left foot drop  Patient lives with her son, has a caretaker from 8 AM to 6 PM, rest of the time son is with her  She was working with physical therapy today, complaining of difficulty with ambulation, PT noticed foot drop  Differential diagnosis CVA versus peroneal nerve palsy.  Denies trauma  Will admit under stroke pathway  CTA head and neck showed no acute intracranial hemorrhage, large vessel occlusion or high-grade vascular stenosis.  Chronic right ICA dissection  CT of the lumbar spine showed old L1 compression fracture.  Multilevel minimal disc bulges and facet arthropathy, no significant central canal stenosis, mild bilateral neural foraminal narrowing left greater than right at L5-S1  MRI of the brain, echo  Neurochecks  Aspirin, statin

## 2024-09-15 NOTE — H&P
"H&P - Hospitalist   Name: Shae Jung 73 y.o. female I MRN: [id number]  Unit/Bed#: [facility] I Date of Admission: 9/14/2024   Date of Service: 9/14/2024 I Hospital Day: 0     Assessment & Plan  Left foot drop  Patient is a 73-year-old female with a past medical history of hypertension, hyperlipidemia, history of prior stroke with right-sided residual deficit, L1 compression fracture who presented to the hospital after episodes of dizziness, headache and left foot drop  Patient lives with her son, has a caretaker from 8 AM to 6 PM, rest of the time son is with her  She was working with physical therapy today, complaining of difficulty with ambulation, PT noticed foot drop  Differential diagnosis CVA versus peroneal nerve palsy.  Denies trauma  Will admit under stroke pathway  CTA head and neck showed no acute intracranial hemorrhage, large vessel occlusion or high-grade vascular stenosis.  Chronic right ICA dissection  CT of the lumbar spine showed old L1 compression fracture.  Multilevel minimal disc bulges and facet arthropathy, no significant central canal stenosis, mild bilateral neural foraminal narrowing left greater than right at L5-S1  MRI of the brain, echo  Neurochecks  Aspirin, statin    History of stroke  Has a history of 3 prior CVAs with residual right leg weakness and dysarthria  Continue statin, aspirin  Hypertension  Currently not on any medications  Gastroesophageal reflux disease  Continue PPI  Hyperlipidemia  Continue statin  Diabetes mellitus type II, controlled (HCC)  Lab Results   Component Value Date    HGBA1C 6.0 09/12/2024       No results for input(s): \"POCGLU\" in the last 72 hours.    Blood Sugar Average: Last 72 hrs:    Diet controlled  Compression fracture of L1 lumbar vertebra (HCC)  Follows neurosurgery  Ambulatory dysfunction  Due to history of stroke    VTE Pharmacologic Prophylaxis: VTE Score: 3 Moderate Risk (Score 3-4) - Pharmacological DVT Prophylaxis Ordered: heparin.  Code " Status: Level 1 - Full Code   Discussion with family: Updated  (son) at bedside.    Anticipated Length of Stay: Patient will be admitted on an inpatient basis with an anticipated length of stay of greater than 2 midnights secondary to left foot drop.    History of Present Illness   Chief Complaint: Left foot drop    Shae Jung is a 73 y.o. female with a PMH of hypertension, hyperlipidemia, diabetes, stroke, bladder dysfunction who presents with foot drop.  Patient has history of prior stroke with residual deficit, right leg weakness, dysarthria.  She is going to physical therapy.  At physical therapy on Wednesday she became hypoxic and she was brought to the ED for further evaluation, workup was negative she was sent home.  Today during physical therapy it was noted that she has increased difficulty moving around.  Per son she was complaining of some headache, intermittent dizziness and they noticed she has a left foot drop.  She was sent to the ED for further evaluation.   Patient lives with her son.  She has a caretaker from 8 AM to 6 PM, son is taking care of her rest of the time.    Review of Systems son helped with interpretation.  Review of system was limited, she was complaining of headache, intermittent dizziness, difficulty walking.  Denies chest pain palpitation shortness of breath diarrhea.    I have reviewed the patient's PMH, PSH, Social History, Family History, Meds, and Allergies  Social History:  Marital Status: /Civil Union     Patient Pre-hospital Living Situation: Home with son and she has a caretaker every day from 8 AM to 6 PM      Objective     Vitals:   Blood Pressure: (!) 177/88 (09/14/24 1800)  Pulse: 67 (09/14/24 1800)  Temperature: 98.2 °F (36.8 °C) (09/14/24 1617)  Temp Source: Oral (09/14/24 1617)  Respirations: 18 (09/14/24 1800)  Weight - Scale: 55.3 kg (121 lb 14.6 oz) (09/14/24 1617)  SpO2: 97 % (09/14/24 1800)    Physical Exam  Constitutional:        General: She is not in acute distress.  Cardiovascular:      Rate and Rhythm: Normal rate and regular rhythm.      Heart sounds: No murmur heard.  Pulmonary:      Effort: Pulmonary effort is normal. No respiratory distress.      Breath sounds: Normal breath sounds. No wheezing.   Abdominal:      General: Bowel sounds are normal. There is no distension.      Palpations: Abdomen is soft.      Tenderness: There is no abdominal tenderness.   Musculoskeletal:         General: No swelling.   Skin:     General: Skin is warm and dry.   Neurological:      Mental Status: She is alert.      Comments: Dysarthria, persistently lifting her left leg--that is chronic per son, strength 5/5 in both arms.  Plantarflexion intact, unable to dorsiflex   Psychiatric:         Mood and Affect: Mood normal.         Lines/Drains:  Lines/Drains/Airways       Active Status       None                        Additional Data:   Lab Results: I have reviewed the following results: CBC/BMP:   .     09/14/24  1725   WBC 5.57   HGB 12.6   HCT 38.5      SODIUM 141   K 4.0      CO2 31   BUN 17   CREATININE 0.70   GLUC 120      Results from last 7 days   Lab Units 09/14/24  1725   WBC Thousand/uL 5.57   HEMOGLOBIN g/dL 12.6   HEMATOCRIT % 38.5   PLATELETS Thousands/uL 240   SEGS PCT % 66   LYMPHO PCT % 24   MONO PCT % 7   EOS PCT % 2     Results from last 7 days   Lab Units 09/14/24  1725 09/11/24  1507   SODIUM mmol/L 141 139   POTASSIUM mmol/L 4.0 4.0   CHLORIDE mmol/L 105 105   CO2 mmol/L 31 28   BUN mg/dL 17 16   CREATININE mg/dL 0.70 0.61   ANION GAP mmol/L 5 6   CALCIUM mg/dL 9.5 9.1   ALBUMIN g/dL  --  4.2   TOTAL BILIRUBIN mg/dL  --  0.37   ALK PHOS U/L  --  64   ALT U/L  --  19   AST U/L  --  22   GLUCOSE RANDOM mg/dL 120 137     Results from last 7 days   Lab Units 09/14/24  1725   INR  0.98         Lab Results   Component Value Date    HGBA1C 6.0 09/12/2024    HGBA1C 6.1 05/30/2023    HGBA1C 6.0 03/06/2023           Imaging Review:  Reviewed radiology reports from this admission including: CT head.  Other Studies: EKG was reviewed.     Administrative Statements   I have spent a total time of 60 minutes in caring for this patient on the day of the visit/encounter including Counseling / Coordination of care, Documenting in the medical record, Reviewing / ordering tests, medicine, procedures  , and Obtaining or reviewing history  .    ** Please Note: This note has been constructed using a voice recognition system. **

## 2024-09-15 NOTE — ASSESSMENT & PLAN NOTE
73 year old female with history of 3 prior CVA (residual RLE weakness, ambulatory dysfunction and dysarthria, on aspirin/statin), HTN, GERD, HLD, DM, and compression fracture of L1 vertebrae who presents with left foot drop.      Patient has been going to PT for residual stroke deficits. While she was at PT on 9/14, she was noted to have increased difficulty when moving around, c/o headache and intermittent dizziness.  During this time, PT and her son noticed she had a new left foot drop, and came to the ED for further evaluation.  On neurology evaluation, patient reported left ankle pain x 2 to 3 days PTA.    Workup:  - CTA head/neck (9/14/24) negative for acute intracranial abnormalities, chronic right ICA dissection seen.   - CT L-spine (9/14/24) revealed chronic L1 compression fracture, minimal disc bulges and facet arthropathy, mild bilateral neural foraminal narrowing L>R at L5-S1  - MRI brain wo (9/14/24) :  1. No acute infarct identified. Left greater than right anterior/inferior frontal lobe encephalomalacia with gliosis from prior trauma.  2. Loss of normal flow void involving the visualized extracranial, petrous and cavernous right internal carotid artery related to chronic occlusion as demonstrated on prior CT angiograms    Exam notable for left foot drop with decreased sensation below knee.  Neuroimaging negative for acute infarct.  Unclear etiology at this time, could be compressive peroneal nerve injury however patient has no strength with twisted inversion vs possible lumbar radiculopathy, however only distal LLE is affected.    Plan:  -Discontinue stroke pathway  -Recommend MRI L-spine wo  -PT/OT as able  -Consider outpatient EMG if symptoms persist  -Fall precautions  -Medical management and supportive care per primary team. Correction of any metabolic or infectious disturbances.     Plan discussed with Attending Neurologist, please see attestation for further input/recommendations.

## 2024-09-15 NOTE — OCCUPATIONAL THERAPY NOTE
Occupational Therapy         Patient Name: Shae Jung  Today's Date: 9/15/2024       09/15/24 0900   OT Last Visit   OT Visit Date 09/15/24   Note Type   Note type Cancelled Session;Evaluation   Cancel Reasons Patient off floor/test   Additional Comments OT consult received, chart reviewed. Pt to MRI. Will continue to follow.   Psychosocial   Psychosocial (WDL) WDL   Cognition   Orientation Level Oriented X4     Lilly Whatley

## 2024-09-15 NOTE — ASSESSMENT & PLAN NOTE
Follows neurosurgery  Last evaluated in 4/24  Had been in tlso brace- surgery was not needed at that time  Ct showing slightly progressed  Neuro exam fluctuating at times  Will await neurology eval- may require neurosx eval

## 2024-09-15 NOTE — ASSESSMENT & PLAN NOTE
Patient is a 73-year-old female with a past medical history of hypertension, hyperlipidemia, history of prior stroke with right-sided residual deficit, L1 compression fracture who presented to the hospital after episodes of dizziness, headache and left foot drop  Patient lives with her son, has a caretaker from 8 AM to 6 PM, rest of the time son is with her  She was working with physical therapy today, complaining of difficulty with ambulation, PT noticed foot drop  Differential diagnosis CVA versus peroneal nerve palsy.  Denies trauma  Will admit under stroke pathway  CTA head and neck showed no acute intracranial hemorrhage, large vessel occlusion or high-grade vascular stenosis.  Chronic right ICA dissection  CT of the lumbar spine showed old L1 compression fracture.  Multilevel minimal disc bulges and facet arthropathy, no significant central canal stenosis, mild bilateral neural foraminal narrowing left greater than right at L5-S1  Pt follows with neurosx outpt- last seen in April. She had been in tlso brace. At that time surgery was not recommended.   MRI of the brain, echo- PENDING  Neurochecks-variable at times   Aspirin, statin

## 2024-09-15 NOTE — ASSESSMENT & PLAN NOTE
"Lab Results   Component Value Date    HGBA1C 6.0 09/12/2024       No results for input(s): \"POCGLU\" in the last 72 hours.    Blood Sugar Average: Last 72 hrs:    Diet controlled  "

## 2024-09-15 NOTE — SPEECH THERAPY NOTE
Speech Language/Pathology  Speech/Language Pathology  Assessment    Patient Name: Shae Jung  Today's Date: 9/15/2024     Problem List  Principal Problem:    Left foot drop  Active Problems:    Hypertension    Gastroesophageal reflux disease    Hyperlipidemia    Diabetes mellitus type II, controlled ([facility]ly [facility])    Compression fracture of L1 lumbar vertebra (HCC)    Ambulatory dysfunction    History of stroke    Past Medical History  Past Medical History:   Diagnosis Date    Abdominal pain     Anxiety     last assessed: 10/19/2013    Arthritis     osteo both hands; last assessed: 10/19/2013    Bowel incontinence     Chest pain     Constipation     CVA (cerebral vascular accident) (Formerly [facility])     Diabetes mellitus type II, controlled (HCC)     Disease of thyroid gland     Epigastric pain     with distention    Falls     High cholesterol     HL (hearing loss)     Hyperlipidemia     Hypertension     last assessed: 4/3/2017    Migraine     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    Palpitations     Recurrent urinary tract infection     Seizures ([facility])     Sleep disorder     last assessed: 10/19/2013    Speech impairment     Stroke (Former[facility])     Thyroid disease     Tinnitus      Past Surgical History  Past Surgical History:   Procedure Laterality Date    AXILLARY SURGERY      cyst surgery    BLADDER SURGERY       SECTION      CYSTOSCOPY N/A 2018    Procedure: CYSTOSCOPY;  Surgeon: Charis Major MD;  Location: [facility];  Service: Gynecology    FACIAL BONE TUMOR EXCISION      KNEE SURGERY      OTHER SURGICAL HISTORY      cyst removed from axilla    ID ESOPHAGOGASTRODUODENOSCOPY TRANSORAL DIAGNOSTIC N/A 2017    Procedure: ESOPHAGOGASTRODUODENOSCOPY (EGD);  Surgeon: Jose Antonio Platt MD;  Location: [facility];  Service: Gastroenterology    ID SLING OPERATION STRESS INCONTINENCE N/A 2018    Procedure: PUBOVAGINAL SLING;  Surgeon: Charis Major MD;  Location: [facility];  Service: Gynecology    TUBAL  "LIGATION        Bedside Swallow Evaluation:    Summary:  Pt presented w/ moderate oropharyngeal dysphagia. Admitted 2/2 left foot droop. Pt with hx of multiple strokes. Son present at bedside. Per son report, pt currently with [facility] for outpatient SLP services. Pt is not currently receiving tx for dysphagia and tx focus on voicing due to dysarthria/hoarseness 2/2 most recent CVA. Pt baseline diet at home \"softer foods\" such as \"rice, beans, mashed potatoes, steamed vegetables\" and pt is known to have \"choking episodes\" with both liquids and solids intermittently. Per son report, he feels as if pt's dysphagia has increased recently. Pt seen at bedside with dental soft/thin liquid diet. Pt with prolonged oral holding and poor/reduced mastication. Overall delayed pharyngeal swallow. Severe coughing episode with bite of dental soft meal tray. SLP revising meal tray to smaller/softer pieces- increased mastication and no overt s/s of aspiration. No overt s/s of aspiration with thin liquids. Extensive education provided to son as well as safe swallow strategies.    Recommendations:  Diet: Dysphagia 2 Mechanical Soft   Liquid: Thin liquids   Meds: Crushed in puree   Supervision: Direct   Positioning:Upright  Strategies: Small bites/sips, slow rate, addition of moisture to solids   Pt to take PO/Meds only when fully alert and upright.   Oral care: Frequent   Aspiration precautions  Therapy Prognosis: Fair   Prognosis considerations: Worsening dysphagia, CVA hx   Frequency: 3-5x/wk as able/appropriate     Consider consult w/:  Nutrition    Goal(s):  Pt will tolerate least restrictive diet w/out s/s aspiration or oral/pharyngeal difficulties.    Dysphagia LTG  -Patient will demonstrate safe and effective oral intake (without overt s/s significant oral/pharyngeal dysphagia including s/s penetration or aspiration) for the highest appropriate diet level.     Short Term Goals    -Pt will tolerate Dysphagia 2/mechanical soft diet " and thin liquid with no significant s/s oral or pharyngeal dysphagia across 1-3 diagnostic session/s.    -Patient will tolerate trials of upgraded food and/or liquid texture with no significant s/s of oral or pharyngeal dysphagia including aspiration across 1-3 diagnostic sessions     -Patient will comply with a Video/Modified Barium Swallow study if warranted for more complete assessment of swallowing anatomy/physiology/aspiration risk and to assess efficacy of treatment techniques    Re: Mastication  -Patient will demonstrate adequate mastication to safely consume the  least restrictive diet with no verbal, visual or tactile cues needed.    Re: Compensatory Strategies    -Patient’s caregiver will demonstrate adherence to recommended diet, as well as application of aspiration precautions and compensatory strategies.         H&P/Admit info/ pertinent provider notes: (PMH noted above)  Medical Decision Making  72 yo F with acute onset L foot drop/weakness- stroke workup with CTA head to r/o bleed/LVO, EKG to r/o arrhythmia, CBC to assess for leukocytosis/anemia, CMP to assess for elevated LFTs/SHAUN/electrolyte derangements, CT L-spine to r/o osseous abn      Special Studies:  MRI 9/15/24:  IMPRESSION:     No acute infarct identified. Left greater than right anterior/inferior frontal lobe encephalomalacia with gliosis from prior trauma.     Loss of normal flow void involving the visualized extracranial, petrous and cavernous right internal carotid artery related to chronic occlusion as demonstrated on prior CT angiograms.    ENT 9/19/24:  Procedure: Flexible fiberoptic laryngoscopy     Indications: Evaluate areas of the upper aerodigestive tract not seen on physical exam     Procedure in detail: After informed verbal consent was obtained the nose was anesthetized using 4% lidocaine and neosynephrine spray. After adequate time for anesthesia the scope was guided easily along the nasal cavity floor and into the  nasopharynx. The nasopharynx, oropharynx, hypopharynx and larynx were evaluated with the below listed findings. The scope was removed without difficulty and the patient tolerated the procedure well.      Findings: No significant mucoid purulence or polyposis in the nasal cavity. No lesions or masses of the nasopharynx, oropharynx, hypopharynx, or larynx. Airway is widely patent.  Bilateral vocal folds are fully mobile. No lesions or masses of the subglottis. Minimal 1-2 mm glottic gap. This is not consistent with the level of breathiness of her voice and poor production. When she hums the production is normal and full.        A/P: hoarseness: She has a mild glottic gap , but it seems that her main problem is discoordination of her breath and her speech as she seems to blow out all of her air prior to vocalizing. Recommend ongoing therapy for her voice including significant breath work.     Head CT 9/14/24:  IMPRESSION:     No acute intracranial hemorrhage, new proximal large vessel occlusion in the head and neck or high-grade vascular stenosis. Chronic right ICA dissection, as detailed above.      Previous MBS:  Yes 6/20/23   Recommendations:   Recommended Diet:  soft/level 3 diet and thin liquids   Recommended Form of Medications: whole with liquid and whole with puree (pt preference)   Aspiration precautions and compensatory swallowing strategies: upright posture, only feed when fully alert, slow rate of feeding, small bites/sips, effortful swallow and alternating bites and sips  SLP Dysphagia therapy recommended: May benefit from training in aspiration precautions (pt follows with OP SLP)  Patient's goal:    Did the pt report pain? No   If yes, was nursing notified/was it addressed?    Reason for consult:  R/o aspiration  Determine safest and least restrictive diet  Change in mental status  Stroke protocol  H/o neurological disease  h/o dysphagia   C/o solid food dysphagia  C/o liquid  dysphagia    Precautions:  Aspiration  Fall      Food Allergies: NKFA   Current Diet: Dental soft/thin liquids   Premorbid diet: Dental soft/thin liquids   O2 requirement: Room air    Social/Prior living Home with son and with day time aide   Voice/Speech: Hoarse/decreased volume   Follows commands: 1 step   Cognitive status: Baseline deficits from CVA      Oral mech exam:  Dentition: Upper dentures, son stating pt broke bottom dentures   Lips (VII): +  Tongue (XII): Decreased ROM   Secretion management: Adequate  Volitional cough: +   Volitional swallow: +  Oral care     Items administered: Dysphagia 3, Dysphagia 2, thin liquids       Oral stage:  Lip closure: Decreased but functional  Mastication: Poor with dysphagia 3 meal tray  Bolus formation: Decreased   Bolus control: Decreased   Transfer: Delayed   Oral residue: Mild   Pocketing: None     Pharyngeal stage:  Swallow promptness: Delayed   Laryngeal rise: Functional   Wet voice: n/a   Throat clear: yes w/ dysphagia 3  Cough:yes w/ dysphagia 3  Secondary swallows: yes with thins   Audible swallows: yes    Esophageal stage:  No s/s reported    Aspiration precautions posted    Results d/w:  Pt, nursing, family, physician,

## 2024-09-15 NOTE — PLAN OF CARE
Problem: Potential for Falls  Goal: Patient will remain free of falls  Description: INTERVENTIONS:  - Educate patient/family on patient safety including physical limitations  - Instruct patient to call for assistance with activity   - Consult OT/PT to assist with strengthening/mobility   - Keep Call bell within reach  - Keep bed low and locked with side rails adjusted as appropriate  - Keep care items and personal belongings within reach  - Initiate and maintain comfort rounds  - Make Fall Risk Sign visible to staff  - Offer Toileting every 2 Hours, in advance of need  - Initiate/Maintain bed alarm  - Obtain necessary fall risk management equipment  - Apply yellow socks and bracelet for high fall risk patients  - Consider moving patient to room near nurses station  Outcome: Progressing     Problem: SAFETY ADULT  Goal: Patient will remain free of falls  Description: INTERVENTIONS:  - Educate patient/family on patient safety including physical limitations  - Instruct patient to call for assistance with activity   - Consult OT/PT to assist with strengthening/mobility   - Keep Call bell within reach  - Keep bed low and locked with side rails adjusted as appropriate  - Keep care items and personal belongings within reach  - Initiate and maintain comfort rounds  - Make Fall Risk Sign visible to staff  - Offer Toileting every 2 Hours, in advance of need  - Initiate/Maintain bed alarm  - Obtain necessary fall risk management equipment  - Apply yellow socks and bracelet for high fall risk patients  - Consider moving patient to room near nurses station  Outcome: Progressing  Goal: Maintain or return to baseline ADL function  Description: INTERVENTIONS:  -  Assess patient's ability to carry out ADLs; assess patient's baseline for ADL function and identify physical deficits which impact ability to perform ADLs (bathing, care of mouth/teeth, toileting, grooming, dressing, etc.)  - Assess/evaluate cause of self-care deficits    - Assess range of motion  - Assess patient's mobility; develop plan if impaired  - Assess patient's need for assistive devices and provide as appropriate  - Encourage maximum independence but intervene and supervise when necessary  - Involve family in performance of ADLs  - Assess for home care needs following discharge   - Consider OT consult to assist with ADL evaluation and planning for discharge  - Provide patient education as appropriate  Outcome: Progressing  Goal: Maintains/Returns to pre admission functional level  Description: INTERVENTIONS:  - Perform AM-PAC 6 Click Basic Mobility/ Daily Activity assessment daily.  - Set and communicate daily mobility goal to care team and patient/family/caregiver.   - Collaborate with rehabilitation services on mobility goals if consulted  - Perform Range of Motion 3 times a day.  - Reposition patient every 2 hours.  - Dangle patient 3 times a day  - Stand patient 3 times a day  - Ambulate patient 3 times a day  - Out of bed to chair 3 times a day   - Out of bed for meals 3 times a day  - Out of bed for toileting  - Record patient progress and toleration of activity level   Outcome: Progressing     Problem: DISCHARGE PLANNING  Goal: Discharge to home or other facility with appropriate resources  Description: INTERVENTIONS:  - Identify barriers to discharge w/patient and caregiver  - Arrange for needed discharge resources and transportation as appropriate  - Identify discharge learning needs (meds, wound care, etc.)  - Arrange for interpretive services to assist at discharge as needed  - Refer to Case Management Department for coordinating discharge planning if the patient needs post-hospital services based on physician/advanced practitioner order or complex needs related to functional status, cognitive ability, or social support system  Outcome: Progressing     Problem: Knowledge Deficit  Goal: Patient/family/caregiver demonstrates understanding of disease process,  treatment plan, medications, and discharge instructions  Description: Complete learning assessment and assess knowledge base.  Interventions:  - Provide teaching at level of understanding  - Provide teaching via preferred learning methods  Outcome: Progressing     Problem: Activity Intolerance/Impaired Mobility  Goal: Mobility/activity is maintained at optimum level for patient  Description: Interventions:  - Assess and monitor patient  barriers to mobility and need for assistive/adaptive devices.  - Assess patient's emotional response to limitations.  - Collaborate with interdisciplinary team and initiate plans and interventions as ordered.  - Encourage independent activity per ability.  - Maintain proper body alignment.  - Perform active/passive rom as tolerated/ordered.  - Plan activities to conserve energy.  - Turn patient as appropriate  Outcome: Progressing

## 2024-09-15 NOTE — PROGRESS NOTES
"Progress Note - Hospitalist   Name: Shae Jung 73 y.o. female I MRN: [id number]  Unit/Bed#: E4 -01 I Date of Admission: 9/14/2024   Date of Service: 9/15/2024 I Hospital Day: 1    Assessment & Plan  Left foot drop  Patient is a 73-year-old female with a past medical history of hypertension, hyperlipidemia, history of prior stroke with right-sided residual deficit, L1 compression fracture who presented to the hospital after episodes of dizziness, headache and left foot drop  Patient lives with her son, has a caretaker from 8 AM to 6 PM, rest of the time son is with her  She was working with physical therapy today, complaining of difficulty with ambulation, PT noticed foot drop  Differential diagnosis CVA versus peroneal nerve palsy.  Denies trauma  Will admit under stroke pathway  CTA head and neck showed no acute intracranial hemorrhage, large vessel occlusion or high-grade vascular stenosis.  Chronic right ICA dissection  CT of the lumbar spine showed old L1 compression fracture.  Multilevel minimal disc bulges and facet arthropathy, no significant central canal stenosis, mild bilateral neural foraminal narrowing left greater than right at L5-S1  Pt follows with neurosx outpt- last seen in April. She had been in tlso brace. At that time surgery was not recommended.   MRI of the brain, echo- PENDING  Neurochecks-variable at times   Aspirin, statin    History of stroke  Has a history of 3 prior CVAs with residual right leg weakness and dysarthria  Continue statin, aspirin  Hypertension  Currently not on any medications  Bp stable   Gastroesophageal reflux disease  Continue PPI  Hyperlipidemia  Continue statin  Diabetes mellitus type II, controlled (HCC)  Lab Results   Component Value Date    HGBA1C 6.0 09/12/2024       No results for input(s): \"POCGLU\" in the last 72 hours.    Blood Sugar Average: Last 72 hrs:    Diet controlled  Compression fracture of L1 lumbar vertebra (HCC)  Follows neurosurgery  Last " evaluated in   Had been in tlso brace- surgery was not needed at that time  Ct showing slightly progressed  Neuro exam fluctuating at times  Will await neurology eval- may require neurosx eval   Ambulatory dysfunction  Due to history of stroke  Pt/ot     VTE Pharmacologic Prophylaxis: VTE Score: 3 heparin     Mobility:   Basic Mobility Inpatient Raw Score: 13  -[facility] Goal: 4: Move to chair/commode  -[facility] Achieved: 6: Walk 10 steps or more      Patient Centered Rounds:  discussed with her nurse       Education and Discussions with Family / Patient: will discuss with her son after evaluation by neurology     Current Length of Stay: 1 day(s)  Current Patient Status: Inpatient     Discharge Plan: Anticipate discharge in 48-72 hrs to tbd- pt/ot still to evaluate    Code Status: Level 1 - Full Code    Subjective   Pt seen and examined. Used cryacrom [id number]. Pt denies worsening of her weakness. No pain in her back.     Objective     Vitals:   Temp (24hrs), Av.3 °F (36.3 °C), Min:96.6 °F (35.9 °C), Max:98.2 °F (36.8 °C)    Temp:  [96.6 °F (35.9 °C)-98.2 °F (36.8 °C)] 97.1 °F (36.2 °C)  HR:  [50-84] 56  Resp:  [16-18] 16  BP: ()/(55-90) 115/57  SpO2:  [93 %-100 %] 96 %  Body mass index is 25.19 kg/m².     Input and Output Summary (last 24 hours):     Intake/Output Summary (Last 24 hours) at 9/15/2024 1046  Last data filed at 2024 2249  Gross per 24 hour   Intake 250 ml   Output --   Net 250 ml       Physical Exam  Constitutional:       Appearance: Normal appearance.   HENT:      Head: Normocephalic and atraumatic.   Eyes:      Extraocular Movements: Extraocular movements intact.      Pupils: Pupils are equal, round, and reactive to light.   Cardiovascular:      Rate and Rhythm: Normal rate and regular rhythm.      Heart sounds: No murmur heard.     No friction rub. No gallop.   Pulmonary:      Effort: Pulmonary effort is normal. No respiratory distress.      Breath sounds: No wheezing, rhonchi or rales.    Abdominal:      General: Bowel sounds are normal. There is no distension.      Palpations: Abdomen is soft.      Tenderness: There is no abdominal tenderness. There is no guarding or rebound.   Neurological:      Mental Status: She is alert and oriented to person, place, and time.      Comments: Weakness was variable in her left lower ext. She had 4/5 muscle strength in left leg. She was able to dorsiflex and plantarflex her foot but later in examine she was only able to move her toes           Lines/Drains:  Lines/Drains/Airways       Active Status       None                      Telemetry:  Telemetry Orders (From admission, onward)               24 Hour Telemetry Monitoring  Continuous x 24 Hours (Telem)        Question:  Reason for 24 Hour Telemetry  Answer:  TIA/Suspected CVA/ Confirmed CVA                  Lab Results: I have reviewed the following results:    Results from last 7 days   Lab Units 09/15/24  0328 09/14/24  1725   WBC Thousand/uL 5.08 5.57   HEMOGLOBIN g/dL 12.1 12.6   HEMATOCRIT % 35.9 38.5   PLATELETS Thousands/uL 202 240   SEGS PCT %  --  66   LYMPHO PCT %  --  24   MONO PCT %  --  7   EOS PCT %  --  2     Results from last 7 days   Lab Units 09/15/24  0328 09/14/24  1725 09/11/24  1507   SODIUM mmol/L 140   < > 139   POTASSIUM mmol/L 4.1   < > 4.0   CHLORIDE mmol/L 108   < > 105   CO2 mmol/L 26   < > 28   BUN mg/dL 14   < > 16   CREATININE mg/dL 0.54*   < > 0.61   ANION GAP mmol/L 6   < > 6   CALCIUM mg/dL 8.7   < > 9.1   ALBUMIN g/dL  --   --  4.2   TOTAL BILIRUBIN mg/dL  --   --  0.37   ALK PHOS U/L  --   --  64   ALT U/L  --   --  19   AST U/L  --   --  22   GLUCOSE RANDOM mg/dL 98   < > 137    < > = values in this interval not displayed.     Results from last 7 days   Lab Units 09/14/24  1725   INR  0.98         Results from last 7 days   Lab Units 09/12/24  1519   HEMOGLOBIN A1C  6.0           Recent Cultures (last 7 days):         Imaging Review: ct lumbar spine  Other Studies: No  additional pertinent studies reviewed.    Last 24 Hours Medication List:     Current Facility-Administered Medications:     acetaminophen (Ofirmev) injection 1,000 mg, Q6H PRN, Last Rate: Stopped (09/14/24 2136)    aspirin (ECOTRIN LOW STRENGTH) EC tablet 81 mg, Daily    atorvastatin (LIPITOR) tablet 40 mg, Daily With Dinner    benzonatate (TESSALON PERLES) capsule 100 mg, TID PRN    Cholecalciferol (VITAMIN D3) tablet 5,000 Units, Daily    cyclobenzaprine (FLEXERIL) tablet 2.5 mg, TID    docusate sodium (COLACE) capsule 100 mg, BID PRN    heparin (porcine) subcutaneous injection 5,000 Units, Q8H LAM **AND** [CANCELED] Platelet count, Once    pantoprazole (PROTONIX) EC tablet 40 mg, Daily Before Breakfast    traZODone (DESYREL) tablet 25 mg, HS    Administrative Statements   Today, Patient Was Seen By: Daniella Sanchez DO

## 2024-09-16 ENCOUNTER — APPOINTMENT (OUTPATIENT)
Dept: MRI IMAGING | Facility: HOSPITAL | Age: 74
DRG: 074 | End: 2024-09-16
Payer: MEDICARE

## 2024-09-16 ENCOUNTER — APPOINTMENT (OUTPATIENT)
Dept: PHYSICAL THERAPY | Facility: CLINIC | Age: 74
End: 2024-09-16
Payer: MEDICARE

## 2024-09-16 ENCOUNTER — APPOINTMENT (INPATIENT)
Dept: NON INVASIVE DIAGNOSTICS | Facility: HOSPITAL | Age: 74
DRG: 074 | End: 2024-09-16
Payer: MEDICARE

## 2024-09-16 LAB
ATRIAL RATE: 68 BPM
BSA FOR ECHO PROCEDURE: 1.43 M2
DME PARACHUTE DELIVERY DATE ACTUAL: NORMAL
DME PARACHUTE DELIVERY DATE EXPECTED: NORMAL
DME PARACHUTE DELIVERY DATE REQUESTED: NORMAL
DME PARACHUTE ITEM DESCRIPTION: NORMAL
DME PARACHUTE ORDER STATUS: NORMAL
DME PARACHUTE SUPPLIER NAME: NORMAL
DME PARACHUTE SUPPLIER PHONE: NORMAL
P AXIS: 67 DEGREES
PR INTERVAL: 142 MS
QRS AXIS: 62 DEGREES
QRSD INTERVAL: 86 MS
QT INTERVAL: 414 MS
QTC INTERVAL: 440 MS
RA PRESSURE ESTIMATED: 5 MMHG
SL CV LV EF: 58
T WAVE AXIS: 78 DEGREES
VENTRICULAR RATE: 68 BPM

## 2024-09-16 PROCEDURE — 99232 SBSQ HOSP IP/OBS MODERATE 35: CPT | Performed by: INTERNAL MEDICINE

## 2024-09-16 PROCEDURE — 93306 TTE W/DOPPLER COMPLETE: CPT | Performed by: STUDENT IN AN ORGANIZED HEALTH CARE EDUCATION/TRAINING PROGRAM

## 2024-09-16 PROCEDURE — 97163 PT EVAL HIGH COMPLEX 45 MIN: CPT

## 2024-09-16 PROCEDURE — 93010 ELECTROCARDIOGRAM REPORT: CPT

## 2024-09-16 PROCEDURE — 97167 OT EVAL HIGH COMPLEX 60 MIN: CPT

## 2024-09-16 PROCEDURE — 93306 TTE W/DOPPLER COMPLETE: CPT

## 2024-09-16 PROCEDURE — 92526 ORAL FUNCTION THERAPY: CPT

## 2024-09-16 PROCEDURE — 99222 1ST HOSP IP/OBS MODERATE 55: CPT

## 2024-09-16 PROCEDURE — 72148 MRI LUMBAR SPINE W/O DYE: CPT

## 2024-09-16 RX ADMIN — GABAPENTIN 100 MG: 100 CAPSULE ORAL at 21:44

## 2024-09-16 RX ADMIN — CYCLOBENZAPRINE HYDROCHLORIDE 2.5 MG: 5 TABLET, FILM COATED ORAL at 21:44

## 2024-09-16 RX ADMIN — Medication 5000 UNITS: at 08:42

## 2024-09-16 RX ADMIN — ATORVASTATIN CALCIUM 40 MG: 40 TABLET, FILM COATED ORAL at 16:42

## 2024-09-16 RX ADMIN — GABAPENTIN 100 MG: 100 CAPSULE ORAL at 08:41

## 2024-09-16 RX ADMIN — ACETAMINOPHEN 1000 MG: 10 INJECTION INTRAVENOUS at 12:05

## 2024-09-16 RX ADMIN — GABAPENTIN 100 MG: 100 CAPSULE ORAL at 16:42

## 2024-09-16 RX ADMIN — HEPARIN SODIUM 5000 UNITS: 5000 INJECTION INTRAVENOUS; SUBCUTANEOUS at 14:17

## 2024-09-16 RX ADMIN — ASPIRIN 81 MG: 81 TABLET, COATED ORAL at 08:41

## 2024-09-16 RX ADMIN — CYCLOBENZAPRINE HYDROCHLORIDE 2.5 MG: 5 TABLET, FILM COATED ORAL at 16:42

## 2024-09-16 RX ADMIN — CYCLOBENZAPRINE HYDROCHLORIDE 2.5 MG: 5 TABLET, FILM COATED ORAL at 08:41

## 2024-09-16 RX ADMIN — ACETAMINOPHEN 1000 MG: 10 INJECTION INTRAVENOUS at 19:46

## 2024-09-16 RX ADMIN — TRAZODONE HYDROCHLORIDE 25 MG: 50 TABLET ORAL at 21:44

## 2024-09-16 RX ADMIN — HEPARIN SODIUM 5000 UNITS: 5000 INJECTION INTRAVENOUS; SUBCUTANEOUS at 21:44

## 2024-09-16 RX ADMIN — DOCUSATE SODIUM 100 MG: 100 CAPSULE, LIQUID FILLED ORAL at 21:44

## 2024-09-16 NOTE — ASSESSMENT & PLAN NOTE
"Lab Results   Component Value Date    HGBA1C 6.0 (H) 09/15/2024       No results for input(s): \"POCGLU\" in the last 72 hours.    Blood Sugar Average: Last 72 hrs:   pre diabetic   Diet controlled  "

## 2024-09-16 NOTE — PLAN OF CARE
Problem: OCCUPATIONAL THERAPY ADULT  Goal: Performs self-care activities at highest level of function for planned discharge setting.  See evaluation for individualized goals.  Description: Treatment Interventions: ADL retraining, Functional transfer training, UE strengthening/ROM, Endurance training, Cognitive reorientation, Equipment evaluation/education, Patient/family training, Compensatory technique education, Continued evaluation          See flowsheet documentation for full assessment, interventions and recommendations.   Note: Limitation: Decreased ADL status, Decreased UE strength, Decreased Safe judgement during ADL, Decreased cognition, Decreased endurance, Decreased high-level ADLs  Prognosis: Fair  Assessment: Pt is a 74y/o female admitted to the hospital 2* symptoms of HA, L foot drop; MRI(brain)=neg for acute findings; CT(L-spine)=Compression fracture anterior superior plate of L1 has slightly further progressed. Pt with PMH hypertension, hyperlipidemia, diabetes, bladder dysfunction, CVA with R residual weakness/dysarthria. PTA family(son) states pt had assistance with her ADLs, transfers, ambulation--HHA, household distances; +falls=5-10; neg , neg home alone, HHA--70hrs/wk(M-TH=8-6, Friday=9-9pm, Sat=8am-2:30). Pt currently allowed OOB without a back brace. During inital eval, pt demonstrated deficits with her functional balance, functional mobility, ADL status, transfer safety, b/l UE strength, and activity tolerance(currently fair=15-20mins). Pt would benefit from continued OT tx for the above deficits.2-5xwk/1-2wks. The patient's raw score on the AM-PAC Daily Activity Inpatient Short Form is 15. A raw score of less than 19 suggests the patient may benefit from discharge to post-acute rehabilitation services. Please refer to the recommendation of the Occupational Therapist for safe discharge planning.     Rehab Resource Intensity Level, OT: III (Minimum Resource Intensity) (home PT/OT)

## 2024-09-16 NOTE — Clinical Note
history of multiple strokes, TBI, subdural hemorrhage, left anterior inferior encephalomalacia, ambulatory dysfunction with recurrent falls, oropharyngeal dysphagia, anxiety, progressive insomnia cognitive impairment, L1 vertebral compression fracture.

## 2024-09-16 NOTE — ASSESSMENT & PLAN NOTE
Patient is a 73-year-old female with a past medical history of hypertension, hyperlipidemia, history of prior stroke with right-sided residual deficit, L1 compression fracture who presented to the hospital after episodes of dizziness, headache and left foot drop  Patient lives with her son, has a caretaker from 8 AM to 6 PM, rest of the time son is with her  She was working with physical therapy today, complaining of difficulty with ambulation, PT noticed foot drop  Differential diagnosis CVA versus peroneal nerve palsy.  Denies trauma  Will admit under stroke pathway  CTA head and neck showed no acute intracranial hemorrhage, large vessel occlusion or high-grade vascular stenosis.  Chronic right ICA dissection  CT of the lumbar spine showed old L1 compression fracture.  Multilevel minimal disc bulges and facet arthropathy, no significant central canal stenosis, mild bilateral neural foraminal narrowing left greater than right at L5-S1  Pt follows with neurosx outpt- last seen in April. She had been in tlso brace. At that time surgery was not recommended.   MRI of the brain negative  Neurochecks-variable at times-   Aspirin, statin  Await mri of l spine      n/a

## 2024-09-16 NOTE — SPEECH THERAPY NOTE
Speech Language/Pathology    Speech/Language Pathology Progress Note    Patient Name: Shae Jung  Today's Date: 2024     Problem List  Principal Problem:    Left foot drop  Active Problems:    Hypertension    Gastroesophageal reflux disease    Hyperlipidemia    Diabetes mellitus type II, controlled (HCC)    Compression fracture of L1 lumbar vertebra (HCC)    Ambulatory dysfunction    History of stroke       Past Medical History  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    Palpitations     Recurrent urinary tract infection     Seizures ([facility])     Sleep disorder     last assessed: 10/19/2013    Speech impairment     Stroke ([facility])     Thyroid disease     Tinnitus         Past Surgical History  Past Surgical History:   Procedure Laterality Date    AXILLARY SURGERY      cyst surgery    BLADDER SURGERY       SECTION      CYSTOSCOPY N/A 2018    Procedure: CYSTOSCOPY;  Surgeon: Charis Major MD;  Location: [facility];  Service: Gynecology    FACIAL BONE TUMOR EXCISION      KNEE SURGERY      OTHER SURGICAL HISTORY      cyst removed from axilla    PA ESOPHAGOGASTRODUODENOSCOPY TRANSORAL DIAGNOSTIC N/A 2017    Procedure: ESOPHAGOGASTRODUODENOSCOPY (EGD);  Surgeon: Jose Antonio Platt MD;  Location: [facility] LAB;  Service: Gastroenterology    PA SLING OPERATION STRESS INCONTINENCE N/A 2018    Procedure: PUBOVAGINAL SLING;  Surgeon: Charis Major MD;  Location: [facility] OR;  Service: Gynecology     TUBAL LIGATION           Subjective:  Pt alert, feeding self, as well as being fed by CG. Son at bedside. Pt reportedly did not sleep well last night.   Objective:  EGD 6/6/22:  IMPRESSION:  Small sliding hiatal hernia  Possible C0M2 short-segment Yee's esophagus biopsied  Normal duodenum  RECOMMENDATION:  Await pathology results  Continue omeprazole daily  Colonoscopy to follow  MRI 9/15/24:  No acute infarct identified. Left greater than right anterior/inferior frontal lobe encephalomalacia with gliosis from prior trauma.  Loss of normal flow void involving the visualized extracranial, petrous and cavernous right internal carotid artery related to chronic occlusion as demonstrated on prior CT angiograms.    Pt seen for f/u dysphagia tx. She had eaten her entire level 2 lunch except the rice pudding and was eating additional food that the family brought in. When allowed to feed self, the pt put extremely large amounts on the spoon. Fed self quickly w/ minimal mastication. Much better when fed by CG's. Given hiatal hernia, educated son to give periodic small sips of liquid, as sometimes passage is slowed in that area. Pt then completed the rice pudding. Bolus control appeared adequate. No obvious oral residue. No cough present throughout the large meal w/ food or liquids.  He reported pt coughs at night while laying flat. Advised to elevate HOB. He stated she has 2 pillows under her head. Educated her back should be elevated (not just her head) to at least 30 degrees/6 inches. He thinks he has a wedge cushion at home to use. Pt cannot cough upon request. He stated she coughs if he makes her laugh. Currently going to [facility] for voice. He reported when pt yells at him/to him her voice is much louder. I suspect this is partially due to breath support and effort (ent consult noted). He denied any pt h/o URI's, PNAs.  Assessment:  No cough or wet vocal quality w/ large amount of food and liquid today. (Level 2 [facility]  soft, food family brought in was somewhat in between a 2 and 3), Pt needs to be paced to go slow and take smaller bites if not being fed. Coughs at night while lying flat.   Plan/Recommendations:  Continue dysphagia 2 mechanical soft and thin. Break or crush larger pills as able/allowable. Slow rate. Small bites. Periodic sips of liquid in between. Do not lay flat at rest. Keep HOB elevated to at least 30 degrees at rest. F/u 1-2xs as able and Indicated.

## 2024-09-16 NOTE — PLAN OF CARE
Problem: PHYSICAL THERAPY ADULT  Goal: Performs mobility at highest level of function for planned discharge setting.  See evaluation for individualized goals.  Description: Treatment/Interventions: Functional transfer training, LE strengthening/ROM, Elevations, Therapeutic exercise, Endurance training, Patient/family training, Bed mobility, Gait training, Spoke to nursing, OT, Family  Equipment Recommended: Walker (will trial next tx session)       See flowsheet documentation for full assessment, interventions and recommendations.  Note: Prognosis: Fair  Problem List: Decreased strength, Decreased endurance, Impaired balance, Decreased mobility, Decreased cognition, Impaired judgement, Decreased safety awareness, Pain  Assessment: Pt. 73 y.o.female w/ h/o multiple strokes, TBI & subdural hemorrhage, presented w/ episodes of dizziness, headache & L foot drop. Pt admitted for Left foot drop w/ ambulatory dysfunction. Imaging showed no acute intracranial abnormality. MRI LS pending. Pt referred to PT for mobility assessment & D/C planning w/ orders of Up and OOB as tolerated . Please see above for information re: home set-up & PLOF as well as objective findings during PT assessment. PTA, pt reports being (A) w/ ADL's and (A) w/ amb w/ B/L hand held assistance. On eval, pt functioning below baseline hence will continue skilled PT to improve function & safety. Pt require minAx1 for most bed mobility except supine to sit require modAx1; minAx1 for transfers & minAx2 for amb w/ B/L hand held assistance + cues for techniques & safety. Gait deviations as above, slow & unsteady but no gross LOB noted. Pt's son notes that pt's current gait not at baseline. Pt son reported after gait assessment that pt was using a RW at OPPT & showed a video which showed that pt was ambulating well w/ RW + assistance. Will trial RW next tx session. The patient's AM-PAC Basic Mobility Inpatient Short Form Raw Score is 15. A Raw score of less  "than or equal to 16 suggests the patient may benefit from discharge to post-acute rehabilitation services. Please also refer to the recommendation of the Physical Therapist for safe discharge planning. From PT standpoint, will recommend Level III (minimum resource intensity) rehab services and 24hr S/A at D/C. No SOB & dizziness reported t/o session. Nsg staff most recent vital signs as follows: /78   Pulse (!) 53   Temp 97.6 °F (36.4 °C) (Temporal)   Resp 18   Ht 4' 9\" (1.448 m)   Wt 52.6 kg (116 lb)   SpO2 98%   BMI 25.10 kg/m² . At end of session, pt back in bed in stable condition, call bell & phone in reach, bed alarm activated. Fall precautions reinforced w/ good understanding. CM to follow. Nsg staff to continue to mobilized pt (OOB in chair for all meals & ambulate in room/unit) as tolerated to prevent further decline in function. Will also recommend Restorative for daily amb &/or daily OOB in chair as appropriate. Nsg notified. Co-eval was necessary to complete this PT eval for the pt's best interest given pt's medical acuity & complexity.        Rehab Resource Intensity Level, PT: III (Minimum Resource Intensity) (pending progress)    See flowsheet documentation for full assessment.        "

## 2024-09-16 NOTE — PROGRESS NOTES
"Progress Note - Hospitalist   Name: Shae Jung 73 y.o. female I MRN: [id number]  Unit/Bed#: E4 -01 I Date of Admission: 9/14/2024   Date of Service: 9/16/2024 I Hospital Day: 2    Assessment & Plan  Left foot drop  Patient is a 73-year-old female with a past medical history of hypertension, hyperlipidemia, history of prior stroke with right-sided residual deficit, L1 compression fracture who presented to the hospital after episodes of dizziness, headache and left foot drop  Patient lives with her son, has a caretaker from 8 AM to 6 PM, rest of the time son is with her  She was working with physical therapy today, complaining of difficulty with ambulation, PT noticed foot drop  Differential diagnosis CVA versus peroneal nerve palsy.  Denies trauma  Will admit under stroke pathway  CTA head and neck showed no acute intracranial hemorrhage, large vessel occlusion or high-grade vascular stenosis.  Chronic right ICA dissection  CT of the lumbar spine showed old L1 compression fracture.  Multilevel minimal disc bulges and facet arthropathy, no significant central canal stenosis, mild bilateral neural foraminal narrowing left greater than right at L5-S1  Pt follows with neurosx outpt- last seen in April. She had been in tlso brace. At that time surgery was not recommended.   MRI of the brain negative  Neurochecks-variable at times-   Aspirin, statin  Await mri of l spine     History of stroke  Has a history of 3 prior CVAs with residual right leg weakness and dysarthria  Continue statin, aspirin  Mri negative for acute cva  Hypertension  Currently not on any medications  Bp stable   Gastroesophageal reflux disease  Continue PPI  Hyperlipidemia  Continue statin  Diabetes mellitus type II, controlled (HCC)  Lab Results   Component Value Date    HGBA1C 6.0 (H) 09/15/2024       No results for input(s): \"POCGLU\" in the last 72 hours.    Blood Sugar Average: Last 72 hrs:   pre diabetic   Diet controlled  Compression " fracture of L1 lumbar vertebra (HCC)  Follows neurosurgery  Last evaluated in   Had been in tlso brace- surgery was not needed at that time  Ct showing slightly progressed  Neuro exam fluctuating at times  Lumbar spine mri is pending   Ambulatory dysfunction  Due to history of stroke  Pt/ot     VTE Pharmacologic Prophylaxis: VTE Score: 3 heparin     Mobility:   Basic Mobility Inpatient Raw Score: 15  -[facility] Goal: 4: Move to chair/commode  -[facility] Achieved: 6: Walk 10 steps or more    Patient Centered Rounds:  discussed with her nurse         Education and Discussions with Family / Patient: son in room     Current Length of Stay: 2 day(s)  Current Patient Status: Inpatient     Discharge Plan: str- 48 to 72 hours     Code Status: Level 1 - Full Code    Subjective   Pt seen and examined. Son provided interpretation. No new problems or weakness     Objective     Vitals:   Temp (24hrs), Av.7 °F (36.5 °C), Min:97.3 °F (36.3 °C), Max:98.4 °F (36.9 °C)    Temp:  [97.3 °F (36.3 °C)-98.4 °F (36.9 °C)] 97.6 °F (36.4 °C)  HR:  [53-81] 53  Resp:  [16-18] 18  BP: (139-155)/(67-78) 139/78  SpO2:  [97 %-98 %] 98 %  Body mass index is 25.1 kg/m².     Input and Output Summary (last 24 hours):     Intake/Output Summary (Last 24 hours) at 2024 1036  Last data filed at 9/15/2024 2027  Gross per 24 hour   Intake 130 ml   Output --   Net 130 ml       Physical Exam  Constitutional:       Comments: Frail    HENT:      Head: Normocephalic and atraumatic.   Eyes:      Extraocular Movements: Extraocular movements intact.      Pupils: Pupils are equal, round, and reactive to light.   Cardiovascular:      Rate and Rhythm: Normal rate and regular rhythm.      Heart sounds: No murmur heard.     No friction rub. No gallop.   Pulmonary:      Effort: Pulmonary effort is normal. No respiratory distress.      Breath sounds: Normal breath sounds. No wheezing, rhonchi or rales.   Abdominal:      General: Bowel sounds are normal. There is no  distension.      Palpations: Abdomen is soft.      Tenderness: There is no abdominal tenderness. There is no guarding or rebound.   Neurological:      Mental Status: She is alert and oriented to person, place, and time.      Comments: Observed pt while son was talking to case management. Pt had more rom of her left ankle. Asked son to provide interpretation for neuro exam and rom significant decreased. Ambulation was not observed.           Lines/Drains:  Lines/Drains/Airways       Active Status       None                    Lab Results:  Results from last 7 days   Lab Units 09/15/24  0328 09/14/24  1725   WBC Thousand/uL 5.08 5.57   HEMOGLOBIN g/dL 12.1 12.6   HEMATOCRIT % 35.9 38.5   PLATELETS Thousands/uL 202 240   SEGS PCT %  --  66   LYMPHO PCT %  --  24   MONO PCT %  --  7   EOS PCT %  --  2     Results from last 7 days   Lab Units 09/15/24  0328 09/14/24  1725 09/11/24  1507   SODIUM mmol/L 140   < > 139   POTASSIUM mmol/L 4.1   < > 4.0   CHLORIDE mmol/L 108   < > 105   CO2 mmol/L 26   < > 28   BUN mg/dL 14   < > 16   CREATININE mg/dL 0.54*   < > 0.61   ANION GAP mmol/L 6   < > 6   CALCIUM mg/dL 8.7   < > 9.1   ALBUMIN g/dL  --   --  4.2   TOTAL BILIRUBIN mg/dL  --   --  0.37   ALK PHOS U/L  --   --  64   ALT U/L  --   --  19   AST U/L  --   --  22   GLUCOSE RANDOM mg/dL 98   < > 137    < > = values in this interval not displayed.     Results from last 7 days   Lab Units 09/14/24  1725   INR  0.98         Results from last 7 days   Lab Units 09/15/24  0328 09/12/24  1519   HEMOGLOBIN A1C % 6.0* 6.0           Recent Cultures (last 7 days):         Imaging Review: Reviewed radiology reports from this admission including: MRI brain.  Other Studies: No additional pertinent studies reviewed.    Last 24 Hours Medication List:     Current Facility-Administered Medications:     acetaminophen (Ofirmev) injection 1,000 mg, Q6H PRN, Last Rate: Stopped (09/15/24 2027)    aspirin (ECOTRIN LOW STRENGTH) EC tablet 81 mg,  Daily    atorvastatin (LIPITOR) tablet 40 mg, Daily With Dinner    benzonatate (TESSALON PERLES) capsule 100 mg, TID PRN    Cholecalciferol (VITAMIN D3) tablet 5,000 Units, Daily    cyclobenzaprine (FLEXERIL) tablet 2.5 mg, TID    docusate sodium (COLACE) capsule 100 mg, BID PRN    gabapentin (NEURONTIN) capsule 100 mg, TID    heparin (porcine) subcutaneous injection 5,000 Units, Q8H LAM **AND** [CANCELED] Platelet count, Once    pantoprazole (PROTONIX) EC tablet 40 mg, Daily Before Breakfast    traZODone (DESYREL) tablet 25 mg, HS  Administrative Statements     Today, Patient Was Seen By: Daniella Sanchez DO

## 2024-09-16 NOTE — ASSESSMENT & PLAN NOTE
Follows neurosurgery  Last evaluated in 4/24  Had been in tlso brace- surgery was not needed at that time  Ct showing slightly progressed  Neuro exam fluctuating at times  Lumbar spine mri is pending

## 2024-09-16 NOTE — CONSULTS
PHYSICAL MEDICINE AND REHABILITATION CONSULT NOTE  Shae Jung 73 y.o. female MRN: [id number]  Unit/Bed#: E4 -01 Encounter: [id number]    Requested by:   Loraine Tavarez MD   Reason for Consultation:  L foot drop   Primary insurance listed on facesheet:  [facility]      Assessment and Recommendations:  73-year-old female with a past medical history of diabetes mellitus 2, bilateral carotid artery stenosis with a history of multiple strokes, TBI, subdural hemorrhage, left anterior inferior encephalomalacia, ambulatory dysfunction with recurrent falls, oropharyngeal dysphagia, anxiety, progressive insomnia cognitive impairment, L1 vertebral compression fracture.   Patient was noted to have newly recognized left foot drop and was brought to the hospital.  CT head did not show acute findings.  CTA head and neck showed chronic right ICA dissection.  MRI brain did not show acute infarct which showed left greater than right anterior/inferior frontal lobe encephalomalacia with gliosis from prior trauma.  CT L-spine showed compression fracture anterior superior plate of L1 which is slightly further progressed without significant retropulsion.  Also has some mild bilateral neuroforaminal narrowing at left greater than right L5-S1.  Neurology consulted and feel foot drop could be related to lumbar radiculopathy and have ordered MRI L-spine which is pending.  SLP eval and noted mod oropharyngeal dysphagia with recommendation for D2, thin liquid diet, meds crushed in puree.    Prior Level of Function and Social history:    Patient lives with son who assist with caregiving along with an additional caregiver from 8 AM to 6 PM.   Patient was able to walk short distances with caregiver assist from home to car and short distances in the home only otherwise uses wheelchair.  She did require some assistance with bathing and dressing.  She lives in a 3 story home with about 4 steps to enter.  During the day she stays  on the first floor without bathroom but does have a stair glide to the second floor where her bathroom is.  She does go up to second floor bathroom when she has to use the restroom during the day with assistance.    Current Level of Function:    Pending assessment by PT/OT      Left foot drop   L1 vertebral compression fracture   Dysphagia   History of stroke with residual deficits  Cognitive impairment  Ambulatory dysfunction  Insomnia      Disposition recommendation:  Too be determined - [facility] v home with home health  The patient with following culmination of conditions MAYBE an appropriate candidate for transfer to [facility]/IRF pending:   - PT/OT assessments showing new functional decline and activity tolerance appropriate for [facility]/IRF program  - She does have new foot drop and will have more difficulty on stairs, transfers and ambulation     Left foot drop with distal LLE pain (neuropathic)  - Risk of radiculopathy v other neuropathy  - May increase gabapentin to improve pain control but monitor for tolerance in interim  - MRI Brain without evidence of new CVA  - CT L spine showing compression fracture anterior superior plate of L1 which is slightly further progressed without significant retropulsion.  Also has some mild bilateral neuroforaminal narrowing at left greater than right L5-S1  - MRI L spine pending  - Cogmt with neuro and recommend Nsx c/s as well   - PT, OT, fall precautions, AFO  - Ensure adequate CG oversight    Dysphagia  - SLP eval and noted mod oropharyngeal dysphagia with recommendation for D2, thin liquid diet, meds crushed in puree.  - Monitor strict oral intake given likelihood of dehydration   - SLP evaluate and treat decline in swallowing.  - Aspiration precautions; HOB completely upright when eating, 100% supervision, monitor for pocketing  - Hold oral intake if patient too confused or lethargic and notify MD   - Oral care with meals and bedtime  - Monitor for appropriate fluid and nutrition  intake and ensure adequate hydration/nutrition  - Recommend Nutrition consult to assist with management     L1 vertebral compression fracture   - CT L spine showing compression fracture anterior superior plate of L1 which is slightly further progressed without significant retropulsion.  Also has some mild bilateral neuroforaminal narrowing at left greater than right L5-S1  - MRI L spine pending  - Recommend Nsx c/s with increased compression fx and foot drop    History of stroke  - Status and residual impairments: Impaired balance, coordination, cognition, dysphagia  - Neuropsych Med review:    Flexeril 2.5mg TID   Gabapentin 100mg TID  Trazodone 25mg HS   - Care with sedating meds as increase risk of fall/injury - ensure adequate CG oversight    - Continue PT, OT in acute setting to improve ADLs, mobility, strength, conditioning, and decrease risks of immobility as well as to assist with dispo recommendations   - Decrease risks of complications related to decreased mobility status and monitor for them during course  - MSK - atrophy, contractures, bone demineralization, CV - DVT/PE, orthostasis, decreased CO, Resp - atelectasis, PNA, GI - constipation, reduced appetite,  - retention, incontinence, Skin - pressure injuries  - Continue SLP for dysphagia (see dysphagia mgmt below)  - Fall precautions - if needed increase rounding or consider virtual sitter or in-person sitter  - Supportive counseling and updates to patient and if appropriate family/primary contact  - Secondary stroke prevention and additional work-up recommended by neurology   - Antithrombotic: Aspirin  - Statin  - Optimal management of blood pressure and blood sugar management with parameters per neuro/primary team  - Overall mgmt per primary team/neuro currently, recommend optimal medical mgmt during rehab process as well  - Remains at risk for increased confusion/delirium, restlessness, agitation, and fall - continue to monitor for  concerns/changes  - Monitor neuro-exam, wakefulness, mood, cognition, insight into deficits and safety awareness - low threshold to repeat brain imaging  - Monitor and ensure optimal management electrolytes, nutrition, and hydration  - Monitor for signs or symptoms of infection, medication intolerances, other systemic etiologies  - Additional labs, imaging, specialist follow-up as needed per primary team currently   - Overstimulation precautions, frequent re-orientation, re-direction, re-assurance if impaired cognition   - Optimal mood, pain, and sleep management  - If impaired sleep or behavior recommend sleep log and agitation monitoring    - Limit sedating medications when possible  - For routine restlessness, anxiety, irritability focus on non-pharmacologic management    - Hold benzo's with increased risk paradoxical reaction and possibility of limiting cognitive recovery and increasing fall risk unless chronic use or other clear indication but use with caution     Ambulatory dysfunction  Multifactorial: Acute foot drop, hx of strokes, TBI, cog impairment, L1 compression fx with increased progression on receing imaging   - Optimal management of each as listed   - Continue PT, OT in acute rehab setting  - Optimal nutrition, hydration, and medical management  - Monitor vitals closely with and without activity  - Fall precautions   - Ensure adequate oversight     Insomnia  -Recently started on low-dose trazodone, monitor for efficacy and tolerance  -Discussed with patient's family risk of fall versus suboptimal sleep  -Care with sedating medicines as well, fall precautions    VTE prophylaxis   As outlined by primary team      Other medical issues:     Per primary service (and relevant consultants if applicable)      ==================================================================    Chief Complaint:  L foot drop and lower leg pain     History of Present Illness:   73-year-old female with a past medical history of  diabetes mellitus 2, bilateral carotid artery stenosis with a history of multiple strokes, TBI, subdural hemorrhage, left anterior inferior encephalomalacia, ambulatory dysfunction with recurrent falls, oropharyngeal dysphagia, anxiety, progressive insomnia cognitive impairment, L1 vertebral compression fracture, seen by neurosurgery in April for that at that time noted to have rigid lower extremities and significant apraxia during which time patient was nonverbal and unable to walk.  Patient was noted to have newly recognized left foot drop and was brought to the hospital.  CT head did not show acute findings.  CTA head and neck showed chronic right ICA dissection.  MRI brain did not show acute infarct which showed left greater than right anterior/inferior frontal lobe encephalomalacia with gliosis from prior trauma.  CT L-spine showed compression fracture anterior superior plate of L1 which is slightly further progressed without significant retropulsion.  Also has some mild bilateral neuroforaminal narrowing at left greater than right L5-S1.  Neurology consulted and feel foot drop could be related to lumbar radiculopathy and have ordered MRI L-spine which is pending.  SLP eval and noted mod oropharyngeal dysphagia with recommendation for D2, thin liquid diet, meds crushed in puree.    On evaluation, patient reports burning type pain in left lateral lower leg and foot.  Patient reports ongoing weakness of her left ankle.  Patient denies shortness of breath, lightheadedness, other changes in strength or sensation, bowel or bladder incontinence or other new complaints.    Review of Systems:     Complete review of systems obtained.    Please see HPI for details with other significant symptoms or history listed here:    Otherwise, 14 point review of systems completed and was otherwise unremarkable.    Allergies   Allergen Reactions    Tramadol      Causes nausea and vomiting        Current Facility-Administered  Medications:     acetaminophen (Ofirmev) injection 1,000 mg, 1,000 mg, Intravenous, Q6H PRN, PRO Lindsay, Stopped at 09/15/24 2027    aspirin (ECOTRIN LOW STRENGTH) EC tablet 81 mg, 81 mg, Oral, Daily, Loraine Tavarez MD, 81 mg at 09/16/24 0841    atorvastatin (LIPITOR) tablet 40 mg, 40 mg, Oral, Daily With Dinner, Loraine Tavarez MD, 40 mg at 09/15/24 1558    benzonatate (TESSALON PERLES) capsule 100 mg, 100 mg, Oral, TID PRN, Loraine Tavarez MD    Cholecalciferol (VITAMIN D3) tablet 5,000 Units, 5,000 Units, Oral, Daily, Loraine Tavarez MD, 5,000 Units at 09/16/24 0842    cyclobenzaprine (FLEXERIL) tablet 2.5 mg, 2.5 mg, Oral, TID, Loraine Tavarez MD, 2.5 mg at 09/16/24 0841    docusate sodium (COLACE) capsule 100 mg, 100 mg, Oral, BID PRN, Loraine Tavarez MD    gabapentin (NEURONTIN) capsule 100 mg, 100 mg, Oral, TID, PRO Handy, 100 mg at 09/16/24 0841    heparin (porcine) subcutaneous injection 5,000 Units, 5,000 Units, Subcutaneous, Q8H LAM **AND** [CANCELED] Platelet count, , , Once, Loraine Tavarez MD    pantoprazole (PROTONIX) EC tablet 40 mg, 40 mg, Oral, Daily Before Breakfast, Loraine Tavarez MD    traZODone (DESYREL) tablet 25 mg, 25 mg, Oral, HS, Loraine Tavarez MD, 25 mg at 09/15/24 4040    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    Palpitations     Recurrent urinary tract infection     Seizures (HCC)     Sleep disorder     last assessed:  10/19/2013    Speech impairment     Stroke (HCC)     Thyroid disease     Tinnitus        Past Surgical History:   Procedure Laterality Date    AXILLARY SURGERY      cyst surgery    BLADDER SURGERY       SECTION      CYSTOSCOPY N/A 2018    Procedure: CYSTOSCOPY;  Surgeon: Charis Major MD;  Location: [facility];  Service: Gynecology    FACIAL BONE TUMOR EXCISION      KNEE SURGERY      OTHER SURGICAL HISTORY      cyst removed from axilla    WI ESOPHAGOGASTRODUODENOSCOPY TRANSORAL DIAGNOSTIC N/A 2017    Procedure: ESOPHAGOGASTRODUODENOSCOPY (EGD);  Surgeon: Jose Antonio Platt MD;  Location: [facility];  Service: Gastroenterology    WI SLING OPERATION STRESS INCONTINENCE N/A 2018    Procedure: PUBOVAGINAL SLING;  Surgeon: Charis Major MD;  Location: [facility];  Service: Gynecology    TUBAL LIGATION        Family History   Problem Relation Age of Onset    No Known Problems Father     Diabetes Family         mellitus    Hypertension Family     Stroke Family     Thyroid disease Family     No Known Problems Mother     No Known Problems Sister     No Known Problems Daughter     No Known Problems Maternal Grandmother     No Known Problems Maternal Grandfather     No Known Problems Paternal Grandmother     No Known Problems Paternal Grandfather        Social History available currently in EMR:  (See additional SH as outlined above)   Social History     Socioeconomic History    Marital status: /Civil Union     Spouse name: None    Number of children: None    Years of education: Grade 12 completed    Highest education level: None   Occupational History    None   Tobacco Use    Smoking status: Never    Smokeless tobacco: Never   Vaping Use    Vaping status: Never Used   Substance and Sexual Activity    Alcohol use: No    Drug use: No    Sexual activity: None   Other Topics Concern    None   Social History Narrative    Caffeine use    Lives with spouse    Orthodox     Social Determinants of Health      Financial Resource Strain: Low Risk  (9/12/2024)    Overall Financial Resource Strain (CARDIA)     Difficulty of Paying Living Expenses: Not very hard   Food Insecurity: No Food Insecurity (9/16/2024)    Hunger Vital Sign     Worried About Running Out of Food in the Last Year: Never true     Ran Out of Food in the Last Year: Never true   Transportation Needs: No Transportation Needs (9/16/2024)    PRAPARE - Transportation     Lack of Transportation (Medical): No     Lack of Transportation (Non-Medical): No   Physical Activity: Not on file   Stress: Stress Concern Present (2/3/2022)    Stateless [place] [facility] [employer] - Occupational Stress Questionnaire     Feeling of Stress : To some extent   Social Connections: Not on file   Intimate Partner Violence: Not on file   Housing Stability: Low Risk  (9/16/2024)    Housing Stability Vital Sign     Unable to Pay for Housing in the Last Year: No     Number of Times Moved in the Last Year: 0     Homeless in the Last Year: No       Physical Examination:   Temp:  [97.5 °F (36.4 °C)-98.4 °F (36.9 °C)] 97.6 °F (36.4 °C)  HR:  [53-81] 53  Resp:  [16-18] 18  BP: (139-155)/(67-78) 139/78    General: Awake, alert in NAD  HENT: NCAT, MMM  Respiratory: Unlabored breathing  Cardiovascular: Regular rate   Gastrointestinal: Soft, non-distended  Genitourinary: No amos  SkiN/MSK/Extremities:  No calf edema, no calf tenderness to palpation  Neurologic/Psych:   MENTAL STATUS: awake, oriented to person, place, time, and able to follow simple commands  Affect: Euthymic   CN II-XII: grossly intact   Strength/MMT: Bilateral upper extremity 5- out of 5, right lower extremity 4+ to 5- out of 5, left proximal lower extremity 5- out of 5, left dorsiflexion 1 out of 5, left plantarflexion 4 out of 5    Data:  Lab Results   Component Value Date    HGB 12.1 09/15/2024    HGB 12.2 04/08/2015    HCT 35.9 09/15/2024    HCT 37.6 04/08/2015    WBC 5.08 09/15/2024    WBC 4.98 04/08/2015    NA  141 04/08/2015    K 4.1 09/15/2024    K 4.0 04/08/2015     09/15/2024     04/08/2015    GLUCOSE 98 04/08/2015    CREATININE 0.54 (L) 09/15/2024    CREATININE 0.64 04/08/2015    BUN 14 09/15/2024    BUN 19 04/08/2015         MRI brain wo contrast    Result Date: 9/15/2024  Impression: No acute infarct identified. Left greater than right anterior/inferior frontal lobe encephalomalacia with gliosis from prior trauma. Loss of normal flow void involving the visualized extracranial, petrous and cavernous right internal carotid artery related to chronic occlusion as demonstrated on prior CT angiograms. Workstation performed: [id number]     CT spine lumbar without contrast    Result Date: 9/14/2024  Impression: Compression fracture anterior superior plate of L1 has slightly further progressed as above. No significant retropulsion. Multilevel minimal disc bulges and facet arthropathy as above. No significant central canal stenoses. Mild bilateral neural foraminal narrowing left greater than right at L5-S1. Workstation performed: [id number]     CTA head and neck with and without contrast    Result Date: 9/14/2024  Impression: No acute intracranial hemorrhage, new proximal large vessel occlusion in the head and neck or high-grade vascular stenosis. Chronic right ICA dissection, as detailed above. Workstation performed: [id number]       Pertinent labs and imaging reviewed.      Patient seen on date of service listed.    Thank you for allowing the PM&R service to participate in the care of this patient. We will continue to follow Shae Jung's progress with you. Please do not hesitate to call with questions or concerns.    Aiden Harrell MD, MS  [facility]  Physical Medicine and Rehabilitation  Brain Injury Medicine

## 2024-09-16 NOTE — PHYSICAL THERAPY NOTE
PT EVALUATION    Pt. Name: Shae Jung  Pt. Age: 73 y.o.  MRN: [id number]  LENGTH OF STAY: 2      Admitting Diagnoses:   Left foot drop [M21.372]  History of CVA (cerebrovascular accident) [Z86.73]  Internal carotid artery dissection (HCC) [I77.71]  Ambulatory dysfunction [R26.2]  Known medical problems [Z78.9]    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    Palpitations     Recurrent urinary tract infection     Seizures (HCC)     Sleep disorder     last assessed: 10/19/2013    Speech impairment     Stroke (HCC)     Thyroid disease     Tinnitus        Past Surgical History:   Procedure Laterality Date    AXILLARY SURGERY      cyst surgery    BLADDER SURGERY       SECTION      CYSTOSCOPY N/A 2018    Procedure: CYSTOSCOPY;  Surgeon: Charis Major MD;  Location: [facility];  Service: Gynecology    FACIAL BONE TUMOR EXCISION      KNEE SURGERY      OTHER SURGICAL HISTORY      cyst removed from axilla    KY ESOPHAGOGASTRODUODENOSCOPY TRANSORAL DIAGNOSTIC N/A 2017    Procedure: ESOPHAGOGASTRODUODENOSCOPY (EGD);  Surgeon: Jose Antonio Platt MD;  Location: [facility];  Service: Gastroenterology    KY SLING OPERATION STRESS INCONTINENCE N/A 2018    Procedure: PUBOVAGINAL SLING;  Surgeon: Charis Major MD;  Location: [facility];  Service: Gynecology    TUBAL LIGATION         Imaging Studies:  MRI brain wo contrast   Final Result by Yisel No MD (09/15 1004)      No acute infarct  identified. Left greater than right anterior/inferior frontal lobe encephalomalacia with gliosis from prior trauma.      Loss of normal flow void involving the visualized extracranial, petrous and cavernous right internal carotid artery related to chronic occlusion as demonstrated on prior CT angiograms.      Workstation performed: [id number]         CTA head and neck with and without contrast   ED Interpretation by Zaira Nolasco DO (09/14 1937)     IMPRESSION:     No acute intracranial hemorrhage, new proximal large vessel occlusion in the head and neck or high-grade vascular stenosis. Chronic right ICA dissection, as detailed above.        Final Result by Jair Workman MD (09/14 1910)      No acute intracranial hemorrhage, new proximal large vessel occlusion in the head and neck or high-grade vascular stenosis. Chronic right ICA dissection, as detailed above.                  Workstation performed: [id number]         CT spine lumbar without contrast   ED Interpretation by Zaira Nolasco DO (09/14 2002)     IMPRESSION:     Compression fracture anterior superior plate of L1 has slightly further progressed as above. No significant retropulsion.     Multilevel minimal disc bulges and facet arthropathy as above. No significant central canal stenoses. Mild bilateral neural foraminal narrowing left greater than right at L5-S1.        Final Result by Jean Wills MD (09/14 1952)      Compression fracture anterior superior plate of L1 has slightly further progressed as above. No significant retropulsion.      Multilevel minimal disc bulges and facet arthropathy as above. No significant central canal stenoses. Mild bilateral neural foraminal narrowing left greater than right at L5-S1.                  Workstation performed: [id number]         MRI lumbar spine wo contrast    (Results Pending)         09/16/24 1333   PT Last Visit   PT Visit Date 09/16/24   Note Type   Note type Evaluation   Pain Assessment   Pain  Score 8   Pain Location/Orientation Orientation: Left;Location: Leg;Location: Knee   Hospital Pain Intervention(s) Repositioned;Ambulation/increased activity;Emotional support;Rest  (RN made aware)   Restrictions/Precautions   Weight Bearing Precautions Per Order No   Braces or Orthoses   (pt's son reports that pt does not require TLSO anymore)   Other Precautions Cognitive;Chair Alarm;Bed Alarm;Fall Risk;Pain   Home Living   Type of Home House   Home Layout Multi-level;Bed/bath upstairs;Stairs to enter with rails;Other (Comment)  (4STE w/ HR; stair glide to 2nd floor bed/bath)   Bathroom Shower/Tub Walk-in shower   Bathroom Toilet Standard  (w/ toilet raiser)   Bathroom Equipment Grab bars in shower;Shower chair;Grab bars around toilet   Home Equipment Walker;Wheelchair-manual;Stair glide;Grab bars   Prior Function   Level of [place] Needs assistance with ADLs;Needs assistance with functional mobility;Needs assistance with IADLS  (ambulates w/ B/L hand held assistance)   Lives With Son   Receives Help From Home health  (70 hrs/week-> M-Th 8a-6p, Fri 9a-9p, Sat 8-14:25)   Falls in the last 6 months 5 to 10   Comments (-) ; (-) alone; pt's son reports that pt able to ambulate household distance w/ B/L hand held assistance & uses w/c in community   General   Additional Pertinent History h/o multiple strokes, TBI, subdural hemorrhage, left anterior inferior encephalomalacia, ambulatory dysfunction with recurrent falls, oropharyngeal dysphagia, anxiety, progressive insomnia cognitive impairment, L1 vertebral compression fracture   Family/Caregiver Present Yes  (son)   Cognition   Overall Cognitive Status Impaired   Arousal/Participation Alert   Orientation Level Oriented to person;Oriented to place;Disoriented to time;Disoriented to situation   Following Commands Follows one step commands with increased time or repetition   Comments pleasant & cooperative   Subjective   Subjective Pt agreeable to PT/OT evals.    RUE Assessment   RUE Assessment   (refer to OT)   LUE Assessment   LUE Assessment   (refer to OT)   RLE Assessment   RLE Assessment WFL  (3+/5 grossly except ankle 3/5)   LLE Assessment   LLE Assessment WFL  (3+/5 grossly except ankle DF 3-/5)   Coordination   Sensation WFL   Bed Mobility   Rolling R 4  Minimal assistance   Additional items Assist x 1;Bedrails;Increased time required;Verbal cues   Rolling L 4  Minimal assistance   Additional items Assist x 1;Bedrails;Increased time required;Verbal cues   Supine to Sit 3  Moderate assistance   Additional items Assist x 1;HOB elevated;Bedrails;Increased time required;Verbal cues;LE management   Sit to Supine 4  Minimal assistance   Additional items Assist x 1;Increased time required;Verbal cues;LE management   Additional Comments cues for techniques & safety   Transfers   Sit to Stand 4  Minimal assistance   Additional items Assist x 1;Increased time required;Verbal cues   Stand to Sit 4  Minimal assistance   Additional items Assist x 1;Increased time required;Verbal cues   Additional Comments cues for techniques & safety; pt automatically reaches out for assistance   Ambulation/Elevation   Gait pattern Narrow LINDA;Decreased foot clearance;Shuffling;Step to;Excessively slow   Gait Assistance 4  Minimal assist   Additional items Assist x 2;Verbal cues;Tactile cues   Assistive Device None;Other (Comment)  (B/L hand held assistance)   Distance 100'x1   Ambulation/Elevation Additional Comments unsteady gait w/ inconsistent stride -> step to w/ inc L stride length vs shuffling gait; B/L dec heel strike & slight R foot inversion during stance   Balance   Static Sitting Fair   Dynamic Sitting Fair -   Static Standing Poor +   Dynamic Standing Poor   Ambulatory Poor   Endurance Deficit   Endurance Deficit Yes   Endurance Deficit Description fatigue   Activity Tolerance   Activity Tolerance Patient limited by fatigue;Treatment limited secondary to medical complications  (Comment);Patient limited by pain   Medical Staff Made Aware OTR Simon   Nurse Made Aware yes   Assessment   Prognosis Fair   Problem List Decreased strength;Decreased endurance;Impaired balance;Decreased mobility;Decreased cognition;Impaired judgement;Decreased safety awareness;Pain   Assessment Pt. 73 y.o.female w/ h/o multiple strokes, TBI & subdural hemorrhage, presented w/ episodes of dizziness, headache & L foot drop. Pt admitted for Left foot drop w/ ambulatory dysfunction. Imaging showed no acute intracranial abnormality. MRI LS pending. Pt referred to PT for mobility assessment & D/C planning w/ orders of Up and OOB as tolerated . Please see above for information re: home set-up & PLOF as well as objective findings during PT assessment. PTA, pt reports being (A) w/ ADL's and (A) w/ amb w/ B/L hand held assistance. On eval, pt functioning below baseline hence will continue skilled PT to improve function & safety. Pt require minAx1 for most bed mobility except supine to sit require modAx1; minAx1 for transfers & minAx2 for amb w/ B/L hand held assistance + cues for techniques & safety. Gait deviations as above, slow & unsteady but no gross LOB noted. Pt's son notes that pt's current gait not at baseline. Pt son reported after gait assessment that pt was using a RW at OPPT & showed a video which showed that pt was ambulating well w/ RW + assistance. Will trial RW next tx session. The patient's AM-PAC Basic Mobility Inpatient Short Form Raw Score is 15. A Raw score of less than or equal to 16 suggests the patient may benefit from discharge to post-acute rehabilitation services. Please also refer to the recommendation of the Physical Therapist for safe discharge planning. From PT standpoint, will recommend Level III (minimum resource intensity) rehab services and 24hr S/A at D/C. No SOB & dizziness reported t/o session. Nsg staff most recent vital signs as follows: /78   Pulse (!) 53   Temp 97.6 °F (36.4  "°C) (Temporal)   Resp 18   Ht 4' 9\" (1.448 m)   Wt 52.6 kg (116 lb)   SpO2 98%   BMI 25.10 kg/m² . At end of session, pt back in bed in stable condition, call bell & phone in reach, bed alarm activated. Fall precautions reinforced w/ good understanding. CM to follow. Nsg staff to continue to mobilized pt (OOB in chair for all meals & ambulate in room/unit) as tolerated to prevent further decline in function. Will also recommend Restorative for daily amb &/or daily OOB in chair as appropriate. Nsg notified. Co-eval was necessary to complete this PT eval for the pt's best interest given pt's medical acuity & complexity.   Goals   Patient Goals none stated   STG Expiration Date 09/26/24   Short Term Goal #1 Goals to be met in 10 days; pt will be able to: 1) inc strength & balance by 1/2 grade to improve overall functional mobility & dec fall risk; 2) inc bed mobility to S for pt to be able to get in/OOB safely w/ proper techniques 100% of the time, to dec caregiver burden & safely function at home; 3) inc transfers to S for pt to transition safely from one surface to another w/o % of the time, to dec caregiver burden & safely function at home; 4) inc amb w/ appropriate AD approx. >250' w/ minAx1 for pt to ambulate household distances w/o any % of the time, to dec caregiver burden & safely function at home; 5) negotiate stairs w/ minAx1 for inc safety during stair mgt inside/outside of home & dec caregiver burden; 6) pt/caregiver ed   PT Treatment Day 0   Plan   Treatment/Interventions Functional transfer training;LE strengthening/ROM;Elevations;Therapeutic exercise;Endurance training;Patient/family training;Bed mobility;Gait training;Spoke to nursing;OT;Family   PT Frequency 2-3x/wk   Discharge Recommendation   Rehab Resource Intensity Level, PT III (Minimum Resource Intensity)  (pending progress)   Equipment Recommended Walker  (will trial next tx session)   AM-PAC Basic Mobility Inpatient   Turning " in Flat Bed Without Bedrails 3   Lying on Back to Sitting on Edge of Flat Bed Without Bedrails 2   Moving Bed to Chair 3   Standing Up From Chair Using Arms 3   Walk in Room 2   Climb 3-5 Stairs With Railing 2   Basic Mobility Inpatient Raw Score 15   Basic Mobility Standardized Score 36.97   [facility] Highest Level Of Mobility   [facility] Goal 4: Move to chair/commode   -[facility] Achieved 7: Walk 25 feet or more   End of Consult   Patient Position at End of Consult Supine;Bed/Chair alarm activated;All needs within reach   End of Consult Comments Pt in stable condition. All needs in reach. Bed alarm activated.   Hx/personal factors: co-morbidities, inaccessible home, advanced age, use of AD, dec cognition, pain, h/o of falls, fall risk, and assist w/ ADL's  Examination: dec mobility, dec balance, dec endurance, dec amb, risk for falls, pain, dec cognition  Clinical: unpredictable (ongoing medical status, abnormal lab values, risk for falls, imaging test/result pending, and pain mgt)  Complexity: high    Geraldo Angela

## 2024-09-16 NOTE — CASE MANAGEMENT
Case Management Assessment & Discharge Planning Note    Patient name Shae Jung  [facility] -* MRN [id number]  : 1950 Date 2024       Current Admission Date: 2024  Current Admission Diagnosis:Left foot drop   Patient Active Problem List    Diagnosis Date Noted Date Diagnosed    Left foot drop 2024     Oropharyngeal dysphagia 2024     History of stroke      Tension headache 2024     Right sided weakness 2024     Snoring 2024     Ambulatory dysfunction 2024     Compression fracture of L1 lumbar vertebra (HCC) 2024     Generalized abdominal pain 2023     Lumbar radiculitis      Numbness and tingling in both hands      Class 1 obesity due to excess calories without serious comorbidity with body mass index (BMI) of 30.0 to 30.9 in adult 2020     Sciatica of right side 2020     Hip strain, right, initial encounter 2019     Breast pain, right 2019     Elevated BP without diagnosis of hypertension 2019     Contusion of right breast 2019     Epigastric abdominal pain 2019     NSAID long-term use 2019     Diabetes mellitus type II, controlled (HCC) 10/12/2018     Arthritis 10/12/2018     Primary insomnia 2018     Urinary, incontinence, stress female 2018     Urge and stress incontinence 10/26/2017     Osteoporosis 09/15/2017     Adhesive capsulitis of left shoulder 2017     Constipation 05/10/2017     Cervical radiculitis 2017     Elevated TSH 2017     Osteoarthritis of both hands 2017     Memory difficulties 2016     Asymptomatic bilateral carotid artery stenosis 2016     Gastroesophageal reflux disease 2016     Hypertension 2016     Collapsed vertebra, not elsewhere classified, sacral and sacrococcygeal region, initial encounter for fracture (HCC) 2016     HLD (hyperlipidemia) 2016     History of CVA with  residual deficit 01/20/2016     Depression 01/20/2016     Headache 01/20/2016     Dizziness 01/20/2016     Fall 01/16/2016     Subdural hemorrhage following injury (HCC) 01/16/2016     Subarachnoid hemorrhage following injury (HCC) 01/16/2016     Hyperlipidemia 10/19/2013     Muscle weakness 10/19/2013       LOS (days): 2  Geometric Mean LOS (GMLOS) (days):   Days to GMLOS:     OBJECTIVE:    Risk of Unplanned Readmission Score: 11.05         Current admission status: Inpatient  Referral Reason: Stroke    Preferred Pharmacy:   [id number] - [place], [place] - [address]  [address] [address]  Phone: [phone] Fax: [phone]    Primary Care Provider: Vicente Zhu MD    Primary Insurance: [employer]  REP  Secondary Insurance: [facility]    ASSESSMENT:  Active Health Care Proxies       Jean Jung Rajesh Health Care Representative - Son   Primary Phone: [phone] (Mobile)     Power of  - Scan on 2/16/2024 12:03 PM: POA                                Advance Directives  Does patient have a Health Care POA?: Yes  Does patient have Advance Directives?: Yes  Advance Directives: Living will, Power of  for health care  Primary Contact: Jean Jung (Son)   [phone]         Readmission Root Cause  30 Day Readmission: No    Patient Information  Admitted from:: Home  Mental Status: Alert  During Assessment patient was accompanied by: Son  Assessment information provided by:: Son  Primary Caregiver: Private caregiver  Caregiver's Name:: [facility] and Son  Caregiver's Relationship to Patient:: Other (Specify) (Son and [facility] agency, [facility])  Caregiver's Telephone Number:: Jean Jung (Son)   [phone]  Support Systems: Private Caregivers, Children  County of Residence: [place]  What city do you live in?: [place]  Home entry access options. Select all that apply.:  Stairs  Number of steps to enter home.: 4  Do the steps have railings?: Yes  Type of Current Residence: 3 story home  Upon entering residence, is there a bedroom on the main floor (no further steps)?: No  A bedroom is located on the following floor levels of residence (select all that apply):: 2nd Floor  Upon entering residence, is there a bathroom on the main floor (no further steps)?: No  Indicate which floors of current residence have a bathroom (select all the apply):: 2nd Floor  Number of steps to 2nd floor from main floor: One Flight (Patient has chair glide)  Living Arrangements: Lives w/ Son  Is patient a ?: No    Activities of Daily Living Prior to Admission  Functional Status: Assistance  Completes ADLs independently?: No  Level of ADL dependence: Assistance  Ambulates independently?: No  Level of ambulatory dependence: Assistance  Does patient use assisted devices?: Yes  Assisted Devices (DME) used: Stair Chair/Glide, Wheelchair, Shower Chair, Other (Comment), Bedside Commode (Raised toilet seat)  Does patient currently own DME?: Yes  What DME does the patient currently own?: Bedside Commode, Shower Chair, Stair Chair/Glide, Wheelchair, Other (Comment) (Raised toilet seat)  Does patient have a history of Outpatient Therapy (PT/OT)?: Yes  Does the patient have a history of Short-Term Rehab?: No  Does patient have a history of HHC?: No  Does patient currently have HHC?: No    Current Home Health Care  Type of Current Home Care Services: Home health aide    Patient Information Continued  Income Source: Pension/half-way  Does patient have prescription coverage?: Yes  Does patient receive dialysis treatments?: No  Does patient have a history of substance abuse?: No  Does patient have a history of Mental Health Diagnosis?: No         Means of Transportation  Means of Transport to Appts:: Family transport      Social Determinants of Health (SDOH)      Flowsheet Row Most Recent Value   Housing Stability     In the last 12 months, was there a time when you were not able to pay the mortgage or rent on time? N   In the past 12 months, how many times have you moved where you were living? 0   At any time in the past 12 months, were you homeless or living in a shelter (including now)? N   Transportation Needs    In the past 12 months, has lack of transportation kept you from medical appointments or from getting medications? no   In the past 12 months, has lack of transportation kept you from meetings, work, or from getting things needed for daily living? No   Food Insecurity    Within the past 12 months, you worried that your food would run out before you got the money to buy more. Never true   Within the past 12 months, the food you bought just didn't last and you didn't have money to get more. Never true   Utilities    In the past 12 months has the electric, gas, oil, or water company threatened to shut off services in your home? No            DISCHARGE DETAILS:    Discharge planning discussed with:: Patient's son  Freedom of Choice: Yes  Comments - Freedom of Choice: [place] referral to C  CM contacted family/caregiver?: Yes  Were Treatment Team discharge recommendations reviewed with patient/caregiver?: Yes  Did patient/caregiver verbalize understanding of patient care needs?: Yes  Were patient/caregiver advised of the risks associated with not following Treatment Team discharge recommendations?: Yes    Contacts  Patient Contacts: Jean Jung (Son)   [phone]  Relationship to Patient:: Family  Contact Method: Phone  Phone Number: Jean Jung (Son)   [phone]  Reason/Outcome: Continuity of Care, Emergency Contact, Discharge Planning    Requested Home Health Care         Is the patient interested in C at discharge?: Yes  Home Health Discipline requested:: Nursing, Physical Therapy, Occupational Therapy    DME Referral Provided  Referral made for DME?: No    Other  Referral/Resources/Interventions Provided:  Interventions: HHC    Would you like to participate in our [facility] service program?  : No - Declined       CM met with patient and patient's son at bedside to introduce self and role with DC planning.  Patient resides with her son in a 2 story home with 4 steps to enter.  Patient requires assistance with all ADL's and IADL's.  Patient has a Waiver funded caregiver through [employer] from 8am-6pm daily. Patient's son then acts as a caregiver.  Patient has a chair glide, WC, shower chair, BSC, and raised toilet seat. Patient's son will contact caregivers to resume services upon DC.  Patient's son has been trying to get increased overnight hours as it has become difficult to care for patient overnight.  He has an appointment with [facility] on 9.20.24 with care coordinator Jonah.  CM will reach out to [facility] to advocate for increased hours. Patient is receiving OP PT, OT, and speech therapy, son interested in HHC.     CM placed blanket referral for HHC via [facility].    CM placed PC to [facility] [phone], patient's coordinator is Stephen.   took a message for Stephen requesting a returned PC to .  CM will continue to follow.

## 2024-09-16 NOTE — OCCUPATIONAL THERAPY NOTE
Occupational Therapy Evaluation     Patient Name: Shae Jung  Today's Date: 2024  Problem List  Principal Problem:    Left foot drop  Active Problems:    Hypertension    Gastroesophageal reflux disease    Hyperlipidemia    Diabetes mellitus type II, controlled (HCC)    Compression fracture of L1 lumbar vertebra (HCC)    Ambulatory dysfunction    History of stroke    Past Medical History  Past Medical History:   Diagnosis Date    Abdominal pain     Anxiety     last assessed: 10/19/2013    Arthritis     osteo both hands; last assessed: 10/19/2013    Bowel incontinence     Chest pain     Constipation     CVA (cerebral vascular accident) ([facility])     Diabetes mellitus type II, controlled (HCC)     Disease of thyroid gland     Epigastric pain     with distention    Falls     High cholesterol     HL (hearing loss)     Hyperlipidemia     Hypertension     last assessed: 4/3/2017    Migraine     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    Palpitations     Recurrent urinary tract infection     Seizures ([facility])     Sleep disorder     last assessed: 10/19/2013    Speech impairment     Stroke ([facility])     Thyroid disease     Tinnitus      Past Surgical History  Past Surgical History:   Procedure Laterality Date    AXILLARY SURGERY      cyst surgery    BLADDER SURGERY       SECTION      CYSTOSCOPY N/A 2018    Procedure: CYSTOSCOPY;  Surgeon: Charis Major MD;  Location: [facility];  Service: Gynecology    FACIAL BONE TUMOR EXCISION      KNEE SURGERY      OTHER SURGICAL HISTORY      cyst removed from axilla    KS ESOPHAGOGASTRODUODENOSCOPY TRANSORAL DIAGNOSTIC N/A 2017    Procedure: ESOPHAGOGASTRODUODENOSCOPY (EGD);  Surgeon: Jose Antonio Platt MD;  Location: [facility];  Service: Gastroenterology    KS SLING OPERATION STRESS INCONTINENCE N/A 2018    Procedure: PUBOVAGINAL SLING;  Surgeon: Charis Major MD;  Location: [facility];  Service: Gynecology    TUBAL LIGATION             24  "1315   Note Type   Note type Evaluation   Pain Assessment   Pain Assessment Tool 0-10   Pain Score 8   Pain Location/Orientation Orientation: Left;Location: Leg   Pain Rating: FLACC (Rest) - Face 0   Pain Rating: FLACC (Rest) - Legs 0   Pain Rating: FLACC (Rest) - Activity 0   Pain Rating: FLACC (Rest) - Cry 0   Pain Rating: FLACC (Rest) - Consolability 0   Score: FLACC (Rest) 0   Restrictions/Precautions   Weight Bearing Precautions Per Order No   Braces or Orthoses   (hx TLSO, but hasn't used for several months)   Other Precautions Fall Risk;Pain;Chair Alarm;Bed Alarm;Spinal precautions  (allowed OOB without TLSO--per Dr. Sanchez)   Home Living   Type of Home House   Home Layout Multi-level;Bed/bath upstairs  (4 rosendo with railing)   Bathroom Shower/Tub Walk-in shower   Bathroom Toilet Raised   Bathroom Equipment Grab bars in shower;Grab bars around toilet;Shower chair;Commode   Home Equipment Walker;Cane;Wheelchair-manual   Prior Function   Level of [place] Needs assistance with functional mobility;Needs assistance with ADLs;Needs assistance with IADLS   Lives With Son   Receives Help From Family;Home health   Falls in the last 6 months 5 to 10   Comments PTA family(son) states pt had assistance with her ADLs, transfers, ambulation--HHA, household distances; +falls=5-10; neg , neg home alone, HHA--70hrs/wk(M-TH=8-6, Friday=9-9pm, Sat=8am-2:30)   Lifestyle   Autonomy PTA family(son) states pt had assistance with her ADLs, transfers, ambulation--HHA, household distances; +falls=5-10; neg , neg home alone, HHA--70hrs/wk(M-TH=8-6, Friday=9-9pm, Sat=8am-2:30)   Reciprocal Relationships supportive son   Service to Others homemaker   Intrinsic Gratification watching TV   Subjective   Subjective \"My left side is sore.\"   ADL   Where Assessed Edge of bed   Eating Assistance 5  Supervision/Setup   Grooming Assistance 5  Supervision/Setup   UB Bathing Assistance 4  Minimal Assistance   LB Bathing Assistance 4  " Minimal Assistance   UB Dressing Assistance 4  Minimal Assistance   LB Dressing Assistance 4  Minimal Assistance   Bed Mobility   Rolling R 4  Minimal assistance   Additional items Assist x 1;Increased time required;Verbal cues;LE management   Rolling L 4  Minimal assistance   Additional items Assist x 1;Increased time required;Verbal cues;LE management   Supine to Sit 3  Moderate assistance   Additional items Assist x 1;Increased time required;Verbal cues;LE management   Sit to Supine 4  Minimal assistance   Additional items Assist x 1;Increased time required;Verbal cues;LE management   Transfers   Sit to Stand 4  Minimal assistance   Additional items Assist x 1;Increased time required;Verbal cues   Stand to Sit 4  Minimal assistance   Additional items Assist x 1;Increased time required;Verbal cues   Additional Comments bp's=130/58(EOB)   Functional Mobility   Functional Mobility 4  Minimal assistance   Additional Comments x2   Additional items Hand hold assistance   Balance   Static Sitting Good   Dynamic Sitting Fair +   Static Standing Fair -   Dynamic Standing Poor +   Activity Tolerance   Activity Tolerance Patient limited by fatigue;Patient limited by pain   Medical Staff Made Aware mayo P.T., MD, CM   RUE Assessment   RUE Assessment WFL   RUE Strength   RUE Overall Strength Within Functional Limits - able to perform ADL tasks with strength  (4/5 throughout)   LUE Assessment   LUE Assessment WFL   LUE Strength   LUE Overall Strength   (4/5 throughout)   Hand Function   Gross Motor Coordination Functional   Fine Motor Coordination Functional   Sensation   Light Touch No apparent deficits   Proprioception   Proprioception No apparent deficits   Vision-Basic Assessment   Current Vision   (no glasses)   Vision - Complex Assessment   Acuity Able to read clock/calendar on wall without difficulty   Psychosocial   Psychosocial (WDL) X   Patient Behaviors/Mood Flat affect;Cooperative   Perception   Inattention/Neglect  Appears intact   Cognition   Overall Cognitive Status Impaired   Arousal/Participation Alert   Attention Attends with cues to redirect   Orientation Level Oriented to person;Oriented to place;Oriented to time  (per son's translation)   Memory   (unable to fully assess)   Following Commands Follows one step commands with increased time or repetition   Comments son states no hx cognitive deficits and that pt is currently at her cognitive baseline   Assessment   Limitation Decreased ADL status;Decreased UE strength;Decreased Safe judgement during ADL;Decreased cognition;Decreased endurance;Decreased high-level ADLs   Prognosis Fair   Assessment Pt is a 74y/o female admitted to the hospital 2* symptoms of HA, L foot drop; MRI(brain)=neg for acute findings; CT(L-spine)=Compression fracture anterior superior plate of L1 has slightly further progressed. Pt with PM hypertension, hyperlipidemia, diabetes, bladder dysfunction, CVA with R residual weakness/dysarthria. PTA family(son) states pt had assistance with her ADLs, transfers, ambulation--HHA, household distances; +falls=5-10; neg , neg home alone, HHA--70hrs/wk(M-TH=8-6, Friday=9-9pm, Sat=8am-2:30). Pt currently allowed OOB without a back brace. During inital eval, pt demonstrated deficits with her functional balance, functional mobility, ADL status, transfer safety, b/l UE strength, and activity tolerance(currently fair=15-20mins). Pt would benefit from continued OT tx for the above deficits.2-5xwk/1-2wks. The patient's raw score on the AM-PAC Daily Activity Inpatient Short Form is 15. A raw score of less than 19 suggests the patient may benefit from discharge to post-acute rehabilitation services. Please refer to the recommendation of the Occupational Therapist for safe discharge planning.   Goals   Patient Goals unable to fully assess   STG Time Frame   (1-7 days)   Short Term Goal #1 Pt will demonstrate improved activity tolerance to good(20-30mins) and  standing tolerance to 3-5mins to assist with ADLs.   Short Term Goal #2 Pt will demonstrate proper walker/transfer safety 100% of the time.   Short Term Goal  Pt will independently verbalize 2-3 potential fall risks/transfer safety hazards and their appropriate compensation techniques.   LTG Time Frame   (7-14 days)   Long Term Goal #1 Pt will demonstrate g/g- balance with all functional activities.   Long Term Goal #2 Pt will demonstrate mod I with their UE and LE bathing/dresssing.   Long Term Goal Pt will demonstrate mod I with their sit-stand transfers to assist with completion of their LE dressing.   Plan   Treatment Interventions ADL retraining;Functional transfer training;UE strengthening/ROM;Endurance training;Cognitive reorientation;Equipment evaluation/education;Patient/family training;Compensatory technique education;Continued evaluation   Goal Expiration Date 09/30/24   OT Treatment Day 0   OT Frequency   (2-5xwk)   Discharge Recommendation   Rehab Resource Intensity Level, OT III (Minimum Resource Intensity)  (home PT/OT)   AM-PAC Daily Activity Inpatient   Lower Body Dressing 2   Bathing 2   Toileting 2   Upper Body Dressing 3   Grooming 3   Eating 3   Daily Activity Raw Score 15   Daily Activity Standardized Score (Calc for Raw Score >=11) 34.69   AM-PAC Applied Cognition Inpatient   Following a Speech/Presentation 3   Understanding Ordinary Conversation 3   Taking Medications 3   Remembering Where Things Are Placed or Put Away 3   Remembering List of 4-5 Errands 3   Taking Care of Complicated Tasks 3   Applied Cognition Raw Score 18   Applied Cognition Standardized Score 38.07   Simon Sorenson

## 2024-09-16 NOTE — UTILIZATION REVIEW
Initial Clinical Review    Admission: Date/Time/Statement:   Admission Orders (From admission, onward)       Ordered        09/14/24 1953  INPATIENT ADMISSION  Once                          Orders Placed This Encounter   Procedures    INPATIENT ADMISSION     Standing Status:   Standing     Number of Occurrences:   1     Order Specific Question:   Level of Care     Answer:   Med Surg [16]     Order Specific Question:   Estimated length of stay     Answer:   More than 2 Midnights     Order Specific Question:   Certification     Answer:   I certify that inpatient services are medically necessary for this patient for a duration of greater than two midnights. See H&P and MD Progress Notes for additional information about the patient's course of treatment.     ED Arrival Information       Expected   -    Arrival   9/14/2024 16:07    Acuity   Urgent              Means of arrival   Walk-In    Escorted by   Self    Service   Hospitalist    Admission type   Emergency              Arrival complaint   Stroke like symptoms and ankle pain             Chief Complaint   Patient presents with    Medical Problem     Per son pt is dragging her left foot not sure if this is since yesterday or started today        Initial Presentation:   73 yof to ER from home for L foot drop. Started yesterday/this morning, first noticed when at physical therapy today. Hx hypertension, hyperlipidemia, diabetes, stroke, bladder dysfunction.  Presents with headache, intermittent dizziness, difficulty walking, weakness with L foot dorsiflexion and dragging foot when ambulating. Admission CTA head/neck: Chronic occlusion of the right internal carotid artery at its origin in the neck which remains occluded throughout its course in the neck, reconstituting is a tiny right ICA intracranially at the distal cavernous segment. CT of the lumbar spine showed old L1 compression fracture. Multilevel minimal disc bulges and facet arthropathy, no significant central  canal stenosis, mild bilateral neural foraminal narrowing left greater than right at L5-S1.   Admitted to inpatient status for L foot drop.    Anticipated Length of Stay/Certification Statement:   Patient will be admitted on an inpatient basis with an anticipated length of stay of greater than 2 midnights secondary to left foot drop     Date: 9/15/24   Day 2:   L foot drop POA. Weakness was variable in her left lower ext. She had 4/5 muscle strength in left leg. She was able to dorsiflex and plantarflex her foot but later in examine she was only able to move her toes. Scheduled for MRI spine.   Per neuro:  With a negative MRI of her brain, we can rest assured this is not related to CVA.  She can be removed from the stroke pathway  Her foot drop then is likely either peroneal neuropathy, or L5 radiculopathy.  Involvement of ankle inversion (if reliable) would favor the latter, as inverters are not supplied by the peroneal nerve but the posterior tibial nerve.  I could not demonstrate a clear Tinel's sign at the fibular head.  Conversely, her pain distribution reported present below the knee, not between her hip and knee is more in keeping with peroneal nerve palsy.  She does not describe any radicular discomfort  We recommend pursuing MRI of LS spine.  Notably she has had study done previously for right leg pain, and in 2022 there was evidence of L5-S1 left-sided foraminal compromise due to disc osteophyte complex with impingement on that root.  It is possible with her more recent falls she could have aggravated that.  For supportive care we recommend PT eval for MAFO.  Gabapentin 100 3 times daily may be helpful for her ongoing pain      Date: 9/16/24  Day 3: Has surpassed a 2nd midnight with active treatments and services.  Dx L foot drop. Pt with more ROM L ankle today. MRI spine pending. Continue to monitor neuro/functional status, continue therapies, monitor VS, follow labs, tests, cultures.        ED Triage  Vitals [09/14/24 1617]   Temperature Pulse Respirations Blood Pressure SpO2 Pain Score   98.2 °F (36.8 °C) 84 18 165/74 99 % No Pain     Weight (last 2 days)       Date/Time Weight    09/16/24 0915 52.6 (116)    09/14/24 2040 52.8 (116.4)    09/14/24 1617 55.3 (121.91)            Vital Signs (last 3 days)       Date/Time Temp Pulse Resp BP MAP (mmHg) SpO2 O2 Device Patient Position - Orthostatic VS Cheyanne Coma Scale Score Pain    09/16/24 0915 -- 53 -- 139/78 -- -- -- -- -- --    09/16/24 0830 -- -- -- -- -- -- -- -- 15 No Pain    09/16/24 0738 97.6 °F (36.4 °C) 81 18 139/78 -- 98 % None (Room air) Lying -- --    09/15/24 2144 98.4 °F (36.9 °C) 65 16 149/67 97 97 % -- Lying -- --    09/15/24 2007 -- -- -- -- -- -- -- -- -- 5    09/15/24 1915 -- -- -- -- -- -- None (Room air) -- 15 --    09/15/24 1834 97.8 °F (36.6 °C) 69 18 155/75 108 98 % -- Lying -- --    09/15/24 1523 97.5 °F (36.4 °C) 64 18 150/67 97 98 % None (Room air) Lying -- --    09/15/24 1051 97.3 °F (36.3 °C) 57 18 144/75 102 97 % None (Room air) Lying -- --    09/15/24 0846 -- -- -- -- -- -- -- -- 15 5    09/15/24 0733 97.1 °F (36.2 °C) 56 16 115/57 82 96 % None (Room air) Lying -- --    09/15/24 0540 97.7 °F (36.5 °C) 50 16 136/64 -- 100 % None (Room air) Lying 15 --    09/15/24 0340 97.3 °F (36.3 °C) 52 18 124/58 83 94 % None (Room air) Lying 15 --    09/15/24 0140 97.7 °F (36.5 °C) 56 16 92/55 -- 96 % None (Room air) Lying 15 --    09/14/24 2340 97.4 °F (36.3 °C) 67 16 128/60 -- 96 % None (Room air) Lying 15 --    09/14/24 2240 96.9 °F (36.1 °C) 64 18 174/77 111 97 % None (Room air) Lying 15 --    09/14/24 2140 96.6 °F (35.9 °C) 62 18 170/77 110 98 % None (Room air) Lying 15 --    09/14/24 2047 -- -- -- -- -- -- -- -- -- 10 - Worst Possible Pain    09/14/24 2040 96.9 °F (36.1 °C) 67 18 176/90 -- 93 % None (Room air) Lying 15 --    09/14/24 1800 -- 67 18 177/88 125 97 % None (Room air) Lying -- --    09/14/24 1730 -- -- -- -- -- -- None (Room air) --  -- --    09/14/24 1726 -- -- -- -- -- -- -- -- 15 --    09/14/24 1617 98.2 °F (36.8 °C) 84 18 165/74 -- 99 % None (Room air) Sitting -- No Pain              Pertinent Labs/Diagnostic Test Results:   Radiology:  MRI brain wo contrast   Final Interpretation by Yisel No MD (09/15 1004)      No acute infarct identified. Left greater than right anterior/inferior frontal lobe encephalomalacia with gliosis from prior trauma.      Loss of normal flow void involving the visualized extracranial, petrous and cavernous right internal carotid artery related to chronic occlusion as demonstrated on prior CT angiograms.      Workstation performed: [id number]         CTA head and neck with and without contrast   ED Interpretation by Zaira Nolasco DO (09/14 1937)     IMPRESSION:     No acute intracranial hemorrhage, new proximal large vessel occlusion in the head and neck or high-grade vascular stenosis. Chronic right ICA dissection, as detailed above.        Final Interpretation by Jair Workman MD (09/14 1910)      No acute intracranial hemorrhage, new proximal large vessel occlusion in the head and neck or high-grade vascular stenosis. Chronic right ICA dissection, as detailed above.                  Workstation performed: [id number]         CT spine lumbar without contrast   ED Interpretation by Zaira Nolasco DO (09/14 2002)     IMPRESSION:     Compression fracture anterior superior plate of L1 has slightly further progressed as above. No significant retropulsion.     Multilevel minimal disc bulges and facet arthropathy as above. No significant central canal stenoses. Mild bilateral neural foraminal narrowing left greater than right at L5-S1.        Final Interpretation by Jean Wills MD (09/14 1952)      Compression fracture anterior superior plate of L1 has slightly further progressed as above. No significant retropulsion.      Multilevel minimal disc bulges and facet arthropathy as above. No significant  central canal stenoses. Mild bilateral neural foraminal narrowing left greater than right at L5-S1.                  Workstation performed: [id number]         MRI lumbar spine wo contrast    (Results Pending)     Cardiology:  Echo complete w/ contrast if indicated    by REMINGTON Patterson (09/16 1013)      ECG 12 lead    by Interface, Ris Results In (09/16 0817)        Results from last 7 days   Lab Units 09/15/24  0328 09/14/24  1725 09/11/24  1507   WBC Thousand/uL 5.08 5.57 6.76   HEMOGLOBIN g/dL 12.1 12.6 13.0   HEMATOCRIT % 35.9 38.5 38.7   PLATELETS Thousands/uL 202 240 252   TOTAL NEUT ABS Thousands/µL  --  3.67 4.47         Results from last 7 days   Lab Units 09/15/24  0328 09/14/24  1725 09/11/24  1507   SODIUM mmol/L 140 141 139   POTASSIUM mmol/L 4.1 4.0 4.0   CHLORIDE mmol/L 108 105 105   CO2 mmol/L 26 31 28   ANION GAP mmol/L 6 5 6   BUN mg/dL 14 17 16   CREATININE mg/dL 0.54* 0.70 0.61   EGFR ml/min/1.73sq m 94 86 90   CALCIUM mg/dL 8.7 9.5 9.1   MAGNESIUM mg/dL  --   --  2.1     Results from last 7 days   Lab Units 09/11/24  1507   AST U/L 22   ALT U/L 19   ALK PHOS U/L 64   TOTAL PROTEIN g/dL 6.7   ALBUMIN g/dL 4.2   TOTAL BILIRUBIN mg/dL 0.37         Results from last 7 days   Lab Units 09/15/24  0328 09/14/24  1725 09/11/24  1507   GLUCOSE RANDOM mg/dL 98 120 137         Results from last 7 days   Lab Units 09/15/24  0328 09/12/24  1519   HEMOGLOBIN A1C % 6.0* 6.0   EAG mg/dl 126  --        Results from last 7 days   Lab Units 09/11/24  1705 09/11/24  1507   HS TNI 0HR ng/L  --  3   HS TNI 2HR ng/L <2  --    HSTNI D2 ng/L <-1  --          Results from last 7 days   Lab Units 09/14/24  1725   PROTIME seconds 13.2   INR  0.98   PTT seconds 28     Results from last 7 days   Lab Units 09/11/24  1507   TSH 3RD GENERATON uIU/mL 1.530     ED Treatment-Medication Administration from 09/14/2024 1607 to 09/14/2024 2033         Date/Time Order Dose Route Action     09/14/2024 1840 iohexol (OMNIPAQUE) 350  MG/ML injection (MULTI-DOSE) 85 mL 85 mL Intravenous Given            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    Palpitations     Recurrent urinary tract infection     Seizures ([facility])     Sleep disorder     last assessed: 10/19/2013    Speech impairment     Stroke ([facility])     Thyroid disease     Tinnitus      Present on Admission:   Hypertension   Hyperlipidemia   Gastroesophageal reflux disease   Diabetes mellitus type II, controlled ([facility])   Compression fracture of L1 lumbar vertebra ([facility])   Ambulatory dysfunction      Admitting Diagnosis: Left foot drop [M21.372]  History of CVA (cerebrovascular accident) [Z86.73]  Internal carotid artery dissection ([facility]) [I77.71]  Ambulatory dysfunction [R26.2]  Known medical problems [Z78.9]  Age/Sex: 73 y.o. female  Admission Orders:  Telemetry  Neuro checks/VS: Every 1 hour x 4 hours, then every 2 hours x 8 hours, then every 4 hours x 72 hours   Nursing dysphagia screen  Pt/ot/st eval & tx  Consult neuro  Scd/foot pumps    Scheduled Medications:  Medications 09/07 09/08 09/09 09/10 09/11 09/12 09/13 09/14 09/15 09/16   aspirin (ECOTRIN LOW STRENGTH) EC tablet 81 mg  Dose: 81 mg  Freq: Daily Route: PO  Start: 09/15/24 0900   Admin Instructions:               0838      0841        atorvastatin (LIPITOR) tablet 40 mg  Dose: 40 mg  Freq: Daily with dinner Route: PO  Start: 09/15/24 1630            7643      1630        Cholecalciferol (VITAMIN D3) tablet 5,000  Units  Dose: 5,000 Units  Freq: Daily Route: PO  Start: 09/15/24 0900   Admin Instructions:               4537      0352        cyclobenzaprine (FLEXERIL) tablet 2.5 mg  Dose: 2.5 mg  Freq: 3 times daily Route: PO  Start: 09/14/24 2100           (7224) 6041 0161 3044 5789     1600     2100        gabapentin (NEURONTIN) capsule 100 mg  Dose: 100 mg  Freq: 3 times daily Route: PO  Start: 09/15/24 1615   Admin Instructions:               (9023) 4463 1653     1600     2100         heparin (porcine) subcutaneous injection 5,000 Units  Dose: 5,000 Units  Freq: Every 8 hours scheduled Route: SC  Start: 09/14/24 2200   Admin Instructions:              (3510)      (9330)     (4664)     (1090)      (8283)     1400     2200        pantoprazole (PROTONIX) EC tablet 40 mg  Dose: 40 mg  Freq: Daily before breakfast Route: PO  Start: 09/15/24 0700   Admin Instructions:               (6495)      (4941)        traZODone (DESYREL) tablet 25 mg  Dose: 25 mg  Freq: Daily at bedtime Route: PO  Start: 09/14/24 2200   Admin Instructions:              9512 0249      2200        Legend:       [id number]/0709/0809/0909/1009/1109/1209/1309/1409/1509/16        Continuous Meds Sorted by Name  for Shae Jung as of 09/07/24 through 9/16/24  Legend:       Medications 09/07 09/08 09/09 09/10 09/11 09/12 09/13 09/14 09/15 09/16   Legend:       [id number]/0709/0809/0909/1009/1109/1209/1309/1409/1509/16        PRN Meds Sorted by Name  for Erich Shae as of 09/07/24 through 9/16/24  Legend:       Medications 09/07 09/08 09/09 09/10 09/11 09/12 09/13 09/14 09/15 09/16   acetaminophen (Ofirmev) injection 1,000 mg  Dose: 1,000 mg  Freq: Every 6 hours PRN Route: IV  PRN Reasons: mild pain,moderate pain,fever  Start: 09/14/24 2051   Admin Instructions:              2116     2136 2007 2027         benzonatate (TESSALON PERLES) capsule 100 mg  Dose: 100 mg  Freq: 3 times daily PRN Route: PO  PRN  Reason: cough  Start: 09/14/24 2003   Admin Instructions:                   docusate sodium (COLACE) capsule 100 mg  Dose: 100 mg  Freq: 2 times daily PRN Route: PO  PRN Reason: constipation  Start: 09/14/24 2003                iohexol (OMNIPAQUE) 350 MG/ML injection (MULTI-DOSE) 85 mL  Dose: 85 mL  Freq: Once in imaging Route: IV  PRN Reason: contrast  Start: 09/14/24 1840 End: 09/14/24 1840           1840          iohexol (OMNIPAQUE) 350 MG/ML injection (MULTI-DOSE) 85 mL  Dose: 85 mL  Freq: Once in imaging Route: IV  PRN Reason: contrast  Start: 09/11/24 1812 End: 09/11/24 1813 1813                           Network Utilization Review Department  ATTENTION: Please call with any questions or concerns to [phone] and carefully listen to the prompts so that you are directed to the right person. All voicemails are confidential.   For Discharge needs, contact Care Management DC Support Team at [phone] opt. 2  Send all requests for admission clinical reviews, approved or denied determinations and any other requests to dedicated fax number below belonging to the campus where the patient is receiving treatment. List of dedicated fax numbers for the Facilities:  FACILITY NAME UR FAX NUMBER   ADMISSION DENIALS (Administrative/Medical Necessity) [phone]   DISCHARGE SUPPORT TEAM (NETWORK) [phone]   PARENT CHILD HEALTH (Maternity/NICU/Pediatrics) [phone]   [facility] [phone]   [facility] [phone]   [facility] [phone]   [facility] [phone]   Critical access hospital [phone]   [facility] [phone]   [facility] [phone]   [facility] [phone]   [facility] [phone]   [facility] [phone]   [facility]  [facility] [phone]   [facility] [phone]

## 2024-09-16 NOTE — ASSESSMENT & PLAN NOTE
Has a history of 3 prior CVAs with residual right leg weakness and dysarthria  Continue statin, aspirin  Mri negative for acute cva

## 2024-09-16 NOTE — DISCHARGE INSTR - DIET
Continue dysphagia 2 mechanical soft and thin. Break or crush larger pills as able/allowable. Slow rate. Small bites. Periodic sips of liquid in between. Do not lay flat at rest. Keep HOB elevated to at least 30 degrees at rest.

## 2024-09-17 ENCOUNTER — HOME HEALTH ADMISSION (OUTPATIENT)
Dept: HOME HEALTH SERVICES | Facility: HOME HEALTHCARE | Age: 74
End: 2024-09-17
Payer: MEDICARE

## 2024-09-17 ENCOUNTER — HOME CARE VISIT (OUTPATIENT)
Dept: HOME HEALTH SERVICES | Facility: HOME HEALTHCARE | Age: 74
End: 2024-09-17

## 2024-09-17 VITALS
HEART RATE: 67 BPM | WEIGHT: 116 LBS | SYSTOLIC BLOOD PRESSURE: 169 MMHG | RESPIRATION RATE: 16 BRPM | DIASTOLIC BLOOD PRESSURE: 86 MMHG | HEIGHT: 57 IN | BODY MASS INDEX: 25.03 KG/M2 | TEMPERATURE: 96.5 F | OXYGEN SATURATION: 98 %

## 2024-09-17 PROCEDURE — 99239 HOSP IP/OBS DSCHRG MGMT >30: CPT

## 2024-09-17 RX ORDER — GABAPENTIN 100 MG/1
100 CAPSULE ORAL 3 TIMES DAILY
Qty: 90 CAPSULE | Refills: 0 | Status: SHIPPED | OUTPATIENT
Start: 2024-09-17

## 2024-09-17 RX ORDER — ATORVASTATIN CALCIUM 40 MG/1
20 TABLET, FILM COATED ORAL
Qty: 30 TABLET | Refills: 0 | Status: SHIPPED | OUTPATIENT
Start: 2024-09-17

## 2024-09-17 RX ORDER — DOCUSATE SODIUM 100 MG/1
100 CAPSULE, LIQUID FILLED ORAL 2 TIMES DAILY
Status: DISCONTINUED | OUTPATIENT
Start: 2024-09-17 | End: 2024-09-17 | Stop reason: HOSPADM

## 2024-09-17 RX ORDER — CYCLOBENZAPRINE HCL 5 MG
2.5 TABLET ORAL 3 TIMES DAILY PRN
Start: 2024-09-17

## 2024-09-17 RX ORDER — POLYETHYLENE GLYCOL 3350 17 G/17G
17 POWDER, FOR SOLUTION ORAL DAILY
Status: DISCONTINUED | OUTPATIENT
Start: 2024-09-17 | End: 2024-09-17 | Stop reason: HOSPADM

## 2024-09-17 RX ORDER — AMLODIPINE BESYLATE 2.5 MG/1
2.5 TABLET ORAL DAILY
Qty: 30 TABLET | Refills: 0 | Status: SHIPPED | OUTPATIENT
Start: 2024-09-17

## 2024-09-17 RX ADMIN — ASPIRIN 81 MG: 81 TABLET, COATED ORAL at 08:38

## 2024-09-17 RX ADMIN — GABAPENTIN 100 MG: 100 CAPSULE ORAL at 08:38

## 2024-09-17 RX ADMIN — HEPARIN SODIUM 5000 UNITS: 5000 INJECTION INTRAVENOUS; SUBCUTANEOUS at 06:06

## 2024-09-17 RX ADMIN — PANTOPRAZOLE SODIUM 40 MG: 40 TABLET, DELAYED RELEASE ORAL at 08:38

## 2024-09-17 RX ADMIN — CYCLOBENZAPRINE HYDROCHLORIDE 2.5 MG: 5 TABLET, FILM COATED ORAL at 08:38

## 2024-09-17 RX ADMIN — Medication 5000 UNITS: at 08:38

## 2024-09-17 RX ADMIN — DOCUSATE SODIUM 100 MG: 100 CAPSULE, LIQUID FILLED ORAL at 09:50

## 2024-09-17 RX ADMIN — POLYETHYLENE GLYCOL 3350 17 G: 17 POWDER, FOR SOLUTION ORAL at 09:50

## 2024-09-17 NOTE — CASE MANAGEMENT
Case Management Discharge Planning Note    Patient name Shae Jung  Location [facility] /[facility] -* MRN [id number]  : 1950 Date 2024       Current Admission Date: 2024  Current Admission Diagnosis:Left foot drop   Patient Active Problem List    Diagnosis Date Noted Date Diagnosed    Left foot drop 2024     Oropharyngeal dysphagia 2024     History of stroke      Tension headache 2024     Right sided weakness 2024     Snoring 2024     Ambulatory dysfunction 2024     Compression fracture of L1 lumbar vertebra (HCC) 2024     Generalized abdominal pain 2023     Lumbar radiculitis      Numbness and tingling in both hands      Class 1 obesity due to excess calories without serious comorbidity with body mass index (BMI) of 30.0 to 30.9 in adult 2020     Sciatica of right side 2020     Hip strain, right, initial encounter 2019     Breast pain, right 2019     Elevated BP without diagnosis of hypertension 2019     Contusion of right breast 2019     Epigastric abdominal pain 2019     NSAID long-term use 2019     Diabetes mellitus type II, controlled (HCC) 10/12/2018     Arthritis 10/12/2018     Primary insomnia 2018     Urinary, incontinence, stress female 2018     Urge and stress incontinence 10/26/2017     Osteoporosis 09/15/2017     Adhesive capsulitis of left shoulder 2017     Constipation 05/10/2017     Cervical radiculitis 2017     Elevated TSH 2017     Osteoarthritis of both hands 2017     Memory difficulties 2016     Asymptomatic bilateral carotid artery stenosis 2016     Gastroesophageal reflux disease 2016     Hypertension 2016     Collapsed vertebra, not elsewhere classified, sacral and sacrococcygeal region, initial encounter for fracture (HCC) 2016     HLD (hyperlipidemia) 2016     History of CVA with residual deficit  01/20/2016     Depression 01/20/2016     Headache 01/20/2016     Dizziness 01/20/2016     Fall 01/16/2016     Subdural hemorrhage following injury (HCC) 01/16/2016     Subarachnoid hemorrhage following injury (HCC) 01/16/2016     Hyperlipidemia 10/19/2013     Muscle weakness 10/19/2013       LOS (days): 3  Geometric Mean LOS (GMLOS) (days):   Days to GMLOS:     OBJECTIVE:  Risk of Unplanned Readmission Score: 11.22         Current admission status: Inpatient   Preferred Pharmacy:   [id number] - [place] - [address]  [address] [address]  Phone: [phone] Fax: [phone]    Primary Care Provider: Vicente Zhu MD    Primary Insurance: [employer]IC[employer] [facility]  Secondary Insurance: [facility]    DISCHARGE DETAILS:    Discharge planning discussed with:: Patient's son  Freedom of Choice: Yes  Comments - Freedom of Choice: [facility] reserved in [place]  CM contacted family/caregiver?: Yes  Were Treatment Team discharge recommendations reviewed with patient/caregiver?: Yes  Did patient/caregiver verbalize understanding of patient care needs?: Yes  Were patient/caregiver advised of the risks associated with not following Treatment Team discharge recommendations?: Yes    Contacts  Patient Contacts: Jean Jung (Son)   [phone]  Relationship to Patient:: Family  Contact Method: In Person  Phone Number: Jean Jung (Son)   [phone]  Reason/Outcome: Continuity of Care, Emergency Contact, Discharge Planning    Requested Home Health Care         Is the patient interested in HHC at discharge?: Yes  Home Health Discipline requested:: Occupational Therapy, Physical Therapy, Speech Language Pathology  Home Health Agency Name:: [facility]  HHA External Referral Reason (only applicable if external HHA name selected): Patient has established relationship with provider  Home Health Follow-Up Provider:: AI  Home  Health Services Needed:: Gait/ADL Training, Strengthening/Theraputic Exercises to Improve Function, Evaluate Functional Status and Safety  Homebound Criteria Met:: Uses an Assist Device (i.e. cane, walker, etc), Requires the Assistance of Another Person for Safe Ambulation or to Leave the Home  Supporting Clincal Findings:: Limited Endurance, Fatigues Easliy in Short Distances    DME Referral Provided  Referral made for DME?: No    Other Referral/Resources/Interventions Provided:  Interventions: HHC    Would you like to participate in our [facility] service program?  : No - Declined    Treatment Team Recommendation: Home with Home Health Care  Discharge Destination Plan:: Home with Home Health Care  Transport at Discharge : Family                       CM met with patient and patient's son at bedside, they would like [facility] for HHC.  Son feels this would be the safest option as it has been increasingly difficult to transport patient to all of her appointments.  Son understands that he will need to cancel OP therapy appointments.  If son feels HHC is not sufficient he could always transition back to OP.      CM reserved [facility] in [place].

## 2024-09-17 NOTE — ASSESSMENT & PLAN NOTE
Patient is a 73-year-old female with a past medical history of hypertension, hyperlipidemia, history of prior stroke with right-sided residual deficit, L1 compression fracture who presented to the hospital after episodes of dizziness, headache and left foot drop vs weakness/worsening dorsiflexion  Patient lives with her son, has a caretaker from 8 AM to 6 PM, rest of the time son is with her  She was working with physical therapy and complaining of difficulty with ambulation, PT noticed foot drop  Underwent stroke work up - negative for acute CVA  CTA head and neck showed no acute intracranial hemorrhage, large vessel occlusion or high-grade vascular stenosis.  Chronic right ICA dissection  CT of the lumbar spine showed old L1 compression fracture.  Multilevel minimal disc bulges and facet arthropathy, no significant central canal stenosis, mild bilateral neural foraminal narrowing left greater than right at L5-S1  Pt follows with neurosx outpt- last seen in April. She had been in tlso brace. At that time surgery was not recommended.   MRI of the brain negative  MRI of lumbar spine with stable spondylosis and chronic appearing L2 fracture with no acute fractures noted.  Discussed case with neurology and neurosurgery.  Left foot drop felt to be in setting of peroneal neuropathy which patient will need further workup in the outpatient.  Neurology ordered EMG.  Appreciate CM assistance, planning for [facility] and HHC with son  Discussed with physical therapy prior to discharge, not recommended for MAFO due to the problem being mostly dorsiflexion. They felt this would be too restrictive  Continue home medications, would reduce her flexeril to 2.5 mg TID prn and gabapentin doses 100 mg TID due to sedating effects (similar to how she was receiving here)

## 2024-09-17 NOTE — PLAN OF CARE
Problem: Potential for Falls  Goal: Patient will remain free of falls  Description: INTERVENTIONS:  - Educate patient/family on patient safety including physical limitations  - Instruct patient to call for assistance with activity   - Consult OT/PT to assist with strengthening/mobility   - Keep Call bell within reach  - Keep bed low and locked with side rails adjusted as appropriate  - Keep care items and personal belongings within reach  - Initiate and maintain comfort rounds  - Make Fall Risk Sign visible to staff  - Offer Toileting every 2 Hours, in advance of need  - Initiate/Maintain bed alarm  - Obtain necessary fall risk management equipment  - Apply yellow socks and bracelet for high fall risk patients  - Consider moving patient to room near nurses station  9/17/2024 1241 by Chyna Salazar  Outcome: Adequate for Discharge  9/17/2024 1017 by Chyna Salazar  Outcome: Progressing     Problem: SAFETY ADULT  Goal: Patient will remain free of falls  Description: INTERVENTIONS:  - Educate patient/family on patient safety including physical limitations  - Instruct patient to call for assistance with activity   - Consult OT/PT to assist with strengthening/mobility   - Keep Call bell within reach  - Keep bed low and locked with side rails adjusted as appropriate  - Keep care items and personal belongings within reach  - Initiate and maintain comfort rounds  - Make Fall Risk Sign visible to staff  - Offer Toileting every 2 Hours, in advance of need  - Initiate/Maintain bed alarm  - Obtain necessary fall risk management equipment  - Apply yellow socks and bracelet for high fall risk patients  - Consider moving patient to room near nurses station  9/17/2024 1241 by Chyna Salazar  Outcome: Adequate for Discharge  9/17/2024 1017 by Chyna Salazar  Outcome: Progressing  Goal: Maintain or return to baseline ADL function  Description: INTERVENTIONS:  -  Assess patient's ability to carry out ADLs; assess patient's  baseline for ADL function and identify physical deficits which impact ability to perform ADLs (bathing, care of mouth/teeth, toileting, grooming, dressing, etc.)  - Assess/evaluate cause of self-care deficits   - Assess range of motion  - Assess patient's mobility; develop plan if impaired  - Assess patient's need for assistive devices and provide as appropriate  - Encourage maximum independence but intervene and supervise when necessary  - Involve family in performance of ADLs  - Assess for home care needs following discharge   - Consider OT consult to assist with ADL evaluation and planning for discharge  - Provide patient education as appropriate  9/17/2024 1241 by Chyna Salazar  Outcome: Adequate for Discharge  9/17/2024 1017 by Chyna Salazar  Outcome: Progressing  Goal: Maintains/Returns to pre admission functional level  Description: INTERVENTIONS:  - Perform AM-PAC 6 Click Basic Mobility/ Daily Activity assessment daily.  - Set and communicate daily mobility goal to care team and patient/family/caregiver.   - Collaborate with rehabilitation services on mobility goals if consulted  - Perform Range of Motion 3 times a day.  - Reposition patient every 2 hours.  - Dangle patient 3 times a day  - Stand patient 3 times a day  - Ambulate patient 3 times a day  - Out of bed to chair 3 times a day   - Out of bed for meals 3 times a day  - Out of bed for toileting  - Record patient progress and toleration of activity level   9/17/2024 1241 by Chyna Salazar  Outcome: Adequate for Discharge  9/17/2024 1017 by Chyna Salazar  Outcome: Progressing     Problem: DISCHARGE PLANNING  Goal: Discharge to home or other facility with appropriate resources  Description: INTERVENTIONS:  - Identify barriers to discharge w/patient and caregiver  - Arrange for needed discharge resources and transportation as appropriate  - Identify discharge learning needs (meds, wound care, etc.)  - Arrange for interpretive services to assist  at discharge as needed  - Refer to Case Management Department for coordinating discharge planning if the patient needs post-hospital services based on physician/advanced practitioner order or complex needs related to functional status, cognitive ability, or social support system  9/17/2024 1241 by Chyna Salazar  Outcome: Adequate for Discharge  9/17/2024 1017 by Chyna Salazar  Outcome: Progressing     Problem: Knowledge Deficit  Goal: Patient/family/caregiver demonstrates understanding of disease process, treatment plan, medications, and discharge instructions  Description: Complete learning assessment and assess knowledge base.  Interventions:  - Provide teaching at level of understanding  - Provide teaching via preferred learning methods  9/17/2024 1241 by Chyna Salazar  Outcome: Adequate for Discharge  9/17/2024 1017 by Chyna Salazar  Outcome: Progressing     Problem: Activity Intolerance/Impaired Mobility  Goal: Mobility/activity is maintained at optimum level for patient  Description: Interventions:  - Assess and monitor patient  barriers to mobility and need for assistive/adaptive devices.  - Assess patient's emotional response to limitations.  - Collaborate with interdisciplinary team and initiate plans and interventions as ordered.  - Encourage independent activity per ability.  - Maintain proper body alignment.  - Perform active/passive rom as tolerated/ordered.  - Plan activities to conserve energy.  - Turn patient as appropriate  9/17/2024 1241 by Chyna Salazar  Outcome: Adequate for Discharge  9/17/2024 1017 by Chyna Salazar  Outcome: Progressing     Problem: Nutrition/Hydration-ADULT  Goal: Nutrient/Hydration intake appropriate for improving, restoring or maintaining nutritional needs  Description: Monitor and assess patient's nutrition/hydration status for malnutrition. Collaborate with interdisciplinary team and initiate plan and interventions as ordered.  Monitor patient's weight and  dietary intake as ordered or per policy. Utilize nutrition screening tool and intervene as necessary. Determine patient's food preferences and provide high-protein, high-caloric foods as appropriate.     INTERVENTIONS:  - Monitor oral intake, urinary output, labs, and treatment plans  - Assess nutrition and hydration status and recommend course of action  - Evaluate amount of meals eaten  - Assist patient with eating if necessary   - Allow adequate time for meals  - Recommend/ encourage appropriate diets, oral nutritional supplements, and vitamin/mineral supplements  - Order, calculate, and assess calorie counts as needed  - Recommend, monitor, and adjust tube feedings and TPN/PPN based on assessed needs  - Assess need for intravenous fluids  - Provide specific nutrition/hydration education as appropriate  - Include patient/family/caregiver in decisions related to nutrition  9/17/2024 1241 by Chyna Salazar  Outcome: Adequate for Discharge  9/17/2024 1017 by Chyna Salazar  Outcome: Progressing

## 2024-09-17 NOTE — PLAN OF CARE
Problem: Potential for Falls  Goal: Patient will remain free of falls  Description: INTERVENTIONS:  - Educate patient/family on patient safety including physical limitations  - Instruct patient to call for assistance with activity   - Consult OT/PT to assist with strengthening/mobility   - Keep Call bell within reach  - Keep bed low and locked with side rails adjusted as appropriate  - Keep care items and personal belongings within reach  - Initiate and maintain comfort rounds  - Make Fall Risk Sign visible to staff  - Offer Toileting every 2 Hours, in advance of need  - Initiate/Maintain bed alarm  - Obtain necessary fall risk management equipment  - Apply yellow socks and bracelet for high fall risk patients  - Consider moving patient to room near nurses station  Outcome: Progressing     Problem: SAFETY ADULT  Goal: Patient will remain free of falls  Description: INTERVENTIONS:  - Educate patient/family on patient safety including physical limitations  - Instruct patient to call for assistance with activity   - Consult OT/PT to assist with strengthening/mobility   - Keep Call bell within reach  - Keep bed low and locked with side rails adjusted as appropriate  - Keep care items and personal belongings within reach  - Initiate and maintain comfort rounds  - Make Fall Risk Sign visible to staff  - Offer Toileting every 2 Hours, in advance of need  - Initiate/Maintain bed alarm  - Obtain necessary fall risk management equipment  - Apply yellow socks and bracelet for high fall risk patients  - Consider moving patient to room near nurses station  Outcome: Progressing  Goal: Maintain or return to baseline ADL function  Description: INTERVENTIONS:  -  Assess patient's ability to carry out ADLs; assess patient's baseline for ADL function and identify physical deficits which impact ability to perform ADLs (bathing, care of mouth/teeth, toileting, grooming, dressing, etc.)  - Assess/evaluate cause of self-care deficits    - Assess range of motion  - Assess patient's mobility; develop plan if impaired  - Assess patient's need for assistive devices and provide as appropriate  - Encourage maximum independence but intervene and supervise when necessary  - Involve family in performance of ADLs  - Assess for home care needs following discharge   - Consider OT consult to assist with ADL evaluation and planning for discharge  - Provide patient education as appropriate  Outcome: Progressing  Goal: Maintains/Returns to pre admission functional level  Description: INTERVENTIONS:  - Perform AM-PAC 6 Click Basic Mobility/ Daily Activity assessment daily.  - Set and communicate daily mobility goal to care team and patient/family/caregiver.   - Collaborate with rehabilitation services on mobility goals if consulted  - Perform Range of Motion 3 times a day.  - Reposition patient every 2 hours.  - Dangle patient 3 times a day  - Stand patient 3 times a day  - Ambulate patient 3 times a day  - Out of bed to chair 3 times a day   - Out of bed for meals 3 times a day  - Out of bed for toileting  - Record patient progress and toleration of activity level   Outcome: Progressing     Problem: DISCHARGE PLANNING  Goal: Discharge to home or other facility with appropriate resources  Description: INTERVENTIONS:  - Identify barriers to discharge w/patient and caregiver  - Arrange for needed discharge resources and transportation as appropriate  - Identify discharge learning needs (meds, wound care, etc.)  - Arrange for interpretive services to assist at discharge as needed  - Refer to Case Management Department for coordinating discharge planning if the patient needs post-hospital services based on physician/advanced practitioner order or complex needs related to functional status, cognitive ability, or social support system  Outcome: Progressing     Problem: Knowledge Deficit  Goal: Patient/family/caregiver demonstrates understanding of disease process,  treatment plan, medications, and discharge instructions  Description: Complete learning assessment and assess knowledge base.  Interventions:  - Provide teaching at level of understanding  - Provide teaching via preferred learning methods  Outcome: Progressing     Problem: Activity Intolerance/Impaired Mobility  Goal: Mobility/activity is maintained at optimum level for patient  Description: Interventions:  - Assess and monitor patient  barriers to mobility and need for assistive/adaptive devices.  - Assess patient's emotional response to limitations.  - Collaborate with interdisciplinary team and initiate plans and interventions as ordered.  - Encourage independent activity per ability.  - Maintain proper body alignment.  - Perform active/passive rom as tolerated/ordered.  - Plan activities to conserve energy.  - Turn patient as appropriate  Outcome: Progressing     Problem: Nutrition/Hydration-ADULT  Goal: Nutrient/Hydration intake appropriate for improving, restoring or maintaining nutritional needs  Description: Monitor and assess patient's nutrition/hydration status for malnutrition. Collaborate with interdisciplinary team and initiate plan and interventions as ordered.  Monitor patient's weight and dietary intake as ordered or per policy. Utilize nutrition screening tool and intervene as necessary. Determine patient's food preferences and provide high-protein, high-caloric foods as appropriate.     INTERVENTIONS:  - Monitor oral intake, urinary output, labs, and treatment plans  - Assess nutrition and hydration status and recommend course of action  - Evaluate amount of meals eaten  - Assist patient with eating if necessary   - Allow adequate time for meals  - Recommend/ encourage appropriate diets, oral nutritional supplements, and vitamin/mineral supplements  - Order, calculate, and assess calorie counts as needed  - Recommend, monitor, and adjust tube feedings and TPN/PPN based on assessed needs  - Assess  need for intravenous fluids  - Provide specific nutrition/hydration education as appropriate  - Include patient/family/caregiver in decisions related to nutrition  Outcome: Progressing

## 2024-09-17 NOTE — ASSESSMENT & PLAN NOTE
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 LFTs normal. Started on lipitor 20 mg on discharge  MRI negative for acute CVA  OP neurology follow up 6 weeks

## 2024-09-17 NOTE — NURSING NOTE
IV removed. AVS given to and reviewed with patient's son. Questions addressed. Patient discharged.    Chyna CACERESN, RN

## 2024-09-17 NOTE — DISCHARGE INSTR - AVS FIRST PAGE
New medications include amlodipine for your blood pressure and atorvastatin for your cholesterol    The gabapentin can make you tired so if you do not take it three times a day (8 hours apart) you can take 3 (100 mg) pills at night, which would be 300 mg at night. We had you taking 100 mg pills three times a day here.     Please take your amlodipine in the morning. This is a pill that is 2.5 mg daily.  If you are able to get a blood pressure cuff, take your blood pressure about 3 hours after and keep a log, take it to your family doctor to keep an eye on your blood pressure. At any point if you have lightheadedness with this or your blood pressure is less then 90/60 notify a provider.    Continue therapy at home    You will see the neurology team in 6 weeks. Please call and make an appointment. They may want to do nerve testing on you.    Please follow up with your family doctor.

## 2024-09-17 NOTE — QUICK NOTE
MRI L-spine without contrast reviewed with attending neurologist-stable spondylosis and chronic appearing L2 fracture seen, no acute fracture noted.    Appreciate neurosurgery's input-left foot drop does not appear to be the result of any lumbar pathology.  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  Patient will likely need a brace for left foot drop upon discharge and continue with outpatient PT.  Will order outpatient EMG.  No further inpatient neurological recommendations at this time.      Shae Jung will need follow-up in in 6 weeks with general neurology team for Other in 60 minute appointment. They will require a EMG/NCS prior to neurology appointment - ordered.  Message sent to schedulers.

## 2024-09-17 NOTE — DISCHARGE SUMMARY
Discharge Summary - Hospitalist   Name: Shae Jung 73 y.o. female I MRN: [id number]  Unit/Bed#: E4 -01 I Date of Admission: 9/14/2024   Date of Service: 9/17/2024 I Hospital Day: 3     Assessment & Plan  Left foot drop  Patient is a 73-year-old female with a past medical history of hypertension, hyperlipidemia, history of prior stroke with right-sided residual deficit, L1 compression fracture who presented to the hospital after episodes of dizziness, headache and left foot drop vs weakness/worsening dorsiflexion  Patient lives with her son, has a caretaker from 8 AM to 6 PM, rest of the time son is with her  She was working with physical therapy and complaining of difficulty with ambulation, PT noticed foot drop  Underwent stroke work up - negative for acute CVA  CTA head and neck showed no acute intracranial hemorrhage, large vessel occlusion or high-grade vascular stenosis.  Chronic right ICA dissection  CT of the lumbar spine showed old L1 compression fracture.  Multilevel minimal disc bulges and facet arthropathy, no significant central canal stenosis, mild bilateral neural foraminal narrowing left greater than right at L5-S1  Pt follows with neurosx outpt- last seen in April. She had been in tlso brace. At that time surgery was not recommended.   MRI of the brain negative  MRI of lumbar spine with stable spondylosis and chronic appearing L2 fracture with no acute fractures noted.  Discussed case with neurology and neurosurgery.  Left foot drop felt to be in setting of peroneal neuropathy which patient will need further workup in the outpatient.  Neurology ordered EMG.  Appreciate CM assistance, planning for [facility] and HHC with son  Discussed with physical therapy prior to discharge, not recommended for MAFO due to the problem being mostly dorsiflexion. They felt this would be too restrictive  Continue home medications, would reduce her flexeril to 2.5 mg TID prn and gabapentin doses 100 mg TID due to  sedating effects (similar to how she was receiving here)  History of stroke  Has a history of 3 prior CVAs with residual right leg weakness and dysarthria  Continue aspirin. LFTs normal. Started on lipitor 20 mg on discharge  MRI negative for acute CVA  OP neurology follow up 6 weeks  Hypertension  BP with sub optimal control. Plan to start Norvasc 2.5 mg daily on discharge and follow up PCP for BP checks  Gastroesophageal reflux disease  Continue PPI  Hyperlipidemia  Continue statin, lipitor 20  Follow up PCP for monitoring lipid panel  Diabetes mellitus type II, controlled (HCC)  Lab Results   Component Value Date    HGBA1C 6.0 (H) 09/15/2024   Continue diet control  Compression fracture of L1 lumbar vertebra (HCC)  Follows neurosurgery. Last evaluated in 4/24  Had been in tlso brace- surgery was not needed at that time  Ct showing slightly progressed -MRI lumbar spine notes chronic appearing fracture, no significant compression  Without neurological compromise on exam  Appreciate neurosurgical consultation here  Continue outpatient follow-up and further workup for left foot drop -low suspicion for lumbar pathology of this.  No surgery or transfer indicated.  Ambulatory dysfunction  Planning for home  health care with increase care with [facility]     Medical Problems       Resolved Problems  Date Reviewed: 9/16/2024   None       Discharging Physician / Practitioner: Ally Estrada PA-C  PCP: Vicente Zhu MD  Admission Date:   Admission Orders (From admission, onward)       Ordered        09/14/24 1953  INPATIENT ADMISSION  Once                          Discharge Date: 09/17/24    Consultations During Hospital Stay:  Neurology  Neurosurgery  PMR  PT OT      Significant Findings / Test Results:   MRI lumbar spine wo contrast  Result Date: 9/16/2024  Impression: 1. Stable spondylosis as detailed above. 2. Chronic appearing L2 fracture as seen on recent CT. No new fracture seen. Workstation performed: [id number]      Echo complete w/ contrast if indicated  Result Date: 9/16/2024  Narrative:   Left Ventricle: Left ventricular cavity size is normal. Wall thickness is mildly increased. There is concentric remodeling. The left ventricular ejection fraction is 58%. Systolic function is normal. Wall motion is normal. Diastolic function is mildly abnormal, consistent with grade I (abnormal) relaxation.   Right Ventricle: Right ventricular cavity size is normal. Systolic function is normal.   Mitral Valve: There is mild annular calcification.   Tricuspid Valve: There is mild regurgitation.     MRI brain wo contrast  Result Date: 9/15/2024  Impression: No acute infarct identified. Left greater than right anterior/inferior frontal lobe encephalomalacia with gliosis from prior trauma. Loss of normal flow void involving the visualized extracranial, petrous and cavernous right internal carotid artery related to chronic occlusion as demonstrated on prior CT angiograms.     CT spine lumbar without contrast  Result Date: 9/14/2024  Impression: Compression fracture anterior superior plate of L1 has slightly further progressed as above. No significant retropulsion. Multilevel minimal disc bulges and facet arthropathy as above. No significant central canal stenoses. Mild bilateral neural foraminal narrowing left greater than right at L5-S1.    CTA head and neck with and without contrast  Result Date: 9/14/2024  Impression: No acute intracranial hemorrhage, new proximal large vessel occlusion in the head and neck or high-grade vascular stenosis. Chronic right ICA dissection, as detailed above.        Outpatient Tests Requested:  Outpatient neurology follow-up in 6 weeks  Outpatient EMG  Outpatient PCP follow-up    Reason for Admission: Left foot drop    Hospital Course:   Shae Jung is a 73 y.o. female patient who originally presented to the hospital on 9/14/2024 due to left foot drop, worsening weakness and dizziness which was noted at  "therapy.  Patient had thorough evaluation and patient was placed on stroke pathway which was negative for acute CVA.  She was seen by neurology, neurosurgery, PT and OT as well as PMR and her left foot drop/increase in left foot weakness was felt to be peroneal nerve palsy.  Patient had MRI imaging of back which not display any acute lumbar findings.  Patient was started on gabapentin and will be returning home with home care and [facility].  She require outpatient follow-up with neurology in 6 weeks and undergo EMG testing.    Please see above list of diagnoses and related plan for additional information.     Condition at Discharge: fair    Discharge Day Visit / Exam:   Subjective: Patient seen and examined lying in bed.  Her son is bedside and helped provide history.  Patient reports she is feeling fine today.  She slept good last night.  Denies any chest pain, shortness of breath or abdominal pain.  She denies any lower extremity numbness, tingling or weakness.  Denies any groin numbness or tingling.  She has sensation when she has to urinate.  No difficulty urinating.  No fevers.  She still has some weakness in the left ankle with movement.  Plan is to go home with increased help.     Vitals: Blood Pressure: 169/86 (09/17/24 0714)  Pulse: 67 (09/17/24 0714)  Temperature: (!) 96.5 °F (35.8 °C) (09/17/24 0714)  Temp Source: Temporal (09/17/24 0714)  Respirations: 16 (09/17/24 0714)  Height: 4' 9\" (144.8 cm) (09/16/24 0915)  Weight - Scale: 52.6 kg (116 lb) (09/16/24 0915)  SpO2: 98 % (09/17/24 0714)    Exam:   Physical Exam  Constitutional:       General: She is not in acute distress.     Appearance: Normal appearance. She is normal weight. She is not toxic-appearing.   Cardiovascular:      Rate and Rhythm: Normal rate and regular rhythm.      Pulses:           Dorsalis pedis pulses are 2+ on the right side and 2+ on the left side.   Abdominal:      General: Bowel sounds are normal. There is no distension.     "  Palpations: Abdomen is soft.      Tenderness: There is no abdominal tenderness. There is no guarding or rebound.   Musculoskeletal:      Right lower leg: No edema.      Left lower leg: No edema.   Neurological:      Mental Status: She is oriented to person, place, and time. Mental status is at baseline.      Comments: Limited motion dorsiflexion LLE. Simliar to previously noted neuro exams.           Discussion with Family: Updated  (son) at bedside.    Discharge instructions/Information to patient and family:   See after visit summary for information provided to patient and family.      Provisions for Follow-Up Care:  See after visit summary for information related to follow-up care and any pertinent home health orders.      Mobility at time of Discharge:   Basic Mobility Inpatient Raw Score: 16  JH-HLM Goal: 5: Stand one or more mins  JH-HLM Achieved: 6: Walk 10 steps or more  HLM Goal achieved. Continue to encourage appropriate mobility.     Disposition:   Home with [facility]NA Services (Reminder: Complete face to face encounter)    Planned Readmission: No    Discharge Medications:  See after visit summary for reconciled discharge medications provided to patient and/or family.        **Please Note: This note may have been constructed using a voice recognition system**

## 2024-09-17 NOTE — CASE MANAGEMENT
Case Management Discharge Planning Note    Patient name Shae Jung  Location [facility] /E4 -* MRN [id number]  : 1950 Date 2024       Current Admission Date: 2024  Current Admission Diagnosis:Left foot drop   Patient Active Problem List    Diagnosis Date Noted Date Diagnosed    Left foot drop 2024     Oropharyngeal dysphagia 2024     History of stroke      Tension headache 2024     Right sided weakness 2024     Snoring 2024     Ambulatory dysfunction 2024     Compression fracture of L1 lumbar vertebra (HCC) 2024     Generalized abdominal pain 2023     Lumbar radiculitis      Numbness and tingling in both hands      Class 1 obesity due to excess calories without serious comorbidity with body mass index (BMI) of 30.0 to 30.9 in adult 2020     Sciatica of right side 2020     Hip strain, right, initial encounter 2019     Breast pain, right 2019     Elevated BP without diagnosis of hypertension 2019     Contusion of right breast 2019     Epigastric abdominal pain 2019     NSAID long-term use 2019     Diabetes mellitus type II, controlled (HCC) 10/12/2018     Arthritis 10/12/2018     Primary insomnia 2018     Urinary, incontinence, stress female 2018     Urge and stress incontinence 10/26/2017     Osteoporosis 09/15/2017     Adhesive capsulitis of left shoulder 2017     Constipation 05/10/2017     Cervical radiculitis 2017     Elevated TSH 2017     Osteoarthritis of both hands 2017     Memory difficulties 2016     Asymptomatic bilateral carotid artery stenosis 2016     Gastroesophageal reflux disease 2016     Hypertension 2016     Collapsed vertebra, not elsewhere classified, sacral and sacrococcygeal region, initial encounter for fracture (HCC) 2016     HLD (hyperlipidemia) 2016     History of CVA with residual deficit  01/20/2016     Depression 01/20/2016     Headache 01/20/2016     Dizziness 01/20/2016     Fall 01/16/2016     Subdural hemorrhage following injury (HCC) 01/16/2016     Subarachnoid hemorrhage following injury (HCC) 01/16/2016     Hyperlipidemia 10/19/2013     Muscle weakness 10/19/2013       LOS (days): 3  Geometric Mean LOS (GMLOS) (days):   Days to GMLOS:     OBJECTIVE:  Risk of Unplanned Readmission Score: 11.22         Current admission status: Inpatient   Preferred Pharmacy:   [id number] - [place] - [address]  [address] [address]  Phone: [phone] Fax: [phone]    Primary Care Provider: Vicente Zhu MD    Primary Insurance: [employer]ICARE [facility]  Secondary Insurance: [facility]    DISCHARGE DETAILS:    Discharge planning discussed with:: Patient's son  Freedom of Choice: Yes     CM contacted family/caregiver?: Yes  Were Treatment Team discharge recommendations reviewed with patient/caregiver?: Yes  Did patient/caregiver verbalize understanding of patient care needs?: Yes  Were patient/caregiver advised of the risks associated with not following Treatment Team discharge recommendations?: Yes    Contacts  Patient Contacts: Jean Jung (Son)   [phone]  Relationship to Patient:: Family  Contact Method: In Person  Reason/Outcome: Continuity of Care, Emergency Contact, Discharge Planning                   Would you like to participate in our [facility] service program?  : No - Declined                                        IMM Given (Date):: 09/17/24  IMM Given to:: Family  Family notified:: Reviewed with patient's son     IMM reviewed with patient and caregiver, patient and caregiver agrees with discharge determination.    CM met with patient and patient's son at bedside to review choice in [facility] agency. CM reviewed choice list, son requesting time to make decision.  Son understands if  patient has [facility] she would not be able to also do OP therapy.      JERSON placed PC to [facility] [phone] and spoke with Ana Rosa.  Ana Rosa reports that  Stephen is the only one that can approve additional hours, he has a meeting already scheduled with the family.  CM requested to speak with Stephen, he is unavailable, CM left message requesting a call back.  JERSON will continue to follow.

## 2024-09-17 NOTE — UTILIZATION REVIEW
NOTIFICATION OF ADMISSION DISCHARGE   This is a Notification of Discharge from [facility]. Please be advised that this patient has been discharge from our facility. Below you will find the admission and discharge date and time including the patient’s disposition.   UTILIZATION REVIEW CONTACT:  Michelle Mckeon MA  Utilization   Network Utilization Review Department  Phone: [phone] x carefully listen to the prompts. All voicemails are confidential.  Email: [email or web address]     ADMISSION INFORMATION  PRESENTATION DATE: 9/14/2024  4:20 PM  OBERVATION ADMISSION DATE: N/A  INPATIENT ADMISSION DATE: 9/14/24  7:53 PM   DISCHARGE DATE: 9/17/2024 12:42 PM   DISPOSITION:Home with Home Health Care    Network Utilization Review Department  ATTENTION: Please call with any questions or concerns to [phone] and carefully listen to the prompts so that you are directed to the right person. All voicemails are confidential.   For Discharge needs, contact Care Management DC Support Team at [phone] opt. 2  Send all requests for admission clinical reviews, approved or denied determinations and any other requests to dedicated fax number below belonging to the campus where the patient is receiving treatment. List of dedicated fax numbers for the Facilities:  FACILITY NAME UR FAX NUMBER   ADMISSION DENIALS (Administrative/Medical Necessity) [phone]   DISCHARGE SUPPORT TEAM ([facility]) [phone]   PARENT CHILD HEALTH (Maternity/NICU/Pediatrics) [phone]   [facility] [phone]   [facility] [phone]   [facility] [phone]   [facility] [phone]   [facility] [phone]   [facility] [phone]   [facility] [phone]   [facility]  [place] [phone]   [facility] [phone]   [facility] [phone]   [facility] [phone]   [facility] [phone]

## 2024-09-17 NOTE — ASSESSMENT & PLAN NOTE
Follows neurosurgery. Last evaluated in 4/24  Had been in tlso brace- surgery was not needed at that time  Ct showing slightly progressed -MRI lumbar spine notes chronic appearing fracture, no significant compression  Without neurological compromise on exam  Appreciate neurosurgical consultation here  Continue outpatient follow-up and further workup for left foot drop -low suspicion for lumbar pathology of this.  No surgery or transfer indicated.

## 2024-09-17 NOTE — CONSULTS
"e-Consult (IPC)     Consults     Contacted by Ally Estrada PA-C.    Shae Jung 73 y.o. female MRN: [id number]  Unit/Bed#: E4 -[place] Encounter: [id number]    Reason for Consult    Per provider report, patient presented [facility] emergency department on 9/14/2024 with complaint of dizziness, headache and left foot drop.  She is known to our service that she completed treatment for L1 compression fracture at the end of April.  MRI lumbar spine was completed in setting of recent known fracture and new left foot drop.     She has past medical history of 3 prior strokes, hypertension, hyperlipidemia, and type 2 diabetes.  She has chronic ambulatory dysfunction secondary to her strokes.  Available past medical history,social history, surgical history, medication list, drug allergies and review of systems were reviewed.    /86 (BP Location: Right arm)   Pulse 67   Temp (!) 96.5 °F (35.8 °C) (Temporal)   Resp 16   Ht 4' 9\" (1.448 m)   Wt 52.6 kg (116 lb)   SpO2 98%   BMI 25.10 kg/m²      Clinical exam per provider report, left dorsiflexion 1/5, left plantarflexion 4/5.  Otherwise intact.    Imaging personally reviewed.  MRI lumbar spine with chronic appearing L2/L1 fracture.  Some stable mild spondylosis at L4-L5 and L5-S1.  No central stenosis.    Assessment and Recommendations    1.  Continue to monitor neuroexam closely.  2.  Left foot drop does not appear to be result of any lumbar pathology.  3.  Prior lumbar fracture appears chronic and healed.  4.  There does not appear to be any central stenosis that could be contributing to motor deficits.  5.  Do not anticipate need for transfer.  6.  No surgery indicated.  7.  Continue ongoing peroneal nerve workup as well as rehab.  8.  Neurosurgery will sign off.  Call with questions.    All questions answered. Provider is in agreement with the course of action.  21-30 minutes, >50% of the total time devoted to medical consultative verbal/EMR " discussion between providers. Written report will be generated in the EMR.

## 2024-09-17 NOTE — PROGRESS NOTES
Patient:    MRN:  [id number]    Evaristoin Request ID:  [id number]    Level of care reserved:  Home Health Agency    Partner Reserved:  [facility], [place] [address] [phone]    Clinical needs requested:    Geography searched:  [id number]    Start of Service:    Request sent:  10:33am EDT on 9/16/2024 by Kelsey Drummond    Partner reserved:  10:56am EDT on 9/17/2024 by Kelsey Drummond    Choice list shared:  9:25am EDT on 9/17/2024 by Kelsey Drummond

## 2024-09-17 NOTE — ASSESSMENT & PLAN NOTE
Patient is a 73-year-old female with a past medical history of hypertension, hyperlipidemia, history of prior stroke with right-sided residual deficit, L1 compression fracture who presented to the hospital after episodes of dizziness, headache and left foot drop  Patient lives with her son, has a caretaker from 8 AM to 6 PM, rest of the time son is with her  She was working with physical therapy today, complaining of difficulty with ambulation, PT noticed foot drop  Differential diagnosis CVA versus peroneal nerve palsy.  Denies trauma  Will admit under stroke pathway  CTA head and neck showed no acute intracranial hemorrhage, large vessel occlusion or high-grade vascular stenosis.  Chronic right ICA dissection  CT of the lumbar spine showed old L1 compression fracture.  Multilevel minimal disc bulges and facet arthropathy, no significant central canal stenosis, mild bilateral neural foraminal narrowing left greater than right at L5-S1  Pt follows with neurosx outpt- last seen in April. She had been in tlso brace. At that time surgery was not recommended.   MRI of the brain negative  Neurochecks-variable at times-   Aspirin, statin  Await mri of l spine

## 2024-09-18 ENCOUNTER — TELEPHONE (OUTPATIENT)
Age: 74
End: 2024-09-18

## 2024-09-18 ENCOUNTER — APPOINTMENT (OUTPATIENT)
Dept: PHYSICAL THERAPY | Facility: CLINIC | Age: 74
End: 2024-09-18
Payer: MEDICARE

## 2024-09-18 ENCOUNTER — HOME CARE VISIT (OUTPATIENT)
Dept: HOME HEALTH SERVICES | Facility: HOME HEALTHCARE | Age: 74
End: 2024-09-18
Payer: MEDICARE

## 2024-09-18 ENCOUNTER — OFFICE VISIT (OUTPATIENT)
Dept: DENTISTRY | Facility: CLINIC | Age: 74
End: 2024-09-18

## 2024-09-18 VITALS — HEART RATE: 76 BPM | TEMPERATURE: 98.4 F | SYSTOLIC BLOOD PRESSURE: 116 MMHG | DIASTOLIC BLOOD PRESSURE: 72 MMHG

## 2024-09-18 VITALS — OXYGEN SATURATION: 95 % | DIASTOLIC BLOOD PRESSURE: 70 MMHG | SYSTOLIC BLOOD PRESSURE: 144 MMHG | HEART RATE: 73 BPM

## 2024-09-18 DIAGNOSIS — K08.109 EDENTULOUS: Primary | ICD-10-CM

## 2024-09-18 PROCEDURE — G0151 HHCP-SERV OF PT,EA 15 MIN: HCPCS

## 2024-09-18 PROCEDURE — WIS5009 POST-OP DENTURE ADJUSTMENT: Performed by: DENTIST

## 2024-09-18 PROCEDURE — 400013 VN SOC

## 2024-09-18 NOTE — PROGRESS NOTES
"CC-NOTE: pt here with her son and caretaker/nurse.    Pt does not verbalize much- son states pt will not wear lower denture=too uncomfortable.  ALSO states, \" It has taken 7 months for him to have dentures completed for her.\"  Cc- cannot wear lower denture  Asa II   Pain Scale 0  Reviewed Med hx  EXAM: clinical exam reveals significant overextended denture; does NOT completely seat in mouth; son says she cannot talk with them or keep in. He stated ,\"They were delivered and unable to wear!\"            Soft tissue: neg; she is wearing Max denture with adhesive.           -confirmed overextended denture; removed 4mm of acryllic at buccal Anterior and post regions. Used PIP paste and adjusted accordingly; also adjusted occlusion. She can now close and chew without discomfort. We were able to establish a speaking space improved and son states she now says they are more comfortable. Polished.  Recommend: try for 1-2wks and call if more adjustment needed. Son thanked us for being patient and caring for her.  NV- possible denture adjustment.  "

## 2024-09-18 NOTE — TELEPHONE ENCOUNTER
1ST ATTEMPT,     Called pt no answer, NOT ACCEPTING CALLS AT THIS TIME    Thank you,     Tatyana       HFU/ SL ALL / Left foot drop     DC- HOME- 9/17/2024    ----- Message from PRO Handy sent at 9/17/2024 11:12 AM EDT -----  Regarding: ALICIA Bustos    Shae Jung will need follow-up in in 6 weeks with general neurology team for Other in 60 minute appointment. They will require a EMG/NCS prior to neurology appointment.    Thanks,   Mary MAST

## 2024-09-19 ENCOUNTER — HOME CARE VISIT (OUTPATIENT)
Dept: HOME HEALTH SERVICES | Facility: HOME HEALTHCARE | Age: 74
End: 2024-09-19
Payer: MEDICARE

## 2024-09-19 VITALS — HEART RATE: 72 BPM | OXYGEN SATURATION: 97 % | SYSTOLIC BLOOD PRESSURE: 124 MMHG | DIASTOLIC BLOOD PRESSURE: 68 MMHG

## 2024-09-19 PROCEDURE — G0152 HHCP-SERV OF OT,EA 15 MIN: HCPCS

## 2024-09-19 PROCEDURE — G0153 HHCP-SVS OF S/L PATH,EA 15MN: HCPCS

## 2024-09-20 ENCOUNTER — HOME CARE VISIT (OUTPATIENT)
Dept: HOME HEALTH SERVICES | Facility: HOME HEALTHCARE | Age: 74
End: 2024-09-20
Payer: MEDICARE

## 2024-09-20 VITALS — HEART RATE: 71 BPM | DIASTOLIC BLOOD PRESSURE: 66 MMHG | SYSTOLIC BLOOD PRESSURE: 118 MMHG | OXYGEN SATURATION: 95 %

## 2024-09-20 PROCEDURE — G0151 HHCP-SERV OF PT,EA 15 MIN: HCPCS

## 2024-09-20 NOTE — CASE COMMUNICATION
Speech (clinical dysphagia) evaluation completed 9/19/24.  Pt presents with mild oropharyngeal dysphagia with increased aspiration risk unless fed by family/CG utilizing slow rate and small bolus presentation.  Education initiated this visit.  Anticipate f/u 1-2 to provided further education, assure use of safe swallowing/feeding strategies, and assess pt's po tolerance.

## 2024-09-23 ENCOUNTER — HOME CARE VISIT (OUTPATIENT)
Dept: HOME HEALTH SERVICES | Facility: HOME HEALTHCARE | Age: 74
End: 2024-09-23
Payer: MEDICARE

## 2024-09-23 VITALS — HEART RATE: 71 BPM | OXYGEN SATURATION: 97 % | DIASTOLIC BLOOD PRESSURE: 60 MMHG | SYSTOLIC BLOOD PRESSURE: 92 MMHG

## 2024-09-23 VITALS — HEART RATE: 64 BPM | OXYGEN SATURATION: 96 % | DIASTOLIC BLOOD PRESSURE: 65 MMHG | SYSTOLIC BLOOD PRESSURE: 104 MMHG

## 2024-09-23 PROCEDURE — G0152 HHCP-SERV OF OT,EA 15 MIN: HCPCS

## 2024-09-23 PROCEDURE — G0153 HHCP-SVS OF S/L PATH,EA 15MN: HCPCS

## 2024-09-23 PROCEDURE — G0151 HHCP-SERV OF PT,EA 15 MIN: HCPCS

## 2024-09-24 PROCEDURE — G0180 MD CERTIFICATION HHA PATIENT: HCPCS | Performed by: INTERNAL MEDICINE

## 2024-09-25 ENCOUNTER — HOME CARE VISIT (OUTPATIENT)
Dept: HOME HEALTH SERVICES | Facility: HOME HEALTHCARE | Age: 74
End: 2024-09-25
Payer: MEDICARE

## 2024-09-25 VITALS — OXYGEN SATURATION: 98 % | SYSTOLIC BLOOD PRESSURE: 144 MMHG | HEART RATE: 64 BPM | DIASTOLIC BLOOD PRESSURE: 76 MMHG

## 2024-09-25 PROCEDURE — G0151 HHCP-SERV OF PT,EA 15 MIN: HCPCS

## 2024-09-26 ENCOUNTER — PATIENT OUTREACH (OUTPATIENT)
Dept: FAMILY MEDICINE CLINIC | Facility: CLINIC | Age: 74
End: 2024-09-26

## 2024-09-26 NOTE — LETTER
09/26/24    Estimado/a Oscar Pierre un coordinador de la atención médica en [facility]  [facility]  [address] [address] [phone].     Intentamos comunicarnos con usted por teléfono varias veces. Es importante que se comunique con nosotros al Dept: [phone] para que podamos ofrecerle ayuda con milana necesidades de atención médica.     Atentamente.     Rafaelina Reyes, MSW  [[employer]]

## 2024-09-26 NOTE — PROGRESS NOTES
CRISTI ARTHUR did place second call to Pt but she did not  the phone, was unable to leave VM. CRISTI ARTHUR will send Unable to Reach Letter by mail since Mychart status seems the code .     CRISTI ARTHUR is closing referral today due to unable to contact Pt.  CRSITI ARTHUR is remain available for further assistance as needed.

## 2024-09-27 ENCOUNTER — TRANSITIONAL CARE MANAGEMENT (OUTPATIENT)
Dept: FAMILY MEDICINE CLINIC | Facility: CLINIC | Age: 74
End: 2024-09-27

## 2024-09-30 ENCOUNTER — HOME CARE VISIT (OUTPATIENT)
Dept: HOME HEALTH SERVICES | Facility: HOME HEALTHCARE | Age: 74
End: 2024-09-30
Payer: MEDICARE

## 2024-09-30 VITALS — OXYGEN SATURATION: 95 % | SYSTOLIC BLOOD PRESSURE: 102 MMHG | DIASTOLIC BLOOD PRESSURE: 64 MMHG | HEART RATE: 78 BPM

## 2024-09-30 VITALS — HEART RATE: 81 BPM | SYSTOLIC BLOOD PRESSURE: 132 MMHG | DIASTOLIC BLOOD PRESSURE: 68 MMHG | OXYGEN SATURATION: 100 %

## 2024-09-30 PROCEDURE — G0152 HHCP-SERV OF OT,EA 15 MIN: HCPCS

## 2024-09-30 PROCEDURE — G0151 HHCP-SERV OF PT,EA 15 MIN: HCPCS

## 2024-10-01 ENCOUNTER — HOSPITAL ENCOUNTER (OUTPATIENT)
Dept: BONE DENSITY | Facility: MEDICAL CENTER | Age: 74
Discharge: HOME | End: 2024-10-01
Payer: MEDICARE

## 2024-10-01 DIAGNOSIS — N95.1 MENOPAUSAL STATE: ICD-10-CM

## 2024-10-01 DIAGNOSIS — S32.010A COMPRESSION FRACTURE OF L1 VERTEBRA, INITIAL ENCOUNTER (HCC): ICD-10-CM

## 2024-10-01 PROCEDURE — 77080 DXA BONE DENSITY AXIAL: CPT

## 2024-10-02 ENCOUNTER — HOME CARE VISIT (OUTPATIENT)
Dept: HOME HEALTH SERVICES | Facility: HOME HEALTHCARE | Age: 74
End: 2024-10-02
Payer: MEDICARE

## 2024-10-02 VITALS — HEART RATE: 75 BPM | DIASTOLIC BLOOD PRESSURE: 64 MMHG | SYSTOLIC BLOOD PRESSURE: 100 MMHG | OXYGEN SATURATION: 96 %

## 2024-10-02 VITALS
DIASTOLIC BLOOD PRESSURE: 64 MMHG | OXYGEN SATURATION: 95 % | SYSTOLIC BLOOD PRESSURE: 104 MMHG | TEMPERATURE: 97.8 F | HEART RATE: 73 BPM

## 2024-10-02 PROCEDURE — G0151 HHCP-SERV OF PT,EA 15 MIN: HCPCS

## 2024-10-02 PROCEDURE — G0152 HHCP-SERV OF OT,EA 15 MIN: HCPCS

## 2024-10-03 ENCOUNTER — HOME CARE VISIT (OUTPATIENT)
Dept: HOME HEALTH SERVICES | Facility: HOME HEALTHCARE | Age: 74
End: 2024-10-03
Payer: MEDICARE

## 2024-10-09 ENCOUNTER — APPOINTMENT (EMERGENCY)
Dept: CT IMAGING | Facility: HOSPITAL | Age: 74
End: 2024-10-09
Payer: MEDICARE

## 2024-10-09 ENCOUNTER — HOSPITAL ENCOUNTER (EMERGENCY)
Facility: HOSPITAL | Age: 74
Discharge: HOME | End: 2024-10-09
Attending: EMERGENCY MEDICINE | Admitting: EMERGENCY MEDICINE
Payer: MEDICARE

## 2024-10-09 ENCOUNTER — APPOINTMENT (EMERGENCY)
Dept: RADIOLOGY | Facility: HOSPITAL | Age: 74
End: 2024-10-09
Payer: MEDICARE

## 2024-10-09 VITALS
HEART RATE: 67 BPM | OXYGEN SATURATION: 97 % | SYSTOLIC BLOOD PRESSURE: 168 MMHG | DIASTOLIC BLOOD PRESSURE: 75 MMHG | TEMPERATURE: 98.5 F | RESPIRATION RATE: 17 BRPM

## 2024-10-09 DIAGNOSIS — R42 DIZZINESS: ICD-10-CM

## 2024-10-09 DIAGNOSIS — R07.9 CHEST PAIN: ICD-10-CM

## 2024-10-09 DIAGNOSIS — F41.9 ANXIETY: ICD-10-CM

## 2024-10-09 DIAGNOSIS — M25.572 LEFT ANKLE PAIN: Primary | ICD-10-CM

## 2024-10-09 LAB
2HR DELTA HS TROPONIN: <-1 NG/L
ALBUMIN SERPL BCG-MCNC: 4.5 G/DL (ref 3.5–5)
ALP SERPL-CCNC: 51 U/L (ref 34–104)
ALT SERPL W P-5'-P-CCNC: 14 U/L (ref 7–52)
ANION GAP SERPL CALCULATED.3IONS-SCNC: 6 MMOL/L (ref 4–13)
AST SERPL W P-5'-P-CCNC: 20 U/L (ref 13–39)
ATRIAL RATE: 62 BPM
ATRIAL RATE: 72 BPM
BASOPHILS # BLD AUTO: 0.04 THOUSANDS/ΜL (ref 0–0.1)
BASOPHILS NFR BLD AUTO: 1 % (ref 0–1)
BILIRUB SERPL-MCNC: 0.56 MG/DL (ref 0.2–1)
BUN SERPL-MCNC: 23 MG/DL (ref 5–25)
CALCIUM SERPL-MCNC: 9.5 MG/DL (ref 8.4–10.2)
CARDIAC TROPONIN I PNL SERPL HS: 3 NG/L
CARDIAC TROPONIN I PNL SERPL HS: <2 NG/L
CHLORIDE SERPL-SCNC: 104 MMOL/L (ref 96–108)
CO2 SERPL-SCNC: 31 MMOL/L (ref 21–32)
CREAT SERPL-MCNC: 0.67 MG/DL (ref 0.6–1.3)
EOSINOPHIL # BLD AUTO: 0.17 THOUSAND/ΜL (ref 0–0.61)
EOSINOPHIL NFR BLD AUTO: 3 % (ref 0–6)
ERYTHROCYTE [DISTWIDTH] IN BLOOD BY AUTOMATED COUNT: 12.6 % (ref 11.6–15.1)
GFR SERPL CREATININE-BSD FRML MDRD: 88 ML/MIN/1.73SQ M
GLUCOSE SERPL-MCNC: 97 MG/DL (ref 65–140)
HCT VFR BLD AUTO: 38 % (ref 34.8–46.1)
HGB BLD-MCNC: 12.6 G/DL (ref 11.5–15.4)
IMM GRANULOCYTES # BLD AUTO: 0.01 THOUSAND/UL (ref 0–0.2)
IMM GRANULOCYTES NFR BLD AUTO: 0 % (ref 0–2)
LYMPHOCYTES # BLD AUTO: 1.45 THOUSANDS/ΜL (ref 0.6–4.47)
LYMPHOCYTES NFR BLD AUTO: 27 % (ref 14–44)
MCH RBC QN AUTO: 30.5 PG (ref 26.8–34.3)
MCHC RBC AUTO-ENTMCNC: 33.2 G/DL (ref 31.4–37.4)
MCV RBC AUTO: 92 FL (ref 82–98)
MONOCYTES # BLD AUTO: 0.45 THOUSAND/ΜL (ref 0.17–1.22)
MONOCYTES NFR BLD AUTO: 8 % (ref 4–12)
NEUTROPHILS # BLD AUTO: 3.32 THOUSANDS/ΜL (ref 1.85–7.62)
NEUTS SEG NFR BLD AUTO: 61 % (ref 43–75)
NRBC BLD AUTO-RTO: 0 /100 WBCS
P AXIS: 48 DEGREES
P AXIS: 54 DEGREES
PLATELET # BLD AUTO: 231 THOUSANDS/UL (ref 149–390)
PMV BLD AUTO: 11 FL (ref 8.9–12.7)
POTASSIUM SERPL-SCNC: 3.8 MMOL/L (ref 3.5–5.3)
PR INTERVAL: 148 MS
PR INTERVAL: 156 MS
PROT SERPL-MCNC: 7.2 G/DL (ref 6.4–8.4)
QRS AXIS: 27 DEGREES
QRS AXIS: 8 DEGREES
QRSD INTERVAL: 92 MS
QRSD INTERVAL: 92 MS
QT INTERVAL: 440 MS
QT INTERVAL: 448 MS
QTC INTERVAL: 454 MS
QTC INTERVAL: 481 MS
RBC # BLD AUTO: 4.13 MILLION/UL (ref 3.81–5.12)
SODIUM SERPL-SCNC: 141 MMOL/L (ref 135–147)
T WAVE AXIS: 33 DEGREES
T WAVE AXIS: 55 DEGREES
VENTRICULAR RATE: 62 BPM
VENTRICULAR RATE: 72 BPM
WBC # BLD AUTO: 5.44 THOUSAND/UL (ref 4.31–10.16)

## 2024-10-09 PROCEDURE — 36415 COLL VENOUS BLD VENIPUNCTURE: CPT

## 2024-10-09 PROCEDURE — 73610 X-RAY EXAM OF ANKLE: CPT

## 2024-10-09 PROCEDURE — 99285 EMERGENCY DEPT VISIT HI MDM: CPT

## 2024-10-09 PROCEDURE — 84484 ASSAY OF TROPONIN QUANT: CPT | Performed by: EMERGENCY MEDICINE

## 2024-10-09 PROCEDURE — 99285 EMERGENCY DEPT VISIT HI MDM: CPT | Performed by: PHYSICIAN ASSISTANT

## 2024-10-09 PROCEDURE — 96361 HYDRATE IV INFUSION ADD-ON: CPT

## 2024-10-09 PROCEDURE — 96374 THER/PROPH/DIAG INJ IV PUSH: CPT

## 2024-10-09 PROCEDURE — 85025 COMPLETE CBC W/AUTO DIFF WBC: CPT | Performed by: EMERGENCY MEDICINE

## 2024-10-09 PROCEDURE — 71045 X-RAY EXAM CHEST 1 VIEW: CPT

## 2024-10-09 PROCEDURE — 70498 CT ANGIOGRAPHY NECK: CPT

## 2024-10-09 PROCEDURE — 70496 CT ANGIOGRAPHY HEAD: CPT

## 2024-10-09 PROCEDURE — 93005 ELECTROCARDIOGRAM TRACING: CPT

## 2024-10-09 PROCEDURE — 93010 ELECTROCARDIOGRAM REPORT: CPT | Performed by: STUDENT IN AN ORGANIZED HEALTH CARE EDUCATION/TRAINING PROGRAM

## 2024-10-09 PROCEDURE — 80053 COMPREHEN METABOLIC PANEL: CPT | Performed by: EMERGENCY MEDICINE

## 2024-10-09 RX ORDER — DIAZEPAM 10 MG/2ML
2 INJECTION, SOLUTION INTRAMUSCULAR; INTRAVENOUS ONCE
Status: COMPLETED | OUTPATIENT
Start: 2024-10-09 | End: 2024-10-09

## 2024-10-09 RX ORDER — MULTIVITAMIN
1 CAPSULE ORAL DAILY
COMMUNITY

## 2024-10-09 RX ORDER — MECLIZINE HCL 12.5 MG 12.5 MG/1
12.5 TABLET ORAL ONCE
Status: COMPLETED | OUTPATIENT
Start: 2024-10-09 | End: 2024-10-09

## 2024-10-09 RX ADMIN — DIAZEPAM 2 MG: 10 INJECTION, SOLUTION INTRAMUSCULAR; INTRAVENOUS at 14:44

## 2024-10-09 RX ADMIN — SODIUM CHLORIDE 500 ML: 0.9 INJECTION, SOLUTION INTRAVENOUS at 15:57

## 2024-10-09 RX ADMIN — SODIUM CHLORIDE 1000 ML: 0.9 INJECTION, SOLUTION INTRAVENOUS at 16:56

## 2024-10-09 RX ADMIN — MECLIZINE 12.5 MG: 12.5 TABLET ORAL at 16:56

## 2024-10-09 RX ADMIN — IOHEXOL 85 ML: 350 INJECTION, SOLUTION INTRAVENOUS at 14:18

## 2024-10-09 NOTE — ED NOTES
Pt able to ambulate with CAM boot with assistance of this RN and pt son. Pt complaining of dizziness and was directed back to bed. Provider aware     Danay Lr RN  10/09/24 [id number]

## 2024-10-09 NOTE — ED NOTES
Pt requesting to leave, states dizziness has gone away. Provider aware     Danay Lr RN  10/09/24 [id number]

## 2024-10-09 NOTE — DISCHARGE INSTRUCTIONS
Please refer to the attached information for strict return instructions. If symptoms worsen or new symptoms develop please return to the ER. Please follow up with podiatry and with your primary care physician for re-evaluation.

## 2024-10-09 NOTE — ED PROVIDER NOTES
Final diagnoses:   Dizziness   Chest pain   Anxiety   Left ankle pain     ED Disposition       None          Assessment & Plan       Medical Decision Making  Headache, left-sided neck pain reported as well as lightheadedness which is primarily with sitting or standing up quickly, this tends to improve within a few seconds.  She has some tenderness to the left side of neck over the cervical musculature, likely cause of neck pain.  Given history neuroimaging obtained, CTA head/neck, CTA head/neck with chronic occlusions however no dynamic changes or evidence of recent infarct.    The chest pain has been present since last night and described as tightness.  She notes some shortness of breath associated as well as palpitations.  During the time of my examination, she continues to note the palpitations although is noted to have normal sinus rhythm by monitor.  EKG/tropes nonischemic x 2.  Chest x-ray clear.  Patient given small amount of Valium here with resolution of palpitations and chest discomfort.  Anxiety component suspected.    Left ankle pain, swelling over lateral malleolus with point tenderness to same.  On x-ray of ankle, suspected small avulsion fracture over lateral malleolus versus calcification.  Given associated pain/tenderness, will treat as possible fracture.  Plan for trial of cam walker boot given associated foot drop and necessity to ambulate on this side.  Patient notes some wooziness after Valium limiting ability to stand/walk.  Will give some IV fluids and reassess ambulation shortly after improvement of side effects.    Patient signed out to Bret Elizabeth PA-C pending ambulation.     Amount and/or Complexity of Data Reviewed  Labs: ordered.  Radiology: ordered.    Risk  Prescription drug management.             Medications   sodium chloride 0.9 % bolus 500 mL (500 mL Intravenous New Bag 10/9/24 2691)   diazepam (VALIUM) injection 2 mg (2 mg Intravenous Given 10/9/24 1195)   iohexol (OMNIPAQUE) 350  MG/ML injection (SINGLE-DOSE) 85 mL (85 mL Intravenous Given 10/9/24 1418)       ED Risk Strat Scores   HEART Risk Score      Flowsheet Row Most Recent Value   Heart Score Risk Calculator    History 0 Filed at: 10/09/2024 1607   ECG 0 Filed at: 10/09/2024 1607   Age 2 Filed at: 10/09/2024 1607   Risk Factors 2 Filed at: 10/09/2024 1607   Troponin 0 Filed at: 10/09/2024 1607   HEART Score 4 Filed at: 10/09/2024 1607                                                   History of Present Illness       Chief Complaint   Patient presents with    Chest Pain     Pt c/o cp with palpitations that started last night at midnight. Pt also c/o of some SOB. 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    Palpitations     Recurrent urinary tract infection     Seizures (HCC)     Sleep disorder     last assessed: 10/19/2013    Speech impairment     Stroke (HCC)     Thyroid disease     Tinnitus       Past Surgical History:   Procedure Laterality Date    AXILLARY SURGERY      cyst surgery    BLADDER SURGERY       SECTION      CYSTOSCOPY N/A 2018    Procedure: CYSTOSCOPY;  Surgeon: Charis Major MD;  Location: [facility];  Service: Gynecology    FACIAL BONE TUMOR EXCISION      KNEE SURGERY      OTHER SURGICAL HISTORY      cyst removed from axilla    AL ESOPHAGOGASTRODUODENOSCOPY TRANSORAL DIAGNOSTIC N/A 2017    Procedure: ESOPHAGOGASTRODUODENOSCOPY (EGD);  Surgeon: Jose Antonio Platt MD;   Location: [facility];  Service: Gastroenterology    [place] SLING OPERATION STRESS INCONTINENCE N/A 1/18/2018    Procedure: PUBOVAGINAL SLING;  Surgeon: Charis Major MD;  Location: [facility] OR;  Service: Gynecology    TUBAL LIGATION        Family History   Problem Relation Age of Onset    No Known Problems Father     Diabetes Family         mellitus    Hypertension Family     Stroke Family     Thyroid disease Family     No Known Problems Mother     No Known Problems Sister     No Known Problems Daughter     No Known Problems Maternal Grandmother     No Known Problems Maternal Grandfather     No Known Problems Paternal Grandmother     No Known Problems Paternal Grandfather       Social History     Tobacco Use    Smoking status: Never    Smokeless tobacco: Never   Vaping Use    Vaping status: Never Used   Substance Use Topics    Alcohol use: No    Drug use: No      E-Cigarette/Vaping    E-Cigarette Use Never User       E-Cigarette/Vaping Substances    Nicotine No     THC No     CBD No     Flavoring No     Other No     Unknown No       I have reviewed and agree with the history as documented.     Shae is a 73-year-old female, history of previous CVA, carotid occlusion, hypertension, hyperlipidemia, presenting with son who is her primary caregiver presenting with several concerns including chest pain and palpitations which began overnight, headache and left-sided posterior neck pain along with some lightheadedness today, and pain/swelling to left ankle over the past 2-3 weeks.  She was admitted about a month ago with left foot drop which was felt to be in the setting of peroneal neuropathy.  However, she began to develop some swelling and pain in the area about 1-2 weeks after discharge.  No specific injury or trauma recalled however she does drag her foot frequently and may have injured it in this manner.  She normally ambulates for a few steps with assistance.    The dizziness is described as lightheadedness and is  worse with sitting upright and standing upright quickly.  It seems to improve within a few seconds.  Denies any symptoms of spinning/vertigo.  The left side neck pain worsens with certain movements and palpation of the area.    She does note some ongoing palpitations and chest pain at time of my examination.  On heart monitor, no ectopy noted during this time.  She describes the pain as pressure/tightness.      History provided by:  Patient and relative   used: Yes        Review of Systems   Constitutional:  Negative for chills and fever.   HENT:  Negative for congestion, rhinorrhea and sore throat.    Eyes:  Negative for pain and visual disturbance.   Respiratory:  Negative for cough, shortness of breath and wheezing.    Cardiovascular:  Positive for chest pain. Negative for palpitations.   Gastrointestinal:  Negative for abdominal pain, diarrhea, nausea and vomiting.   Genitourinary:  Negative for dysuria, frequency and urgency.   Musculoskeletal:  Positive for arthralgias, joint swelling and neck pain. Negative for back pain and neck stiffness.   Skin:  Negative for rash and wound.   Neurological:  Positive for light-headedness and headaches. Negative for dizziness, weakness and numbness.           Objective       ED Triage Vitals   Temperature Pulse Blood Pressure Respirations SpO2 Patient Position - Orthostatic VS   10/09/24 1213 10/09/24 1213 10/09/24 1215 10/09/24 1213 10/09/24 1213 10/09/24 1213   98.5 °F (36.9 °C) 63 155/70 16 99 % Lying      Temp Source Heart Rate Source BP Location FiO2 (%) Pain Score    10/09/24 1213 10/09/24 1213 10/09/24 1213 -- --    Oral Monitor Right arm        Vitals      Date and Time Temp Pulse SpO2 Resp BP Pain Score FACES Pain Rating User   10/09/24 1600 -- 62 96 % 17 156/70 -- -- SL   10/09/24 1500 -- 68 96 % 17 132/68 -- -- SL   10/09/24 1440 -- 75 -- -- 136/96 -- --    10/09/24 1434 -- 76 -- -- 149/72 -- --    10/09/24 1433 -- 75 -- -- 142/69 -- --     10/09/24 1400 -- 66 98 % 16 155/73 -- -- RM   10/09/24 1324 -- 64 96 % 20 154/70 -- -- RM   10/09/24 1215 -- 63 100 % 32 155/70 -- --    10/09/24 1213 98.5 °F (36.9 °C) 63 99 % 16 -- -- 2 [facility]            Physical Exam  Constitutional:       General: She is not in acute distress.     Appearance: She is well-developed. She is not diaphoretic.   HENT:      Head: Normocephalic and atraumatic.      Right Ear: External ear normal.      Left Ear: External ear normal.   Eyes:      Extraocular Movements:      Right eye: Normal extraocular motion.      Left eye: Normal extraocular motion.      Conjunctiva/sclera: Conjunctivae normal.      Pupils: Pupils are equal, round, and reactive to light.   Cardiovascular:      Rate and Rhythm: Normal rate and regular rhythm.   Pulmonary:      Effort: Pulmonary effort is normal. No accessory muscle usage or respiratory distress.      Breath sounds: No wheezing or rales.   Abdominal:      General: Abdomen is flat. There is no distension.      Tenderness: There is no abdominal tenderness.   Musculoskeletal:      Cervical back: Normal range of motion. No rigidity.      Comments: Numbness to palpation to left cervical paraspinal musculature.  Tightness noted along these muscles and pain with certain movements noted to left side of neck.  No midline spinal tenderness.    Tenderness to palpation and mild soft tissue swelling over left lateral malleolus.  No gross deformity.  2+ PT/DP pulses.  Normal range of motion of left ankle although notes pain with full flexion-extension.   Skin:     General: Skin is warm and dry.      Capillary Refill: Capillary refill takes less than 2 seconds.      Findings: No erythema or rash.   Neurological:      Mental Status: She is alert and oriented to person, place, and time.      Cranial Nerves: Cranial nerves 2-12 are intact.      Motor: No abnormal muscle tone.      Coordination: Coordination normal.   Psychiatric:         Mood and Affect: Mood is  anxious.         Behavior: Behavior normal.         Thought Content: Thought content normal.         Judgment: Judgment normal.         Results Reviewed       Procedure Component Value Units Date/Time    HS Troponin I 2hr [[id number]]  (Normal) Collected: 10/09/24 1449    Lab Status: Final result Specimen: Blood from Arm, Left Updated: 10/09/24 1523     hs TnI 2hr <2 ng/L      Delta 2hr hsTnI <-1 ng/L     HS Troponin 0hr (reflex protocol) [[id number]]  (Normal) Collected: 10/09/24 1241    Lab Status: Final result Specimen: Blood from Arm, Right Updated: 10/09/24 1308     hs TnI 0hr 3 ng/L     Comprehensive metabolic panel [[id number]] Collected: 10/09/24 1241    Lab Status: Final result Specimen: Blood from Arm, Right Updated: 10/09/24 1303     Sodium 141 mmol/L      Potassium 3.8 mmol/L      Chloride 104 mmol/L      CO2 31 mmol/L      ANION GAP 6 mmol/L      BUN 23 mg/dL      Creatinine 0.67 mg/dL      Glucose 97 mg/dL      Calcium 9.5 mg/dL      AST 20 U/L      ALT 14 U/L      Alkaline Phosphatase 51 U/L      Total Protein 7.2 g/dL      Albumin 4.5 g/dL      Total Bilirubin 0.56 mg/dL      eGFR 88 ml/min/1.73sq m     Narrative:      National Kid[employer] guidelines for Chronic Kidney Disease (CKD):     Stage 1 with normal or high GFR (GFR > 90 mL/min/1.73 square meters)    Stage 2 Mild CKD (GFR = 60-89 mL/min/1.73 square meters)    Stage 3A Moderate CKD (GFR = 45-59 mL/min/1.73 square meters)    Stage 3B Moderate CKD (GFR = 30-44 mL/min/1.73 square meters)    Stage 4 Severe CKD (GFR = 15-29 mL/min/1.73 square meters)    Stage 5 End Stage CKD (GFR <15 mL/min/1.73 square meters)  Note: GFR calculation is accurate only with a steady state creatinine    CBC and differential [[id number]] Collected: 10/09/24 1241    Lab Status: Final result Specimen: Blood from Arm, Right Updated: 10/09/24 1248     WBC 5.44 Thousand/uL      RBC 4.13 Million/uL      Hemoglobin 12.6 g/dL      Hematocrit 38.0 %      MCV 92 fL       MCH 30.5 pg      MCHC 33.2 g/dL      RDW 12.6 %      MPV 11.0 fL      Platelets 231 Thousands/uL      nRBC 0 /100 WBCs      Segmented % 61 %      Immature Grans % 0 %      Lymphocytes % 27 %      Monocytes % 8 %      Eosinophils Relative 3 %      Basophils Relative 1 %      Absolute Neutrophils 3.32 Thousands/µL      Absolute Immature Grans 0.01 Thousand/uL      Absolute Lymphocytes 1.45 Thousands/µL      Absolute Monocytes 0.45 Thousand/µL      Eosinophils Absolute 0.17 Thousand/µL      Basophils Absolute 0.04 Thousands/µL             CTA head and neck with and without contrast   Final Interpretation by Yisel No MD (10/09 5468)      CT Brain:  No acute intracranial abnormality.      Paranasal sinus disease as described above, with aerated secretions and mucosal thickening noted in the right sphenoid sinus, which may indicate a component of acute sinusitis.      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 There is an additional high-grade stenosis beyond the    reconstituted segment in the paraclinoid/supraclinoid right internal carotid artery, grossly unchanged.      Patent left internal carotid artery. Patent bilateral anterior cerebral arteries and branches, bilateral middle cerebral arteries and branches. Normal intracranial posterior circulation.      Cervical left internal carotid artery, bilateral cervical vertebral arteries appear widely patent.                  Workstation performed: [id number]         XR chest 1 view portable   Final Interpretation by Vimal Smith MD (10/09 4013)      No acute cardiopulmonary disease.            Resident: Neville Treviño I, the attending radiologist, have reviewed the images and agree with the final report above.      Workstation performed: [id number]         XR ankle 3+ views LEFT    (Results  Pending)       ECG 12 Lead Documentation Only    Date/Time: 10/9/2024 12:30 PM    Performed by: Kush Canales PA-C  Authorized by: Kush Canales PA-C    Indications / Diagnosis:  Chest pain  ECG reviewed by me, the ED Provider: yes    Patient location:  ED  Previous ECG:     Previous ECG:  Compared to current    Similarity:  No change    Comparison to cardiac monitor: Yes    Interpretation:     Interpretation: normal    Rate:     ECG rate:  62    ECG rate assessment: normal    Rhythm:     Rhythm: sinus rhythm    Ectopy:     Ectopy: none    QRS:     QRS axis:  Normal    QRS intervals:  Normal  Conduction:     Conduction: normal    ST segments:     ST segments:  Normal  T waves:     T waves: normal    Other findings:     Other findings comment:  Low voltage QRS      ED Medication and Procedure Management   Prior to Admission Medications   Prescriptions Last Dose Informant Patient Reported? Taking?   Cholecalciferol (VITAMIN D-3) 5000 units TABS Not Taking Self No No   Sig: Take 1 tablet by mouth daily   Patient not taking: Reported on 10/9/2024   Diclofenac Sodium (VOLTAREN) 1 %   No Yes   Sig: Apply 2 g topically 4 (four) times a day   Diclofenac Sodium (VOLTAREN) 1 %   No Yes   Sig: Apply 2 g topically 4 (four) times a day   Incontinence Supply Disposable (Brief Overnight Large) MISC  Self No Yes   Sig: Use 4 (four) times a day   Incontinence Supply Disposable (Underpads) MISC  Self No Yes   Sig: Use in the morning Please dispense washable underpads   Infant Care Products (Comfort-Smooth Baby Wipes) MISC  Self No Yes   Sig: Use 4 (four) times a day   Multiple Vitamin (multivitamin) capsule   Yes Yes   Sig: Take 1 capsule by mouth daily   amLODIPine (NORVASC) 2.5 mg tablet   No Yes   Sig: Take 1 tablet (2.5 mg total) by mouth daily   aspirin (ECOTRIN LOW STRENGTH) 81 mg EC tablet  Self Yes Yes   Sig: Take 81 mg by mouth daily   atorvastatin (LIPITOR) 40 mg tablet   No Yes   Sig: Take 0.5 tablets (20 mg  total) by mouth daily with dinner   benzonatate (TESSALON PERLES) 100 mg capsule Not Taking  No No   Sig: Take 1 capsule (100 mg total) by mouth 3 (three) times a day as needed for cough   Patient not taking: Reported on 2024   cyclobenzaprine (FLEXERIL) 5 mg tablet Not Taking  No No   Sig: Take 0.5 tablets (2.5 mg total) by mouth 3 (three) times a day as needed for muscle spasms 2.5 mg in the am, 2.5 mg in the pm and 5 mg qHs   Patient not taking: Reported on 10/9/2024   docusate sodium (COLACE) 100 mg capsule Not Taking  No No   Sig: Take 1 capsule (100 mg total) by mouth 2 (two) times a day as needed for constipation   Patient not taking: Reported on 10/9/2024   famotidine (PEPCID) 20 mg tablet Not Taking  No No   Sig: Take 1 tablet (20 mg total) by mouth daily at bedtime as needed for heartburn   Patient not taking: Reported on 10/9/2024   fluticasone (FLONASE) 50 mcg/act nasal spray Not Taking  No No   Si spray into each nostril daily   Patient not taking: Reported on 10/9/2024   gabapentin (NEURONTIN) 100 mg capsule   No Yes   Sig: Take 1 capsule (100 mg total) by mouth 3 (three) times a day   Patient taking differently: Take 100 mg by mouth 3 (three) times a day if you dont take it 3 times per day you may take all 3 tabs at bedtime per written instructions on [facility]   lidocaine (LIDODERM) 5 % Not Taking  No No   Sig: Apply 1 patch topically over 12 hours daily Remove & Discard patch within 12 hours or as directed by MD   Patient not taking: Reported on 10/9/2024   lidocaine (Lidoderm) 5 % Not Taking  No No   Sig: Apply 1 patch topically over 12 hours daily Remove & Discard patch within 12 hours or as directed by MD   Patient not taking: Reported on 2024   omeprazole (PriLOSEC) 20 mg delayed release capsule Not Taking Child No No   Sig: Take 2 capsules (40 mg total) by mouth 2 (two) times a day   Patient not taking: Reported on 2024   polyethylene glycol (GLYCOLAX) powder Not Taking Child No No    Sig: Take 17 g by mouth daily   Patient not taking: Reported on 9/18/2024   polyethylene glycol (MIRALAX) 17 g packet Not Taking Child No No   Sig: Take 17 g by mouth daily   Patient not taking: Reported on 9/18/2024   traZODone (DESYREL) 50 mg tablet   No Yes   Sig: Take 1 tablet (50 mg total) by mouth daily at bedtime   triamcinolone (KENALOG) 0.1 % ointment Not Taking Child No No   Sig: Apply topically 2 (two) times a day   Patient not taking: Reported on 9/18/2024      Facility-Administered Medications: None     Patient's Medications   Discharge Prescriptions    No medications on file       ED SEPSIS DOCUMENTATION   Time reflects when diagnosis was documented in both MDM as applicable and the Disposition within this note       Time User Action Codes Description Comment    10/9/2024  4:08 PM Kush Canales [R42] Dizziness     10/9/2024  4:08 PM Kush Cnaales Add [R07.9] Chest pain     10/9/2024  4:09 PM Kush Canales Add [F41.9] Anxiety     10/9/2024  4:09 PM Kush Canales Add [M25.572] Left ankle pain     10/9/2024  4:09 PM Kush Canales Modify [R42] Dizziness     10/9/2024  4:09 PM Kush Canales Modify [M25.572] Left ankle pain                  Kush Canales PA-C  10/09/24 1617

## 2024-10-09 NOTE — ED CARE HANDOFF
Emergency Department Sign Out Note        Sign out and transfer of care from Kush Canales PA-C. See Separate Emergency Department note.     The patient, Shae Jung, was evaluated by the previous provider for multiple complaints. See previous provider's note for full HPI.     Workup Completed:  Results Reviewed       Procedure Component Value Units Date/Time    HS Troponin I 2hr [[id number]]  (Normal) Collected: 10/09/24 1449    Lab Status: Final result Specimen: Blood from Arm, Left Updated: 10/09/24 1523     hs TnI 2hr <2 ng/L      Delta 2hr hsTnI <-1 ng/L     HS Troponin 0hr (reflex protocol) [[id number]]  (Normal) Collected: 10/09/24 1241    Lab Status: Final result Specimen: Blood from Arm, Right Updated: 10/09/24 1308     hs TnI 0hr 3 ng/L     Comprehensive metabolic panel [[id number]] Collected: 10/09/24 1241    Lab Status: Final result Specimen: Blood from Arm, Right Updated: 10/09/24 1303     Sodium 141 mmol/L      Potassium 3.8 mmol/L      Chloride 104 mmol/L      CO2 31 mmol/L      ANION GAP 6 mmol/L      BUN 23 mg/dL      Creatinine 0.67 mg/dL      Glucose 97 mg/dL      Calcium 9.5 mg/dL      AST 20 U/L      ALT 14 U/L      Alkaline Phosphatase 51 U/L      Total Protein 7.2 g/dL      Albumin 4.5 g/dL      Total Bilirubin 0.56 mg/dL      eGFR 88 ml/min/1.73sq m     Narrative:      National Kidney Disease Foundation guidelines for Chronic Kidney Disease (CKD):     Stage 1 with normal or high GFR (GFR > 90 mL/min/1.73 square meters)    Stage 2 Mild CKD (GFR = 60-89 mL/min/1.73 square meters)    Stage 3A Moderate CKD (GFR = 45-59 mL/min/1.73 square meters)    Stage 3B Moderate CKD (GFR = 30-44 mL/min/1.73 square meters)    Stage 4 Severe CKD (GFR = 15-29 mL/min/1.73 square meters)    Stage 5 End Stage CKD (GFR <15 mL/min/1.73 square meters)  Note: GFR calculation is accurate only with a steady state creatinine    CBC and differential [[id number]] Collected: 10/09/24 1241    Lab Status: Final result  Specimen: Blood from Arm, Right Updated: 10/09/24 1248     WBC 5.44 Thousand/uL      RBC 4.13 Million/uL      Hemoglobin 12.6 g/dL      Hematocrit 38.0 %      MCV 92 fL      MCH 30.5 pg      MCHC 33.2 g/dL      RDW 12.6 %      MPV 11.0 fL      Platelets 231 Thousands/uL      nRBC 0 /100 WBCs      Segmented % 61 %      Immature Grans % 0 %      Lymphocytes % 27 %      Monocytes % 8 %      Eosinophils Relative 3 %      Basophils Relative 1 %      Absolute Neutrophils 3.32 Thousands/µL      Absolute Immature Grans 0.01 Thousand/uL      Absolute Lymphocytes 1.45 Thousands/µL      Absolute Monocytes 0.45 Thousand/µL      Eosinophils Absolute 0.17 Thousand/µL      Basophils Absolute 0.04 Thousands/µL             CTA head and neck with and without contrast   Final Result by Yisel No MD (10/09 3998)      CT Brain:  No acute intracranial abnormality.      Paranasal sinus disease as described above, with aerated secretions and mucosal thickening noted in the right sphenoid sinus, which may indicate a component of acute sinusitis.      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 There is an additional high-grade stenosis beyond the    reconstituted segment in the paraclinoid/supraclinoid right internal carotid artery, grossly unchanged.      Patent left internal carotid artery. Patent bilateral anterior cerebral arteries and branches, bilateral middle cerebral arteries and branches. Normal intracranial posterior circulation.      Cervical left internal carotid artery, bilateral cervical vertebral arteries appear widely patent.                  Workstation performed: [id number]         XR chest 1 view portable   Final Result by Vimal Smith MD (10/09 3553)      No acute cardiopulmonary disease.            Resident: Neville Treviño I, the attending  radiologist, have reviewed the images and agree with the final report above.      Workstation performed: [id number]         XR ankle 3+ views LEFT    (Results Pending)         ED Course / Workup Pending (followup):        HEART Risk Score      Flowsheet Row Most Recent Value   Heart Score Risk Calculator    History 0 Filed at: 10/09/2024 1607   ECG 0 Filed at: 10/09/2024 1607   Age 2 Filed at: 10/09/2024 1607   Risk Factors 2 Filed at: 10/09/2024 1607   Troponin 0 Filed at: 10/09/2024 1607   HEART Score 4 Filed at: 10/09/2024 1607                                    ED Course as of 10/09/24 1752   Wed Oct 09, 2024   1552 S/o from [facility]: foot drop chronically, left ankle swollen and painful. Xray shows tiny avulsion fracture where pt is tender. Pending ambulation trial     Also here for left sided neck pain, headache. Likely muscular    Chest pain as well. Normal work up. Received valium which made her loopy.       1650 Pt walked okay with the CAM boot. Still sleepy and complaining of dizziness. Will add on another liter of fluids   1740 Pt feeling better     Procedures  Medical Decision Making  Pt's symptoms improved here. Was able to walk with CAM boot. Needs to follow up with podiatry and PCP.     I have discussed the plan to discharge pt from ED. The patient was discharged in stable condition.  Patient ambulated off the department.  Extensive return to emergency department precautions were discussed.  Follow up with appropriate providers including primary care physician was discussed.  Patient and/or their  primary decision maker expressed understanding.  Patient remained stable during entire emergency department stay.      Amount and/or Complexity of Data Reviewed  Labs: ordered.  Radiology: ordered.    Risk  Prescription drug management.            Disposition  Final diagnoses:   Dizziness   Chest pain   Anxiety   Left ankle pain     Time reflects when diagnosis was documented in both MDM as applicable and  the Disposition within this note       Time User Action Codes Description Comment    10/9/2024  4:08 PM Kush Canales Add [R42] Dizziness     10/9/2024  4:08 PM Kush Canales Add [R07.9] Chest pain     10/9/2024  4:09 PM Kush Canales Add [F41.9] Anxiety     10/9/2024  4:09 PM Kush Canales Add [M25.572] Left ankle pain     10/9/2024  4:09 PM Kush Canales Modify [R42] Dizziness     10/9/2024  4:09 PM Kush Canales Modify [M25.572] Left ankle pain           ED Disposition       ED Disposition   Discharge    Condition   Stable    Date/Time   Wed Oct 9, 2024 1740    Comment   Shae Jung discharge to home/self care.                   Follow-up Information       Follow up With Specialties Details Why Contact Info Additional Information     [facility] Schedule an appointment as soon as possible for a visit   [facility] [address] [phone]  Podiatry [place] [id number] [place] [address] [phone]    [facility] Emergency Department Emergency Medicine  If symptoms worsen [address] [place] [address] [phone] [facility] Emergency Department, [address] [place] [address], [id number]    Vicente Zhu MD Family Medicine Call   [address] [facility]  [address] [place] [address]  [phone]             Discharge Medication List as of 10/9/2024  5:40 PM        CONTINUE these medications which have NOT CHANGED    Details   amLODIPine (NORVASC) 2.5 mg tablet Take 1 tablet (2.5 mg total) by mouth daily, Starting Tue 9/17/2024, Normal      aspirin (ECOTRIN LOW STRENGTH) 81 mg EC tablet Take 81 mg by mouth daily, Historical Med      atorvastatin (LIPITOR) 40 mg tablet Take 0.5 tablets (20 mg total) by mouth daily with dinner, Starting Tue 9/17/2024, Normal      !! Diclofenac Sodium (VOLTAREN) 1 % Apply 2 g topically 4 (four) times a day, Starting Wed  2/14/2024, Normal      !! Diclofenac Sodium (VOLTAREN) 1 % Apply 2 g topically 4 (four) times a day, Starting Fri 2/23/2024, Normal      gabapentin (NEURONTIN) 100 mg capsule Take 1 capsule (100 mg total) by mouth 3 (three) times a day, Starting Tue 9/17/2024, Normal      !! Incontinence Supply Disposable (Brief Overnight Large) MISC Use 4 (four) times a day, Starting Mon 11/7/2022, Print      !! Incontinence Supply Disposable (Underpads) MISC Use in the morning Please dispense washable underpads, Starting Mon 11/7/2022, Print      Infant Care Products (Comfort-Smooth Baby Wipes) MISC Use 4 (four) times a day, Starting Mon 11/7/2022, Print      Multiple Vitamin (multivitamin) capsule Take 1 capsule by mouth daily, Historical Med      traZODone (DESYREL) 50 mg tablet Take 1 tablet (50 mg total) by mouth daily at bedtime, Starting Thu 9/12/2024, Normal      benzonatate (TESSALON PERLES) 100 mg capsule Take 1 capsule (100 mg total) by mouth 3 (three) times a day as needed for cough, Starting Fri 11/17/2023, Normal      Cholecalciferol (VITAMIN D-3) 5000 units TABS Take 1 tablet by mouth daily, Starting Fri 10/12/2018, Normal      cyclobenzaprine (FLEXERIL) 5 mg tablet Take 0.5 tablets (2.5 mg total) by mouth 3 (three) times a day as needed for muscle spasms 2.5 mg in the am, 2.5 mg in the pm and 5 mg qHs, Starting Tue 9/17/2024, No Print      docusate sodium (COLACE) 100 mg capsule Take 1 capsule (100 mg total) by mouth 2 (two) times a day as needed for constipation, Starting Fri 11/17/2023, Normal      famotidine (PEPCID) 20 mg tablet Take 1 tablet (20 mg total) by mouth daily at bedtime as needed for heartburn, Starting Mon 9/18/2023, Normal      fluticasone (FLONASE) 50 mcg/act nasal spray 1 spray into each nostril daily, Starting Fri 11/17/2023, Normal      !! lidocaine (Lidoderm) 5 % Apply 1 patch topically over 12 hours daily Remove & Discard patch within 12 hours or as directed by MD, Starting Thu 10/26/2023,  Normal      !! lidocaine (LIDODERM) 5 % Apply 1 patch topically over 12 hours daily Remove & Discard patch within 12 hours or as directed by MD, Starting Fri 2/23/2024, Normal      omeprazole (PriLOSEC) 20 mg delayed release capsule Take 2 capsules (40 mg total) by mouth 2 (two) times a day, Starting Mon 6/3/2024, Normal      polyethylene glycol (GLYCOLAX) powder Take 17 g by mouth daily, Starting Fri 2/8/2019, Normal      polyethylene glycol (MIRALAX) 17 g packet Take 17 g by mouth daily, Starting Mon 9/18/2023, Normal      triamcinolone (KENALOG) 0.1 % ointment Apply topically 2 (two) times a day, Starting Tue 7/25/2023, Normal       !! - Potential duplicate medications found. Please discuss with provider.               ED Provider  Electronically Signed by     Bret Elizabeth PA-C  10/09/24 8312

## 2024-11-07 DIAGNOSIS — F51.01 PRIMARY INSOMNIA: ICD-10-CM

## 2024-11-11 RX ORDER — TRAZODONE HYDROCHLORIDE 50 MG/1
50 TABLET, FILM COATED ORAL
Qty: 30 TABLET | Refills: 1 | Status: SHIPPED | OUTPATIENT
Start: 2024-11-11 | End: 2024-11-19 | Stop reason: SDUPTHER

## 2024-11-17 PROBLEM — R03.0 ELEVATED BP WITHOUT DIAGNOSIS OF HYPERTENSION: Status: RESOLVED | Noted: 2019-07-01 | Resolved: 2024-11-17

## 2024-11-17 PROBLEM — E66.811 CLASS 1 OBESITY DUE TO EXCESS CALORIES WITHOUT SERIOUS COMORBIDITY WITH BODY MASS INDEX (BMI) OF 30.0 TO 30.9 IN ADULT: Status: RESOLVED | Noted: 2020-04-02 | Resolved: 2024-11-17

## 2024-11-17 PROBLEM — E66.09 CLASS 1 OBESITY DUE TO EXCESS CALORIES WITHOUT SERIOUS COMORBIDITY WITH BODY MASS INDEX (BMI) OF 30.0 TO 30.9 IN ADULT: Status: RESOLVED | Noted: 2020-04-02 | Resolved: 2024-11-17

## 2024-11-19 ENCOUNTER — TELEPHONE (OUTPATIENT)
Dept: FAMILY MEDICINE CLINIC | Facility: CLINIC | Age: 74
End: 2024-11-19

## 2024-11-19 DIAGNOSIS — S32.010D COMPRESSION FRACTURE OF L1 VERTEBRA WITH ROUTINE HEALING, SUBSEQUENT ENCOUNTER: ICD-10-CM

## 2024-11-19 DIAGNOSIS — S06.5XAS TRAUMATIC SUBDURAL HEMORRHAGE WITH UNKNOWN LOSS OF CONSCIOUSNESS STATUS, SEQUELA (HCC): ICD-10-CM

## 2024-11-19 DIAGNOSIS — I69.30 HISTORY OF CVA WITH RESIDUAL DEFICIT: ICD-10-CM

## 2024-11-19 DIAGNOSIS — K21.9 GASTROESOPHAGEAL REFLUX DISEASE, UNSPECIFIED WHETHER ESOPHAGITIS PRESENT: ICD-10-CM

## 2024-11-19 DIAGNOSIS — F51.01 PRIMARY INSOMNIA: ICD-10-CM

## 2024-11-19 DIAGNOSIS — M51.369 LUMBAR DEGENERATIVE DISC DISEASE: ICD-10-CM

## 2024-11-19 DIAGNOSIS — R13.12 OROPHARYNGEAL DYSPHAGIA: ICD-10-CM

## 2024-11-19 DIAGNOSIS — R26.2 AMBULATORY DYSFUNCTION: ICD-10-CM

## 2024-11-19 DIAGNOSIS — S06.6X0S SUBARACHNOID HEMORRHAGE FOLLOWING INJURY, NO LOSS OF CONSCIOUSNESS, SEQUELA (HCC): Primary | ICD-10-CM

## 2024-11-19 RX ORDER — CYCLOBENZAPRINE HCL 5 MG
2.5 TABLET ORAL 3 TIMES DAILY PRN
Qty: 30 TABLET | Refills: 2 | Status: SHIPPED | OUTPATIENT
Start: 2024-11-19

## 2024-11-19 RX ORDER — GABAPENTIN 100 MG/1
100 CAPSULE ORAL 3 TIMES DAILY
Qty: 90 CAPSULE | Refills: 2 | Status: SHIPPED | OUTPATIENT
Start: 2024-11-19

## 2024-11-19 RX ORDER — TRAZODONE HYDROCHLORIDE 50 MG/1
50 TABLET, FILM COATED ORAL
Qty: 30 TABLET | Refills: 2 | Status: SHIPPED | OUTPATIENT
Start: 2024-11-19

## 2024-11-19 NOTE — TELEPHONE ENCOUNTER
Pt's son is also requesting a new script be sent over for medication:    traZODone (DESYREL) 50 mg tablet [[id number]]     Due to pharmacy not receiving script.    Please advise?

## 2024-11-19 NOTE — TELEPHONE ENCOUNTER
Robert Guerrero,    Pt's son came into office requesting a medication refill for mother medications are:      cyclobenzaprine (FLEXERIL) 5 mg tablet [[id number]]     gabapentin (NEURONTIN) 100 mg capsule [[id number]]     omeprazole (PriLOSEC) 20 mg delayed release capsule [[id number]]     citalopram (CeleXA) 20 mg tablet     Pt's son also wanted new updated referrals for pt with update referral date do to pt being in the hospital recently.    Referrals are:    Speech Therapy    Occupational Therapy    Physical Therapy    These are needed by [facility] where pt is getting therapy to continue care.    Please advise?

## 2024-12-02 ENCOUNTER — TELEPHONE (OUTPATIENT)
Dept: NEUROLOGY | Facility: CLINIC | Age: 74
End: 2024-12-02

## 2024-12-05 ENCOUNTER — TELEPHONE (OUTPATIENT)
Dept: FAMILY MEDICINE CLINIC | Facility: CLINIC | Age: 74
End: 2024-12-05

## 2024-12-05 NOTE — TELEPHONE ENCOUNTER
PCP SIGNATURE NEEDED FOR [email or web address]  FORM RECEIVED VIA FAX AND PLACED IN PCP FOLDER TO BE DELIVERED AT ASSIGNED TIMES.    Medication therapy Recommendation.

## 2024-12-06 DIAGNOSIS — Z86.73 HISTORY OF CVA (CEREBROVASCULAR ACCIDENT): ICD-10-CM

## 2024-12-06 NOTE — TELEPHONE ENCOUNTER
Pharmacy called nurse line requesting refills for this medication. Pharmacist stated that insurance is requiring 100 tablets supply.       Please advise, thanks

## 2024-12-09 ENCOUNTER — TELEPHONE (OUTPATIENT)
Dept: FAMILY MEDICINE CLINIC | Facility: CLINIC | Age: 74
End: 2024-12-09

## 2024-12-09 RX ORDER — ATORVASTATIN CALCIUM 40 MG/1
20 TABLET, FILM COATED ORAL
Qty: 30 TABLET | Refills: 0 | Status: SHIPPED | OUTPATIENT
Start: 2024-12-09

## 2024-12-09 NOTE — TELEPHONE ENCOUNTER
PCP SIGNATURE NEEDED FOR [facility] FORM RECEIVED VIA FAX AND PLACED IN PCP FOLDER TO BE DELIVERED AT ASSIGNED TIMES.      [facility]

## 2024-12-10 ENCOUNTER — OFFICE VISIT (OUTPATIENT)
Dept: NEUROLOGY | Facility: CLINIC | Age: 74
End: 2024-12-10
Payer: MEDICARE

## 2024-12-10 VITALS
SYSTOLIC BLOOD PRESSURE: 142 MMHG | OXYGEN SATURATION: 100 % | TEMPERATURE: 98.2 F | DIASTOLIC BLOOD PRESSURE: 76 MMHG | HEIGHT: 57 IN | HEART RATE: 70 BPM | BODY MASS INDEX: 25.1 KG/M2

## 2024-12-10 DIAGNOSIS — R41.3 MEMORY LOSS: ICD-10-CM

## 2024-12-10 DIAGNOSIS — I69.30 HISTORY OF CVA WITH RESIDUAL DEFICIT: ICD-10-CM

## 2024-12-10 DIAGNOSIS — G89.29 CHRONIC NECK PAIN: Primary | ICD-10-CM

## 2024-12-10 DIAGNOSIS — R06.83 SNORING: ICD-10-CM

## 2024-12-10 DIAGNOSIS — M54.2 CHRONIC NECK PAIN: Primary | ICD-10-CM

## 2024-12-10 PROCEDURE — 99215 OFFICE O/P EST HI 40 MIN: CPT | Performed by: PSYCHIATRY & NEUROLOGY

## 2024-12-10 PROCEDURE — G2211 COMPLEX E/M VISIT ADD ON: HCPCS | Performed by: PSYCHIATRY & NEUROLOGY

## 2024-12-10 NOTE — PROGRESS NOTES
Name: Shae Jung      : 1950      MRN: [id number]  Encounter Provider: Nelly Hancock MD  Encounter Date: 12/10/2024   Encounter department: [facility]  :  Assessment & Plan  Chronic neck pain  Comprehensive spine PT referral  Will not do muscle relaxers at this time, she is allergic to flexeril   Orders:    Ambulatory Referral to Comprehensive Spine PT; Future    Snoring  Sleep medicine   Orders:    Ambulatory Referral to Sleep Medicine; Future    History of CVA with residual deficit  -for secondary stroke prevention, recommend continuation of combination of aspirin and atorvastatin  -Blood Pressure goal < 130/80, BP is at goal   -LDL goal <70  -snoring - sleep medicine referral    Counseling/stroke education -   -I advised patient to avoid using NSAIDs for headaches or other pain and to stick to tylenol if needed  -Recommend lifestyle modifications such as mediterranean diet & regular exercise regimen atleast 4-5 times a week for 20-30 minutes.   -I educated patient/family regarding medication compliance  -encourage control of diabetes and hypertension; defer management to primary   Orders:    Ambulatory Referral to Physical Therapy; Future    Ambulatory Referral to Occupational Therapy; Future    Ambulatory Referral to Speech Therapy; Future    Memory loss  Check B12, and folate    Orders:    Vitamin B12; Future    Folate; Future    Follow up in 6 months     I would be happy to see the patient sooner if any new questions/concerns arise.  Patient/Guardian was advised to the call the office if they have any questions and concerns in the meantime.     Patient/Guardian does understand that if they have any new stroke like symptoms such as facial droop on one side, weakness/paralysis on either side, speech trouble, numbness on one side, balance issues, any vision changes, extreme dizziness or any new headache, to call  immediately or to proceed to the nearest ER  immediately.          History of Present Illness   HPI    Shae Jung is a 74 year old female with a history of 3 previous CVAs from [id number] presenting today with her son and home nurse for follow up. Since this stroke in 2019, patient has had loss of coordination especially to the right upper extremity. She notes that she frequently drops things with the right hand. She also has experienced difficulty ambulating, requiring a wheelchair since 2020. She also has a hoarse voice and whispers when she speaks which is unchanged since her stroke in 2019.     Patient also has been experiencing headaches in the right occipital region/right neck. Her son states stress and anxiety worsen her headaches. Tylenol improves the pain. Son also notes she is also having some memory issues which are relatively unchanged from prior visits, she has been hyper fixated on her wedding ring and repeatedly asks for it even though she knows her son has put it away for safekeeping. He denies her becoming lost in familiar environments or any hazardous situations like leaving the stove on.     Of note, she has lost some weight since previous visit and was encouraged to mention this to PCP. Her son states she eats 4 meals a day and does not know why she may be losing weight. He states her appetite is the same. He also reports she sometimes refuses to take medications and he tries to give her the most essential ones like Aspirin and BP meds. BP runs 120s-140s systolic according to son.   Her son mentions that since her hospital admission in October, she has lost PT follow up. She tried 3 sessions of comprehensive spine PT and was benefitting prior to hospital admission. Son states hospital admission for dizziness did not show any significant new neurological findings. Imaging was stable.     Plan to refer to PT again to get patient reestablished with them. Will also check MRI NeuroQuant due to memory concerns as well as B12 and folate. Will  "plan follow up visit in 6 months to reassess and discuss findings.          Review of Systems   Constitutional: Negative.    HENT: Negative.     Eyes: Negative.    Respiratory: Negative.     Cardiovascular: Negative.    Gastrointestinal: Negative.    Endocrine: Negative.    Genitourinary: Negative.    Musculoskeletal: Negative.    Skin: Negative.    Allergic/Immunologic: Negative.    Neurological: Negative.    Hematological: Negative.    Psychiatric/Behavioral: Negative.     All other systems reviewed and are negative.   I have personally reviewed the MA's review of systems and made changes as necessary.         Objective   /76 (Patient Position: Sitting, Cuff Size: Standard)   Pulse 70   Temp 98.2 °F (36.8 °C) (Temporal)   Ht 4' 9\" (1.448 m)   SpO2 100%   BMI 25.10 kg/m²     Physical Exam  General - patient is alert   Speech - no dysarthria noted, no aphasia noted.     Neuro:   Cranial nerves: PERRL, EOMI, facial sensation intact to soft touch in V1, V2 and V3, no facial asymmetry noted, uvula/palate midline, tongue midline.   Motor: 5/5 throughout, normal tone, no pronator drift noted.   Sensory - intact to soft touch throughout  Gait - ambulates with a wheelchair   Neurological Exam        Administrative Statements   I have spent a total time of 40 minutes in caring for this patient on the day of the visit/encounter including Risks and benefits of tx options, Instructions for management, Counseling / Coordination of care, Documenting in the medical record, Reviewing / ordering tests, medicine, procedures  , Obtaining or reviewing history  , and Communicating with other healthcare professionals .   "

## 2024-12-10 NOTE — ASSESSMENT & PLAN NOTE
Comprehensive spine PT referral  Will not do muscle relaxers at this time, she is allergic to flexeril   Orders:    Ambulatory Referral to Comprehensive Spine PT; Future

## 2024-12-10 NOTE — ASSESSMENT & PLAN NOTE
-for secondary stroke prevention, recommend continuation of combination of aspirin and atorvastatin  -Blood Pressure goal < 130/80, BP is at goal   -LDL goal <70  -snoring - sleep medicine referral    Counseling/stroke education -   -I advised patient to avoid using NSAIDs for headaches or other pain and to stick to tylenol if needed  -Recommend lifestyle modifications such as mediterranean diet & regular exercise regimen atleast 4-5 times a week for 20-30 minutes.   -I educated patient/family regarding medication compliance  -encourage control of diabetes and hypertension; defer management to primary   Orders:    Ambulatory Referral to Physical Therapy; Future    Ambulatory Referral to Occupational Therapy; Future    Ambulatory Referral to Speech Therapy; Future

## 2024-12-10 NOTE — TELEPHONE ENCOUNTER
FAXED ON 12/10/24 TO [facility] at [phone]. FAX CONFIRMATION RECEIVED.

## 2024-12-28 ENCOUNTER — HOSPITAL ENCOUNTER (EMERGENCY)
Facility: HOSPITAL | Age: 74
Discharge: HOME | End: 2024-12-28
Attending: EMERGENCY MEDICINE
Payer: MEDICARE

## 2024-12-28 VITALS
HEART RATE: 72 BPM | OXYGEN SATURATION: 97 % | DIASTOLIC BLOOD PRESSURE: 59 MMHG | SYSTOLIC BLOOD PRESSURE: 114 MMHG | RESPIRATION RATE: 16 BRPM | TEMPERATURE: 98.9 F

## 2024-12-28 DIAGNOSIS — R21 RASH: Primary | ICD-10-CM

## 2024-12-28 PROCEDURE — 99282 EMERGENCY DEPT VISIT SF MDM: CPT

## 2024-12-28 PROCEDURE — 99284 EMERGENCY DEPT VISIT MOD MDM: CPT | Performed by: PHYSICIAN ASSISTANT

## 2024-12-28 RX ORDER — CEPHALEXIN 500 MG/1
500 CAPSULE ORAL EVERY 8 HOURS SCHEDULED
Qty: 15 CAPSULE | Refills: 0 | Status: SHIPPED | OUTPATIENT
Start: 2024-12-28 | End: 2025-01-02

## 2024-12-28 RX ORDER — HYDROCORTISONE 25 MG/ML
LOTION TOPICAL 2 TIMES DAILY
Qty: 50 ML | Refills: 0 | Status: SHIPPED | OUTPATIENT
Start: 2024-12-28 | End: 2025-01-04

## 2024-12-28 NOTE — ED PROVIDER NOTES
Time reflects when diagnosis was documented in both MDM as applicable and the Disposition within this note       Time User Action Codes Description Comment    12/28/2024  3:49 PM Kush Canales Add [R21] Rash           ED Disposition       ED Disposition   Discharge    Condition   Stable    Date/Time   Sat Dec 28, 2024  3:49 PM    Comment   Shae Jung discharge to home/self care.                   Assessment & Plan       Medical Decision Making  Rash to L chest, L shoulder, R upper arm over the past few days. Slightly itchy, no pain reported. Felt unlikely to represent shingles as multiple dermatomes and crosses midline, no pain. Possibly folliculitis given raised, red papular rash vs contact dermatitis. Will place on keflex for possible folliculitis given spread across multiple regions, add topical hydrocortisone as well. F/u with PCP recommended should symptoms persist. Return indications reviewed.     Risk  Prescription drug management.             Medications - No data to display    ED Risk Strat Scores                                              History of Present Illness       Chief Complaint   Patient presents with    Rash     Pt reports woke up with a rash yesterday and today getting worse, upper body arms and back.        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    Palpitations     Recurrent urinary tract infection     Seizures (HCC)     Sleep disorder     last assessed: 10/19/2013     Speech impairment     Stroke (HCC)     Thyroid disease     Tinnitus       Past Surgical History:   Procedure Laterality Date    AXILLARY SURGERY      cyst surgery    BLADDER SURGERY       SECTION      CYSTOSCOPY N/A 2018    Procedure: CYSTOSCOPY;  Surgeon: Charis Major MD;  Location: [facility] OR;  Service: Gynecology    FACIAL BONE TUMOR EXCISION      KNEE SURGERY      OTHER SURGICAL HISTORY      cyst removed from axilla    WY ESOPHAGOGASTRODUODENOSCOPY TRANSORAL DIAGNOSTIC N/A 2017    Procedure: ESOPHAGOGASTRODUODENOSCOPY (EGD);  Surgeon: Jose Antonio Platt MD;  Location: [facility] GI LAB;  Service: Gastroenterology    [place] SLING OPERATION STRESS INCONTINENCE N/A 2018    Procedure: PUBOVAGINAL SLING;  Surgeon: Charis Major MD;  Location: [facility] OR;  Service: Gynecology    TUBAL LIGATION        Family History   Problem Relation Age of Onset    No Known Problems Father     Diabetes Family         mellitus    Hypertension Family     Stroke Family     Thyroid disease Family     No Known Problems Mother     No Known Problems Sister     No Known Problems Daughter     No Known Problems Maternal Grandmother     No Known Problems Maternal Grandfather     No Known Problems Paternal Grandmother     No Known Problems Paternal Grandfather       Social History     Tobacco Use    Smoking status: Never    Smokeless tobacco: Never   Vaping Use    Vaping status: Never Used   Substance Use Topics    Alcohol use: No    Drug use: No      E-Cigarette/Vaping    E-Cigarette Use Never User       E-Cigarette/Vaping Substances    Nicotine No     THC No     CBD No     Flavoring No     Other No     Unknown No       I have reviewed and agree with the history as documented.     Shae is a 75 yo F presenting with rash to L chest/shoulder, L upper arm, and R upper arm. It is nonpainful, slightly itchy. No previous history of similar. No specific new allergens noted. Tried cortisone topically x1 today. No systemic symptoms noted.        History provided by:  Patient and relative   used: No        Review of Systems   Constitutional:  Negative for chills and fever.   HENT:  Negative for congestion, rhinorrhea and sore throat.    Eyes:  Negative for pain and visual disturbance.   Respiratory:  Negative for cough, shortness of breath and wheezing.    Cardiovascular:  Negative for chest pain and palpitations.   Gastrointestinal:  Negative for abdominal pain, nausea and vomiting.   Genitourinary:  Negative for dysuria, frequency and urgency.   Musculoskeletal:  Negative for back pain, neck pain and neck stiffness.   Skin:  Positive for rash. Negative for wound.   Neurological:  Negative for dizziness, weakness, light-headedness and numbness.           Objective       ED Triage Vitals [12/28/24 1419]   Temperature Pulse Blood Pressure Respirations SpO2 Patient Position - Orthostatic VS   98.9 °F (37.2 °C) 72 114/59 16 97 % Sitting      Temp Source Heart Rate Source BP Location FiO2 (%) Pain Score    Oral Monitor Right arm -- No Pain      Vitals      Date and Time Temp Pulse SpO2 Resp BP Pain Score FACES Pain Rating User   12/28/24 1419 98.9 °F (37.2 °C) 72 97 % 16 114/59 No Pain -- CO            Physical Exam  Constitutional:       General: She is not in acute distress.     Appearance: She is well-developed. She is not diaphoretic.   HENT:      Head: Normocephalic and atraumatic.      Right Ear: External ear normal.      Left Ear: External ear normal.   Eyes:      Extraocular Movements:      Right eye: Normal extraocular motion.      Left eye: Normal extraocular motion.      Conjunctiva/sclera: Conjunctivae normal.      Pupils: Pupils are equal, round, and reactive to light.   Cardiovascular:      Rate and Rhythm: Normal rate and regular rhythm.   Pulmonary:      Effort: Pulmonary effort is normal. No accessory muscle usage or respiratory distress.   Abdominal:      General: Abdomen is flat. There is no distension.    Musculoskeletal:      Cervical back: Normal range of motion. No rigidity.   Skin:     General: Skin is warm and dry.      Capillary Refill: Capillary refill takes less than 2 seconds.      Findings: Rash present. No erythema.      Comments: Papular, erythematous rash to L upper chest, L shoulder, R upper arm - appearance likely representing folliculitis vs less likely contact dermatitis.    Neurological:      Mental Status: She is alert and oriented to person, place, and time.      Motor: No abnormal muscle tone.      Coordination: Coordination normal.   Psychiatric:         Behavior: Behavior normal.         Thought Content: Thought content normal.         Judgment: Judgment normal.         Results Reviewed       None            No orders to display       Procedures    ED Medication and Procedure Management   Prior to Admission Medications   Prescriptions Last Dose Informant Patient Reported? Taking?   Cholecalciferol (VITAMIN D-3) 5000 units TABS  Self No No   Sig: Take 1 tablet by mouth daily   Patient not taking: Reported on 10/9/2024   Diclofenac Sodium (VOLTAREN) 1 %   No No   Sig: Apply 2 g topically 4 (four) times a day   Diclofenac Sodium (VOLTAREN) 1 %   No No   Sig: Apply 2 g topically 4 (four) times a day   Incontinence Supply Disposable (Brief Overnight Large) MISC  Self No No   Sig: Use 4 (four) times a day   Incontinence Supply Disposable (Underpads) MISC  Self No No   Sig: Use in the morning Please dispense washable underpads   Infant Care Products (Comfort-Smooth Baby Wipes) MISC  Self No No   Sig: Use 4 (four) times a day   Multiple Vitamin (multivitamin) capsule   Yes No   Sig: Take 1 capsule by mouth daily   amLODIPine (NORVASC) 2.5 mg tablet   No No   Sig: Take 1 tablet (2.5 mg total) by mouth daily   aspirin (ECOTRIN LOW STRENGTH) 81 mg EC tablet  Self Yes No   Sig: Take 81 mg by mouth daily   atorvastatin (LIPITOR) 40 mg tablet   No No   Sig: Take 0.5 tablets (20 mg total) by mouth daily  with dinner   benzonatate (TESSALON PERLES) 100 mg capsule   No No   Sig: Take 1 capsule (100 mg total) by mouth 3 (three) times a day as needed for cough   Patient not taking: Reported on 12/10/2024   cyclobenzaprine (FLEXERIL) 5 mg tablet   No No   Sig: Take 0.5 tablets (2.5 mg total) by mouth 3 (three) times a day as needed for muscle spasms   docusate sodium (COLACE) 100 mg capsule   No No   Sig: Take 1 capsule (100 mg total) by mouth 2 (two) times a day as needed for constipation   Patient not taking: Reported on 12/10/2024   famotidine (PEPCID) 20 mg tablet   No No   Sig: Take 1 tablet (20 mg total) by mouth daily at bedtime as needed for heartburn   Patient not taking: Reported on 12/10/2024   fluticasone (FLONASE) 50 mcg/act nasal spray   No No   Si spray into each nostril daily   Patient not taking: Reported on 12/10/2024   gabapentin (NEURONTIN) 100 mg capsule   No No   Sig: Take 1 capsule (100 mg total) by mouth 3 (three) times a day   lidocaine (LIDODERM) 5 %   No No   Sig: Apply 1 patch topically over 12 hours daily Remove & Discard patch within 12 hours or as directed by MD   Patient not taking: Reported on 12/10/2024   lidocaine (Lidoderm) 5 %   No No   Sig: Apply 1 patch topically over 12 hours daily Remove & Discard patch within 12 hours or as directed by MD   Patient not taking: Reported on 2024   omeprazole (PriLOSEC) 20 mg delayed release capsule   No No   Sig: Take 2 capsules (40 mg total) by mouth 2 (two) times a day   polyethylene glycol (GLYCOLAX) powder  Child No No   Sig: Take 17 g by mouth daily   Patient not taking: Reported on 2024   polyethylene glycol (MIRALAX) 17 g packet  Child No No   Sig: Take 17 g by mouth daily   Patient not taking: Reported on 2024   traZODone (DESYREL) 50 mg tablet   No No   Sig: Take 1 tablet (50 mg total) by mouth daily at bedtime   triamcinolone (KENALOG) 0.1 % ointment  Child No No   Sig: Apply topically 2 (two) times a day   Patient not  taking: Reported on 9/18/2024      Facility-Administered Medications: None     Discharge Medication List as of 12/28/2024  3:52 PM        START taking these medications    Details   cephalexin (KEFLEX) 500 mg capsule Take 1 capsule (500 mg total) by mouth every 8 (eight) hours for 5 days, Starting Sat 12/28/2024, Until Thu 1/2/2025, Normal      hydrocortisone 2.5 % lotion Apply topically 2 (two) times a day for 7 days, Starting Sat 12/28/2024, Until Sat 1/4/2025, Normal           CONTINUE these medications which have NOT CHANGED    Details   amLODIPine (NORVASC) 2.5 mg tablet Take 1 tablet (2.5 mg total) by mouth daily, Starting Tue 9/17/2024, Normal      aspirin (ECOTRIN LOW STRENGTH) 81 mg EC tablet Take 81 mg by mouth daily, Historical Med      atorvastatin (LIPITOR) 40 mg tablet Take 0.5 tablets (20 mg total) by mouth daily with dinner, Starting Mon 12/9/2024, Normal      benzonatate (TESSALON PERLES) 100 mg capsule Take 1 capsule (100 mg total) by mouth 3 (three) times a day as needed for cough, Starting Fri 11/17/2023, Normal      Cholecalciferol (VITAMIN D-3) 5000 units TABS Take 1 tablet by mouth daily, Starting Fri 10/12/2018, Normal      cyclobenzaprine (FLEXERIL) 5 mg tablet Take 0.5 tablets (2.5 mg total) by mouth 3 (three) times a day as needed for muscle spasms, Starting Tue 11/19/2024, Normal      !! Diclofenac Sodium (VOLTAREN) 1 % Apply 2 g topically 4 (four) times a day, Starting Wed 2/14/2024, Normal      !! Diclofenac Sodium (VOLTAREN) 1 % Apply 2 g topically 4 (four) times a day, Starting Fri 2/23/2024, Normal      docusate sodium (COLACE) 100 mg capsule Take 1 capsule (100 mg total) by mouth 2 (two) times a day as needed for constipation, Starting Fri 11/17/2023, Normal      famotidine (PEPCID) 20 mg tablet Take 1 tablet (20 mg total) by mouth daily at bedtime as needed for heartburn, Starting Mon 9/18/2023, Normal      fluticasone (FLONASE) 50 mcg/act nasal spray 1 spray into each nostril  daily, Starting Fri 11/17/2023, Normal      gabapentin (NEURONTIN) 100 mg capsule Take 1 capsule (100 mg total) by mouth 3 (three) times a day, Starting Tue 11/19/2024, Normal      !! Incontinence Supply Disposable (Brief Overnight Large) MISC Use 4 (four) times a day, Starting Mon 11/7/2022, Print      !! Incontinence Supply Disposable (Underpads) MISC Use in the morning Please dispense washable underpads, Starting Mon 11/7/2022, Print      Infant Care Products (Comfort-Smooth Baby Wipes) MISC Use 4 (four) times a day, Starting Mon 11/7/2022, Print      !! lidocaine (Lidoderm) 5 % Apply 1 patch topically over 12 hours daily Remove & Discard patch within 12 hours or as directed by MD, Starting Thu 10/26/2023, Normal      !! lidocaine (LIDODERM) 5 % Apply 1 patch topically over 12 hours daily Remove & Discard patch within 12 hours or as directed by MD, Starting Fri 2/23/2024, Normal      Multiple Vitamin (multivitamin) capsule Take 1 capsule by mouth daily, Historical Med      omeprazole (PriLOSEC) 20 mg delayed release capsule Take 2 capsules (40 mg total) by mouth 2 (two) times a day, Starting Tue 11/19/2024, Normal      polyethylene glycol (GLYCOLAX) powder Take 17 g by mouth daily, Starting Fri 2/8/2019, Normal      polyethylene glycol (MIRALAX) 17 g packet Take 17 g by mouth daily, Starting Mon 9/18/2023, Normal      traZODone (DESYREL) 50 mg tablet Take 1 tablet (50 mg total) by mouth daily at bedtime, Starting Tue 11/19/2024, Normal      triamcinolone (KENALOG) 0.1 % ointment Apply topically 2 (two) times a day, Starting Tue 7/25/2023, Normal       !! - Potential duplicate medications found. Please discuss with provider.        No discharge procedures on file.  ED SEPSIS DOCUMENTATION   Time reflects when diagnosis was documented in both MDM as applicable and the Disposition within this note       Time User Action Codes Description Comment    12/28/2024  3:49 PM Kush Canales Add [R21] Rash                   Kush Canales PA-C  12/28/24 1835

## 2024-12-31 ENCOUNTER — TELEPHONE (OUTPATIENT)
Dept: FAMILY MEDICINE CLINIC | Facility: CLINIC | Age: 74
End: 2024-12-31

## 2024-12-31 NOTE — TELEPHONE ENCOUNTER
PCP SIGNATURE NEEDED FOR [facility].  [facility]  FORM RECEIVED VIA FAX AND PLACED IN PCP FOLDER TO BE DELIVERED AT ASSIGNED TIMES.    R32 Unspecified Urinary Incontinence

## 2025-01-08 ENCOUNTER — EVALUATION (OUTPATIENT)
Dept: OCCUPATIONAL THERAPY | Facility: CLINIC | Age: 75
End: 2025-01-08
Payer: MEDICARE

## 2025-01-08 ENCOUNTER — EVALUATION (OUTPATIENT)
Dept: SPEECH THERAPY | Facility: CLINIC | Age: 75
End: 2025-01-08
Payer: MEDICARE

## 2025-01-08 DIAGNOSIS — I69.30 HISTORY OF CVA WITH RESIDUAL DEFICIT: ICD-10-CM

## 2025-01-08 DIAGNOSIS — R47.9 SPEECH DISTURBANCE, UNSPECIFIED TYPE: ICD-10-CM

## 2025-01-08 DIAGNOSIS — R49.8 OTHER VOICE AND RESONANCE DISORDERS: Primary | ICD-10-CM

## 2025-01-08 DIAGNOSIS — J38.3 GLOTTIC INSUFFICIENCY: ICD-10-CM

## 2025-01-08 DIAGNOSIS — J38.7 PRESBYLARYNGES: ICD-10-CM

## 2025-01-08 PROCEDURE — 97166 OT EVAL MOD COMPLEX 45 MIN: CPT

## 2025-01-08 PROCEDURE — 92524 BEHAVRAL QUALIT ANALYS VOICE: CPT

## 2025-01-08 PROCEDURE — 97530 THERAPEUTIC ACTIVITIES: CPT

## 2025-01-08 NOTE — PROGRESS NOTES
OCCUPATIONAL THERAPY INITIAL EVALUATION:    1/8/2025  Shae Jung  1950  [id number]  Nelly Hancock MD   Diagnosis ICD-10-CM Associated Orders   1. History of CVA with residual deficit  I69.30 Ambulatory Referral to Occupational Therapy            Assessment/Plan    Skilled Analysis:  Shae Jung is a 74 y.o. female referred to Occupational Therapy s/p No primary diagnosis found..   Pt participated in skilled OT evaluation and following formalized testing as well as clinical observation, Pt presents with the following areas of deficit:  generalized weakness, decreased MMT RUE, decreased /FMS, proprioception and decreased kinesthetic awareness of RUE, impaired FM skills and coordination. Pt requiring maximal to total assistance for ADLs and requires 1 person assist for all transfers and functional mobility.  Pt will benefit from skilled Occupational Therapy services 2x/week for 12 weeks with focus on Neuro Re-ed, Self-care, There ex, There Act, Manual as needed for improved ADL/IADL performance.  Shae Jung is in agreement with POC.    Todays treatment - discussed POC, transfer to and from mat with MIN A with BUE support (in front of pt). Support provided to trunk with wedge.  Pt often picking BLEs up off floor req stabilization by OT.  Discussed purpose/POC and goals for OT.        POC expires Unit limit Auth Expiration date PT/OT/ST + Visit Limit?   4/8/25 BOMN TBD BOMN                           Visit/Unit Tracking  AUTH Status:  Date 1/8             TBD Used 1              Remaining  TBD                  Duration in weeks: 2x/week, 12 weeks  Plan of care beginning date: 1/8/25  Plan of care expiration date: 4/8/25  PN #1 due: 2/8/25    Precautions: fall risk, Latvian speaking, low voice tone, dec sensation to RUE              GOALS    Short Term Goals (4-6 weeks)  Strength/Endurance  - Pt will increase B/L hand  strength by 3+ lbs each lbs through motor tasks to improve performance  for self-feeding   - Pt will demo with G tolerance to  seated and in stance exercise x 15-20 minutes with minimal rest breaks required for increased engagement in life roles   - Pt / caregivers will demo with G carryover of HEP to improve functional progression towards goals in POC     FMC/GMC  - Pt will increase RUE rate of manipulation by 10% for 9 hole peg test demo improved grasp, eye hand coordination, and automaticity   - Pt will increase FMC for buttoning with report of Much Difficulty on Neuro QOL for improved B/L coordination     Functional Performance  - Pt will improve donning and doffing of t-shirts with < maximal assistance for decreased caregiver burden and gross motor performance   - Increase engagement with meals using finger foods and/or fork with ability to perform hand to mouth movements per caregiver report   - Increase ability to sit edge of mat without trunk support x 20 minutes for improved strength and functional mobility         Long Term Goals (12 weeks)  Strength/Endurance  - Pt will increase RUE proximal shoulder strength to 4/5  through the use of strengthening exercises and HEP   - Pt will increase her B/L  strength each to > 25 lbs for improved ability to perform ADLs with more indep     FMC/GMC  - Pt will demo improved RUE positioning and awareness with reaching and vision occluded for improved safety   - Pt will increase RUE rate of manipulation to completed 9 hole peg test in 2 min or less demo improved grasp, eye hand coordination, and automaticity   - Pt will increase FMC for buttoning with report of a little difficulty on Neuro QOL for improved B/L coordination     Functional Performance  - Improve eye hand coordination and self-feeding to be able to feed 50% of meal or reach/drink from cup with RUE or LUE to increase indep   - Pt/caregiver will report improved indep to comb hair in RUE with a full grasp for increased indep in ADLs   - Increase ability to sit edge of mat  "without trunk support x 30 minutes for improved strength and functional mobility           Subjective    PATIENT GOAL: \"To walk.\"    Son: \"To do as much as she can.  She has trouble sitting.\"     Patient-Specific Functional Scale   Task is scored 0 (unable to perform activity) to 10 (ability to perform activity independently)    Activity Date: Date:   Self-feeding     2.   RUE strength / arm awareness      3.   UB ADLs      4.   Grooming indep         HISTORY OF PRESENT ILLNESS:     Pt is a 74 y.o. female who was referred to Occupational Therapy s/p  No primary diagnosis found..   Pt has had several strokes, last one was in 2019/2020 with residual right sided deficit.  Also has L1 compression fracture from a fall.   She has deficits in her balance, weakness, occasional dizziness.   Son reports from 2020, things started going down hill.  She was going to [facility] for about 6 months and also had HH therapy.      Pts son present throughout entire evaluation. Her son is her caretaker all day and also has a HHA.  Her son lives with her.  She has stair lifts, can walk if you hold her BUEs.  She is mostly sedentary, they move her from room to room in the W/C or is sitting on the couch.      She requires total assistance for ADLs.  If you cue her she will then engage in the ADL.  Last time she got dressed herself was several years ago. Her son manages all of her medications.   Cognitively, she does remember prospective events/appointments.   She needs help to be fed for all meals.  She can  her cup and hold it.  She requires total assistance for toileting, if cued she will assist.  She is continent.      She will clean her dentures, she will comb her hair but has difficulty holding the comb a certain way or performing the movement a certain way.      Needs help to grab remote from TV, has difficulty managing the remote buttons.  Says she has difficulty with sense of direction/movement.      She has an android, " she will call her son. Says her sense of direction using cell phone is off.     Denies any vision changes.          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    Palpitations     Recurrent urinary tract infection     Seizures ([facility])     Sleep disorder     last assessed: 10/19/2013    Speech impairment     Stroke ([facility])     Thyroid disease     Tinnitus          Pain Levels   Restin    With Activity:  0      Objective    Impairment Observations:        ERASMO Jean-Baptiste           UPPER EXTREMITY FUNCTION   Impaired Impaired Dominant Hand: Right     /PINCH STRENGTH             Dynamometer    - Gross Grasp 15 lbs 15 lbs abnormal   Pinch Meter     - Pincer 4 lbs 5 lbs abnormal    - Tripod 6.5 lbs 5 lbs abnormal    - Lateral 7.5 lbs  4.5 lbs abnormal     AROM (Seated)             Scapula   - Retraction/Protraction  - Elevation/Depression  - Upward/Downward rotation Near full  Near full     Shoulder FF Near full   Near full      Shoulder Ext Near full   Near full      Shoulder internal rotation  Near full  Near full     Shoulder external rotation  Near full  Near full     Shoulder Abd Near full   Near full      Shoulder Add Near full   Near full      Elbow Flex Full   Full      Elbow Ext Full  (needed tactile cues) Full      Pronation Full   Full      Supination Near full  Near full      Wrist Flex Full   Full      Wrist Ext Full   Full      Digit Flex Full   Full      Digit Ex Full   Full       Composite Grasp        Hook Grasp                MMT             Shoulder FF 3+/5 4-/5    Shoulder Ext 3+/5 4-/5    Shoulder Abduction 3+/5 4-/5    Shoulder Adduction 3+/5 4-/5    Elbow Flex 4-/5 4+/5    Elbow Ext 4+/5 4-/5    Wrist Flex 4-/5 4-/5    Wrist Ext 4-/5 4-/5            SENSATION      Monofilament Testing  Normal: 2.83 mm    Diminished light touch: 3.61 mm    Diminished protective sensation: 4.31 mm    Loss of protective sensation: 4.56    Complete loss of sensation / deep pressure: 6.65 4.31 mm forearm/upper arm     4.56 mm hand  3.61 mm     Sharp / Dull       Proprioception Impaired     Hot/Cold Temp      Stereognosis         COORDINATION      Opposition Impaired        Finger to Nose Impaired      Rapid Alternating Movement Impaired   Decreased awareness of position of hand    9 Hole Peg Test 3 min 12 seconds  DNT     Fxnl Dexterity Test      Tyson Hand Function Test         - Handwriting         - Card Turning         - Small Item p/u         - Simulated Feeding         - Stacking Checkers         - Moving Light Objects         - Moving Heavy Objects      Concentric Circles Test (tremors)        TONE: MODIFIED ITZEL SCALE     DNT   No increase in muscle tone (0)      Slight Increase in muscle tone with catch and release or min resist at end range (1)      Slight Increase in muscle tone with catch and release, followed by min resistance through remainder of range (1+)      Increased muscle tone through full range, able to be moved easily (2)      Considerable increase in tone, difficult to move (3)      Rigid in Flexion/Extension (4)                ADL Simulation  Donning of pants-  -- seconds with -- assist  Donning t-shirt/jacket-   -- seconds with -- assist         Neuro QOL  Upper Extremity Function (Fine motor, ADL):     Are you able to turn a key in a lock? Unable to do  Are you able to brush your teeth? With much difficulty  Are you able to make a phone call using a touch tone or key-pad?  With some difficulty  Are you able to  coins from a table top? With much difficulty  Are you are able to write with a pen or pencil With much difficulty  Are you able to open and close a zipper? With much difficulty  Are you able to wash and dry your body? With much difficulty  Are you able to shampoo your hair? With much difficulty  Are you able to open previously opened jars? With much difficulty  Are you able to hold a plate full of food? Unable to do  Are you able to pull on trousers? With much difficulty  Are you able to button your shirt? Unable to do  Are you able to trim your fingernails? Unable to do  Are you able to cut your toe nails? Unable to do  Are you able to bend down and  clothing from the floor? With much difficulty  How much DIFFICULTY do you currently have using a spoon to eat a meal? A lot of difficulty  How much DIFFICULTY do you currently have putting on a pullover shirt? A lot of difficulty  How much DIFFICULTY do you currently have taking off a pullover shirt? A lot of difficulty  How much DIFFICULTY do you currently have removing wrappings from small objects? Can't do  How much DIFFUCULTY do you currently have opening medications or vitamin containers (e.g. childproof containers, small bottles)? Can't do      OTHER PLANNED THERAPY INTERVENTIONS:   Seated and in stance neuro re-ed  Task-Oriented Training  FMC/prehension  GMC  Proximal to distal teaming  Timed Trials - RUE  Manual tx - RUE as needed  IASTM - RUE as needed  Hand to target  Sensory re-ed (Cutaneous/Proprioceptive) - RUE   Seated functional reach: crossing midline  Closed chain activities  Open chain activities  HEP  ADL's   Transfers to mat

## 2025-01-08 NOTE — PROGRESS NOTES
"Speech-Language Pathology Initial Evaluation    Today's date: 2025   Patient’s name: Shae Jung  : 1950  MRN: [id number]  Safety measures: h/o CVA  Referring provider: Tami Arechiga MD    Encounter Diagnosis     ICD-10-CM    1. Other voice and resonance disorders  R49.8       2. 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 Speech disturbance, unspecified type  R47.9 Ambulatory Referral to Speech Therapy      5. History of CVA with residual deficit  I69.30 Ambulatory Referral to Speech Therapy          Assessment:  Patient presents with severe voice disorder characterized by decreased vocal intensity, increased muscle tension, and hoarse vocal quality secondary to glottic insufficiency and presbylarynges.  During testing today, Patient's voice was noted to go \"in and out\" without any consistency.  Patient's voice was evident though when sneezing and laughing!  This shows that voicing is possible for Patient.  When she was able to produce open vowel (e.g. \"ah\" and \"oo\") she stated that she felt some tension and pain.  Clinician explained that this could be due to the fact that she had not used her voice in many months so she is now feeling fatigue.  Muscle tension is suspected due to increased whispering.  Upon palpation, Patient's SCM and BOT were noted to be very tight.  During subjective measures, Patient's son, Jean, answered questions based on his observations at home.  During objective measures, Patient was better able to produce open \"oo\" sound compared to \"ah\" sound.  Patient attempted S/Z ratio, but was unable to produce /z/ sound.  At this time, it is recommended that Patient be seen for skilled outpatient Speech Therapy to target the goals mentioned below to increase vocal intensity, reduce muscle tension, and promote healthy vocal quality.  Patient will be a good candidate for diaphragmatic breath training, SOVTE, and MFR.        Short Term Goals:   1. Patient " will be educated on vocal hygiene and demonstrate understanding of recommendations to facilitate increased quality of voice, to be achieved in 2-4 weeks.    2. Patient will be educated on diaphragmatic breathing (versus clavicular breathing) to establish controlled, rhythmic breathing, to be achieved in 2-4 weeks.    3. Patient will utilize diaphragmatic breathing during oral sentence/paragraph reading in 80% of opportunities to facilitate increased breath support for voice/speaking, decrease laryngeal effort, and improve glottic closure to be achieved in 3 months.    4. Patient will utilize semi-occluded vocal tract exercises without laryngeal tension to promote healthy vocal function with 80% accuracy, to be achieved in 3 months.     5. Patient will utilize a forward tone focused/resonant voice to decrease vocal hyperfunction and improve voice quality and vocal projection, to be achieved in 3 months.      6. Patient will self-report a decrease in muscle tension of the larynx and cervical area after independently completing relaxation/stretching exercise to be achieved in 3 months.     7. Patient will learn and apply resonance techniques at the word, sentence, and paragraph levels with min verbal cues to facilitate reduced laryngeal tension when voicing, to be achieved in 3 months.    Long Term Goals:   1. Patient will improve vocal quality for increased functional communication by discharge.     2. Patient will independently utilize vocal function exercises to maintain best level of voice to facilitate increased generalization of skills into conversational speech by discharge.       Plan:  Patient would benefit from outpatient skilled Speech Therapy services: Voice therapy    Frequency: 1-2x weekly  Duration: 3 months    Intervention certification from: 1/8/2025  Intervention certification to: 04/08/2025      Subjective:  History of present illness: Patient is a 74 y.o. female who was referred to outpatient  skilled Speech Therapy services for a voice evaluation. Patient recently met with neurology (Dr. Hancock) regarding CVA follow up.  Patient was noted to have 3 previous CVAs dating from [id number].  Since her stroke in 2019, Patient has had loss of coordination in RUE.  She is also experiencing trouble with ambulation since 2020 and uses a wheelchair.  In regards to speech concerns, Patient has had hoarse voice and whispers when she speaks which was noted starting in 2019, but has worsened over the past few months. Patient did visit with ENT (Dr. Jonas) in June of 2024 who reported glottic insufficiency and presbylarynges.  Today, Patient presents with her son, Jean, with concerns regarding her voicing.     Patient's goal(s): Better voice    Pain: Present (scale:  Only when speaking )    Hearing: WFL  Vision: WFL (with glasses when watching TV)    Home environment/lifestyle: At home (with home aide)  Highest level of education:  NT  Vocational status: Sewed for [employer]       Objective:  VOICE EVALUATION:    Interview:   Chief complaint: Hoarse vocal quality, unpredictable voicing, only whispering   -Onset: gradual  -Symptom progression since onset has been: worsening  -Associated symptoms: h/o CVA  -Voice is better: no known pattern  -Voice is worse: no known pattern    -Occupation: Currently retired (Worked as a  for [employer])  -Vocal demand: low (voice use includes: Talking with caregiver and family memebers)  -Amplification: Never  -Past training or therapy: Yes at [facility] (mainly focused on motor speech and language)  -Past problems: none    -Singing: no    -Voice surgery: no    Vocal hygiene:  -Smoking: NT  -Water intake daily in oz: NT  -Alcohol intake servings weekly: NT  -Caffeine intake daily in oz: NT  -Caffeine-free beverages daily in oz: NT  -Throat clearing: None  -Lozenges:  NT  -Exposure to chemicals, dry, or juwan environments: NT    -Cough:  Yes, sometimes with saliva     -Dyspnea:   NT    -Dysphagia:  According to ENT (June of 2024), mild dysphagia    -Allergy testing: NT  -Allergies: medications  -Nasal symptoms: sneezing  -GERD/LPR symptoms:  NT  -Recurrent URI: none  -Other major medical conditions: None      Patient Self-Ratings:  -The Voice Handicap Index (VHI) is a self-administered, 30-item outcomes instrument to quantify patients' perception of their voice handicap. It is a list of statements that many people have used to describe their voices and the effects of their voices on their lives. Patient indicated how frequently she has the same experience using a rating point scale (“never” = 0, “almost never” = 1, “sometimes” = 2, “almost always” = 3, and “always” = 4). Patient obtained a score of 105/120, indicating a self-perceived impairment level of SEVERE.     Subscale: Score: Self-Perceived Impairment Level:   Physical 37/40 Severe   Functional 40/40 Severe   Emotional 28/40 Severe        TOTAL 105/120 Severe       Objective Measurements/Voice Parameters:  RESPIRATORY EFFICIENCY:   -S:Z Ratio Task: Patient was instructed to sustain the sounds /s/ and /z/ to examine the coordination and efficiency of respiration and voice production. Normative data suggests that adults can prolong these sounds for 20-25 seconds. Ratios of 1.4 and above are consistent with laryngeal inefficiency, and ratios of 2.0 and above are suggestive of vocal fold pathology. Patient's S:Z ratio was not able to be calculated (3.00 sec : N/A).  Patient attempted /z/ sound several times, but was unable to complete.    -Maximum Phonation Time: Patient was instructed to sustain /a/ to measure respiratory and laryngeal coordination and efficiency. Adults are typically able to prolong vowels sound for 15-20 seconds. Reduced MPT may suggest poor respiratory support, or poor medial glottal closure. Patient's MPT was 8.4 seconds.      MEASURES OF PITCH:  The Visi-Pitch IV, a computer-driven voice analysis program, was used  "to collect objective voice data using the Visi-Pitch Multidimensional Voice Program (MDVP) & Real Time Pitch Program (MTPP).     -Multi-Dimensional Voice Profile (MDVP): This is obtained on the phonation of the sound /a/, in which dimensions of the voice, including frequncy perturbations, amplitude perturbations, variations in F0, noise to harmonic ratio, voice turbulence Index, soft phonation Index, tremours in frequency and amplitude, degree of subharmonics, and degree of voicing apart from the frequency and amplitude, are reflected.      Normative Data:   Mean Fundamental Frequency (F0) N/A 243.9 Hz   Jitter (RAP) N/A 0.378%   Shimmer N/A 1.997%   Agrbv-ka-Wamwvocyl Ratio (NHR) N/A 0.112     **Unable to collect data, voicing not available    -Habitual Pitch: Patient was instructed to engage in two different speaking tasks (counting and reading) to calculate the pitch she uses most frequently.     Task: Mean F0 (Hz) Min-Max Range (Hz) Normative Data:   Speaking (counting 1-10) 297.61 Hz 306..22 Hz 225 Hz (180 Hz -250 Hz)   Reading (\"[place]”) 288.77 Hz 288..11 Hz 225 Hz (180 Hz -250 Hz)       -Maximal Phonational Frequency Range: Patient was instructed to voice from lowest to highest notes.     Task Min-Max Range (Hz) Normative Data:   Gliding 180..64 Hz 134 Hz-895 Hz       Patient was educated on various vocal abuse behaviors and vocal hygiene throughout assessment. Vocal abuses included decreased water and increased caffeine intake, coughing and throat-clearing, thoracic/clavicular breathing, straining voice, whispering. Vocal hygiene strategies included increased water intake, decreased caffeine intake, throat-clearing awareness, increased breath support/diaphragmatic breathing, compensatory strategies to minimize vocal abuses during work day, confidential voice. Patient was agreeable.       Treatment:  -No treatment was performed on this date of service.      Visit Tracking:  POC "   Expires Auth Expiration Date ST Visit Limit   04/08/2025 PENDING PENDING          Visit/Unit Tracking:  Auth Status Date 01/08/2025   PENDING Used 1    Remaining 0       Intervention Comments:  45 minutes test administration

## 2025-01-08 NOTE — LETTER
2025    Tami Arechiga MD  [address]  [address]  [facility] [address]    Patient: Shae Jung   YOB: 1950   Date of Visit: 2025     Encounter Diagnosis     ICD-10-CM    1. Other voice and resonance disorders  R49.8       2. 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 Speech disturbance, unspecified type  R47.9 Ambulatory Referral to Speech Therapy      5. History of CVA with residual deficit  I69.30 Ambulatory Referral to Speech Therapy          Dear Dr. Arechiga:    Thank you for your recent referral of Shae Jung. Please review the attached evaluation summary from Shae's recent visit.     Please verify that you agree with the plan of care by signing the attached order.     If you have any questions or concerns, please do not hesitate to call.     I sincerely appreciate the opportunity to share in the care of one of your patients and hope to have another opportunity to work with you in the near future.     Sincerely,    Diann Sheth, SLP      Referring Provider:     Based upon review of the patient's progress and continued therapy plan, it is my medical opinion that Shae Jung should continue speech therapy treatment at the Physical Therapy at [address]:                    Tami Arechiga MD  [address]  [address]  [facility] [address]  Via Fax: [phone]        Speech-Language Pathology Initial Evaluation    Today's date: 2025   Patient’s name: Shae Jung  : 1950  MRN: [id number]  Safety measures: h/o CVA  Referring provider: Tami Arechiga MD    Encounter Diagnosis     ICD-10-CM    1. Other voice and resonance disorders  R49.8       2. 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 Speech disturbance, unspecified type  R47.9 Ambulatory Referral to Speech Therapy      5. History of CVA with residual deficit  I69.30 Ambulatory Referral to Speech Therapy     "      Assessment:  Patient presents with severe voice disorder characterized by decreased vocal intensity, increased muscle tension, and hoarse vocal quality secondary to glottic insufficiency and presbylarynges.  During testing today, Patient's voice was noted to go \"in and out\" without any consistency.  Patient's voice was evident though when sneezing and laughing!  This shows that voicing is possible for Patient.  When she was able to produce open vowel (e.g. \"ah\" and \"oo\") she stated that she felt some tension and pain.  Clinician explained that this could be due to the fact that she had not used her voice in many months so she is now feeling fatigue.  Muscle tension is suspected due to increased whispering.  Upon palpation, Patient's SCM and BOT were noted to be very tight.  During subjective measures, Patient's son, Jean, answered questions based on his observations at home.  During objective measures, Patient was better able to produce open \"oo\" sound compared to \"ah\" sound.  Patient attempted S/Z ratio, but was unable to produce /z/ sound.  At this time, it is recommended that Patient be seen for skilled outpatient Speech Therapy to target the goals mentioned below to increase vocal intensity, reduce muscle tension, and promote healthy vocal quality.  Patient will be a good candidate for diaphragmatic breath training, SOVTE, and MFR.        Short Term Goals:   1. Patient will be educated on vocal hygiene and demonstrate understanding of recommendations to facilitate increased quality of voice, to be achieved in 2-4 weeks.    2. Patient will be educated on diaphragmatic breathing (versus clavicular breathing) to establish controlled, rhythmic breathing, to be achieved in 2-4 weeks.    3. Patient will utilize diaphragmatic breathing during oral sentence/paragraph reading in 80% of opportunities to facilitate increased breath support for voice/speaking, decrease laryngeal effort, and improve glottic closure " to be achieved in 3 months.    4. Patient will utilize semi-occluded vocal tract exercises without laryngeal tension to promote healthy vocal function with 80% accuracy, to be achieved in 3 months.     5. Patient will utilize a forward tone focused/resonant voice to decrease vocal hyperfunction and improve voice quality and vocal projection, to be achieved in 3 months.      6. Patient will self-report a decrease in muscle tension of the larynx and cervical area after independently completing relaxation/stretching exercise to be achieved in 3 months.     7. Patient will learn and apply resonance techniques at the word, sentence, and paragraph levels with min verbal cues to facilitate reduced laryngeal tension when voicing, to be achieved in 3 months.    Long Term Goals:   1. Patient will improve vocal quality for increased functional communication by discharge.     2. Patient will independently utilize vocal function exercises to maintain best level of voice to facilitate increased generalization of skills into conversational speech by discharge.       Plan:  Patient would benefit from outpatient skilled Speech Therapy services: Voice therapy    Frequency: 1-2x weekly  Duration: 3 months    Intervention certification from: 1/8/2025  Intervention certification to: 04/08/2025      Subjective:  History of present illness: Patient is a 74 y.o. female who was referred to outpatient skilled Speech Therapy services for a voice evaluation. Patient recently met with neurology (Dr. Hancock) regarding CVA follow up.  Patient was noted to have 3 previous CVAs dating from 5929-3204.  Since her stroke in 2019, Patient has had loss of coordination in RUE.  She is also experiencing trouble with ambulation since 2020 and uses a wheelchair.  In regards to speech concerns, Patient has had hoarse voice and whispers when she speaks which was noted starting in 2019, but has worsened over the past few months. Patient did visit with ENT  (Dr. Jonas) in June of 2024 who reported glottic insufficiency and presbylarynges.  Today, Patient presents with her son, Jean, with concerns regarding her voicing.     Patient's goal(s): Better voice    Pain: Present (scale:  Only when speaking )    Hearing: WFL  Vision: WFL (with glasses when watching TV)    Home environment/lifestyle: At home (with home aide)  Highest level of education:  NT  Vocational status: Sewed for [employer]       Objective:  VOICE EVALUATION:    Interview:   Chief complaint: Hoarse vocal quality, unpredictable voicing, only whispering   -Onset: gradual  -Symptom progression since onset has been: worsening  -Associated symptoms: h/o CVA  -Voice is better: no known pattern  -Voice is worse: no known pattern    -Occupation: Currently retired (Worked as a  for [employer])  -Vocal demand: low (voice use includes: Talking with caregiver and family memebers)  -Amplification: Never  -Past training or therapy: Yes at [facility] (mainly focused on motor speech and language)  -Past problems: none    -Singing: no    -Voice surgery: no    Vocal hygiene:  -Smoking: NT  -Water intake daily in oz: NT  -Alcohol intake servings weekly: NT  -Caffeine intake daily in oz: NT  -Caffeine-free beverages daily in oz: NT  -Throat clearing: None  -Lozenges:  NT  -Exposure to chemicals, dry, or juwan environments: NT    -Cough:  Yes, sometimes with saliva     -Dyspnea:  NT    -Dysphagia:  According to ENT (June of 2024), mild dysphagia    -Allergy testing: NT  -Allergies: medications  -Nasal symptoms: sneezing  -GERD/LPR symptoms:  NT  -Recurrent URI: none  -Other major medical conditions: None      Patient Self-Ratings:  -The Voice Handicap Index (VHI) is a self-administered, 30-item outcomes instrument to quantify patients' perception of their voice handicap. It is a list of statements that many people have used to describe their voices and the effects of their voices on their lives. Patient indicated how  frequently she has the same experience using a rating point scale (“never” = 0, “almost never” = 1, “sometimes” = 2, “almost always” = 3, and “always” = 4). Patient obtained a score of 105/120, indicating a self-perceived impairment level of SEVERE.     Subscale: Score: Self-Perceived Impairment Level:   Physical 37/40 Severe   Functional 40/40 Severe   Emotional 28/40 Severe        TOTAL 105/120 Severe       Objective Measurements/Voice Parameters:  RESPIRATORY EFFICIENCY:   -S:Z Ratio Task: Patient was instructed to sustain the sounds /s/ and /z/ to examine the coordination and efficiency of respiration and voice production. Normative data suggests that adults can prolong these sounds for 20-25 seconds. Ratios of 1.4 and above are consistent with laryngeal inefficiency, and ratios of 2.0 and above are suggestive of vocal fold pathology. Patient's S:Z ratio was not able to be calculated (3.00 sec : N/A).  Patient attempted /z/ sound several times, but was unable to complete.    -Maximum Phonation Time: Patient was instructed to sustain /a/ to measure respiratory and laryngeal coordination and efficiency. Adults are typically able to prolong vowels sound for 15-20 seconds. Reduced MPT may suggest poor respiratory support, or poor medial glottal closure. Patient's MPT was 8.4 seconds.      MEASURES OF PITCH:  The [employer]-Pitch IV, a computer-driven voice analysis program, was used to collect objective voice data using the Visi-Pitch Multidimensional Voice Program (MDVP) & Real Time Pitch Program (MTPP).     -Multi-Dimensional Voice Profile (MDVP): This is obtained on the phonation of the sound /a/, in which dimensions of the voice, including frequncy perturbations, amplitude perturbations, variations in F0, noise to harmonic ratio, voice turbulence Index, soft phonation Index, tremours in frequency and amplitude, degree of subharmonics, and degree of voicing apart from the frequency and amplitude, are reflected.       "Normative Data:   Mean Fundamental Frequency (F0) N/A 243.9 Hz   Jitter (RAP) N/A 0.378%   Shimmer N/A 1.997%   Xjomd-st-Fhghwksxw Ratio (NHR) N/A 0.112     **Unable to collect data, voicing not available    -Habitual Pitch: Patient was instructed to engage in two different speaking tasks (counting and reading) to calculate the pitch she uses most frequently.     Task: Mean F0 (Hz) Min-Max Range (Hz) Normative Data:   Speaking (counting 1-10) 297.61 Hz 306..22 Hz 225 Hz (180 Hz -250 Hz)   Reading (\"[place]”) 288.77 Hz 288..11 Hz 225 Hz (180 Hz -250 Hz)       -Maximal Phonational Frequency Range: Patient was instructed to voice from lowest to highest notes.     Task Min-Max Range (Hz) Normative Data:   Gliding 180..64 Hz 134 Hz-895 Hz       Patient was educated on various vocal abuse behaviors and vocal hygiene throughout assessment. Vocal abuses included decreased water and increased caffeine intake, coughing and throat-clearing, thoracic/clavicular breathing, straining voice, whispering. Vocal hygiene strategies included increased water intake, decreased caffeine intake, throat-clearing awareness, increased breath support/diaphragmatic breathing, compensatory strategies to minimize vocal abuses during work day, confidential voice. Patient was agreeable.       Treatment:  -No treatment was performed on this date of service.      Visit Tracking:  POC   Expires Auth Expiration Date ST Visit Limit   04/08/2025 PENDING PENDING          Visit/Unit Tracking:  Auth Status Date 01/08/2025   PENDING Used 1    Remaining 0       Intervention Comments:  45 minutes test administration         "

## 2025-01-09 ENCOUNTER — APPOINTMENT (OUTPATIENT)
Dept: PHYSICAL THERAPY | Facility: CLINIC | Age: 75
End: 2025-01-09
Payer: MEDICARE

## 2025-01-09 ENCOUNTER — APPOINTMENT (OUTPATIENT)
Dept: OCCUPATIONAL THERAPY | Facility: CLINIC | Age: 75
End: 2025-01-09
Payer: MEDICARE

## 2025-01-10 ENCOUNTER — EVALUATION (OUTPATIENT)
Dept: PHYSICAL THERAPY | Facility: CLINIC | Age: 75
End: 2025-01-10
Payer: MEDICARE

## 2025-01-10 DIAGNOSIS — I69.30 HISTORY OF CVA WITH RESIDUAL DEFICIT: ICD-10-CM

## 2025-01-10 PROCEDURE — 97163 PT EVAL HIGH COMPLEX 45 MIN: CPT | Performed by: PHYSICAL THERAPIST

## 2025-01-10 NOTE — PROGRESS NOTES
PT Evaluation     Today's date: 1/10/2025  Patient name: Shae Jung  : 1950  MRN: [id number]  Referring provider: Nelly Hancock MD  Dx:   Encounter Diagnosis     ICD-10-CM    1. History of CVA with residual deficit  I69.30 Ambulatory Referral to Physical Therapy                     Assessment    Assessment details: Pt reports to physical therapy this date with a diagnosis of chronic CVA with gait dysfunction. At this time patient has decreased lower extremity coordination, decreased strength grossly bilaterally, clonus bilaterally in ankle plantarflexors, decreased sensation in left lower extremity and decreased balance all causing her to be at a high risk of falls, have difficulty with independence with ambulation and require assistance for all transfers. For those reasons patient is dependent for ADLs, IADLs, and all mobility. Pt will benefit from skilled therapy intervention in order to reduce caregiver burden, improve independence with mobility, transfers, gait, improve balance, strength, coordination and overall improve osseous health, and cardiovascular endurance and health.     Goals  STG:  Pt will be able to complete TUG in one minute with minimal assistance using a RW in 4 weeks  Pt will complete ambulation for household distances with no more than min a using a RW in 4 weeks  Pt will be able to complete stand pivot transfer with no more than supervision assist in 4 weeks  Pt will be able to complete HEP with caregiver in 4 weeks    LTG  Pt will complete HEP with caregiver consistently within 12 weeks  Pt will be independent with ambulation using an assistive device within home in 12 weeks  Pt will be able to complete transfers independently within 12 weeks  Pt will be able to complete 6 minute walk test in 12 weeks    Plan    Planned therapy interventions: neuromuscular re-education, balance, coordination, patient/caregiver education, gait training, therapeutic activities, therapeutic  exercise and home exercise program    Frequency: 2x week  Duration in weeks: 12  Plan of Care beginning date: 1/10/2025  Plan of Care expiration date: 4/10/2025  Treatment plan discussed with: patient and family      Subjective Evaluation    History of Present Illness  Mechanism of injury: Patient is a 74 y.o. female who was referred to outpatient skilled PT services. Patient recently met with neurology (Dr. Hancock) regarding CVA follow up.  Patient was noted to have 3 previous CVAs dating from 2949-4360.  Since her stroke in 2019, Patient has had loss of coordination in RUE.  She is also experiencing trouble with ambulation since 2020 and uses a wheelchair. Pt is living at home with her son wit ha nurse assistance 7 days a week for 8 hours. She is dependent for ADLs and IADLs. She is able to walk with assistance and has difficulty placing her right foot flat. She struggles with UE dexterity and with speech as well. Typically walking with son at home with bilateral hand hold assist. Ambulation has required this level of assistance for the last year and a half. She was able to walk prior without a cane or walker. She requires assistance for all transfers at this time.   Patient Goals  Patient goals for therapy: improved balance and increased strength  Patient goal: improve gait  Pain  Location: low back pain    Social Support  Steps to enter house: yes (4 steps)  Stairs in house: yes (stair lift)   Lives in: multiple-level home  Lives with: adult children    Employment status: not working  Exercise history: stretching in home, standing at railing, walking    Treatments  Previous treatment: physical therapy, speech therapy and occupational therapy (in 2024 at [facility])  Current treatment: physical therapy, speech therapy and occupational therapy      Objective   Neuro Exam:     Sensation   Light touch LE: left impaired  Light touch LE: right WNL  Proprioception LE: left impaired  Proprioception LE: right  WNL    Coordination   Heel to shin: left dysmetric and right dysmetric  Rapid alternating movements: LE impaired    Modified Mary Lou   Left gastroc: 1  Right gastroc: 1  Left hamstrin  Right hamstrin  Left quadricepts: 0  Right quadricepts: 0    Transfers   Sit to stand: minimum assist   Wheelchair to mat: moderate assist   Mat to wheelchair: moderate assist   Sit to supine: minimum assist   Supine to sit: minimum assist     Functional outcomes   Functional outcome gait comment: R HHA with ataxic foot placement and mod assist for balance at trunk           Short Term Goal Expiration Date:(2/10/25)  Long Term Goal Expiration Date: (4/10/25)  POC Expiration Date: (4/10/25)      POC expires Unit limit Auth Expiration date PT/OT/ST + Visit Limit?   4/10/25 bomn pending bomn                           Visit/Unit Tracking  AUTH Status:  Date 1/10             pending Used 1              Remaining                     Precautions fall risk       Manuals                                        Neuro Re-Ed         2 mwt        Gait speed        TUG                                        Ther Ex                                                                        Ther Activity                        Gait Training                        Modalities

## 2025-01-13 ENCOUNTER — OFFICE VISIT (OUTPATIENT)
Dept: PHYSICAL THERAPY | Facility: CLINIC | Age: 75
End: 2025-01-13
Payer: MEDICARE

## 2025-01-13 ENCOUNTER — OFFICE VISIT (OUTPATIENT)
Dept: SPEECH THERAPY | Facility: CLINIC | Age: 75
End: 2025-01-13
Payer: MEDICARE

## 2025-01-13 DIAGNOSIS — R47.9 SPEECH DISTURBANCE, UNSPECIFIED TYPE: ICD-10-CM

## 2025-01-13 DIAGNOSIS — I69.30 HISTORY OF CVA WITH RESIDUAL DEFICIT: ICD-10-CM

## 2025-01-13 DIAGNOSIS — J38.3 GLOTTIC INSUFFICIENCY: ICD-10-CM

## 2025-01-13 DIAGNOSIS — I69.30 HISTORY OF CVA WITH RESIDUAL DEFICIT: Primary | ICD-10-CM

## 2025-01-13 DIAGNOSIS — R49.8 OTHER VOICE AND RESONANCE DISORDERS: Primary | ICD-10-CM

## 2025-01-13 DIAGNOSIS — J38.7 PRESBYLARYNGES: ICD-10-CM

## 2025-01-13 PROCEDURE — 97112 NEUROMUSCULAR REEDUCATION: CPT | Performed by: PHYSICAL THERAPIST

## 2025-01-13 PROCEDURE — 97110 THERAPEUTIC EXERCISES: CPT | Performed by: PHYSICAL THERAPIST

## 2025-01-13 PROCEDURE — 92507 TX SP LANG VOICE COMM INDIV: CPT

## 2025-01-13 NOTE — PROGRESS NOTES
Daily Speech Treatment Note    Today's date: 2025   Patient’s name: Shae Jung  : 1950  MRN: [id number]  Safety measures: h/o CVA  Referring provider: Nelly Hancock MD    Encounter Diagnosis     ICD-10-CM    1. Other voice and resonance disorders  R49.8       2. 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 Speech disturbance, unspecified type  R47.9       5. History of CVA with residual deficit  I69.30         Visit Tracking:  POC   Expires Auth Expiration Date ST Visit Limit   2025 PENDING PENDING          Visit/Unit Tracking:  Auth Status Date 2025   PENDING Used 1 2    Remaining 0 PENDING       Subjective/Behavioral:  -Patient was open to trying MFR today.     Objective/Assessment:  -Patient's family member/caregiver was present during today's session.  -Reviewed testing results and goals in plan care with patient. Patient is in agreement at this time.      Short Term Goals:   1. Patient will be educated on vocal hygiene and demonstrate understanding of recommendations to facilitate increased quality of voice, to be achieved in 2-4 weeks.    2. Patient will be educated on diaphragmatic breathing (versus clavicular breathing) to establish controlled, rhythmic breathing, to be achieved in 2-4 weeks.    3. Patient will utilize diaphragmatic breathing during oral sentence/paragraph reading in 80% of opportunities to facilitate increased breath support for voice/speaking, decrease laryngeal effort, and improve glottic closure to be achieved in 3 months.    4. Patient will utilize semi-occluded vocal tract exercises without laryngeal tension to promote healthy vocal function with 80% accuracy, to be achieved in 3 months.     5. Patient will utilize a forward tone focused/resonant voice to decrease vocal hyperfunction and improve voice quality and vocal projection, to be achieved in 3 months.      6. Patient will self-report a decrease in muscle  tension of the larynx and cervical area after independently completing relaxation/stretching exercise to be achieved in 3 months.     Following patient's verbal consent to treat, manual therapy technique through myofascial release was applied to patient's BOT, laryngeal region, and SCM.     Region(s) 01/13/2025         Elongation of SCM   X 15 mins (L and R)         BOT   X 5 mins         Inferior Laryngeal Glide   X 5 mins         Palpatory examination revealed moderate mechanosensitivity (i.e., sensitivity to mechanical pressure/stretch) through the BOT, laryngeal, and SCM region. A mild palpatory pressure was suspected to show a moderate area of apparent thickening that, with stimulation/pressure/stretch, reproduced patient's complaints/correlated to her primary issue of tension. Patient verbalized that technique/pressure, through triggering her symptoms, was helpful.    Patient was able to identify regions of myofascial sensitivity and participate in exercises (e.g., BOT) to reduce overall tension. Patient reported increased ease with trials.    Self-rating:  -Start of session: 10/10 (with 10 being the worst)  -End of session: 5/10 - IMPROVEMENT    Notes:  -upright position (sitting) and lotion     7. Patient will learn and apply resonance techniques at the word, sentence, and paragraph levels with min verbal cues to facilitate reduced laryngeal tension when voicing, to be achieved in 3 months.      Plan:  -Patient was provided with home exercises/activities to target goals in plan of care at the end of today's session.  -Continue with current plan of care.

## 2025-01-13 NOTE — PROGRESS NOTES
Daily Note     Today's date: 2025  Patient name: Shae Jung  : 1950  MRN: [id number]  Referring provider: Nelly Hancock MD  Dx: No diagnosis found.               Subjective: no issues reported pre PT.       Objective: See treatment diary below  Gait analysis:  Little to no trunk rotation,increased reliance on  UE support and bracing with UE. Inconsistent base of support varies between WBOS and normal good reciprocal pattern but with inconsistent step height and length.     C/o hip pain.     Assessment: Tolerated treatment well. Patient demonstrated fatigue post treatment and would benefit from continued PT  Further endurance and gait testing performed today. Patient reliant on HHA of son to ambulate, demonstrates antalgic gait pattern, responsive to cues for NBOS. Demonstrates decreased endurance is at high risk for falls per TUG and gait speed.   With some non verbal cues noted for hip pain throughout session especially with sitting down. Patient nods no that hip pain did not increase with exercises. Progression limited by decreased communication, regardless of language barrier, patent also with aphasia from prior CVA.    Plan: Continue per plan of care.  Progress treatment as tolerated.       Short Term Goal Expiration Date:(2/10/25)  Long Term Goal Expiration Date: (4/10/25)  POC Expiration Date: (4/10/25)      POC expires Unit limit Auth Expiration date PT/OT/ST + Visit Limit?   4/10/25 bomn pending bomn                             Visit/Unit Tracking  AUTH Status:  Date 1/10 01/13              8 approved 01/10/25-25 Used 1 2             Remaining  7 6                  Precautions fall risk       Manuals                                        Neuro Re-Ed         2 mwt 175ft w/BL HHA       Gait speed 20ft/24.6s  0.81ft/s    0.24m/s  W/HHA       TUG 44.7 w/HHA       Dynamic balance   Solo   Side stepping    Backward walking        Hurdles   Solo   PRN                      Ther Ex         Endurance and ROM  Nu step L3 x 10 min                                                                Ther Activity SPT        HHA x 5                Gait Training        Solo   NV   Level   W/1 UE HHA    Gait train with AD (RW)                  Modalities

## 2025-01-14 ENCOUNTER — OFFICE VISIT (OUTPATIENT)
Dept: OCCUPATIONAL THERAPY | Facility: CLINIC | Age: 75
End: 2025-01-14
Payer: MEDICARE

## 2025-01-14 DIAGNOSIS — I69.30 HISTORY OF CVA WITH RESIDUAL DEFICIT: Primary | ICD-10-CM

## 2025-01-14 PROCEDURE — 97112 NEUROMUSCULAR REEDUCATION: CPT

## 2025-01-14 PROCEDURE — 97110 THERAPEUTIC EXERCISES: CPT

## 2025-01-14 NOTE — PROGRESS NOTES
Occupational Therapy Daily Note:    Today's date: 2025  Patient name: Shae Jung  : 1950  MRN: [id number]  Referring provider: Nelly Hancock MD  Dx:   Encounter Diagnosis   Name Primary?    History of CVA with residual deficit Yes                    POC expires Unit limit Auth Expiration date PT/OT/ST + Visit Limit?   25 BOMN TBD BOMN                           Visit/Unit Tracking  AUTH Status:  Date             8 (25 - 3/9/25 Used 1 2             Remaining  7 6                 Duration in weeks: 2x/week, 12 weeks  Plan of care beginning date: 25  Plan of care expiration date: 25  PN #1 due: 25    Precautions: fall risk, Persian speaking, low voice tone, dec sensation to RUE         Subjective: Jean states that she enjoyed card game and digiflex and tension tongs were hard on the R hand.     Objective: See treatment below. Pt participated in skilled OT this date with focus to NMRE, UE strength/endurance, hand//pinch strength, functional tool use, increased proprioception, b/l coordination, /VM skills, hand to target accuracy, proximal stability, and overall activity tolerance/endurance, for increased safety and engagement in ADL/IADL tasks.     Neuro Re-ed: Pt started card matching with board on back of mirror towards right side encouraging wbing into RUE while reaching with LUE cross body to target. Pt with good scanning strategies and speed, with increased accuracy due to the same. Pt completed 25% of task prior to running out of time. Therapist placing foot board under pts feet for grounding and reaching purposes. Pt enjoyed card matching and may complete similar at home.    Thera Ex: Pt engaged in seated EOM tabletop task this date with therapist spreading out pool noodles on tabletop and pt using RUE first with red tension tongs and picked up all pool noodles and placed into elevated target held by therapist. Pt then using LUE to do the same. Pt  completed tasks with fatigue noted as well as min difficulty with functional tool use in b/l hands, however, increased difficulty noted in R. Coordination difficulties noted with functional tool use and targeting.     Pt completed 30 reps of yellow digiflex on b/l hands, with similar coordination difficulties noted.    Assessment: Tolerated treatment well. Increased difficulty with b/l coordination and functional tool management. Therapist incorporating wbing to address the same. Pt tolerated wbing well, with pt lifting legs at times off of foot placement inconsistently. Pt tolerated unsupported seated balance for sessions entirety, with min discomfort noted at end of session, however, no LOB noted. Patient would benefit from continued skilled OT.    Plan: Continued skilled OT per POC

## 2025-01-16 ENCOUNTER — APPOINTMENT (OUTPATIENT)
Dept: PHYSICAL THERAPY | Facility: CLINIC | Age: 75
End: 2025-01-16
Payer: MEDICARE

## 2025-01-16 ENCOUNTER — APPOINTMENT (OUTPATIENT)
Dept: OCCUPATIONAL THERAPY | Facility: CLINIC | Age: 75
End: 2025-01-16
Payer: MEDICARE

## 2025-01-16 ENCOUNTER — APPOINTMENT (OUTPATIENT)
Dept: SPEECH THERAPY | Facility: CLINIC | Age: 75
End: 2025-01-16
Payer: MEDICARE

## 2025-01-16 DIAGNOSIS — J38.3 GLOTTIC INSUFFICIENCY: ICD-10-CM

## 2025-01-16 DIAGNOSIS — R47.9 SPEECH DISTURBANCE, UNSPECIFIED TYPE: ICD-10-CM

## 2025-01-16 DIAGNOSIS — R49.8 OTHER VOICE AND RESONANCE DISORDERS: Primary | ICD-10-CM

## 2025-01-16 DIAGNOSIS — J38.7 PRESBYLARYNGES: ICD-10-CM

## 2025-01-16 DIAGNOSIS — I69.30 HISTORY OF CVA WITH RESIDUAL DEFICIT: ICD-10-CM

## 2025-01-16 NOTE — PROGRESS NOTES
Daily Speech Treatment Note    Today's date: 2025   Patient’s name: Shae Jung  : 1950  MRN: [id number]  Safety measures: h/o CVA  Referring provider: Nelly Hancock MD    Encounter Diagnosis     ICD-10-CM    1. Other voice and resonance disorders  R49.8       2. 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 Speech disturbance, unspecified type  R47.9       5. History of CVA with residual deficit  I69.30         Visit Tracking:  POC   Expires Auth Expiration Date ST Visit Limit   2025 PENDING PENDING          Visit/Unit Tracking:  Auth Status Date 2025 ***   PENDING Used 1 2 ***    Remaining 0 PENDING        Subjective/Behavioral:  -Patient was open to trying MFR today.     Objective/Assessment:  -Patient's family member/caregiver was present during today's session.  -Reviewed testing results and goals in plan care with patient. Patient is in agreement at this time.      Short Term Goals:   1. Patient will be educated on vocal hygiene and demonstrate understanding of recommendations to facilitate increased quality of voice, to be achieved in 2-4 weeks.    2. Patient will be educated on diaphragmatic breathing (versus clavicular breathing) to establish controlled, rhythmic breathing, to be achieved in 2-4 weeks.    3. Patient will utilize diaphragmatic breathing during oral sentence/paragraph reading in 80% of opportunities to facilitate increased breath support for voice/speaking, decrease laryngeal effort, and improve glottic closure to be achieved in 3 months.    4. Patient will utilize semi-occluded vocal tract exercises without laryngeal tension to promote healthy vocal function with 80% accuracy, to be achieved in 3 months.     5. Patient will utilize a forward tone focused/resonant voice to decrease vocal hyperfunction and improve voice quality and vocal projection, to be achieved in 3 months.      6. Patient will self-report a decrease in  muscle tension of the larynx and cervical area after independently completing relaxation/stretching exercise to be achieved in 3 months.     Following patient's verbal consent to treat, manual therapy technique through myofascial release was applied to patient's BOT, laryngeal region, and SCM.     Region(s) 01/13/2025         Elongation of SCM   X 15 mins (L and R)         BOT   X 5 mins         Inferior Laryngeal Glide   X 5 mins         Palpatory examination revealed moderate mechanosensitivity (i.e., sensitivity to mechanical pressure/stretch) through the BOT, laryngeal, and SCM region. A mild palpatory pressure was suspected to show a moderate area of apparent thickening that, with stimulation/pressure/stretch, reproduced patient's complaints/correlated to her primary issue of tension. Patient verbalized that technique/pressure, through triggering her symptoms, was helpful.    Patient was able to identify regions of myofascial sensitivity and participate in exercises (e.g., BOT) to reduce overall tension. Patient reported increased ease with trials.    Self-rating:  -Start of session: 10/10 (with 10 being the worst)  -End of session: 5/10 - IMPROVEMENT    Notes:  -upright position (sitting) and lotion     7. Patient will learn and apply resonance techniques at the word, sentence, and paragraph levels with min verbal cues to facilitate reduced laryngeal tension when voicing, to be achieved in 3 months.      Plan:  -Patient was provided with home exercises/activities to target goals in plan of care at the end of today's session.  -Continue with current plan of care.

## 2025-01-21 ENCOUNTER — APPOINTMENT (OUTPATIENT)
Dept: PHYSICAL THERAPY | Facility: CLINIC | Age: 75
End: 2025-01-21
Payer: MEDICARE

## 2025-01-21 ENCOUNTER — APPOINTMENT (OUTPATIENT)
Dept: OCCUPATIONAL THERAPY | Facility: CLINIC | Age: 75
End: 2025-01-21
Payer: MEDICARE

## 2025-01-21 ENCOUNTER — APPOINTMENT (OUTPATIENT)
Dept: SPEECH THERAPY | Facility: CLINIC | Age: 75
End: 2025-01-21
Payer: MEDICARE

## 2025-01-21 DIAGNOSIS — J38.3 GLOTTIC INSUFFICIENCY: ICD-10-CM

## 2025-01-21 DIAGNOSIS — I69.30 HISTORY OF CVA WITH RESIDUAL DEFICIT: ICD-10-CM

## 2025-01-21 DIAGNOSIS — R47.9 SPEECH DISTURBANCE, UNSPECIFIED TYPE: ICD-10-CM

## 2025-01-21 DIAGNOSIS — J38.7 PRESBYLARYNGES: ICD-10-CM

## 2025-01-21 DIAGNOSIS — R49.8 OTHER VOICE AND RESONANCE DISORDERS: Primary | ICD-10-CM

## 2025-01-21 NOTE — PROGRESS NOTES
Daily Speech Treatment Note    Today's date: 2025   Patient’s name: Shae Jung  : 1950  MRN: [id number]  Safety measures: h/o CVA  Referring provider: Nelly Hancock MD    Encounter Diagnosis     ICD-10-CM    1. Other voice and resonance disorders  R49.8       2. 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 Speech disturbance, unspecified type  R47.9       5. History of CVA with residual deficit  I69.30         Visit Tracking:  POC   Expires Auth Expiration Date ST Visit Limit   2025 PENDING PENDING          Visit/Unit Tracking:  Auth Status Date 2025 ***   PENDING Used 1 2 ***    Remaining 0 PENDING        Subjective/Behavioral:  -Patient ***    Objective/Assessment:  -Patient's family member/caregiver was present during today's session.      Short Term Goals:   1. Patient will be educated on vocal hygiene and demonstrate understanding of recommendations to facilitate increased quality of voice, to be achieved in 2-4 weeks.    2. Patient will be educated on diaphragmatic breathing (versus clavicular breathing) to establish controlled, rhythmic breathing, to be achieved in 2-4 weeks.    3. Patient will utilize diaphragmatic breathing during oral sentence/paragraph reading in 80% of opportunities to facilitate increased breath support for voice/speaking, decrease laryngeal effort, and improve glottic closure to be achieved in 3 months.    4. Patient will utilize semi-occluded vocal tract exercises without laryngeal tension to promote healthy vocal function with 80% accuracy, to be achieved in 3 months.     5. Patient will utilize a forward tone focused/resonant voice to decrease vocal hyperfunction and improve voice quality and vocal projection, to be achieved in 3 months.      6. Patient will self-report a decrease in muscle tension of the larynx and cervical area after independently completing relaxation/stretching exercise to be achieved in 3  months.     Following patient's verbal consent to treat, manual therapy technique through myofascial release was applied to patient's BOT, laryngeal region, and SCM.     Region(s) 01/13/2025         Elongation of SCM   X 15 mins (L and R)         BOT   X 5 mins         Inferior Laryngeal Glide   X 5 mins           7. Patient will learn and apply resonance techniques at the word, sentence, and paragraph levels with min verbal cues to facilitate reduced laryngeal tension when voicing, to be achieved in 3 months.      Plan:  -Patient was provided with home exercises/activities to target goals in plan of care at the end of today's session.  -Continue with current plan of care.

## 2025-01-23 ENCOUNTER — TELEPHONE (OUTPATIENT)
Dept: CARDIOLOGY CLINIC | Facility: CLINIC | Age: 75
End: 2025-01-23

## 2025-01-27 ENCOUNTER — TELEPHONE (OUTPATIENT)
Dept: PHYSICAL THERAPY | Facility: CLINIC | Age: 75
End: 2025-01-27

## 2025-01-27 NOTE — TELEPHONE ENCOUNTER
"Received a message; \"The person you are calling can not receive calls at this time.\"    Will attempt another number listed.   "

## 2025-03-05 ENCOUNTER — TELEPHONE (OUTPATIENT)
Dept: FAMILY MEDICINE CLINIC | Facility: CLINIC | Age: 75
End: 2025-03-05

## 2025-03-05 NOTE — TELEPHONE ENCOUNTER
PCP SIGNATURE NEEDED FOR [facility] FORM RECEIVED VIA FAX AND PLACED IN PCP FOLDER TO BE DELIVERED AT ASSIGNED TIMES.      [facility]

## 2025-03-13 NOTE — TELEPHONE ENCOUNTER
Hoa Cooper from [facility] called nurse line  requesting status of previus form.     Please advise, thanks

## 2025-03-20 ENCOUNTER — TELEPHONE (OUTPATIENT)
Dept: FAMILY MEDICINE CLINIC | Facility: CLINIC | Age: 75
End: 2025-03-20
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
## 2025-03-25 ENCOUNTER — RA CDI HCC (OUTPATIENT)
Dept: OTHER | Facility: HOSPITAL | Age: 75
End: 2025-03-25

## 2025-03-25 NOTE — PROGRESS NOTES
HCC coding opportunities          Chart Reviewed number of suggestions sent to Provider: 1  I69.351     Patients Insurance     Medicare Insurance: Medicare/[facility]

## 2025-04-01 ENCOUNTER — TELEPHONE (OUTPATIENT)
Dept: FAMILY MEDICINE CLINIC | Facility: CLINIC | Age: 75
End: 2025-04-01

## 2025-04-01 NOTE — TELEPHONE ENCOUNTER
Pt was notified lab result    first attempt to contact patient. no answer left message to return my call on answering machine    Need to reschedule a no show chris

## 2025-04-06 ENCOUNTER — HOSPITAL ENCOUNTER (EMERGENCY)
Facility: HOSPITAL | Age: 75
Discharge: HOME | End: 2025-04-06
Attending: EMERGENCY MEDICINE
Payer: MEDICARE

## 2025-04-06 ENCOUNTER — APPOINTMENT (EMERGENCY)
Dept: RADIOLOGY | Facility: HOSPITAL | Age: 75
End: 2025-04-06
Payer: MEDICARE

## 2025-04-06 ENCOUNTER — APPOINTMENT (EMERGENCY)
Dept: CT IMAGING | Facility: HOSPITAL | Age: 75
End: 2025-04-06
Payer: MEDICARE

## 2025-04-06 VITALS
HEART RATE: 61 BPM | BODY MASS INDEX: 25.1 KG/M2 | OXYGEN SATURATION: 97 % | TEMPERATURE: 97.2 F | HEIGHT: 57 IN | DIASTOLIC BLOOD PRESSURE: 86 MMHG | RESPIRATION RATE: 19 BRPM | SYSTOLIC BLOOD PRESSURE: 169 MMHG

## 2025-04-06 DIAGNOSIS — I10 HYPERTENSION: ICD-10-CM

## 2025-04-06 DIAGNOSIS — K21.9 GASTROESOPHAGEAL REFLUX DISEASE, UNSPECIFIED WHETHER ESOPHAGITIS PRESENT: ICD-10-CM

## 2025-04-06 DIAGNOSIS — K29.70 GASTRITIS: ICD-10-CM

## 2025-04-06 DIAGNOSIS — G47.00 INSOMNIA: ICD-10-CM

## 2025-04-06 DIAGNOSIS — M25.552 LEFT HIP PAIN: ICD-10-CM

## 2025-04-06 DIAGNOSIS — K76.0 HEPATIC STEATOSIS: ICD-10-CM

## 2025-04-06 DIAGNOSIS — R51.9 FACIAL PAIN: ICD-10-CM

## 2025-04-06 DIAGNOSIS — Z86.73 HISTORY OF CVA (CEREBROVASCULAR ACCIDENT): ICD-10-CM

## 2025-04-06 DIAGNOSIS — S32.010A CLOSED COMPRESSION FRACTURE OF BODY OF L1 VERTEBRA (HCC): ICD-10-CM

## 2025-04-06 DIAGNOSIS — G89.29 CHRONIC PAIN: Primary | ICD-10-CM

## 2025-04-06 LAB
ALBUMIN SERPL BCG-MCNC: 4.5 G/DL (ref 3.5–5)
ALP SERPL-CCNC: 48 U/L (ref 34–104)
ALT SERPL W P-5'-P-CCNC: 10 U/L (ref 7–52)
ANION GAP SERPL CALCULATED.3IONS-SCNC: 6 MMOL/L (ref 4–13)
AST SERPL W P-5'-P-CCNC: 21 U/L (ref 13–39)
BACTERIA UR QL AUTO: ABNORMAL /HPF
BASOPHILS # BLD AUTO: 0.04 THOUSANDS/ÂΜL (ref 0–0.1)
BASOPHILS NFR BLD AUTO: 1 % (ref 0–1)
BILIRUB SERPL-MCNC: 0.51 MG/DL (ref 0.2–1)
BILIRUB UR QL STRIP: NEGATIVE
BUN SERPL-MCNC: 20 MG/DL (ref 5–25)
CALCIUM SERPL-MCNC: 9.4 MG/DL (ref 8.4–10.2)
CARDIAC TROPONIN I PNL SERPL HS: <2 NG/L (ref ?–50)
CHLORIDE SERPL-SCNC: 105 MMOL/L (ref 96–108)
CLARITY UR: CLEAR
CO2 SERPL-SCNC: 29 MMOL/L (ref 21–32)
COLOR UR: YELLOW
CREAT SERPL-MCNC: 0.7 MG/DL (ref 0.6–1.3)
EOSINOPHIL # BLD AUTO: 0.15 THOUSAND/ÂΜL (ref 0–0.61)
EOSINOPHIL NFR BLD AUTO: 3 % (ref 0–6)
ERYTHROCYTE [DISTWIDTH] IN BLOOD BY AUTOMATED COUNT: 12.8 % (ref 11.6–15.1)
GFR SERPL CREATININE-BSD FRML MDRD: 86 ML/MIN/1.73SQ M
GLUCOSE SERPL-MCNC: 104 MG/DL (ref 65–140)
GLUCOSE UR STRIP-MCNC: NEGATIVE MG/DL
HCT VFR BLD AUTO: 40 % (ref 34.8–46.1)
HGB BLD-MCNC: 13.1 G/DL (ref 11.5–15.4)
HGB UR QL STRIP.AUTO: NEGATIVE
IMM GRANULOCYTES # BLD AUTO: 0.01 THOUSAND/UL (ref 0–0.2)
IMM GRANULOCYTES NFR BLD AUTO: 0 % (ref 0–2)
KETONES UR STRIP-MCNC: NEGATIVE MG/DL
LEUKOCYTE ESTERASE UR QL STRIP: NEGATIVE
LIPASE SERPL-CCNC: 14 U/L (ref 11–82)
LYMPHOCYTES # BLD AUTO: 1.39 THOUSANDS/ÂΜL (ref 0.6–4.47)
LYMPHOCYTES NFR BLD AUTO: 27 % (ref 14–44)
MCH RBC QN AUTO: 30.3 PG (ref 26.8–34.3)
MCHC RBC AUTO-ENTMCNC: 32.8 G/DL (ref 31.4–37.4)
MCV RBC AUTO: 92 FL (ref 82–98)
MONOCYTES # BLD AUTO: 0.4 THOUSAND/ÂΜL (ref 0.17–1.22)
MONOCYTES NFR BLD AUTO: 8 % (ref 4–12)
MUCOUS THREADS UR QL AUTO: ABNORMAL
NEUTROPHILS # BLD AUTO: 3.17 THOUSANDS/ÂΜL (ref 1.85–7.62)
NEUTS SEG NFR BLD AUTO: 61 % (ref 43–75)
NITRITE UR QL STRIP: POSITIVE
NON-SQ EPI CELLS URNS QL MICRO: ABNORMAL /HPF
NRBC BLD AUTO-RTO: 0 /100 WBCS
PH UR STRIP.AUTO: 6.5 [PH] (ref 4.5–8)
PLATELET # BLD AUTO: 234 THOUSANDS/UL (ref 149–390)
PMV BLD AUTO: 10.8 FL (ref 8.9–12.7)
POTASSIUM SERPL-SCNC: 4.8 MMOL/L (ref 3.5–5.3)
PROT SERPL-MCNC: 7 G/DL (ref 6.4–8.4)
PROT UR STRIP-MCNC: NEGATIVE MG/DL
RBC # BLD AUTO: 4.33 MILLION/UL (ref 3.81–5.12)
RBC #/AREA URNS AUTO: ABNORMAL /HPF
SODIUM SERPL-SCNC: 140 MMOL/L (ref 135–147)
SP GR UR STRIP.AUTO: 1.01 (ref 1–1.03)
UROBILINOGEN UR QL STRIP.AUTO: 0.2 E.U./DL
WBC # BLD AUTO: 5.16 THOUSAND/UL (ref 4.31–10.16)
WBC #/AREA URNS AUTO: ABNORMAL /HPF

## 2025-04-06 PROCEDURE — 74177 CT ABD & PELVIS W/CONTRAST: CPT

## 2025-04-06 PROCEDURE — 70496 CT ANGIOGRAPHY HEAD: CPT

## 2025-04-06 PROCEDURE — 71046 X-RAY EXAM CHEST 2 VIEWS: CPT

## 2025-04-06 PROCEDURE — 36415 COLL VENOUS BLD VENIPUNCTURE: CPT | Performed by: EMERGENCY MEDICINE

## 2025-04-06 PROCEDURE — 83690 ASSAY OF LIPASE: CPT | Performed by: EMERGENCY MEDICINE

## 2025-04-06 PROCEDURE — 93005 ELECTROCARDIOGRAM TRACING: CPT

## 2025-04-06 PROCEDURE — 84484 ASSAY OF TROPONIN QUANT: CPT | Performed by: EMERGENCY MEDICINE

## 2025-04-06 PROCEDURE — 85025 COMPLETE CBC W/AUTO DIFF WBC: CPT | Performed by: EMERGENCY MEDICINE

## 2025-04-06 PROCEDURE — 70498 CT ANGIOGRAPHY NECK: CPT

## 2025-04-06 PROCEDURE — 81001 URINALYSIS AUTO W/SCOPE: CPT

## 2025-04-06 PROCEDURE — 80053 COMPREHEN METABOLIC PANEL: CPT | Performed by: EMERGENCY MEDICINE

## 2025-04-06 PROCEDURE — 73564 X-RAY EXAM KNEE 4 OR MORE: CPT

## 2025-04-06 PROCEDURE — 73552 X-RAY EXAM OF FEMUR 2/>: CPT

## 2025-04-06 PROCEDURE — 99285 EMERGENCY DEPT VISIT HI MDM: CPT

## 2025-04-06 RX ORDER — ASPIRIN 81 MG/1
81 TABLET ORAL DAILY
Qty: 21 TABLET | Refills: 0 | Status: SHIPPED | OUTPATIENT
Start: 2025-04-06 | End: 2025-04-16 | Stop reason: SDUPTHER

## 2025-04-06 RX ORDER — ATORVASTATIN CALCIUM 40 MG/1
20 TABLET, FILM COATED ORAL
Qty: 11 TABLET | Refills: 0 | Status: SHIPPED | OUTPATIENT
Start: 2025-04-06 | End: 2025-04-16 | Stop reason: SDUPTHER

## 2025-04-06 RX ORDER — AMLODIPINE BESYLATE 2.5 MG/1
2.5 TABLET ORAL DAILY
Qty: 21 TABLET | Refills: 0 | Status: SHIPPED | OUTPATIENT
Start: 2025-04-06 | End: 2025-04-16 | Stop reason: SDUPTHER

## 2025-04-06 RX ORDER — LIDOCAINE 50 MG/G
1 PATCH TOPICAL ONCE
Status: DISCONTINUED | OUTPATIENT
Start: 2025-04-06 | End: 2025-04-06 | Stop reason: HOSPADM

## 2025-04-06 RX ORDER — OMEPRAZOLE 20 MG/1
40 CAPSULE, DELAYED RELEASE ORAL 2 TIMES DAILY
Qty: 21 CAPSULE | Refills: 0 | Status: SHIPPED | OUTPATIENT
Start: 2025-04-06 | End: 2025-04-16 | Stop reason: SDUPTHER

## 2025-04-06 RX ORDER — ACETAMINOPHEN 325 MG/1
650 TABLET ORAL ONCE
Status: COMPLETED | OUTPATIENT
Start: 2025-04-06 | End: 2025-04-06

## 2025-04-06 RX ADMIN — IOHEXOL 100 ML: 350 INJECTION, SOLUTION INTRAVENOUS at 18:27

## 2025-04-06 RX ADMIN — ACETAMINOPHEN 650 MG: 325 TABLET, FILM COATED ORAL at 18:13

## 2025-04-06 RX ADMIN — LIDOCAINE 1 PATCH: 700 PATCH TOPICAL at 18:16

## 2025-04-06 NOTE — DISCHARGE INSTRUCTIONS
Llame a santiago médico de cabecera mañana para restablecer la atención y la medicación.     Regrese a urgencias si presenta síntomas nuevos o que empeoran, flori cambios en el estado mental, entumecimiento o debilidad, dificultad para respirar, etc.

## 2025-04-06 NOTE — ED NOTES
Pt was transported to bathroom by wheelchair and assist x2. Pt was continent of urine.      Yang Palacios RN  04/06/25 1948

## 2025-04-06 NOTE — ED PROVIDER NOTES
Time reflects when diagnosis was documented in both MDM as applicable and the Disposition within this note       Time User Action Codes Description Comment    4/6/2025  7:53 PM Zaira Nolasco Add [G89.29] Chronic pain     4/6/2025  7:53 PM Zaira Nolasco Add [Z86.73] History of CVA (cerebrovascular accident)     4/6/2025  7:53 PM Zaira Nolasco Add [K29.70] Gastritis     4/6/2025  7:53 PM Zaira Nolasco Add [K76.0] Hepatic steatosis     4/6/2025  7:53 PM Zaira Nolasco Add [S32.010A] Closed compression fracture of body of L1 vertebra (HCC)     4/6/2025  7:53 PM Zaira Nolasco Modify [S32.010A] Closed compression fracture of body of L1 vertebra (HCC) Mild anterior compression fracture of L1, slightly progressed since the prior study.    4/6/2025  7:54 PM Zaira Nolasco Add [M25.552] Left hip pain     4/6/2025  7:54 PM Zaira Nolasco Add [R51.9] Facial pain     4/6/2025  7:54 PM Zaira Nolasco Add [I10] Hypertension     4/6/2025  7:55 PM Zaira Nolasco Add [K21.9] Gastroesophageal reflux disease, unspecified whether esophagitis present     4/6/2025  7:59 PM Zaira Nolasco Add [G47.00] Insomnia           ED Disposition       ED Disposition   Discharge    Condition   Stable    Date/Time   Sun Apr 6, 2025  7:53 PM    Comment   Shaetejinder Jung discharge to home/self care.                   Assessment & Plan       Medical Decision Making  75 yo F off meds for 3 months with various sx- CP/abd pain/L hip pain- will treat sx, x-ray L hip/knee to r/o fx, EKG to r/o STEMI/dysrhythmia/abn intervals/ischemic changes, troponin to eval for ischemia, CBC to assess for leukocytosis/anemia, CMP to assess for elevated LFTs/SHAUN/electrolyte derangements, CXR to r/o PTX/PNA/effusion/CHF, CTA head/neck to r/o bleed/abn, CT A/P to assess for intra-abdominal pathology, dispo pending workup    ER workup all similar to prior/baseline  Provided refills for prescriptions and instructed to call to f/u with PCP    Problems Addressed:  Facial pain: acute illness or  injury  Gastritis: acute illness or injury  Hepatic steatosis: chronic illness or injury  History of CVA (cerebrovascular accident): chronic illness or injury  Insomnia: chronic illness or injury  Left hip pain: acute illness or injury    Amount and/or Complexity of Data Reviewed  Independent Historian:      Details: Patient's sister  External Data Reviewed: labs, radiology, ECG and notes.  Labs: ordered. Decision-making details documented in ED Course.  Radiology: ordered and independent interpretation performed. Decision-making details documented in ED Course.  ECG/medicine tests: ordered and independent interpretation performed. Decision-making details documented in ED Course.    Risk  OTC drugs.  Prescription drug management.        ED Course as of 04/07/25 0102   Sun Apr 06, 2025   1810 hs TnI 0hr: <2   1823 EKG independently interpreted by myself:  sinus bhumi, no sig st changes   1909 CXR independently interpreted by myself: no acute abn    1911 Femur and knee x-ray independently interpreted by myself; no acute osseous abn, pending rads rad       Medications   acetaminophen (TYLENOL) tablet 650 mg (650 mg Oral Given 4/6/25 1813)   iohexol (OMNIPAQUE) 350 MG/ML injection (MULTI-DOSE) 100 mL (100 mL Intravenous Given 4/6/25 1827)       ED Risk Strat Scores   HEART Risk Score      Flowsheet Row Most Recent Value   Heart Score Risk Calculator    History 0 Filed at: 04/06/2025 2001   ECG 0 Filed at: 04/06/2025 2001   Age 2 Filed at: 04/06/2025 2001   Risk Factors 1 Filed at: 04/06/2025 2001   Troponin 0 Filed at: 04/06/2025 2001   HEART Score 3 Filed at: 04/06/2025 2001          HEART Risk Score      Flowsheet Row Most Recent Value   Heart Score Risk Calculator    History 0 Filed at: 04/06/2025 2001   ECG 0 Filed at: 04/06/2025 2001   Age 2 Filed at: 04/06/2025 2001   Risk Factors 1 Filed at: 04/06/2025 2001   Troponin 0 Filed at: 04/06/2025 2001   HEART Score 3 Filed at: 04/06/2025 2001                               SBIRT 20yo+      Flowsheet Row Most Recent Value   Initial Alcohol Screen: US AUDIT-C     1. How often do you have a drink containing alcohol? 0 Filed at: 04/06/2025 1651   2. How many drinks containing alcohol do you have on a typical day you are drinking?  0 Filed at: 04/06/2025 1651   3b. FEMALE Any Age, or MALE 65+: How often do you have 4 or more drinks on one occassion? 0 Filed at: 04/06/2025 1651   Audit-C Score 0 Filed at: 04/06/2025 1651   LEVAR: How many times in the past year have you...    Used an illegal drug or used a prescription medication for non-medical reasons? Never Filed at: 04/06/2025 1651                            History of Present Illness       Chief Complaint   Patient presents with    Pain     Patient arrives with sister reporting patient recently got medical insurance. Brought her in to ER today for well visit. States patient has had 2-3 strokes, last being approx 3 months ago. States that she has left sided facial pain, left leg pain and also with chronic back pain. Also requesting something to sleep/relax.        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    Palpitations     Recurrent urinary tract infection     Seizures (HCC)     Sleep disorder     last assessed: 10/19/2013    Speech impairment     Stroke (HCC)     Thyroid disease     Tinnitus       Past Surgical History:   Procedure Laterality Date    AXILLARY SURGERY      cyst surgery    BLADDER  "SURGERY       SECTION      CYSTOSCOPY N/A 2018    Procedure: CYSTOSCOPY;  Surgeon: Charis Major MD;  Location: [facility];  Service: Gynecology    FACIAL BONE TUMOR EXCISION      KNEE SURGERY      OTHER SURGICAL HISTORY      cyst removed from axilla    MD ESOPHAGOGASTRODUODENOSCOPY TRANSORAL DIAGNOSTIC N/A 2017    Procedure: ESOPHAGOGASTRODUODENOSCOPY (EGD);  Surgeon: Jose Antonio Platt MD;  Location: [facility];  Service: Gastroenterology    MD SLING OPERATION STRESS INCONTINENCE N/A 2018    Procedure: PUBOVAGINAL SLING;  Surgeon: Charis Major MD;  Location: [facility];  Service: Gynecology    TUBAL LIGATION        Family History   Problem Relation Age of Onset    No Known Problems Father     Diabetes Family         mellitus    Hypertension Family     Stroke Family     Thyroid disease Family     No Known Problems Mother     No Known Problems Sister     No Known Problems Daughter     No Known Problems Maternal Grandmother     No Known Problems Maternal Grandfather     No Known Problems Paternal Grandmother     No Known Problems Paternal Grandfather       Social History     Tobacco Use    Smoking status: Never    Smokeless tobacco: Never   Vaping Use    Vaping status: Never Used   Substance Use Topics    Alcohol use: No    Drug use: No      E-Cigarette/Vaping    E-Cigarette Use Never User       E-Cigarette/Vaping Substances    Nicotine No     THC No     CBD No     Flavoring No     Other No     Unknown No       I have reviewed and agree with the history as documented.     75 yo F h/o prior CVAs, HTN, HLD, DM; presenting for evaluation.    Pt is Japanese speaking and only able to answer yes/no questions, sister at bedside is providing translation.  From what I can gather, pt's son had her documents/caring for her then went to prison and then 3 months ago pt came to live with sister. Since that time, sister has been trying to get her insurance/\"documents\" and finally completed so came to ER for check " up today.     She states she has been without her meds for 3 months.  Reports that pt is currently having R sided facial pain, CP, abdominal pain, L hip/upper leg/knee pain.  Does report a fall about 5 days ago, unknown head trauma.     Denies N/V/D/C, urinary sx, numbness              Per independent review of external records:   12/10/24 Neuro- chronic neck pain, h/o CVAs ([id number]) with loss of coordination especially to RUE/difficulty ambulating/hoarse whispering voice, wheelchair bound since 2020  [place]igan, anxiety  10/9/24 CTA head/neck      Review of Systems        Objective       ED Triage Vitals   Temperature Pulse Blood Pressure Respirations SpO2 Patient Position - Orthostatic VS   04/06/25 1650 04/06/25 1650 04/06/25 1650 04/06/25 1650 04/06/25 1650 04/06/25 1650   (!) 97.2 °F (36.2 °C) 71 (!) 181/90 18 99 % Sitting      Temp Source Heart Rate Source BP Location FiO2 (%) Pain Score    04/06/25 1650 04/06/25 1650 04/06/25 1650 -- 04/06/25 1813    Oral Monitor Right arm  5      Vitals      Date and Time Temp Pulse SpO2 Resp BP Pain Score FACES Pain Rating User   04/06/25 1813 -- -- -- -- -- 5 -- LAB   04/06/25 1800 -- 61 97 % 19 169/86 -- -- LAB   04/06/25 1650 97.2 °F (36.2 °C) 71 99 % 18 181/90 -- -- DS            Physical Exam  Vitals and nursing note reviewed.   Constitutional:       General: She is not in acute distress.     Appearance: Normal appearance. She is well-developed.   HENT:      Head: Normocephalic and atraumatic.      Nose: Nose normal.   Eyes:      Extraocular Movements: Extraocular movements intact.      Conjunctiva/sclera: Conjunctivae normal.   Cardiovascular:      Rate and Rhythm: Normal rate and regular rhythm.      Heart sounds: Normal heart sounds.   Pulmonary:      Effort: Pulmonary effort is normal. No respiratory distress.      Breath sounds: Normal breath sounds. No stridor. No wheezing or rales.   Chest:      Chest wall: No tenderness.   Abdominal:      General: There is no  distension.      Palpations: Abdomen is soft.      Tenderness: There is no abdominal tenderness. There is no guarding or rebound.      Comments: Pelvis stable, no hip ttp. No skin changes/swelling/rash   Musculoskeletal:         General: No swelling, tenderness or deformity.      Cervical back: Normal range of motion and neck supple.   Skin:     General: Skin is warm and dry.      Findings: No rash.   Neurological:      General: No focal deficit present.      Mental Status: She is alert and oriented to person, place, and time.      Motor: No abnormal muscle tone.      Coordination: Coordination normal.   Psychiatric:         Thought Content: Thought content normal.         Judgment: Judgment normal.         Results Reviewed       Procedure Component Value Units Date/Time    Urine Microscopic [[id number]]  (Abnormal) Collected: 04/06/25 1941    Lab Status: Final result Specimen: Urine, Clean Catch Updated: 04/06/25 2004     RBC, UA 2-4 /hpf      WBC, UA 2-4 /hpf      Epithelial Cells Occasional /hpf      Bacteria, UA Innumerable /hpf      MUCUS THREADS Innumerable    Urine Macroscopic, POC [[id number]]  (Abnormal) Collected: 04/06/25 1941    Lab Status: Final result Specimen: Urine Updated: 04/06/25 1943     Color, UA Yellow     Clarity, UA Clear     pH, UA 6.5     Leukocytes, UA Negative     Nitrite, UA Positive     Protein, UA Negative mg/dl      Glucose, UA Negative mg/dl      Ketones, UA Negative mg/dl      Urobilinogen, UA 0.2 E.U./dl      Bilirubin, UA Negative     Occult Blood, UA Negative     Specific Gravity, UA 1.015    Narrative:      CLINITEK RESULT    HS Troponin 0hr (reflex protocol) [[id number]]  (Normal) Collected: 04/06/25 1734    Lab Status: Final result Specimen: Blood from Arm, Right Updated: 04/06/25 1800     hs TnI 0hr <2 ng/L     Comprehensive metabolic panel [[id number]] Collected: 04/06/25 1734    Lab Status: Final result Specimen: Blood from Arm, Right Updated: 04/06/25 1756     Sodium 140  mmol/L      Potassium 4.8 mmol/L      Chloride 105 mmol/L      CO2 29 mmol/L      ANION GAP 6 mmol/L      BUN 20 mg/dL      Creatinine 0.70 mg/dL      Glucose 104 mg/dL      Calcium 9.4 mg/dL      AST 21 U/L      ALT 10 U/L      Alkaline Phosphatase 48 U/L      Total Protein 7.0 g/dL      Albumin 4.5 g/dL      Total Bilirubin 0.51 mg/dL      eGFR 86 ml/min/1.73sq m     Narrative:      National Kid[employer] guidelines for Chronic Kidney Disease (CKD):     Stage 1 with normal or high GFR (GFR > 90 mL/min/1.73 square meters)    Stage 2 Mild CKD (GFR = 60-89 mL/min/1.73 square meters)    Stage 3A Moderate CKD (GFR = 45-59 mL/min/1.73 square meters)    Stage 3B Moderate CKD (GFR = 30-44 mL/min/1.73 square meters)    Stage 4 Severe CKD (GFR = 15-29 mL/min/1.73 square meters)    Stage 5 End Stage CKD (GFR <15 mL/min/1.73 square meters)  Note: GFR calculation is accurate only with a steady state creatinine    Lipase [[id number]]  (Normal) Collected: 04/06/25 1734    Lab Status: Final result Specimen: Blood from Arm, Right Updated: 04/06/25 1756     Lipase 14 u/L     CBC and differential [[id number]] Collected: 04/06/25 1734    Lab Status: Final result Specimen: Blood from Arm, Right Updated: 04/06/25 1739     WBC 5.16 Thousand/uL      RBC 4.33 Million/uL      Hemoglobin 13.1 g/dL      Hematocrit 40.0 %      MCV 92 fL      MCH 30.3 pg      MCHC 32.8 g/dL      RDW 12.8 %      MPV 10.8 fL      Platelets 234 Thousands/uL      nRBC 0 /100 WBCs      Segmented % 61 %      Immature Grans % 0 %      Lymphocytes % 27 %      Monocytes % 8 %      Eosinophils Relative 3 %      Basophils Relative 1 %      Absolute Neutrophils 3.17 Thousands/µL      Absolute Immature Grans 0.01 Thousand/uL      Absolute Lymphocytes 1.39 Thousands/µL      Absolute Monocytes 0.40 Thousand/µL      Eosinophils Absolute 0.15 Thousand/µL      Basophils Absolute 0.04 Thousands/µL             CTA head and neck with and without contrast   ED  Interpretation by Zaira Nolasco DO (04/06 1902)   IMPRESSION:     CT Brain:  - No acute intracranial abnormality.     CT Angiography:  - Unchanged chronically occluded cervical right ICA with cavernous segment reconstitution, and unchanged high-grade tandem stenosis of the paraclinoid segment. Right ICA terminus is reconstituted.  - No other large vessel occlusion or significant stenosis in the head or neck.           Final Interpretation by Louis Mcclendon MD (04/06 1900)      CT Brain:   - No acute intracranial abnormality.      CT Angiography:   - Unchanged chronically occluded cervical right ICA with cavernous segment reconstitution, and unchanged high-grade tandem stenosis of the paraclinoid segment. Right ICA terminus is reconstituted.   - No other large vessel occlusion or significant stenosis in the head or neck.                  Workstation performed: [id number]         CT abdomen pelvis with contrast   ED Interpretation by Zaira Nolasco DO (04/06 1953)   IMPRESSION:     Mild distal gastric wall thickening concerning for gastritis.     Mild hepatic steatosis.     Mild anterior compression fracture of L1, slightly progressed since the prior study.        Final Interpretation by Jean Wills MD (04/06 1933)      Mild distal gastric wall thickening concerning for gastritis.      Mild hepatic steatosis.      Mild anterior compression fracture of L1, slightly progressed since the prior study.         Workstation performed: [id number]         XR chest 2 views    (Results Pending)   XR knee 4+ views left injury    (Results Pending)   XR femur 2 vw left    (Results Pending)       Procedures    ED Medication and Procedure Management   Prior to Admission Medications   Prescriptions Last Dose Informant Patient Reported? Taking?   Cholecalciferol (VITAMIN D-3) 5000 units TABS  Self No No   Sig: Take 1 tablet by mouth daily   Patient not taking: Reported on 10/9/2024   Diclofenac Sodium (VOLTAREN) 1 %    No No   Sig: Apply 2 g topically 4 (four) times a day   Diclofenac Sodium (VOLTAREN) 1 %   No No   Sig: Apply 2 g topically 4 (four) times a day   Incontinence Supply Disposable (Brief Overnight Large) MISC  Self No No   Sig: Use 4 (four) times a day   Incontinence Supply Disposable (Underpads) MISC  Self No No   Sig: Use in the morning Please dispense washable underpads   Infant Care Products (Comfort-Smooth Baby Wipes) MISC  Self No No   Sig: Use 4 (four) times a day   Multiple Vitamin (multivitamin) capsule   Yes No   Sig: Take 1 capsule by mouth daily   amLODIPine (NORVASC) 2.5 mg tablet   No No   Sig: Take 1 tablet (2.5 mg total) by mouth daily   amLODIPine (NORVASC) 2.5 mg tablet   No Yes   Sig: Take 1 tablet (2.5 mg total) by mouth daily for 21 days   aspirin (ECOTRIN LOW STRENGTH) 81 mg EC tablet  Self Yes No   Sig: Take 81 mg by mouth daily   aspirin (ECOTRIN LOW STRENGTH) 81 mg EC tablet   No Yes   Sig: Take 1 tablet (81 mg total) by mouth daily for 21 days   atorvastatin (LIPITOR) 40 mg tablet   No No   Sig: Take 0.5 tablets (20 mg total) by mouth daily with dinner   atorvastatin (LIPITOR) 40 mg tablet   No Yes   Sig: Take 0.5 tablets (20 mg total) by mouth daily with dinner for 22 days   benzonatate (TESSALON PERLES) 100 mg capsule   No No   Sig: Take 1 capsule (100 mg total) by mouth 3 (three) times a day as needed for cough   Patient not taking: Reported on 12/10/2024   cyclobenzaprine (FLEXERIL) 5 mg tablet   No No   Sig: Take 0.5 tablets (2.5 mg total) by mouth 3 (three) times a day as needed for muscle spasms   docusate sodium (COLACE) 100 mg capsule   No No   Sig: Take 1 capsule (100 mg total) by mouth 2 (two) times a day as needed for constipation   Patient not taking: Reported on 12/10/2024   famotidine (PEPCID) 20 mg tablet   No No   Sig: Take 1 tablet (20 mg total) by mouth daily at bedtime as needed for heartburn   Patient not taking: Reported on 12/10/2024   fluticasone (FLONASE) 50 mcg/act  nasal spray   No No   Si spray into each nostril daily   Patient not taking: Reported on 12/10/2024   gabapentin (NEURONTIN) 100 mg capsule   No No   Sig: Take 1 capsule (100 mg total) by mouth 3 (three) times a day   hydrocortisone 2.5 % lotion   No No   Sig: Apply topically 2 (two) times a day for 7 days   lidocaine (LIDODERM) 5 %   No No   Sig: Apply 1 patch topically over 12 hours daily Remove & Discard patch within 12 hours or as directed by MD   Patient not taking: Reported on 12/10/2024   lidocaine (Lidoderm) 5 %   No No   Sig: Apply 1 patch topically over 12 hours daily Remove & Discard patch within 12 hours or as directed by MD   Patient not taking: Reported on 2024   omeprazole (PriLOSEC) 20 mg delayed release capsule   No No   Sig: Take 2 capsules (40 mg total) by mouth 2 (two) times a day   omeprazole (PriLOSEC) 20 mg delayed release capsule   No Yes   Sig: Take 2 capsules (40 mg total) by mouth 2 (two) times a day for 21 days   polyethylene glycol (GLYCOLAX) powder  Child No No   Sig: Take 17 g by mouth daily   Patient not taking: Reported on 2024   polyethylene glycol (MIRALAX) 17 g packet  Child No No   Sig: Take 17 g by mouth daily   Patient not taking: Reported on 2024   traZODone (DESYREL) 50 mg tablet   No No   Sig: Take 1 tablet (50 mg total) by mouth daily at bedtime   triamcinolone (KENALOG) 0.1 % ointment  Child No No   Sig: Apply topically 2 (two) times a day   Patient not taking: Reported on 2024      Facility-Administered Medications: None     Discharge Medication List as of 2025  8:01 PM        START taking these medications    Details   Melatonin 5 MG TABS Take 1 tablet (5 mg total) by mouth daily at bedtime as needed (insomina) for up to 21 days, Starting Sun 2025, Until Sun 2025 at 2359, Normal           CONTINUE these medications which have CHANGED    Details   amLODIPine (NORVASC) 2.5 mg tablet Take 1 tablet (2.5 mg total) by mouth daily for 21  days, Starting Sun 4/6/2025, Until Sun 4/27/2025, Normal      aspirin (ECOTRIN LOW STRENGTH) 81 mg EC tablet Take 1 tablet (81 mg total) by mouth daily for 21 days, Starting Sun 4/6/2025, Until Sun 4/27/2025, Normal      atorvastatin (LIPITOR) 40 mg tablet Take 0.5 tablets (20 mg total) by mouth daily with dinner for 22 days, Starting Sun 4/6/2025, Until Mon 4/28/2025, Normal      omeprazole (PriLOSEC) 20 mg delayed release capsule Take 2 capsules (40 mg total) by mouth 2 (two) times a day for 21 days, Starting Sun 4/6/2025, Until Sun 4/27/2025, Normal           CONTINUE these medications which have NOT CHANGED    Details   benzonatate (TESSALON PERLES) 100 mg capsule Take 1 capsule (100 mg total) by mouth 3 (three) times a day as needed for cough, Starting Fri 11/17/2023, Normal      Cholecalciferol (VITAMIN D-3) 5000 units TABS Take 1 tablet by mouth daily, Starting Fri 10/12/2018, Normal      cyclobenzaprine (FLEXERIL) 5 mg tablet Take 0.5 tablets (2.5 mg total) by mouth 3 (three) times a day as needed for muscle spasms, Starting Tue 11/19/2024, Normal      !! Diclofenac Sodium (VOLTAREN) 1 % Apply 2 g topically 4 (four) times a day, Starting Wed 2/14/2024, Normal      !! Diclofenac Sodium (VOLTAREN) 1 % Apply 2 g topically 4 (four) times a day, Starting Fri 2/23/2024, Normal      docusate sodium (COLACE) 100 mg capsule Take 1 capsule (100 mg total) by mouth 2 (two) times a day as needed for constipation, Starting Fri 11/17/2023, Normal      famotidine (PEPCID) 20 mg tablet Take 1 tablet (20 mg total) by mouth daily at bedtime as needed for heartburn, Starting Mon 9/18/2023, Normal      fluticasone (FLONASE) 50 mcg/act nasal spray 1 spray into each nostril daily, Starting Fri 11/17/2023, Normal      gabapentin (NEURONTIN) 100 mg capsule Take 1 capsule (100 mg total) by mouth 3 (three) times a day, Starting Tue 11/19/2024, Normal      hydrocortisone 2.5 % lotion Apply topically 2 (two) times a day for 7 days,  Starting Sat 12/28/2024, Until Sat 1/4/2025, Normal      !! Incontinence Supply Disposable (Brief Overnight Large) MISC Use 4 (four) times a day, Starting Mon 11/7/2022, Print      !! Incontinence Supply Disposable (Underpads) MISC Use in the morning Please dispense washable underpads, Starting Mon 11/7/2022, Print      Infant Care Products (Comfort-Smooth Baby Wipes) MISC Use 4 (four) times a day, Starting Mon 11/7/2022, Print      !! lidocaine (Lidoderm) 5 % Apply 1 patch topically over 12 hours daily Remove & Discard patch within 12 hours or as directed by MD, Starting Thu 10/26/2023, Normal      !! lidocaine (LIDODERM) 5 % Apply 1 patch topically over 12 hours daily Remove & Discard patch within 12 hours or as directed by MD, Starting Fri 2/23/2024, Normal      Multiple Vitamin (multivitamin) capsule Take 1 capsule by mouth daily, Historical Med      polyethylene glycol (GLYCOLAX) powder Take 17 g by mouth daily, Starting Fri 2/8/2019, Normal      polyethylene glycol (MIRALAX) 17 g packet Take 17 g by mouth daily, Starting Mon 9/18/2023, Normal      traZODone (DESYREL) 50 mg tablet Take 1 tablet (50 mg total) by mouth daily at bedtime, Starting Tue 11/19/2024, Normal      triamcinolone (KENALOG) 0.1 % ointment Apply topically 2 (two) times a day, Starting Tue 7/25/2023, Normal       !! - Potential duplicate medications found. Please discuss with provider.        No discharge procedures on file.  ED SEPSIS DOCUMENTATION   Time reflects when diagnosis was documented in both MDM as applicable and the Disposition within this note       Time User Action Codes Description Comment    4/6/2025  7:53 PM Zaira Nolasco Add [G89.29] Chronic pain     4/6/2025  7:53 PM Zaira Nolasco Add [Z86.73] History of CVA (cerebrovascular accident)     4/6/2025  7:53 PM Zaira Nolasco Add [K29.70] Gastritis     4/6/2025  7:53 PM Zaira Nolasco Add [K76.0] Hepatic steatosis     4/6/2025  7:53 PM Zaira Nolasco Add [S32.010A] Closed compression  fracture of body of L1 vertebra (HCC)     4/6/2025  7:53 PM Zaira Nolasco Modify [S32.010A] Closed compression fracture of body of L1 vertebra (HCC) Mild anterior compression fracture of L1, slightly progressed since the prior study.    4/6/2025  7:54 PM Zaira Nolasco Add [M25.552] Left hip pain     4/6/2025  7:54 PM Zaira Nolasco Add [R51.9] Facial pain     4/6/2025  7:54 PM Zaira Nolasco Add [I10] Hypertension     4/6/2025  7:55 PM Zaira Nolasco Add [K21.9] Gastroesophageal reflux disease, unspecified whether esophagitis present     4/6/2025  7:59 PM Zaira Nolasco Add [G47.00] Insomnia                  Zaira Nolasco DO  04/07/25 0102

## 2025-04-07 LAB
ATRIAL RATE: 58 BPM
P AXIS: 71 DEGREES
PR INTERVAL: 154 MS
QRS AXIS: 32 DEGREES
QRSD INTERVAL: 90 MS
QT INTERVAL: 428 MS
QTC INTERVAL: 420 MS
T WAVE AXIS: 53 DEGREES
VENTRICULAR RATE: 58 BPM

## 2025-04-07 PROCEDURE — 93010 ELECTROCARDIOGRAM REPORT: CPT | Performed by: INTERNAL MEDICINE

## 2025-04-09 ENCOUNTER — TELEPHONE (OUTPATIENT)
Dept: FAMILY MEDICINE CLINIC | Facility: CLINIC | Age: 75
End: 2025-04-09

## 2025-04-09 NOTE — TELEPHONE ENCOUNTER
IEB FORM RECEIVED ON 4/9/2025  GIVEN TO ETHAN BUSTAMANTE TO BE COMPLETED, SIGNED BY MD/ (INCLUDE LISC. NUMBER), AND FAXED BACK IN 3 DAYS

## 2025-04-11 NOTE — TELEPHONE ENCOUNTER
FORM COMPLETED, SIGNED BY MD/ (INCLUDE LISC. NUMBER) FAXED ON 04/11/25 TO ENRIQUE at [phone]. FAX CONFIRMATION RECEIVED. FORM GIVEN TO HUGH TO SCAN

## 2025-04-16 ENCOUNTER — OFFICE VISIT (OUTPATIENT)
Dept: FAMILY MEDICINE CLINIC | Facility: CLINIC | Age: 75
End: 2025-04-16

## 2025-04-16 VITALS
HEIGHT: 57 IN | HEART RATE: 66 BPM | BODY MASS INDEX: 25.1 KG/M2 | SYSTOLIC BLOOD PRESSURE: 136 MMHG | OXYGEN SATURATION: 99 % | TEMPERATURE: 97.7 F | DIASTOLIC BLOOD PRESSURE: 62 MMHG | RESPIRATION RATE: 18 BRPM

## 2025-04-16 DIAGNOSIS — F99 INSOMNIA DUE TO OTHER MENTAL DISORDER: ICD-10-CM

## 2025-04-16 DIAGNOSIS — I10 PRIMARY HYPERTENSION: ICD-10-CM

## 2025-04-16 DIAGNOSIS — F51.05 INSOMNIA DUE TO OTHER MENTAL DISORDER: ICD-10-CM

## 2025-04-16 DIAGNOSIS — Z59.9 FINANCIAL DIFFICULTIES: ICD-10-CM

## 2025-04-16 DIAGNOSIS — Z86.73 HISTORY OF CVA (CEREBROVASCULAR ACCIDENT): ICD-10-CM

## 2025-04-16 DIAGNOSIS — M51.369 DEGENERATION OF INTERVERTEBRAL DISC OF LUMBAR REGION, UNSPECIFIED WHETHER PAIN PRESENT: ICD-10-CM

## 2025-04-16 DIAGNOSIS — Z74.2 NEED FOR HOME HEALTH CARE: ICD-10-CM

## 2025-04-16 DIAGNOSIS — Z00.00 MEDICARE ANNUAL WELLNESS VISIT, INITIAL: ICD-10-CM

## 2025-04-16 DIAGNOSIS — E11.9 CONTROLLED TYPE 2 DIABETES MELLITUS WITHOUT COMPLICATION, WITHOUT LONG-TERM CURRENT USE OF INSULIN (HCC): Primary | ICD-10-CM

## 2025-04-16 DIAGNOSIS — R26.9 GAIT DISTURBANCE, POST-STROKE: ICD-10-CM

## 2025-04-16 DIAGNOSIS — N39.46 URGE AND STRESS INCONTINENCE: ICD-10-CM

## 2025-04-16 DIAGNOSIS — R26.2 AMBULATORY DYSFUNCTION: ICD-10-CM

## 2025-04-16 DIAGNOSIS — R26.89 LOSS OF BALANCE: ICD-10-CM

## 2025-04-16 DIAGNOSIS — Z59.819 HOUSING INSTABILITY: ICD-10-CM

## 2025-04-16 DIAGNOSIS — Z59.41 FOOD INSECURITY: ICD-10-CM

## 2025-04-16 DIAGNOSIS — Z59.12 INADEQUATE HOUSING UTILITIES: ICD-10-CM

## 2025-04-16 DIAGNOSIS — M19.042 PRIMARY OSTEOARTHRITIS OF BOTH HANDS: ICD-10-CM

## 2025-04-16 DIAGNOSIS — M19.041 PRIMARY OSTEOARTHRITIS OF BOTH HANDS: ICD-10-CM

## 2025-04-16 DIAGNOSIS — I69.398 GAIT DISTURBANCE, POST-STROKE: ICD-10-CM

## 2025-04-16 DIAGNOSIS — M48.58XA COLLAPSED VERTEBRA, NOT ELSEWHERE CLASSIFIED, SACRAL AND SACROCOCCYGEAL REGION, INITIAL ENCOUNTER FOR FRACTURE (HCC): ICD-10-CM

## 2025-04-16 DIAGNOSIS — S32.010A COMPRESSION FRACTURE OF L1 VERTEBRA, INITIAL ENCOUNTER (HCC): ICD-10-CM

## 2025-04-16 DIAGNOSIS — I69.30 HISTORY OF CVA WITH RESIDUAL DEFICIT: ICD-10-CM

## 2025-04-16 DIAGNOSIS — K21.9 GASTROESOPHAGEAL REFLUX DISEASE, UNSPECIFIED WHETHER ESOPHAGITIS PRESENT: ICD-10-CM

## 2025-04-16 LAB — SL AMB POCT HEMOGLOBIN AIC: 5.7 (ref ?–6.5)

## 2025-04-16 PROCEDURE — 83036 HEMOGLOBIN GLYCOSYLATED A1C: CPT | Performed by: FAMILY MEDICINE

## 2025-04-16 PROCEDURE — 99215 OFFICE O/P EST HI 40 MIN: CPT | Performed by: FAMILY MEDICINE

## 2025-04-16 PROCEDURE — G0438 PPPS, INITIAL VISIT: HCPCS | Performed by: FAMILY MEDICINE

## 2025-04-16 PROCEDURE — G2211 COMPLEX E/M VISIT ADD ON: HCPCS | Performed by: FAMILY MEDICINE

## 2025-04-16 RX ORDER — TRAZODONE HYDROCHLORIDE 50 MG/1
50 TABLET ORAL
Qty: 30 TABLET | Refills: 2 | Status: SHIPPED | OUTPATIENT
Start: 2025-04-16

## 2025-04-16 RX ORDER — GABAPENTIN 100 MG/1
100 CAPSULE ORAL 3 TIMES DAILY
Qty: 90 CAPSULE | Refills: 2 | Status: SHIPPED | OUTPATIENT
Start: 2025-04-16

## 2025-04-16 RX ORDER — ACETAMINOPHEN 500 MG
1000 TABLET ORAL EVERY 8 HOURS PRN
Qty: 90 TABLET | Refills: 1 | Status: SHIPPED | OUTPATIENT
Start: 2025-04-16

## 2025-04-16 RX ORDER — BROMPHENIRAMIN/PSEUDOEPHEDRINE 1-15MG/5ML
LIQUID (ML) ORAL 4 TIMES DAILY
Qty: 160 EACH | Refills: 11 | Status: SHIPPED | OUTPATIENT
Start: 2025-04-16

## 2025-04-16 RX ORDER — ASPIRIN 81 MG/1
81 TABLET ORAL DAILY
Qty: 90 TABLET | Refills: 1 | Status: SHIPPED | OUTPATIENT
Start: 2025-04-16 | End: 2025-05-07

## 2025-04-16 RX ORDER — AMLODIPINE BESYLATE 2.5 MG/1
2.5 TABLET ORAL DAILY
Qty: 90 TABLET | Refills: 1 | Status: SHIPPED | OUTPATIENT
Start: 2025-04-16 | End: 2025-05-07

## 2025-04-16 RX ORDER — ATORVASTATIN CALCIUM 40 MG/1
20 TABLET, FILM COATED ORAL
Qty: 90 TABLET | Refills: 1 | Status: SHIPPED | OUTPATIENT
Start: 2025-04-16 | End: 2025-05-08

## 2025-04-16 RX ORDER — UNDERPADS 23" X 36"
EACH MISCELLANEOUS DAILY
Qty: 4 EACH | Refills: 11 | Status: SHIPPED | OUTPATIENT
Start: 2025-04-16

## 2025-04-16 RX ORDER — LIDOCAINE 50 MG/G
1 PATCH TOPICAL DAILY
Qty: 15 PATCH | Refills: 3 | Status: SHIPPED | OUTPATIENT
Start: 2025-04-16

## 2025-04-16 RX ORDER — OMEPRAZOLE 40 MG/1
40 CAPSULE, DELAYED RELEASE ORAL 2 TIMES DAILY
Qty: 60 CAPSULE | Refills: 3 | Status: SHIPPED | OUTPATIENT
Start: 2025-04-16

## 2025-04-16 RX ORDER — UBIDECARENONE 75 MG
CAPSULE ORAL 4 TIMES DAILY
Qty: 240 EACH | Refills: 11 | Status: SHIPPED | OUTPATIENT
Start: 2025-04-16

## 2025-04-16 SDOH — ECONOMIC STABILITY - INCOME SECURITY: PROBLEM RELATED TO HOUSING AND ECONOMIC CIRCUMSTANCES, UNSPECIFIED: Z59.9

## 2025-04-16 SDOH — ECONOMIC STABILITY - FOOD INSECURITY: FOOD INSECURITY: Z59.41

## 2025-04-16 SDOH — ECONOMIC STABILITY - HOUSING INSECURITY: HOUSING INSTABILITY UNSPECIFIED: Z59.819

## 2025-04-16 SDOH — ECONOMIC STABILITY - HOUSING INSECURITY: INADEQUATE HOUSING UTILITIES: Z59.12

## 2025-04-16 NOTE — ASSESSMENT & PLAN NOTE
Multifactorial incontinence  Likely combination of previous CVA and urge and stress incontinence  Will need incontinence supply  Orders:    Ambulatory referral to chronic care management; Future    Incontinence Supply Disposable (Brief Overnight Large) MISC; Use 4 (four) times a day    Incontinence Supply Disposable (Underpads) MISC; Use in the morning Please dispense washable underpads    Infant Care Products (Comfort-Smooth Baby Wipes) MISC; Use 4 (four) times a day
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
## 2025-04-16 NOTE — ASSESSMENT & PLAN NOTE
At goal of less than 140/90  Continue amlodipine  Orders:    amLODIPine (NORVASC) 2.5 mg tablet; Take 1 tablet (2.5 mg total) by mouth daily for 21 days

## 2025-04-16 NOTE — ASSESSMENT & PLAN NOTE
Orders:    omeprazole (PriLOSEC) 40 MG capsule; Take 1 capsule (40 mg total) by mouth 2 (two) times a day

## 2025-04-16 NOTE — ASSESSMENT & PLAN NOTE
History of cerebrovascular accident  Continue aspirin and statin for secondary stroke prophylaxis  Orders:    atorvastatin (LIPITOR) 40 mg tablet; Take 0.5 tablets (20 mg total) by mouth daily with dinner for 22 days    aspirin (ECOTRIN LOW STRENGTH) 81 mg EC tablet; Take 1 tablet (81 mg total) by mouth daily for 21 days

## 2025-04-16 NOTE — ASSESSMENT & PLAN NOTE
Orders:    Ambulatory referral to chronic care management; Future    Incontinence Supply Disposable (Brief Overnight Large) MISC; Use 4 (four) times a day    Incontinence Supply Disposable (Underpads) MISC; Use in the morning Please dispense washable underpads    Infant Care Products (Comfort-Smooth Baby Wipes) MISC; Use 4 (four) times a day

## 2025-04-16 NOTE — ASSESSMENT & PLAN NOTE
Lab Results   Component Value Date    HGBA1C 5.7 04/16/2025   Stable  Recommend low carbohydrate diet    Orders:    POCT hemoglobin A1c    Ambulatory referral to chronic care management; Future

## 2025-04-16 NOTE — ASSESSMENT & PLAN NOTE
History of multiple vertebral compression fraction  DEXA scan reviewed which showed osteopenia  Recommend calcium and vitamin D supplement daily  Orders:    Ambulatory referral to chronic care management; Future    gabapentin (NEURONTIN) 100 mg capsule; Take 1 capsule (100 mg total) by mouth 3 (three) times a day    lidocaine (LIDODERM) 5 %; Apply 1 patch topically over 12 hours daily Remove & Discard patch within 12 hours or as directed by MD

## 2025-04-16 NOTE — LETTER
April 16, 2025     Patient: Shae Jung  YOB: 1950  Date of Visit: 4/16/2025      To Whom it May Concern:    Shae Jung is under my professional care. Shae was seen in my office on 4/16/2025.     If you have any questions or concerns, please don't hesitate to call.         Sincerely,          Vicente Zhu MD        CC: No Recipients

## 2025-04-16 NOTE — ASSESSMENT & PLAN NOTE
Orders:    gabapentin (NEURONTIN) 100 mg capsule; Take 1 capsule (100 mg total) by mouth 3 (three) times a day    lidocaine (LIDODERM) 5 %; Apply 1 patch topically over 12 hours daily Remove & Discard patch within 12 hours or as directed by MD    acetaminophen (TYLENOL) 500 mg tablet; Take 2 tablets (1,000 mg total) by mouth every 8 (eight) hours as needed for mild pain or moderate pain

## 2025-04-16 NOTE — ASSESSMENT & PLAN NOTE
Most recent CT scan showed progression of L1 compression fracture  She will benefit from PT/OT evaluation    Orders:    lidocaine (LIDODERM) 5 %; Apply 1 patch topically over 12 hours daily Remove & Discard patch within 12 hours or as directed by MD    acetaminophen (TYLENOL) 500 mg tablet; Take 2 tablets (1,000 mg total) by mouth every 8 (eight) hours as needed for mild pain or moderate pain

## 2025-04-16 NOTE — ASSESSMENT & PLAN NOTE
Orders:    gabapentin (NEURONTIN) 100 mg capsule; Take 1 capsule (100 mg total) by mouth 3 (three) times a day    Diclofenac Sodium (VOLTAREN) 1 %; Apply 2 g topically 4 (four) times a day    Incontinence Supply Disposable (Brief Overnight Large) MISC; Use 4 (four) times a day    Incontinence Supply Disposable (Underpads) MISC; Use in the morning Please dispense washable underpads    Infant Care Products (Comfort-Smooth Baby Wipes) MISC; Use 4 (four) times a day

## 2025-04-16 NOTE — ASSESSMENT & PLAN NOTE
Patient requires assistance with IADLs and ADLs after multiple CVA  Patient will need 24/7 home care due to her underlying complex medical condition

## 2025-04-16 NOTE — ASSESSMENT & PLAN NOTE
Most likely due to depressed mood  Resume melatonin and trazodone  Orders:    Melatonin 5 MG TABS; Take 1 tablet (5 mg total) by mouth daily at bedtime as needed (insomina)    traZODone (DESYREL) 50 mg tablet; Take 1 tablet (50 mg total) by mouth daily at bedtime

## 2025-04-16 NOTE — ASSESSMENT & PLAN NOTE
Orders:    traZODone (DESYREL) 50 mg tablet; Take 1 tablet (50 mg total) by mouth daily at bedtime

## 2025-04-16 NOTE — ASSESSMENT & PLAN NOTE
Orders:    Incontinence Supply Disposable (Brief Overnight Large) MISC; Use 4 (four) times a day    Incontinence Supply Disposable (Underpads) MISC; Use in the morning Please dispense washable underpads    Infant Care Products (Comfort-Smooth Baby Wipes) MISC; Use 4 (four) times a day

## 2025-04-17 ENCOUNTER — PATIENT OUTREACH (OUTPATIENT)
Dept: FAMILY MEDICINE CLINIC | Facility: CLINIC | Age: 75
End: 2025-04-17

## 2025-04-17 DIAGNOSIS — R41.3 MEMORY LOSS: ICD-10-CM

## 2025-04-17 DIAGNOSIS — I69.398 GAIT DISTURBANCE, POST-STROKE: ICD-10-CM

## 2025-04-17 DIAGNOSIS — M48.58XA COLLAPSED VERTEBRA, NOT ELSEWHERE CLASSIFIED, SACRAL AND SACROCOCCYGEAL REGION, INITIAL ENCOUNTER FOR FRACTURE (HCC): ICD-10-CM

## 2025-04-17 DIAGNOSIS — R26.89 LOSS OF BALANCE: ICD-10-CM

## 2025-04-17 DIAGNOSIS — N39.46 URGE AND STRESS INCONTINENCE: ICD-10-CM

## 2025-04-17 DIAGNOSIS — R13.12 OROPHARYNGEAL DYSPHAGIA: ICD-10-CM
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
## 2025-04-17 DIAGNOSIS — Z86.73 HISTORY OF CVA (CEREBROVASCULAR ACCIDENT): ICD-10-CM

## 2025-04-17 DIAGNOSIS — M81.0 AGE RELATED OSTEOPOROSIS, UNSPECIFIED PATHOLOGICAL FRACTURE PRESENCE: ICD-10-CM

## 2025-04-17 DIAGNOSIS — Z86.73 HISTORY OF STROKE: ICD-10-CM

## 2025-04-17 DIAGNOSIS — R26.2 AMBULATORY DYSFUNCTION: ICD-10-CM

## 2025-04-17 DIAGNOSIS — I69.30 HISTORY OF CVA WITH RESIDUAL DEFICIT: ICD-10-CM

## 2025-04-17 DIAGNOSIS — M54.16 LUMBAR RADICULITIS: ICD-10-CM

## 2025-04-17 DIAGNOSIS — S32.010D COMPRESSION FRACTURE OF L1 VERTEBRA WITH ROUTINE HEALING, SUBSEQUENT ENCOUNTER: ICD-10-CM

## 2025-04-17 DIAGNOSIS — R26.9 GAIT DISTURBANCE, POST-STROKE: ICD-10-CM

## 2025-04-17 NOTE — PROGRESS NOTES
Patient:    MRN:  [id number]    Marcela Request ID:  [id number]    Level of care reserved:    Partner Reserved:    Clinical needs requested:  physical therapy, Mongolian speaking provider or , speech therapy, op pt in the home, occupational therapy, skilled nursing    Geography searched:  20 miles around [address]    Start of Service:    Request sent:  11:52am EDT on 4/17/2025 by Zoila Leung    Partner reserved:    Choice list shared:

## 2025-04-17 NOTE — PROGRESS NOTES
Patient:    MRN:  [id number]    Marcela Request ID:  [id number]    Level of care reserved:  Home Health Agency    Partner Reserved:  [place] [address] [phone]    Clinical needs requested:  physical therapy, Niuean speaking provider or , speech therapy, op pt in the home, occupational therapy, skilled nursing    Geography searched:  20 miles around [address]    Start of Service:    Request sent:  11:52am EDT on 4/17/2025 by Zoila Leung    Partner reserved:  11:58am EDT on 4/17/2025 by Zoila Leung    Choice list shared:

## 2025-04-18 ENCOUNTER — PATIENT OUTREACH (OUTPATIENT)
Dept: FAMILY MEDICINE CLINIC | Facility: CLINIC | Age: 75
End: 2025-04-18

## 2025-04-18 NOTE — PROGRESS NOTES
Patient identified for CCM billing.    I called the patient's brother, Gulshan, using [employer] but received voicemail.  Message was left asking for a return call.  I will continue further outreach.    Chart reviewed.

## 2025-04-23 ENCOUNTER — PATIENT OUTREACH (OUTPATIENT)
Dept: FAMILY MEDICINE CLINIC | Facility: CLINIC | Age: 75
End: 2025-04-23

## 2025-04-23 NOTE — PROGRESS NOTES
Outgoing Call  2025    CMOC called Shae Jung on this day regarding new referral received from CRISTI Jaime to assist with SNAP Benefits application.    CMOC introduced herself and her role. Gulshan agreed services and home visits.    CMOC explained Gulshan that [facility] will complete SNAP Benefits application at the time of home visit. Gulshan agreed.    Gulshan stated that he needs assistance to follow up with Home Care Renewal and to establish services for Shae. CMOC will do.    Also Gulshan Stated that he needs to get new ID, Passport and Social Security Card for Shae. CMOC will do.    Per Chart Review, CMOC found an  ID, [employer] Medicare Insurance Card and Medicare SSAO Card. CM will provide copy to Shae.    CMOC scheduled Home Visit tomorrow 2025.

## 2025-04-23 NOTE — LETTER
April 23, 2025    Shae Jung  [address] [place] [id number]    Dear Ms. Jung,    We would like to invite you to participate in our Chronic Care Management program with the goal of improving your health. We will match you up with a care manager who will work with you to keep your chronic conditions under control. Please call your clinic or log on to [facility] if you have any questions or would like to enroll.    We look forward to working toward a healthier you together.         Sincerely,    Palak RUTLEDGE Care Manager  [phone]

## 2025-04-23 NOTE — PROGRESS NOTES
I called Gulshan using [employer] but received voicemail.  Message was left asking for a return call.  I am sending the UTR and closing this case.

## 2025-04-24 ENCOUNTER — PATIENT OUTREACH (OUTPATIENT)
Dept: FAMILY MEDICINE CLINIC | Facility: CLINIC | Age: 75
End: 2025-04-24

## 2025-04-25 ENCOUNTER — PATIENT OUTREACH (OUTPATIENT)
Dept: FAMILY MEDICINE CLINIC | Facility: CLINIC | Age: 75
End: 2025-04-25
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
## 2025-04-28 ENCOUNTER — PATIENT OUTREACH (OUTPATIENT)
Dept: FAMILY MEDICINE CLINIC | Facility: CLINIC | Age: 75
End: 2025-04-28

## 2025-04-28 NOTE — PROGRESS NOTES
OP SW received a call from [facility]. OP SW spoke with Hannah. Hannah will send message to supervisor about APS referral. OP SW will be called back once they have information. OP SW had called again to [facility]. OP CRISTI spoke with [facility] who transferred case to Supervisor, Noy. Noy told MYRA COLIN that this is not an APS case. Noy informed OP CRISTI that it is more complicated.    Noy stated case was sent to  Department. Noy stated they handle cases for individuals with multiple issues. Noy stated she did email stakeholder for [facility] regarding waiver services. Noy stated that it should be a simple renewal for benefits to continue.     Noy told OP SW that if she finds out a date of when patient's brother, Gulshan will be returning to [facility] to let her know. Noy mentioned that if Gulshan cannot be POA then to also let her know. Noy and MYRA COLIN discussed possibility of nursing home placement if Gulshan cannot continue caring for patient.     OP CRISTI had contacted Gulshan. OP CRISTI left a voicemail in Latvian. OP CRISTI had contacted OC Kinjal Luo. Kinjal will call OP SW during home visit for this patient so she can speak with Gulshan. OP SW routed note to Kinjal. OP CRISTI will continue to f/u.

## 2025-04-28 NOTE — PROGRESS NOTES
Incoming Call  04/28/2025    CMOC received phone call from Shae Gaffney's Brother.    Gulshan stated that they went in person to [facility] and award letter was given.    CMOC scheduled Home Visit tomorrow 04/29/2025.    CMOC will continue to follow up.

## 2025-04-29 ENCOUNTER — PATIENT OUTREACH (OUTPATIENT)
Dept: FAMILY MEDICINE CLINIC | Facility: CLINIC | Age: 75
End: 2025-04-29

## 2025-04-29 NOTE — PROGRESS NOTES
Outgoing Call  04/29/2025    CMOC called Gulshan to confirm Home Visit.    Gulshan did not answer at this time. CMOC left voicemail to please call back.    CMOC called Mr Hodgson from [facility] Office for more information regarding renewing Home Care Services for Shae. Mr Hodgson stated that application was closed on February 26, 2025 due not reapplied bye Shae. Then new paperwork to renewal was sent on March. Application was received on April 22nd and Mr Meneses  assigned rejected application on April 26th due bank statements were missing. Mr Hodgson suggested CMOC to call Mr Meneses for more information for bank statements. CMOC will call along with Gulshan at the time of Home Visit.    Later on this day, CMOC received phone call from Gulshan.    CMOC explained Gulshan that HV would be rescheduled for Thursday due we need to make phone calls to find out more information regarding reapplying Home Care Services, contact SW at the office regarding his penitentiary plans and others. Gulshan expressed understanding.    CMOC explained Gulshan that bank statements are need it to renew HCS. Gulshan stated that he took them to [facility]/ yesterday. CMOC explained that we can ask for reconsideration. Gulshan seen not understood and stated that [facility] provided letter with all her benefits.    CMOC explained that Medicare and Medicaid are not the same. Also that Medicaid pays for HCS; and at this time case is closed and her benefits were cancelled on Feb 2025. Gulshan seen not understood.    CMOC explained that I will do Home Visit on Thursday and we would be able to talk about differences between medical insurances, and other concerns; Gulshan agreed.    Home Visit was rescheduled on 05/01/2025.

## 2025-05-01 ENCOUNTER — PATIENT OUTREACH (OUTPATIENT)
Dept: FAMILY MEDICINE CLINIC | Facility: CLINIC | Age: 75
End: 2025-05-01

## 2025-05-01 ENCOUNTER — RA CDI HCC (OUTPATIENT)
Dept: OTHER | Facility: HOSPITAL | Age: 75
End: 2025-05-01

## 2025-05-02 ENCOUNTER — PATIENT OUTREACH (OUTPATIENT)
Dept: FAMILY MEDICINE CLINIC | Facility: CLINIC | Age: 75
End: 2025-05-02

## 2025-05-02 NOTE — PROGRESS NOTES
OP SW had conference call yesterday with patient's brother, Gulshan and CMOC Kinjal Luo. OP SW clarified Gulshan's plans to retire and move back to [facility]. Gulshan mentioned that he plans to go back within a 1-2 years. Gulshan mentioned he has POA for patient. Gulshan mentioned that in case of death, disability, resignation or removal then patient's , Jean is to step in. Gulshan is upset that Jean nor daughter, Iraida are helping him out.    Gulshan expressed frustration with being POA. Gulshan stated he does not have support form Jean or Iraida. Gulshan stated he does not think he can continue with being POA. OP SW suggested patient speak with Jean and Iraida. OP SW advised Gulshan speak with [facility] Legal Services for further legal advise. Gulshan plans to outreach.    OP SW informed Gulshan that if Jean nor Iraida want to get involved then he will have to continue to assume responsibility. OP SW informed Gulshan that if he cannot assume responsibility then he has to consider nursing home placement. Gulshan became tearful. Patient was in the background very upset. OP SW provided supportive counseling.    Kinjal noted that all the paperwork has been submitted for waiver services. Kinjal noted she spoke with [facility] who is working to find two aides Tanzanian speaking for patient. Kinjal informed Gulshan that it is a process to has to wait.    Gulshan became upset that Jean does not want to give money to help patient. Gulshan mentioned that patient's home was sold and Jean has the money. OP SW once again directed Gulshan to seek legal advise. OP SW reminded Gulshan about contacting [facility] Legal Services.    Gulshan mentioned that he has been using his SNAP benefits for the patient. Kinjal informed Gulshan that he just needs to call EBT regarding changing pin. Gulshan provided with contact number. Gulshan plans to call.    Gulshan concerned as he does not like to leave patient alone which he steps out. Gulshan mentioned patient has  fallen 2x. OP SW asked patient if [facility] Care had started HHC for PT, OT, speech therapy and skilled nursing. Gulshan stated he is not sure as he can rarely  the phone taking care of patient. OP SW will outreach [facility] Care.    OP SW suggested Meals on Wheels referral in the meantime. Gulshan agreed. Gulshan thanked Kinjal and OP CRISTI for speaking with him.     OP SW had called [facility] Care. OP SW had spoke with Cheri. Cheri stated patient is in the system but they were not able to establish care. Cheri will send message to supervisor to try again.    OP SW had called [facility]. OP SW was transferred to JERSON, Uzma Bañuelos. OP SW left a voicemail. OP SW received a call back from Uzma. OP SW had called Uzma back. OP SW discussed the above. Uzma mentioned that she will set up home visit for Meals on Wheels referral.     OP CRISTI routed note to Kinjal. OP CRISTI will continue to f/u.

## 2025-05-02 NOTE — PROGRESS NOTES
In Person  05/01/2025    CMOC met with Shae and Gulshan on this day regarding Community Resources.    CMOC explained Guslhan that CMOC was able to find that Mr Meneses is  from [facility] assigned to Shae. Also CMOC explained Gulshan that we need to call him to request reconsideration. Gulshan expressed understanding.    CMOC called Mr Meneses. Mr Meneses did not answer at this time. CMOC left voicemail to please call back.    Gulshan stated that he received Shae's EBT card and needs help calling to reset pin number. CMOC called customer services and we were not able to change pin. CMOC recommended Gulshan to call [facility] to find more information on how to reset the pin. Phone number was provided.    CMOC video called CRISTI Cummings to follow up with Gulshan's snf plans. Gulshan explained his plans.     CMOC sent copy of POA to CRISTI Cummings via email to be explained to Gulshan.    On this video call Zoila explained Gulshan POA rights for Shae's  and Gulshan's rights. Gulshan expressed understanding.    CMOC and CRISTI recommended Gulshan to call Shae's  and daughter to request assistance caring for Shae. Gulshan will do.    Gulshan stated that Social Security Replacement Card have not been received yet. CMOC explained Gulshan that it would take up to 30 days.    Gulshan stated that his friend will assist Shae with paperwork related citizenship.    Gulshan stated that Shae's daughter requested copy of ID and will be receive on the mail.    CMOC and Zoila explained Gulshan that he needs to call [place]  Services for orientation, answers and questions. Gulshan will call.    CMOC will continue to follow up with Waiver Program approval.    Next follow up was scheduled on 05/09/2025.

## 2025-05-07 ENCOUNTER — PATIENT OUTREACH (OUTPATIENT)
Dept: FAMILY MEDICINE CLINIC | Facility: CLINIC | Age: 75
End: 2025-05-07

## 2025-05-07 ENCOUNTER — OFFICE VISIT (OUTPATIENT)
Dept: FAMILY MEDICINE CLINIC | Facility: CLINIC | Age: 75
End: 2025-05-07

## 2025-05-07 VITALS
RESPIRATION RATE: 18 BRPM | OXYGEN SATURATION: 99 % | SYSTOLIC BLOOD PRESSURE: 124 MMHG | HEART RATE: 69 BPM | DIASTOLIC BLOOD PRESSURE: 62 MMHG | BODY MASS INDEX: 25.1 KG/M2 | HEIGHT: 57 IN | TEMPERATURE: 98.1 F

## 2025-05-07 DIAGNOSIS — R26.9 GAIT DISTURBANCE, POST-STROKE: ICD-10-CM

## 2025-05-07 DIAGNOSIS — R41.89 COGNITIVE IMPAIRMENT: Primary | ICD-10-CM

## 2025-05-07 DIAGNOSIS — F32.A DEPRESSION, UNSPECIFIED DEPRESSION TYPE: ICD-10-CM

## 2025-05-07 DIAGNOSIS — R41.3 MEMORY LOSS: ICD-10-CM

## 2025-05-07 DIAGNOSIS — I69.398 GAIT DISTURBANCE, POST-STROKE: ICD-10-CM

## 2025-05-07 DIAGNOSIS — I69.30 HISTORY OF CVA WITH RESIDUAL DEFICIT: ICD-10-CM

## 2025-05-07 DIAGNOSIS — Z74.2 NEED FOR HOME HEALTH CARE: ICD-10-CM

## 2025-05-07 PROCEDURE — G2211 COMPLEX E/M VISIT ADD ON: HCPCS | Performed by: FAMILY MEDICINE

## 2025-05-07 PROCEDURE — 99214 OFFICE O/P EST MOD 30 MIN: CPT | Performed by: FAMILY MEDICINE

## 2025-05-07 RX ORDER — ESCITALOPRAM OXALATE 10 MG/1
10 TABLET ORAL DAILY
Qty: 30 TABLET | Refills: 3 | Status: SHIPPED | OUTPATIENT
Start: 2025-05-07

## 2025-05-07 NOTE — PROGRESS NOTES
Name: Shae Jung      : 1950      MRN: [id number]  Encounter Provider: Vicente Zhu MD  Encounter Date: 2025   Encounter department: [facility]  :  Assessment & Plan  Cognitive impairment  Patient has had multiple cerebrovascular accident  Family is concerned about her cognitive abilities    Orders:    Ambulatory Referral to Senior Care; Future    History of CVA with residual deficit    Orders:    Ambulatory Referral to Senior Care; Future    Depression, unspecified depression type  Depression Screening Follow-up Plan: Patient's depression screening was positive with a PHQ-2 score of 5. Their PHQ-9 score was 21. Patient assessed for underlying major depression. They have no active suicidal ideations. Brief counseling provided and recommend additional follow-up/re-evaluation next office visit.  Trial of Lexapro  Recommend to continue supportive care    Orders:    escitalopram (LEXAPRO) 10 mg tablet; Take 1 tablet (10 mg total) by mouth daily    Gait disturbance, post-stroke  Recommend home PT/ OT  Application for waiver services in process  Case discussed with case management in office       Need for home health care  Case discussed with case management in person  Patient will benefit from continued PT/OT and home health care       Memory loss  Likely due to prior cerebrovascular accident  Differentials include vascular dementia from prior stroke versus untreated depression    Orders:    Ambulatory Referral to Senior Care; Future           History of Present Illness    services used for the visit    74-year-old female with a history of cerebrovascular accident, diabetes, hypertension, and carotid stenosis who presents today for follow-up.  She is here with her brother Terrence.  Terrence states that he is frustrated with caring for her.  He states that he is doing his best but does not have enough resources to care for her.  Gulshan expressed  that her memory and cognition has declined significantly since she had a stroke few years ago.   He states that patient gets very agitated occasionally during the day. He states that she often attempts to get out of her wheelchair which put her at risk for falling.  He reports that she has had multiple falls.  He reports that she is very unsteady with her gait.  He states that she had a fall recently and sustained a bruise to her right shoulder.  He reports that he has been putting her in restraints in order to prevent her from falling when she tries to get up on her own.  He states that representative from Adult Protective Services instructed him that he is not to put her in a restraint.  He states that he does not know what to do when she gets agitated.  He is frustrated that patient has not received home services for his sister as of yet.  He is the power of .  He states that none of the other family members are helping the patient including her  and daughter.  Terrence reports that he would like to put her in a nursing home if she does not get the care that she needs at home.  He reports that he also has a life and he has to go to work to provide for himself.          Review of Systems   Unable to perform ROS: Patient nonverbal (History provided by brother)   Constitutional:  Positive for fatigue. Negative for chills, diaphoresis, fever and unexpected weight change.   Eyes:  Negative for visual disturbance.   Respiratory:  Negative for cough, shortness of breath and wheezing.    Cardiovascular:  Negative for chest pain and palpitations.   Gastrointestinal:  Negative for abdominal pain, nausea and vomiting.   Genitourinary:  Positive for difficulty urinating.   Musculoskeletal:  Positive for arthralgias, back pain and gait problem.   Skin:  Negative for rash.   Neurological:  Positive for speech difficulty and weakness. Negative for seizures and syncope.   Psychiatric/Behavioral:  Positive for  "dysphoric mood and sleep disturbance. Negative for agitation. The patient is nervous/anxious.        Objective   /62 (BP Location: Left arm, Patient Position: Sitting, Cuff Size: Standard)   Pulse 69   Temp 98.1 °F (36.7 °C) (Temporal)   Resp 18   Ht 4' 9\" (1.448 m)   SpO2 99%   BMI 25.10 kg/m²      Physical Exam  Constitutional:       General: She is not in acute distress.     Appearance: Normal appearance. She is well-developed. She is not ill-appearing, toxic-appearing or diaphoretic.   HENT:      Head: Normocephalic and atraumatic.      Right Ear: External ear normal.      Left Ear: External ear normal.      Nose: Nose normal.   Eyes:      General:         Right eye: No discharge.         Left eye: No discharge.      Extraocular Movements: Extraocular movements intact.      Pupils: Pupils are equal, round, and reactive to light.   Cardiovascular:      Rate and Rhythm: Normal rate and regular rhythm.      Pulses:           Dorsalis pedis pulses are 1+ on the right side and 1+ on the left side.        Posterior tibial pulses are 1+ on the right side and 1+ on the left side.      Heart sounds: Normal heart sounds. No murmur heard.     No friction rub. No gallop.   Pulmonary:      Effort: Pulmonary effort is normal. No respiratory distress.      Breath sounds: Normal breath sounds. No stridor. No wheezing or rhonchi.   Abdominal:      General: Bowel sounds are normal. There is no distension.      Palpations: Abdomen is soft. There is no mass.      Tenderness: There is no abdominal tenderness. There is no guarding.   Musculoskeletal:         General: Normal range of motion.        Arms:       Cervical back: Normal range of motion.      Right lower leg: No edema.      Left lower leg: No edema.      Comments: Right shoulder contusion   Feet:      Right foot:      Skin integrity: No ulcer, skin breakdown, erythema, warmth, callus or dry skin.      Left foot:      Skin integrity: No ulcer, skin breakdown, " erythema, warmth, callus or dry skin.   Skin:     General: Skin is warm.      Capillary Refill: Capillary refill takes less than 2 seconds.      Coloration: Skin is not jaundiced.      Findings: No bruising or lesion.   Neurological:      Mental Status: She is alert.      Gait: Gait abnormal (Wheelchair-bound).

## 2025-05-07 NOTE — PROGRESS NOTES
OP CRISTI had met with patient and brother, Gulshan. Gulshan informed OP CRISTI that he called [employer] through [facility]. Gulshan stated he called this morning. Gulshan stated they will send to him within 7-8 days. Gulshan stated Ms. Meneses is requesting this document from  Assistance Office. OP CRISTI contacted [facility] regarding this. CMOC made aware.    Gulshan still has not contacted [facility] Legal Services. Gulshan does not want to put patient in a nursing home. Gulshan stated  [employer] CM, Uzma Edda had stopped by this morning. Gulshan stated Uzma was planning to get some assistance in the home. Gulshan stated he has not heard from [facility] regarding HHC for PT, OT, speech therapy and skilled nursing. OP SW will outreach them again.    OP CRISTI had called [facility]. MYRA COLIN spoke with Luciana. Luciana confirmed patient is scheduled for today. Luciana stated they spoke with Gulshan regarding this. MYRA COLIN thanked Luciana for update.     MYRA COLIN routed note to [facility]. MYRA COLIN will continue to f/u.

## 2025-05-07 NOTE — ASSESSMENT & PLAN NOTE
Recommend home PT/ OT  Application for waiver services in process  Case discussed with case management in office

## 2025-05-07 NOTE — ASSESSMENT & PLAN NOTE
Depression Screening Follow-up Plan: Patient's depression screening was positive with a PHQ-2 score of 5. Their PHQ-9 score was 21. Patient assessed for underlying major depression. They have no active suicidal ideations. Brief counseling provided and recommend additional follow-up/re-evaluation next office visit.  Trial of Lexapro  Recommend to continue supportive care    Orders:    escitalopram (LEXAPRO) 10 mg tablet; Take 1 tablet (10 mg total) by mouth daily

## 2025-05-07 NOTE — ASSESSMENT & PLAN NOTE
Likely due to prior cerebrovascular accident  Differentials include vascular dementia from prior stroke versus untreated depression    Orders:    Ambulatory Referral to Senior Care; Future

## 2025-05-07 NOTE — ASSESSMENT & PLAN NOTE
Case discussed with case management in person  Patient will benefit from continued PT/OT and home health care

## 2025-05-09 ENCOUNTER — PATIENT OUTREACH (OUTPATIENT)
Dept: FAMILY MEDICINE CLINIC | Facility: CLINIC | Age: 75
End: 2025-05-09

## 2025-05-09 NOTE — PROGRESS NOTES
Outgoing Call  05/09/2025    CMOC called Shae Gaffney's Brother, on this day to follow up with Waiver Program and EBT Card/PIN Number.    Gulshan stated that Mr Meneses called him and requested Bank Statements and Life Insurance Letter. Gulshan already requested life insurance letter and is waiting to be received.    CMOC suggested Gulshan to please call CMOC as soon both letters are recived; and CMOC will submit it. Gulshan expressed understanding and will do.    Regarding EBT Card; Gulshan was able to fix the issues and everything is good.    CMOC will continue to follow up.    Next outreach was scheduled on 05/16/2025.

## 2025-05-12 ENCOUNTER — TELEPHONE (OUTPATIENT)
Dept: FAMILY MEDICINE CLINIC | Facility: CLINIC | Age: 75
End: 2025-05-12

## 2025-05-12 ENCOUNTER — PATIENT OUTREACH (OUTPATIENT)
Dept: FAMILY MEDICINE CLINIC | Facility: CLINIC | Age: 75
End: 2025-05-12

## 2025-05-12 NOTE — TELEPHONE ENCOUNTER
PCP SIGNATURE NEEDED FOR [facility] FORM RECEIVED VIA FAX AND PLACED IN PCP FOLDER TO BE DELIVERED AT ASSIGNED TIMES.      [facility]

## 2025-05-12 NOTE — PROGRESS NOTES
Incoming Call  05/12/2025    CMOC re eived phone call from Gulshan, Shae's Brother, on this day.    Gulshan stated that he received letter from [employer] and is ready to be .    CMOC schedule  tomorrow 05/13/2025.    CMOC will continue to follow up.

## 2025-05-13 ENCOUNTER — PATIENT OUTREACH (OUTPATIENT)
Dept: FAMILY MEDICINE CLINIC | Facility: CLINIC | Age: 75
End: 2025-05-13

## 2025-05-13 NOTE — TELEPHONE ENCOUNTER
FAXED ON 05/13/25 TO [facility]  at [phone]. FAX CONFIRMATION RECEIVED.  Scanned into pt chart.

## 2025-05-14 NOTE — PROGRESS NOTES
In Person  05/13/2025    CMOC met with leandro on this day regarding documents requested by DHS to process Waiver Program/Financial Part.    CMOC received from Leandro Letter of [employer]. CMOC explained Leandro that letter does not show cash value; and letter might be rejected. Also CMOC received Bank Statements from 12/2024;01/2025;02/2025 and 03/2025.     All documents were submitted via email/attention Mr Meneses.    CMOC will continue to follow up.    Next outreach was scheduled on 05/21/2025.

## 2025-05-15 ENCOUNTER — PATIENT OUTREACH (OUTPATIENT)
Dept: FAMILY MEDICINE CLINIC | Facility: CLINIC | Age: 75
End: 2025-05-15

## 2025-05-15 NOTE — PROGRESS NOTES
OP SW had called  AAA CM Uzma Bañuelos. OP SW left a voicemail. OP SW will await for call back to further discuss assistance in the home for patient. OP SW routed note to CM. OP SW will continue to f/u.    Addendum:    OP SW had received call back from Uzma. Uzma informed OP SW that she spoke with patient's brother, Gulshan. Uzma stated Gulshan claimed to have already submitted everything to the  Assistance office. Uzma stated they're just at this point waiting for a decision from waiver. Uzma also confirmed with her supervisor and patient is not eligible for Meals on Wheels. Uzma stated patient does not live alone and has Gulshan who is willing to cook for her. Uzma mentioned she would check back in a week or two but does plan to close case on her end.    Uzma mentioned an APS referral was placed. Uzma noted patient has a case open. Uzma noted APS CM, Tristan Doss appears on the case. OP SW routed updated note to OC.

## 2025-05-19 ENCOUNTER — PATIENT OUTREACH (OUTPATIENT)
Dept: FAMILY MEDICINE CLINIC | Facility: CLINIC | Age: 75
End: 2025-05-19

## 2025-05-20 ENCOUNTER — PATIENT OUTREACH (OUTPATIENT)
Dept: FAMILY MEDICINE CLINIC | Facility: CLINIC | Age: 75
End: 2025-05-20

## 2025-05-20 NOTE — PROGRESS NOTES
Outgoing Call  05/20/2025    CMOC called Shae Gaffney's brother, on this day to follow up with updates from Mr Zaira/ADILENE.    Gulshan stated that he called Mr Meneses few times and left voicemail. Mr Meneses have not return calls back.    CMOC suggested to call again.    Also CMOC sent email to Mr Meneses. Confimation of received was received.    CMOC will continue to follow up.    Next outreach was scheduled on 05/23/2025.

## 2025-05-20 NOTE — PROGRESS NOTES
Incoming Text Message  05/19/2025    CMOC received text message from Shae Gaffney's brother on this day.    Gulshan was asking for an update on Waiver Program Renewal.    CMOC responded to please call  Zaira from [facility] for updates.    CMOC will continue to follow up    Next outreach was scheduled on 05/20/2025.   yes

## 2025-05-23 ENCOUNTER — PATIENT OUTREACH (OUTPATIENT)
Dept: FAMILY MEDICINE CLINIC | Facility: CLINIC | Age: 75
End: 2025-05-23

## 2025-05-23 NOTE — PROGRESS NOTES
Outgoing Call  05/23/2025    CMOC called Shae Gaffney's Brother, on this day to follow up with Waiver Program/Financial Part.    Gulshan stated that Mr Meneses have no return calls back. CMOC explained Gulshan that CMOC have not received any calls neither emails from Mr Meneses.    Gulshan stated that he will try another way (calling supervisor) to find Mr Meneses.    CMOC will continue to follow up.    Next outreach was scheduled on 06/03/2025.

## 2025-05-28 ENCOUNTER — PATIENT OUTREACH (OUTPATIENT)
Dept: FAMILY MEDICINE CLINIC | Facility: CLINIC | Age: 75
End: 2025-05-28

## 2025-05-28 NOTE — PROGRESS NOTES
OP SW had called  [employer]. OP SW spoke with rep. OP SW transferred to [facility] CM, Tristan Doss. OP SW left a voicemail. OP SW will await call back. OP SW routed note to CMOC. OP SW will continue to f/u.    Addendum:    OP CRISTI received a call back from Tristan. Tristan stated that she is trying to get Adult Day Care program for patient. Tristan stated she notes brother, Gulshan is very overwhelmed with caring for the patient. Tristan stated she hopes to alleviate some stress with the Adult Day Care program.    Tristan stated she also called  Assistance Office regarding status on MA application. Tristan stated they still are requesting bank statements and [employer] information. OP SW noted that this was given to  Mr. Meneses. OP SW informed Tristan that patient has to f/u with Mr. Meneses regarding status. [facility] has also tried to contact Mr. Meneses. Tristan will contact Gulshan regarding any update from Mr. Meneses.    OP SW made Tristan aware that she has spoken to Gulshan about nursing home placement. Gulshan declined. Tristan thinks it would be beneficial for Gulshan to reconsider nursing home placement. Tristan asked that CMOC and OP SW coordinate date and time with her to speak with Gulshan together. OP SW will speak with CMOC regarding this. OP SW sent updated note to CMOC.

## 2025-05-29 ENCOUNTER — PATIENT OUTREACH (OUTPATIENT)
Dept: FAMILY MEDICINE CLINIC | Facility: CLINIC | Age: 75
End: 2025-05-29

## 2025-05-29 NOTE — PROGRESS NOTES
OP SW had received a call from the patient's brother, Gulshan. In response, the OP SW returned the call and communicated in Gulshan's preferred language, Equatorial Guinean. During the conversation, Gulshan exhibited confrontational behavior and demanded an explanation for the delay in the response from the OP SW. OP SW clarified that her obligations extend to multiple patients.    Gulshan asserted that [facility] had failed to provide the necessary documents to Mr. Meneses. Gulshan informed the OP SW that he had recently mailed the documents to the  Assistance Office. Gulshan's demeanor became increasingly contentious, prompting the OP SW to advise him to maintain a respectful tone. Gulshan expressed dissatisfaction, indicating that the process was taking an excessive amount of time. OP SW explained that she does not have control over the duration required for the approval of MA. Gulshan assured the OP SW that there was no need for concern, as the patient would be attending an Adult Day Program.     OP SW emphasized that they are working diligently to secure MA and to reinstate waiver services. OP SW encouraged Gulshan to be patient. Gulshan apologized for his behavior. OP SW advised Gulshan to go in person to  Assistance Office if able and/or continue calling Mr. Meneses. OP SW routed note to [facility]. OP SW will continue to f/u.

## 2025-06-03 ENCOUNTER — TELEPHONE (OUTPATIENT)
Dept: FAMILY MEDICINE CLINIC | Facility: CLINIC | Age: 75
End: 2025-06-03

## 2025-06-03 ENCOUNTER — PATIENT OUTREACH (OUTPATIENT)
Dept: FAMILY MEDICINE CLINIC | Facility: CLINIC | Age: 75
End: 2025-06-03

## 2025-06-03 NOTE — PROGRESS NOTES
OP CRISTI had received a call from [facility] Tristan ARTHUR. OP CRISTI had called [facility] Saulo Zaira. OP CRISTI left a voicemail. OP CRISTI had called Tristan back and left a voicemail. OP CRISTI routed note to [facility]. OP CRISTI will continue to f/u.

## 2025-06-03 NOTE — TELEPHONE ENCOUNTER
[facility] is requesting for pcp to sign and date order sent via email that needs to be esigned. Call back number [phone].

## 2025-06-04 ENCOUNTER — PATIENT OUTREACH (OUTPATIENT)
Dept: FAMILY MEDICINE CLINIC | Facility: CLINIC | Age: 75
End: 2025-06-04

## 2025-06-04 NOTE — PROGRESS NOTES
Outgoing Call  06/04/2025    [facility] called Gulshan, Shae's Brother, on this day to follow up with Waiver Program Renewal.    Gulshan stated that Shae does not want to stay at his home and would like to go Nursing Facility.    CMOC explained Gulshan that Waiver Program renewal process will stop due Shae can not qualifies for both at the same time. Gulshan agreed and restated that Shae wants to be at nursing facility.    Also OC explained Gulshan that  will be calling him for assistance. Gulshan expressed understanding.    [facility] will closed this referral on my behalf today 06/04/2025 due Shae would like to be at Nursing Facility.    No follow up was scheduled at this time.

## 2025-06-05 ENCOUNTER — PATIENT OUTREACH (OUTPATIENT)
Dept: FAMILY MEDICINE CLINIC | Facility: CLINIC | Age: 75
End: 2025-06-05

## 2025-06-05 NOTE — PROGRESS NOTES
OP SW had received an in basket from [facility] yesterday. Per in basket, patient's brother, Gulshan wants to place patient into LTC facility. Per in basket, Gulshan no longer wants to pursue waiver services. [facility] had closed case on her end. OP SW responded to [facility].    OP SW had called [facility]. OP SW spoke with Luciana. Luciana stated she would relay above message to Sean COLIN. Luciana stated she would have Sean call OP SW. Luciana provided OP SW with contact number fr [facility] to inquire about process for LTC. OP SW had called [facility]. OP SW spoke with Bela. Bela noted Sean will need to start process. Bela informed OP SW that she will have Sean f/u regarding LTC for this patient.     OP SW had called  [facility] APS CMTristan. OP SW informed Tristan about the above. Tristan stated she will also f/u with Gulshan. OP SW made Tristan aware of last conversation with Gulshan on 5/29. OP SW also made Tristan aware of yesterday's conversation with [facility]. OP SW explained she will not allow disrespectful behavior towards her or other staff.    OP SW had called Gulshan. OP SW spoke in the preferred language, Greenlandic. Gulshan stated patient claims to be treated poorly by him. Gulshan also stated patient claims to be bored living with him. Gulshan expressed continued interest to pursue LTC for patient. Gulshan stated patient is becoming a lot for him to handle on his own. Gulshan noted he does not want any legal issues. Gulshan still has no update regarding MA application. OP SW made Gulshan aware that Sean will f/u regarding LTC.     OP CRISTI had received a call back from Sean. Sean stated that the [facility] facility would not be LTC. Sean suggested patient and Gulshan connect with [facility] and/or [facility]. Sean also suggested if they want urgent LTC placement to go to the ED. OP SW will continue to f/u.

## 2025-06-09 ENCOUNTER — TELEPHONE (OUTPATIENT)
Dept: FAMILY MEDICINE CLINIC | Facility: CLINIC | Age: 75
End: 2025-06-09

## 2025-06-09 ENCOUNTER — PATIENT OUTREACH (OUTPATIENT)
Dept: FAMILY MEDICINE CLINIC | Facility: CLINIC | Age: 75
End: 2025-06-09

## 2025-06-09 DIAGNOSIS — Z86.73 HISTORY OF CVA (CEREBROVASCULAR ACCIDENT): ICD-10-CM

## 2025-06-09 RX ORDER — ASPIRIN 81 MG/1
81 TABLET ORAL DAILY
Qty: 90 TABLET | Refills: 1 | Status: SHIPPED | OUTPATIENT
Start: 2025-06-09

## 2025-06-09 NOTE — PROGRESS NOTES
OP SW had called  [facility] APS CMTristan. OP SW left a voicemail. OP SW had called patient's brother, Gulshan. OP SW spoke in the preferred language, Yoruba. OP SW had informed Gulshan that he can connect with [facility] and/or [facility] for LTC placement. OP SW also informed Gulshan if they want urgent LTC placement to go to the ED. Gulshan understood. Gulshan will get back to OP SW with his decision.    OP SW had contacted [facility]  Henna Zaman for assistance with LTC placement. Henna suggested OP SW contact [facility]. OP SW had contacted [facility]. OP SW spoke with admissions department. OP SW advised to place referral via [facility]IN. OP SW agreed.    OP SW completed referral via AIDIN. OP SW sent message to PCP about completing MA-51 form. PCP will plan to complete and return to OP SW. OP SW sent referral to following places via AIDIN:    [facility]  [facility]  [facility]  [facility]  [facility]    OP SW will continued to f/u.

## 2025-06-09 NOTE — TELEPHONE ENCOUNTER
Diann from [facility] called regarding an update medication list for patient. Nurse is reviewing medication list with patient's brother (POA) and wants to see what patient should be on.     Aspirin is on the current med list but script ended. Script was dispensed 90 tablets and instructions to take for 21 days on 4/16. Does patient still need to be on Aspirin given her stroke history and no other form of anticoagulant? Or was the script only for the 21 days? Please advise so I can let the [facility] nurse know.     Thanks.    Nurse requesting last office note faxed to [phone] along with note indicating if patient is to be on aspirin or not.

## 2025-06-09 NOTE — PROGRESS NOTES
Patient:    MRN:  [id number]    Aidin Request ID:  [id number]    Level of care reserved:    Partner Reserved:    Clinical needs requested:  Martiniquais speaking provider or     Geography searched:  10 miles around [id number]    Start of Service:    Request sent:  12:58pm EDT on 6/9/2025 by Zoila Leung    Partner reserved:    Choice list shared:

## 2025-06-11 ENCOUNTER — PATIENT OUTREACH (OUTPATIENT)
Dept: FAMILY MEDICINE CLINIC | Facility: CLINIC | Age: 75
End: 2025-06-11

## 2025-06-11 NOTE — PROGRESS NOTES
Patient:    MRN:  [id number]    Marcela Request ID:  [id number]    Level of care reserved:  Skilled Nursing Facility    Partner Reserved:  [facility] , [place] [address] [phone]    Clinical needs requested:  Haitian speaking provider or     Geography searched:  10 miles around [id number]    Start of Service:    Request sent:  12:58pm EDT on 6/9/2025 by Zoila Leung    Partner reserved:  8:27am EDT on 6/11/2025 by Zoila Leung    Choice list shared:

## 2025-06-11 NOTE — PROGRESS NOTES
OP SW had received completed MA-51 from PCP. OP SW submitted MA-51 in AIDIN referral. OP SW received response that [facility]. OP SW reserved this agency within AIDIN. OP SW had called patient's brother, Gulshan. OP SW left a voicemail in Czech. OP SW had called  [facility] APS CM, Tristan Doss. OP SW left a voicemail. OP SW had called [facility] SW, Sean. OP SW left a voicemail.     OP SW had received a call from Gulshan. OP SW had received a call from [facility], Terri Arias. OP SW had called Terri back. Terri told OP SW that their [place] location has no beds available. Terri told OP SW that they do have beds available in [place], [place]. Terri informed OP SW that they can place patient in [place], [place] with plan to have her transferred to [place], [place] once a bed is available. OP SW will discuss with Gulshan and get back to Terri.    OP SW had called Gulshan back. OP SW spoke in the preferred language, Czech. Gulshan told OP SW that CM was present with him but did not specify who. OP SW informed Gulshan about the above. Gulshan declined [place] location. Gulshan stated he was going to continue to keep patient in home. Gulshan stated he spoke with , Jean and daughter, Leticia who plan to help him out.    OP CRISTI made Gulshan aware that CMOC closed case because he wanted to go through LTC placement. Gulshan aware but Jean and Leticia plan to help him out. OP SW advised Gulshan and Sean to f/u with Mr. Meneses regarding MA application. OP SW also advised that they f/u with [facility]. OP SW made patient and Sean aware that case will be closed on her end.     OP SW had called Tristan and left a detailed voicemail about the above. OP SW also called Terri and left a detailed voicemail with same. OP CRISTI OP SW closed case. Please reconsult as needed.    Addendum:    OP SW had called Sean back. OP SW left a voicemail. OP SW had called [facility]. MYRA COLIN  spoke with Ana Rosa. Ana Rosa transferred MYRA COLIN to Sean. MYRA COLIN left a voicemail for Sean. Sean called MYRA COLIN back. MYRA COLIN informed Sean of the above. Sean thanked MYRA COLIN for update.

## 2025-06-12 ENCOUNTER — PATIENT OUTREACH (OUTPATIENT)
Dept: FAMILY MEDICINE CLINIC | Facility: CLINIC | Age: 75
End: 2025-06-12

## 2025-06-12 NOTE — PROGRESS NOTES
Chart Review  06/12/2025    [facility] placed referral to [facility] in order Emma can assist Gulshan with Waiver Program referral and staffing.    Emma stated that will call Gulshan to follow up.    No outreach was scheduled at this time.

## 2025-06-12 NOTE — TELEPHONE ENCOUNTER
FAXED ON 06/12/25 TO Diann at [facility] at [phone]. FAX CONFIRMATION RECEIVED.

## 2025-06-13 ENCOUNTER — TELEPHONE (OUTPATIENT)
Dept: NEUROLOGY | Facility: CLINIC | Age: 75
End: 2025-06-13

## 2025-06-13 NOTE — TELEPHONE ENCOUNTER
Pt conf appt on   6/17/25   @2:30pm  at the [facility] with  . Thank you for choosing [facility] for your care

## 2025-06-16 ENCOUNTER — PATIENT OUTREACH (OUTPATIENT)
Dept: FAMILY MEDICINE CLINIC | Facility: CLINIC | Age: 75
End: 2025-06-16

## 2025-06-16 NOTE — PROGRESS NOTES
MYRA COLIN had received a call from [facility] Tristan ARTHUR. MYRA COLIN explained to Tristan that patient's brother, Gulshan declined to move forward with LTC placement. MYRA COLIN informed Tristan that Gulshan needs to f/u with Mr. Meneses regarding MA application. MYRA COLIN also informed Tristan that [facility] is willing to assist with waiver services and staffing. MYRA COLIN provided Tristan the contact number for Emma from [facility]. Tristan thanked MYRA COLIN for update. MYRA COLIN will continue to keep case closed. Please reconsult as needed.

## 2025-06-18 ENCOUNTER — OFFICE VISIT (OUTPATIENT)
Dept: FAMILY MEDICINE CLINIC | Facility: CLINIC | Age: 75
End: 2025-06-18

## 2025-06-18 VITALS
RESPIRATION RATE: 14 BRPM | DIASTOLIC BLOOD PRESSURE: 58 MMHG | HEIGHT: 57 IN | HEART RATE: 72 BPM | OXYGEN SATURATION: 97 % | BODY MASS INDEX: 25.1 KG/M2 | SYSTOLIC BLOOD PRESSURE: 102 MMHG | TEMPERATURE: 96.8 F

## 2025-06-18 DIAGNOSIS — T17.308A CHOKING, INITIAL ENCOUNTER: ICD-10-CM

## 2025-06-18 DIAGNOSIS — I69.398 GAIT DISTURBANCE, POST-STROKE: ICD-10-CM

## 2025-06-18 DIAGNOSIS — R13.12 OROPHARYNGEAL DYSPHAGIA: ICD-10-CM

## 2025-06-18 DIAGNOSIS — R26.9 GAIT DISTURBANCE, POST-STROKE: ICD-10-CM

## 2025-06-18 DIAGNOSIS — S32.010S COMPRESSION FRACTURE OF L1 VERTEBRA, SEQUELA: ICD-10-CM

## 2025-06-18 DIAGNOSIS — I69.351 HEMIPLEGIA AND HEMIPARESIS FOLLOWING CEREBRAL INFARCTION AFFECTING RIGHT DOMINANT SIDE (HCC): ICD-10-CM

## 2025-06-18 DIAGNOSIS — M51.369 DEGENERATION OF INTERVERTEBRAL DISC OF LUMBAR REGION, UNSPECIFIED WHETHER PAIN PRESENT: ICD-10-CM

## 2025-06-18 DIAGNOSIS — I10 PRIMARY HYPERTENSION: ICD-10-CM

## 2025-06-18 DIAGNOSIS — M94.0 COSTOCHONDRITIS, ACUTE: Primary | ICD-10-CM

## 2025-06-18 DIAGNOSIS — S06.5X0S TRAUMATIC SUBDURAL HEMORRHAGE WITHOUT LOSS OF CONSCIOUSNESS, SEQUELA (HCC): ICD-10-CM

## 2025-06-18 PROBLEM — M54.12 CERVICAL RADICULITIS: Status: RESOLVED | Noted: 2017-05-09 | Resolved: 2025-06-18

## 2025-06-18 PROBLEM — F29 PSYCHOSIS, UNSPECIFIED PSYCHOSIS TYPE (HCC): Status: ACTIVE | Noted: 2025-06-18

## 2025-06-18 PROCEDURE — G2211 COMPLEX E/M VISIT ADD ON: HCPCS | Performed by: FAMILY MEDICINE

## 2025-06-18 PROCEDURE — 99214 OFFICE O/P EST MOD 30 MIN: CPT | Performed by: FAMILY MEDICINE

## 2025-06-18 RX ORDER — ACETAMINOPHEN 500 MG
1000 TABLET ORAL EVERY 8 HOURS PRN
Qty: 90 TABLET | Refills: 1 | Status: SHIPPED | OUTPATIENT
Start: 2025-06-18

## 2025-06-18 RX ORDER — LIDOCAINE 50 MG/G
1 PATCH TOPICAL DAILY
Qty: 15 PATCH | Refills: 3 | Status: SHIPPED | OUTPATIENT
Start: 2025-06-18

## 2025-06-18 NOTE — ASSESSMENT & PLAN NOTE
Patient family reports concern for coughing and sometimes choking after she eats  Patient has a history of CVA  Referral for speech/ swallow evaluation    Orders:    Ambulatory Referral to Speech Therapy; Future

## 2025-06-18 NOTE — PROGRESS NOTES
Name: Shae Jung      : 1950      MRN: [id number]  Encounter Provider: Vicente Zhu MD  Encounter Date: 2025   Encounter department: [facility]  :  Assessment & Plan  Costochondritis, acute  A few days of chest discomfort  Pain is reproducible with palpation  Recommend trial of warm compresses, Tylenol, diclofenac, and lidocaine patch  Return to clinic for evaluation if symptoms persist      Orders:    acetaminophen (TYLENOL) 500 mg tablet; Take 2 tablets (1,000 mg total) by mouth every 8 (eight) hours as needed for mild pain or moderate pain    Diclofenac Sodium (VOLTAREN) 1 %; Apply 2 g topically 4 (four) times a day    lidocaine (LIDODERM) 5 %; Apply 1 patch topically daily over 12 hours Remove & Discard patch within 12 hours or as directed by MD    Compression fracture of L1 vertebra, sequela  Recommend physical therapy            Degeneration of intervertebral disc of lumbar region, unspecified whether pain present  Recommend physical therapy       Gait disturbance, post-stroke  Recommend physical therapy       Oropharyngeal dysphagia  Patient family reports concern for coughing and sometimes choking after she eats  Patient has a history of CVA  Referral for speech/ swallow evaluation    Orders:    Ambulatory Referral to Speech Therapy; Future    Choking, initial encounter    Orders:    Ambulatory Referral to Speech Therapy; Future    Hemiplegia and hemiparesis following cerebral infarction affecting right dominant side (HCC)  Recommend follow-up with neurology outpatient       Traumatic subdural hemorrhage without loss of consciousness, sequela (HCC)  Recommend follow-up with neurology outpatient       Primary hypertension  At goal of less than 140/90  Continue amlodipine                 History of Present Illness    ID [id number]    74-year-old female with a history of CVA, hypertension, migraine, carotid stenosis, and  "hyperlipidemia who presents today for follow-up.  She is here with her caregiver.  She is doing much better according to caregiver.  She does have some mild discomfort on her chest wall for the past couple of days.  Caregiver also reports that she sometimes choke and coughs after she eats.  The pain is worse when she moves.  Patient is started receiving some home services.      Review of Systems   Reason unable to perform ROS: History provided by brother.   Constitutional:  Negative for chills, fatigue and fever.   Respiratory:  Positive for choking. Negative for shortness of breath and wheezing.    Cardiovascular:  Negative for chest pain and leg swelling.   Gastrointestinal:  Negative for abdominal pain, nausea and vomiting.   Musculoskeletal:  Positive for arthralgias, back pain and gait problem.   Skin:  Negative for rash.   Neurological:  Negative for seizures, syncope, weakness and headaches.   Psychiatric/Behavioral:  Negative for confusion.        Objective   /58 (BP Location: Right arm, Patient Position: Sitting, Cuff Size: Standard)   Pulse 72   Temp (!) 96.8 °F (36 °C) (Temporal)   Resp 14   Ht 4' 9\" (1.448 m)   SpO2 97%   BMI 25.10 kg/m²      Physical Exam  Constitutional:       General: She is not in acute distress.     Appearance: Normal appearance. She is well-developed. She is not ill-appearing, toxic-appearing or diaphoretic.   HENT:      Head: Normocephalic and atraumatic.      Right Ear: External ear normal.      Left Ear: External ear normal.      Mouth/Throat:      Pharynx: No oropharyngeal exudate.     Eyes:      General: No scleral icterus.        Right eye: No discharge.         Left eye: No discharge.      Extraocular Movements: Extraocular movements intact.      Pupils: Pupils are equal, round, and reactive to light.       Cardiovascular:      Rate and Rhythm: Normal rate and regular rhythm.      Heart sounds: Normal heart sounds. No murmur heard.     No friction rub. No gallop. "   Pulmonary:      Effort: Pulmonary effort is normal. No respiratory distress.      Breath sounds: No stridor. No wheezing or rhonchi.   Chest:      Chest wall: Tenderness present. No mass, deformity or swelling.        Comments: Chest wall tenderness on palpation  Abdominal:      General: Bowel sounds are normal. There is no distension.      Palpations: Abdomen is soft. There is no mass.      Tenderness: There is no abdominal tenderness. There is no guarding.     Musculoskeletal:         General: Normal range of motion.      Cervical back: Normal range of motion.   Lymphadenopathy:      Cervical: No cervical adenopathy.     Skin:     General: Skin is warm.      Capillary Refill: Capillary refill takes less than 2 seconds.     Neurological:      General: No focal deficit present.      Mental Status: She is alert and oriented to person, place, and time.      Gait: Gait normal.     Psychiatric:         Mood and Affect: Mood normal.         Behavior: Behavior normal.

## 2025-06-20 ENCOUNTER — TELEPHONE (OUTPATIENT)
Dept: FAMILY MEDICINE CLINIC | Facility: CLINIC | Age: 75
End: 2025-06-20

## 2025-06-20 NOTE — TELEPHONE ENCOUNTER
Arely Patel called today in regards orders that she sent to PCP by email, I suggested her , to have them fax also.  ORDERS Numbers:    [id number]  [id number]  [id number]

## 2025-06-23 ENCOUNTER — TELEPHONE (OUTPATIENT)
Dept: FAMILY MEDICINE CLINIC | Facility: CLINIC | Age: 75
End: 2025-06-23

## 2025-06-23 NOTE — TELEPHONE ENCOUNTER
PCP SIGNATURE NEEDED FOR [facility] FORM RECEIVED VIA FAX AND PLACED IN PCP FOLDER TO BE DELIVERED AT ASSIGNED TIMES.    Order#: [id number]

## 2025-06-24 ENCOUNTER — TELEPHONE (OUTPATIENT)
Dept: FAMILY MEDICINE CLINIC | Facility: CLINIC | Age: 75
End: 2025-06-24

## 2025-06-24 NOTE — TELEPHONE ENCOUNTER
PCP SIGNATURE NEEDED FOR [facility] FORM RECEIVED VIA FAX AND PLACED IN PCP FOLDER TO BE DELIVERED AT ASSIGNED TIMES.      Lidocaine patch prior auth

## 2025-06-28 DIAGNOSIS — M48.58XA COLLAPSED VERTEBRA, NOT ELSEWHERE CLASSIFIED, SACRAL AND SACROCOCCYGEAL REGION, INITIAL ENCOUNTER FOR FRACTURE (HCC): ICD-10-CM

## 2025-06-28 DIAGNOSIS — I69.398 GAIT DISTURBANCE, POST-STROKE: ICD-10-CM

## 2025-06-28 DIAGNOSIS — R26.9 GAIT DISTURBANCE, POST-STROKE: ICD-10-CM

## 2025-06-28 DIAGNOSIS — M51.369 DEGENERATION OF INTERVERTEBRAL DISC OF LUMBAR REGION, UNSPECIFIED WHETHER PAIN PRESENT: ICD-10-CM

## 2025-06-30 RX ORDER — GABAPENTIN 100 MG/1
CAPSULE ORAL
Qty: 90 CAPSULE | Refills: 2 | Status: SHIPPED | OUTPATIENT
Start: 2025-06-30

## 2025-07-09 ENCOUNTER — TELEPHONE (OUTPATIENT)
Dept: FAMILY MEDICINE CLINIC | Facility: CLINIC | Age: 75
End: 2025-07-09

## 2025-07-09 NOTE — TELEPHONE ENCOUNTER
IEB FORM RECEIVED ON 7/9/2025  GIVEN TO ETHAN BUSTAMANTE TO BE COMPLETED, SIGNED BY MD/ (INCLUDE LISC. NUMBER), AND FAXED BACK IN 3 DAYS

## 2025-07-10 NOTE — TELEPHONE ENCOUNTER
FORM COMPLETED, SIGNED BY MD/ (INCLUDE LISC. NUMBER) FAXED ON 07/10/25 TO [facility] at [phone]. FAX CONFIRMATION RECEIVED. FORM GIVEN TO HUGH TO SCAN

## 2025-07-20 DIAGNOSIS — F32.A DEPRESSION, UNSPECIFIED DEPRESSION TYPE: ICD-10-CM

## 2025-07-20 DIAGNOSIS — K21.9 GASTROESOPHAGEAL REFLUX DISEASE, UNSPECIFIED WHETHER ESOPHAGITIS PRESENT: ICD-10-CM

## 2025-07-21 RX ORDER — OMEPRAZOLE 40 MG/1
CAPSULE, DELAYED RELEASE ORAL
Qty: 60 CAPSULE | Refills: 3 | Status: SHIPPED | OUTPATIENT
Start: 2025-07-21

## 2025-07-21 RX ORDER — ESCITALOPRAM OXALATE 10 MG/1
10 TABLET ORAL DAILY
Qty: 30 TABLET | Refills: 3 | Status: SHIPPED | OUTPATIENT
Start: 2025-07-21

## 2025-08-07 DIAGNOSIS — F99 INSOMNIA DUE TO OTHER MENTAL DISORDER: ICD-10-CM

## 2025-08-07 DIAGNOSIS — F51.05 INSOMNIA DUE TO OTHER MENTAL DISORDER: ICD-10-CM

## 2025-08-07 RX ORDER — TRAZODONE HYDROCHLORIDE 50 MG/1
TABLET ORAL
Qty: 30 TABLET | Refills: 2 | Status: SHIPPED | OUTPATIENT
Start: 2025-08-07

## (undated) DEVICE — CATH FOLEY 18FR 5ML 2 WAY SILICONE ELASTIMER

## (undated) DEVICE — EXIDINE 4 PCT

## (undated) DEVICE — ALLENTOWN DR  LUCENTE S LAP PK: Brand: CARDINAL HEALTH

## (undated) DEVICE — PREMIUM DRY TRAY LF: Brand: MEDLINE INDUSTRIES, INC.

## (undated) DEVICE — NEEDLE HYPO 22G X 1-1/2 IN

## (undated) DEVICE — INTENDED FOR TISSUE SEPARATION, AND OTHER PROCEDURES THAT REQUIRE A SHARP SURGICAL BLADE TO PUNCTURE OR CUT.: Brand: BARD-PARKER SAFETY BLADES SIZE 11, STERILE

## (undated) DEVICE — SCD SEQUENTIAL COMPRESSION COMFORT SLEEVE MEDIUM KNEE LENGTH: Brand: KENDALL SCD

## (undated) DEVICE — GLOVE SRG LF STRL BGL SKNSNS 8 PF